# Patient Record
Sex: FEMALE | Race: WHITE | NOT HISPANIC OR LATINO | Employment: OTHER | URBAN - NONMETROPOLITAN AREA
[De-identification: names, ages, dates, MRNs, and addresses within clinical notes are randomized per-mention and may not be internally consistent; named-entity substitution may affect disease eponyms.]

---

## 2017-01-13 ENCOUNTER — OFFICE VISIT (OUTPATIENT)
Dept: FAMILY MEDICINE CLINIC | Facility: CLINIC | Age: 62
End: 2017-01-13

## 2017-01-13 VITALS
DIASTOLIC BLOOD PRESSURE: 55 MMHG | SYSTOLIC BLOOD PRESSURE: 138 MMHG | BODY MASS INDEX: 33.33 KG/M2 | TEMPERATURE: 98.1 F | HEART RATE: 80 BPM | WEIGHT: 225 LBS | HEIGHT: 69 IN | OXYGEN SATURATION: 97 %

## 2017-01-13 DIAGNOSIS — E03.9 ACQUIRED HYPOTHYROIDISM: Primary | ICD-10-CM

## 2017-01-13 DIAGNOSIS — R10.11 RIGHT UPPER QUADRANT ABDOMINAL PAIN: ICD-10-CM

## 2017-01-13 PROCEDURE — 99214 OFFICE O/P EST MOD 30 MIN: CPT | Performed by: INTERNAL MEDICINE

## 2017-01-13 NOTE — MR AVS SNAPSHOT
[place]ia Ramos   1/13/2017 1:15 PM   Office Visit    Dept Phone:  [phone]   Encounter #:  [id number]    Provider:  Gordon Norman MD   Department:  [facility] PRIMARY CARE                Your Full Care Plan              Your Updated Medication List          This list is accurate as of: 1/13/17  1:45 PM.  Always use your most recent med list.                aspirin 81 MG EC tablet       BIOTIN PO       calcium carbonate 600 MG tablet   Commonly known as:  OS-SHANTE       CRANBERRY CONCENTRATE PO       [facility] CINNAMON 500 MG capsule   Generic drug:  Cinnamon       cyanocobalamin 2500 MCG tablet tablet   Commonly known as:  VITAMIN B-12       Fish Oil 435 MG capsule       flecainide 50 MG tablet   Commonly known as:  TAMBOCOR   Take 1 tablet by mouth 2 (Two) Times a Day.       Garlic 500 MG capsule       Glucosamine--500 MG tablet       ipratropium 17 MCG/ACT inhaler   Commonly known as:  ATROVENT HFA   Inhale 2 puffs Every 6 (Six) Hours As Needed for wheezing.       levothyroxine 175 MCG tablet   Commonly known as:  SYNTHROID, LEVOTHROID   Take 1 tablet by mouth Daily.       magnesium gluconate 500 MG tablet   Commonly known as:  MAGONATE       neomycin-bacitracin-polymyxin 400-5-5000 ointment   Commonly known as:  NEOSPORIN   Apply  topically 2 (Two) Times a Day. Apply with steri strips in place       nitrofurantoin (macrocrystal-monohydrate) 100 MG capsule   Commonly known as:  MACROBID       Omeprazole 20 MG tablet delayed-release   Commonly known as:  EQ OMEPRAZOLE   Take 20 mg by mouth 2 (Two) Times a Day.       PENCREAM cream       SOLUBLE FIBER/PROBIOTICS PO       Unable to find       vitamin C 500 MG tablet   Commonly known as:  ASCORBIC ACID       Vitamin D3 2000 UNITS capsule               You Were Diagnosed With        Codes Comments    Acquired hypothyroidism    -  Primary ICD-10-CM: E03.9  ICD-9-CM: 244.9     Right upper quadrant abdominal pain      ICD-10-CM: R10.11  ICD-9-CM: 789.01       Instructions     None    Patient Instructions History      Upcoming Appointments     Visit Type Date Time Department    OFFICE VISIT 1/13/2017  1:15 PM [facility]    OFFICE VISIT 2/6/2017  8:15 AM [facility]    OFFICE VISIT 2/10/2017  3:15 PM [facility]    FOLLOW UP - SLEEP 2/21/2017 12:50 PM [facility]      MyChart Signup     Our records indicate that you have an active [employer] account.    You can view your After Visit Summary by going to [place] and logging in with your [employer] username and password.  If you don't have a [employer] username and password but a parent or guardian has access to your record, the parent or guardian should login with their own [employer] username and password and access your record to view the After Visit Summary.    If you have questions, you can email [email or web address] or call [phone] to talk to our [employer] staff.  Remember, [employer] is NOT to be used for urgent needs.  For medical emergencies, dial 911.               Other Info from Your Visit           Your Appointments     Feb 06, 2017  8:15 AM EST   Office Visit with Gordon Norman MD   [facility] PRIMARY CARE (--)    [address]  [facility] [address] [phone]           Arrive 15 minutes prior to appointment.            Feb 10, 2017  3:15 PM EST   Office Visit with Gordon Norman MD   [facility] PRIMARY CARE (--)    [address]  [facility] [address] [phone]           Arrive 15 minutes prior to appointment.            Feb 21, 2017 12:50 PM EST   FOLLOW UP - SLEEP with Moreno Mustafa MD   [facility] PULMONARY CRITICAL CARE AND SLEEP (--)    [address]  [facility] [address] [phone]              Allergies     Albuterol  Other (See Comments)    FEELS WEIRD, INCREASED HR AND  "BREATHING    Ciprodex [Ciprofloxacin-dexamethasone]  Other (See Comments)    REDNESS    Darvon [Propoxyphene] Unspecified     Niacin  Hives    Other  Other (See Comments)    POLLEN,TREES,AND PLANTS MULTIPLE ENVIRONMENTAL ALLERGIES    Tegaderm Ag Mesh [Silver]  Hives    Adhesive Tape  Rash      Reason for Visit     Abdominal Pain RUQ pain      Vital Signs     Blood Pressure Pulse Temperature Height Weight Last Menstrual Period    138/55 (BP Location: Left arm, Patient Position: Sitting) 80 98.1 °F (36.7 °C) 69\" (175.3 cm) 225 lb (102 kg) (LMP Unknown)    Oxygen Saturation Body Mass Index Smoking Status             97% 33.23 kg/m2 Former Smoker         Problems and Diagnoses Noted     Underactive thyroid    Abdominal pain, right upper quadrant            "

## 2017-01-13 NOTE — PROGRESS NOTES
"Subjective   Patient ID: Albania Ramos is a 61 y.o. female Pt request medical management.     History of Present Illness   Pt request medical management.   Pt in right ab.  Soft stools. Pain comes and goes.  Just pain in the right side and radiates around the right waist. Can last all day and some days not at all.     Pt sees Dr Mustafa for mikaela. Waking up more tired.   Not tolerating mask.   Not tolerating nasal pillows.         The following portions of the patient's history were reviewed and updated as appropriate: allergies, current medications, past family history, past medical history, past social history, past surgical history and problem list.  Review of Systems   Constitutional: Positive for fatigue.   HENT: Positive for congestion.    Gastrointestinal: Positive for abdominal pain.   All other systems reviewed and are negative.      Visit Vitals   • /55 (BP Location: Left arm, Patient Position: Sitting)   • Pulse 80   • Temp 98.1 °F (36.7 °C)   • Ht 69\" (175.3 cm)   • Wt 225 lb (102 kg)   • LMP  (LMP Unknown)   • SpO2 97%   • BMI 33.23 kg/m2       Objective   Physical Exam  General Appearance:    Alert, cooperative, no distress, appears stated age   Head:    Normocephalic, without obvious abnormality, atraumatic   Eyes:    PERRL, conjunctiva/corneas clear, EOM's intact, fundi     benign, both eyes        Ears:    Normal TM's and external ear canals, both ears   Nose:   Nares normal, septum midline, mucosa normal, no drainage    or sinus tenderness   Throat:   Lips, mucosa, and tongue normal; teeth and gums normal   Neck:   Supple, symmetrical, trachea midline, no adenopathy;        thyroid:  No enlargement/tenderness/nodules; no carotid    bruit or JVD   Back:     Symmetric, no curvature, ROM normal, no CVA tenderness   Lungs:     Clear to auscultation bilaterally, respirations unlabored   Chest wall:    No tenderness or deformity   Heart:    Regular rate and rhythm, S1 and S2 normal, no murmur, rub   or " gallop   Abdomen:     Soft, non-tender, bowel sounds active all four quadrants,     no masses, no organomegaly           Extremities:   Extremities normal, atraumatic, no cyanosis or edema   Pulses:   2+ and symmetric all extremities   Skin:   Skin color, texture, turgor normal, no rashes or lesions   Lymph nodes:   Cervical, supraclavicular, and axillary nodes normal   Neurologic:   CNII-XII intact. Normal strength, sensation and reflexes       throughout     Assessment/Plan     kub with large dense stool in area of pain. Neg ct.         Al[place]ia was seen today for abdominal pain.    Diagnoses and all orders for this visit:    Acquired hypothyroidism  -     TSH; Future  -     T4, Free; Future  -     T3, Free; Future    Right upper quadrant abdominal pain      Return in about 4 weeks (around 2/10/2017).           Immunization History   Administered Date(s) Administered   • Influenza TIV (IM) 10/08/2016   • Influenza, Unspecified 08/01/2012   • Tdap 01/01/2011   • Tetanus 04/26/2011   • Zoster 01/01/2012           There are no Patient Instructions on file for this visit.

## 2017-02-06 ENCOUNTER — CONVERSION ENCOUNTER (OUTPATIENT)
Dept: FAMILY MEDICINE CLINIC | Facility: CLINIC | Age: 62
End: 2017-02-06

## 2017-02-07 LAB
T3FREE SERPL-MCNC: 3 PG/ML (ref 2–4.4)
T4 FREE SERPL-MCNC: 1.92 NG/DL (ref 0.78–2.19)
TSH SERPL DL<=0.005 MIU/L-ACNC: 0.02 MIU/ML (ref 0.47–4.68)

## 2017-02-15 ENCOUNTER — TELEPHONE (OUTPATIENT)
Dept: FAMILY MEDICINE CLINIC | Facility: CLINIC | Age: 62
End: 2017-02-15

## 2017-02-15 NOTE — TELEPHONE ENCOUNTER
Patient called very upset stating that she has called three times requesting her lab results, her hair is falling out and she is very concerned. Patient states that labs were done at [facility]. I apologized and informed patient that [facility], and that [facility] would [facility] and request labs. I told patient that labs should have been faxed because they were ordered under [facility] name. I told patient that once labs were received we would give her a call. Patient states that she has to go out today anyway, so she is going to  a copy as well.

## 2017-02-17 ENCOUNTER — TELEPHONE (OUTPATIENT)
Dept: FAMILY MEDICINE CLINIC | Facility: CLINIC | Age: 62
End: 2017-02-17

## 2017-02-17 DIAGNOSIS — E03.8 OTHER SPECIFIED HYPOTHYROIDISM: ICD-10-CM

## 2017-02-17 RX ORDER — LEVOTHYROXINE SODIUM 0.1 MG/1
100 TABLET ORAL DAILY
Qty: 90 TABLET | Refills: 3 | Status: SHIPPED | OUTPATIENT
Start: 2017-02-17 | End: 2018-02-13 | Stop reason: SDUPTHER

## 2017-02-17 RX ORDER — LEVOTHYROXINE SODIUM 88 UG/1
88 TABLET ORAL DAILY
Qty: 90 TABLET | Refills: 3 | Status: SHIPPED | OUTPATIENT
Start: 2017-02-17 | End: 2017-09-21 | Stop reason: SDUPTHER

## 2017-02-17 NOTE — TELEPHONE ENCOUNTER
I spoke with the patient and told her that Dr. Norman had changed her medication and sent it to the pharmacy and to recheck TSH in 6 weeks.  She voiced verbal understanding.

## 2017-02-17 NOTE — TELEPHONE ENCOUNTER
Patient notified that medication changes had been made and to recheck TSH in 6 weeks.  She voiced verbal understanding.

## 2017-02-17 NOTE — TELEPHONE ENCOUNTER
It looks like she may need a slight increase. I will increase to total of 188mcg.  This will have to be two seprate rx. Please let her now. Recheck in 6 weeks.

## 2017-02-17 NOTE — TELEPHONE ENCOUNTER
Patient called for results of her thyroid labs gave them to her but she wanted you to know she is extremely fatigued and also her hair is falling out again was wondering what to do since her TSH was low?

## 2017-02-21 ENCOUNTER — OFFICE VISIT (OUTPATIENT)
Dept: PULMONOLOGY | Facility: CLINIC | Age: 62
End: 2017-02-21

## 2017-02-21 VITALS
WEIGHT: 228 LBS | HEART RATE: 89 BPM | DIASTOLIC BLOOD PRESSURE: 70 MMHG | SYSTOLIC BLOOD PRESSURE: 128 MMHG | RESPIRATION RATE: 18 BRPM | BODY MASS INDEX: 33.77 KG/M2 | HEIGHT: 69 IN | OXYGEN SATURATION: 97 %

## 2017-02-21 DIAGNOSIS — G47.33 OBSTRUCTIVE SLEEP APNEA: Primary | ICD-10-CM

## 2017-02-21 PROCEDURE — 99213 OFFICE O/P EST LOW 20 MIN: CPT | Performed by: INTERNAL MEDICINE

## 2017-02-21 NOTE — PROGRESS NOTES
"Chief Complaint   Patient presents with   • Follow-up     Sleep Apnea          Subjective   Albania Ramos is a 62 y.o. female.     History of Present Illness     The patient is here for follow-up of obstructive sleep apnea.    She reports using his CPAP nightly.  She complains that she does not sleep soundly with the machine however she is not getting multiple times in the middle of the night to urinate.  She does complain of feeling more fatigued during the day and yawning more.  She has switched face masks and is now using a full face mask.  She states it takes her longer to get to sleep since she has started using CPAP.  She used to fall asleep almost immediately and now it takes her about 10 minutes before she can fall asleep.  She is not snoring now since she is using CPAP nightly according to her .    The following portions of the patient's history were reviewed and updated as appropriate: allergies, current medications, past family history, past medical history, past social history and past surgical history.    Review of Systems   Constitutional: Positive for fatigue. Negative for diaphoresis and unexpected weight change.   HENT: Positive for sinus pressure. Negative for rhinorrhea, sneezing, sore throat and voice change.    Respiratory: Negative for cough, choking, chest tightness, shortness of breath and wheezing.    Cardiovascular: Positive for palpitations. Negative for leg swelling.   Psychiatric/Behavioral: Positive for sleep disturbance.       Objective   Visit Vitals   • /70 (BP Location: Right arm, Patient Position: Sitting)   • Pulse 89   • Resp 18   • Ht 69\" (175.3 cm)   • Wt 228 lb (103 kg)   • LMP  (LMP Unknown)   • SpO2 97%   • BMI 33.67 kg/m2     Physical Exam   Constitutional: She is oriented to person, place, and time. She appears well-developed.   HENT:   Head: Normocephalic and atraumatic.   Mouth/Throat: Oropharynx is clear and moist.   Crowded oropharynx.   Eyes: EOM are " normal.   Cardiovascular: Normal rate and regular rhythm.    Pulmonary/Chest: Effort normal and breath sounds normal.   Musculoskeletal:   Gait normal.   Neurological: She is alert and oriented to person, place, and time.   Psychiatric: She has a normal mood and affect.   Vitals reviewed.          Assessment/Plan   [place]ia was seen today for follow-up.    Diagnoses and all orders for this visit:    Obstructive sleep apnea           Return in about 3 months (around 5/21/2017) for Recheck.    DISCUSSION (if any):  She is compliant with CPAP use.  Continue to use the CPAP nightly.  We will monitor her for 3-4 months and see if she has any improvement in her symptoms.    Errors in dictation may reflect use of voice recognition software and not all errors in transcription may have been detected prior to signing    This document was electronically signed by Moreno Mustafa MD February 21, 2017  1:36 PM

## 2017-03-08 DIAGNOSIS — G47.33 OSA (OBSTRUCTIVE SLEEP APNEA): Primary | ICD-10-CM

## 2017-04-03 DIAGNOSIS — G47.33 OSA (OBSTRUCTIVE SLEEP APNEA): Primary | ICD-10-CM

## 2017-04-13 ENCOUNTER — OFFICE VISIT (OUTPATIENT)
Dept: FAMILY MEDICINE CLINIC | Facility: CLINIC | Age: 62
End: 2017-04-13

## 2017-04-13 ENCOUNTER — HOSPITAL ENCOUNTER (OUTPATIENT)
Dept: GENERAL RADIOLOGY | Facility: HOSPITAL | Age: 62
Discharge: HOME | End: 2017-04-13
Attending: INTERNAL MEDICINE | Admitting: INTERNAL MEDICINE

## 2017-04-13 ENCOUNTER — TELEPHONE (OUTPATIENT)
Dept: FAMILY MEDICINE CLINIC | Facility: CLINIC | Age: 62
End: 2017-04-13

## 2017-04-13 ENCOUNTER — LAB (OUTPATIENT)
Dept: LAB | Facility: HOSPITAL | Age: 62
End: 2017-04-13
Attending: INTERNAL MEDICINE

## 2017-04-13 VITALS
HEART RATE: 74 BPM | TEMPERATURE: 98.1 F | SYSTOLIC BLOOD PRESSURE: 132 MMHG | HEIGHT: 69 IN | BODY MASS INDEX: 34.07 KG/M2 | OXYGEN SATURATION: 99 % | WEIGHT: 230 LBS | RESPIRATION RATE: 16 BRPM | DIASTOLIC BLOOD PRESSURE: 71 MMHG

## 2017-04-13 DIAGNOSIS — M19.90 SENILE ARTHRITIS: Primary | ICD-10-CM

## 2017-04-13 DIAGNOSIS — R79.89 ELEVATED D-DIMER: Primary | ICD-10-CM

## 2017-04-13 DIAGNOSIS — J45.21 MILD INTERMITTENT ASTHMA WITH ACUTE EXACERBATION: ICD-10-CM

## 2017-04-13 DIAGNOSIS — E03.9 ACQUIRED HYPOTHYROIDISM: Primary | ICD-10-CM

## 2017-04-13 DIAGNOSIS — R07.1 CHEST PAIN ON BREATHING: ICD-10-CM

## 2017-04-13 LAB
D-DIMER, QUANTITATIVE (MAD,POW, STR): 564 NG/ML (FEU) (ref 0–500)
T4 FREE SERPL-MCNC: 1.81 NG/DL (ref 0.78–2.19)
TROPONIN I SERPL-MCNC: <0.012 NG/ML (ref 0–0.03)
TSH SERPL DL<=0.05 MIU/L-ACNC: 0.07 MIU/ML (ref 0.47–4.68)

## 2017-04-13 PROCEDURE — 84484 ASSAY OF TROPONIN QUANT: CPT

## 2017-04-13 PROCEDURE — 85379 FIBRIN DEGRADATION QUANT: CPT | Performed by: INTERNAL MEDICINE

## 2017-04-13 PROCEDURE — 36415 COLL VENOUS BLD VENIPUNCTURE: CPT | Performed by: INTERNAL MEDICINE

## 2017-04-13 PROCEDURE — 84481 FREE ASSAY (FT-3): CPT | Performed by: INTERNAL MEDICINE

## 2017-04-13 PROCEDURE — 99214 OFFICE O/P EST MOD 30 MIN: CPT | Performed by: INTERNAL MEDICINE

## 2017-04-13 PROCEDURE — 71020 HC CHEST PA AND LATERAL: CPT

## 2017-04-13 PROCEDURE — 84443 ASSAY THYROID STIM HORMONE: CPT | Performed by: INTERNAL MEDICINE

## 2017-04-13 PROCEDURE — 84439 ASSAY OF FREE THYROXINE: CPT | Performed by: INTERNAL MEDICINE

## 2017-04-13 PROCEDURE — 93000 ELECTROCARDIOGRAM COMPLETE: CPT | Performed by: INTERNAL MEDICINE

## 2017-04-13 RX ORDER — ASPIRIN 81 MG/1
81 TABLET ORAL DAILY
Qty: 90 TABLET | Refills: 3 | Status: SHIPPED | OUTPATIENT
Start: 2017-04-13 | End: 2017-05-16 | Stop reason: SDUPTHER

## 2017-04-13 RX ORDER — AMOXICILLIN AND CLAVULANATE POTASSIUM 875; 125 MG/1; MG/1
1 TABLET, FILM COATED ORAL 2 TIMES DAILY
Qty: 20 TABLET | Refills: 0 | Status: SHIPPED | OUTPATIENT
Start: 2017-04-13 | End: 2017-05-16

## 2017-04-13 NOTE — PROGRESS NOTES
"Subjective   Patient ID: Albania Ramos is a 62 y.o. female Pt is here for management of multiple medical problems.    Chief Complaint   Patient presents with   • Shortness of Breath     x 4 days, tightness in chest, no energy, feels like she has a knot in throat, patient has been wheezing, patient is using inhaler   • Cough     coughing up yellow chunks of phlegm, patient having some soreness on right side of chest, patient would like to get a chest X-ray   • Hypothyroidism     patient needs order to check thyroid levels       History of Present Illness      The following portions of the patient's history were reviewed and updated as appropriate: allergies, current medications, past family history, past medical history, past social history, past surgical history and problem list.    soa and cp on right side.   Occurred at work. NP folllowed pt and she didn't go to er.  Cough with large yellow veronika.    Strait line pain and pressure on right chest.   After rain started breathing improved.    durration 4 days./      Review of Systems   Constitutional: Positive for fatigue.   Respiratory: Positive for cough, shortness of breath and wheezing.    Cardiovascular: Positive for chest pain and palpitations.   All other systems reviewed and are negative.      Objective     /71 (BP Location: Left arm, Patient Position: Sitting, Cuff Size: Large Adult)  Pulse 74  Temp 98.1 °F (36.7 °C) (Oral)   Resp 16  Ht 69\" (175.3 cm)  Wt 230 lb (104 kg)  LMP  (LMP Unknown)  SpO2 99%  BMI 33.97 kg/m2  Physical Exam  General Appearance:    Alert, cooperative, no distress, appears stated age   Head:    Normocephalic, without obvious abnormality, atraumatic   Eyes:    PERRL, conjunctiva/corneas clear, EOM's intact           Ears:    Normal TM's and external ear canals, both ears   Nose:   Nares normal, septum midline, mucosa normal, no drainage    or sinus tenderness   Throat:   Lips, mucosa, and tongue normal; teeth and gums " normal   Neck:   Supple, symmetrical, trachea midline, no adenopathy;        thyroid:  No enlargement/tenderness/nodules; no carotid    bruit or JVD   Back:     Symmetric, no curvature, ROM normal, no CVA tenderness   Lungs:     Clear to auscultation bilaterally, respirations unlabored   Chest wall:    No tenderness or deformity   Heart:    Regular rate and rhythm, S1 and S2 normal, no murmur, rub   or gallop   Abdomen:     Soft, non-tender, bowel sounds active all four quadrants,     no masses, no organomegaly           Extremities:   Extremities normal, atraumatic, no cyanosis or edema   Pulses:   2+ and symmetric all extremities   Skin:   Skin color, texture, turgor normal, no rashes or lesions   Lymph nodes:   Cervical, supraclavicular, and axillary nodes normal   Neurologic:   CNII-XII intact. Normal strength, sensation and reflexes       throughout       Assessment/Plan           [place] was seen today for shortness of breath, cough and hypothyroidism.    Diagnoses and all orders for this visit:    Acquired hypothyroidism  -     TSH  -     T4, Free  -     T3, Free    Mild intermittent asthma with acute exacerbation    Chest pain on breathing  -     D-dimer, Quantitative  -     Troponin I  -     ECG 12 Lead  -     XR Chest PA & Lateral; Future      No Follow-up on file.    ECG 12 Lead  Date/Time: 4/13/2017 10:48 AM  Performed by: JAXSON SAPP  Authorized by: JAXSON SAPP   Comparison: compared with previous ECG from 7/29/2016  Similar to previous ECG  Rhythm: sinus rhythm  BPM: 71                           There are no Patient Instructions on file for this visit.

## 2017-04-13 NOTE — TELEPHONE ENCOUNTER
Neris Segura with [facility] called regarding Albania Kumar' D-Dimer it was 564, her other labs are still pending.

## 2017-04-14 ENCOUNTER — TELEPHONE (OUTPATIENT)
Dept: FAMILY MEDICINE CLINIC | Facility: CLINIC | Age: 62
End: 2017-04-14

## 2017-04-14 ENCOUNTER — LAB (OUTPATIENT)
Dept: LAB | Facility: HOSPITAL | Age: 62
End: 2017-04-14
Attending: INTERNAL MEDICINE

## 2017-04-14 DIAGNOSIS — R79.89 ELEVATED D-DIMER: ICD-10-CM

## 2017-04-14 LAB
D-DIMER, QUANTITATIVE (MAD,POW, STR): 444 NG/ML (FEU) (ref 0–500)
T3FREE SERPL-MCNC: 3.2 PG/ML (ref 2–4.4)

## 2017-04-14 PROCEDURE — 85379 FIBRIN DEGRADATION QUANT: CPT

## 2017-05-03 ENCOUNTER — PATIENT MESSAGE (OUTPATIENT)
Dept: FAMILY MEDICINE CLINIC | Facility: CLINIC | Age: 62
End: 2017-05-03

## 2017-05-11 ENCOUNTER — OFFICE VISIT (OUTPATIENT)
Dept: FAMILY MEDICINE CLINIC | Facility: CLINIC | Age: 62
End: 2017-05-11

## 2017-05-11 ENCOUNTER — HOSPITAL ENCOUNTER (OUTPATIENT)
Dept: MRI IMAGING | Facility: HOSPITAL | Age: 62
Discharge: HOME | End: 2017-05-11
Attending: INTERNAL MEDICINE | Admitting: INTERNAL MEDICINE

## 2017-05-11 VITALS
DIASTOLIC BLOOD PRESSURE: 68 MMHG | TEMPERATURE: 98.5 F | OXYGEN SATURATION: 98 % | BODY MASS INDEX: 34.36 KG/M2 | HEIGHT: 69 IN | HEART RATE: 90 BPM | RESPIRATION RATE: 16 BRPM | WEIGHT: 232 LBS | SYSTOLIC BLOOD PRESSURE: 137 MMHG

## 2017-05-11 DIAGNOSIS — S83.8X2A ACUTE MENISCAL INJURY OF LEFT KNEE, INITIAL ENCOUNTER: Primary | ICD-10-CM

## 2017-05-11 PROCEDURE — 73721 MRI JNT OF LWR EXTRE W/O DYE: CPT

## 2017-05-11 PROCEDURE — 99213 OFFICE O/P EST LOW 20 MIN: CPT | Performed by: INTERNAL MEDICINE

## 2017-05-12 ENCOUNTER — HOSPITAL ENCOUNTER (OUTPATIENT)
Dept: MRI IMAGING | Facility: HOSPITAL | Age: 62
End: 2017-05-12
Attending: INTERNAL MEDICINE

## 2017-05-15 ENCOUNTER — TELEPHONE (OUTPATIENT)
Dept: FAMILY MEDICINE CLINIC | Facility: CLINIC | Age: 62
End: 2017-05-15

## 2017-05-16 ENCOUNTER — OFFICE VISIT (OUTPATIENT)
Dept: PULMONOLOGY | Facility: CLINIC | Age: 62
End: 2017-05-16

## 2017-05-16 VITALS
OXYGEN SATURATION: 96 % | SYSTOLIC BLOOD PRESSURE: 130 MMHG | WEIGHT: 232 LBS | DIASTOLIC BLOOD PRESSURE: 86 MMHG | HEIGHT: 69 IN | RESPIRATION RATE: 14 BRPM | HEART RATE: 75 BPM | BODY MASS INDEX: 34.36 KG/M2

## 2017-05-16 DIAGNOSIS — J45.20 MILD INTERMITTENT ASTHMA WITHOUT COMPLICATION: ICD-10-CM

## 2017-05-16 DIAGNOSIS — J30.89 OTHER ALLERGIC RHINITIS: ICD-10-CM

## 2017-05-16 DIAGNOSIS — G47.33 OSA (OBSTRUCTIVE SLEEP APNEA): Primary | ICD-10-CM

## 2017-05-16 PROCEDURE — 99214 OFFICE O/P EST MOD 30 MIN: CPT | Performed by: INTERNAL MEDICINE

## 2017-05-16 RX ORDER — AZELASTINE 1 MG/ML
1 SPRAY, METERED NASAL 2 TIMES DAILY PRN
Qty: 1 EACH | Refills: 5 | Status: SHIPPED | OUTPATIENT
Start: 2017-05-16 | End: 2018-12-28

## 2017-05-16 RX ORDER — MONTELUKAST SODIUM 10 MG/1
10 TABLET ORAL NIGHTLY
Qty: 30 TABLET | Refills: 5 | Status: SHIPPED | OUTPATIENT
Start: 2017-05-16 | End: 2017-10-10 | Stop reason: ALTCHOICE

## 2017-06-20 ENCOUNTER — TELEPHONE (OUTPATIENT)
Dept: FAMILY MEDICINE CLINIC | Facility: CLINIC | Age: 62
End: 2017-06-20

## 2017-06-20 DIAGNOSIS — N39.0 RECURRENT UTI: Primary | ICD-10-CM

## 2017-06-22 ENCOUNTER — TELEPHONE (OUTPATIENT)
Dept: FAMILY MEDICINE CLINIC | Facility: CLINIC | Age: 62
End: 2017-06-22

## 2017-06-22 LAB
BACTERIA UR CULT: NO GROWTH
BACTERIA UR CULT: NORMAL

## 2017-06-22 NOTE — TELEPHONE ENCOUNTER
Dr. Norman, I talked to Albania in regards to the Urine Culture results, she states the urine dip at work showed blood and WBCs after 2 full rounds of antibiotics and 3 days of Cipro. Patient is still having UTI symptoms and she has been having an increase in swelling in her knees and left leg and ankle. Albania has been scheduled to see you on Tuesday (6/27/17).

## 2017-06-27 ENCOUNTER — OFFICE VISIT (OUTPATIENT)
Dept: FAMILY MEDICINE CLINIC | Facility: CLINIC | Age: 62
End: 2017-06-27

## 2017-06-27 VITALS
OXYGEN SATURATION: 100 % | TEMPERATURE: 98 F | DIASTOLIC BLOOD PRESSURE: 66 MMHG | SYSTOLIC BLOOD PRESSURE: 137 MMHG | HEART RATE: 80 BPM | RESPIRATION RATE: 16 BRPM

## 2017-06-27 DIAGNOSIS — R31.9 HEMATURIA: ICD-10-CM

## 2017-06-27 DIAGNOSIS — R60.0 LOCALIZED EDEMA: Primary | ICD-10-CM

## 2017-06-27 PROCEDURE — 99214 OFFICE O/P EST MOD 30 MIN: CPT | Performed by: INTERNAL MEDICINE

## 2017-06-27 RX ORDER — HYDROCHLOROTHIAZIDE 12.5 MG/1
12.5 CAPSULE, GELATIN COATED ORAL DAILY
Qty: 30 CAPSULE | Refills: 5 | Status: SHIPPED | OUTPATIENT
Start: 2017-06-27 | End: 2017-12-07 | Stop reason: SDUPTHER

## 2017-06-27 NOTE — PROGRESS NOTES
Subjective   Patient ID: Albania Ramos is a 62 y.o. female Pt is here for management of multiple medical problems.    Chief Complaint   Patient presents with   • Edema     Patient complains of bilateral leg swelling and pain. She states that swelling was worse after taking Ciprofloxacin for Diverticulitis, so she stopped the antibiotic after three days.        History of Present Illness    cipro for diverticulitis. Had tendinitis in both knees. Still with edema in knees and some better.     uti not symptomatic.        Going for knee replacement 7/25/17.            The following portions of the patient's history were reviewed and updated as appropriate: allergies, current medications, past family history, past medical history, past social history, past surgical history and problem list.      Review of Systems   Constitutional: Negative for fatigue.   Musculoskeletal: Positive for arthralgias.       Objective     /66  Pulse 80  Temp 98 °F (36.7 °C) (Oral)   Resp 16  LMP  (LMP Unknown)  SpO2 100%  Physical Exam  General Appearance:    Alert, cooperative, no distress, appears stated age   Head:    Normocephalic, without obvious abnormality, atraumatic   Eyes:    PERRL, conjunctiva/corneas clear, EOM's intact           Ears:    Normal TM's and external ear canals, both ears   Nose:   Nares normal, septum midline, mucosa normal, no drainage    or sinus tenderness   Throat:   Lips, mucosa, and tongue normal; teeth and gums normal   Neck:   Supple, symmetrical, trachea midline, no adenopathy;        thyroid:  No enlargement/tenderness/nodules; no carotid    bruit or JVD   Back:     Symmetric, no curvature, ROM normal, no CVA tenderness   Lungs:     Clear to auscultation bilaterally, respirations unlabored   Chest wall:    No tenderness or deformity   Heart:    Regular rate and rhythm, S1 and S2 normal, no murmur, rub   or gallop   Abdomen:     Soft, non-tender, bowel sounds active all four quadrants,     no  masses, no organomegaly           Extremities:  edema in knees and crepitus. Worse in right knee.      Pulses:   2+ and symmetric all extremities   Skin:   Skin color, texture, turgor normal, no rashes or lesions   Lymph nodes:   Cervical, supraclavicular, and axillary nodes normal   Neurologic:   CNII-XII intact. Normal strength, sensation and reflexes       throughout       Assessment/Plan     Recheck urine.Has know mild hematuria.   Cysto in past.    Had us renal and ct ab.      On cpap. Less palpitation.   Start prn hctz for edema.       [place] was seen today for edema.    Diagnoses and all orders for this visit:    Localized edema  -     hydrochlorothiazide (MICROZIDE) 12.5 MG capsule; Take 1 capsule by mouth Daily.    Hematuria  -     Urinalysis With Microscopic  -     Urine Culture    No Follow-up on file.                   There are no Patient Instructions on file for this visit.

## 2017-06-29 LAB
APPEARANCE UR: CLEAR
BACTERIA #/AREA URNS HPF: NORMAL /HPF
BACTERIA UR CULT: NO GROWTH
BACTERIA UR CULT: NORMAL
BILIRUB UR QL STRIP: NEGATIVE
COLOR UR: YELLOW
EPI CELLS #/AREA URNS HPF: NORMAL /HPF
GLUCOSE UR QL: NEGATIVE
HGB UR QL STRIP: (no result)
KETONES UR QL STRIP: NEGATIVE
LEUKOCYTE ESTERASE UR QL STRIP: NEGATIVE
NITRITE UR QL STRIP: NEGATIVE
PH UR STRIP: 7 [PH] (ref 5–8)
PROT UR QL STRIP: NEGATIVE
RBC #/AREA URNS HPF: NORMAL /HPF
SP GR UR: 1.01 (ref 1–1.03)
UROBILINOGEN UR STRIP-MCNC: (no result) MG/DL
WBC #/AREA URNS HPF: NORMAL /HPF

## 2017-07-06 ENCOUNTER — OFFICE VISIT (OUTPATIENT)
Dept: FAMILY MEDICINE CLINIC | Facility: CLINIC | Age: 62
End: 2017-07-06

## 2017-07-06 ENCOUNTER — HOSPITAL ENCOUNTER (OUTPATIENT)
Dept: ULTRASOUND IMAGING | Facility: HOSPITAL | Age: 62
Discharge: HOME | End: 2017-07-06
Attending: INTERNAL MEDICINE | Admitting: INTERNAL MEDICINE

## 2017-07-06 VITALS
OXYGEN SATURATION: 98 % | BODY MASS INDEX: 34.8 KG/M2 | HEIGHT: 69 IN | SYSTOLIC BLOOD PRESSURE: 137 MMHG | DIASTOLIC BLOOD PRESSURE: 72 MMHG | HEART RATE: 77 BPM | TEMPERATURE: 98 F | RESPIRATION RATE: 16 BRPM | WEIGHT: 235 LBS

## 2017-07-06 DIAGNOSIS — R10.11 RIGHT UPPER QUADRANT ABDOMINAL PAIN: ICD-10-CM

## 2017-07-06 DIAGNOSIS — R10.11 RIGHT UPPER QUADRANT ABDOMINAL PAIN: Primary | ICD-10-CM

## 2017-07-06 PROCEDURE — 99214 OFFICE O/P EST MOD 30 MIN: CPT | Performed by: INTERNAL MEDICINE

## 2017-07-06 PROCEDURE — 76705 ECHO EXAM OF ABDOMEN: CPT

## 2017-07-06 NOTE — PROGRESS NOTES
"Subjective   Patient ID: Albania Ramos is a 62 y.o. female Pt is here for management of multiple medical problems.    Chief Complaint   Patient presents with   • Edema     left foot and ankle swelling on and off x 3 weeks   • Pain     patient states she has been having pain in her right side again, hurts when bending to the right side and pain/burning on the right side during a bowel movement       History of Present Illness    Left ankle edema worse after getting up in the am.     Pt with no soda intake.    Has compresion stocking at home.   On hctz with overall improvement.     Pain in right upper ab.   Waxes and wanes thinks stool makes it worse.   Hx of ccy. No fever and chills.              The following portions of the patient's history were reviewed and updated as appropriate: allergies, current medications, past family history, past medical history, past social history, past surgical history and problem list.      Review of Systems   HENT: Positive for congestion.    All other systems reviewed and are negative.      Objective     /72 (BP Location: Left arm, Patient Position: Sitting, Cuff Size: Large Adult)  Pulse 77  Temp 98 °F (36.7 °C) (Oral)   Resp 16  Ht 69\" (175.3 cm)  Wt 235 lb (107 kg)  LMP  (LMP Unknown)  SpO2 98%  BMI 34.7 kg/m2  Physical Exam  General Appearance:    Alert, cooperative, no distress, appears stated age   Head:    Normocephalic, without obvious abnormality, atraumatic   Eyes:    PERRL, conjunctiva/corneas clear, EOM's intact           Ears:    Normal TM's and external ear canals, both ears   Nose:   Nares normal, septum midline, mucosa normal, no drainage    or sinus tenderness   Throat:   Lips, mucosa, and tongue normal; teeth and gums normal   Neck:   Supple, symmetrical, trachea midline, no adenopathy;        thyroid:  No enlargement/tenderness/nodules; no carotid    bruit or JVD   Back:     Symmetric, no curvature, ROM normal, no CVA tenderness   Lungs:     Clear to " auscultation bilaterally, respirations unlabored   Chest wall:    No tenderness or deformity   Heart:    Regular rate and rhythm, S1 and S2 normal, no murmur, rub   or gallop   Abdomen:     Soft, mild-tender, bowel sounds active all four quadrants,     no masses, no organomegaly           Extremities:   Extremities normal, atraumatic, no cyanosis +2 edema left foot.    Pulses:   2+ and symmetric all extremities   Skin:   Skin color, texture, turgor normal, no rashes or lesions   Lymph nodes:   Cervical, supraclavicular, and axillary nodes normal   Neurologic:   CNII-XII intact. Normal strength, sensation and reflexes       throughout       Assessment/Plan     Start compression stockings.        [place] was seen today for edema and pain.    Diagnoses and all orders for this visit:    Right upper quadrant abdominal pain  -     US liver; Future  -     Compression Thigh High Stockings      No Follow-up on file.                   There are no Patient Instructions on file for this visit.

## 2017-07-12 ENCOUNTER — HOSPITAL ENCOUNTER (OUTPATIENT)
Dept: GENERAL RADIOLOGY | Facility: HOSPITAL | Age: 62
Discharge: HOME | End: 2017-07-12
Attending: INTERNAL MEDICINE | Admitting: INTERNAL MEDICINE

## 2017-07-12 ENCOUNTER — OFFICE VISIT (OUTPATIENT)
Dept: FAMILY MEDICINE CLINIC | Facility: CLINIC | Age: 62
End: 2017-07-12

## 2017-07-12 VITALS
BODY MASS INDEX: 35.1 KG/M2 | SYSTOLIC BLOOD PRESSURE: 130 MMHG | WEIGHT: 237 LBS | TEMPERATURE: 98.1 F | HEIGHT: 69 IN | DIASTOLIC BLOOD PRESSURE: 75 MMHG | HEART RATE: 89 BPM | OXYGEN SATURATION: 97 %

## 2017-07-12 DIAGNOSIS — M25.562 ACUTE PAIN OF LEFT KNEE: Primary | ICD-10-CM

## 2017-07-12 DIAGNOSIS — M25.562 ACUTE PAIN OF LEFT KNEE: ICD-10-CM

## 2017-07-12 PROCEDURE — 93000 ELECTROCARDIOGRAM COMPLETE: CPT | Performed by: INTERNAL MEDICINE

## 2017-07-12 PROCEDURE — 71020 HC CHEST PA AND LATERAL: CPT

## 2017-07-12 PROCEDURE — 99214 OFFICE O/P EST MOD 30 MIN: CPT | Performed by: INTERNAL MEDICINE

## 2017-07-12 NOTE — PROGRESS NOTES
"Subjective   Patient ID: Albania Ramos is a 62 y.o. female Pt request medical management.     History of Present Illness   Pt request medical management.     July 25 tkr left with DR Calero.     [facility].    No cp and soa.         The following portions of the patient's history were reviewed and updated as appropriate: allergies, current medications, past family history, past medical history, past social history, past surgical history and problem list.  Review of Systems   Musculoskeletal: Positive for arthralgias, gait problem and joint swelling.       /75  Pulse 89  Temp 98.1 °F (36.7 °C)  Ht 69\" (175.3 cm)  Wt 237 lb (108 kg)  LMP  (LMP Unknown)  SpO2 97%  BMI 35 kg/m2    Objective   Physical Exam  General Appearance:    Alert, cooperative, no distress, appears stated age   Head:    Normocephalic, without obvious abnormality, atraumatic   Eyes:    PERRL, conjunctiva/corneas clear, EOM's intact, fundi     benign, both eyes        Ears:    Normal TM's and external ear canals, both ears   Nose:   Nares normal, septum midline, mucosa normal, no drainage    or sinus tenderness   Throat:   Lips, mucosa, and tongue normal; teeth and gums normal   Neck:   Supple, symmetrical, trachea midline, no adenopathy;        thyroid:  No enlargement/tenderness/nodules; no carotid    bruit or JVD   Back:     Symmetric, no curvature, ROM normal, no CVA tenderness   Lungs:     Clear to auscultation bilaterally, respirations unlabored   Chest wall:    No tenderness or deformity   Heart:    Regular rate and rhythm, S1 and S2 normal, no murmur, rub   or gallop   Abdomen:     Soft, non-tender, bowel sounds active all four quadrants,     no masses, no organomegaly           Extremities:   Extremities normal, atraumatic, no cyanosis or edema   Pulses:   2+ and symmetric all extremities   Skin:   Skin color, texture, turgor normal, no rashes or lesions   Lymph nodes:   Cervical, supraclavicular, and axillary nodes normal "   Neurologic:   CNII-XII intact. Normal strength, sensation and reflexes       throughout     Assessment/Plan   Pt is cleared for tkr.      Labs pending.     Chest xr pending.       Albania was seen today for pre-op exam.    Diagnoses and all orders for this visit:    Acute pain of left knee  -     Comprehensive Metabolic Panel  -     CBC & Differential  -     Hemoglobin A1c  -     Rapid drug screen, urine  -     RESPIRATORY CULTURE  -     Urinalysis With Microscopic  -     Urine Culture  -     XR Chest PA & Lateral; Future    Other orders  -     ECG 12 Lead        ECG 12 Lead  Date/Time: 7/12/2017 4:23 PM  Performed by: JAXSON SAPP  Authorized by: JAXSON SAPP   Interpreted by ED physician  Comparison: compared with previous ECG   Rhythm: sinus rhythm  BPM: 81  Clinical impression: normal ECG                Return if symptoms worsen or fail to improve.             Immunization History   Administered Date(s) Administered   • Influenza TIV (IM) 10/08/2016   • Influenza, Unspecified 08/01/2012   • Pneumococcal Conjugate 11/16/2015   • Tdap 01/01/2011   • Tetanus 04/26/2011   • Zoster 01/01/2012           There are no Patient Instructions on file for this visit.

## 2017-07-14 LAB
ALBUMIN SERPL-MCNC: 4.2 G/DL (ref 3.5–5)
ALBUMIN/GLOB SERPL: 1.8 G/DL (ref 1–2)
ALP SERPL-CCNC: 89 U/L (ref 38–126)
ALT SERPL-CCNC: 38 U/L (ref 13–69)
APPEARANCE UR: CLEAR
AST SERPL-CCNC: 30 U/L (ref 15–46)
BACTERIA #/AREA URNS HPF: NORMAL /HPF
BACTERIA SPEC RESP CULT: NORMAL
BACTERIA SPEC RESP CULT: NORMAL
BACTERIA UR CULT: NO GROWTH
BACTERIA UR CULT: NORMAL
BASOPHILS # BLD AUTO: 0.06 10*3/MM3 (ref 0–0.2)
BASOPHILS NFR BLD AUTO: 0.7 % (ref 0–2.5)
BILIRUB SERPL-MCNC: 0.4 MG/DL (ref 0.2–1.3)
BILIRUB UR QL STRIP: NEGATIVE
BUN SERPL-MCNC: 16 MG/DL (ref 7–20)
BUN/CREAT SERPL: 22.9 (ref 7.1–23.5)
CALCIUM SERPL-MCNC: 9.7 MG/DL (ref 8.4–10.2)
CHLORIDE SERPL-SCNC: 100 MMOL/L (ref 98–107)
CO2 SERPL-SCNC: 32 MMOL/L (ref 26–30)
COLOR UR: YELLOW
CREAT SERPL-MCNC: 0.7 MG/DL (ref 0.6–1.3)
EOSINOPHIL # BLD AUTO: 0.45 10*3/MM3 (ref 0–0.7)
EOSINOPHIL NFR BLD AUTO: 5.3 % (ref 0–7)
EPI CELLS #/AREA URNS HPF: NORMAL /HPF
ERYTHROCYTE [DISTWIDTH] IN BLOOD BY AUTOMATED COUNT: 13.2 % (ref 11.5–14.5)
GLOBULIN SER CALC-MCNC: 2.4 GM/DL
GLUCOSE SERPL-MCNC: 78 MG/DL (ref 74–98)
GLUCOSE UR QL: NEGATIVE
HBA1C MFR BLD: 5.4 %
HCT VFR BLD AUTO: 42.6 % (ref 37–47)
HGB BLD-MCNC: 13.6 G/DL (ref 12–16)
HGB UR QL STRIP: (no result)
IMM GRANULOCYTES # BLD: 0.05 10*3/MM3 (ref 0–0.06)
IMM GRANULOCYTES NFR BLD: 0.6 % (ref 0–0.6)
KETONES UR QL STRIP: NEGATIVE
LEUKOCYTE ESTERASE UR QL STRIP: NEGATIVE
LYMPHOCYTES # BLD AUTO: 2.56 10*3/MM3 (ref 0.6–3.4)
LYMPHOCYTES NFR BLD AUTO: 30.4 % (ref 10–50)
MCH RBC QN AUTO: 28.5 PG (ref 27–31)
MCHC RBC AUTO-ENTMCNC: 31.9 G/DL (ref 30–37)
MCV RBC AUTO: 89.1 FL (ref 81–99)
MONOCYTES # BLD AUTO: 0.8 10*3/MM3 (ref 0–0.9)
MONOCYTES NFR BLD AUTO: 9.5 % (ref 0–12)
NEUTROPHILS # BLD AUTO: 4.51 10*3/MM3 (ref 2–6.9)
NEUTROPHILS NFR BLD AUTO: 53.5 % (ref 37–80)
NITRITE UR QL STRIP: NEGATIVE
NRBC BLD AUTO-RTO: 0 /100 WBC (ref 0–0)
PH UR STRIP: 6 [PH] (ref 5–8)
PLATELET # BLD AUTO: 289 10*3/MM3 (ref 130–400)
POTASSIUM SERPL-SCNC: 3.8 MMOL/L (ref 3.5–5.1)
PROT SERPL-MCNC: 6.6 G/DL (ref 6.3–8.2)
PROT UR QL STRIP: NEGATIVE
RBC # BLD AUTO: 4.78 10*6/MM3 (ref 4.2–5.4)
RBC #/AREA URNS HPF: NORMAL /HPF
SODIUM SERPL-SCNC: 142 MMOL/L (ref 137–145)
SP GR UR: 1.01 (ref 1–1.03)
UROBILINOGEN UR STRIP-MCNC: (no result) MG/DL
WBC # BLD AUTO: 8.43 10*3/MM3 (ref 4.8–10.8)
WBC #/AREA URNS HPF: NORMAL /HPF

## 2017-07-18 ENCOUNTER — TELEPHONE (OUTPATIENT)
Dept: FAMILY MEDICINE CLINIC | Facility: CLINIC | Age: 62
End: 2017-07-18

## 2017-07-18 NOTE — TELEPHONE ENCOUNTER
Albania Segura called asking about US of Liver results, she wants to know if results will cause any delay in her surgery?

## 2017-08-14 ENCOUNTER — TREATMENT (OUTPATIENT)
Dept: PHYSICAL THERAPY | Facility: CLINIC | Age: 62
End: 2017-08-14

## 2017-08-14 DIAGNOSIS — Z96.652 H/O TOTAL KNEE REPLACEMENT, LEFT: Primary | ICD-10-CM

## 2017-08-14 PROCEDURE — 97110 THERAPEUTIC EXERCISES: CPT | Performed by: PHYSICAL THERAPIST

## 2017-08-14 PROCEDURE — 97140 MANUAL THERAPY 1/> REGIONS: CPT | Performed by: PHYSICAL THERAPIST

## 2017-08-14 PROCEDURE — 97161 PT EVAL LOW COMPLEX 20 MIN: CPT | Performed by: PHYSICAL THERAPIST

## 2017-08-14 NOTE — PROGRESS NOTES
Physical Therapy Initial Evaluation and Plan of Care      Patient: Albania Ramos   : 1955  Diagnosis/ICD-10 Code:  H/O total knee replacement, left [Z96.652]  Referring practitioner: Self Referring    Subjective Evaluation    History of Present Illness  Mechanism of injury: Pt reports she twisted knee and fell around march 3/1/17 and then she tore meniscus.  She had to use a cane and had painful ambulation until her surgery 17.  Pt reports she is doing much better with this TKA as compared to her R LE.     L LE sciatic nerve sxs as well.  Injection scheduled on 17.       R TKA 11/3/15.      Pain  Current pain ratin (knee pain)  Location: L knee and sciatic nerve pain noted today.  Relieving factors: ice and medications  Aggravating factors: ambulation, stairs, repetitive movement, standing and movement  Progression: improved    Treatments  Previous treatment: physical therapy (home health PT)  Patient Goals  Patient goals for therapy: return to work, increased strength, decreased pain and increased motion             Objective     Observations     Additional Observation Details  L LE ROCIO hose present.  L Knee bandaged.  No distress at rest.     L ankle brace present ( old ankle injury ).     Active Range of Motion   Left Knee   Flexion: 86 degrees with pain  Extensor lag: 3 degrees     Passive Range of Motion   Left Knee   Flexion: 93 degrees with pain    Additional Passive Range of Motion Details  Pain in the medial knee on the R LE.     Swelling     Left Knee Girth Measurement (cm)   Joint line: 52 cm         Assessment & Plan     Assessment  Impairments: abnormal gait, abnormal muscle tone, abnormal or restricted ROM, activity intolerance, impaired balance, impaired physical strength, lacks appropriate home exercise program and pain with function  Assessment details: Patient is a 62 year old female who comes to physical therapy S/P TKA on the L LE. Signs and symptoms are consistent with  diagnosis.  The patient currently has pain, decreased ROM, decreased strength, and inability to perform all essential functional activities. Pt will benefit from skilled PT services to address the above issues.     Prognosis: fair  Prognosis details:   SHORT TERM GOALS:  2 weeks       1. Pt independent with HEP  2. Pt to demonstrate marci hip strength 4/5 or greater to improve stability with ambulation  3. Pt to report being able to walk for 10 minutes without increasing pain in the left knee    LONG TERM GOALS:   6 weeks  1. Pt to demonstrate ability to perform full functional squat with good form and control of the knees and without increasing pain  2. Pt to demonstrate marci hip strength to 4+/5 or greater to improve safety with ambulation on uneven surfaces  3. Pt to return to work full duty without increased pain in the left knee   4. Pt to demonstrate ability to perform step up/down 8 inch step x10 safely and without pain in the left knee   Functional Limitations: carrying objects, walking, uncomfortable because of pain, standing and unable to perform repetitive tasks  Plan  Therapy options: will be seen for skilled physical therapy services  Planned modality interventions: cryotherapy, electrical stimulation/Russian stimulation, thermotherapy (hydrocollator packs) and ultrasound  Planned therapy interventions: stretching, strengthening, soft tissue mobilization, manual therapy, motor coordination training, neuromuscular re-education, ADL retraining, balance/weight-bearing training, flexibility, functional ROM exercises, gait training and home exercise program  Treatment plan discussed with: patient  Plan details: Pt will be seen 2-3 times per week for skilled PT.         Manual Therapy:    9     mins  65033;  Therapeutic Exercise:    18     mins  94906;     Neuromuscular Doug:        mins  10153;    Therapeutic Activity:          mins  16566;     Gait Training:           mins  78679;     Ultrasound:          mins   88846;    Electrical Stimulation:         mins  04936 ( );  Dry Needling          mins self-pay    Timed Treatment:  27    mins   Total Treatment:     58   mins    PT SIGNATURE: Jackson Hodge, PT   DATE TREATMENT INITIATED: 8/14/2017    Initial Certification  Certification Period: 11/12/2017  I certify that the therapy services are furnished while this patient is under my care.  The services outlined above are required by this patient, and will be reviewed every 90 days.     PHYSICIAN: Self Referring      DATE:     Please sign and return via fax to  .. Thank you, [facility] Physical Therapy.

## 2017-08-16 ENCOUNTER — OFFICE VISIT (OUTPATIENT)
Dept: FAMILY MEDICINE CLINIC | Facility: CLINIC | Age: 62
End: 2017-08-16

## 2017-08-16 VITALS
TEMPERATURE: 97.9 F | HEART RATE: 84 BPM | HEIGHT: 69 IN | SYSTOLIC BLOOD PRESSURE: 136 MMHG | DIASTOLIC BLOOD PRESSURE: 68 MMHG | WEIGHT: 236 LBS | BODY MASS INDEX: 34.96 KG/M2 | OXYGEN SATURATION: 97 % | RESPIRATION RATE: 16 BRPM

## 2017-08-16 DIAGNOSIS — K59.03 DRUG-INDUCED CONSTIPATION: ICD-10-CM

## 2017-08-16 DIAGNOSIS — G57.02 PIRIFORMIS SYNDROME OF LEFT SIDE: Primary | ICD-10-CM

## 2017-08-16 PROCEDURE — 99214 OFFICE O/P EST MOD 30 MIN: CPT | Performed by: INTERNAL MEDICINE

## 2017-08-16 RX ORDER — BUPRENORPHINE 10 UG/H
PATCH TRANSDERMAL WEEKLY
COMMUNITY
End: 2017-10-10

## 2017-08-16 RX ORDER — ONDANSETRON 4 MG/1
4 TABLET, FILM COATED ORAL EVERY 8 HOURS PRN
COMMUNITY
End: 2017-10-10

## 2017-08-16 RX ORDER — HYDROCODONE BITARTRATE AND ACETAMINOPHEN 7.5; 325 MG/1; MG/1
1 TABLET ORAL 2 TIMES DAILY PRN
COMMUNITY
End: 2018-06-26

## 2017-08-16 NOTE — PROGRESS NOTES
"Subjective   Patient ID: Albania Ramos is a 62 y.o. female Pt is here for management of multiple medical problems.    Chief Complaint   Patient presents with   • Pain     patient having increased sciatic pain on the left side   • Constipation     patient having severe constipation from pain medication       History of Present Illness  Surgery on 7/25. Still in pain. More from sciatic nerve pain.  Starts mid buttox.   Crying a lot.     Using pain meds.     The following portions of the patient's history were reviewed and updated as appropriate: allergies, current medications, past family history, past medical history, past social history, past surgical history and problem list.      Review of Systems   Constitutional: Positive for fatigue.   Musculoskeletal: Positive for arthralgias, back pain and joint swelling.   All other systems reviewed and are negative.      Objective     /68 (BP Location: Left arm, Patient Position: Sitting, Cuff Size: Large Adult)  Pulse 84  Temp 97.9 °F (36.6 °C) (Oral)   Resp 16  Ht 69\" (175.3 cm)  Wt 236 lb (107 kg)  LMP  (LMP Unknown)  SpO2 97%  BMI 34.85 kg/m2  Physical Exam  General Appearance:    Alert, cooperative, no distress, appears stated age   Head:    Normocephalic, without obvious abnormality, atraumatic   Eyes:    PERRL, conjunctiva/corneas clear, EOM's intact           Ears:    Normal TM's and external ear canals, both ears   Nose:   Nares normal, septum midline, mucosa normal, no drainage    or sinus tenderness   Throat:   Lips, mucosa, and tongue normal; teeth and gums normal   Neck:   Supple, symmetrical, trachea midline, no adenopathy;        thyroid:  No enlargement/tenderness/nodules; no carotid    bruit or JVD   Back:     Symmetric, no curvature, ROM normal, no CVA tenderness   Lungs:     Clear to auscultation bilaterally, respirations unlabored   Chest wall:    No tenderness or deformity   Heart:    Regular rate and rhythm, S1 and S2 normal, no murmur, " rub   or gallop   Abdomen:     Soft, non-tender, bowel sounds active all four quadrants,     no masses, no organomegaly           Extremities:  ttp or mid butt ox on left side.      Pulses:   2+ and symmetric all extremities   Skin:   Skin color, texture, turgor normal, no rashes or lesions   Lymph nodes:   Cervical, supraclavicular, and axillary nodes normal   Neurologic:   CNII-XII intact. Normal strength, sensation and reflexes       throughout       Assessment/Plan           Albania was seen today for pain and constipation.    Diagnoses and all orders for this visit:    Piriformis syndrome of left side  -     Ambulatory Referral to Physical Therapy Evaluate and treat    Drug-induced constipation  -     linaclotide (LINZESS) 145 MCG capsule capsule; Take 1 capsule by mouth Every Morning Before Breakfast.      Return if symptoms worsen or fail to improve.                   Patient Instructions   Mag citrate orange flavor.

## 2017-08-17 ENCOUNTER — TREATMENT (OUTPATIENT)
Dept: PHYSICAL THERAPY | Facility: CLINIC | Age: 62
End: 2017-08-17

## 2017-08-17 PROCEDURE — 97110 THERAPEUTIC EXERCISES: CPT | Performed by: PHYSICAL THERAPIST

## 2017-08-17 NOTE — PROGRESS NOTES
Physical Therapy Daily Progress Note      Visit # : 2    Albania Ramos reports 2/10 pain today at rest.  Pt reports her hip is painful with the knee.  Pt reports the posterior knee is much better since the ice massage.         Objective Pt present to PT today with cane, ROCIO hose, ankle brace.    A/ AROM:  L knee flexion 90      See Exercise, Manual, and Modality Logs for complete treatment.     Assessment/Plan  Pt with elevated hip sxs but her posterior knee sxs are much less. Pt may be trying to perform HEP and CPM too much.       Progress per Plan of Care             Manual Therapy:    4     mins  23090;  Therapeutic Exercise:    45     mins  88372;     Neuromuscular Doug:        mins  09097;    Therapeutic Activity:          mins  33964;     Gait Training:        ___  mins  02521;     Ultrasound:          mins  07662;    Electrical Stimulation:         mins  69651 ( );  Dry Needling          mins self-pay    Timed Treatment:   49   mins   Total Treatment:     55   mins    Jackson Hodge, PT  Physical Therapist

## 2017-08-22 ENCOUNTER — TREATMENT (OUTPATIENT)
Dept: PHYSICAL THERAPY | Facility: CLINIC | Age: 62
End: 2017-08-22

## 2017-08-22 DIAGNOSIS — Z96.652 H/O TOTAL KNEE REPLACEMENT, LEFT: Primary | ICD-10-CM

## 2017-08-22 PROCEDURE — 97140 MANUAL THERAPY 1/> REGIONS: CPT | Performed by: PHYSICAL THERAPIST

## 2017-08-22 PROCEDURE — 97110 THERAPEUTIC EXERCISES: CPT | Performed by: PHYSICAL THERAPIST

## 2017-08-22 NOTE — PROGRESS NOTES
Physical Therapy Daily Progress Note      Visit # : 3    Albania Ramos reports 4/10 pain today at rest.  Pt with pain elevated from using the CPM at the house.         Objective Pt present to PT today with moderate distress today.     AROM:  Supine flexion 92 degrees.           Ext  -5 degrees from full ext.    Hip PROM is positive for relief of pain.     See Exercise, Manual, and Modality Logs for complete treatment.     Assessment/Plan  Pt with increased ROM and less pain post PT.  The hip is much less painful today.      Progress per Plan of Care             Manual Therapy:    14     mins  11177;  Therapeutic Exercise:    41     mins  38576;     Neuromuscular Doug:        mins  95416;    Therapeutic Activity:          mins  09412;     Gait Training:        ___  mins  54379;     Ultrasound:          mins  91504;    Electrical Stimulation:         mins  99043 ( );  Dry Needling          mins self-pay    Timed Treatment:   55   mins   Total Treatment:     68   mins    Jackson Hodge, PT  Physical Therapist

## 2017-08-25 ENCOUNTER — TREATMENT (OUTPATIENT)
Dept: PHYSICAL THERAPY | Facility: CLINIC | Age: 62
End: 2017-08-25

## 2017-08-25 DIAGNOSIS — Z96.652 H/O TOTAL KNEE REPLACEMENT, LEFT: Primary | ICD-10-CM

## 2017-08-25 PROCEDURE — 97140 MANUAL THERAPY 1/> REGIONS: CPT | Performed by: PHYSICAL THERAPIST

## 2017-08-25 PROCEDURE — 97110 THERAPEUTIC EXERCISES: CPT | Performed by: PHYSICAL THERAPIST

## 2017-08-25 NOTE — PROGRESS NOTES
Physical Therapy Daily Progress Note      Visit # : 4    Albania Ramos reports 2/10 pain today at rest.  Pt reports she is doing better overall.         Objective Pt present to PT today with ambulation with minimal guarding and minimal antalgia, straight.     AROM L knee flexion 100 degrees.       See Exercise, Manual, and Modality Logs for complete treatment.     Assessment/Plan  Pt with increased mobility and increased function.       Progress per Plan of Care             Manual Therapy:    9     mins  86259;  Therapeutic Exercise:    48     mins  97733;     Neuromuscular Doug:        mins  46909;    Therapeutic Activity:          mins  83184;     Gait Training:        ___  mins  65663;     Ultrasound:          mins  77952;    Electrical Stimulation:         mins  47619 ( );  Dry Needling          mins self-pay    Timed Treatment:   57   mins   Total Treatment:     68   mins    Jackson Hodge, PT  Physical Therapist

## 2017-08-29 ENCOUNTER — TREATMENT (OUTPATIENT)
Dept: PHYSICAL THERAPY | Facility: CLINIC | Age: 62
End: 2017-08-29

## 2017-08-29 DIAGNOSIS — Z96.652 H/O TOTAL KNEE REPLACEMENT, LEFT: Primary | ICD-10-CM

## 2017-08-29 PROCEDURE — 97110 THERAPEUTIC EXERCISES: CPT | Performed by: PHYSICAL THERAPIST

## 2017-08-29 PROCEDURE — 97140 MANUAL THERAPY 1/> REGIONS: CPT | Performed by: PHYSICAL THERAPIST

## 2017-08-29 NOTE — PROGRESS NOTES
Physical Therapy Daily Progress Note      Visit # : 5    Albania Ramos reports 3/10 pain today at rest.  Pt with knee a little more painful this past weekend.         Objective Pt present to PT today with minimal distress noted.     AROM: knee Flexion 100      See Exercise, Manual, and Modality Logs for complete treatment.     Assessment/Plan  Pt with knee more sensitive today to activity.  She seems to have less hip sxs today.       Progress per Plan of Care             Manual Therapy:    9     mins  52523;  Therapeutic Exercise:    48     mins  36577;     Neuromuscular Doug:        mins  64299;    Therapeutic Activity:          mins  47174;     Gait Training:        ___  mins  76045;     Ultrasound:          mins  70194;    Electrical Stimulation:         mins  55263 ( );  Dry Needling          mins self-pay    Timed Treatment:   57   mins   Total Treatment:     59   mins    Jackson Hodge, PT  Physical Therapist

## 2017-09-01 ENCOUNTER — TREATMENT (OUTPATIENT)
Dept: PHYSICAL THERAPY | Facility: CLINIC | Age: 62
End: 2017-09-01

## 2017-09-01 DIAGNOSIS — Z96.652 H/O TOTAL KNEE REPLACEMENT, LEFT: Primary | ICD-10-CM

## 2017-09-01 PROCEDURE — 97530 THERAPEUTIC ACTIVITIES: CPT | Performed by: PHYSICAL THERAPIST

## 2017-09-01 PROCEDURE — 97110 THERAPEUTIC EXERCISES: CPT | Performed by: PHYSICAL THERAPIST

## 2017-09-01 NOTE — PROGRESS NOTES
Physical Therapy Daily Progress Note      Visit # : 6    Albania Ramos reports 2/10 pain today at rest.  Pt with difficult night with sleeping because of medication not in use.         Objective Pt present to PT today with no cane in use today.     Pt with increased exercise activity today with less pain overall.  She is able to perform ankle activity.       See Exercise, Manual, and Modality Logs for complete treatment.     Assessment/Plan  Pt with increased exercise today.  Pt has more sxs in the knee today versus the hip.       Progress per Plan of Care             Manual Therapy:         mins  11748;  Therapeutic Exercise:    42     mins  53305;     Neuromuscular Doug:        mins  72592;    Therapeutic Activity:     14     mins  96729;     Gait Training:        ___  mins  97301;     Ultrasound:          mins  40897;    Electrical Stimulation:         mins  73557 ( );  Dry Needling          mins self-pay    Timed Treatment:   56   mins   Total Treatment:     69   mins    Jackson Hodge, PT  Physical Therapist

## 2017-09-05 ENCOUNTER — TREATMENT (OUTPATIENT)
Dept: PHYSICAL THERAPY | Facility: CLINIC | Age: 62
End: 2017-09-05

## 2017-09-05 DIAGNOSIS — Z96.652 H/O TOTAL KNEE REPLACEMENT, LEFT: Primary | ICD-10-CM

## 2017-09-05 PROCEDURE — 97140 MANUAL THERAPY 1/> REGIONS: CPT | Performed by: PHYSICAL THERAPIST

## 2017-09-05 PROCEDURE — 97110 THERAPEUTIC EXERCISES: CPT | Performed by: PHYSICAL THERAPIST

## 2017-09-05 NOTE — PROGRESS NOTES
Physical Therapy Daily Progress Note      Visit # : 7    Albania Ramos reports 2/10 pain today at rest.  Pt with less pain in the knee.  Pt feels like a band around the knee.  The hip is slightly painful and tight today as well.         Objective Pt present to PT today with no distres at rest. Pt does have some antalgia noted with ambulation.     AROM of the L knee Flexion 109 degrees.     The L anterior knee does seem to have hypersensitivity to palpation at the distal portion of the incision.       See Exercise, Manual, and Modality Logs for complete treatment.     Assessment/Plan  Pt with increased function and better mobility.  Pt return to MD today.  Pt seems to have some hypersensitivity in the knee more from swelling than the incision itself.       Progress per Plan of Care             Manual Therapy:    10     mins  94002;  Therapeutic Exercise:    44     mins  42018;     Neuromuscular Doug:        mins  22673;    Therapeutic Activity:          mins  55664;     Gait Training:        ___  mins  09638;     Ultrasound:          mins  53172;    Electrical Stimulation:         mins  42118 ( );  Dry Needling          mins self-pay    Timed Treatment:   54   mins   Total Treatment:     58   mins    Jackson Hodge, PT  Physical Therapist

## 2017-09-12 ENCOUNTER — TREATMENT (OUTPATIENT)
Dept: PHYSICAL THERAPY | Facility: CLINIC | Age: 62
End: 2017-09-12

## 2017-09-12 DIAGNOSIS — Z96.652 H/O TOTAL KNEE REPLACEMENT, LEFT: Primary | ICD-10-CM

## 2017-09-12 PROCEDURE — 97110 THERAPEUTIC EXERCISES: CPT | Performed by: PHYSICAL THERAPIST

## 2017-09-12 PROCEDURE — 97140 MANUAL THERAPY 1/> REGIONS: CPT | Performed by: PHYSICAL THERAPIST

## 2017-09-12 NOTE — PROGRESS NOTES
Physical Therapy Daily Progress Note      Visit # : 8    Albania Ramos reports 2/10 pain today at rest.  Pt with pain elevated to 6/10 pain after doing exercises.         Objective Pt present to PT today with min guarding.    AROM:  L knee flexion 115,  Ext -1 degrees.     Edema in the lower knee area      See Exercise, Manual, and Modality Logs for complete treatment.     Assessment/Plan  Pt with L knee mobility improved.  She had an injection that seems to reduce her pain.       Progress per Plan of Care             Manual Therapy:    11     mins  00500;  Therapeutic Exercise:    43     mins  22056;     Neuromuscular Doug:        mins  35785;    Therapeutic Activity:          mins  08472;     Gait Training:        ___  mins  75082;     Ultrasound:          mins  36595;    Electrical Stimulation:         mins  12320 ( );  Dry Needling          mins self-pay    Timed Treatment:   54   mins   Total Treatment:     70   mins    Jackson Hodge, PT  Physical Therapist

## 2017-09-14 ENCOUNTER — TREATMENT (OUTPATIENT)
Dept: PHYSICAL THERAPY | Facility: CLINIC | Age: 62
End: 2017-09-14

## 2017-09-14 ENCOUNTER — TRANSCRIBE ORDERS (OUTPATIENT)
Dept: MAMMOGRAPHY | Facility: HOSPITAL | Age: 62
End: 2017-09-14

## 2017-09-14 DIAGNOSIS — Z96.652 H/O TOTAL KNEE REPLACEMENT, LEFT: Primary | ICD-10-CM

## 2017-09-14 DIAGNOSIS — Z13.9 SCREENING: Primary | ICD-10-CM

## 2017-09-14 PROCEDURE — 97110 THERAPEUTIC EXERCISES: CPT | Performed by: PHYSICAL THERAPIST

## 2017-09-14 PROCEDURE — 97140 MANUAL THERAPY 1/> REGIONS: CPT | Performed by: PHYSICAL THERAPIST

## 2017-09-14 NOTE — PROGRESS NOTES
Physical Therapy Daily Progress Note      Visit # : 9    Albania Ramos reports 2/10 pain today at rest.  Pt reports a good day for walking.         Objective Pt present to PT today with no distress noted at rest.  Ambulating without AD into PT area.     Hypersensitivity in the lower incision area and pes anserine area.       See Exercise, Manual, and Modality Logs for complete treatment.     Assessment/Plan  Pt with better ambulation and function.  Pt with medial knee pain still present.       Progress per Plan of Care             Manual Therapy:    9     mins  66770;  Therapeutic Exercise:    48     mins  83730;     Neuromuscular Doug:        mins  23878;    Therapeutic Activity:          mins  92403;     Gait Training:        ___  mins  77644;     Ultrasound:          mins  20516;    Electrical Stimulation:         mins  27434 ( );  Dry Needling          mins self-pay    Timed Treatment:   57   mins   Total Treatment:     72   mins    Jackson Hodge, PT  Physical Therapist

## 2017-09-19 ENCOUNTER — TREATMENT (OUTPATIENT)
Dept: PHYSICAL THERAPY | Facility: CLINIC | Age: 62
End: 2017-09-19

## 2017-09-19 DIAGNOSIS — Z96.652 H/O TOTAL KNEE REPLACEMENT, LEFT: Primary | ICD-10-CM

## 2017-09-19 PROCEDURE — 97035 APP MDLTY 1+ULTRASOUND EA 15: CPT | Performed by: PHYSICAL THERAPIST

## 2017-09-19 PROCEDURE — 97014 ELECTRIC STIMULATION THERAPY: CPT | Performed by: PHYSICAL THERAPIST

## 2017-09-19 PROCEDURE — 97140 MANUAL THERAPY 1/> REGIONS: CPT | Performed by: PHYSICAL THERAPIST

## 2017-09-19 PROCEDURE — 97110 THERAPEUTIC EXERCISES: CPT | Performed by: PHYSICAL THERAPIST

## 2017-09-19 NOTE — PROGRESS NOTES
Physical Therapy Daily Progress Note      Visit # : 10    Albania Ramos reports 4/10 pain today at rest.  6-7/10 pain last night trying to sleep.  Pain in the sides and the back of the knee.         Objective Pt present to PT today with moderate distress noted in the L knee.  Pt with more discomfort and guarding.     AROM: Knee flexion 112,  Ext -5 degrees.     Palpation:  Tenderness, increased temperature, and hypersensitivity to the L knee.     Pt with less ability to perform exercises secondary to pain and edema.     See Exercise, Manual, and Modality Logs for complete treatment.     Assessment/Plan  Pt with R knee hypersensitive and painful.  Pt's pes anserine is the primary area of pain and maybe causing more inflammation in the whole knee.       Progress per Plan of Care  Check response to treatment today.            Manual Therapy:    16     mins  18109;  Therapeutic Exercise:    13     mins  92784;     Neuromuscular Doug:        mins  50208;    Therapeutic Activity:          mins  20368;     Gait Training:        ___  mins  40080;     Ultrasound:     10     mins  58939;    Electrical Stimulation:     20    mins  82172 ( );  Dry Needling          mins self-pay    Timed Treatment:  29    mins   Total Treatment:     59   mins    Jackson Hodge, PT  Physical Therapist

## 2017-09-21 ENCOUNTER — OFFICE VISIT (OUTPATIENT)
Dept: FAMILY MEDICINE CLINIC | Facility: CLINIC | Age: 62
End: 2017-09-21

## 2017-09-21 VITALS
DIASTOLIC BLOOD PRESSURE: 67 MMHG | HEART RATE: 86 BPM | BODY MASS INDEX: 33.62 KG/M2 | HEIGHT: 69 IN | SYSTOLIC BLOOD PRESSURE: 143 MMHG | OXYGEN SATURATION: 98 % | TEMPERATURE: 97.9 F | WEIGHT: 227 LBS

## 2017-09-21 DIAGNOSIS — R10.30 LOWER ABDOMINAL PAIN: Primary | ICD-10-CM

## 2017-09-21 DIAGNOSIS — Z00.00 ROUTINE GENERAL MEDICAL EXAMINATION AT A HEALTH CARE FACILITY: ICD-10-CM

## 2017-09-21 DIAGNOSIS — E03.8 OTHER SPECIFIED HYPOTHYROIDISM: ICD-10-CM

## 2017-09-21 DIAGNOSIS — R19.7 DIARRHEA OF PRESUMED INFECTIOUS ORIGIN: ICD-10-CM

## 2017-09-21 PROCEDURE — 99214 OFFICE O/P EST MOD 30 MIN: CPT | Performed by: INTERNAL MEDICINE

## 2017-09-21 RX ORDER — LEVOTHYROXINE SODIUM 88 UG/1
88 TABLET ORAL DAILY
Qty: 90 TABLET | Refills: 3 | Status: SHIPPED | OUTPATIENT
Start: 2017-09-21 | End: 2018-08-22 | Stop reason: SDUPTHER

## 2017-09-21 RX ORDER — AMOXICILLIN AND CLAVULANATE POTASSIUM 875; 125 MG/1; MG/1
1 TABLET, FILM COATED ORAL 2 TIMES DAILY
Qty: 20 TABLET | Refills: 0 | Status: SHIPPED | OUTPATIENT
Start: 2017-09-21 | End: 2017-10-10

## 2017-09-21 RX ORDER — METRONIDAZOLE 500 MG/1
500 TABLET ORAL 3 TIMES DAILY
Qty: 30 TABLET | Refills: 0 | Status: SHIPPED | OUTPATIENT
Start: 2017-09-21 | End: 2017-10-10

## 2017-09-21 RX ORDER — NITROFURANTOIN 25; 75 MG/1; MG/1
100 CAPSULE ORAL 2 TIMES DAILY
COMMUNITY
End: 2017-10-10

## 2017-09-21 NOTE — PROGRESS NOTES
Subjective     Patient ID: Albania Ramos is a 62 y.o. female. Patient is here for management of multiple medical problems.     Chief Complaint   Patient presents with   • Abdominal Pain     nausea with black stool with bloody discharge x 2 days      History of Present Illness     Pt with lower ab pain started on Tuesday got worse and persisted till today. Pain some better today. Black tarry stool started last night with bloody mucus.   occ taking Prilosec.      Pt on Macrobid daily.          The following portions of the patient's history were reviewed and updated as appropriate: allergies, current medications, past family history, past medical history, past social history, past surgical history and problem list.    Review of Systems   Constitutional: Positive for fatigue.   Respiratory: Negative for cough and shortness of breath.    Gastrointestinal: Positive for abdominal distention, abdominal pain, blood in stool and diarrhea.   All other systems reviewed and are negative.      Current Outpatient Prescriptions:   •  ALLERGY SERUM INJECTION, Inject  under the skin 1 (One) Time., Disp: , Rfl:   •  aspirin 81 MG EC tablet, Take 81 mg by mouth daily., Disp: , Rfl:   •  azelastine (ASTELIN) 0.1 % nasal spray, 1 spray into each nostril 2 (Two) Times a Day As Needed for Rhinitis or Allergies. Use in each nostril as directed, Disp: 1 each, Rfl: 5  •  BIOTIN PO, Take 2,500 mcg by mouth daily., Disp: , Rfl:   •  Buprenorphine 10 MCG/HR patch weekly, Place  on the skin 1 (One) Time Per Week., Disp: , Rfl:   •  calcium carbonate (OS-SHANTE) 600 MG tablet, Take 600 mg by mouth 2 (two) times a day with meals., Disp: , Rfl:   •  Cholecalciferol (VITAMIN D3) 2000 UNITS capsule, Take 1 capsule by mouth daily., Disp: , Rfl:   •  Cinnamon ([employer] CINNAMON) 500 MG capsule, Take 500 mg by mouth 2 (two) times a day., Disp: , Rfl:   •  CRANBERRY CONCENTRATE PO, Take 4,200 mg by mouth 2 (two) times a day., Disp: , Rfl:   •  Cream Base  (PENCREAM) cream, Apply  topically., Disp: , Rfl:   •  cyanocobalamin (VITAMIN B-12) 2500 MCG tablet tablet, Place 1 tablet under the tongue. 2-3 times a week, Disp: , Rfl:   •  flecainide (TAMBOCOR) 50 MG tablet, Take 1 tablet by mouth 2 (Two) Times a Day., Disp: 180 tablet, Rfl: 2  •  Garlic 500 MG capsule, Take 1 tablet by mouth 2 (two) times a day., Disp: , Rfl:   •  Glucosamine Sulfate-MSM (GLUCOSAMINE-MSM) 500-500 MG tablet, Take 1 tablet by mouth daily., Disp: , Rfl:   •  hydrochlorothiazide (MICROZIDE) 12.5 MG capsule, Take 1 capsule by mouth Daily., Disp: 30 capsule, Rfl: 5  •  HYDROcodone-acetaminophen (NORCO) 7.5-325 MG per tablet, Take 1 tablet by mouth 2 (Two) Times a Day., Disp: , Rfl:   •  ipratropium (ATROVENT HFA) 17 MCG/ACT inhaler, Inhale 2 puffs Every 6 (Six) Hours As Needed for wheezing., Disp: 1 inhaler, Rfl: 5  •  levothyroxine (SYNTHROID) 100 MCG tablet, Take 1 tablet by mouth Daily. Pt will be on total of 188mcg, Disp: 90 tablet, Rfl: 3  •  levothyroxine (SYNTHROID, LEVOTHROID) 88 MCG tablet, Take 1 tablet by mouth Daily., Disp: 90 tablet, Rfl: 3  •  linaclotide (LINZESS) 145 MCG capsule capsule, Take 1 capsule by mouth Every Morning Before Breakfast., Disp: 30 capsule, Rfl: 0  •  magnesium gluconate (MAGONATE) 500 MG tablet, Take 1 tablet by mouth daily., Disp: , Rfl:   •  montelukast (SINGULAIR) 10 MG tablet, Take 1 tablet by mouth Every Night., Disp: 30 tablet, Rfl: 5  •  nitrofurantoin, macrocrystal-monohydrate, (MACROBID) 100 MG capsule, Take 100 mg by mouth 2 (Two) Times a Day., Disp: , Rfl:   •  Omega-3 Fatty Acids (FISH OIL) 435 MG capsule, Take 1,000 mg by mouth daily., Disp: , Rfl:   •  Omeprazole (EQ OMEPRAZOLE) 20 MG tablet delayed-release, Take 20 mg by mouth 2 (Two) Times a Day., Disp: 180 each, Rfl: 3  •  ondansetron (ZOFRAN) 4 MG tablet, Take 4 mg by mouth Every 8 (Eight) Hours As Needed for Nausea or Vomiting., Disp: , Rfl:   •  PATIENT SUPPLIED ALLERGY INJECTION, Inject   "under the skin 1 (One) Time., Disp: , Rfl:   •  Probiotic Product (SOLUBLE FIBER/PROBIOTICS PO), Take 1 tablet by mouth daily., Disp: , Rfl:   •  rivaroxaban (XARELTO) 10 MG tablet, Take 10 mg by mouth Daily., Disp: , Rfl:   •  tapentadol (NUCYNTA) 50 MG tablet, Take 50 mg by mouth Every 6 (Six) Hours As Needed., Disp: , Rfl:   •  Unable to find, Take 1 each by mouth 1 (one) time. Med Name: BLACK  ELDERBERRY SYRUP, Disp: , Rfl:   •  vitamin C (ASCORBIC ACID) 500 MG tablet, Take 1 tablet by mouth daily., Disp: , Rfl:   •  amoxicillin-clavulanate (AUGMENTIN) 875-125 MG per tablet, Take 1 tablet by mouth 2 (Two) Times a Day., Disp: 20 tablet, Rfl: 0  •  metroNIDAZOLE (FLAGYL) 500 MG tablet, Take 1 tablet by mouth 3 (Three) Times a Day., Disp: 30 tablet, Rfl: 0    Objective      Blood pressure 143/67, pulse 86, temperature 97.9 °F (36.6 °C), height 69\" (175.3 cm), weight 227 lb (103 kg), SpO2 98 %.    Physical Exam     General Appearance:    Alert, cooperative, no distress, appears stated age   Head:    Normocephalic, without obvious abnormality, atraumatic   Eyes:    PERRL, conjunctiva/corneas clear, EOM's intact   Ears:    Normal TM's and external ear canals, both ears   Nose:   Nares normal, septum midline, mucosa normal, no drainage   or sinus tenderness   Throat:   Lips, mucosa, and tongue normal; teeth and gums normal   Neck:   Supple, symmetrical, trachea midline, no adenopathy;        thyroid:  No enlargement/tenderness/nodules; no carotid    bruit or JVD   Back:     Symmetric, no curvature, ROM normal, no CVA tenderness   Lungs:     Clear to auscultation bilaterally, respirations unlabored   Chest wall:    No tenderness or deformity   Heart:    Regular rate and rhythm, S1 and S2 normal, no murmur,        rub or gallop   Abdomen:     noSoft, +-tender in lower ab. No rebound.  , bowel sounds active all four quadrants,    no masses, no organomegaly   Extremities:   Extremities normal, atraumatic, no cyanosis or " edema   Pulses:   2+ and symmetric all extremities   Skin:   Skin color, texture, turgor normal, no rashes or lesions   Lymph nodes:   Cervical, supraclavicular, and axillary nodes normal   Neurologic:   CNII-XII intact. Normal strength, sensation and reflexes       throughout      Results for orders placed or performed in visit on 07/12/17   RESPIRATORY CULTURE   Result Value Ref Range    Upper Respiratory Culture Final report     Result 1 Comment    Urine Culture   Result Value Ref Range    Urine Culture Final report     Result 1 No growth    Comprehensive Metabolic Panel   Result Value Ref Range    Glucose 78 74 - 98 mg/dL    BUN 16 7 - 20 mg/dL    Creatinine 0.70 0.60 - 1.30 mg/dL    eGFR Non African Am 85 >60 mL/min/1.73    eGFR African Am 103 >60 mL/min/1.73    BUN/Creatinine Ratio 22.9 7.1 - 23.5    Sodium 142 137 - 145 mmol/L    Potassium 3.8 3.5 - 5.1 mmol/L    Chloride 100 98 - 107 mmol/L    Total CO2 32.0 (H) 26.0 - 30.0 mmol/L    Calcium 9.7 8.4 - 10.2 mg/dL    Total Protein 6.6 6.3 - 8.2 g/dL    Albumin 4.20 3.50 - 5.00 g/dL    Globulin 2.4 gm/dL    A/G Ratio 1.8 1.0 - 2.0 g/dL    Total Bilirubin 0.4 0.2 - 1.3 mg/dL    Alkaline Phosphatase 89 38 - 126 U/L    AST (SGOT) 30 15 - 46 U/L    ALT (SGPT) 38 13 - 69 U/L   Hemoglobin A1c   Result Value Ref Range    Hemoglobin A1C 5.40 %   Microscopic Examination   Result Value Ref Range    WBC, UA Comment None Seen /hpf    RBC, UA Comment None Seen /hpf    Epithelial Cells (non renal) 0-2 /hpf    Bacteria, UA Comment None Seen /hpf   CBC & Differential   Result Value Ref Range    WBC 8.43 4.80 - 10.80 10*3/mm3    RBC 4.78 4.20 - 5.40 10*6/mm3    Hemoglobin 13.6 12.0 - 16.0 g/dL    Hematocrit 42.6 37.0 - 47.0 %    MCV 89.1 81.0 - 99.0 fL    MCH 28.5 27.0 - 31.0 pg    MCHC 31.9 30.0 - 37.0 g/dL    RDW 13.2 11.5 - 14.5 %    Platelets 289 130 - 400 10*3/mm3    Neutrophil Rel % 53.5 37.0 - 80.0 %    Lymphocyte Rel % 30.4 10.0 - 50.0 %    Monocyte Rel % 9.5 0.0 - 12.0  %    Eosinophil Rel % 5.3 0.0 - 7.0 %    Basophil Rel % 0.7 0.0 - 2.5 %    Neutrophils Absolute 4.51 2.00 - 6.90 10*3/mm3    Lymphocytes Absolute 2.56 0.60 - 3.40 10*3/mm3    Monocytes Absolute 0.80 0.00 - 0.90 10*3/mm3    Eosinophils Absolute 0.45 0.00 - 0.70 10*3/mm3    Basophils Absolute 0.06 0.00 - 0.20 10*3/mm3    Immature Granulocyte Rel % 0.6 0.0 - 0.6 %    Immature Grans Absolute 0.05 0.00 - 0.06 10*3/mm3    nRBC 0.0 0.0 - 0.0 /100 WBC   Urinalysis With Microscopic   Result Value Ref Range    Specific Gravity, UA 1.015 1.005 - 1.030    pH, UA 6.0 5.0 - 8.0    Color, UA Yellow     Appearance, UA Clear Clear    Leukocytes, UA Negative Negative    Protein Negative Negative    Glucose, UA Negative Negative    Ketones Negative Negative    Blood, UA Trace (A) Negative    Bilirubin, UA Negative Negative    Urobilinogen, UA Comment     Nitrite, UA Negative Negative         Assessment/Plan   Get back on daily ppi.   emperic treatment for pud +/- divertic. Get labs and reeval.        [place] was seen today for abdominal pain.    Diagnoses and all orders for this visit:    Lower abdominal pain  -     amoxicillin-clavulanate (AUGMENTIN) 875-125 MG per tablet; Take 1 tablet by mouth 2 (Two) Times a Day.  -     metroNIDAZOLE (FLAGYL) 500 MG tablet; Take 1 tablet by mouth 3 (Three) Times a Day.  -     H. Pylori Antigen, Stool  -     Clostridium Difficile Toxin  -     Stool Culture  -     Comprehensive Metabolic Panel  -     CBC & Differential    Other specified hypothyroidism  -     levothyroxine (SYNTHROID, LEVOTHROID) 88 MCG tablet; Take 1 tablet by mouth Daily.  -     TSH  -     T4, Free  -     T3, Free  -     Vitamin B12  -     Comprehensive Metabolic Panel  -     CBC & Differential    Diarrhea of presumed infectious origin  -     amoxicillin-clavulanate (AUGMENTIN) 875-125 MG per tablet; Take 1 tablet by mouth 2 (Two) Times a Day.  -     metroNIDAZOLE (FLAGYL) 500 MG tablet; Take 1 tablet by mouth 3 (Three) Times a  Day.  -     H. Pylori Antigen, Stool  -     Clostridium Difficile Toxin  -     Stool Culture  -     Comprehensive Metabolic Panel  -     CBC & Differential    Routine general medical examination at a health care facility  -     Lipid Panel      Return in about 3 months (around 12/21/2017), or if symptoms worsen or fail to improve.          There are no Patient Instructions on file for this visit.     Gordon Norman MD    Assessment/Plan           [place]ia was seen today for abdominal pain.    Diagnoses and all orders for this visit:    Lower abdominal pain  -     amoxicillin-clavulanate (AUGMENTIN) 875-125 MG per tablet; Take 1 tablet by mouth 2 (Two) Times a Day.  -     metroNIDAZOLE (FLAGYL) 500 MG tablet; Take 1 tablet by mouth 3 (Three) Times a Day.  -     H. Pylori Antigen, Stool  -     Clostridium Difficile Toxin  -     Stool Culture  -     Comprehensive Metabolic Panel  -     CBC & Differential    Other specified hypothyroidism  -     levothyroxine (SYNTHROID, LEVOTHROID) 88 MCG tablet; Take 1 tablet by mouth Daily.  -     TSH  -     T4, Free  -     T3, Free  -     Vitamin B12  -     Comprehensive Metabolic Panel  -     CBC & Differential    Diarrhea of presumed infectious origin  -     amoxicillin-clavulanate (AUGMENTIN) 875-125 MG per tablet; Take 1 tablet by mouth 2 (Two) Times a Day.  -     metroNIDAZOLE (FLAGYL) 500 MG tablet; Take 1 tablet by mouth 3 (Three) Times a Day.  -     H. Pylori Antigen, Stool  -     Clostridium Difficile Toxin  -     Stool Culture  -     Comprehensive Metabolic Panel  -     CBC & Differential    Routine general medical examination at a health care facility  -     Lipid Panel      Return in about 3 months (around 12/21/2017), or if symptoms worsen or fail to improve.                   There are no Patient Instructions on file for this visit.

## 2017-09-22 ENCOUNTER — TREATMENT (OUTPATIENT)
Dept: PHYSICAL THERAPY | Facility: CLINIC | Age: 62
End: 2017-09-22

## 2017-09-22 DIAGNOSIS — Z96.652 H/O TOTAL KNEE REPLACEMENT, LEFT: Primary | ICD-10-CM

## 2017-09-22 PROCEDURE — 97014 ELECTRIC STIMULATION THERAPY: CPT | Performed by: PHYSICAL THERAPIST

## 2017-09-22 PROCEDURE — 97140 MANUAL THERAPY 1/> REGIONS: CPT | Performed by: PHYSICAL THERAPIST

## 2017-09-22 PROCEDURE — 97110 THERAPEUTIC EXERCISES: CPT | Performed by: PHYSICAL THERAPIST

## 2017-09-22 NOTE — PROGRESS NOTES
Physical Therapy Daily Progress Note      Visit # : 11     Albania Ramos reports 1/10 pain today at rest.  Pt reports she has felt better over the past 4 days than she has the whole recovery.          Objective Pt present to PT today with edema still present in the medial knee area.     Pt with less pain after STM.   The pes anserine is still hypersensate and guarded.       See Exercise, Manual, and Modality Logs for complete treatment.     Assessment/Plan  Pt with much less pain after last treatment.  Pt with pes anserine the primary area of pain today.       Progress strengthening /stabilization /functional activity             Manual Therapy:    12     mins  93783;  Therapeutic Exercise:    22     mins  80145;     Neuromuscular Doug:        mins  12522;    Therapeutic Activity:          mins  28917;     Gait Training:        ___  mins  94091;     Ultrasound:     12     mins  53347;    Electrical Stimulation:    15     mins  40901 ( );  Dry Needling          mins self-pay    Timed Treatment:   46   mins   Total Treatment:     68   mins    Jackson Hodge, PT  Physical Therapist

## 2017-09-26 ENCOUNTER — TREATMENT (OUTPATIENT)
Dept: PHYSICAL THERAPY | Facility: CLINIC | Age: 62
End: 2017-09-26

## 2017-09-26 DIAGNOSIS — Z96.652 H/O TOTAL KNEE REPLACEMENT, LEFT: Primary | ICD-10-CM

## 2017-09-26 PROCEDURE — 97140 MANUAL THERAPY 1/> REGIONS: CPT | Performed by: PHYSICAL THERAPIST

## 2017-09-26 PROCEDURE — 97014 ELECTRIC STIMULATION THERAPY: CPT | Performed by: PHYSICAL THERAPIST

## 2017-09-26 PROCEDURE — 97110 THERAPEUTIC EXERCISES: CPT | Performed by: PHYSICAL THERAPIST

## 2017-09-26 NOTE — PROGRESS NOTES
Physical Therapy Daily Progress Note      Visit # : 12    Albania Ramos reports 4/10 pain today at rest.  Pt is in the back of the knee and medial knee.  4/10 in the hip and back as well.  L knee feels swollen.      This is a bad day but she felt good after last PT treatment for 2 days.         Objective Pt present to PT today with moderate distress noted with ambulation.     Left knee is very hyper sensate and guarded.  The pes anserine is the primary area of pain.     AROM:  L Knee 120,  Knee ext is full    Strength testing is limited by pain in the knee.     See Exercise, Manual, and Modality Logs for complete treatment.     Assessment/Plan  Pt with pain from the medial knee and the pes anserine area.   She has been unable to do as much PT since the injection.       Progress per Plan of Care  Continue with PT.            Manual Therapy:    16     mins  69197;  Therapeutic Exercise:    27     mins  88312;     Neuromuscular Doug:        mins  59361;    Therapeutic Activity:          mins  69076;     Gait Training:        ___  mins  44583;     Ultrasound:     10     mins  98484;    Electrical Stimulation:    15     mins  11844 ( );  Dry Needling          mins self-pay    Timed Treatment:   53   mins   Total Treatment:     75   mins    Jackson Hodge, PT  Physical Therapist

## 2017-09-27 LAB
BACTERIA SPEC CULT: NORMAL
BACTERIA SPEC CULT: NORMAL
C DIFF TOX A+B STL QL IA: NEGATIVE
CAMPYLOBACTER STL CULT: NORMAL
E COLI SXT STL QL IA: NEGATIVE
H PYLORI AG STL QL IA: NEGATIVE
SALM + SHIG STL CULT: NORMAL

## 2017-10-06 ENCOUNTER — HOSPITAL ENCOUNTER (OUTPATIENT)
Dept: MAMMOGRAPHY | Facility: HOSPITAL | Age: 62
Discharge: HOME | End: 2017-10-06
Attending: OBSTETRICS & GYNECOLOGY | Admitting: OBSTETRICS & GYNECOLOGY

## 2017-10-06 DIAGNOSIS — Z13.9 SCREENING: ICD-10-CM

## 2017-10-06 PROCEDURE — 77063 BREAST TOMOSYNTHESIS BI: CPT

## 2017-10-06 PROCEDURE — G0202 SCR MAMMO BI INCL CAD: HCPCS

## 2017-10-10 ENCOUNTER — OFFICE VISIT (OUTPATIENT)
Dept: CARDIOLOGY | Facility: CLINIC | Age: 62
End: 2017-10-10

## 2017-10-10 VITALS
HEART RATE: 86 BPM | BODY MASS INDEX: 34.39 KG/M2 | HEIGHT: 69 IN | OXYGEN SATURATION: 98 % | SYSTOLIC BLOOD PRESSURE: 130 MMHG | DIASTOLIC BLOOD PRESSURE: 70 MMHG | RESPIRATION RATE: 18 BRPM | WEIGHT: 232.2 LBS

## 2017-10-10 DIAGNOSIS — R00.2 PALPITATIONS: Primary | ICD-10-CM

## 2017-10-10 DIAGNOSIS — I48.0 PAROXYSMAL ATRIAL FIBRILLATION (HCC): Chronic | ICD-10-CM

## 2017-10-10 PROCEDURE — 93000 ELECTROCARDIOGRAM COMPLETE: CPT | Performed by: INTERNAL MEDICINE

## 2017-10-10 PROCEDURE — 99213 OFFICE O/P EST LOW 20 MIN: CPT | Performed by: INTERNAL MEDICINE

## 2017-10-10 RX ORDER — LEVOCETIRIZINE DIHYDROCHLORIDE 5 MG/1
5 TABLET, FILM COATED ORAL EVERY EVENING
COMMUNITY
End: 2019-10-01 | Stop reason: SDUPTHER

## 2017-10-10 RX ORDER — TURM/GING/BOS/YUC/WIL/CHAM/HOR 100-100 MG
500 TABLET ORAL DAILY
COMMUNITY
End: 2019-10-01

## 2017-10-10 RX ORDER — IBUPROFEN 200 MG
200 TABLET ORAL EVERY 6 HOURS PRN
COMMUNITY
End: 2018-03-20

## 2017-10-10 RX ORDER — FLECAINIDE ACETATE 50 MG/1
50 TABLET ORAL 2 TIMES DAILY
Qty: 180 TABLET | Refills: 4 | Status: SHIPPED | OUTPATIENT
Start: 2017-10-10 | End: 2018-06-26 | Stop reason: HOSPADM

## 2017-10-10 RX ORDER — ECHINACEA 400 MG
1 CAPSULE ORAL DAILY
COMMUNITY
End: 2019-10-01 | Stop reason: SDUPTHER

## 2017-10-10 NOTE — PROGRESS NOTES
Subjective:     Encounter Date:10/10/2017      Patient ID: Albania Ramos is a 62 y.o. female.    Chief Complaint:Palpitations  HPI  This is a 62-year-old female patient who has had 2 previous arrhythmia ablations.  The patient recently had a prolonged loop recorder implanted which was then removed.  She had no episodes of recurrent atrial fibrillation during the monitoring phase.  She had only an occasional PVC.  She continues to have palpitations consistent with atrial and/or ventricular ectopy.  There has been some improvement in the frequency duration and intensity of these since she started using CPAP for recently diagnosed obstructive sleep apnea.  She has occasional shortness of breath but this has not been effort limiting.  She reports intermittent swelling of her feet and ankles but feels that this is due to recent knee issues.  Her primary care provider has expressed concern that this is due to congestive heart failure.  However, her echocardiogram shows normal left ventricular systolic and diastolic function.  There is no evidence of pulmonary hypertension, valvular disease or pericardial disease.  This would largely obviate the likelihood of congestive heart failure.  In addition the patient has no hypertension.  She is a former smoker but has not smoked in many years.  She has no orthopnea or PND.  The following portions of the patient's history were reviewed and updated as appropriate: allergies, current medications, past family history, past medical history, past social history, past surgical history and problem  Review of Systems   Constitution: Negative for chills, diaphoresis, fever, weakness, malaise/fatigue, night sweats, weight gain and weight loss.   HENT: Negative for ear discharge, hearing loss, hoarse voice and nosebleeds.    Eyes: Negative for discharge, double vision, pain and photophobia.   Cardiovascular: Positive for leg swelling and palpitations. Negative for chest pain,  claudication, cyanosis, dyspnea on exertion, near-syncope, orthopnea, paroxysmal nocturnal dyspnea and syncope.   Respiratory: Positive for shortness of breath. Negative for cough, hemoptysis, sputum production and wheezing.    Endocrine: Negative for cold intolerance, heat intolerance, polydipsia, polyphagia and polyuria.   Hematologic/Lymphatic: Negative for adenopathy and bleeding problem. Does not bruise/bleed easily.   Skin: Negative for color change, flushing, itching and rash.   Musculoskeletal: Negative for muscle cramps, muscle weakness, myalgias and stiffness.   Gastrointestinal: Negative for abdominal pain, diarrhea, hematemesis, hematochezia, nausea and vomiting.   Genitourinary: Negative for dysuria, frequency and nocturia.   Neurological: Negative for dizziness, focal weakness, loss of balance, numbness, paresthesias and seizures.   Psychiatric/Behavioral: Negative for altered mental status, hallucinations and suicidal ideas.   Allergic/Immunologic: Negative for HIV exposure, hives and persistent infections.       Current Outpatient Prescriptions:   •  ALLERGY SERUM INJECTION, Inject  under the skin 1 (One) Time., Disp: , Rfl:   •  aspirin 81 MG EC tablet, Take 81 mg by mouth daily., Disp: , Rfl:   •  azelastine (ASTELIN) 0.1 % nasal spray, 1 spray into each nostril 2 (Two) Times a Day As Needed for Rhinitis or Allergies. Use in each nostril as directed, Disp: 1 each, Rfl: 5  •  BIOTIN PO, Take 2,500 mcg by mouth daily., Disp: , Rfl:   •  calcium carbonate (OS-SHANTE) 600 MG tablet, Take 600 mg by mouth 2 (two) times a day with meals., Disp: , Rfl:   •  Cholecalciferol (VITAMIN D3) 2000 UNITS capsule, Take 1 capsule by mouth daily., Disp: , Rfl:   •  Cinnamon ([employer] CINNAMON) 500 MG capsule, Take 500 mg by mouth 2 (two) times a day., Disp: , Rfl:   •  CRANBERRY CONCENTRATE PO, Take 4,200 mg by mouth 2 (two) times a day., Disp: , Rfl:   •  Cream Base (PENCREAM) cream, Apply  topically., Disp: , Rfl:   •   cyanocobalamin (VITAMIN B-12) 2500 MCG tablet tablet, Place 1 tablet under the tongue. 2-3 times a week, Disp: , Rfl:   •  Flaxseed, Linseed, (FLAXSEED OIL) 1000 MG capsule, Take 1 tablet by mouth Daily., Disp: , Rfl:   •  flecainide (TAMBOCOR) 50 MG tablet, Take 1 tablet by mouth 2 (Two) Times a Day., Disp: 180 tablet, Rfl: 4  •  Garlic 500 MG capsule, Take 1 tablet by mouth 2 (two) times a day., Disp: , Rfl:   •  Glucosamine Sulfate-MSM (GLUCOSAMINE-MSM) 500-500 MG tablet, Take 1 tablet by mouth daily., Disp: , Rfl:   •  Green Tea, Camillia sinensis, (GREEN TEA PO), Take 500 mg by mouth Daily., Disp: , Rfl:   •  hydrochlorothiazide (MICROZIDE) 12.5 MG capsule, Take 1 capsule by mouth Daily., Disp: 30 capsule, Rfl: 5  •  ibuprofen (ADVIL,MOTRIN) 200 MG tablet, Take 200 mg by mouth Every 6 (Six) Hours As Needed for Mild Pain ., Disp: , Rfl:   •  ipratropium (ATROVENT HFA) 17 MCG/ACT inhaler, Inhale 2 puffs Every 6 (Six) Hours As Needed for wheezing., Disp: 1 inhaler, Rfl: 5  •  levocetirizine (XYZAL) 5 MG tablet, Take 5 mg by mouth Every Evening., Disp: , Rfl:   •  levothyroxine (SYNTHROID) 100 MCG tablet, Take 1 tablet by mouth Daily. Pt will be on total of 188mcg, Disp: 90 tablet, Rfl: 3  •  levothyroxine (SYNTHROID, LEVOTHROID) 88 MCG tablet, Take 1 tablet by mouth Daily., Disp: 90 tablet, Rfl: 3  •  magnesium gluconate (MAGONATE) 500 MG tablet, Take 1 tablet by mouth daily., Disp: , Rfl:   •  Misc Natural Products (TUMERSAID) tablet, Take 500 mg by mouth Daily., Disp: , Rfl:   •  Omega-3 Fatty Acids (FISH OIL) 435 MG capsule, Take 1,000 mg by mouth daily., Disp: , Rfl:   •  Omeprazole (EQ OMEPRAZOLE) 20 MG tablet delayed-release, Take 20 mg by mouth 2 (Two) Times a Day., Disp: 180 each, Rfl: 3  •  PATIENT SUPPLIED ALLERGY INJECTION, Inject  under the skin 1 (One) Time., Disp: , Rfl:   •  Probiotic Product (SOLUBLE FIBER/PROBIOTICS PO), Take 1 tablet by mouth daily., Disp: , Rfl:   •  tapentadol (NUCYNTA) 50 MG  "tablet, Take 50 mg by mouth Every 6 (Six) Hours As Needed., Disp: , Rfl:   •  Unable to find, Take 1 each by mouth 1 (one) time. Med Name: JULIETH CALVO SYRUP, Disp: , Rfl:   •  vitamin C (ASCORBIC ACID) 500 MG tablet, Take 1 tablet by mouth daily., Disp: , Rfl:   •  HYDROcodone-acetaminophen (NORCO) 7.5-325 MG per tablet, Take 1 tablet by mouth 2 (Two) Times a Day As Needed., Disp: , Rfl:      Objective:     Physical Exam   Constitutional: She is oriented to person, place, and time. She appears well-developed and well-nourished.   HENT:   Head: Normocephalic and atraumatic.   Eyes: Conjunctivae are normal. No scleral icterus.   Cardiovascular: Normal rate, regular rhythm, normal heart sounds and intact distal pulses.  Exam reveals no gallop and no friction rub.    No murmur heard.  Pulmonary/Chest: Effort normal and breath sounds normal. No respiratory distress.   Abdominal: Soft. Bowel sounds are normal. There is no tenderness.   Musculoskeletal: She exhibits no edema.   Neurological: She is alert and oriented to person, place, and time.   Skin: Skin is warm and dry.   Psychiatric: She has a normal mood and affect. Her behavior is normal.     Blood pressure 130/70, pulse 86, resp. rate 18, height 69\" (175.3 cm), weight 232 lb 3.2 oz (105 kg), SpO2 98 %.   Lab Review:       Assessment:         1. Palpitations  No evidence of recurrent atrial fibrillation.  The patient has occasional to rare symptomatic PVCs.  - ECG 12 Lead    2. Paroxysmal atrial fibrillation  The patient is maintaining normal sinus rhythm on low-dose flecainide antiarrhythmic drug therapy.  - ECG 12 Lead    ECG 12 Lead  Date/Time: 10/10/2017 11:22 AM  Performed by: CARLA THOMPSON  Authorized by: CARLA THOMPSON   Rhythm: sinus rhythm  Rate: normal  QRS axis: normal  Clinical impression: normal ECG             Plan:       No changes in her medication profile have been made at today's visit.  No additional cardiovascular testing is " warranted at this time.

## 2017-10-17 ENCOUNTER — TREATMENT (OUTPATIENT)
Dept: PHYSICAL THERAPY | Facility: CLINIC | Age: 62
End: 2017-10-17

## 2017-10-17 DIAGNOSIS — Z96.652 H/O TOTAL KNEE REPLACEMENT, LEFT: Primary | ICD-10-CM

## 2017-10-17 PROCEDURE — 97110 THERAPEUTIC EXERCISES: CPT | Performed by: PHYSICAL THERAPIST

## 2017-10-17 PROCEDURE — 97530 THERAPEUTIC ACTIVITIES: CPT | Performed by: PHYSICAL THERAPIST

## 2017-10-17 NOTE — PROGRESS NOTES
Physical Therapy Daily Progress Note      Visit # : 13    [place]ia Ramos reports 3/10 pain today at rest.  Pt with L knee pain and stiffness are still a problem.  Pt reports MD wanted her to take 2 weeks off.         Objective Pt present to PT today with minimal guarding.     AROM:  L knee flexion 118 degrees,   Knee ext -1.5 degrees from neutral.     MMT:  Knee flex 4/5,  Ext 4/5 L knee     Pt ambulating with better mechanics in the L LE.     Palpation:  Tenderness still present but not as sensitive in the medial knee/ pes anserine area.         See Exercise, Manual, and Modality Logs for complete treatment.     Assessment/Plan  Pt took 2 weeks off to reduce some of her inflammation.  She now arrives with less pain in the knee on the R LE.  Continue with initial goals.       Progress per Plan of Care  Return to PT for 2 x per week.            Manual Therapy:         mins  35924;  Therapeutic Exercise:    28     mins  32541;     Neuromuscular Doug:        mins  45280;    Therapeutic Activity:     11     mins  89321;     Gait Training:        ___  mins  43280;     Ultrasound:          mins  26981;    Electrical Stimulation:         mins  81009 ( );  Dry Needling          mins self-pay    Timed Treatment:   39   mins   Total Treatment:     45   mins    Jackson Hodge, PT  Physical Therapist

## 2017-10-19 ENCOUNTER — TREATMENT (OUTPATIENT)
Dept: PHYSICAL THERAPY | Facility: CLINIC | Age: 62
End: 2017-10-19

## 2017-10-19 DIAGNOSIS — Z96.652 H/O TOTAL KNEE REPLACEMENT, LEFT: Primary | ICD-10-CM

## 2017-10-19 PROCEDURE — 97110 THERAPEUTIC EXERCISES: CPT | Performed by: PHYSICAL THERAPIST

## 2017-10-19 NOTE — PROGRESS NOTES
Physical Therapy Daily Progress Note      Visit # : 14    Al[place]ia Ramos reports 2/10 pain today at rest.  Medial and lateral knee.  Posterior lateral knee area painful         Objective Pt present to PT today with no distress noted at rest.     AROM:  L knee flexion 126,  Ext -1 degree from full ext.       See Exercise, Manual, and Modality Logs for complete treatment.     Assessment/Plan  Pt with less pain and increased ROM noted today.   Pt seems to have less inflammation today.       Progress per Plan of Care             Manual Therapy:         mins  95972;  Therapeutic Exercise:    53     mins  92689;     Neuromuscular Doug:        mins  14510;    Therapeutic Activity:          mins  25904;     Gait Training:        ___  mins  48387;     Ultrasound:          mins  34282;    Electrical Stimulation:         mins  71415 ( );  Dry Needling          mins self-pay    Timed Treatment:   53   mins   Total Treatment:     66   mins    Jackson Hodge, PT  Physical Therapist

## 2017-10-23 ENCOUNTER — TREATMENT (OUTPATIENT)
Dept: PHYSICAL THERAPY | Facility: CLINIC | Age: 62
End: 2017-10-23

## 2017-10-23 DIAGNOSIS — Z96.652 H/O TOTAL KNEE REPLACEMENT, LEFT: Primary | ICD-10-CM

## 2017-10-23 PROCEDURE — 97110 THERAPEUTIC EXERCISES: CPT | Performed by: PHYSICAL THERAPIST

## 2017-10-23 NOTE — PROGRESS NOTES
Physical Therapy Daily Progress Note      Visit # : 15    Albania Ramos reports 1.5/10 pain today at rest.  Pt reports she is sleeping better and having less pain.  Pt reports being able to walk        Objective Pt present to PT today with no distress noted.     Pt with stiffness with transfers.     AROM:  L Knee Flexion 129,  Ext -2 degrees.       See Exercise, Manual, and Modality Logs for complete treatment.     Assessment/Plan  Pt with less pain and increased function.  Her knee ext ROM is slightly less today.       Progress per Plan of Care             Manual Therapy:         mins  82235;  Therapeutic Exercise:    54     mins  23303;     Neuromuscular Doug:        mins  10192;    Therapeutic Activity:          mins  11776;     Gait Training:        ___  mins  57984;     Ultrasound:          mins  49243;    Electrical Stimulation:         mins  13345 ( );  Dry Needling          mins self-pay    Timed Treatment:   54   mins   Total Treatment:     55   mins    Jackson Hodge, PT  Physical Therapist

## 2017-10-26 ENCOUNTER — TREATMENT (OUTPATIENT)
Dept: PHYSICAL THERAPY | Facility: CLINIC | Age: 62
End: 2017-10-26

## 2017-10-26 DIAGNOSIS — Z96.652 H/O TOTAL KNEE REPLACEMENT, LEFT: Primary | ICD-10-CM

## 2017-10-26 PROCEDURE — 97110 THERAPEUTIC EXERCISES: CPT | Performed by: PHYSICAL THERAPIST

## 2017-10-26 NOTE — PROGRESS NOTES
Physical Therapy Daily Progress Note      Visit # : 16    Albania Ramos reports 0/10 pain today at rest.  Pt reports she is feeling better.          Objective Pt present to PT today with minimal stiffness while ambulating but no distress.     AROM:  L knee flexion 130 degrees,  Ext -2 degrees.   Pt with increased functional mobility with less pain noted.       See Exercise, Manual, and Modality Logs for complete treatment.     Assessment/Plan  Pt with increased ROM and increased function for the LE.       Progress per Plan of Care  Pt returns to [facility] on Monday morning and she may return to work next week.            Manual Therapy:    5     mins  33982;  Therapeutic Exercise:    50     mins  70594;     Neuromuscular Doug:        mins  32648;    Therapeutic Activity:          mins  13171;     Gait Training:        ___  mins  01775;     Ultrasound:          mins  90103;    Electrical Stimulation:         mins  83541 ( );  Dry Needling          mins self-pay    Timed Treatment:   55   mins   Total Treatment:     59   mins    Jackson Hodge, PT  Physical Therapist

## 2017-10-30 ENCOUNTER — TREATMENT (OUTPATIENT)
Dept: PHYSICAL THERAPY | Facility: CLINIC | Age: 62
End: 2017-10-30

## 2017-10-30 PROCEDURE — 97110 THERAPEUTIC EXERCISES: CPT | Performed by: PHYSICAL THERAPIST

## 2017-10-30 NOTE — PROGRESS NOTES
Physical Therapy Daily Progress Note      Visit # : 17    Albania Ramos reports 3/10 pain today at rest.  Pt reports some jabbing pains this AM in the L knee.  She went to MD and has injection this morning.         Objective Pt present to PT today with minimal guarding and moderate antalgia post injection today.     L knee ext -1 degrees,  Flexion 121 degrees.     Exercises limited today by elevated discomfort from injection.     See Exercise, Manual, and Modality Logs for complete treatment.     Assessment/Plan  Pt with R knee more tender from injection and pinching in the knee.       Progress per Plan of Care             Manual Therapy:    3     mins  63217;  Therapeutic Exercise:    38     mins  01895;     Neuromuscular Doug:        mins  38960;    Therapeutic Activity:          mins  38120;     Gait Training:        ___  mins  65193;     Ultrasound:          mins  50827;    Electrical Stimulation:         mins  64806 ( );  Dry Needling          mins self-pay    Timed Treatment:   41   mins   Total Treatment:     61   mins    Jackson Hodge, PT  Physical Therapist

## 2017-11-03 ENCOUNTER — TREATMENT (OUTPATIENT)
Dept: PHYSICAL THERAPY | Facility: CLINIC | Age: 62
End: 2017-11-03

## 2017-11-03 PROCEDURE — 97110 THERAPEUTIC EXERCISES: CPT | Performed by: PHYSICAL THERAPIST

## 2017-11-03 NOTE — PROGRESS NOTES
Physical Therapy Daily Progress Note      Visit # : 18    Albania Ramos reports 1/10 pain today at rest.  Pt reports that this past injection seems to really have helped.  She reports more normal motion today.         Objective Pt present to PT today with no distress noted.     L knee flexion 128,  Ext -1.5degree from neutral.       See Exercise, Manual, and Modality Logs for complete treatment.     Assessment/Plan  Pt with increased ROM and function.       Progress per Plan of Care             Manual Therapy:         mins  03888;  Therapeutic Exercise:    53     mins  30415;     Neuromuscular Doug:        mins  67303;    Therapeutic Activity:          mins  49450;     Gait Training:        ___  mins  47140;     Ultrasound:          mins  39156;    Electrical Stimulation:         mins  42557 ( );  Dry Needling          mins self-pay    Timed Treatment:   53   mins   Total Treatment:     65   mins    Jackson Hodge, PT  Physical Therapist

## 2017-11-06 ENCOUNTER — TREATMENT (OUTPATIENT)
Dept: PHYSICAL THERAPY | Facility: CLINIC | Age: 62
End: 2017-11-06

## 2017-11-06 DIAGNOSIS — Z96.652 H/O TOTAL KNEE REPLACEMENT, LEFT: Primary | ICD-10-CM

## 2017-11-06 PROCEDURE — 97110 THERAPEUTIC EXERCISES: CPT | Performed by: PHYSICAL THERAPIST

## 2017-11-06 NOTE — PROGRESS NOTES
Physical Therapy Daily Progress Note      Visit # : 19    Albania Ramos reports 1/10 pain today at rest.  Pt with less pain overall.  Pt reports being able to stand for about 2 hours but then her legs start really hurting.         Objective Pt present to PT today with no distress ambulating into PT area.     AROM:  L knee flexion 130 degrees.       See Exercise, Manual, and Modality Logs for complete treatment.     Assessment/Plan  Pt with improved function and less overall pain.        Progress per Plan of Care             Manual Therapy:         mins  22408;  Therapeutic Exercise:    54     mins  17396;     Neuromuscular Doug:        mins  66969;    Therapeutic Activity:          mins  59229;     Gait Training:        ___  mins  53704;     Ultrasound:          mins  47071;    Electrical Stimulation:        mins  31787 ( );  Dry Needling          mins self-pay    Timed Treatment:   54   mins   Total Treatment:     68   mins    Jackson Hodge, PT  Physical Therapist

## 2017-11-09 ENCOUNTER — TREATMENT (OUTPATIENT)
Dept: PHYSICAL THERAPY | Facility: CLINIC | Age: 62
End: 2017-11-09

## 2017-11-09 DIAGNOSIS — Z96.652 H/O TOTAL KNEE REPLACEMENT, LEFT: Primary | ICD-10-CM

## 2017-11-09 PROCEDURE — 97110 THERAPEUTIC EXERCISES: CPT | Performed by: PHYSICAL THERAPIST

## 2017-11-09 PROCEDURE — 97530 THERAPEUTIC ACTIVITIES: CPT | Performed by: PHYSICAL THERAPIST

## 2017-11-09 NOTE — PROGRESS NOTES
Physical Therapy Daily Progress Note      Visit # : 20    Albania Ramos reports 0/10 pain today at rest.  Pt reports she is able ride bike at home more intensely.         Objective Pt present to PT today with no distress noted.     AROM:  L knee flexion 130 degrees, Ext -2 degrees.     Post stretching L knee ext +2 degree ext    See Exercise, Manual, and Modality Logs for complete treatment.     Assessment/Plan  Pt with less pain and less discomfort noted today.  She started a new topical cream for the knee which may be helping.       Progress per Plan of Care             Manual Therapy:         mins  31654;  Therapeutic Exercise:    40     mins  25354;     Neuromuscular Doug:        mins  92250;    Therapeutic Activity:     13     mins  74666;     Gait Training:        ___  mins  40737;     Ultrasound:          mins  91625;    Electrical Stimulation:         mins  46105 ( );  Dry Needling          mins self-pay    Timed Treatment:   53   mins   Total Treatment:     55   mins    Jackson Hodge, PT  Physical Therapist

## 2017-11-13 ENCOUNTER — TREATMENT (OUTPATIENT)
Dept: PHYSICAL THERAPY | Facility: CLINIC | Age: 62
End: 2017-11-13

## 2017-11-13 DIAGNOSIS — Z96.652 H/O TOTAL KNEE REPLACEMENT, LEFT: Primary | ICD-10-CM

## 2017-11-13 PROCEDURE — 97110 THERAPEUTIC EXERCISES: CPT | Performed by: PHYSICAL THERAPIST

## 2017-11-13 NOTE — PROGRESS NOTES
Physical Therapy Daily Progress Note      Visit # : 21    Albania Ramos reports 2/10 pain today at rest.  Pt with pain less overall.  She is able to ride bike more.         Objective Pt present to PT today with no distress at rest.  Pt does have slight antalgia.     AROM:  Knee flexion 131 degrees,  Ext 0 degrees.    See Exercise, Manual, and Modality Logs for complete treatment.     Assessment/Plan  Pt with good ROM.  She still has some catching occasional and some tenderness to the medial knee joint.       Progress per Plan of Care             Manual Therapy:         mins  53361;  Therapeutic Exercise:    45     mins  99802;     Neuromuscular Doug:        mins  39703;    Therapeutic Activity:          mins  53825;     Gait Training:        ___  mins  86011;     Ultrasound:          mins  60722;    Electrical Stimulation:         mins  66650 ( );  Dry Needling          mins self-pay    Timed Treatment:   45   mins   Total Treatment:     58   mins    Jackson Hodge, PT  Physical Therapist

## 2017-11-17 ENCOUNTER — TREATMENT (OUTPATIENT)
Dept: PHYSICAL THERAPY | Facility: CLINIC | Age: 62
End: 2017-11-17

## 2017-11-17 DIAGNOSIS — Z96.652 H/O TOTAL KNEE REPLACEMENT, LEFT: Primary | ICD-10-CM

## 2017-11-17 PROCEDURE — 97110 THERAPEUTIC EXERCISES: CPT | Performed by: PHYSICAL THERAPIST

## 2017-11-17 NOTE — PROGRESS NOTES
Physical Therapy Daily Progress Note      Visit # : 22    Albania Ramos reports 4/10 pain today at rest.  Pt returned to work this week and worked back to back shifts.          Objective Pt present to PT today with moderate distress noted with ambulation.     Pt with the R knee pain elevated and guarded today.  Pt with less pain after exercises today.       See Exercise, Manual, and Modality Logs for complete treatment.     Assessment/Plan  Pt with more soreness today from elevated activity level returning to work.        Progress per Plan of Care             Manual Therapy:         mins  80871;  Therapeutic Exercise:    53     mins  00327;     Neuromuscular Doug:        mins  79707;    Therapeutic Activity:          mins  26739;     Gait Training:        ___  mins  72631;     Ultrasound:          mins  28718;    Electrical Stimulation:         mins  10458 ( );  Dry Needling          mins self-pay    Timed Treatment:   53   mins   Total Treatment:     56   mins    Jackson Hodge, PT  Physical Therapist

## 2017-11-22 ENCOUNTER — TREATMENT (OUTPATIENT)
Dept: PHYSICAL THERAPY | Facility: CLINIC | Age: 62
End: 2017-11-22

## 2017-11-22 DIAGNOSIS — Z96.652 H/O TOTAL KNEE REPLACEMENT, LEFT: Primary | ICD-10-CM

## 2017-11-22 PROCEDURE — 97530 THERAPEUTIC ACTIVITIES: CPT | Performed by: PHYSICAL THERAPIST

## 2017-11-22 PROCEDURE — 97110 THERAPEUTIC EXERCISES: CPT | Performed by: PHYSICAL THERAPIST

## 2017-11-22 NOTE — PROGRESS NOTES
Physical Therapy Daily Progress Note      Visit # : 23    Albania Ramos reports 0/10 pain today at rest.  Pt reports soreness in the hips from returning to work.  Pt reports she is back to work but hasn't work a full week yet.         Objective Pt present to PT today with no distress noted.      AROM:  L knee ext 0 degrees,  Flexion 138 degrees      See Exercise, Manual, and Modality Logs for complete treatment.     Assessment/Plan  Pt with ROM improved into ext and into flexion.       Progress per Plan of Care             Manual Therapy:         mins  66808;  Therapeutic Exercise:    43     mins  45013;     Neuromuscular Doug:        mins  35382;    Therapeutic Activity:     11     mins  22881;     Gait Training:        ___  mins  72773;     Ultrasound:          mins  94083;    Electrical Stimulation:         mins  03062 ( );  Dry Needling          mins self-pay    Timed Treatment:   54   mins   Total Treatment:     64   mins    Jackson Hodge, PT  Physical Therapist

## 2017-11-27 ENCOUNTER — TREATMENT (OUTPATIENT)
Dept: PHYSICAL THERAPY | Facility: CLINIC | Age: 62
End: 2017-11-27

## 2017-11-27 DIAGNOSIS — Z96.652 H/O TOTAL KNEE REPLACEMENT, LEFT: Primary | ICD-10-CM

## 2017-11-27 PROCEDURE — 97110 THERAPEUTIC EXERCISES: CPT | Performed by: PHYSICAL THERAPIST

## 2017-11-27 PROCEDURE — 97530 THERAPEUTIC ACTIVITIES: CPT | Performed by: PHYSICAL THERAPIST

## 2017-11-27 NOTE — PROGRESS NOTES
Physical Therapy Daily Progress Note      Visit # : 24    Albania Ramos reports 2/10 pain today at rest.  Stiffness and soreness in the L knee.  The strider at work seems to help.         Objective Pt present to PT today with no distress noted.  Pt with minimal antalgia noted in the L LE.     AROM: Knee flexion 134 degrees,  Ext 0 degrees.       See Exercise, Manual, and Modality Logs for complete treatment.     Assessment/Plan  Pt with overall pain controlled by the topically lotion.  She is able to work better.  She states that the exercises seem to keep things moving better.       Progress per Plan of Care             Manual Therapy:         mins  97391;  Therapeutic Exercise:    43     mins  17229;     Neuromuscular Doug:        mins  21612;    Therapeutic Activity:     12     mins  87238;     Gait Training:        ___  mins  64187;     Ultrasound:          mins  27075;    Electrical Stimulation:         mins  06750 ( );  Dry Needling          mins self-pay    Timed Treatment:   55   mins   Total Treatment:     59   mins    Jackson Hodge, PT  Physical Therapist

## 2017-11-28 ENCOUNTER — OFFICE VISIT (OUTPATIENT)
Dept: CARDIOLOGY | Facility: CLINIC | Age: 62
End: 2017-11-28

## 2017-11-28 ENCOUNTER — OFFICE VISIT (OUTPATIENT)
Dept: PULMONOLOGY | Facility: CLINIC | Age: 62
End: 2017-11-28

## 2017-11-28 VITALS
WEIGHT: 235 LBS | DIASTOLIC BLOOD PRESSURE: 70 MMHG | RESPIRATION RATE: 16 BRPM | HEIGHT: 69 IN | HEART RATE: 77 BPM | OXYGEN SATURATION: 99 % | BODY MASS INDEX: 34.8 KG/M2 | SYSTOLIC BLOOD PRESSURE: 116 MMHG

## 2017-11-28 VITALS
OXYGEN SATURATION: 97 % | HEIGHT: 69 IN | BODY MASS INDEX: 34.8 KG/M2 | TEMPERATURE: 90 F | DIASTOLIC BLOOD PRESSURE: 70 MMHG | SYSTOLIC BLOOD PRESSURE: 150 MMHG | WEIGHT: 235 LBS

## 2017-11-28 DIAGNOSIS — R07.2 PRECORDIAL PAIN: ICD-10-CM

## 2017-11-28 DIAGNOSIS — R06.02 SOB (SHORTNESS OF BREATH): Primary | ICD-10-CM

## 2017-11-28 DIAGNOSIS — I48.0 PAROXYSMAL ATRIAL FIBRILLATION (HCC): Primary | Chronic | ICD-10-CM

## 2017-11-28 DIAGNOSIS — J45.30 MILD PERSISTENT ASTHMA WITHOUT COMPLICATION: ICD-10-CM

## 2017-11-28 DIAGNOSIS — R06.02 SOB (SHORTNESS OF BREATH): ICD-10-CM

## 2017-11-28 DIAGNOSIS — G47.33 OSA (OBSTRUCTIVE SLEEP APNEA): Primary | ICD-10-CM

## 2017-11-28 DIAGNOSIS — I49.3 PVCS (PREMATURE VENTRICULAR CONTRACTIONS): ICD-10-CM

## 2017-11-28 DIAGNOSIS — R00.2 PALPITATIONS: ICD-10-CM

## 2017-11-28 DIAGNOSIS — R06.02 SHORTNESS OF BREATH: ICD-10-CM

## 2017-11-28 DIAGNOSIS — J30.89 OTHER ALLERGIC RHINITIS: ICD-10-CM

## 2017-11-28 PROCEDURE — 99214 OFFICE O/P EST MOD 30 MIN: CPT | Performed by: NURSE PRACTITIONER

## 2017-11-28 PROCEDURE — 94729 DIFFUSING CAPACITY: CPT | Performed by: INTERNAL MEDICINE

## 2017-11-28 PROCEDURE — 99214 OFFICE O/P EST MOD 30 MIN: CPT | Performed by: INTERNAL MEDICINE

## 2017-11-28 PROCEDURE — 94726 PLETHYSMOGRAPHY LUNG VOLUMES: CPT | Performed by: INTERNAL MEDICINE

## 2017-11-28 PROCEDURE — 94060 EVALUATION OF WHEEZING: CPT | Performed by: INTERNAL MEDICINE

## 2017-11-28 PROCEDURE — 95012 NITRIC OXIDE EXP GAS DETER: CPT | Performed by: NURSE PRACTITIONER

## 2017-11-28 PROCEDURE — 94620 PR PULMONARY STRESS TESTING,SIMPLE: CPT | Performed by: NURSE PRACTITIONER

## 2017-11-28 RX ORDER — NITROFURANTOIN 25; 75 MG/1; MG/1
100 CAPSULE ORAL 2 TIMES DAILY
Status: ON HOLD | COMMUNITY
End: 2017-12-18

## 2017-11-28 RX ORDER — LAMOTRIGINE 100 %
POWDER (GRAM) MISCELLANEOUS
COMMUNITY
End: 2018-03-20

## 2017-11-28 RX ORDER — UBIDECARENONE 100 MG
100 CAPSULE ORAL DAILY
COMMUNITY
End: 2019-10-01 | Stop reason: SDUPTHER

## 2017-11-28 NOTE — PROGRESS NOTES
"    Subjective:     Encounter Date:11/28/2017      Patient ID: [place]ia Ramos is a 62 y.o. female.    Chief Complaint:Chest pain, shortness of breath and palpitations  HPI  This is a 62-year-old female patient who has been experiencing chest discomfort over the last 2 months.  The patient indicates that she has been having a tightness across her central chest which radiates into her central throat with a \"knot-like\" quality.  The discomfort occurs both at rest as well as with activity.  It is somewhat improved using an inhaler.  It is worse with physical activity and exertion.  The chest discomfort is associated with shortness of breath as well as palpitations.  The patient indicates that she has been having a pounding in her chest with a sense that her heart is beating fast and irregular.  The patient has been unable to wear it her Holter monitors or event recorder is due to severe skin allergy to the adhesive.  She has previously had an implantable loop recorder inserted but this was only in place for 6 weeks before it was removed.  The patient is convinced that her symptoms are identical to that which she was experiencing with previous bouts of atrial fibrillation.  She has had no symptoms to suggest stroke or TIA.  Initially there was concern that the patient's shortness of breath was due to her obstructive sleep apnea.  Her CPAP device was subsequently adjusted and this has led to no improvement in her symptoms.  The overall frequency duration and intensity of her chest discomfort shortness of breath and palpitations have escalated over the last 2 months.  The frequency of her chest discomfort shortness of breath and palpitations are largely related to her physical activity level but these are mostly occurring multiple times per day every day.  The chest discomfort and shortness of breath will typically last anywhere from a few minutes to over an hour.  The palpitations tend to be of shorter duration.  She has " some dizziness but no syncope.  The following portions of the patient's history were reviewed and updated as appropriate: allergies, current medications, past family history, past medical history, past social history, past surgical history and problem  Review of Systems   Constitution: Positive for weakness and malaise/fatigue. Negative for chills, diaphoresis, fever, night sweats, weight gain and weight loss.   HENT: Negative for ear discharge, hearing loss, hoarse voice and nosebleeds.    Eyes: Negative.  Negative for discharge, double vision, pain and photophobia.   Cardiovascular: Positive for chest pain, dyspnea on exertion, irregular heartbeat and palpitations. Negative for claudication, cyanosis, leg swelling, near-syncope, orthopnea, paroxysmal nocturnal dyspnea and syncope.   Respiratory: Positive for shortness of breath. Negative for cough, hemoptysis, sputum production and wheezing.    Endocrine: Negative.  Negative for cold intolerance, heat intolerance, polydipsia, polyphagia and polyuria.   Hematologic/Lymphatic: Negative.  Negative for adenopathy and bleeding problem. Does not bruise/bleed easily.   Skin: Negative.  Negative for color change, flushing, itching and rash.   Musculoskeletal: Negative.  Negative for muscle cramps, muscle weakness, myalgias and stiffness.   Gastrointestinal: Negative.  Negative for abdominal pain, diarrhea, hematemesis, hematochezia, nausea and vomiting.   Genitourinary: Negative.  Negative for dysuria, frequency and nocturia.   Neurological: Positive for headaches. Negative for focal weakness, loss of balance, numbness, paresthesias and seizures.   Psychiatric/Behavioral: Negative.  Negative for altered mental status, hallucinations and suicidal ideas.   Allergic/Immunologic: Negative for HIV exposure, hives and persistent infections.       Current Outpatient Prescriptions:   •  ALLERGY SERUM INJECTION, Inject  under the skin 1 (One) Time., Disp: , Rfl:   •  aspirin 81 MG  EC tablet, Take 81 mg by mouth daily., Disp: , Rfl:   •  azelastine (ASTELIN) 0.1 % nasal spray, 1 spray into each nostril 2 (Two) Times a Day As Needed for Rhinitis or Allergies. Use in each nostril as directed, Disp: 1 each, Rfl: 5  •  BIOTIN PO, Take 2,500 mcg by mouth daily., Disp: , Rfl:   •  calcium carbonate (OS-SHANTE) 600 MG tablet, Take 600 mg by mouth 2 (two) times a day with meals., Disp: , Rfl:   •  Cholecalciferol (VITAMIN D3) 2000 UNITS capsule, Take 1 capsule by mouth daily., Disp: , Rfl:   •  Cinnamon ([employer] CINNAMON) 500 MG capsule, Take 500 mg by mouth 2 (two) times a day., Disp: , Rfl:   •  coenzyme Q10 100 MG capsule, Take 100 mg by mouth Daily., Disp: , Rfl:   •  CRANBERRY CONCENTRATE PO, Take 4,200 mg by mouth 2 (two) times a day., Disp: , Rfl:   •  Cream Base (PENCREAM) cream, Apply  topically., Disp: , Rfl:   •  cyanocobalamin (VITAMIN B-12) 2500 MCG tablet tablet, Place 1 tablet under the tongue. 2-3 times a week, Disp: , Rfl:   •  Flaxseed, Linseed, (FLAXSEED OIL) 1000 MG capsule, Take 1 tablet by mouth Daily., Disp: , Rfl:   •  flecainide (TAMBOCOR) 50 MG tablet, Take 1 tablet by mouth 2 (Two) Times a Day., Disp: 180 tablet, Rfl: 4  •  Garlic 500 MG capsule, Take 1 tablet by mouth 2 (two) times a day., Disp: , Rfl:   •  Glucosamine Sulfate-MSM (GLUCOSAMINE-MSM) 500-500 MG tablet, Take 1 tablet by mouth daily., Disp: , Rfl:   •  Green Tea, Camillia sinensis, (GREEN TEA PO), Take 500 mg by mouth Daily., Disp: , Rfl:   •  hydrochlorothiazide (MICROZIDE) 12.5 MG capsule, Take 1 capsule by mouth Daily., Disp: 30 capsule, Rfl: 5  •  HYDROcodone-acetaminophen (NORCO) 7.5-325 MG per tablet, Take 1 tablet by mouth 2 (Two) Times a Day As Needed., Disp: , Rfl:   •  hydrocortisone 2.5 % cream, Apply  topically 2 (Two) Times a Day., Disp: , Rfl:   •  ibuprofen (ADVIL,MOTRIN) 200 MG tablet, Take 200 mg by mouth Every 6 (Six) Hours As Needed for Mild Pain ., Disp: , Rfl:   •  ipratropium (ATROVENT HFA) 17  MCG/ACT inhaler, Inhale 2 puffs Every 6 (Six) Hours As Needed for wheezing., Disp: 1 inhaler, Rfl: 5  •  LamoTRIgine powder, , Disp: , Rfl:   •  levocetirizine (XYZAL) 5 MG tablet, Take 5 mg by mouth Every Evening., Disp: , Rfl:   •  levothyroxine (SYNTHROID) 100 MCG tablet, Take 1 tablet by mouth Daily. Pt will be on total of 188mcg, Disp: 90 tablet, Rfl: 3  •  levothyroxine (SYNTHROID, LEVOTHROID) 88 MCG tablet, Take 1 tablet by mouth Daily., Disp: 90 tablet, Rfl: 3  •  Lidocaine-Prilocaine, Bulk, 2-2 % cream, , Disp: , Rfl:   •  magnesium gluconate (MAGONATE) 500 MG tablet, Take 1 tablet by mouth daily., Disp: , Rfl:   •  Misc Natural Products (TUMERSAID) tablet, Take 500 mg by mouth Daily., Disp: , Rfl:   •  Mometasone Furoate (ASMANEX HFA) 200 MCG/ACT aerosol, Inhale 2 puffs 2 (Two) Times a Day. Rinse mouth with water after use., Disp: 1 inhaler, Rfl: 5  •  nitrofurantoin, macrocrystal-monohydrate, (MACROBID) 100 MG capsule, Take 100 mg by mouth 2 (Two) Times a Day., Disp: , Rfl:   •  Omega-3 Fatty Acids (FISH OIL) 435 MG capsule, Take 1,000 mg by mouth daily., Disp: , Rfl:   •  Omeprazole (EQ OMEPRAZOLE) 20 MG tablet delayed-release, Take 20 mg by mouth 2 (Two) Times a Day., Disp: 180 each, Rfl: 3  •  PATIENT SUPPLIED ALLERGY INJECTION, Inject  under the skin 1 (One) Time., Disp: , Rfl:   •  Probiotic Product (SOLUBLE FIBER/PROBIOTICS PO), Take 1 tablet by mouth daily., Disp: , Rfl:   •  tapentadol (NUCYNTA) 50 MG tablet, Take 50 mg by mouth Every 6 (Six) Hours As Needed., Disp: , Rfl:   •  Unable to find, Take 1 each by mouth 1 (one) time. Med Name: BLACK  ELDERBERRY SYRUP, Disp: , Rfl:   •  vitamin C (ASCORBIC ACID) 500 MG tablet, Take 1 tablet by mouth daily., Disp: , Rfl:      Objective:     Physical Exam   Constitutional: She is oriented to person, place, and time. She appears well-developed and well-nourished.   HENT:   Head: Normocephalic and atraumatic.   Eyes: Conjunctivae are normal. No scleral  "icterus.   Cardiovascular: Normal rate, regular rhythm, normal heart sounds and intact distal pulses.  Exam reveals no gallop and no friction rub.    No murmur heard.  Pulmonary/Chest: Effort normal and breath sounds normal. No respiratory distress.   Abdominal: Soft. Bowel sounds are normal. There is no tenderness.   Musculoskeletal: She exhibits no edema.   Neurological: She is alert and oriented to person, place, and time.   Skin: Skin is warm and dry.   Psychiatric: She has a normal mood and affect. Her behavior is normal.     Blood pressure 150/70, temperature (!) 90 °F (32.2 °C), height 69\" (175.3 cm), weight 235 lb (107 kg), SpO2 97 %.   Lab Review:       Assessment:         1. Paroxysmal atrial fibrillation  The patient feels strongly that her recurrent palpitations are due to atrial fibrillation.  She has not had any clinically documented arrhythmia.  We have had difficulty with heart monitoring due to a severe allergic reaction to the adhesive causing blistering and ulceration of her skin.  - Adult Transthoracic Echo Complete W/ Cont if Necessary Per Protocol    2. PVCs (premature ventricular contractions)  The patient has also been identified as having symptomatic PVCs.  - Adult Transthoracic Echo Complete W/ Cont if Necessary Per Protocol    3. SOB (shortness of breath)  Her shortness of breath is likely multifactorial in etiology.  There is some element related to her underlying obesity and aerobic deconditioning.  There may be a component related to obstructive sleep apnea and development of secondary pulmonary hypertension.  It is possible she could've developed congestive heart failure.  This could also represent an angina equivalent.  - Stress Test With Myocardial Perfusion Two Day    4. Precordial pain  The patient's chest discomfort has features both typical and atypical for coronary insufficiency.  She has multiple risk factors for atherosclerosis progression.  She is unable to do treadmill " exercise stress testing due to her severe dyspnea and poor effort tolerance.  It is been over 5 years since her last ischemic evaluation.  - Stress Test With Myocardial Perfusion Two Day    5. Palpitations  I suspect there is a combination of underlying arrhythmia and/or ectopy.  Diagnosis has been limited due to inability to wear a outpatient heart monitor due to severe allergic reaction to the adhesive.  The patient is agreeable for an implantable loop recorder.  Procedures     Plan:       I have recommended the patient be referred to the electrophysiologist in formerly [facility] for a second opinion regarding her atrial fibrillation as well as placement of an implantable loop recorder.  I have recommended a vasodilator nuclear stress test as well as an echocardiogram.  No changes in her medication therapy have been made at today's visit.  Further recommendations will be predicated on the results of her outpatient testing.

## 2017-11-28 NOTE — PROGRESS NOTES
"Chief Complaint   Patient presents with   • Follow-up   • Sleep Apnea         Subjective   Albania Ramos is a 62 y.o. female.     History of Present Illness   The patient comes in today for follow-up of shortness of breath and obstructive sleep apnea.    She is using her CPAP nightly.  She has psoriasis on her face and she has had trouble with the mask making the psoriasis worse so there have been a few nights she has not used the CPAP machine.  Overall she feels much better since she began using the machine.    She does complain of more shortness of breath over the last 2 months.  She states the symptoms had resolved when she started using the CPAP but they have returned now.  She describes shortness of breath especially in the mornings after awakening and heaviness in her chest.  She feels like she \"does not breathe in good\".  She also has these symptoms intermittently throughout the day. She uses the Atrovent occasionally. She has tried it in the mornings when she is more short of breath and it does help some.    She reports feeling more short of breath with walking as well. She states she feels like she needs oxygen sometimes.    Even with having the increased shortness of breath she does not use the rescue inhaler very often.     She follows with Dr. Dominguez and saw him a few months ago but she was not having these symptoms at that time.    The following portions of the patient's history were reviewed and updated as appropriate: allergies, current medications, past family history, past medical history, past social history and past surgical history.    Review of Systems   HENT: Positive for sinus pressure. Negative for sneezing and sore throat.    Respiratory: Positive for shortness of breath. Negative for cough and wheezing.        Objective   Visit Vitals   • /70   • Pulse 77   • Resp 16   • Ht 69\" (175.3 cm)   • Wt 235 lb (107 kg)   • LMP  (LMP Unknown)   • SpO2 99%   • BMI 34.7 kg/m2       Physical " Exam   Constitutional: She is oriented to person, place, and time. She appears well-developed.   HENT:   Head: Normocephalic and atraumatic.   Eyes: EOM are normal.   Cardiovascular: Normal rate and regular rhythm.    Pulmonary/Chest: Effort normal and breath sounds normal. No respiratory distress. She has no wheezes.   Musculoskeletal:   Gait normal.   Neurological: She is alert and oriented to person, place, and time.   Skin: Skin is dry.   Psychiatric: She has a normal mood and affect.   Vitals reviewed.          Assessment/Plan   Albaina was seen today for follow-up and sleep apnea.    Diagnoses and all orders for this visit:    STEFAN (obstructive sleep apnea)  -     BIPAP / CPAP Adjustment    Other allergic rhinitis    Shortness of breath  -     Overnight Sleep Oximetry Study; Future  -     Converted Six Minute Walk  -     Pulmonary Function Test    Mild persistent asthma without complication  -     Nitric Oxide    Other orders  -     Mometasone Furoate (ASMANEX HFA) 200 MCG/ACT aerosol; Inhale 2 puffs 2 (Two) Times a Day. Rinse mouth with water after use.           Return in about 3 months (around 2/28/2018) for Recheck, For Dr. Mustafa.    DISCUSSION (if any):  I will order a gel based full facemask to see if this is less abrasive on her face.  I have encouraged her to continue CPAP use at the current pressure of 10.  She is compliant with use and in reviewing her compliance report that average AHI has decreased to 3.6 with CPAP use.    I reviewed her PFT which was obtained today and discussed the results with her.    Given her symptoms and FeNo results of 64 it seems her asthma is uncontrolled.  I have recommended she try Asmanex on a regular basis.  Since she has a history of palpitations and is on Tambocor I will prescribe an ICS only.    Converted Six Minute Walk  Date/Time: 11/28/2017 11:24 AM  Performed by: AMY CASTELLON  Authorized by: AMY CASTELLON   Comments: 6 minute walk  test    Total distance walked: 396 meters  Oxygen levels increased to 97% during the walk.  She reports feeling some tightness in her chest and shortness of breath during the walk.      If the symptoms of chest tightness and shortness of breath persist, I recommended she follow-up with Dr. hewitt sooner than previously planned.    Since she seems to have more shortness of breath in the morning I will also check an overnight pulse oximetry.    Dictated utilizing Dragon dictation.    This document was electronically signed by JEREMY Concepcion November 28, 2017  12:00 PM

## 2017-11-30 LAB
BH CV ECHO MEAS - % IVS THICK: 37.5 %
BH CV ECHO MEAS - % LVPW THICK: 72.2 %
BH CV ECHO MEAS - AO MAX PG (FULL): 3.8 MMHG
BH CV ECHO MEAS - AO MAX PG: 7 MMHG
BH CV ECHO MEAS - AO MEAN PG (FULL): 1 MMHG
BH CV ECHO MEAS - AO MEAN PG: 3 MMHG
BH CV ECHO MEAS - AO ROOT AREA (BSA CORRECTED): 1.2
BH CV ECHO MEAS - AO ROOT AREA: 5.7 CM^2
BH CV ECHO MEAS - AO ROOT DIAM: 2.7 CM
BH CV ECHO MEAS - AO V2 MAX: 131.5 CM/SEC
BH CV ECHO MEAS - AO V2 MEAN: 80.8 CM/SEC
BH CV ECHO MEAS - AO V2 VTI: 25.2 CM
BH CV ECHO MEAS - AVA(I,A): 2.6 CM^2
BH CV ECHO MEAS - AVA(I,D): 2.6 CM^2
BH CV ECHO MEAS - AVA(V,A): 2.1 CM^2
BH CV ECHO MEAS - AVA(V,D): 2.1 CM^2
BH CV ECHO MEAS - BSA(HAYCOCK): 2.3 M^2
BH CV ECHO MEAS - BSA: 2.2 M^2
BH CV ECHO MEAS - BZI_BMI: 34.7 KILOGRAMS/M^2
BH CV ECHO MEAS - BZI_METRIC_HEIGHT: 175.3 CM
BH CV ECHO MEAS - BZI_METRIC_WEIGHT: 106.6 KG
BH CV ECHO MEAS - CONTRAST EF 4CH: 65.1 ML/M^2
BH CV ECHO MEAS - EDV(CUBED): 107.2 ML
BH CV ECHO MEAS - EDV(MOD-SP4): 149 ML
BH CV ECHO MEAS - EDV(TEICH): 104.9 ML
BH CV ECHO MEAS - EF(CUBED): 72.2 %
BH CV ECHO MEAS - EF(MOD-SP4): 65 %
BH CV ECHO MEAS - EF(TEICH): 63.9 %
BH CV ECHO MEAS - ESV(CUBED): 29.8 ML
BH CV ECHO MEAS - ESV(MOD-SP4): 52 ML
BH CV ECHO MEAS - ESV(TEICH): 37.9 ML
BH CV ECHO MEAS - FS: 34.7 %
BH CV ECHO MEAS - IVS/LVPW: 1.3
BH CV ECHO MEAS - IVSD: 1 CM
BH CV ECHO MEAS - IVSS: 1.7 CM
BH CV ECHO MEAS - LA DIMENSION: 3.6 CM
BH CV ECHO MEAS - LA/AO: 1.3
BH CV ECHO MEAS - LV DIASTOLIC VOL/BSA (35-75): 67.4 ML/M^2
BH CV ECHO MEAS - LV IVRT: 0.16 SEC
BH CV ECHO MEAS - LV MASS(C)D: 178.9 GRAMS
BH CV ECHO MEAS - LV MASS(C)DI: 80.8 GRAMS/M^2
BH CV ECHO MEAS - LV MASS(C)S: 183.9 GRAMS
BH CV ECHO MEAS - LV MASS(C)SI: 83.1 GRAMS/M^2
BH CV ECHO MEAS - LV MAX PG: 3.2 MMHG
BH CV ECHO MEAS - LV MEAN PG: 2 MMHG
BH CV ECHO MEAS - LV SYSTOLIC VOL/BSA (12-30): 23.5 ML/M^2
BH CV ECHO MEAS - LV V1 MAX: 89 CM/SEC
BH CV ECHO MEAS - LV V1 MEAN: 62.2 CM/SEC
BH CV ECHO MEAS - LV V1 VTI: 20.5 CM
BH CV ECHO MEAS - LVIDD: 4.8 CM
BH CV ECHO MEAS - LVIDS: 3.1 CM
BH CV ECHO MEAS - LVLD AP4: 8.6 CM
BH CV ECHO MEAS - LVLS AP4: 7 CM
BH CV ECHO MEAS - LVOT AREA (M): 3.1 CM^2
BH CV ECHO MEAS - LVOT AREA: 3.1 CM^2
BH CV ECHO MEAS - LVOT DIAM: 2 CM
BH CV ECHO MEAS - LVPWD: 0.9 CM
BH CV ECHO MEAS - LVPWS: 1.6 CM
BH CV ECHO MEAS - MV A MAX VEL: 52.3 CM/SEC
BH CV ECHO MEAS - MV DEC SLOPE: 613 CM/SEC^2
BH CV ECHO MEAS - MV DEC TIME: 0.21 SEC
BH CV ECHO MEAS - MV E MAX VEL: 122 CM/SEC
BH CV ECHO MEAS - MV E/A: 2.3
BH CV ECHO MEAS - MV P1/2T MAX VEL: 124 CM/SEC
BH CV ECHO MEAS - MV P1/2T: 59.2 MSEC
BH CV ECHO MEAS - MVA P1/2T LCG: 1.8 CM^2
BH CV ECHO MEAS - MVA(P1/2T): 3.7 CM^2
BH CV ECHO MEAS - RAP SYSTOLE: 10 MMHG
BH CV ECHO MEAS - RVSP: 25 MMHG
BH CV ECHO MEAS - SI(AO): 65.2 ML/M^2
BH CV ECHO MEAS - SI(CUBED): 35 ML/M^2
BH CV ECHO MEAS - SI(LVOT): 29.1 ML/M^2
BH CV ECHO MEAS - SI(MOD-SP4): 43.8 ML/M^2
BH CV ECHO MEAS - SI(TEICH): 30.3 ML/M^2
BH CV ECHO MEAS - SV(AO): 144.3 ML
BH CV ECHO MEAS - SV(CUBED): 77.4 ML
BH CV ECHO MEAS - SV(LVOT): 64.4 ML
BH CV ECHO MEAS - SV(MOD-SP4): 97 ML
BH CV ECHO MEAS - SV(TEICH): 67 ML
BH CV ECHO MEAS - TR MAX VEL: 196.3 CM/SEC
BH CV ECHO MEAS - TV MAX PG: 2.3 MMHG
BH CV ECHO MEAS - TV V2 MAX: 75.6 CM/SEC
LEFT ATRIUM VOLUME INDEX: 24 ML/M2
LV EF 2D ECHO EST: 65 %

## 2017-12-06 ENCOUNTER — HOSPITAL ENCOUNTER (OUTPATIENT)
Dept: NUCLEAR MEDICINE | Facility: HOSPITAL | Age: 62
Discharge: HOME | End: 2017-12-06
Attending: INTERNAL MEDICINE

## 2017-12-06 ENCOUNTER — TREATMENT (OUTPATIENT)
Dept: PHYSICAL THERAPY | Facility: CLINIC | Age: 62
End: 2017-12-06

## 2017-12-06 DIAGNOSIS — Z96.652 H/O TOTAL KNEE REPLACEMENT, LEFT: Primary | ICD-10-CM

## 2017-12-06 PROCEDURE — A9500 TC99M SESTAMIBI: HCPCS | Performed by: INTERNAL MEDICINE

## 2017-12-06 PROCEDURE — 25010000002 REGADENOSON 0.4 MG/5ML SOLUTION: Performed by: INTERNAL MEDICINE

## 2017-12-06 PROCEDURE — 78452 HT MUSCLE IMAGE SPECT MULT: CPT

## 2017-12-06 PROCEDURE — 93017 CV STRESS TEST TRACING ONLY: CPT

## 2017-12-06 PROCEDURE — 0 TECHNETIUM SESTAMIBI: Performed by: INTERNAL MEDICINE

## 2017-12-06 PROCEDURE — 93018 CV STRESS TEST I&R ONLY: CPT | Performed by: INTERNAL MEDICINE

## 2017-12-06 PROCEDURE — 78452 HT MUSCLE IMAGE SPECT MULT: CPT | Performed by: INTERNAL MEDICINE

## 2017-12-06 PROCEDURE — 97110 THERAPEUTIC EXERCISES: CPT | Performed by: PHYSICAL THERAPIST

## 2017-12-06 RX ADMIN — TECHNETIUM TC-99M SESTAMIBI 1 DOSE: 1 INJECTION INTRAVENOUS at 07:20

## 2017-12-06 RX ADMIN — REGADENOSON 0.4 MG: 0.08 INJECTION, SOLUTION INTRAVENOUS at 07:20

## 2017-12-06 NOTE — PROGRESS NOTES
Physical Therapy Daily Progress Note      Visit # : 25    Albania Ramos reports 1/10 pain today at rest.  Pt reports she doesn't have any pain meds in her at this time but she is using the cream on the knee periodically.          Objective Pt present to PT today with no distress noted at rest.     Pt with good reproduction of HEP without pain elevation.       See Exercise, Manual, and Modality Logs for complete treatment.     Assessment/Plan  Pt with knee mobility and function continue to improve.  Her resting pain levels have greatly improved.         Progress per Plan of Care             Manual Therapy:         mins  73941;  Therapeutic Exercise:    15     mins  36068;     Neuromuscular Doug:        mins  28808;    Therapeutic Activity:          mins  41702;     Gait Training:        ___  mins  65829;     Ultrasound:          mins  14705;    Electrical Stimulation:         mins  35320 ( );  Dry Needling          mins self-pay    Timed Treatment:   15   mins   Total Treatment:     19   mins    Jackson Hodge, PT  Physical Therapist

## 2017-12-07 ENCOUNTER — HOSPITAL ENCOUNTER (OUTPATIENT)
Dept: NUCLEAR MEDICINE | Facility: HOSPITAL | Age: 62
Discharge: HOME | End: 2017-12-07
Attending: INTERNAL MEDICINE

## 2017-12-07 DIAGNOSIS — R60.0 LOCALIZED EDEMA: ICD-10-CM

## 2017-12-07 LAB
BH CV NUCLEAR PRIOR STUDY: 3
BH CV STRESS COMMENTS STAGE 1: NORMAL
BH CV STRESS DOSE REGADENOSON STAGE 1: 0.4
BH CV STRESS DURATION MIN STAGE 1: 0
BH CV STRESS DURATION SEC STAGE 1: 15
BH CV STRESS PROTOCOL 1: NORMAL
BH CV STRESS RECOVERY BP: NORMAL MMHG
BH CV STRESS RECOVERY HR: 87 BPM
BH CV STRESS STAGE 1: 1
LV EF NUC BP: 67 %
MAXIMAL PREDICTED HEART RATE: 159 BPM
PERCENT MAX PREDICTED HR: 54.72 %
STRESS BASELINE BP: NORMAL MMHG
STRESS BASELINE HR: 68 BPM
STRESS PERCENT HR: 64 %
STRESS POST PEAK BP: NORMAL MMHG
STRESS POST PEAK HR: 87 BPM
STRESS TARGET HR: 135 BPM

## 2017-12-07 PROCEDURE — 0 TECHNETIUM SESTAMIBI: Performed by: INTERNAL MEDICINE

## 2017-12-07 PROCEDURE — A9500 TC99M SESTAMIBI: HCPCS | Performed by: INTERNAL MEDICINE

## 2017-12-07 RX ORDER — HYDROCHLOROTHIAZIDE 12.5 MG/1
12.5 CAPSULE, GELATIN COATED ORAL DAILY
Qty: 30 CAPSULE | Refills: 5 | Status: SHIPPED | OUTPATIENT
Start: 2017-12-07 | End: 2018-06-14 | Stop reason: SDUPTHER

## 2017-12-07 RX ADMIN — TECHNETIUM TC-99M SESTAMIBI 1 DOSE: 1 INJECTION INTRAVENOUS at 06:35

## 2017-12-11 ENCOUNTER — PREP FOR SURGERY (OUTPATIENT)
Dept: OTHER | Facility: HOSPITAL | Age: 62
End: 2017-12-11

## 2017-12-11 ENCOUNTER — TREATMENT (OUTPATIENT)
Dept: PHYSICAL THERAPY | Facility: CLINIC | Age: 62
End: 2017-12-11

## 2017-12-11 DIAGNOSIS — Z96.652 H/O TOTAL KNEE REPLACEMENT, LEFT: Primary | ICD-10-CM

## 2017-12-11 DIAGNOSIS — R55 SYNCOPE AND COLLAPSE: Primary | ICD-10-CM

## 2017-12-11 PROCEDURE — 97110 THERAPEUTIC EXERCISES: CPT | Performed by: PHYSICAL THERAPIST

## 2017-12-11 PROCEDURE — 97530 THERAPEUTIC ACTIVITIES: CPT | Performed by: PHYSICAL THERAPIST

## 2017-12-11 RX ORDER — SODIUM CHLORIDE 0.9 % (FLUSH) 0.9 %
1-10 SYRINGE (ML) INJECTION AS NEEDED
Status: CANCELLED | OUTPATIENT
Start: 2017-12-11

## 2017-12-11 RX ORDER — ONDANSETRON 2 MG/ML
4 INJECTION INTRAMUSCULAR; INTRAVENOUS EVERY 6 HOURS PRN
Status: CANCELLED | OUTPATIENT
Start: 2017-12-11

## 2017-12-11 RX ORDER — CEFAZOLIN SODIUM 2 G/100ML
2 INJECTION, SOLUTION INTRAVENOUS ONCE
Status: CANCELLED | OUTPATIENT
Start: 2017-12-11

## 2017-12-11 NOTE — PROGRESS NOTES
Physical Therapy Daily Progress Note      Visit # : 26    Albania Ramos reports 0/10 pain today at rest.  Pt reports she is doing well enough to be discharged.  She is back to work without any significant limitations.         Objective Pt present to PT today with no distress noted.  Pt ambulating into PT area without AD without distress or antalgia noted.     Function:  Pt is able to ascend and descend stairs without pain noted and without use of UE's.       See Exercise, Manual, and Modality Logs for complete treatment.     Assessment/Plan  Pt with all goals met. Pt is independent with her HEP and she has returned to most of her normal functional activity.       D/c to HEP at this time.              Manual Therapy:         mins  19329;  Therapeutic Exercise:    25     mins  10693;     Neuromuscular Doug:        mins  63191;    Therapeutic Activity:     13     mins  08925;     Gait Training:        ___  mins  22357;     Ultrasound:          mins  58235;    Electrical Stimulation:         mins  86030 ( );  Dry Needling         mins self-pay    Timed Treatment:   38   mins   Total Treatment:     39 mins    Jackson Hodge, PT  Physical Therapist

## 2017-12-14 ENCOUNTER — APPOINTMENT (OUTPATIENT)
Dept: CT IMAGING | Facility: HOSPITAL | Age: 62
End: 2017-12-14

## 2017-12-14 ENCOUNTER — APPOINTMENT (OUTPATIENT)
Dept: GENERAL RADIOLOGY | Facility: HOSPITAL | Age: 62
End: 2017-12-14

## 2017-12-14 ENCOUNTER — HOSPITAL ENCOUNTER (EMERGENCY)
Facility: HOSPITAL | Age: 62
Discharge: HOME | End: 2017-12-14
Attending: EMERGENCY MEDICINE | Admitting: EMERGENCY MEDICINE

## 2017-12-14 VITALS
TEMPERATURE: 97.3 F | OXYGEN SATURATION: 96 % | DIASTOLIC BLOOD PRESSURE: 63 MMHG | WEIGHT: 230 LBS | HEIGHT: 69 IN | BODY MASS INDEX: 34.07 KG/M2 | RESPIRATION RATE: 16 BRPM | HEART RATE: 90 BPM | SYSTOLIC BLOOD PRESSURE: 123 MMHG

## 2017-12-14 DIAGNOSIS — R19.7 NAUSEA, VOMITING, AND DIARRHEA: Primary | ICD-10-CM

## 2017-12-14 DIAGNOSIS — R11.2 NAUSEA, VOMITING, AND DIARRHEA: Primary | ICD-10-CM

## 2017-12-14 DIAGNOSIS — Z87.09 HISTORY OF STREP PHARYNGITIS: ICD-10-CM

## 2017-12-14 LAB
ALBUMIN SERPL-MCNC: 4.6 G/DL (ref 3.5–5)
ALBUMIN/GLOB SERPL: 1.6 G/DL (ref 1–2)
ALP SERPL-CCNC: 96 U/L (ref 38–126)
ALT SERPL W P-5'-P-CCNC: 40 U/L (ref 13–69)
ANION GAP SERPL CALCULATED.3IONS-SCNC: 13.7 MMOL/L
AST SERPL-CCNC: 25 U/L (ref 15–46)
BACTERIA UR QL AUTO: ABNORMAL /HPF
BASOPHILS # BLD AUTO: 0.03 10*3/MM3 (ref 0–0.2)
BASOPHILS NFR BLD AUTO: 0.2 % (ref 0–2.5)
BILIRUB SERPL-MCNC: 0.7 MG/DL (ref 0.2–1.3)
BILIRUB UR QL STRIP: NEGATIVE
BUN BLD-MCNC: 25 MG/DL (ref 7–20)
BUN/CREAT SERPL: 35.7 (ref 7.1–23.5)
CALCIUM SPEC-SCNC: 9.3 MG/DL (ref 8.4–10.2)
CHLORIDE SERPL-SCNC: 103 MMOL/L (ref 98–107)
CLARITY UR: CLEAR
CO2 SERPL-SCNC: 30 MMOL/L (ref 26–30)
COLOR UR: YELLOW
CREAT BLD-MCNC: 0.7 MG/DL (ref 0.6–1.3)
DEPRECATED RDW RBC AUTO: 44.6 FL (ref 37–54)
EOSINOPHIL # BLD AUTO: 0.09 10*3/MM3 (ref 0–0.7)
EOSINOPHIL NFR BLD AUTO: 0.7 % (ref 0–7)
ERYTHROCYTE [DISTWIDTH] IN BLOOD BY AUTOMATED COUNT: 14.2 % (ref 11.5–14.5)
FLUAV AG NPH QL: NEGATIVE
FLUBV AG NPH QL IA: NEGATIVE
GFR SERPL CREATININE-BSD FRML MDRD: 85 ML/MIN/1.73
GLOBULIN UR ELPH-MCNC: 2.9 GM/DL
GLUCOSE BLD-MCNC: 134 MG/DL (ref 74–98)
GLUCOSE UR STRIP-MCNC: NEGATIVE MG/DL
HCT VFR BLD AUTO: 46 % (ref 37–47)
HGB BLD-MCNC: 14.5 G/DL (ref 12–16)
HGB UR QL STRIP.AUTO: ABNORMAL
HOLD SPECIMEN: NORMAL
HOLD SPECIMEN: NORMAL
HYALINE CASTS UR QL AUTO: ABNORMAL /LPF
IMM GRANULOCYTES # BLD: 0.05 10*3/MM3 (ref 0–0.06)
IMM GRANULOCYTES NFR BLD: 0.4 % (ref 0–0.6)
KETONES UR QL STRIP: NEGATIVE
LEUKOCYTE ESTERASE UR QL STRIP.AUTO: NEGATIVE
LIPASE SERPL-CCNC: 88 U/L (ref 23–300)
LYMPHOCYTES # BLD AUTO: 0.36 10*3/MM3 (ref 0.6–3.4)
LYMPHOCYTES NFR BLD AUTO: 2.8 % (ref 10–50)
MCH RBC QN AUTO: 27.2 PG (ref 27–31)
MCHC RBC AUTO-ENTMCNC: 31.5 G/DL (ref 30–37)
MCV RBC AUTO: 86.3 FL (ref 81–99)
MONOCYTES # BLD AUTO: 0.43 10*3/MM3 (ref 0–0.9)
MONOCYTES NFR BLD AUTO: 3.3 % (ref 0–12)
NEUTROPHILS # BLD AUTO: 12.06 10*3/MM3 (ref 2–6.9)
NEUTROPHILS NFR BLD AUTO: 92.6 % (ref 37–80)
NITRITE UR QL STRIP: NEGATIVE
NRBC BLD MANUAL-RTO: 0 /100 WBC (ref 0–0)
PH UR STRIP.AUTO: 5.5 [PH] (ref 5–8)
PLATELET # BLD AUTO: 256 10*3/MM3 (ref 130–400)
PMV BLD AUTO: 11.9 FL (ref 6–12)
POTASSIUM BLD-SCNC: 3.7 MMOL/L (ref 3.5–5.1)
PROT SERPL-MCNC: 7.5 G/DL (ref 6.3–8.2)
PROT UR QL STRIP: NEGATIVE
RBC # BLD AUTO: 5.33 10*6/MM3 (ref 4.2–5.4)
RBC # UR: ABNORMAL /HPF
REF LAB TEST METHOD: ABNORMAL
SODIUM BLD-SCNC: 143 MMOL/L (ref 137–145)
SP GR UR STRIP: <=1.005 (ref 1–1.03)
SQUAMOUS #/AREA URNS HPF: ABNORMAL /HPF
TROPONIN I SERPL-MCNC: <0.012 NG/ML (ref 0–0.03)
TSH SERPL DL<=0.05 MIU/L-ACNC: 0.09 MIU/ML (ref 0.47–4.68)
UROBILINOGEN UR QL STRIP: ABNORMAL
WBC NRBC COR # BLD: 13.02 10*3/MM3 (ref 4.8–10.8)
WBC UR QL AUTO: ABNORMAL /HPF
WHOLE BLOOD HOLD SPECIMEN: NORMAL
WHOLE BLOOD HOLD SPECIMEN: NORMAL

## 2017-12-14 PROCEDURE — 25010000002 PROMETHAZINE PER 50 MG: Performed by: PHYSICIAN ASSISTANT

## 2017-12-14 PROCEDURE — 85025 COMPLETE CBC W/AUTO DIFF WBC: CPT | Performed by: PHYSICIAN ASSISTANT

## 2017-12-14 PROCEDURE — 80053 COMPREHEN METABOLIC PANEL: CPT | Performed by: PHYSICIAN ASSISTANT

## 2017-12-14 PROCEDURE — 96374 THER/PROPH/DIAG INJ IV PUSH: CPT

## 2017-12-14 PROCEDURE — 74177 CT ABD & PELVIS W/CONTRAST: CPT

## 2017-12-14 PROCEDURE — 0 IOPAMIDOL 61 % SOLUTION: Performed by: EMERGENCY MEDICINE

## 2017-12-14 PROCEDURE — 84484 ASSAY OF TROPONIN QUANT: CPT | Performed by: PHYSICIAN ASSISTANT

## 2017-12-14 PROCEDURE — 99283 EMERGENCY DEPT VISIT LOW MDM: CPT

## 2017-12-14 PROCEDURE — 83690 ASSAY OF LIPASE: CPT | Performed by: PHYSICIAN ASSISTANT

## 2017-12-14 PROCEDURE — 87804 INFLUENZA ASSAY W/OPTIC: CPT | Performed by: PHYSICIAN ASSISTANT

## 2017-12-14 PROCEDURE — 71010 HC CHEST PA OR AP: CPT

## 2017-12-14 PROCEDURE — 96361 HYDRATE IV INFUSION ADD-ON: CPT

## 2017-12-14 PROCEDURE — 25010000002 ONDANSETRON PER 1 MG: Performed by: PHYSICIAN ASSISTANT

## 2017-12-14 PROCEDURE — 81001 URINALYSIS AUTO W/SCOPE: CPT | Performed by: PHYSICIAN ASSISTANT

## 2017-12-14 PROCEDURE — 96375 TX/PRO/DX INJ NEW DRUG ADDON: CPT

## 2017-12-14 PROCEDURE — 93005 ELECTROCARDIOGRAM TRACING: CPT | Performed by: PHYSICIAN ASSISTANT

## 2017-12-14 PROCEDURE — 84443 ASSAY THYROID STIM HORMONE: CPT | Performed by: PHYSICIAN ASSISTANT

## 2017-12-14 RX ORDER — DICYCLOMINE HYDROCHLORIDE 10 MG/1
10 CAPSULE ORAL 3 TIMES DAILY PRN
Qty: 9 CAPSULE | Refills: 0 | Status: SHIPPED | OUTPATIENT
Start: 2017-12-14 | End: 2018-06-26

## 2017-12-14 RX ORDER — PROMETHAZINE HYDROCHLORIDE 25 MG/ML
6.25 INJECTION, SOLUTION INTRAMUSCULAR; INTRAVENOUS ONCE
Status: COMPLETED | OUTPATIENT
Start: 2017-12-14 | End: 2017-12-14

## 2017-12-14 RX ORDER — ONDANSETRON 2 MG/ML
4 INJECTION INTRAMUSCULAR; INTRAVENOUS ONCE
Status: COMPLETED | OUTPATIENT
Start: 2017-12-14 | End: 2017-12-14

## 2017-12-14 RX ORDER — FAMOTIDINE 10 MG/ML
20 INJECTION, SOLUTION INTRAVENOUS ONCE
Status: COMPLETED | OUTPATIENT
Start: 2017-12-14 | End: 2017-12-14

## 2017-12-14 RX ORDER — ONDANSETRON 4 MG/1
4 TABLET, ORALLY DISINTEGRATING ORAL EVERY 8 HOURS PRN
Qty: 8 TABLET | Refills: 0 | Status: SHIPPED | OUTPATIENT
Start: 2017-12-14 | End: 2018-10-11

## 2017-12-14 RX ORDER — SODIUM CHLORIDE 0.9 % (FLUSH) 0.9 %
10 SYRINGE (ML) INJECTION AS NEEDED
Status: DISCONTINUED | OUTPATIENT
Start: 2017-12-14 | End: 2017-12-14 | Stop reason: HOSPADM

## 2017-12-14 RX ORDER — PROMETHAZINE HYDROCHLORIDE 12.5 MG/1
12.5 TABLET ORAL EVERY 8 HOURS PRN
Qty: 10 TABLET | Refills: 0 | Status: SHIPPED | OUTPATIENT
Start: 2017-12-14 | End: 2018-06-26

## 2017-12-14 RX ADMIN — FAMOTIDINE 20 MG: 10 INJECTION, SOLUTION INTRAVENOUS at 10:38

## 2017-12-14 RX ADMIN — SODIUM CHLORIDE 1000 ML: 9 INJECTION, SOLUTION INTRAVENOUS at 12:26

## 2017-12-14 RX ADMIN — ONDANSETRON 4 MG: 2 INJECTION INTRAMUSCULAR; INTRAVENOUS at 10:38

## 2017-12-14 RX ADMIN — IOPAMIDOL 100 ML: 612 INJECTION, SOLUTION INTRAVENOUS at 11:14

## 2017-12-14 RX ADMIN — SODIUM CHLORIDE 1000 ML: 9 INJECTION, SOLUTION INTRAVENOUS at 10:38

## 2017-12-14 RX ADMIN — PROMETHAZINE HYDROCHLORIDE 6.25 MG: 25 INJECTION INTRAMUSCULAR; INTRAVENOUS at 10:37

## 2017-12-14 NOTE — ED PROVIDER NOTES
Subjective   HPI Comments: Patient is here with ,.. States she was diagnosed positive strep throat test yesterday at [facility] today she awoke with some nausea vomit diarrhea several episodes of each.  She denies chest pain or shortness of air no fevers reported she has had some nasal sinus congestion symptoms for the past couple of days.  Presents today with abdominal cramps vomiting diarrhea onset early this morning she was started on amoxicillin yesterday  Presents here for further evaluation      Review of Systems   Constitutional: Positive for appetite change. Negative for fever.   HENT: Positive for congestion, rhinorrhea and sore throat.    Eyes: Negative.    Respiratory: Negative.    Cardiovascular: Negative.  Negative for chest pain.   Gastrointestinal: Positive for abdominal pain, diarrhea, nausea and vomiting.   Genitourinary: Negative.    Musculoskeletal: Positive for arthralgias. Negative for neck pain.   Skin: Negative.    Neurological: Negative.  Negative for numbness and headaches.   Psychiatric/Behavioral: The patient is nervous/anxious.    All other systems reviewed and are negative.      Past Medical History:   Diagnosis Date   • Abdominal pain    • Abnormal ECG    • Acute sinusitis    • Allergic    • Ankle joint pain    • Arthritis    • Asthma    • Asthma    • Atrial fibrillation    • Bronchitis    • Chronic bronchitis    • CPAP (continuous positive airway pressure) dependence    • Degenerative joint disease    • Depression    • Diverticulosis    • Dizziness     Has had some episodes. No blake syncope.11/2007 patient underwent pulmonary vein isolation, initially successful.Patient returned, however in February noting some recurrent episode of dizziness.Then wore wore event recorder which showed some recurrent PAF.   • Dysuria    • Easy bruising    • GERD (gastroesophageal reflux disease)    • H/O bone density study    • Hay fever    • History of chest x-ray 07/10/2015    No acute  cardiopulmonary process   • History of chest x-ray 07/02/2015    No acute cardiopulmonary process   • History of echocardiogram 04/04/2007    LVEF of greater than 60%. Mild MR.Mild TR.Trace pulmonary insufficinecy.   • History of mammogram    • History of PFTs 07/02/2015    Normal spirometry   • Hyperlipidemia    • Hypothyroidism    • Measles    • Migraine headache    • Mumps    • Osteoporosis    • Paroxysmal atrial fibrillation     A. Failed medical therapy. B. Pulmonary vein isolation 11/2006. C. Recurrent episodes of PAF by event recorder 9/2006.   • Pneumonia    • Shortness of breath    • Sleep apnea    • Surfer's nodules of right foot    • Thyroid cancer    • Torn meniscus    • Varicella    • Whooping cough        Allergies   Allergen Reactions   • Albuterol Other (See Comments)     FEELS WEIRD, INCREASED HR AND BREATHING   • Ciprodex [Ciprofloxacin-Dexamethasone] Other (See Comments)     REDNESS   • Ciprofloxacin Hcl    • Darvon [Propoxyphene]    • Niacin Hives   • Other Other (See Comments)     POLLEN,TREES,AND PLANTS MULTIPLE ENVIRONMENTAL ALLERGIES   • Tegaderm Ag Mesh [Silver] Hives   • Adhesive Tape Rash       Past Surgical History:   Procedure Laterality Date   • ABLATION OF DYSRHYTHMIC FOCUS     • CARDIAC ELECTROPHYSIOLOGY PROCEDURE N/A 8/4/2016    Procedure: Loop recorder implant;  Surgeon: Himanshu Calvert MD;  Location:  [facility];  Service:    • CARDIAC ELECTROPHYSIOLOGY PROCEDURE N/A 10/13/2016    Procedure: Loop recorder removal;  Surgeon: Himanshu Calvert MD;  Location:  [facility];  Service:    • CARDIAC SURGERY     • CHOLECYSTECTOMY     • COLONOSCOPY     • DILATATION AND CURETTAGE     • EAR TUBES Bilateral    • FOOT SURGERY     • HYSTERECTOMY     • ILEAL LOOP CONDUIT     • INGUINAL HERNIA REPAIR     • JOINT REPLACEMENT     • KNEE SURGERY Bilateral     TKR Ponce   • LUNG BIOPSY      Remote   • LYMPH NODE BIOPSY     • MOUTH SURGERY      WISDOM TEETH   • THYROID  SURGERY      Partial   • THYROIDECTOMY, PARTIAL     • TOTAL THYROIDECTOMY         Family History   Problem Relation Age of Onset   • Atrial fibrillation Mother    • Thyroid disease Mother    • Early death Father 68   • Lung cancer Father    • Early death Sister 16   • Heart attack Sister    • Down syndrome Sister    • Sleep apnea Brother      Nonorganic   • Other Brother      Palpitations (Symptom)       Social History     Social History   • Marital status:      Spouse name: N/A   • Number of children: N/A   • Years of education: N/A     Social History Main Topics   • Smoking status: Former Smoker     Years: 22.00     Quit date: 6/14/1996   • Smokeless tobacco: Never Used   • Alcohol use No      Comment: ONCE PER YEAR. Not excessive.   • Drug use: No   • Sexual activity: Defer     Other Topics Concern   • None     Social History Narrative           Objective   Physical Exam   Constitutional: She is oriented to person, place, and time. She appears well-developed and well-nourished.   Afebrile vital signs stable nontoxic no acute distress   HENT:   Head: Normocephalic.   Mouth/Throat: Oropharynx is clear and moist. No oropharyngeal exudate.   Posterior pharynx clear uvula midline no airway compromise   Eyes: Conjunctivae and EOM are normal. Pupils are equal, round, and reactive to light.   Neck: Normal range of motion. Neck supple.   Cardiovascular: Normal rate, regular rhythm, normal heart sounds and intact distal pulses.    Pulmonary/Chest: Effort normal and breath sounds normal.   Abdominal: Soft. Bowel sounds are normal. She exhibits no mass. There is tenderness. There is no rebound and no guarding.   Mild diffuse lower abdominal tenderness no rebound or guarding   Musculoskeletal: Normal range of motion. She exhibits no edema.   Neurological: She is alert and oriented to person, place, and time. She has normal reflexes. No cranial nerve deficit. She exhibits normal muscle tone. Coordination normal.   Skin:  Skin is warm and dry. No rash noted.   Psychiatric: Her behavior is normal. Judgment and thought content normal.   Nursing note and vitals reviewed.      ECG 12 Lead    Date/Time: 12/14/2017 11:38 AM  Performed by: CHASE LÓPEZ  Authorized by: CHASE LÓPEZ   Interpreted by physician  Rhythm: sinus rhythm  Rate: normal  Conduction: conduction normal  ST Segments: ST segments normal  T Waves: T waves normal  Other: no other findings  Clinical impression: non-specific ECG               ED Course  ED Course   Comment By Time   Patient resting comfortably pending CT imaging report patient's case labs management reviewed with Dr. Pily López PA-C 12/14 1128   EKG reviewed Dr Pily López PA-C 12/14 1137   Patient resting comfortably no acute distress Chase López PA-C 12/14 1202   We'll plan on discharge patient home follow-up with PCP patient will need to have her thyroid medication adjusted as well.. Discussion with  regarding this as well Chase López PA-C 12/14 1209   Patient continues to rest comfortably no vomiting or diarrhea while in the emergency room Chase López PA-C 12/14 1301   I have given copy of CT report to   Advised him of the noted hiatal hernia Chase López PA-C 12/14 1325   Patient continues to rest comfortably will discharge patient home Chase López PA-C 12/14 1325   Again patient advised to continue antibiotics started yesterday.. Treating strep pharyngitis Chase López PA-C 12/14 1326                  MDM  Number of Diagnoses or Management Options     Amount and/or Complexity of Data Reviewed  Clinical lab tests: ordered  Tests in the radiology section of CPT®: ordered  Tests in the medicine section of CPT®: ordered  Decide to obtain previous medical records or to obtain history from someone other than the patient: yes  Obtain history from someone other than the patient: yes  Review and  summarize past medical records: yes  Discuss the patient with other providers: yes    Risk of Complications, Morbidity, and/or Mortality  Presenting problems: moderate  Diagnostic procedures: moderate  Management options: moderate        Final diagnoses:   Nausea, vomiting, and diarrhea   History of strep pharyngitis            Aguilar López PA-C  12/14/17 4532

## 2017-12-18 ENCOUNTER — HOSPITAL ENCOUNTER (OUTPATIENT)
Facility: HOSPITAL | Age: 62
Discharge: HOME | End: 2017-12-18
Attending: INTERNAL MEDICINE | Admitting: INTERNAL MEDICINE

## 2017-12-18 VITALS
WEIGHT: 235.45 LBS | SYSTOLIC BLOOD PRESSURE: 134 MMHG | DIASTOLIC BLOOD PRESSURE: 66 MMHG | OXYGEN SATURATION: 99 % | BODY MASS INDEX: 34.87 KG/M2 | HEIGHT: 69 IN | HEART RATE: 82 BPM | RESPIRATION RATE: 16 BRPM | TEMPERATURE: 96.7 F

## 2017-12-18 DIAGNOSIS — I49.3 PVCS (PREMATURE VENTRICULAR CONTRACTIONS): ICD-10-CM

## 2017-12-18 DIAGNOSIS — R06.02 SOB (SHORTNESS OF BREATH): ICD-10-CM

## 2017-12-18 DIAGNOSIS — R07.2 PRECORDIAL PAIN: ICD-10-CM

## 2017-12-18 DIAGNOSIS — R00.2 PALPITATIONS: ICD-10-CM

## 2017-12-18 DIAGNOSIS — I48.0 PAROXYSMAL ATRIAL FIBRILLATION (HCC): ICD-10-CM

## 2017-12-18 PROBLEM — R07.9 CHEST PAIN: Status: ACTIVE | Noted: 2017-11-28

## 2017-12-18 LAB
ANION GAP SERPL CALCULATED.3IONS-SCNC: 7 MMOL/L (ref 3–11)
BUN BLD-MCNC: 11 MG/DL (ref 9–23)
BUN/CREAT SERPL: 18.3 (ref 7–25)
CALCIUM SPEC-SCNC: 8.2 MG/DL (ref 8.7–10.4)
CHLORIDE SERPL-SCNC: 108 MMOL/L (ref 99–109)
CO2 SERPL-SCNC: 30 MMOL/L (ref 20–31)
CREAT BLD-MCNC: 0.6 MG/DL (ref 0.6–1.3)
DEPRECATED RDW RBC AUTO: 44.3 FL (ref 37–54)
ERYTHROCYTE [DISTWIDTH] IN BLOOD BY AUTOMATED COUNT: 14 % (ref 11.3–14.5)
GFR SERPL CREATININE-BSD FRML MDRD: 102 ML/MIN/1.73
GLUCOSE BLD-MCNC: 96 MG/DL (ref 70–100)
HCT VFR BLD AUTO: 39.6 % (ref 34.5–44)
HGB BLD-MCNC: 12.7 G/DL (ref 11.5–15.5)
MAGNESIUM SERPL-MCNC: 1.9 MG/DL (ref 1.3–2.7)
MCH RBC QN AUTO: 27.9 PG (ref 27–31)
MCHC RBC AUTO-ENTMCNC: 32.1 G/DL (ref 32–36)
MCV RBC AUTO: 87 FL (ref 80–99)
PLATELET # BLD AUTO: 224 10*3/MM3 (ref 150–450)
PMV BLD AUTO: 11 FL (ref 6–12)
POTASSIUM BLD-SCNC: 3.8 MMOL/L (ref 3.5–5.5)
RBC # BLD AUTO: 4.55 10*6/MM3 (ref 3.89–5.14)
SODIUM BLD-SCNC: 145 MMOL/L (ref 132–146)
WBC NRBC COR # BLD: 5.65 10*3/MM3 (ref 3.5–10.8)

## 2017-12-18 PROCEDURE — 33282 HC INSERTION PT ACTIV CARD EVNT REC: CPT | Performed by: INTERNAL MEDICINE

## 2017-12-18 PROCEDURE — 25010000002 MIDAZOLAM PER 1 MG: Performed by: INTERNAL MEDICINE

## 2017-12-18 PROCEDURE — 85027 COMPLETE CBC AUTOMATED: CPT | Performed by: PHYSICIAN ASSISTANT

## 2017-12-18 PROCEDURE — 25010000002 FENTANYL CITRATE (PF) 100 MCG/2ML SOLUTION: Performed by: INTERNAL MEDICINE

## 2017-12-18 PROCEDURE — S0260 H&P FOR SURGERY: HCPCS | Performed by: INTERNAL MEDICINE

## 2017-12-18 PROCEDURE — C1764 EVENT RECORDER, CARDIAC: HCPCS | Performed by: INTERNAL MEDICINE

## 2017-12-18 PROCEDURE — 33282 PR IMPLANTATION PT-ACTIVATED CARDIAC EVENT RECORDER: CPT | Performed by: INTERNAL MEDICINE

## 2017-12-18 PROCEDURE — 83735 ASSAY OF MAGNESIUM: CPT | Performed by: PHYSICIAN ASSISTANT

## 2017-12-18 PROCEDURE — 36415 COLL VENOUS BLD VENIPUNCTURE: CPT

## 2017-12-18 PROCEDURE — 80048 BASIC METABOLIC PNL TOTAL CA: CPT | Performed by: PHYSICIAN ASSISTANT

## 2017-12-18 DEVICE — ICM CONFIRM RX 1.4CC DM3500: Type: IMPLANTABLE DEVICE | Status: FUNCTIONAL

## 2017-12-18 RX ORDER — FENTANYL CITRATE 50 UG/ML
INJECTION, SOLUTION INTRAMUSCULAR; INTRAVENOUS AS NEEDED
Status: DISCONTINUED | OUTPATIENT
Start: 2017-12-18 | End: 2017-12-18 | Stop reason: HOSPADM

## 2017-12-18 RX ORDER — CEFAZOLIN SODIUM 2 G/100ML
2 INJECTION, SOLUTION INTRAVENOUS ONCE
Status: DISCONTINUED | OUTPATIENT
Start: 2017-12-18 | End: 2017-12-18 | Stop reason: HOSPADM

## 2017-12-18 RX ORDER — MIDAZOLAM HYDROCHLORIDE 1 MG/ML
INJECTION INTRAMUSCULAR; INTRAVENOUS AS NEEDED
Status: DISCONTINUED | OUTPATIENT
Start: 2017-12-18 | End: 2017-12-18 | Stop reason: HOSPADM

## 2017-12-18 RX ORDER — LIDOCAINE HYDROCHLORIDE 10 MG/ML
INJECTION, SOLUTION EPIDURAL; INFILTRATION; INTRACAUDAL; PERINEURAL AS NEEDED
Status: DISCONTINUED | OUTPATIENT
Start: 2017-12-18 | End: 2017-12-18 | Stop reason: HOSPADM

## 2017-12-18 RX ORDER — ONDANSETRON 2 MG/ML
4 INJECTION INTRAMUSCULAR; INTRAVENOUS EVERY 6 HOURS PRN
Status: DISCONTINUED | OUTPATIENT
Start: 2017-12-18 | End: 2017-12-18 | Stop reason: HOSPADM

## 2017-12-18 RX ORDER — SODIUM CHLORIDE 0.9 % (FLUSH) 0.9 %
1-10 SYRINGE (ML) INJECTION AS NEEDED
Status: DISCONTINUED | OUTPATIENT
Start: 2017-12-18 | End: 2017-12-18 | Stop reason: HOSPADM

## 2017-12-18 NOTE — PLAN OF CARE
Problem: Cardiac Catheterization with/without PCI (Adult)  Goal: Signs and Symptoms of Listed Potential Problems Will be Absent or Manageable (Cardiac Catheterization with/without PCI)  Outcome: Ongoing (interventions implemented as appropriate)    12/18/17 0738   Cardiac Catheterization with/without PCI   Problems Assessed (Cardiac Catheterization) all   Problems Present (Cardiac Catheterization) none

## 2017-12-18 NOTE — PLAN OF CARE
Problem: Patient Care Overview (Adult)  Goal: Plan of Care Review  Outcome: Ongoing (interventions implemented as appropriate)    12/18/17 0925   Coping/Psychosocial Response Interventions   Plan Of Care Reviewed With patient;spouse   Patient Care Overview   Progress improving   Outcome Evaluation   Outcome Summary/Follow up Plan VSS. No c/o pain. Incision to left upper chest. Will discharge later today.       Goal: Adult Individualization and Mutuality  Outcome: Ongoing (interventions implemented as appropriate)    12/18/17 0937   Individualization   Patient Specific Preferences orange juice,  at bedside   Patient Specific Interventions will discharge later today       Goal: Discharge Needs Assessment  Outcome: Ongoing (interventions implemented as appropriate)    12/18/17 0700 12/18/17 0937   Discharge Needs Assessment   Concerns To Be Addressed --  no discharge needs identified;denies needs/concerns at this time   Living Environment   Transportation Available car;family or friend will provide --    Self-Care   Equipment Currently Used at Home none --          Problem: Cardiac Catheterization with/without PCI (Adult)  Goal: Signs and Symptoms of Listed Potential Problems Will be Absent or Manageable (Cardiac Catheterization with/without PCI)  Outcome: Ongoing (interventions implemented as appropriate)    12/18/17 0937   Cardiac Catheterization with/without PCI   Problems Assessed (Cardiac Catheterization) all   Problems Present (Cardiac Catheterization) none

## 2017-12-18 NOTE — H&P
[facility] at [facility]        Date of Hospital Visit: 17      Place of Service: [facility]    Patient Name: Albania Ramos  :1955    Referral Provider: Mary Ann Blackwell MD  Primary Care Provider: Gordon Norman MD    Chief complaint/Reason for Consultation: palpitations         Problem List:  Problem   Chest Pain    1.  Myocardial perfusion study : No reversible ischemia EF 67%     Paroxysmal Atrial Fibrillation    A. Failed medical therapy. B. Pulmonary vein isolation 2006. C. Recurrent episodes of PAF by event recorder 2006.  Known Hx of atrial fibrillation. Previously treated with Rythmol but began having recurrent episodes of tachy palpitations.Has had some episodes of dizziness but no blake syncope.  Has had some episodes. No blake syncope.2007 patient underwent pulmonary vein isolation, initially successful.Patient returned, however in February noting some recurrent episode of dizziness.Then wore wore event recorder which showed some recurrent PAF.     Pvcs (Premature Ventricular Contractions)   Palpitations    1.  Echocardiogram 17:  Normal ejection fraction, normal valvular morphology           History of Present Illness:  68-year-old female with prior history of atrial fibrillation with pulmonary vein isolation in 2006.  She now returns with tachypalpitations associated with chest discomfort and dizziness.  She is unable to tolerate Holter monitor or event recorders due to allergic reaction to the adhesive.  She had a recent myocardial perfusion study which was unremarkable.  She had an echocardiogram which was also normal.Due to recurrent symptoms it was felt that she will benefit from implantation of a loop recorder for further evaluation.    Past Medical History:   Diagnosis Date   • Abdominal pain    • Abnormal ECG    • Acute sinusitis    • Allergic    • Ankle joint pain    • Arthritis    • Asthma    • Asthma    • Atrial  fibrillation    • Bronchitis    • Chronic bronchitis    • CPAP (continuous positive airway pressure) dependence    • Degenerative joint disease    • Depression    • Diverticulosis    • Dizziness     Has had some episodes. No blake syncope.11/2007 patient underwent pulmonary vein isolation, initially successful.Patient returned, however in February noting some recurrent episode of dizziness.Then wore wore event recorder which showed some recurrent PAF.   • Dysuria    • Easy bruising    • GERD (gastroesophageal reflux disease)    • H/O bone density study    • Hay fever    • History of chest x-ray 07/10/2015    No acute cardiopulmonary process   • History of chest x-ray 07/02/2015    No acute cardiopulmonary process   • History of echocardiogram 04/04/2007    LVEF of greater than 60%. Mild MR.Mild TR.Trace pulmonary insufficinecy.   • History of mammogram    • History of PFTs 07/02/2015    Normal spirometry   • Hyperlipidemia    • Hypothyroidism    • Measles    • Migraine headache    • Mumps    • Osteoporosis    • Paroxysmal atrial fibrillation     A. Failed medical therapy. B. Pulmonary vein isolation 11/2006. C. Recurrent episodes of PAF by event recorder 9/2006.   • Pneumonia    • Shortness of breath    • Sleep apnea    • Surfer's nodules of right foot    • Thyroid cancer    • Torn meniscus    • Varicella    • Whooping cough        Past Surgical History:   Procedure Laterality Date   • ABLATION OF DYSRHYTHMIC FOCUS     • CARDIAC ELECTROPHYSIOLOGY PROCEDURE N/A 8/4/2016    Procedure: Loop recorder implant;  Surgeon: Himanshu Calvert MD;  Location:  [facility];  Service:    • CARDIAC ELECTROPHYSIOLOGY PROCEDURE N/A 10/13/2016    Procedure: Loop recorder removal;  Surgeon: Himanshu Calvert MD;  Location:  [facility];  Service:    • CARDIAC SURGERY     • CHOLECYSTECTOMY     • COLONOSCOPY     • DILATATION AND CURETTAGE     • EAR TUBES Bilateral    • FOOT SURGERY     • HYSTERECTOMY      • ILEAL LOOP CONDUIT     • INGUINAL HERNIA REPAIR     • JOINT REPLACEMENT     • KNEE SURGERY Bilateral     TKR Ponce   • LUNG BIOPSY      Remote   • LYMPH NODE BIOPSY     • MOUTH SURGERY      WISDOM TEETH   • THYROID SURGERY      Partial   • THYROIDECTOMY, PARTIAL     • TOTAL THYROIDECTOMY         Allergies   Allergen Reactions   • Albuterol Other (See Comments)     FEELS WEIRD, INCREASED HR AND BREATHING   • Ciprodex [Ciprofloxacin-Dexamethasone] Other (See Comments)     REDNESS   • Ciprofloxacin Hcl    • Darvon [Propoxyphene]    • Niacin Hives   • Other Other (See Comments)     POLLEN,TREES,AND PLANTS MULTIPLE ENVIRONMENTAL ALLERGIES   • Tegaderm Ag Mesh [Silver] Hives   • Adhesive Tape Rash     Prescriptions Prior to Admission   Medication Sig Dispense Refill Last Dose   • aspirin 81 MG EC tablet Take 81 mg by mouth daily.   Past Week at Unknown time   • azelastine (ASTELIN) 0.1 % nasal spray 1 spray into each nostril 2 (Two) Times a Day As Needed for Rhinitis or Allergies. Use in each nostril as directed 1 each 5 Past Week at Unknown time   • BIOTIN PO Take 2,500 mcg by mouth daily.   Past Week at Unknown time   • calcium carbonate (OS-SHANTE) 600 MG tablet Take 600 mg by mouth 2 (two) times a day with meals.   Past Week at Unknown time   • Cholecalciferol (VITAMIN D3) 2000 UNITS capsule Take 1 capsule by mouth daily.   Past Week at Unknown time   • Cinnamon ([employer] CINNAMON) 500 MG capsule Take 500 mg by mouth 2 (two) times a day.   Past Week at Unknown time   • coenzyme Q10 100 MG capsule Take 100 mg by mouth Daily.   Past Week at Unknown time   • CRANBERRY CONCENTRATE PO Take 4,200 mg by mouth 2 (two) times a day.   Past Week at Unknown time   • Cream Base (PENCREAM) cream Apply  topically.   Past Week at Unknown time   • cyanocobalamin (VITAMIN B-12) 2500 MCG tablet tablet Place 1 tablet under the tongue. 2-3 times a week   12/17/2017 at 2100   • dicyclomine (BENTYL) 10 MG capsule Take 1 capsule by mouth 3 (Three)  Times a Day As Needed (diarrhea). 9 capsule 0 Past Week at Unknown time   • Flaxseed, Linseed, (FLAXSEED OIL) 1000 MG capsule Take 1 tablet by mouth Daily.   Past Week at Unknown time   • flecainide (TAMBOCOR) 50 MG tablet Take 1 tablet by mouth 2 (Two) Times a Day. 180 tablet 4 12/17/2017 at 2100   • Garlic 500 MG capsule Take 1 tablet by mouth 2 (two) times a day.   Past Week at Unknown time   • Glucosamine Sulfate-MSM (GLUCOSAMINE-MSM) 500-500 MG tablet Take 1 tablet by mouth daily.   Past Week at Unknown time   • Green Tea, Camillia sinensis, (GREEN TEA PO) Take 500 mg by mouth Daily.   Past Week at Unknown time   • hydrochlorothiazide (MICROZIDE) 12.5 MG capsule Take 1 capsule by mouth Daily. 30 capsule 5 Past Week at Unknown time   • HYDROcodone-acetaminophen (NORCO) 7.5-325 MG per tablet Take 1 tablet by mouth 2 (Two) Times a Day As Needed.   Past Week at Unknown time   • hydrocortisone 2.5 % cream Apply  topically 2 (Two) Times a Day.   Past Week at Unknown time   • ibuprofen (ADVIL,MOTRIN) 200 MG tablet Take 200 mg by mouth Every 6 (Six) Hours As Needed for Mild Pain .   Past Week at Unknown time   • ipratropium (ATROVENT HFA) 17 MCG/ACT inhaler Inhale 2 puffs Every 6 (Six) Hours As Needed for wheezing. 1 inhaler 5 Past Month at Unknown time   • levocetirizine (XYZAL) 5 MG tablet Take 5 mg by mouth Every Evening.   Past Week at Unknown time   • levothyroxine (SYNTHROID) 100 MCG tablet Take 1 tablet by mouth Daily. Pt will be on total of 188mcg 90 tablet 3 12/18/2017 at 0500   • levothyroxine (SYNTHROID, LEVOTHROID) 88 MCG tablet Take 1 tablet by mouth Daily. 90 tablet 3 12/18/2017 at 0600     • Lidocaine-Prilocaine, Bulk, 2-2 % cream    Past Month at Unknown time   • magnesium gluconate (MAGONATE) 500 MG tablet Take 1 tablet by mouth daily.   Past Week at Unknown time   • Misc Natural Products (TUMERSAID) tablet Take 500 mg by mouth Daily.   Past Week at Unknown time   • Mometasone Furoate (ASMANEX HFA) 200  MCG/ACT aerosol Inhale 2 puffs 2 (Two) Times a Day. Rinse mouth with water after use. 1 inhaler 5 Past Month at Unknown time   • Omega-3 Fatty Acids (FISH OIL) 435 MG capsule Take 1,000 mg by mouth daily.   Past Week at Unknown time   • Omeprazole (EQ OMEPRAZOLE) 20 MG tablet delayed-release Take 20 mg by mouth 2 (Two) Times a Day. 180 each 3 Past Week at Unknown time   • ondansetron ODT (ZOFRAN-ODT) 4 MG disintegrating tablet Take 1 tablet by mouth Every 8 (Eight) Hours As Needed for Nausea or Vomiting. 8 tablet 0 12/17/2017 at 0600   • Probiotic Product (SOLUBLE FIBER/PROBIOTICS PO) Take 1 tablet by mouth daily.   Past Week at Unknown time   • Unable to find Take 1 each by mouth 1 (one) time. Med Name: BLACK  ELDERBERRY SYRUP   Past Week at Unknown time   • vitamin C (ASCORBIC ACID) 500 MG tablet Take 1 tablet by mouth daily.   Past Week at Unknown time   • ALLERGY SERUM INJECTION Inject  under the skin 1 (One) Time.   12/16/2017   • LamoTRIgine powder    Unknown at Unknown time   • PATIENT SUPPLIED ALLERGY INJECTION Inject  under the skin 1 (One) Time.   12/16/2017   • promethazine (PHENERGAN) 12.5 MG tablet Take 1 tablet by mouth Every 8 (Eight) Hours As Needed for Nausea or Vomiting. 10 tablet 0 12/16/2017   • tapentadol (NUCYNTA) 50 MG tablet Take 50 mg by mouth Every 6 (Six) Hours As Needed.   More than a month at Unknown time       Current Facility-Administered Medications:   •  ceFAZolin in dextrose (ANCEF) IVPB solution 2 g, 2 g, Intravenous, Once, ADENIKE Guy  •  ondansetron (ZOFRAN) injection 4 mg, 4 mg, Intravenous, Q6H PRN, ADENIKE Guy  •  sodium chloride 0.9 % flush 1-10 mL, 1-10 mL, Intravenous, PRN, ADENIKE Guy      Social History     Social History   • Marital status:      Spouse name: N/A   • Number of children: N/A   • Years of education: N/A     Occupational History   • Not on file.     Social History Main Topics   • Smoking status: Former Smoker     Years:  "22.00     Quit date: 6/14/1996   • Smokeless tobacco: Never Used   • Alcohol use No      Comment: ONCE PER YEAR. Not excessive.   • Drug use: No   • Sexual activity: Defer     Other Topics Concern   • Not on file     Social History Narrative       Family History   Problem Relation Age of Onset   • Atrial fibrillation Mother    • Thyroid disease Mother    • Early death Father 68   • Lung cancer Father    • Early death Sister 16   • Heart attack Sister    • Down syndrome Sister    • Sleep apnea Brother      Nonorganic   • Other Brother      Palpitations (Symptom)       REVIEW OF SYSTEMS:   Review of Systems   Constitution: Negative.   HENT: Negative.    Eyes: Negative.    Cardiovascular: Positive for chest pain, irregular heartbeat and palpitations. Negative for claudication and cyanosis.   Respiratory: Negative.    Endocrine: Negative.    Hematologic/Lymphatic: Negative.    Skin: Negative.    Musculoskeletal: Negative.    Gastrointestinal: Negative.    Genitourinary: Negative.    Neurological: Negative.    Psychiatric/Behavioral: Negative.    Allergic/Immunologic: Negative.    All other systems reviewed and are negative.           Objective:  Vitals:    12/18/17 0700   BP: 131/69   BP Location: Left arm   Patient Position: Lying   Pulse: 69   Resp: 18   Temp: 96.7 °F (35.9 °C)   TempSrc: Temporal Artery    SpO2: 98%   Weight: 107 kg (235 lb 7.2 oz)   Height: 175.3 cm (69\")     Body mass index is 34.77 kg/(m^2).  Flowsheet Rows         First Filed Value    Admission Height  175.3 cm (69\") Documented at 12/18/2017 0700    Admission Weight  107 kg (235 lb 7.2 oz) Documented at 12/18/2017 0700        No intake or output data in the 24 hours ending 12/18/17 0758    Physical Exam   General: No acute distress, well-developed and well-nourished.    Skin: Skin is warm and dry. No obvious cyanosis, erythema or pallor.   HEENT: Atraumatic, normocephalic, no conjunctival pallor, no scleral icterus.   Neck: Supple, no JVD. Normal " carotid upstrokes, no bruits.    Chest:No respiratory distres No chest wall tenderness. Breath sounds are normal. No wheezes,  rhonchi or rales.  Cardiovascular: Normal S1 and S2, no murmer, gallop or rub. PMI is not displaced.    Pulses:Radial and pedal pulses are 2+ and symmetric.    Abdomen: Soft, non tender, normal bowel sounds.   Musculoskeletal/Extremities:  No clubbing, cyanosis or edema. No gross deformity.   Neurological: Alert and oriented to person, place, and time, no gross focal deficits.   Psychiatric: Normal mood and affect.Speech and behavior is normal.         Lab Review:                  Results from last 7 days  Lab Units 12/14/17  1023   SODIUM mmol/L 143   POTASSIUM mmol/L 3.7   CHLORIDE mmol/L 103   CO2 mmol/L 30.0   BUN mg/dL 25*   CREATININE mg/dL 0.70   GLUCOSE mg/dL 134*   CALCIUM mg/dL 9.3       Results from last 7 days  Lab Units 12/14/17  1023   TROPONIN I ng/mL <0.012       Results from last 7 days  Lab Units 12/18/17  0716   WBC 10*3/mm3 5.65   HEMOGLOBIN g/dL 12.7   HEMATOCRIT % 39.6   PLATELETS 10*3/mm3 224         Assessment:   · Palpitations with associated chest pain and dyspnea.    · Allergic reactions to Holter monitor adhesive.      Plan:   · Loop recorder implant recommended by Dr. Dominguez, I agree with the recommendation.  · The procedure was explained to the patient/family extensively. Indications, benefits, risks and alternatives were discussed. The patient understands well, and wishes to proceed.     Scribed for Mary Ann Blackwell MD by Murphy Gauthier PA-C. 12/18/2017  7:58 AM     I have seen and examined the patient, case was discussed with the physician extender, reviewed the above note, necessary changes were made and I agree with the final note.   Mary Ann Blackwell MD, [facility], [facility]

## 2017-12-21 ENCOUNTER — OFFICE VISIT (OUTPATIENT)
Dept: CARDIOLOGY | Facility: CLINIC | Age: 62
End: 2017-12-21

## 2017-12-21 VITALS
HEIGHT: 69 IN | SYSTOLIC BLOOD PRESSURE: 128 MMHG | WEIGHT: 241.4 LBS | BODY MASS INDEX: 35.76 KG/M2 | HEART RATE: 86 BPM | OXYGEN SATURATION: 97 % | DIASTOLIC BLOOD PRESSURE: 62 MMHG | RESPIRATION RATE: 18 BRPM

## 2017-12-21 DIAGNOSIS — I48.0 PAROXYSMAL ATRIAL FIBRILLATION (HCC): Primary | Chronic | ICD-10-CM

## 2017-12-21 DIAGNOSIS — I49.3 PVCS (PREMATURE VENTRICULAR CONTRACTIONS): ICD-10-CM

## 2017-12-21 DIAGNOSIS — R00.2 PALPITATIONS: ICD-10-CM

## 2017-12-21 LAB
ALBUMIN SERPL-MCNC: 3.7 G/DL (ref 3.5–5)
ALBUMIN/GLOB SERPL: 1.7 G/DL (ref 1–2)
ALP SERPL-CCNC: 86 U/L (ref 38–126)
ALT SERPL-CCNC: 50 U/L (ref 13–69)
AST SERPL-CCNC: 37 U/L (ref 15–46)
BASOPHILS # BLD AUTO: 0.04 10*3/MM3 (ref 0–0.2)
BASOPHILS NFR BLD AUTO: 0.6 % (ref 0–2.5)
BILIRUB SERPL-MCNC: 0.3 MG/DL (ref 0.2–1.3)
BUN SERPL-MCNC: 15 MG/DL (ref 7–20)
BUN/CREAT SERPL: 25 (ref 7.1–23.5)
CALCIUM SERPL-MCNC: 9.5 MG/DL (ref 8.4–10.2)
CHLORIDE SERPL-SCNC: 104 MMOL/L (ref 98–107)
CHOLEST SERPL-MCNC: 149 MG/DL (ref 0–199)
CO2 SERPL-SCNC: 33 MMOL/L (ref 26–30)
CREAT SERPL-MCNC: 0.6 MG/DL (ref 0.6–1.3)
EOSINOPHIL # BLD AUTO: 0.38 10*3/MM3 (ref 0–0.7)
EOSINOPHIL NFR BLD AUTO: 5.4 % (ref 0–7)
ERYTHROCYTE [DISTWIDTH] IN BLOOD BY AUTOMATED COUNT: 14.2 % (ref 11.5–14.5)
GLOBULIN SER CALC-MCNC: 2.2 GM/DL
GLUCOSE SERPL-MCNC: 86 MG/DL (ref 74–98)
HCT VFR BLD AUTO: 40.8 % (ref 37–47)
HDLC SERPL-MCNC: 45 MG/DL (ref 40–60)
HGB BLD-MCNC: 12.9 G/DL (ref 12–16)
IMM GRANULOCYTES # BLD: 0.05 10*3/MM3 (ref 0–0.06)
IMM GRANULOCYTES NFR BLD: 0.7 % (ref 0–0.6)
LDLC SERPL CALC-MCNC: 88 MG/DL (ref 0–99)
LYMPHOCYTES # BLD AUTO: 2.1 10*3/MM3 (ref 0.6–3.4)
LYMPHOCYTES NFR BLD AUTO: 29.8 % (ref 10–50)
MCH RBC QN AUTO: 27.7 PG (ref 27–31)
MCHC RBC AUTO-ENTMCNC: 31.6 G/DL (ref 30–37)
MCV RBC AUTO: 87.6 FL (ref 81–99)
MONOCYTES # BLD AUTO: 0.46 10*3/MM3 (ref 0–0.9)
MONOCYTES NFR BLD AUTO: 6.5 % (ref 0–12)
NEUTROPHILS # BLD AUTO: 4.01 10*3/MM3 (ref 2–6.9)
NEUTROPHILS NFR BLD AUTO: 57 % (ref 37–80)
NRBC BLD AUTO-RTO: 0 /100 WBC (ref 0–0)
PLATELET # BLD AUTO: 252 10*3/MM3 (ref 130–400)
POTASSIUM SERPL-SCNC: 4.3 MMOL/L (ref 3.5–5.1)
PROT SERPL-MCNC: 5.9 G/DL (ref 6.3–8.2)
RBC # BLD AUTO: 4.66 10*6/MM3 (ref 4.2–5.4)
SODIUM SERPL-SCNC: 144 MMOL/L (ref 137–145)
T3FREE SERPL-MCNC: 2.4 PG/ML (ref 2–4.4)
T4 FREE SERPL-MCNC: 1.5 NG/DL (ref 0.78–2.19)
TRIGL SERPL-MCNC: 81 MG/DL
TSH SERPL DL<=0.005 MIU/L-ACNC: 0.51 MIU/ML (ref 0.47–4.68)
VIT B12 SERPL-MCNC: >1000 PG/ML (ref 239–931)
VLDLC SERPL CALC-MCNC: 16.2 MG/DL
WBC # BLD AUTO: 7.04 10*3/MM3 (ref 4.8–10.8)

## 2017-12-21 PROCEDURE — 99213 OFFICE O/P EST LOW 20 MIN: CPT | Performed by: INTERNAL MEDICINE

## 2017-12-21 NOTE — PROGRESS NOTES
Subjective:     Encounter Date:12/21/2017      Patient ID: Albania Ramos is a 62 y.o. female.    Chief Complaint:  HPI    The following portions of the patient's history were reviewed and updated as appropriate: allergies, current medications, past family history, past medical history, past social history, past surgical history and problem  ROS        Current Outpatient Prescriptions:   •  ALLERGY SERUM INJECTION, Inject  under the skin 1 (One) Time., Disp: , Rfl:   •  aspirin 81 MG EC tablet, Take 81 mg by mouth daily., Disp: , Rfl:   •  azelastine (ASTELIN) 0.1 % nasal spray, 1 spray into each nostril 2 (Two) Times a Day As Needed for Rhinitis or Allergies. Use in each nostril as directed, Disp: 1 each, Rfl: 5  •  BIOTIN PO, Take 2,500 mcg by mouth daily., Disp: , Rfl:   •  calcium carbonate (OS-SHANTE) 600 MG tablet, Take 600 mg by mouth 2 (two) times a day with meals., Disp: , Rfl:   •  Cholecalciferol (VITAMIN D3) 2000 UNITS capsule, Take 1 capsule by mouth daily., Disp: , Rfl:   •  Cinnamon ([employer] CINNAMON) 500 MG capsule, Take 500 mg by mouth 2 (two) times a day., Disp: , Rfl:   •  coenzyme Q10 100 MG capsule, Take 100 mg by mouth Daily., Disp: , Rfl:   •  CRANBERRY CONCENTRATE PO, Take 4,200 mg by mouth 2 (two) times a day., Disp: , Rfl:   •  Cream Base (PENCREAM) cream, Apply  topically., Disp: , Rfl:   •  cyanocobalamin (VITAMIN B-12) 2500 MCG tablet tablet, Place 1 tablet under the tongue. 2-3 times a week, Disp: , Rfl:   •  dicyclomine (BENTYL) 10 MG capsule, Take 1 capsule by mouth 3 (Three) Times a Day As Needed (diarrhea)., Disp: 9 capsule, Rfl: 0  •  Flaxseed, Linseed, (FLAXSEED OIL) 1000 MG capsule, Take 1 tablet by mouth Daily., Disp: , Rfl:   •  flecainide (TAMBOCOR) 50 MG tablet, Take 1 tablet by mouth 2 (Two) Times a Day., Disp: 180 tablet, Rfl: 4  •  Garlic 500 MG capsule, Take 1 tablet by mouth 2 (two) times a day., Disp: , Rfl:   •  Glucosamine Sulfate-MSM (GLUCOSAMINE-MSM) 500-500 MG tablet,  Take 1 tablet by mouth daily., Disp: , Rfl:   •  Green Tea, Camillia sinensis, (GREEN TEA PO), Take 500 mg by mouth Daily., Disp: , Rfl:   •  hydrochlorothiazide (MICROZIDE) 12.5 MG capsule, Take 1 capsule by mouth Daily., Disp: 30 capsule, Rfl: 5  •  HYDROcodone-acetaminophen (NORCO) 7.5-325 MG per tablet, Take 1 tablet by mouth 2 (Two) Times a Day As Needed., Disp: , Rfl:   •  hydrocortisone 2.5 % cream, Apply  topically 2 (Two) Times a Day., Disp: , Rfl:   •  ibuprofen (ADVIL,MOTRIN) 200 MG tablet, Take 200 mg by mouth Every 6 (Six) Hours As Needed for Mild Pain ., Disp: , Rfl:   •  ipratropium (ATROVENT HFA) 17 MCG/ACT inhaler, Inhale 2 puffs Every 6 (Six) Hours As Needed for wheezing., Disp: 1 inhaler, Rfl: 5  •  LamoTRIgine powder, , Disp: , Rfl:   •  levocetirizine (XYZAL) 5 MG tablet, Take 5 mg by mouth Every Evening., Disp: , Rfl:   •  levothyroxine (SYNTHROID) 100 MCG tablet, Take 1 tablet by mouth Daily. Pt will be on total of 188mcg, Disp: 90 tablet, Rfl: 3  •  levothyroxine (SYNTHROID, LEVOTHROID) 88 MCG tablet, Take 1 tablet by mouth Daily., Disp: 90 tablet, Rfl: 3  •  Lidocaine-Prilocaine, Bulk, 2-2 % cream, , Disp: , Rfl:   •  magnesium gluconate (MAGONATE) 500 MG tablet, Take 1 tablet by mouth daily., Disp: , Rfl:   •  Misc Natural Products (TUMERSAID) tablet, Take 500 mg by mouth Daily., Disp: , Rfl:   •  Mometasone Furoate (ASMANEX HFA) 200 MCG/ACT aerosol, Inhale 2 puffs 2 (Two) Times a Day. Rinse mouth with water after use., Disp: 1 inhaler, Rfl: 5  •  Omega-3 Fatty Acids (FISH OIL) 435 MG capsule, Take 1,000 mg by mouth daily., Disp: , Rfl:   •  Omeprazole (EQ OMEPRAZOLE) 20 MG tablet delayed-release, Take 20 mg by mouth 2 (Two) Times a Day., Disp: 180 each, Rfl: 3  •  ondansetron ODT (ZOFRAN-ODT) 4 MG disintegrating tablet, Take 1 tablet by mouth Every 8 (Eight) Hours As Needed for Nausea or Vomiting., Disp: 8 tablet, Rfl: 0  •  PATIENT SUPPLIED ALLERGY INJECTION, Inject  under the skin 1  "(One) Time., Disp: , Rfl:   •  Probiotic Product (SOLUBLE FIBER/PROBIOTICS PO), Take 1 tablet by mouth daily., Disp: , Rfl:   •  promethazine (PHENERGAN) 12.5 MG tablet, Take 1 tablet by mouth Every 8 (Eight) Hours As Needed for Nausea or Vomiting., Disp: 10 tablet, Rfl: 0  •  tapentadol (NUCYNTA) 50 MG tablet, Take 50 mg by mouth Every 6 (Six) Hours As Needed., Disp: , Rfl:   •  Unable to find, Take 1 each by mouth 1 (one) time. Med Name: BLACK  ELDERBERRY SYRUP, Disp: , Rfl:   •  vitamin C (ASCORBIC ACID) 500 MG tablet, Take 1 tablet by mouth daily., Disp: , Rfl:      Objective:     Physical Exam  Blood pressure 128/62, pulse 86, resp. rate 18, height 175.3 cm (69.02\"), weight 109 kg (241 lb 6.4 oz), SpO2 97 %.   Lab Review:       Assessment:         There are no diagnoses linked to this encounter.  Procedures     Plan:       ***         "

## 2017-12-21 NOTE — PROGRESS NOTES
Subjective:     Encounter Date:12/21/2017      Patient ID: Albania Ramos is a 62 y.o. female.    Chief Complaint:Palpitations  HPI  This is a 62-year-old female patient who recently underwent a battery of outpatient testing.  The patient had a vasodilator nuclear stress test which showed no evidence of ischemia or infarction.  The patient had an echocardiogram performed which showed no cardiac chamber enlargement or ventricular hypertrophy.  The ejection fraction was normal and there were no regional wall motion abnormalities.  Left ventricular diastolic function was normal.  The patient demonstrated no valvular abnormalities, evidence of intracardiac shunting, secondary pulmonary hypertension or pericardial disease.  The patient has no chest discomfort at rest or with activity.  She has no shortness of breath at rest or with activity.  There is no orthopnea PND or lower extremity edema.  Her palpitations persist unchanged in frequency duration or intensity.  The patient has had an implantable loop recorder placed.  There is been no dizziness or syncope.  She remains a nonsmoker.  The following portions of the patient's history were reviewed and updated as appropriate: allergies, current medications, past family history, past medical history, past social history, past surgical history and problem  Review of Systems   Constitution: Negative for chills, diaphoresis, fever, weakness, malaise/fatigue, night sweats, weight gain and weight loss.   HENT: Negative for ear discharge, hearing loss, hoarse voice and nosebleeds.    Eyes: Negative for discharge, double vision, pain and photophobia.   Cardiovascular: Positive for palpitations. Negative for chest pain, claudication, cyanosis, dyspnea on exertion, leg swelling, near-syncope, orthopnea, paroxysmal nocturnal dyspnea and syncope.   Respiratory: Negative for cough, hemoptysis, sputum production and wheezing.    Endocrine: Negative for cold intolerance, heat  intolerance, polydipsia, polyphagia and polyuria.   Hematologic/Lymphatic: Negative for adenopathy and bleeding problem. Does not bruise/bleed easily.   Skin: Negative for color change, flushing, itching and rash.   Musculoskeletal: Negative for muscle cramps, muscle weakness, myalgias and stiffness.   Gastrointestinal: Negative for abdominal pain, diarrhea, hematemesis, hematochezia, nausea and vomiting.   Genitourinary: Negative for dysuria, frequency and nocturia.   Neurological: Negative for focal weakness, loss of balance, numbness, paresthesias and seizures.   Psychiatric/Behavioral: Negative for altered mental status, hallucinations and suicidal ideas.   Allergic/Immunologic: Negative for HIV exposure, hives and persistent infections.       Current Outpatient Prescriptions:   •  ALLERGY SERUM INJECTION, Inject  under the skin 1 (One) Time., Disp: , Rfl:   •  aspirin 81 MG EC tablet, Take 81 mg by mouth daily., Disp: , Rfl:   •  azelastine (ASTELIN) 0.1 % nasal spray, 1 spray into each nostril 2 (Two) Times a Day As Needed for Rhinitis or Allergies. Use in each nostril as directed, Disp: 1 each, Rfl: 5  •  BIOTIN PO, Take 2,500 mcg by mouth daily., Disp: , Rfl:   •  calcium carbonate (OS-SHANTE) 600 MG tablet, Take 600 mg by mouth 2 (two) times a day with meals., Disp: , Rfl:   •  Cholecalciferol (VITAMIN D3) 2000 UNITS capsule, Take 1 capsule by mouth daily., Disp: , Rfl:   •  Cinnamon ([employer] CINNAMON) 500 MG capsule, Take 500 mg by mouth 2 (two) times a day., Disp: , Rfl:   •  coenzyme Q10 100 MG capsule, Take 100 mg by mouth Daily., Disp: , Rfl:   •  CRANBERRY CONCENTRATE PO, Take 4,200 mg by mouth 2 (two) times a day., Disp: , Rfl:   •  Cream Base (PENCREAM) cream, Apply  topically., Disp: , Rfl:   •  cyanocobalamin (VITAMIN B-12) 2500 MCG tablet tablet, Place 1 tablet under the tongue. 2-3 times a week, Disp: , Rfl:   •  dicyclomine (BENTYL) 10 MG capsule, Take 1 capsule by mouth 3 (Three) Times a Day As  Needed (diarrhea)., Disp: 9 capsule, Rfl: 0  •  Flaxseed, Linseed, (FLAXSEED OIL) 1000 MG capsule, Take 1 tablet by mouth Daily., Disp: , Rfl:   •  flecainide (TAMBOCOR) 50 MG tablet, Take 1 tablet by mouth 2 (Two) Times a Day., Disp: 180 tablet, Rfl: 4  •  Garlic 500 MG capsule, Take 1 tablet by mouth 2 (two) times a day., Disp: , Rfl:   •  Glucosamine Sulfate-MSM (GLUCOSAMINE-MSM) 500-500 MG tablet, Take 1 tablet by mouth daily., Disp: , Rfl:   •  Green Tea, Camillia sinensis, (GREEN TEA PO), Take 500 mg by mouth Daily., Disp: , Rfl:   •  hydrochlorothiazide (MICROZIDE) 12.5 MG capsule, Take 1 capsule by mouth Daily., Disp: 30 capsule, Rfl: 5  •  HYDROcodone-acetaminophen (NORCO) 7.5-325 MG per tablet, Take 1 tablet by mouth 2 (Two) Times a Day As Needed., Disp: , Rfl:   •  hydrocortisone 2.5 % cream, Apply  topically 2 (Two) Times a Day., Disp: , Rfl:   •  ibuprofen (ADVIL,MOTRIN) 200 MG tablet, Take 200 mg by mouth Every 6 (Six) Hours As Needed for Mild Pain ., Disp: , Rfl:   •  ipratropium (ATROVENT HFA) 17 MCG/ACT inhaler, Inhale 2 puffs Every 6 (Six) Hours As Needed for wheezing., Disp: 1 inhaler, Rfl: 5  •  LamoTRIgine powder, , Disp: , Rfl:   •  levocetirizine (XYZAL) 5 MG tablet, Take 5 mg by mouth Every Evening., Disp: , Rfl:   •  levothyroxine (SYNTHROID) 100 MCG tablet, Take 1 tablet by mouth Daily. Pt will be on total of 188mcg, Disp: 90 tablet, Rfl: 3  •  levothyroxine (SYNTHROID, LEVOTHROID) 88 MCG tablet, Take 1 tablet by mouth Daily., Disp: 90 tablet, Rfl: 3  •  Lidocaine-Prilocaine, Bulk, 2-2 % cream, , Disp: , Rfl:   •  magnesium gluconate (MAGONATE) 500 MG tablet, Take 1 tablet by mouth daily., Disp: , Rfl:   •  Misc Natural Products (TUMERSAID) tablet, Take 500 mg by mouth Daily., Disp: , Rfl:   •  Mometasone Furoate (ASMANEX HFA) 200 MCG/ACT aerosol, Inhale 2 puffs 2 (Two) Times a Day. Rinse mouth with water after use., Disp: 1 inhaler, Rfl: 5  •  Omega-3 Fatty Acids (FISH OIL) 435 MG capsule,  "Take 1,000 mg by mouth daily., Disp: , Rfl:   •  Omeprazole (EQ OMEPRAZOLE) 20 MG tablet delayed-release, Take 20 mg by mouth 2 (Two) Times a Day., Disp: 180 each, Rfl: 3  •  ondansetron ODT (ZOFRAN-ODT) 4 MG disintegrating tablet, Take 1 tablet by mouth Every 8 (Eight) Hours As Needed for Nausea or Vomiting., Disp: 8 tablet, Rfl: 0  •  PATIENT SUPPLIED ALLERGY INJECTION, Inject  under the skin 1 (One) Time., Disp: , Rfl:   •  Probiotic Product (SOLUBLE FIBER/PROBIOTICS PO), Take 1 tablet by mouth daily., Disp: , Rfl:   •  promethazine (PHENERGAN) 12.5 MG tablet, Take 1 tablet by mouth Every 8 (Eight) Hours As Needed for Nausea or Vomiting., Disp: 10 tablet, Rfl: 0  •  tapentadol (NUCYNTA) 50 MG tablet, Take 50 mg by mouth Every 6 (Six) Hours As Needed., Disp: , Rfl:   •  Unable to find, Take 1 each by mouth 1 (one) time. Med Name: BLACK  ELDERBERRY SYRUP, Disp: , Rfl:   •  vitamin C (ASCORBIC ACID) 500 MG tablet, Take 1 tablet by mouth daily., Disp: , Rfl:      Objective:     Physical Exam   Constitutional: She is oriented to person, place, and time. She appears well-developed and well-nourished.   HENT:   Head: Normocephalic and atraumatic.   Eyes: Conjunctivae are normal. No scleral icterus.   Cardiovascular: Normal rate, regular rhythm, normal heart sounds and intact distal pulses.  Exam reveals no gallop and no friction rub.    No murmur heard.  Pulmonary/Chest: Effort normal and breath sounds normal. No respiratory distress.   Abdominal: Soft. Bowel sounds are normal. There is no tenderness.   Musculoskeletal: She exhibits no edema.   Neurological: She is alert and oriented to person, place, and time.   Skin: Skin is warm and dry.   Psychiatric: She has a normal mood and affect. Her behavior is normal.     Blood pressure 128/62, pulse 86, resp. rate 18, height 175.3 cm (69.02\"), weight 109 kg (241 lb 6.4 oz), SpO2 97 %.   Lab Review:       Assessment:         1. Paroxysmal atrial fibrillation  No evidence of " clinical recurrence.  The patient has had a loop recorder placed for further quantification of her arrhythmia as well as ectopy burden.    2. PVCs (premature ventricular contractions)  Her symptoms seem to be suspicious for ongoing ectopy.    3. Palpitations  I suspect her palpitations are due to symptomatic PACs\PVCs.  Procedures     Plan:       We will make arrangements for the patient to have her loop recorder downloaded on a monthly basis.  No changes in her medication therapy have been made at today's visit.  Further recommendations will be predicated on the results of her loop recorder.  No additional cardiovascular testing is warranted at this time.

## 2017-12-29 ENCOUNTER — OFFICE VISIT (OUTPATIENT)
Dept: FAMILY MEDICINE CLINIC | Facility: CLINIC | Age: 62
End: 2017-12-29

## 2017-12-29 VITALS
OXYGEN SATURATION: 96 % | SYSTOLIC BLOOD PRESSURE: 143 MMHG | TEMPERATURE: 97.7 F | BODY MASS INDEX: 34.96 KG/M2 | WEIGHT: 236 LBS | DIASTOLIC BLOOD PRESSURE: 67 MMHG | HEIGHT: 69 IN | HEART RATE: 80 BPM

## 2017-12-29 DIAGNOSIS — N39.0 RECURRENT UTI: ICD-10-CM

## 2017-12-29 DIAGNOSIS — C73 MALIGNANT NEOPLASM OF THYROID GLAND (HCC): ICD-10-CM

## 2017-12-29 DIAGNOSIS — E03.9 ACQUIRED HYPOTHYROIDISM: ICD-10-CM

## 2017-12-29 DIAGNOSIS — J11.1 INFLUENZA: Primary | ICD-10-CM

## 2017-12-29 DIAGNOSIS — E53.8 COBALAMIN DEFICIENCY: ICD-10-CM

## 2017-12-29 PROCEDURE — 99213 OFFICE O/P EST LOW 20 MIN: CPT | Performed by: INTERNAL MEDICINE

## 2017-12-29 NOTE — PROGRESS NOTES
Subjective     Patient ID: Albania Ramos is a 62 y.o. female. Patient is here for management of multiple medical problems.     Chief Complaint   Patient presents with   • Follow-up     lab results; patient tested positive for Flu A 12/27/2017     History of Present Illness     Recent dx fo f/u 2 days   On tamiflu.   Still coughing.   Pt given cough med.    No humidifer.            The following portions of the patient's history were reviewed and updated as appropriate: allergies, current medications, past family history, past medical history, past social history, past surgical history and problem list.    Review of Systems   Constitutional: Positive for fatigue and fever.   HENT: Positive for congestion, postnasal drip and rhinorrhea.        Current Outpatient Prescriptions:   •  ALLERGY SERUM INJECTION, Inject  under the skin 1 (One) Time., Disp: , Rfl:   •  aspirin 81 MG EC tablet, Take 81 mg by mouth daily., Disp: , Rfl:   •  azelastine (ASTELIN) 0.1 % nasal spray, 1 spray into each nostril 2 (Two) Times a Day As Needed for Rhinitis or Allergies. Use in each nostril as directed, Disp: 1 each, Rfl: 5  •  BIOTIN PO, Take 2,500 mcg by mouth daily., Disp: , Rfl:   •  calcium carbonate (OS-SHANTE) 600 MG tablet, Take 600 mg by mouth 2 (two) times a day with meals., Disp: , Rfl:   •  Cholecalciferol (VITAMIN D3) 2000 UNITS capsule, Take 1 capsule by mouth daily., Disp: , Rfl:   •  Cinnamon ([employer] CINNAMON) 500 MG capsule, Take 500 mg by mouth 2 (two) times a day., Disp: , Rfl:   •  coenzyme Q10 100 MG capsule, Take 100 mg by mouth Daily., Disp: , Rfl:   •  CRANBERRY CONCENTRATE PO, Take 4,200 mg by mouth 2 (two) times a day., Disp: , Rfl:   •  Cream Base (PENCREAM) cream, Apply  topically., Disp: , Rfl:   •  cyanocobalamin (VITAMIN B-12) 2500 MCG tablet tablet, Place 1 tablet under the tongue. 2-3 times a week, Disp: , Rfl:   •  dicyclomine (BENTYL) 10 MG capsule, Take 1 capsule by mouth 3 (Three) Times a Day As Needed  (diarrhea)., Disp: 9 capsule, Rfl: 0  •  Flaxseed, Linseed, (FLAXSEED OIL) 1000 MG capsule, Take 1 tablet by mouth Daily., Disp: , Rfl:   •  flecainide (TAMBOCOR) 50 MG tablet, Take 1 tablet by mouth 2 (Two) Times a Day., Disp: 180 tablet, Rfl: 4  •  Garlic 500 MG capsule, Take 1 tablet by mouth 2 (two) times a day., Disp: , Rfl:   •  Glucosamine Sulfate-MSM (GLUCOSAMINE-MSM) 500-500 MG tablet, Take 1 tablet by mouth daily., Disp: , Rfl:   •  Green Tea, Camillia sinensis, (GREEN TEA PO), Take 500 mg by mouth Daily., Disp: , Rfl:   •  hydrochlorothiazide (MICROZIDE) 12.5 MG capsule, Take 1 capsule by mouth Daily., Disp: 30 capsule, Rfl: 5  •  HYDROcodone-acetaminophen (NORCO) 7.5-325 MG per tablet, Take 1 tablet by mouth 2 (Two) Times a Day As Needed., Disp: , Rfl:   •  hydrocortisone 2.5 % cream, Apply  topically 2 (Two) Times a Day., Disp: , Rfl:   •  ibuprofen (ADVIL,MOTRIN) 200 MG tablet, Take 200 mg by mouth Every 6 (Six) Hours As Needed for Mild Pain ., Disp: , Rfl:   •  ipratropium (ATROVENT HFA) 17 MCG/ACT inhaler, Inhale 2 puffs Every 6 (Six) Hours As Needed for wheezing., Disp: 1 inhaler, Rfl: 5  •  LamoTRIgine powder, , Disp: , Rfl:   •  levocetirizine (XYZAL) 5 MG tablet, Take 5 mg by mouth Every Evening., Disp: , Rfl:   •  levothyroxine (SYNTHROID) 100 MCG tablet, Take 1 tablet by mouth Daily. Pt will be on total of 188mcg, Disp: 90 tablet, Rfl: 3  •  levothyroxine (SYNTHROID, LEVOTHROID) 88 MCG tablet, Take 1 tablet by mouth Daily., Disp: 90 tablet, Rfl: 3  •  Lidocaine-Prilocaine, Bulk, 2-2 % cream, , Disp: , Rfl:   •  magnesium gluconate (MAGONATE) 500 MG tablet, Take 1 tablet by mouth daily., Disp: , Rfl:   •  Misc Natural Products (TUMERSAID) tablet, Take 500 mg by mouth Daily., Disp: , Rfl:   •  Mometasone Furoate (ASMANEX HFA) 200 MCG/ACT aerosol, Inhale 2 puffs 2 (Two) Times a Day. Rinse mouth with water after use., Disp: 1 inhaler, Rfl: 5  •  Omega-3 Fatty Acids (FISH OIL) 435 MG capsule, Take  "1,000 mg by mouth daily., Disp: , Rfl:   •  Omeprazole (EQ OMEPRAZOLE) 20 MG tablet delayed-release, Take 20 mg by mouth 2 (Two) Times a Day., Disp: 180 each, Rfl: 3  •  ondansetron ODT (ZOFRAN-ODT) 4 MG disintegrating tablet, Take 1 tablet by mouth Every 8 (Eight) Hours As Needed for Nausea or Vomiting., Disp: 8 tablet, Rfl: 0  •  PATIENT SUPPLIED ALLERGY INJECTION, Inject  under the skin 1 (One) Time., Disp: , Rfl:   •  Probiotic Product (SOLUBLE FIBER/PROBIOTICS PO), Take 1 tablet by mouth daily., Disp: , Rfl:   •  promethazine (PHENERGAN) 12.5 MG tablet, Take 1 tablet by mouth Every 8 (Eight) Hours As Needed for Nausea or Vomiting., Disp: 10 tablet, Rfl: 0  •  tapentadol (NUCYNTA) 50 MG tablet, Take 50 mg by mouth Every 6 (Six) Hours As Needed., Disp: , Rfl:   •  Unable to find, Take 1 each by mouth 1 (one) time. Med Name: BLACK  ELDERBERRY SYRUP, Disp: , Rfl:   •  vitamin C (ASCORBIC ACID) 500 MG tablet, Take 1 tablet by mouth daily., Disp: , Rfl:     Objective      Blood pressure 143/67, pulse 80, temperature 97.7 °F (36.5 °C), height 175.3 cm (69\"), weight 107 kg (236 lb), SpO2 96 %.    Physical Exam     General Appearance:    Alert, cooperative, no distress, appears stated age   Head:    Normocephalic, without obvious abnormality, atraumatic   Eyes:    PERRL, conjunctiva/corneas clear, EOM's intact   Ears:    Normal TM's and external ear canals, both ears   Nose:   Nares normal, septum midline, mucosa normal, no drainage   or sinus tenderness   Throat:   Lips, mucosa, and tongue normal; teeth and gums normal   Neck:   Supple, symmetrical, trachea midline, no adenopathy;        thyroid:  No enlargement/tenderness/nodules; no carotid    bruit or JVD   Back:     Symmetric, no curvature, ROM normal, no CVA tenderness   Lungs:     Clear to auscultation bilaterally, respirations unlabored   Chest wall:    No tenderness or deformity   Heart:    Regular rate and rhythm, S1 and S2 normal, no murmur,        rub or " gallop   Abdomen:     Soft, non-tender, bowel sounds active all four quadrants,     no masses, no organomegaly   Extremities:   Extremities normal, atraumatic, no cyanosis or edema   Pulses:   2+ and symmetric all extremities   Skin:   Skin color, texture, turgor normal, no rashes or lesions   Lymph nodes:   Cervical, supraclavicular, and axillary nodes normal   Neurologic:   CNII-XII intact. Normal strength, sensation and reflexes       throughout      Results for orders placed or performed during the hospital encounter of 12/18/17   Basic Metabolic Panel   Result Value Ref Range    Glucose 96 70 - 100 mg/dL    BUN 11 9 - 23 mg/dL    Creatinine 0.60 0.60 - 1.30 mg/dL    Sodium 145 132 - 146 mmol/L    Potassium 3.8 3.5 - 5.5 mmol/L    Chloride 108 99 - 109 mmol/L    CO2 30.0 20.0 - 31.0 mmol/L    Calcium 8.2 (L) 8.7 - 10.4 mg/dL    eGFR Non African Amer 102 >60 mL/min/1.73    BUN/Creatinine Ratio 18.3 7.0 - 25.0    Anion Gap 7.0 3.0 - 11.0 mmol/L   CBC (No Diff)   Result Value Ref Range    WBC 5.65 3.50 - 10.80 10*3/mm3    RBC 4.55 3.89 - 5.14 10*6/mm3    Hemoglobin 12.7 11.5 - 15.5 g/dL    Hematocrit 39.6 34.5 - 44.0 %    MCV 87.0 80.0 - 99.0 fL    MCH 27.9 27.0 - 31.0 pg    MCHC 32.1 32.0 - 36.0 g/dL    RDW 14.0 11.3 - 14.5 %    RDW-SD 44.3 37.0 - 54.0 fl    MPV 11.0 6.0 - 12.0 fL    Platelets 224 150 - 450 10*3/mm3   Magnesium   Result Value Ref Range    Magnesium 1.9 1.3 - 2.7 mg/dL         Assessment/Plan   On bactrim for uti.. Pt due for pap soon.          [place] was seen today for follow-up.    Diagnoses and all orders for this visit:    Influenza  -     T4, Free  -     TSH  -     Lipid Panel  -     Comprehensive Metabolic Panel  -     Vitamin B12  -     CBC & Differential    Recurrent UTI  -     T4, Free  -     TSH  -     Lipid Panel  -     Comprehensive Metabolic Panel  -     Vitamin B12  -     CBC & Differential    Acquired hypothyroidism  -     T4, Free  -     TSH  -     Lipid Panel  -     Comprehensive  Metabolic Panel  -     Vitamin B12  -     CBC & Differential    Malignant neoplasm of thyroid gland  -     T4, Free  -     TSH  -     Lipid Panel  -     Comprehensive Metabolic Panel  -     Vitamin B12  -     CBC & Differential    Cobalamin deficiency  -     T4, Free  -     TSH  -     Lipid Panel  -     Comprehensive Metabolic Panel  -     Vitamin B12  -     CBC & Differential      Return in about 6 months (around 6/29/2018).          There are no Patient Instructions on file for this visit.     Gordon Norman MD    Assessment/Plan

## 2018-02-09 RX ORDER — OMEPRAZOLE 20 MG/1
CAPSULE, DELAYED RELEASE ORAL
Qty: 180 CAPSULE | Refills: 2 | Status: SHIPPED | OUTPATIENT
Start: 2018-02-09 | End: 2018-03-20 | Stop reason: SDUPTHER

## 2018-02-13 DIAGNOSIS — R06.02 SHORTNESS OF BREATH: ICD-10-CM

## 2018-02-14 RX ORDER — LEVOTHYROXINE SODIUM 0.1 MG/1
TABLET ORAL
Qty: 90 TABLET | Refills: 1 | Status: SHIPPED | OUTPATIENT
Start: 2018-02-14 | End: 2018-06-14 | Stop reason: SDUPTHER

## 2018-03-20 ENCOUNTER — OFFICE VISIT (OUTPATIENT)
Dept: CARDIOLOGY | Facility: CLINIC | Age: 63
End: 2018-03-20

## 2018-03-20 VITALS
DIASTOLIC BLOOD PRESSURE: 76 MMHG | WEIGHT: 226.5 LBS | HEART RATE: 78 BPM | OXYGEN SATURATION: 94 % | BODY MASS INDEX: 33.55 KG/M2 | RESPIRATION RATE: 18 BRPM | SYSTOLIC BLOOD PRESSURE: 132 MMHG | HEIGHT: 69 IN

## 2018-03-20 DIAGNOSIS — I48.0 PAROXYSMAL ATRIAL FIBRILLATION (HCC): Primary | Chronic | ICD-10-CM

## 2018-03-20 PROCEDURE — 99213 OFFICE O/P EST LOW 20 MIN: CPT | Performed by: INTERNAL MEDICINE

## 2018-03-20 PROCEDURE — 93285 PRGRMG DEV EVAL SCRMS IP: CPT | Performed by: INTERNAL MEDICINE

## 2018-03-20 PROCEDURE — 93000 ELECTROCARDIOGRAM COMPLETE: CPT | Performed by: INTERNAL MEDICINE

## 2018-03-20 NOTE — PROGRESS NOTES
Subjective:     Encounter Date:03/20/2018      Patient ID: Albania Ramos is a 63 y.o. female.    Chief Complaint:Palpitations  HPI  This is a 63-year-old female patient who had a implantable loop recorder placed.  Her loop recorder was interrogated today which showed over 200 episodes of undersensing.  The sensitivity was adjusted and this has now resulted in appropriate QRS the protection.  The under sensing had resulted in apparent bradycardia which was not present.  The patient had no episodes of atrial fibrillation since her last interrogation.  There were no tachycardic episodes.  All 32 episodes of bradycardia and 163 episodes of sinus pauses were actually due to under sensing.  The patient reports ongoing palpitations which she describes as a fluttering sensation as well as a sense of tachycardia which occurs primarily in the morning.  It does not occur every morning.  She indicates that her blood pressure when she checks it at work is in the 115-120 mmHg systolic range.  She reports episodes of shortness of breath intermittently.  There is no orthopnea PND or lower extremity edema.  There has been no dizziness or syncope.  There is no exertional chest arm neck jaw shoulder or back discomfort.  The following portions of the patient's history were reviewed and updated as appropriate: allergies, current medications, past family history, past medical history, past social history, past surgical history and problem  Review of Systems   Constitution: Negative for chills, diaphoresis, fever, weakness, malaise/fatigue, night sweats, weight gain and weight loss.   HENT: Negative for ear discharge, hearing loss, hoarse voice and nosebleeds.    Eyes: Negative for discharge, double vision, pain and photophobia.   Cardiovascular: Negative for chest pain, claudication, cyanosis, dyspnea on exertion, irregular heartbeat, leg swelling, near-syncope, orthopnea, palpitations, paroxysmal nocturnal dyspnea and syncope.    Respiratory: Negative for cough, hemoptysis, shortness of breath, sputum production and wheezing.    Endocrine: Negative for cold intolerance, heat intolerance, polydipsia, polyphagia and polyuria.   Hematologic/Lymphatic: Negative for adenopathy and bleeding problem. Does not bruise/bleed easily.   Skin: Negative for color change, flushing, itching and rash.   Musculoskeletal: Negative for muscle cramps, muscle weakness, myalgias and stiffness.   Gastrointestinal: Negative for abdominal pain, diarrhea, hematemesis, hematochezia, nausea and vomiting.   Genitourinary: Negative for dysuria, frequency and nocturia.   Neurological: Negative for focal weakness, loss of balance, numbness, paresthesias and seizures.   Psychiatric/Behavioral: Negative for altered mental status, hallucinations and suicidal ideas.   Allergic/Immunologic: Negative for HIV exposure, hives and persistent infections.           Current Outpatient Prescriptions:   •  ALLERGY SERUM INJECTION, Inject  under the skin 1 (One) Time., Disp: , Rfl:   •  aspirin 81 MG EC tablet, Take 81 mg by mouth daily., Disp: , Rfl:   •  azelastine (ASTELIN) 0.1 % nasal spray, 1 spray into each nostril 2 (Two) Times a Day As Needed for Rhinitis or Allergies. Use in each nostril as directed, Disp: 1 each, Rfl: 5  •  BIOTIN PO, Take 2,500 mcg by mouth daily., Disp: , Rfl:   •  calcium carbonate (OS-SHANTE) 600 MG tablet, Take 600 mg by mouth 2 (two) times a day with meals., Disp: , Rfl:   •  Cholecalciferol (VITAMIN D3) 2000 UNITS capsule, Take 1 capsule by mouth daily., Disp: , Rfl:   •  Cinnamon ([employer] CINNAMON) 500 MG capsule, Take 500 mg by mouth 2 (two) times a day., Disp: , Rfl:   •  coenzyme Q10 100 MG capsule, Take 100 mg by mouth Daily., Disp: , Rfl:   •  CRANBERRY CONCENTRATE PO, Take 4,200 mg by mouth 2 (two) times a day., Disp: , Rfl:   •  Cream Base (PENCREAM) cream, Apply  topically., Disp: , Rfl:   •  cyanocobalamin (VITAMIN B-12) 2500 MCG tablet tablet, Place  1 tablet under the tongue. 2-3 times a week, Disp: , Rfl:   •  dicyclomine (BENTYL) 10 MG capsule, Take 1 capsule by mouth 3 (Three) Times a Day As Needed (diarrhea)., Disp: 9 capsule, Rfl: 0  •  Flaxseed, Linseed, (FLAXSEED OIL) 1000 MG capsule, Take 1 tablet by mouth Daily., Disp: , Rfl:   •  flecainide (TAMBOCOR) 50 MG tablet, Take 1 tablet by mouth 2 (Two) Times a Day., Disp: 180 tablet, Rfl: 4  •  Garlic 500 MG capsule, Take 1 tablet by mouth 2 (two) times a day., Disp: , Rfl:   •  Glucosamine Sulfate-MSM (GLUCOSAMINE-MSM) 500-500 MG tablet, Take 1 tablet by mouth daily., Disp: , Rfl:   •  Green Tea, Camillia sinensis, (GREEN TEA PO), Take 500 mg by mouth Daily., Disp: , Rfl:   •  hydrochlorothiazide (MICROZIDE) 12.5 MG capsule, Take 1 capsule by mouth Daily., Disp: 30 capsule, Rfl: 5  •  HYDROcodone-acetaminophen (NORCO) 7.5-325 MG per tablet, Take 1 tablet by mouth 2 (Two) Times a Day As Needed., Disp: , Rfl:   •  hydrocortisone 2.5 % cream, Apply  topically 2 (Two) Times a Day., Disp: , Rfl:   •  ipratropium (ATROVENT HFA) 17 MCG/ACT inhaler, Inhale 2 puffs Every 6 (Six) Hours As Needed for wheezing., Disp: 1 inhaler, Rfl: 5  •  levocetirizine (XYZAL) 5 MG tablet, Take 5 mg by mouth Every Evening., Disp: , Rfl:   •  levothyroxine (SYNTHROID, LEVOTHROID) 100 MCG tablet, TAKE ONE TABLET BY MOUTH DAILY ALONG WITH 88MCG TABLET FOR TOTAL DOSE OF 188MCG, Disp: 90 tablet, Rfl: 1  •  levothyroxine (SYNTHROID, LEVOTHROID) 88 MCG tablet, Take 1 tablet by mouth Daily., Disp: 90 tablet, Rfl: 3  •  Lidocaine-Prilocaine, Bulk, 2-2 % cream, , Disp: , Rfl:   •  magnesium gluconate (MAGONATE) 500 MG tablet, Take 1 tablet by mouth daily., Disp: , Rfl:   •  Misc Natural Products (TUMERSAID) tablet, Take 500 mg by mouth Daily., Disp: , Rfl:   •  Omega-3 Fatty Acids (FISH OIL) 435 MG capsule, Take 1,000 mg by mouth daily., Disp: , Rfl:   •  Omeprazole (EQ OMEPRAZOLE) 20 MG tablet delayed-release, Take 20 mg by mouth 2 (Two)  "Times a Day., Disp: 180 each, Rfl: 3  •  ondansetron ODT (ZOFRAN-ODT) 4 MG disintegrating tablet, Take 1 tablet by mouth Every 8 (Eight) Hours As Needed for Nausea or Vomiting., Disp: 8 tablet, Rfl: 0  •  Probiotic Product (SOLUBLE FIBER/PROBIOTICS PO), Take 1 tablet by mouth daily., Disp: , Rfl:   •  promethazine (PHENERGAN) 12.5 MG tablet, Take 1 tablet by mouth Every 8 (Eight) Hours As Needed for Nausea or Vomiting., Disp: 10 tablet, Rfl: 0  •  tapentadol (NUCYNTA) 50 MG tablet, Take 50 mg by mouth Every 6 (Six) Hours As Needed., Disp: , Rfl:   •  Unable to find, Take 1 each by mouth 1 (one) time. Med Name: BLACK  ELDERBERRY SYRUP, Disp: , Rfl:   •  vitamin C (ASCORBIC ACID) 500 MG tablet, Take 1 tablet by mouth daily., Disp: , Rfl:      Objective:     Physical Exam   Constitutional: She is oriented to person, place, and time. She appears well-developed and well-nourished.   HENT:   Head: Normocephalic and atraumatic.   Eyes: Conjunctivae are normal. No scleral icterus.   Neck: No JVD present.   Cardiovascular: Normal rate, regular rhythm, normal heart sounds and intact distal pulses.  Exam reveals no gallop and no friction rub.    No murmur heard.  Pulmonary/Chest: Effort normal and breath sounds normal. No respiratory distress.   Abdominal: Soft. Bowel sounds are normal. There is no tenderness.   Musculoskeletal: She exhibits no edema.   Neurological: She is alert and oriented to person, place, and time.   Skin: Skin is warm and dry. No rash noted.   Psychiatric: She has a normal mood and affect. Her behavior is normal.     Blood pressure 132/76, pulse 78, resp. rate 18, height 175.3 cm (69.02\"), weight 103 kg (226 lb 8 oz), SpO2 94 %.   Lab Review:       Assessment:         1. Paroxysmal atrial fibrillation  No evidence of recurrent atrial fibrillation or other forms of supraventricular tachycardia.  The patient's cardiac monitor has shown no episodes of arrhythmia.  The device was under sensing and falsely " reporting bradycardic episodes.  The patient has never had an issue with bradycardia.      ECG 12 Lead  Date/Time: 3/20/2018 2:06 PM  Performed by: CARLA THOMPSON  Authorized by: CARLA THOMPSON   Rhythm: sinus rhythm  Rate: normal  QRS axis: normal  Clinical impression: normal ECG             Plan:       No changes in her medication therapy have been made at today's visit.  We have discussed the possibility of using verapamil low-dose on a daily basis or on an as-needed basis for episodes of intermittent tachycardia.  The patient indicates that she wishes to take no additional medication at this time but will reconsider over the next 30-90 days.  No additional cardiac testing is warranted.  Her device will be interrogated again within 3 months.

## 2018-04-30 DIAGNOSIS — G47.33 OSA (OBSTRUCTIVE SLEEP APNEA): Primary | ICD-10-CM

## 2018-05-04 ENCOUNTER — OFFICE VISIT (OUTPATIENT)
Dept: PULMONOLOGY | Facility: CLINIC | Age: 63
End: 2018-05-04

## 2018-05-04 ENCOUNTER — TELEPHONE (OUTPATIENT)
Dept: PULMONOLOGY | Facility: CLINIC | Age: 63
End: 2018-05-04

## 2018-05-04 VITALS
SYSTOLIC BLOOD PRESSURE: 118 MMHG | RESPIRATION RATE: 18 BRPM | HEIGHT: 69 IN | HEART RATE: 85 BPM | DIASTOLIC BLOOD PRESSURE: 72 MMHG | BODY MASS INDEX: 34.66 KG/M2 | OXYGEN SATURATION: 96 % | WEIGHT: 234 LBS

## 2018-05-04 DIAGNOSIS — G47.33 OSA (OBSTRUCTIVE SLEEP APNEA): Primary | ICD-10-CM

## 2018-05-04 DIAGNOSIS — R06.02 SHORTNESS OF BREATH: ICD-10-CM

## 2018-05-04 DIAGNOSIS — J30.89 OTHER ALLERGIC RHINITIS: ICD-10-CM

## 2018-05-04 DIAGNOSIS — J45.30 MILD PERSISTENT ASTHMA WITHOUT COMPLICATION: ICD-10-CM

## 2018-05-04 PROCEDURE — 99214 OFFICE O/P EST MOD 30 MIN: CPT | Performed by: NURSE PRACTITIONER

## 2018-05-04 NOTE — PROGRESS NOTES
"Chief Complaint   Patient presents with   • Follow-up   • Sleep Apnea         Subjective   Albania Ramos is a 63 y.o. female.     History of Present Illness   Patient comes in today for follow-up of shortness of breath and obstructive sleep apnea.    She is struggling a great deal with the use of mask with the CPAP machine.  She has used this machine for well over a year and been very compliant with the use of the machine.  She has psoriasis on her face and the mask is causing severe burning, redness and increased rash.  She has tried multiple masks without resolution of this issue.  She has even seen a dermatologist and is using a specific prescription cream several times a week and this has not helped either.  She does benefit from the machine and is very frustrated that she may have to stop using it.    She does report waking short of breath and she feels like this is probably due to taking at the mask and her sleep or it irritates her skin.  We checked an overnight pulse oximetry and it was overall normal there is no need for her to use oxygen at night.    She has Asmanex and a rescue inhaler that she uses intermittently.  She has not needed her rescue inhaler on a regular basis.    She denies any respiratory illness or need for steroids since her last visit.    The following portions of the patient's history were reviewed and updated as appropriate: allergies, current medications, past family history, past medical history, past social history and past surgical history.    Review of Systems   HENT: Negative for sinus pressure, sneezing and sore throat.    Respiratory: Positive for shortness of breath and wheezing. Negative for cough.        Objective   Visit Vitals  /72   Pulse 85   Resp 18   Ht 175.3 cm (69.02\")   Wt 106 kg (234 lb)   LMP  (LMP Unknown)   SpO2 96%   BMI 34.54 kg/m²     Physical Exam   Constitutional: She is oriented to person, place, and time. She appears well-developed and well-nourished. "   HENT:   Head: Atraumatic.   Eyes: EOM are normal.   Neck: Neck supple.   Cardiovascular: Normal rate and regular rhythm.    Pulmonary/Chest: Effort normal. No respiratory distress.   Scattered expiratory wheezing.   Musculoskeletal: She exhibits no edema.   Gait normal.   Neurological: She is alert and oriented to person, place, and time.   Skin: Skin is warm and dry.   Psychiatric: She has a normal mood and affect.   Vitals reviewed.          Assessment/Plan   [place] was seen today for follow-up and sleep apnea.    Diagnoses and all orders for this visit:    STEFAN (obstructive sleep apnea)    Shortness of breath    Other allergic rhinitis    Mild persistent asthma without complication           Return in about 16 weeks (around 8/24/2018) for Recheck ([place]), For Dr. Mustafa.    DISCUSSION (if any):  She does have some scattered wheezing which is expiratory today.  She does not realize she has this wheezing and she cannot hear it so she feels she does not need to use the Asmanex.  I have reeducated on the use of the Asmanex and that she needs to use the medication at least once a day every day.  Her last FeNO was 64 which shows increased inflammation.  She does seem to have uncontrolled asthma symptoms but she is not using her inhaler as she needs to at this time.  She may continue to use the rescue inhaler as needed for increased shortness of breath and wheezing.    As for, obstructive sleep apnea I feel that she needs to try another option to treat her sleep apnea.  I reviewed her sleep study which reveals moderate sleep apnea with an apnea hypopnea of 21 per hour.  I have recommended that she discuss this issue with a Dentist that makes oral devices to treat sleep apnea.  She wants to discuss this with her dentist and if this is not something that her Dentist can help with then she will contact Dr. Clement in [place] and see what her options are.  I also told her that I was not sure how well these devices were  covered by insurance.    She should continue to use Xyzal and Astelin every day.    Dictated utilizing Dragon dictation.    This document was electronically signed by JEREMY Concepcion May 4, 2018  10:59 AM

## 2018-05-04 NOTE — TELEPHONE ENCOUNTER
Pt was seen in the [place] Office and upon checking out she stated that she would call to reschedule her next appointment when she knew what her schedule was.

## 2018-05-21 RX ORDER — ASPIRIN 81 MG
TABLET, DELAYED RELEASE (ENTERIC COATED) ORAL
Qty: 90 TABLET | Refills: 2 | OUTPATIENT
Start: 2018-05-21

## 2018-06-14 DIAGNOSIS — R60.0 LOCALIZED EDEMA: ICD-10-CM

## 2018-06-14 RX ORDER — ASPIRIN 81 MG
TABLET, DELAYED RELEASE (ENTERIC COATED) ORAL
Qty: 90 TABLET | Refills: 1 | Status: SHIPPED | OUTPATIENT
Start: 2018-06-14 | End: 2018-12-28

## 2018-06-14 RX ORDER — LEVOTHYROXINE SODIUM 0.1 MG/1
TABLET ORAL
Qty: 90 TABLET | Refills: 0 | Status: SHIPPED | OUTPATIENT
Start: 2018-06-14 | End: 2018-11-10 | Stop reason: SDUPTHER

## 2018-06-14 RX ORDER — HYDROCHLOROTHIAZIDE 12.5 MG/1
CAPSULE, GELATIN COATED ORAL
Qty: 90 CAPSULE | Refills: 1 | Status: SHIPPED | OUTPATIENT
Start: 2018-06-14 | End: 2018-12-28

## 2018-06-26 ENCOUNTER — OFFICE VISIT (OUTPATIENT)
Dept: CARDIOLOGY | Facility: CLINIC | Age: 63
End: 2018-06-26

## 2018-06-26 VITALS
BODY MASS INDEX: 34.36 KG/M2 | HEIGHT: 69 IN | WEIGHT: 232 LBS | SYSTOLIC BLOOD PRESSURE: 100 MMHG | DIASTOLIC BLOOD PRESSURE: 60 MMHG | RESPIRATION RATE: 16 BRPM | OXYGEN SATURATION: 98 % | HEART RATE: 72 BPM

## 2018-06-26 DIAGNOSIS — I48.0 PAROXYSMAL ATRIAL FIBRILLATION (HCC): Primary | ICD-10-CM

## 2018-06-26 DIAGNOSIS — R06.02 SOB (SHORTNESS OF BREATH): ICD-10-CM

## 2018-06-26 PROCEDURE — 99213 OFFICE O/P EST LOW 20 MIN: CPT | Performed by: INTERNAL MEDICINE

## 2018-06-26 PROCEDURE — 93291 INTERROG DEV EVAL SCRMS IP: CPT | Performed by: INTERNAL MEDICINE

## 2018-07-27 ENCOUNTER — TELEPHONE (OUTPATIENT)
Dept: INTERNAL MEDICINE | Facility: CLINIC | Age: 63
End: 2018-07-27

## 2018-07-27 NOTE — TELEPHONE ENCOUNTER
Patient called to make appt and wanted to know if we could mail her a lab order for routine blood wk she can get done before her scheduled appt on 8/22 @ 1:00. She also wanted to know if Dr. Norman does hip injections?   Please advise patient   Thank you!

## 2018-08-16 LAB
ALBUMIN SERPL-MCNC: 3.8 G/DL (ref 3.5–5)
ALBUMIN/GLOB SERPL: 1.5 G/DL (ref 1–2)
ALP SERPL-CCNC: 97 U/L (ref 38–126)
ALT SERPL-CCNC: 28 U/L (ref 13–69)
AST SERPL-CCNC: 25 U/L (ref 15–46)
BASOPHILS # BLD AUTO: 0.05 10*3/MM3 (ref 0–0.2)
BASOPHILS NFR BLD AUTO: 0.9 % (ref 0–2.5)
BILIRUB SERPL-MCNC: 0.4 MG/DL (ref 0.2–1.3)
BUN SERPL-MCNC: 17 MG/DL (ref 7–20)
BUN/CREAT SERPL: 28.3 (ref 7.1–23.5)
CALCIUM SERPL-MCNC: 9.5 MG/DL (ref 8.4–10.2)
CHLORIDE SERPL-SCNC: 105 MMOL/L (ref 98–107)
CHOLEST SERPL-MCNC: 172 MG/DL (ref 0–199)
CO2 SERPL-SCNC: 32 MMOL/L (ref 26–30)
CREAT SERPL-MCNC: 0.6 MG/DL (ref 0.6–1.3)
EOSINOPHIL # BLD AUTO: 0.3 10*3/MM3 (ref 0–0.7)
EOSINOPHIL NFR BLD AUTO: 5.2 % (ref 0–7)
ERYTHROCYTE [DISTWIDTH] IN BLOOD BY AUTOMATED COUNT: 13.2 % (ref 11.5–14.5)
GLOBULIN SER CALC-MCNC: 2.6 GM/DL
GLUCOSE SERPL-MCNC: 86 MG/DL (ref 74–98)
HCT VFR BLD AUTO: 43.6 % (ref 37–47)
HDLC SERPL-MCNC: 53 MG/DL (ref 40–60)
HGB BLD-MCNC: 13.6 G/DL (ref 12–16)
IMM GRANULOCYTES # BLD: 0.01 10*3/MM3 (ref 0–0.06)
IMM GRANULOCYTES NFR BLD: 0.2 % (ref 0–0.6)
LDLC SERPL CALC-MCNC: 99 MG/DL (ref 0–99)
LYMPHOCYTES # BLD AUTO: 2.05 10*3/MM3 (ref 0.6–3.4)
LYMPHOCYTES NFR BLD AUTO: 35.8 % (ref 10–50)
MCH RBC QN AUTO: 28.3 PG (ref 27–31)
MCHC RBC AUTO-ENTMCNC: 31.2 G/DL (ref 30–37)
MCV RBC AUTO: 90.6 FL (ref 81–99)
MONOCYTES # BLD AUTO: 0.5 10*3/MM3 (ref 0–0.9)
MONOCYTES NFR BLD AUTO: 8.7 % (ref 0–12)
NEUTROPHILS # BLD AUTO: 2.81 10*3/MM3 (ref 2–6.9)
NEUTROPHILS NFR BLD AUTO: 49.2 % (ref 37–80)
NRBC BLD AUTO-RTO: 0 /100 WBC (ref 0–0)
PLATELET # BLD AUTO: 242 10*3/MM3 (ref 130–400)
POTASSIUM SERPL-SCNC: 4.6 MMOL/L (ref 3.5–5.1)
PROT SERPL-MCNC: 6.4 G/DL (ref 6.3–8.2)
RBC # BLD AUTO: 4.81 10*6/MM3 (ref 4.2–5.4)
SODIUM SERPL-SCNC: 143 MMOL/L (ref 137–145)
T4 FREE SERPL-MCNC: 2.01 NG/DL (ref 0.78–2.19)
TRIGL SERPL-MCNC: 99 MG/DL
TSH SERPL DL<=0.005 MIU/L-ACNC: <0.015 MIU/ML (ref 0.47–4.68)
VIT B12 SERPL-MCNC: 801 PG/ML (ref 239–931)
VLDLC SERPL CALC-MCNC: 19.8 MG/DL
WBC # BLD AUTO: 5.72 10*3/MM3 (ref 4.8–10.8)

## 2018-08-22 ENCOUNTER — TELEPHONE (OUTPATIENT)
Dept: INTERNAL MEDICINE | Facility: CLINIC | Age: 63
End: 2018-08-22

## 2018-08-22 DIAGNOSIS — E03.8 OTHER SPECIFIED HYPOTHYROIDISM: ICD-10-CM

## 2018-08-22 RX ORDER — LEVOTHYROXINE SODIUM 0.07 MG/1
75 TABLET ORAL DAILY
Qty: 90 TABLET | Refills: 3 | Status: SHIPPED | OUTPATIENT
Start: 2018-08-22 | End: 2018-12-28 | Stop reason: SDUPTHER

## 2018-08-22 NOTE — TELEPHONE ENCOUNTER
Patient called asking to get her lab results called to her. She states she seen them on mychart but needed to know for sure about her thyroid labs. She wanted to know if she needs a dosage change. She states she is sweating constantly even in air conditioning. She states she feels this could be thyroid related.

## 2018-08-29 ENCOUNTER — OFFICE VISIT (OUTPATIENT)
Dept: INTERNAL MEDICINE | Facility: CLINIC | Age: 63
End: 2018-08-29

## 2018-08-29 VITALS
TEMPERATURE: 97.8 F | HEART RATE: 83 BPM | HEIGHT: 69 IN | WEIGHT: 231.6 LBS | DIASTOLIC BLOOD PRESSURE: 53 MMHG | OXYGEN SATURATION: 97 % | RESPIRATION RATE: 16 BRPM | BODY MASS INDEX: 34.3 KG/M2 | SYSTOLIC BLOOD PRESSURE: 117 MMHG

## 2018-08-29 DIAGNOSIS — M47.818 SI JOINT ARTHRITIS: Primary | ICD-10-CM

## 2018-08-29 DIAGNOSIS — M25.561 CHRONIC PAIN OF BOTH KNEES: ICD-10-CM

## 2018-08-29 DIAGNOSIS — M25.562 CHRONIC PAIN OF BOTH KNEES: ICD-10-CM

## 2018-08-29 DIAGNOSIS — K40.91 UNILATERAL RECURRENT INGUINAL HERNIA WITHOUT OBSTRUCTION OR GANGRENE: ICD-10-CM

## 2018-08-29 DIAGNOSIS — G89.29 CHRONIC PAIN OF BOTH KNEES: ICD-10-CM

## 2018-08-29 DIAGNOSIS — E03.9 ACQUIRED HYPOTHYROIDISM: ICD-10-CM

## 2018-08-29 PROCEDURE — 99213 OFFICE O/P EST LOW 20 MIN: CPT | Performed by: INTERNAL MEDICINE

## 2018-08-29 NOTE — PROGRESS NOTES
Subjective     Patient ID: Albania Ramos is a 63 y.o. female. Patient is here for management of multiple medical problems.     Chief Complaint   Patient presents with   • Hypothyroidism     follow-up   • Joint Injection     patient here for joint injection of the left hip   • Hernia referral     patient needs referral for Hernia     History of Present Illness     Pt with pain in left inguinal groin area.  Fixed x 4. Dr Lewis last to work on her.   Pt would 2nd opinion.        Pt with wt gain. B/L Knee surgery have limited her activity. Now with problems in left knee. Feels left leg is shorter than right.      Pain in left hip and troch. Asking for inj today.            The following portions of the patient's history were reviewed and updated as appropriate: allergies, current medications, past family history, past medical history, past social history, past surgical history and problem list.    Review of Systems   Constitutional: Negative.    HENT: Negative.    Musculoskeletal: Positive for arthralgias, back pain, gait problem and joint swelling.   All other systems reviewed and are negative.      Current Outpatient Prescriptions:   •  ALLERGY SERUM INJECTION, Inject  under the skin 1 (One) Time., Disp: , Rfl:   •  aspirin 81 MG EC tablet, Take 81 mg by mouth daily., Disp: , Rfl:   •  ASPIRIN LOW DOSE 81 MG EC tablet, TAKE ONE TABLET BY MOUTH DAILY, Disp: 90 tablet, Rfl: 1  •  azelastine (ASTELIN) 0.1 % nasal spray, 1 spray into each nostril 2 (Two) Times a Day As Needed for Rhinitis or Allergies. Use in each nostril as directed, Disp: 1 each, Rfl: 5  •  BIOTIN PO, Take 2,500 mcg by mouth daily., Disp: , Rfl:   •  calcium carbonate (OS-SHANTE) 600 MG tablet, Take 600 mg by mouth 2 (two) times a day with meals., Disp: , Rfl:   •  Cholecalciferol (VITAMIN D3) 2000 UNITS capsule, Take 1 capsule by mouth daily., Disp: , Rfl:   •  Cinnamon ([employer] CINNAMON) 500 MG capsule, Take 500 mg by mouth 2 (two) times a day., Disp: , Rfl:    •  coenzyme Q10 100 MG capsule, Take 100 mg by mouth Daily., Disp: , Rfl:   •  CRANBERRY CONCENTRATE PO, Take 4,200 mg by mouth 2 (two) times a day., Disp: , Rfl:   •  Cream Base (PENCREAM) cream, Apply  topically., Disp: , Rfl:   •  cyanocobalamin (VITAMIN B-12) 2500 MCG tablet tablet, Place 1 tablet under the tongue. 2-3 times a week, Disp: , Rfl:   •  Flaxseed, Linseed, (FLAXSEED OIL) 1000 MG capsule, Take 1 tablet by mouth Daily., Disp: , Rfl:   •  Garlic 500 MG capsule, Take 1 tablet by mouth 2 (two) times a day., Disp: , Rfl:   •  Glucosamine Sulfate-MSM (GLUCOSAMINE-MSM) 500-500 MG tablet, Take 1 tablet by mouth daily., Disp: , Rfl:   •  Green Tea, Camillia sinensis, (GREEN TEA PO), Take 500 mg by mouth Daily., Disp: , Rfl:   •  hydrochlorothiazide (MICROZIDE) 12.5 MG capsule, TAKE ONE CAPSULE BY MOUTH DAILY, Disp: 90 capsule, Rfl: 1  •  hydrocortisone 2.5 % cream, Apply  topically 2 (Two) Times a Day., Disp: , Rfl:   •  ipratropium (ATROVENT HFA) 17 MCG/ACT inhaler, Inhale 2 puffs Every 6 (Six) Hours As Needed for wheezing., Disp: 1 inhaler, Rfl: 5  •  levocetirizine (XYZAL) 5 MG tablet, Take 5 mg by mouth Every Evening., Disp: , Rfl:   •  levothyroxine (SYNTHROID, LEVOTHROID) 100 MCG tablet, TAKE ONE TABLET BY MOUTH DAILY ALONG WITH 88MCG TABLET FOR TOTAL DOSE OF 188MCG, Disp: 90 tablet, Rfl: 0  •  levothyroxine (SYNTHROID, LEVOTHROID) 75 MCG tablet, Take 1 tablet by mouth Daily., Disp: 90 tablet, Rfl: 3  •  Lidocaine-Prilocaine, Bulk, 2-2 % cream, , Disp: , Rfl:   •  Misc Natural Products (TUMERSAID) tablet, Take 500 mg by mouth Daily., Disp: , Rfl:   •  Omega-3 Fatty Acids (FISH OIL) 435 MG capsule, Take 1,000 mg by mouth daily., Disp: , Rfl:   •  Omeprazole (EQ OMEPRAZOLE) 20 MG tablet delayed-release, Take 20 mg by mouth 2 (Two) Times a Day., Disp: 180 each, Rfl: 3  •  ondansetron ODT (ZOFRAN-ODT) 4 MG disintegrating tablet, Take 1 tablet by mouth Every 8 (Eight) Hours As Needed for Nausea or  "Vomiting., Disp: 8 tablet, Rfl: 0  •  Probiotic Product (SOLUBLE FIBER/PROBIOTICS PO), Take 1 tablet by mouth daily., Disp: , Rfl:   •  tapentadol (NUCYNTA) 50 MG tablet, Take 50 mg by mouth Every 6 (Six) Hours As Needed., Disp: , Rfl:   •  Unable to find, Take 1 each by mouth 1 (one) time. Med Name: BLACK  ELDERBERRY SYRUP, Disp: , Rfl:   •  vitamin C (ASCORBIC ACID) 500 MG tablet, Take 1 tablet by mouth daily., Disp: , Rfl:     Objective      Blood pressure 117/53, pulse 83, temperature 97.8 °F (36.6 °C), temperature source Oral, resp. rate 16, height 175.3 cm (69\"), weight 105 kg (231 lb 9.6 oz), SpO2 97 %.    Physical Exam     General Appearance:    Alert, cooperative, no distress, appears stated age   Head:    Normocephalic, without obvious abnormality, atraumatic   Eyes:    PERRL, conjunctiva/corneas clear, EOM's intact   Ears:    Normal TM's and external ear canals, both ears   Nose:   Nares normal, septum midline, mucosa normal, no drainage   or sinus tenderness   Throat:   Lips, mucosa, and tongue normal; teeth and gums normal   Neck:   Supple, symmetrical, trachea midline, no adenopathy;        thyroid:  No enlargement/tenderness/nodules; no carotid    bruit or JVD   Back:     Symmetric, no curvature, ROM normal, no CVA tenderness   Lungs:     Clear to auscultation bilaterally, respirations unlabored   Chest wall:    No tenderness or deformity   Heart:    Regular rate and rhythm, S1 and S2 normal, no murmur,        rub or gallop   Abdomen:     Soft, non-tender, bowel sounds active all four quadrants,     no masses, no organomegaly   Extremities:   Extremities normal, atraumatic, no cyanosis or edema   Pulses:   2+ and symmetric all extremities   Skin:   Skin color, texture, turgor normal, no rashes or lesions   Lymph nodes:   Cervical, supraclavicular, and axillary nodes normal   Neurologic:   CNII-XII intact. Normal strength, sensation and reflexes       throughout      Results for orders placed or " performed in visit on 12/29/17   T4, Free   Result Value Ref Range    Free T4 2.01 0.78 - 2.19 ng/dL   TSH   Result Value Ref Range    TSH <0.015 (L) 0.470 - 4.680 mIU/mL   Lipid Panel   Result Value Ref Range    Total Cholesterol 172 0 - 199 mg/dL    Triglycerides 99 <150 mg/dL    HDL Cholesterol 53 40 - 60 mg/dL    VLDL Cholesterol 19.8 mg/dL    LDL Cholesterol  99 0 - 99 mg/dL   Comprehensive Metabolic Panel   Result Value Ref Range    Glucose 86 74 - 98 mg/dL    BUN 17 7 - 20 mg/dL    Creatinine 0.60 0.60 - 1.30 mg/dL    eGFR Non African Am 101 >60 mL/min/1.73    eGFR African Am 123 >60 mL/min/1.73    BUN/Creatinine Ratio 28.3 (H) 7.1 - 23.5    Sodium 143 137 - 145 mmol/L    Potassium 4.6 3.5 - 5.1 mmol/L    Chloride 105 98 - 107 mmol/L    Total CO2 32.0 (H) 26.0 - 30.0 mmol/L    Calcium 9.5 8.4 - 10.2 mg/dL    Total Protein 6.4 6.3 - 8.2 g/dL    Albumin 3.80 3.50 - 5.00 g/dL    Globulin 2.6 gm/dL    A/G Ratio 1.5 1.0 - 2.0 g/dL    Total Bilirubin 0.4 0.2 - 1.3 mg/dL    Alkaline Phosphatase 97 38 - 126 U/L    AST (SGOT) 25 15 - 46 U/L    ALT (SGPT) 28 13 - 69 U/L   Vitamin B12   Result Value Ref Range    Vitamin B-12 801 239 - 931 pg/mL   CBC & Differential   Result Value Ref Range    WBC 5.72 4.80 - 10.80 10*3/mm3    RBC 4.81 4.20 - 5.40 10*6/mm3    Hemoglobin 13.6 12.0 - 16.0 g/dL    Hematocrit 43.6 37.0 - 47.0 %    MCV 90.6 81.0 - 99.0 fL    MCH 28.3 27.0 - 31.0 pg    MCHC 31.2 30.0 - 37.0 g/dL    RDW 13.2 11.5 - 14.5 %    Platelets 242 130 - 400 10*3/mm3    Neutrophil Rel % 49.2 37.0 - 80.0 %    Lymphocyte Rel % 35.8 10.0 - 50.0 %    Monocyte Rel % 8.7 0.0 - 12.0 %    Eosinophil Rel % 5.2 0.0 - 7.0 %    Basophil Rel % 0.9 0.0 - 2.5 %    Neutrophils Absolute 2.81 2.00 - 6.90 10*3/mm3    Lymphocytes Absolute 2.05 0.60 - 3.40 10*3/mm3    Monocytes Absolute 0.50 0.00 - 0.90 10*3/mm3    Eosinophils Absolute 0.30 0.00 - 0.70 10*3/mm3    Basophils Absolute 0.05 0.00 - 0.20 10*3/mm3    Immature Granulocyte Rel % 0.2  0.0 - 0.6 %    Immature Grans Absolute 0.01 0.00 - 0.06 10*3/mm3    nRBC 0.0 0.0 - 0.0 /100 WBC         Assessment/Plan       [place] was seen today for hypothyroidism, joint injection and hernia referral.    Diagnoses and all orders for this visit:    SI joint arthritis (CMS/[facility])  -     Ambulatory Referral to Physical Therapy Evaluate and treat    Chronic pain of both knees  -     Ambulatory Referral to Physical Therapy Evaluate and treat    Acquired hypothyroidism  -     T4, Free  -     Vitamin B12  -     Comprehensive Metabolic Panel  -     TSH  -     CBC & Differential  -     Lipid Panel    Unilateral recurrent inguinal hernia without obstruction or gangrene  -     Ambulatory Referral to General Surgery      Return in about 3 months (around 11/29/2018).          There are no Patient Instructions on file for this visit.     Gordon Norman MD    Assessment/Plan

## 2018-09-07 ENCOUNTER — TREATMENT (OUTPATIENT)
Dept: PHYSICAL THERAPY | Facility: CLINIC | Age: 63
End: 2018-09-07

## 2018-09-07 DIAGNOSIS — G89.29 CHRONIC LEFT SI JOINT PAIN: ICD-10-CM

## 2018-09-07 DIAGNOSIS — M25.562 CHRONIC PAIN OF BOTH KNEES: Primary | ICD-10-CM

## 2018-09-07 DIAGNOSIS — G89.29 CHRONIC PAIN OF BOTH KNEES: Primary | ICD-10-CM

## 2018-09-07 DIAGNOSIS — M25.561 CHRONIC PAIN OF BOTH KNEES: Primary | ICD-10-CM

## 2018-09-07 DIAGNOSIS — M53.3 CHRONIC LEFT SI JOINT PAIN: ICD-10-CM

## 2018-09-07 PROCEDURE — 97110 THERAPEUTIC EXERCISES: CPT | Performed by: PHYSICAL THERAPIST

## 2018-09-07 PROCEDURE — 97161 PT EVAL LOW COMPLEX 20 MIN: CPT | Performed by: PHYSICAL THERAPIST

## 2018-09-07 NOTE — PROGRESS NOTES
" Physical Therapy Initial Evaluation and Plan of Care      Patient: Albania Ramos   : 1955  Diagnosis/ICD-10 Code:  Chronic pain of both knees [M25.561, M25.562, G89.29]  Referring practitioner: Gordon Norman MD    Subjective Evaluation    History of Present Illness  Mechanism of injury: Pt reports she has been having more knee pain and L SI joint pain.  3 months the pain has been elevating.      L inguinal hernia needs to be repaired and will be repaired in November.     R TKA     L TKA       Patient Occupation: Nurse  Pain  Current pain ratin (knees ,  4/10 in the L SI joint)  At worst pain rating: 10  Location: B knee and L SI joint.  medial L knee and Medial R knee.   Relieving factors: rest, ice and relaxation  Exacerbated by: walking.    Treatments  Previous treatment: physical therapy (B knee s and spine here with Me in the past years.)           Objective       Palpation   Left   Hypertonic in the gluteus kaitlin and lumbar paraspinals.   Tenderness of the gluteus kaitlin and lumbar paraspinals.     Additional Palpation Details  L PIIS primary area of pain.  The L piriformis and L glut med at the area of the PIIS.    Tenderness     Left Hip   No tenderness in the greater trochanter.     Active Range of Motion     Lumbar   Flexion: Active lumbar flexion: FT to 3\" above ankles.   Left Knee   Flexion: 128 degrees with pain    Right Knee   Flexion: 134 degrees     Passive Range of Motion   Left Hip   Flexion: Left hip passive flexion: ERP and anterior hip joint. WFL  External rotation (90/90): WFL  External rotation (prone): WFL  Internal rotation (90/90): WFL  Internal rotation (prone): WFL    Right Hip   External rotation (90/90): WFL  External rotation (prone): WFL  Internal rotation (90/90): WFL  Internal rotation (prone): WFL    Strength/Myotome Testing     Left Hip   Planes of Motion   Extension: 3+ (pain noted in the L PIIS )  Abduction: 4-  Adduction: 4-  External rotation: " 3    Right Hip   Planes of Motion   Extension: 3+  Abduction: 4  Adduction: 4-  External rotation: 4    Left Knee   Flexion: 3+ (apin)  Extension: 4-    Right Knee   Flexion: 3+ (pain)  Extension: 4-    Ambulation     Observational Gait   Increased left stance time. Decreased right stance time.     Additional Observational Gait Details  R LE trendelenburg gait pattern noted.     Quality of Movement During Gait     Additional Quality of Movement During Gait Details  L LE less knee control during mid stance.           Assessment & Plan     Assessment  Impairments: abnormal gait, abnormal muscle tone, abnormal or restricted ROM, activity intolerance, impaired physical strength, lacks appropriate home exercise program, pain with function and weight-bearing intolerance  Assessment details: Patient is a 63 year old female who comes to physical therapy with chronic back pain/SI joint pain and B knee pain. Signs and symptoms are consistent with B HS weakness and hip weakness that seems to aiding her pain.  The patient currently has pain, decreased ROM, decreased strength, and inability to perform all essential functional activities. Pt will benefit from skilled PT services to address the above issues.   Prognosis: good  Prognosis details: SHORT TERM GOALS:  2 weeks       1. Pt independent with HEP  2. Pt to demonstrate marci hip strength 4/5 or greater to improve stability with ambulation  3. Pt to report being able to walk for 10 minutes without increasing pain in the right hip    LONG TERM GOALS:   6 weeks  1. Pt to demonstrate ability to perform full functional squat with good form and control of the hips and without increasing pain  2. Pt to demonstrate marci hip strength to 4+/5 or greater to improve safety with ambulation on uneven surfaces  3. Pt to return to work full duty without increased pain in the right hip(s)   4. Pt to demonstrate ability to perform step up/down 8 inch step x10 safely and without pain in the  right hip(s)     Functional Limitations: carrying objects, walking, pushing, standing, stooping and unable to perform repetitive tasks  Plan  Therapy options: will be seen for skilled physical therapy services  Planned modality interventions: electrical stimulation/Surinamese stimulation, ultrasound, cryotherapy and thermotherapy (hydrocollator packs)  Planned therapy interventions: functional ROM exercises, gait training, home exercise program, flexibility, body mechanics training, balance/weight-bearing training, transfer training, therapeutic activities, stretching, strengthening, manual therapy and postural training  Duration in visits: 12  Treatment plan discussed with: patient        Manual Therapy:         mins  22832;  Therapeutic Exercise:    18     mins  76806;     Neuromuscular Doug:        mins  80959;    Therapeutic Activity:          mins  40519;     Gait Training:           mins  10109;     Ultrasound:          mins  49062;    Electrical Stimulation:         mins  26868 ( );  Dry Needling          mins self-pay    Timed Treatment:   18   mins   Total Treatment:     54   mins    PT SIGNATURE: Jackson Hodge, PT   DATE TREATMENT INITIATED: 9/7/2018    Initial Certification  Certification Period: 12/6/2018  I certify that the therapy services are furnished while this patient is under my care.  The services outlined above are required by this patient, and will be reviewed every 90 days.     PHYSICIAN: Gordon Norman MD      DATE:     Please sign and return via fax to  .. Thank you, [facility].

## 2018-09-12 ENCOUNTER — TREATMENT (OUTPATIENT)
Dept: PHYSICAL THERAPY | Facility: CLINIC | Age: 63
End: 2018-09-12

## 2018-09-12 DIAGNOSIS — M25.562 CHRONIC PAIN OF BOTH KNEES: Primary | ICD-10-CM

## 2018-09-12 DIAGNOSIS — G89.29 CHRONIC LEFT SI JOINT PAIN: ICD-10-CM

## 2018-09-12 DIAGNOSIS — M53.3 CHRONIC LEFT SI JOINT PAIN: ICD-10-CM

## 2018-09-12 DIAGNOSIS — M25.561 CHRONIC PAIN OF BOTH KNEES: Primary | ICD-10-CM

## 2018-09-12 DIAGNOSIS — G89.29 CHRONIC PAIN OF BOTH KNEES: Primary | ICD-10-CM

## 2018-09-12 PROCEDURE — 97140 MANUAL THERAPY 1/> REGIONS: CPT | Performed by: PHYSICAL THERAPIST

## 2018-09-12 PROCEDURE — 97110 THERAPEUTIC EXERCISES: CPT | Performed by: PHYSICAL THERAPIST

## 2018-09-12 NOTE — PROGRESS NOTES
Physical Therapy Daily Progress Note      Visit # : 2    Al[place]ia Ramos reports 5/10 R knee, L knee 3/10, 1/10 in the SI joint today at rest.  Pt with the hernia limiting bridge and causing pain in the anterior area.         Objective Pt present to PT today with no distress noted entering PT area.     Palpation:  Primary area of pain noted in the pes anserine in the B LE's. Less pain noted at the posterior SI area and glutes on the L.    Post PT no elevated pain noted.       See Exercise, Manual, and Modality Logs for complete treatment.     Assessment/Plan  Pt with less pain noted after PT.  SI joint is improved.  The pes anserine is tender B.       Progress per Plan of Care  Check response to ice of pes anserine.            Manual Therapy:    10     mins  66652;  Therapeutic Exercise:    43     mins  69311;     Neuromuscular Doug:        mins  69225;    Therapeutic Activity:          mins  12774;     Gait Training:        ___  mins  51939;     Ultrasound:          mins  35374;    Electrical Stimulation:         mins  37540 ( );  Dry Needling          mins self-pay    Timed Treatment:   53   mins   Total Treatment:     64   mins    Jackson Hodge, PT  Physical Therapist

## 2018-09-14 ENCOUNTER — TREATMENT (OUTPATIENT)
Dept: PHYSICAL THERAPY | Facility: CLINIC | Age: 63
End: 2018-09-14

## 2018-09-14 DIAGNOSIS — G89.29 CHRONIC LEFT SI JOINT PAIN: ICD-10-CM

## 2018-09-14 DIAGNOSIS — M25.561 CHRONIC PAIN OF BOTH KNEES: Primary | ICD-10-CM

## 2018-09-14 DIAGNOSIS — M53.3 CHRONIC LEFT SI JOINT PAIN: ICD-10-CM

## 2018-09-14 DIAGNOSIS — M25.562 CHRONIC PAIN OF BOTH KNEES: Primary | ICD-10-CM

## 2018-09-14 DIAGNOSIS — G89.29 CHRONIC PAIN OF BOTH KNEES: Primary | ICD-10-CM

## 2018-09-14 PROCEDURE — 97110 THERAPEUTIC EXERCISES: CPT | Performed by: PHYSICAL THERAPIST

## 2018-09-14 NOTE — PROGRESS NOTES
Physical Therapy Daily Progress Note      Visit # : 3    Albania Ramos reports 3/10 pain today at rest in the knees.  2.5 / 10 pain in the back.      Pt reports the icing of the medial knees seemed to reduce knee pain for 8 hours after.     Objective Pt present to PT today with no distress at rest.     With transfers pt had elevated L PIIS pain noted.  Straight line motions dont seem to cause pain today but rotational motions do cause pain.       See Exercise, Manual, and Modality Logs for complete treatment.     Assessment/Plan  Pt with less pain in the knees but the SI join seems to be elevated today on the L.        Progress per Plan of Care             Manual Therapy:         mins  40873;  Therapeutic Exercise:    39     mins  73163;     Neuromuscular Doug:        mins  93549;    Therapeutic Activity:          mins  11120;     Gait Training:        ___  mins  12010;     Ultrasound:          mins  23370;    Electrical Stimulation:         mins  42670 ( );  Dry Needling          mins self-pay    Timed Treatment:   39   mins   Total Treatment:     47   mins    Jackson Hodge, PT  Physical Therapist

## 2018-09-17 ENCOUNTER — TREATMENT (OUTPATIENT)
Dept: PHYSICAL THERAPY | Facility: CLINIC | Age: 63
End: 2018-09-17

## 2018-09-17 DIAGNOSIS — G89.29 CHRONIC LEFT SI JOINT PAIN: ICD-10-CM

## 2018-09-17 DIAGNOSIS — M25.561 CHRONIC PAIN OF BOTH KNEES: Primary | ICD-10-CM

## 2018-09-17 DIAGNOSIS — M53.3 CHRONIC LEFT SI JOINT PAIN: ICD-10-CM

## 2018-09-17 DIAGNOSIS — M25.562 CHRONIC PAIN OF BOTH KNEES: Primary | ICD-10-CM

## 2018-09-17 DIAGNOSIS — G89.29 CHRONIC PAIN OF BOTH KNEES: Primary | ICD-10-CM

## 2018-09-17 PROCEDURE — 97110 THERAPEUTIC EXERCISES: CPT | Performed by: PHYSICAL THERAPIST

## 2018-09-17 PROCEDURE — 97140 MANUAL THERAPY 1/> REGIONS: CPT | Performed by: PHYSICAL THERAPIST

## 2018-09-17 NOTE — PROGRESS NOTES
Physical Therapy Daily Progress Note      Visit # : 4    Albania Ramos reports 5/10 pain in the SI joint on the L and 2/10 in B knees.today at rest.  Pt with pain elevated in the SI joint from twisting on the table last visit.         Objective Pt present to PT today with minimal distress with transfers and trunk rotation.     Pt with relief of L SI area pain with DKTC and trunk flexion exercises.        See Exercise, Manual, and Modality Logs for complete treatment.     Assessment/Plan  Pt with less pain in the hip and SI joint from doing solely lumbar flexion exercises.       Progress per Plan of Care  Continue with lumbar flexion exercises.            Manual Therapy:    11     mins  85875;  Therapeutic Exercise:    29     mins  89511;     Neuromuscular Doug:        mins  37885;    Therapeutic Activity:          mins  28675;     Gait Training:        ___  mins  02618;     Ultrasound:          mins  33527;    Electrical Stimulation:         mins  12191 ( );  Dry Needling          mins self-pay    Timed Treatment:   40   mins   Total Treatment:     47   mins    Jackson Hodge, PT  Physical Therapist

## 2018-09-21 ENCOUNTER — TREATMENT (OUTPATIENT)
Dept: PHYSICAL THERAPY | Facility: CLINIC | Age: 63
End: 2018-09-21

## 2018-09-21 DIAGNOSIS — G89.29 CHRONIC PAIN OF BOTH KNEES: Primary | ICD-10-CM

## 2018-09-21 DIAGNOSIS — M25.561 CHRONIC PAIN OF BOTH KNEES: Primary | ICD-10-CM

## 2018-09-21 DIAGNOSIS — M25.562 CHRONIC PAIN OF BOTH KNEES: Primary | ICD-10-CM

## 2018-09-21 DIAGNOSIS — M53.3 CHRONIC LEFT SI JOINT PAIN: ICD-10-CM

## 2018-09-21 DIAGNOSIS — G89.29 CHRONIC LEFT SI JOINT PAIN: ICD-10-CM

## 2018-09-21 PROCEDURE — 97140 MANUAL THERAPY 1/> REGIONS: CPT | Performed by: PHYSICAL THERAPIST

## 2018-09-21 PROCEDURE — 97110 THERAPEUTIC EXERCISES: CPT | Performed by: PHYSICAL THERAPIST

## 2018-09-21 NOTE — PROGRESS NOTES
Physical Therapy Daily Progress Note      Visit # : 5    Albania Ramos reports 1.5/10 pain today at rest.  2/10 pain in the knees.  Pt reports the DKTC really helped.         Objective Pt present to PT today with no distress noted at rest.  Ambulation is limited in mechanics due to hip strength and mechanics.     Pt responding well to DKTC stretching     Pt needed MC's for gait and to avoid hip trendelenburg gait pattern.        See Exercise, Manual, and Modality Logs for complete treatment.     Assessment/Plan  Pt with less pain after PT. Her glut med is still not functioning as intended with ambulation.       Progress per Plan of Care  Check response to exercise and functional training.            Manual Therapy:    9     mins  73176;  Therapeutic Exercise:    45     mins  10940;     Neuromuscular Doug:        mins  97892;    Therapeutic Activity:          mins  19310;     Gait Training:        ___  mins  58802;     Ultrasound:          mins  68594;    Electrical Stimulation:         mins  63224 ( );  Dry Needling          mins self-pay    Timed Treatment:   54   mins   Total Treatment:     58   mins    Jackson Hodge, PT  Physical Therapist

## 2018-09-24 ENCOUNTER — TREATMENT (OUTPATIENT)
Dept: PHYSICAL THERAPY | Facility: CLINIC | Age: 63
End: 2018-09-24

## 2018-09-24 DIAGNOSIS — M53.3 CHRONIC LEFT SI JOINT PAIN: ICD-10-CM

## 2018-09-24 DIAGNOSIS — M25.562 CHRONIC PAIN OF BOTH KNEES: Primary | ICD-10-CM

## 2018-09-24 DIAGNOSIS — G89.29 CHRONIC LEFT SI JOINT PAIN: ICD-10-CM

## 2018-09-24 DIAGNOSIS — M25.561 CHRONIC PAIN OF BOTH KNEES: Primary | ICD-10-CM

## 2018-09-24 DIAGNOSIS — G89.29 CHRONIC PAIN OF BOTH KNEES: Primary | ICD-10-CM

## 2018-09-24 PROCEDURE — 97530 THERAPEUTIC ACTIVITIES: CPT | Performed by: PHYSICAL THERAPIST

## 2018-09-24 PROCEDURE — 97110 THERAPEUTIC EXERCISES: CPT | Performed by: PHYSICAL THERAPIST

## 2018-09-24 PROCEDURE — 97140 MANUAL THERAPY 1/> REGIONS: CPT | Performed by: PHYSICAL THERAPIST

## 2018-09-24 NOTE — PROGRESS NOTES
Physical Therapy Daily Progress Note      Visit # : 6    Albania Ramos reports 2-3/10 pain today at rest in the knees and 1/10 pain in the back and the hip.  Pt with less pain overall this weekend.         Objective Pt present to PT today with no distress noted at rest.     Pt with less pain noted with activity today.       See Exercise, Manual, and Modality Logs for complete treatment.     Assessment/Plan  Pt with improved HEP and improved function.       Progress per Plan of Care  Check response to knee foot position with squat.            Manual Therapy:    8     mins  64064;  Therapeutic Exercise:    31     mins  17221;     Neuromuscular Doug:        mins  12946;    Therapeutic Activity:     11     mins  04258;     Gait Training:        ___  mins  04521;     Ultrasound:          mins  85909;    Electrical Stimulation:         mins  72451 ( );  Dry Needling          mins self-pay    Timed Treatment:   50   mins   Total Treatment:     50   mins    Jackson Hodge, PT  Physical Therapist

## 2018-10-10 ENCOUNTER — TREATMENT (OUTPATIENT)
Dept: PHYSICAL THERAPY | Facility: CLINIC | Age: 63
End: 2018-10-10

## 2018-10-10 DIAGNOSIS — G89.29 CHRONIC PAIN OF BOTH KNEES: Primary | ICD-10-CM

## 2018-10-10 DIAGNOSIS — M25.562 CHRONIC PAIN OF BOTH KNEES: Primary | ICD-10-CM

## 2018-10-10 DIAGNOSIS — M53.3 CHRONIC LEFT SI JOINT PAIN: ICD-10-CM

## 2018-10-10 DIAGNOSIS — M25.561 CHRONIC PAIN OF BOTH KNEES: Primary | ICD-10-CM

## 2018-10-10 DIAGNOSIS — G89.29 CHRONIC LEFT SI JOINT PAIN: ICD-10-CM

## 2018-10-10 PROCEDURE — 97110 THERAPEUTIC EXERCISES: CPT | Performed by: PHYSICAL THERAPIST

## 2018-10-10 PROCEDURE — 97530 THERAPEUTIC ACTIVITIES: CPT | Performed by: PHYSICAL THERAPIST

## 2018-10-10 NOTE — PROGRESS NOTES
Physical Therapy Daily Progress Note      Visit # : 7    Al[place]ia Ramos reports 1/10 pain in the knees and the L hip is 2/10 pain today at rest.  Pt has been ill with         Objective Pt presents to PT today with no distress noted at rest.     Pt with gait training for less hip excursion laterally.      MMT:  L hip ER 3+/5,  L hip abd 3/5     See Exercise, Manual, and Modality Logs for complete treatment.     Assessment/Plan  Pt with less pain noted and improved function.  She has improved with functional gait with better Glut med activation.        Progress per Plan of Care             Manual Therapy:         mins  72897;  Therapeutic Exercise:   38     mins  50252;     Neuromuscular Doug:        mins  80098;    Therapeutic Activity:     14     mins  57699;     Gait Training:        ___  mins  02027;     Ultrasound:          mins  68665;    Electrical Stimulation:         mins  81274 ( );  Dry Needling          mins self-pay    Timed Treatment:   52   mins   Total Treatment:     57   mins    Jackson Hodge, PT  Physical Therapist

## 2018-10-11 ENCOUNTER — OFFICE VISIT (OUTPATIENT)
Dept: SURGERY | Facility: CLINIC | Age: 63
End: 2018-10-11

## 2018-10-11 VITALS
OXYGEN SATURATION: 98 % | TEMPERATURE: 97.5 F | WEIGHT: 230.4 LBS | HEIGHT: 69 IN | DIASTOLIC BLOOD PRESSURE: 78 MMHG | SYSTOLIC BLOOD PRESSURE: 130 MMHG | HEART RATE: 81 BPM | BODY MASS INDEX: 34.13 KG/M2

## 2018-10-11 DIAGNOSIS — R10.32: Primary | ICD-10-CM

## 2018-10-11 PROCEDURE — 99204 OFFICE O/P NEW MOD 45 MIN: CPT | Performed by: SURGERY

## 2018-10-11 RX ORDER — LEVOCETIRIZINE DIHYDROCHLORIDE 5 MG/1
TABLET, FILM COATED ORAL
COMMUNITY
End: 2018-10-11 | Stop reason: SDUPTHER

## 2018-10-11 NOTE — PROGRESS NOTES
Subjective   [place] STACEY Ramos is a 63 y.o. female.   Chief Complaint   Patient presents with   • Groin Pain        History of Present Illness   Ms. land is a 63-year-old female patient here for evaluation and management of left groin pain which she describes as chronic and worsening.  She has a history of multiple prior left inguinal hernia repairs, all of which she reports were performed laparoscopically.  She reports that the most recent one was done about 10 years ago.  She states that she has had pain since that time.  She describes her current pain as sharp and exacerbated by bending and lifting.  The pain does not radiate.  She reports that the pain has significantly worsened over the last 1 year and she feels certain that she has a recurrent hernia.  She reports that her prior repairs were done by Dr. Fishman in [place], Dr. Omid Teague in this practice, and Dr. Ed Patterson at [facility].    The following portions of the patient's history were reviewed and updated as appropriate: allergies, current medications, past family history, past medical history, past social history, past surgical history and problem list.      Patient Active Problem List   Diagnosis   • Fatigue   • Hypothyroidism   • Vitamin D deficiency   • Cobalamin deficiency   • Malignant neoplasm of thyroid gland (CMS/HCC)   • Knee pain   • Torn cartilage   • Asthma with acute exacerbation   • Loss of hair   • Atopic rhinitis   • Paroxysmal atrial fibrillation (CMS/HCC)   • Edema   • Palpitations   • PVCs (premature ventricular contractions)   • Degenerative joint disease   • Depression   • Status post placement of implantable loop recorder   • SOB (shortness of breath)   • Chest pain       Past Medical History:   Diagnosis Date   • Abdominal pain    • Abnormal ECG    • Acute sinusitis    • Allergic    • Ankle joint pain    • Arthritis    • Asthma    • Asthma    • Atrial fibrillation (CMS/HCC)    • Bronchitis    • Chronic bronchitis  (CMS/[facility])    • CPAP (continuous positive airway pressure) dependence    • Degenerative joint disease    • Depression    • Diverticulosis    • Dizziness     Has had some episodes. No blake syncope.11/2007 patient underwent pulmonary vein isolation, initially successful.Patient returned, however in February noting some recurrent episode of dizziness.Then wore wore event recorder which showed some recurrent PAF.   • Dysuria    • Easy bruising    • GERD (gastroesophageal reflux disease)    • H/O bone density study    • Hay fever    • History of chest x-ray 07/10/2015    No acute cardiopulmonary process   • History of chest x-ray 07/02/2015    No acute cardiopulmonary process   • History of echocardiogram 04/04/2007    LVEF of greater than 60%. Mild MR.Mild TR.Trace pulmonary insufficinecy.   • History of mammogram    • History of PFTs 07/02/2015    Normal spirometry   • Hyperlipidemia    • Hypothyroidism    • Measles    • Migraine headache    • Mumps    • Osteoporosis    • Paroxysmal atrial fibrillation (CMS/HCC)     A. Failed medical therapy. B. Pulmonary vein isolation 11/2006. C. Recurrent episodes of PAF by event recorder 9/2006.   • Pneumonia    • Shortness of breath    • Sleep apnea    • Surfer's nodules of right foot    • Thyroid cancer (CMS/HCC)    • Torn meniscus    • Varicella    • Whooping cough        Past Surgical History:   Procedure Laterality Date   • ABLATION OF DYSRHYTHMIC FOCUS     • CARDIAC ELECTROPHYSIOLOGY PROCEDURE N/A 8/4/2016    Procedure: Loop recorder implant;  Surgeon: Himanshu Calvert MD;  Location:  [facility];  Service:    • CARDIAC ELECTROPHYSIOLOGY PROCEDURE N/A 10/13/2016    Procedure: Loop recorder removal;  Surgeon: Himanshu Calvert MD;  Location:  [facility];  Service:    • CARDIAC ELECTROPHYSIOLOGY PROCEDURE N/A 12/18/2017    Procedure: Loop insertion;  Surgeon: Mary Ann Blackwell MD;  Location:  [facility];  Service:    • CARDIAC  SURGERY     • CHOLECYSTECTOMY     • COLONOSCOPY     • DILATATION AND CURETTAGE     • EAR TUBES Bilateral    • FOOT SURGERY     • HYSTERECTOMY     • ILEAL LOOP CONDUIT     • INGUINAL HERNIA REPAIR     • JOINT REPLACEMENT     • KNEE SURGERY Bilateral     TKR Ponce   • LUNG BIOPSY      Remote   • LYMPH NODE BIOPSY     • MOUTH SURGERY      WISDOM TEETH   • THYROID SURGERY      Partial   • THYROIDECTOMY, PARTIAL     • TOTAL THYROIDECTOMY         Medications:     Current Outpatient Prescriptions:   •  ALLERGY SERUM INJECTION, Inject  under the skin 1 (One) Time., Disp: , Rfl:   •  aspirin 81 MG EC tablet, Take 81 mg by mouth daily., Disp: , Rfl:   •  ASPIRIN LOW DOSE 81 MG EC tablet, TAKE ONE TABLET BY MOUTH DAILY, Disp: 90 tablet, Rfl: 1  •  azelastine (ASTELIN) 0.1 % nasal spray, 1 spray into each nostril 2 (Two) Times a Day As Needed for Rhinitis or Allergies. Use in each nostril as directed, Disp: 1 each, Rfl: 5  •  BIOTIN PO, Take 2,500 mcg by mouth daily., Disp: , Rfl:   •  calcium carbonate (OS-SHANTE) 600 MG tablet, Take 600 mg by mouth 2 (two) times a day with meals., Disp: , Rfl:   •  Cholecalciferol (VITAMIN D3) 2000 UNITS capsule, Take 1 capsule by mouth daily., Disp: , Rfl:   •  Cinnamon ([employer] CINNAMON) 500 MG capsule, Take 500 mg by mouth 2 (two) times a day., Disp: , Rfl:   •  coenzyme Q10 100 MG capsule, Take 100 mg by mouth Daily., Disp: , Rfl:   •  CRANBERRY CONCENTRATE PO, Take 4,200 mg by mouth 2 (two) times a day., Disp: , Rfl:   •  Cream Base (PENCREAM) cream, Apply  topically., Disp: , Rfl:   •  cyanocobalamin (VITAMIN B-12) 2500 MCG tablet tablet, Place 1 tablet under the tongue. 2-3 times a week, Disp: , Rfl:   •  Flaxseed, Linseed, (FLAXSEED OIL) 1000 MG capsule, Take 1 tablet by mouth Daily., Disp: , Rfl:   •  Garlic 500 MG capsule, Take 1 tablet by mouth 2 (two) times a day., Disp: , Rfl:   •  Glucosamine Sulfate-MSM (GLUCOSAMINE-MSM) 500-500 MG tablet, Take 1 tablet by mouth daily., Disp: , Rfl:   •   Green Tea, Camillia sinensis, (GREEN TEA PO), Take 500 mg by mouth Daily., Disp: , Rfl:   •  hydrochlorothiazide (MICROZIDE) 12.5 MG capsule, TAKE ONE CAPSULE BY MOUTH DAILY, Disp: 90 capsule, Rfl: 1  •  hydrocortisone 2.5 % cream, Apply  topically 2 (Two) Times a Day., Disp: , Rfl:   •  ipratropium (ATROVENT HFA) 17 MCG/ACT inhaler, Inhale 2 puffs Every 6 (Six) Hours As Needed for wheezing., Disp: 1 inhaler, Rfl: 5  •  levocetirizine (XYZAL) 5 MG tablet, Take 5 mg by mouth Every Evening., Disp: , Rfl:   •  levothyroxine (SYNTHROID, LEVOTHROID) 100 MCG tablet, TAKE ONE TABLET BY MOUTH DAILY ALONG WITH 88MCG TABLET FOR TOTAL DOSE OF 188MCG, Disp: 90 tablet, Rfl: 0  •  levothyroxine (SYNTHROID, LEVOTHROID) 75 MCG tablet, Take 1 tablet by mouth Daily., Disp: 90 tablet, Rfl: 3  •  Lidocaine-Prilocaine, Bulk, 2-2 % cream, , Disp: , Rfl:   •  Misc Natural Products (TUMERSAID) tablet, Take 500 mg by mouth Daily., Disp: , Rfl:   •  Omega-3 Fatty Acids (FISH OIL) 435 MG capsule, Take 1,000 mg by mouth daily., Disp: , Rfl:   •  Omeprazole (EQ OMEPRAZOLE) 20 MG tablet delayed-release, Take 20 mg by mouth 2 (Two) Times a Day., Disp: 180 each, Rfl: 3  •  Probiotic Product (SOLUBLE FIBER/PROBIOTICS PO), Take 1 tablet by mouth daily., Disp: , Rfl:   •  Sulfamethoxazole-Trimethoprim (BACTRIM PO), Take  by mouth., Disp: , Rfl:   •  tapentadol (NUCYNTA) 50 MG tablet, Take 50 mg by mouth Every 6 (Six) Hours As Needed., Disp: , Rfl:   •  Unable to find, Take 1 each by mouth 1 (one) time. Med Name: BLACK  ELDERBERRY SYRUP, Disp: , Rfl:   •  vitamin C (ASCORBIC ACID) 500 MG tablet, Take 1 tablet by mouth daily., Disp: , Rfl:     Allergies:   Allergies   Allergen Reactions   • Niacin Hives   • Tegaderm Ag Mesh [Silver] Hives   • Darvon [Propoxyphene] Nausea And Vomiting   • Adhesive Tape Rash   • Albuterol Palpitations     FEELS WEIRD, INCREASED HR AND BREATHING   • Ciprodex [Ciprofloxacin-Dexamethasone] Other (See Comments)      REDNESS,tendonitis   • Ciprofloxacin Hcl Other (See Comments)     reddness,tendonitis   • Other Other (See Comments)     POLLEN,TREES,AND PLANTS MULTIPLE ENVIRONMENTAL ALLERGIES         Family History   Problem Relation Age of Onset   • Atrial fibrillation Mother    • Thyroid disease Mother    • Early death Father 68   • Lung cancer Father    • Early death Sister 16   • Heart attack Sister    • Down syndrome Sister    • Sleep apnea Brother         Nonorganic   • Other Brother         Palpitations (Symptom)       Social History     Social History   • Marital status:      Social History Main Topics   • Smoking status: Former Smoker     Years: 22.00     Quit date: 6/14/1996   • Smokeless tobacco: Never Used   • Alcohol use No      Comment: ONCE PER YEAR. Not excessive.   • Drug use: No   • Sexual activity: Defer     Other Topics Concern   • Not on file       Review of Systems   Constitutional: Negative for chills, fever and unexpected weight change.   HENT: Negative for hearing loss, trouble swallowing and voice change.    Eyes: Negative for visual disturbance.   Respiratory: Negative for apnea, cough, chest tightness, shortness of breath and wheezing.    Cardiovascular: Negative for chest pain, palpitations and leg swelling.   Gastrointestinal: Negative for abdominal distention, abdominal pain, anal bleeding, blood in stool, constipation, diarrhea, nausea, rectal pain and vomiting.   Endocrine: Negative for cold intolerance and heat intolerance.   Genitourinary: Negative for difficulty urinating, dysuria and flank pain.   Musculoskeletal: Negative for back pain and gait problem.        Groin pain   Skin: Negative for color change, rash and wound.   Neurological: Negative for dizziness, syncope, speech difficulty, weakness, light-headedness, numbness and headaches.   Hematological: Negative for adenopathy. Does not bruise/bleed easily.   Psychiatric/Behavioral: Negative for confusion. The patient is not  "nervous/anxious.        Objective    /78   Pulse 81   Temp 97.5 °F (36.4 °C)   Ht 175.3 cm (69.02\")   Wt 105 kg (230 lb 6.4 oz)   LMP  (LMP Unknown)   SpO2 98%   BMI 34.01 kg/m²     Physical Exam   Constitutional: She is oriented to person, place, and time. She appears well-developed and well-nourished.   HENT:   Head: Normocephalic and atraumatic.   Eyes: No scleral icterus.   Neck: Neck supple.   Cardiovascular: Regular rhythm.    Pulmonary/Chest: Effort normal.   Abdominal: Soft. She exhibits no distension. There is tenderness.   There is tenderness over the left groin.  There is no appreciable bulge, nor is there a hernia on either side on Valsalva.  There are expected postoperative changes on the left side which basically entail some palpable firmness to the tissue.   Neurological: She is alert and oriented to person, place, and time.   Skin: Skin is warm and dry.   Psychiatric: She has a normal mood and affect. Her behavior is normal.       Assessment/Plan   [place] was seen today for groin pain.    Diagnoses and all orders for this visit:    Severe left inguinal pain  -     CT Abdomen Pelvis With Contrast; Future        I discussed with the patient that given her history of multiple prior hernia repairs, I would recommend axial imaging for further evaluation.  I have expressed to her that I do not appreciate a hernia recurrence on her physical exam in the office today.  She does seem anxious to proceed with some sort of surgical intervention, and I have explained to her that it may not be the case that a recurrent hernia is present.  I have counseled her regarding the need for the CT scan, and answered all her questions.  She is agreeable to proceeding with CT, and I will see her back once this is been completed.  Findings on the CT will determine further management.  I did briefly discussed with the patient that there are cases of refractory chronic groin pain following inguinal hernia repair, " which require management by a pain specialist.  At the conclusion of the discussion, she verbalized understanding of the plan of care.

## 2018-10-15 ENCOUNTER — TREATMENT (OUTPATIENT)
Dept: PHYSICAL THERAPY | Facility: CLINIC | Age: 63
End: 2018-10-15

## 2018-10-15 PROCEDURE — 97530 THERAPEUTIC ACTIVITIES: CPT | Performed by: PHYSICAL THERAPIST

## 2018-10-15 PROCEDURE — 97110 THERAPEUTIC EXERCISES: CPT | Performed by: PHYSICAL THERAPIST

## 2018-10-15 NOTE — PROGRESS NOTES
Physical Therapy Daily Progress Note      Visit # : 8    [place] Ramos reports 1/10 pain today at rest L Hip.  Pt reports she is gaining the ability to use her hips more properly.         Objective Pt presents to PT today with no distress noted at rest.     Pt with ambulation improved with decreased step length and pace to allow her to use her hip MM glut med to improved gait pattern.       See Exercise, Manual, and Modality Logs for complete treatment.     Assessment/Plan  Pt with increased function and less pain.  She is ambulating better with less hip excursion.       Progress per Plan of Care             Manual Therapy:         mins  88960;  Therapeutic Exercise:    26     mins  88476;     Neuromuscular Doug:        mins  73775;    Therapeutic Activity:     11     mins  77759;     Gait Training:        ___  mins  89201;     Ultrasound:          mins  75027;    Electrical Stimulation:         mins  00861 ( );  Dry Needling          mins self-pay    Timed Treatment:  37    mins   Total Treatment:     37   mins    Jackson Hodge, PT  Physical Therapist

## 2018-10-16 ENCOUNTER — HOSPITAL ENCOUNTER (OUTPATIENT)
Dept: CT IMAGING | Facility: HOSPITAL | Age: 63
Discharge: HOME | End: 2018-10-16
Attending: SURGERY | Admitting: SURGERY

## 2018-10-16 ENCOUNTER — TRANSCRIBE ORDERS (OUTPATIENT)
Dept: ADMINISTRATIVE | Facility: HOSPITAL | Age: 63
End: 2018-10-16

## 2018-10-16 DIAGNOSIS — Z12.39 SCREENING BREAST EXAMINATION: Primary | ICD-10-CM

## 2018-10-16 DIAGNOSIS — R10.32: ICD-10-CM

## 2018-10-16 LAB — CREAT BLDA-MCNC: 0.9 MG/DL (ref 0.6–1.3)

## 2018-10-16 PROCEDURE — 25010000002 IOPAMIDOL 61 % SOLUTION: Performed by: SURGERY

## 2018-10-16 PROCEDURE — 74177 CT ABD & PELVIS W/CONTRAST: CPT

## 2018-10-16 PROCEDURE — 82565 ASSAY OF CREATININE: CPT

## 2018-10-16 RX ADMIN — IOPAMIDOL 100 ML: 612 INJECTION, SOLUTION INTRAVENOUS at 08:53

## 2018-10-24 ENCOUNTER — TREATMENT (OUTPATIENT)
Dept: PHYSICAL THERAPY | Facility: CLINIC | Age: 63
End: 2018-10-24

## 2018-10-24 DIAGNOSIS — M53.3 CHRONIC LEFT SI JOINT PAIN: ICD-10-CM

## 2018-10-24 DIAGNOSIS — G89.29 CHRONIC PAIN OF BOTH KNEES: Primary | ICD-10-CM

## 2018-10-24 DIAGNOSIS — M25.561 CHRONIC PAIN OF BOTH KNEES: Primary | ICD-10-CM

## 2018-10-24 DIAGNOSIS — M25.562 CHRONIC PAIN OF BOTH KNEES: Primary | ICD-10-CM

## 2018-10-24 DIAGNOSIS — G89.29 CHRONIC LEFT SI JOINT PAIN: ICD-10-CM

## 2018-10-24 PROCEDURE — 97110 THERAPEUTIC EXERCISES: CPT | Performed by: PHYSICAL THERAPIST

## 2018-10-24 PROCEDURE — 97140 MANUAL THERAPY 1/> REGIONS: CPT | Performed by: PHYSICAL THERAPIST

## 2018-10-24 NOTE — PROGRESS NOTES
Physical Therapy Daily Progress Note      Visit # : 9    [place] Ramos reports 3/10 pain today at rest.  Back , hip and knees.          Objective Pt presents to PT today with no distress    L lower abdominal area tender and guarded.  Pt with SKTC and DKTC on the L LE causing pain and elevated pain in the L lower abdominal.     Pt with tenderness and guarding noted with activity.  Post activity less guarding noted.     See Exercise, Manual, and Modality Logs for complete treatment.     Assessment/Plan  Pt with less pain after PT.  The lower abdominal area is limiting function due to pain.       Progress per Plan of Care  Continue with PT for strengthening and mobility activity to reduce pain.            Manual Therapy:    11     mins  60598;  Therapeutic Exercise:    28     mins  52850;     Neuromuscular Doug:        mins  30136;    Therapeutic Activity:          mins  95796;     Gait Training:        ___  mins  30511;     Ultrasound:          mins  11018;    Electrical Stimulation:         mins  15565 ( );  Dry Needling          mins self-pay    Timed Treatment:   39   mins   Total Treatment:     46   mins    Jackson Hodge, PT  Physical Therapist

## 2018-10-25 ENCOUNTER — OFFICE VISIT (OUTPATIENT)
Dept: SURGERY | Facility: CLINIC | Age: 63
End: 2018-10-25

## 2018-10-25 VITALS
RESPIRATION RATE: 18 BRPM | HEIGHT: 69 IN | OXYGEN SATURATION: 98 % | TEMPERATURE: 98 F | WEIGHT: 230 LBS | BODY MASS INDEX: 34.07 KG/M2 | SYSTOLIC BLOOD PRESSURE: 126 MMHG | DIASTOLIC BLOOD PRESSURE: 64 MMHG | HEART RATE: 88 BPM

## 2018-10-25 DIAGNOSIS — G89.29 CHRONIC GROIN PAIN, LEFT: Primary | ICD-10-CM

## 2018-10-25 DIAGNOSIS — Z98.890 S/P INGUINAL HERNIA REPAIR USING SYNTHETIC PATCH: ICD-10-CM

## 2018-10-25 DIAGNOSIS — Z87.19 S/P INGUINAL HERNIA REPAIR USING SYNTHETIC PATCH: ICD-10-CM

## 2018-10-25 DIAGNOSIS — R10.32 CHRONIC GROIN PAIN, LEFT: Primary | ICD-10-CM

## 2018-10-25 PROCEDURE — 99214 OFFICE O/P EST MOD 30 MIN: CPT | Performed by: SURGERY

## 2018-11-10 RX ORDER — LEVOTHYROXINE SODIUM 0.1 MG/1
TABLET ORAL
Qty: 90 TABLET | Refills: 0 | Status: SHIPPED | OUTPATIENT
Start: 2018-11-10 | End: 2019-02-21 | Stop reason: SDUPTHER

## 2018-11-27 ENCOUNTER — HOSPITAL ENCOUNTER (OUTPATIENT)
Dept: MAMMOGRAPHY | Facility: HOSPITAL | Age: 63
Discharge: HOME | End: 2018-11-27
Attending: OBSTETRICS & GYNECOLOGY | Admitting: OBSTETRICS & GYNECOLOGY

## 2018-11-27 DIAGNOSIS — Z12.39 SCREENING BREAST EXAMINATION: ICD-10-CM

## 2018-11-27 PROCEDURE — 77067 SCR MAMMO BI INCL CAD: CPT

## 2018-11-27 PROCEDURE — 77063 BREAST TOMOSYNTHESIS BI: CPT

## 2018-12-06 ENCOUNTER — TELEPHONE (OUTPATIENT)
Dept: INTERNAL MEDICINE | Facility: CLINIC | Age: 63
End: 2018-12-06

## 2018-12-15 LAB
ALBUMIN SERPL-MCNC: 4.3 G/DL (ref 3.6–4.8)
ALBUMIN/GLOB SERPL: 2 {RATIO} (ref 1.2–2.2)
ALP SERPL-CCNC: 98 IU/L (ref 39–117)
ALT SERPL-CCNC: 18 IU/L (ref 0–32)
AST SERPL-CCNC: 19 IU/L (ref 0–40)
BASOPHILS # BLD AUTO: 0 X10E3/UL (ref 0–0.2)
BASOPHILS NFR BLD AUTO: 1 %
BILIRUB SERPL-MCNC: 0.4 MG/DL (ref 0–1.2)
BUN SERPL-MCNC: 13 MG/DL (ref 8–27)
BUN/CREAT SERPL: 15 (ref 12–28)
CALCIUM SERPL-MCNC: 9.3 MG/DL (ref 8.7–10.3)
CHLORIDE SERPL-SCNC: 107 MMOL/L (ref 96–106)
CHOLEST SERPL-MCNC: 193 MG/DL (ref 100–199)
CO2 SERPL-SCNC: 26 MMOL/L (ref 20–29)
CREAT SERPL-MCNC: 0.86 MG/DL (ref 0.57–1)
EOSINOPHIL # BLD AUTO: 0.3 X10E3/UL (ref 0–0.4)
EOSINOPHIL NFR BLD AUTO: 4 %
ERYTHROCYTE [DISTWIDTH] IN BLOOD BY AUTOMATED COUNT: 14 % (ref 12.3–15.4)
GLOBULIN SER CALC-MCNC: 2.2 G/DL (ref 1.5–4.5)
GLUCOSE SERPL-MCNC: 91 MG/DL (ref 65–99)
HCT VFR BLD AUTO: 41.7 % (ref 34–46.6)
HDLC SERPL-MCNC: 64 MG/DL
HGB BLD-MCNC: 13.5 G/DL (ref 11.1–15.9)
IMM GRANULOCYTES # BLD: 0 X10E3/UL (ref 0–0.1)
IMM GRANULOCYTES NFR BLD: 0 %
LDLC SERPL CALC-MCNC: 114 MG/DL (ref 0–99)
LYMPHOCYTES # BLD AUTO: 2.3 X10E3/UL (ref 0.7–3.1)
LYMPHOCYTES NFR BLD AUTO: 37 %
MCH RBC QN AUTO: 28.4 PG (ref 26.6–33)
MCHC RBC AUTO-ENTMCNC: 32.4 G/DL (ref 31.5–35.7)
MCV RBC AUTO: 88 FL (ref 79–97)
MONOCYTES # BLD AUTO: 0.4 X10E3/UL (ref 0.1–0.9)
MONOCYTES NFR BLD AUTO: 7 %
NEUTROPHILS # BLD AUTO: 3.2 X10E3/UL (ref 1.4–7)
NEUTROPHILS NFR BLD AUTO: 51 %
PLATELET # BLD AUTO: 265 X10E3/UL (ref 150–379)
POTASSIUM SERPL-SCNC: 5.2 MMOL/L (ref 3.5–5.2)
PROT SERPL-MCNC: 6.5 G/DL (ref 6–8.5)
RBC # BLD AUTO: 4.76 X10E6/UL (ref 3.77–5.28)
SODIUM SERPL-SCNC: 148 MMOL/L (ref 134–144)
T4 FREE SERPL-MCNC: 1.98 NG/DL (ref 0.82–1.77)
TRIGL SERPL-MCNC: 75 MG/DL (ref 0–149)
TSH SERPL DL<=0.005 MIU/L-ACNC: 0.05 UIU/ML (ref 0.45–4.5)
VIT B12 SERPL-MCNC: 596 PG/ML (ref 232–1245)
VLDLC SERPL CALC-MCNC: 15 MG/DL (ref 5–40)
WBC # BLD AUTO: 6.1 X10E3/UL (ref 3.4–10.8)

## 2018-12-28 ENCOUNTER — OFFICE VISIT (OUTPATIENT)
Dept: INTERNAL MEDICINE | Facility: CLINIC | Age: 63
End: 2018-12-28

## 2018-12-28 ENCOUNTER — OFFICE VISIT (OUTPATIENT)
Dept: CARDIOLOGY | Facility: CLINIC | Age: 63
End: 2018-12-28

## 2018-12-28 VITALS
SYSTOLIC BLOOD PRESSURE: 122 MMHG | WEIGHT: 237 LBS | HEIGHT: 69 IN | BODY MASS INDEX: 35.1 KG/M2 | DIASTOLIC BLOOD PRESSURE: 62 MMHG | OXYGEN SATURATION: 98 % | RESPIRATION RATE: 18 BRPM | HEART RATE: 71 BPM

## 2018-12-28 VITALS
WEIGHT: 237 LBS | DIASTOLIC BLOOD PRESSURE: 73 MMHG | HEIGHT: 69 IN | BODY MASS INDEX: 35.1 KG/M2 | OXYGEN SATURATION: 97 % | SYSTOLIC BLOOD PRESSURE: 130 MMHG | HEART RATE: 78 BPM | TEMPERATURE: 97.5 F

## 2018-12-28 DIAGNOSIS — R06.02 SOB (SHORTNESS OF BREATH): ICD-10-CM

## 2018-12-28 DIAGNOSIS — I49.3 PVCS (PREMATURE VENTRICULAR CONTRACTIONS): ICD-10-CM

## 2018-12-28 DIAGNOSIS — R00.2 PALPITATIONS: ICD-10-CM

## 2018-12-28 DIAGNOSIS — R25.2 LEG CRAMPS: ICD-10-CM

## 2018-12-28 DIAGNOSIS — I48.0 PAROXYSMAL ATRIAL FIBRILLATION (HCC): Primary | ICD-10-CM

## 2018-12-28 DIAGNOSIS — E03.9 ACQUIRED HYPOTHYROIDISM: ICD-10-CM

## 2018-12-28 DIAGNOSIS — I48.0 PAROXYSMAL ATRIAL FIBRILLATION (HCC): Primary | Chronic | ICD-10-CM

## 2018-12-28 DIAGNOSIS — J06.9 UPPER RESPIRATORY TRACT INFECTION, UNSPECIFIED TYPE: ICD-10-CM

## 2018-12-28 DIAGNOSIS — E03.8 OTHER SPECIFIED HYPOTHYROIDISM: ICD-10-CM

## 2018-12-28 PROCEDURE — 99214 OFFICE O/P EST MOD 30 MIN: CPT | Performed by: INTERNAL MEDICINE

## 2018-12-28 PROCEDURE — 93291 INTERROG DEV EVAL SCRMS IP: CPT | Performed by: INTERNAL MEDICINE

## 2018-12-28 PROCEDURE — 99213 OFFICE O/P EST LOW 20 MIN: CPT | Performed by: INTERNAL MEDICINE

## 2018-12-28 RX ORDER — AMOXICILLIN AND CLAVULANATE POTASSIUM 875; 125 MG/1; MG/1
1 TABLET, FILM COATED ORAL EVERY 12 HOURS SCHEDULED
Qty: 20 TABLET | Refills: 0 | Status: SHIPPED | OUTPATIENT
Start: 2018-12-28 | End: 2019-10-01 | Stop reason: SDUPTHER

## 2018-12-28 RX ORDER — LEVOTHYROXINE SODIUM 88 UG/1
88 TABLET ORAL DAILY
Qty: 90 TABLET | Refills: 3 | Status: SHIPPED | OUTPATIENT
Start: 2018-12-28 | End: 2019-02-18 | Stop reason: SDUPTHER

## 2018-12-28 NOTE — PROGRESS NOTES
"    Subjective:     Encounter Date:12/28/2018      Patient ID: [place] Ramos is a 63 y.o. female.    Chief Complaint: Paroxysmal atrial fibrillation  HPI  This is a 63-year-old female patient with a [employer] loop recorder who presents to cardiology clinic for routine device interrogation in follow-up on atrial fibrillation.  Ice interrogation shows under sensing creating the false impression of bradycardia and pauses.  This was previously recognized on her last interrogation.  She has had no episodes of atrial fibrillation or other arrhythmia.  She has had no PVCs.  The patient reports intermittent palpitations with a sense that her heart is \"flip-flop\".  She has had no change in her baseline dyspnea.  There is no chest discomfort at rest or with activity.  Overall she reports doing well from a cardiovascular standpoint.  There is no orthopnea PND or lower extremity edema.  She has had no dizziness or syncope.  The following portions of the patient's history were reviewed and updated as appropriate: allergies, current medications, past family history, past medical history, past social history, past surgical history and problem  Review of Systems   Constitution: Negative for chills, diaphoresis, fever, weakness, malaise/fatigue, weight gain and weight loss.   HENT: Negative for ear discharge, hearing loss, hoarse voice and nosebleeds.    Eyes: Negative for discharge, double vision, pain and photophobia.   Cardiovascular: Positive for palpitations. Negative for chest pain, claudication, cyanosis, dyspnea on exertion, irregular heartbeat, leg swelling, near-syncope, orthopnea, paroxysmal nocturnal dyspnea and syncope.   Respiratory: Positive for shortness of breath. Negative for cough, hemoptysis, sputum production and wheezing.    Endocrine: Negative for cold intolerance, heat intolerance, polydipsia, polyphagia and polyuria.   Hematologic/Lymphatic: Negative for adenopathy and bleeding problem. Does " not bruise/bleed easily.   Skin: Negative for color change, flushing, itching and rash.   Musculoskeletal: Negative for muscle cramps, muscle weakness, myalgias and stiffness.   Gastrointestinal: Negative for abdominal pain, diarrhea, hematemesis, hematochezia, nausea and vomiting.   Genitourinary: Negative for dysuria, frequency and nocturia.   Neurological: Negative for focal weakness, loss of balance, numbness, paresthesias and seizures.   Psychiatric/Behavioral: Negative for altered mental status, hallucinations and suicidal ideas.   Allergic/Immunologic: Negative for HIV exposure, hives and persistent infections.           Current Outpatient Medications:   •  ALLERGY SERUM INJECTION, Inject  under the skin 1 (One) Time., Disp: , Rfl:   •  amoxicillin-clavulanate (AUGMENTIN) 875-125 MG per tablet, Take 1 tablet by mouth Every 12 (Twelve) Hours., Disp: 20 tablet, Rfl: 0  •  BIOTIN PO, Take 2,500 mcg by mouth daily., Disp: , Rfl:   •  calcium carbonate (OS-SHANTE) 600 MG tablet, Take 600 mg by mouth 2 (two) times a day with meals., Disp: , Rfl:   •  Cholecalciferol (VITAMIN D3) 2000 UNITS capsule, Take 1 capsule by mouth daily., Disp: , Rfl:   •  Cinnamon ([employer] CINNAMON) 500 MG capsule, Take 500 mg by mouth 2 (two) times a day., Disp: , Rfl:   •  coenzyme Q10 100 MG capsule, Take 100 mg by mouth Daily., Disp: , Rfl:   •  CRANBERRY CONCENTRATE PO, Take 4,200 mg by mouth 2 (two) times a day., Disp: , Rfl:   •  Cream Base (PENCREAM) cream, Apply  topically., Disp: , Rfl:   •  cyanocobalamin (VITAMIN B-12) 2500 MCG tablet tablet, Take 1 tablet by mouth 1 (One) Time for 1 dose. 2-3 times a week, Disp: 30 tablet, Rfl: 0  •  Flaxseed, Linseed, (FLAXSEED OIL) 1000 MG capsule, Take 1 tablet by mouth Daily., Disp: , Rfl:   •  Garlic 500 MG capsule, Take 1 tablet by mouth 2 (two) times a day., Disp: , Rfl:   •  Glucosamine Sulfate-MSM (GLUCOSAMINE-MSM) 500-500 MG tablet, Take 1 tablet by mouth daily., Disp: , Rfl:   •  Green  Tea, Camillia sinensis, (GREEN TEA PO), Take 500 mg by mouth Daily., Disp: , Rfl:   •  levocetirizine (XYZAL) 5 MG tablet, Take 5 mg by mouth Every Evening., Disp: , Rfl:   •  levothyroxine (SYNTHROID, LEVOTHROID) 100 MCG tablet, TAKE ONE TABLET BY MOUTH DAILY ALONG WITH 88MCG TABLET FOR TOTAL DOSE OF 188MCG, Disp: 90 tablet, Rfl: 0  •  levothyroxine (SYNTHROID, LEVOTHROID) 88 MCG tablet, Take 1 tablet by mouth Daily., Disp: 90 tablet, Rfl: 3  •  Misc Natural Products (TUMERSAID) tablet, Take 500 mg by mouth Daily., Disp: , Rfl:   •  Omega-3 Fatty Acids (FISH OIL) 435 MG capsule, Take 1,000 mg by mouth daily., Disp: , Rfl:   •  Probiotic Product (SOLUBLE FIBER/PROBIOTICS PO), Take 1 tablet by mouth daily., Disp: , Rfl:   •  vitamin C (ASCORBIC ACID) 500 MG tablet, Take 1 tablet by mouth daily., Disp: , Rfl:      Objective:     Physical Exam   Constitutional: She is oriented to person, place, and time. She appears well-developed and well-nourished. No distress.   HENT:   Head: Normocephalic and atraumatic.   Mouth/Throat: Oropharynx is clear and moist.   Eyes: Conjunctivae and EOM are normal. Pupils are equal, round, and reactive to light. No scleral icterus.   Neck: Normal range of motion. Neck supple. No JVD present. No tracheal deviation present. No thyromegaly present.   Cardiovascular: Normal rate, regular rhythm, S1 normal, S2 normal, normal heart sounds, intact distal pulses and normal pulses. PMI is not displaced. Exam reveals no gallop and no friction rub.   No murmur heard.  Pulmonary/Chest: Effort normal and breath sounds normal. No respiratory distress. She has no wheezes. She has no rales.   Abdominal: Soft. Bowel sounds are normal. She exhibits no distension and no mass. There is no tenderness. There is no rebound and no guarding.   Musculoskeletal: Normal range of motion. She exhibits no edema or deformity.   Neurological: She is alert and oriented to person, place, and time. She displays normal  "reflexes. No cranial nerve deficit. Coordination normal.   Skin: Skin is warm and dry. No rash noted. She is not diaphoretic. No erythema.   Psychiatric: She has a normal mood and affect. Her behavior is normal. Thought content normal.     Blood pressure 122/62, pulse 71, resp. rate 18, height 175.3 cm (69.02\"), weight 108 kg (237 lb), SpO2 98 %.   Lab Review:       Assessment:         1. Paroxysmal atrial fibrillation (CMS/HCC)  Maintaining normal sinus rhythm.  Interrogation of her loop recorder shows no evidence of atrial fibrillation or other arrhythmia in the last 6 months.    2. SOB (shortness of breath)  Stable symptoms without evidence of ongoing cardiac pathology    3. PVCs (premature ventricular contractions)  No PVCs noted on cardiac monitor in the last 6 months.    4. Palpitations  The patient reports intermittent rare palpitations.    Procedures     Plan:       No changes to the medication profile have been made at today's visit.  No additional cardiovascular testing is warranted.         "

## 2018-12-28 NOTE — PROGRESS NOTES
Subjective     Patient ID: [place]ia Ramos is a 63 y.o. female. Patient is here for management of multiple medical problems.     Chief Complaint   Patient presents with   • Follow-up     follow up on lab work would like to discuss thyroid meds     URI    This is a new problem. The current episode started 1 to 4 weeks ago. The problem has been gradually worsening. There has been no fever. Associated symptoms include chest pain, congestion, coughing and headaches. Pertinent negatives include no abdominal pain or dysuria. The treatment provided moderate relief.   Hypothyroidism   This is a chronic problem. The problem occurs intermittently. The problem has been gradually worsening. Associated symptoms include chest pain, congestion, coughing and headaches. Pertinent negatives include no abdominal pain or anorexia.        The following portions of the patient's history were reviewed and updated as appropriate: allergies, current medications, past family history, past medical history, past social history, past surgical history and problem list.    Review of Systems   HENT: Positive for congestion.    Respiratory: Positive for cough.    Cardiovascular: Positive for chest pain.   Gastrointestinal: Negative for abdominal pain and anorexia.   Genitourinary: Negative for dysuria.   Neurological: Positive for headaches.       Current Outpatient Medications:   •  ALLERGY SERUM INJECTION, Inject  under the skin 1 (One) Time., Disp: , Rfl:   •  BIOTIN PO, Take 2,500 mcg by mouth daily., Disp: , Rfl:   •  calcium carbonate (OS-SHANTE) 600 MG tablet, Take 600 mg by mouth 2 (two) times a day with meals., Disp: , Rfl:   •  Cholecalciferol (VITAMIN D3) 2000 UNITS capsule, Take 1 capsule by mouth daily., Disp: , Rfl:   •  Cinnamon ([employer] CINNAMON) 500 MG capsule, Take 500 mg by mouth 2 (two) times a day., Disp: , Rfl:   •  coenzyme Q10 100 MG capsule, Take 100 mg by mouth Daily., Disp: , Rfl:   •  CRANBERRY CONCENTRATE PO, Take 4,200 mg by  "mouth 2 (two) times a day., Disp: , Rfl:   •  Cream Base (PENCREAM) cream, Apply  topically., Disp: , Rfl:   •  cyanocobalamin (VITAMIN B-12) 2500 MCG tablet tablet, Take 1 tablet by mouth 1 (One) Time for 1 dose. 2-3 times a week, Disp: 30 tablet, Rfl: 0  •  Flaxseed, Linseed, (FLAXSEED OIL) 1000 MG capsule, Take 1 tablet by mouth Daily., Disp: , Rfl:   •  Garlic 500 MG capsule, Take 1 tablet by mouth 2 (two) times a day., Disp: , Rfl:   •  Glucosamine Sulfate-MSM (GLUCOSAMINE-MSM) 500-500 MG tablet, Take 1 tablet by mouth daily., Disp: , Rfl:   •  Green Tea, Camillia sinensis, (GREEN TEA PO), Take 500 mg by mouth Daily., Disp: , Rfl:   •  levocetirizine (XYZAL) 5 MG tablet, Take 5 mg by mouth Every Evening., Disp: , Rfl:   •  levothyroxine (SYNTHROID, LEVOTHROID) 100 MCG tablet, TAKE ONE TABLET BY MOUTH DAILY ALONG WITH 88MCG TABLET FOR TOTAL DOSE OF 188MCG, Disp: 90 tablet, Rfl: 0  •  levothyroxine (SYNTHROID, LEVOTHROID) 88 MCG tablet, Take 1 tablet by mouth Daily., Disp: 90 tablet, Rfl: 3  •  Misc Natural Products (TUMERSAID) tablet, Take 500 mg by mouth Daily., Disp: , Rfl:   •  Omega-3 Fatty Acids (FISH OIL) 435 MG capsule, Take 1,000 mg by mouth daily., Disp: , Rfl:   •  Probiotic Product (SOLUBLE FIBER/PROBIOTICS PO), Take 1 tablet by mouth daily., Disp: , Rfl:   •  vitamin C (ASCORBIC ACID) 500 MG tablet, Take 1 tablet by mouth daily., Disp: , Rfl:   •  amoxicillin-clavulanate (AUGMENTIN) 875-125 MG per tablet, Take 1 tablet by mouth Every 12 (Twelve) Hours., Disp: 20 tablet, Rfl: 0    Objective      Blood pressure 130/73, pulse 78, temperature 97.5 °F (36.4 °C), height 175.3 cm (69.02\"), weight 108 kg (237 lb), SpO2 97 %.    Physical Exam     General Appearance:    Alert, cooperative, no distress, appears stated age   Head:    Normocephalic, without obvious abnormality, atraumatic   Eyes:    PERRL, conjunctiva/corneas clear, EOM's intact   Ears:    Normal TM's and external ear canals, both ears   Nose:   " Nares normal, septum midline, mucosa normal, no drainage  + sinus tenderness   Throat:   Lips, mucosa, and tongue normal; teeth and gums normal   Neck:   Supple, symmetrical, trachea midline, no adenopathy;        thyroid:  No enlargement/tenderness/nodules; no carotid    bruit or JVD   Back:     Symmetric, no curvature, ROM normal, no CVA tenderness   Lungs:     Clear to auscultation bilaterally, respirations unlabored   Chest wall:    No tenderness or deformity   Heart:    Regular rate and irrgular rhythm, S2 normal, no murmur,        rub or gallop   Abdomen:     Soft, non-tender, bowel sounds active all four quadrants,     no masses, no organomegaly   Extremities:   Extremities normal, atraumatic, no cyanosis or edema   Pulses:   2+ and symmetric all extremities   Skin:   Skin color, texture, turgor normal, no rashes or lesions   Lymph nodes:   Cervical, supraclavicular, and axillary nodes normal   Neurologic:   CNII-XII intact. Normal strength, sensation and reflexes       throughout      Results for orders placed or performed during the hospital encounter of 10/16/18   POC Creatinine   Result Value Ref Range    Creatinine 0.90 0.60 - 1.30 mg/dL         Assessment/Plan     Pt will increase thyroid med slightly.  188 alternating with 175.  Sweating on 188 and cramping on 175.      [place] was seen today for follow-up.    Diagnoses and all orders for this visit:    Paroxysmal atrial fibrillation (CMS/HCC)  -     Lipid Panel  -     CBC & Differential  -     Vitamin B12  -     Comprehensive Metabolic Panel  -     TSH  -     T4, Free    Acquired hypothyroidism  -     Lipid Panel  -     CBC & Differential  -     Vitamin B12  -     Comprehensive Metabolic Panel  -     TSH  -     T4, Free    Upper respiratory tract infection, unspecified type  -     Lipid Panel  -     CBC & Differential  -     Vitamin B12  -     Comprehensive Metabolic Panel  -     TSH  -     T4, Free  -     amoxicillin-clavulanate (AUGMENTIN) 875-125 MG  per tablet; Take 1 tablet by mouth Every 12 (Twelve) Hours.    Leg cramps  -     Lipid Panel  -     CBC & Differential  -     Vitamin B12  -     Comprehensive Metabolic Panel  -     TSH  -     T4, Free    Other specified hypothyroidism  -     levothyroxine (SYNTHROID, LEVOTHROID) 88 MCG tablet; Take 1 tablet by mouth Daily.  -     Lipid Panel  -     CBC & Differential  -     Vitamin B12  -     Comprehensive Metabolic Panel  -     TSH  -     T4, Free    Other orders  -     cyanocobalamin (VITAMIN B-12) 2500 MCG tablet tablet; Take 1 tablet by mouth 1 (One) Time for 1 dose. 2-3 times a week      Return in about 3 months (around 3/28/2019).          There are no Patient Instructions on file for this visit.     Gordon Norman MD    Assessment/Plan

## 2019-01-17 RX ORDER — OMEPRAZOLE 20 MG/1
CAPSULE, DELAYED RELEASE ORAL
Qty: 180 CAPSULE | Refills: 1 | Status: SHIPPED | OUTPATIENT
Start: 2019-01-17 | End: 2020-06-16 | Stop reason: SDUPTHER

## 2019-02-18 DIAGNOSIS — E03.8 OTHER SPECIFIED HYPOTHYROIDISM: ICD-10-CM

## 2019-02-18 RX ORDER — LEVOTHYROXINE SODIUM 88 UG/1
88 TABLET ORAL DAILY
Qty: 90 TABLET | Refills: 1 | Status: SHIPPED | OUTPATIENT
Start: 2019-02-18 | End: 2019-10-01 | Stop reason: ALTCHOICE

## 2019-02-18 RX ORDER — LEVOTHYROXINE SODIUM 0.1 MG/1
TABLET ORAL
Qty: 90 TABLET | Refills: 0 | OUTPATIENT
Start: 2019-02-18

## 2019-02-20 RX ORDER — LEVOTHYROXINE SODIUM 0.1 MG/1
TABLET ORAL
Qty: 90 TABLET | Refills: 0 | OUTPATIENT
Start: 2019-02-20

## 2019-02-21 RX ORDER — LEVOTHYROXINE SODIUM 0.1 MG/1
100 TABLET ORAL DAILY
Qty: 90 TABLET | Refills: 0 | Status: SHIPPED | OUTPATIENT
Start: 2019-02-21 | End: 2019-05-12 | Stop reason: SDUPTHER

## 2019-03-05 DIAGNOSIS — G47.33 OSA (OBSTRUCTIVE SLEEP APNEA): Primary | ICD-10-CM

## 2019-03-28 RX ORDER — MOMETASONE FUROATE 200 UG/1
AEROSOL RESPIRATORY (INHALATION)
Qty: 13 G | Refills: 5 | Status: SHIPPED | OUTPATIENT
Start: 2019-03-28 | End: 2019-10-01

## 2019-05-13 RX ORDER — LEVOTHYROXINE SODIUM 0.1 MG/1
TABLET ORAL
Qty: 90 TABLET | Refills: 3 | Status: SHIPPED | OUTPATIENT
Start: 2019-05-13 | End: 2019-10-01 | Stop reason: ALTCHOICE

## 2019-06-26 ENCOUNTER — PATIENT MESSAGE (OUTPATIENT)
Dept: CARDIOLOGY | Facility: CLINIC | Age: 64
End: 2019-06-26

## 2019-07-01 ENCOUNTER — OFFICE VISIT (OUTPATIENT)
Dept: UROLOGY | Facility: CLINIC | Age: 64
End: 2019-07-01

## 2019-07-01 VITALS
OXYGEN SATURATION: 98 % | HEART RATE: 66 BPM | SYSTOLIC BLOOD PRESSURE: 140 MMHG | DIASTOLIC BLOOD PRESSURE: 78 MMHG | HEIGHT: 69 IN | WEIGHT: 237 LBS | BODY MASS INDEX: 35.1 KG/M2

## 2019-07-01 DIAGNOSIS — Z87.440 HISTORY OF UTI: Primary | ICD-10-CM

## 2019-07-01 DIAGNOSIS — N32.81 OVERACTIVE BLADDER: ICD-10-CM

## 2019-07-01 LAB
BILIRUB BLD-MCNC: NEGATIVE MG/DL
CLARITY, POC: CLEAR
COLOR UR: YELLOW
GLUCOSE UR STRIP-MCNC: NEGATIVE MG/DL
KETONES UR QL: NEGATIVE
LEUKOCYTE EST, POC: NEGATIVE
NITRITE UR-MCNC: NEGATIVE MG/ML
PH UR: 6 [PH] (ref 5–8)
PROT UR STRIP-MCNC: NEGATIVE MG/DL
RBC # UR STRIP: ABNORMAL /UL
SP GR UR: 1 (ref 1–1.03)
UROBILINOGEN UR QL: NORMAL

## 2019-07-01 PROCEDURE — 81003 URINALYSIS AUTO W/O SCOPE: CPT | Performed by: UROLOGY

## 2019-07-01 PROCEDURE — 99203 OFFICE O/P NEW LOW 30 MIN: CPT | Performed by: UROLOGY

## 2019-07-01 PROCEDURE — 52265 CYSTOSCOPY AND TREATMENT: CPT | Performed by: UROLOGY

## 2019-07-01 NOTE — PROGRESS NOTES
Chief Complaint  Consult (uti-blood in urine-urgency)      HPI  [place]ia Ramos is a 64 y.o.female who is referred for evaluation of her bladder with a past history of urinary tract infections and more recently a significant problem with incontinence and dysuria.  She has been evaluated previously by Dr. Bloom who has performed 2 previous cystoscopies.  She states years ago she had her bladder tacked up and found it essentially worthless.  She has also had a prior hysterectomy.  She describes significant problems with incontinence with both urgent and stress components.  She also volunteers that acidic foods seem to aggravate her dysuria.  She has been provided with Estrace cream but is only using it on a weekly basis.  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  Typically she has blood in her urine although it is negative for bacteria and white cells.  Recent urine culture revealed multiple organisms with tiny colony count suggesting contaminated specimen.  She states previously she had been placed on a chronic regimen of alternating Macrodantin and Bactrim on a consistent basis but quit using it last year.    She has a history of atrial fibrillation but after 2 cardiac ablations she is not taking anticoagulation.    Vitals:    07/01/19 1030   BP: 140/78   Pulse: 66   SpO2: 98%       Past Medical History  Past Medical History:   Diagnosis Date   • Abdominal pain    • Abnormal ECG    • Acute sinusitis    • Allergic    • Ankle joint pain    • Arthritis    • Asthma    • Asthma    • Atrial fibrillation (CMS/HCC)    • Bronchitis    • Chronic bronchitis (CMS/HCC)    • CPAP (continuous positive airway pressure) dependence    • Degenerative joint disease    • Depression    • Diverticulosis    • Dizziness     Has had some episodes. No blake syncope.11/2007 patient underwent pulmonary vein isolation, initially successful.Patient returned, however in February noting some  recurrent episode of dizziness.Then wore wore event recorder which showed some recurrent PAF.   • Dysuria    • Easy bruising    • Follicular thyroid cancer (CMS/HCC)     treated with total thyroidectomy followed by [facility]   • GERD (gastroesophageal reflux disease)    • H/O bone density study    • Hay fever    • History of chest x-ray 07/10/2015    No acute cardiopulmonary process   • History of chest x-ray 07/02/2015    No acute cardiopulmonary process   • History of echocardiogram 04/04/2007    LVEF of greater than 60%. Mild MR.Mild TR.Trace pulmonary insufficinecy.   • History of mammogram    • History of PFTs 07/02/2015    Normal spirometry   • Hyperlipidemia    • Hypothyroidism    • Measles    • Migraine headache    • Mumps    • Osteoporosis    • Paroxysmal atrial fibrillation (CMS/HCC)     A. Failed medical therapy. B. Pulmonary vein isolation 11/2006. C. Recurrent episodes of PAF by event recorder 9/2006.   • Pneumonia    • Shortness of breath    • Sleep apnea    • Surfer's nodules of right foot    • Torn meniscus    • Varicella    • Whooping cough        Past Surgical History  Past Surgical History:   Procedure Laterality Date   • ABLATION OF DYSRHYTHMIC FOCUS      x 2   • CARDIAC ELECTROPHYSIOLOGY PROCEDURE N/A 8/4/2016    Procedure: Loop recorder implant;  Surgeon: Himanshu Calvert MD;  Location:  [facility];  Service:    • CARDIAC ELECTROPHYSIOLOGY PROCEDURE N/A 10/13/2016    Procedure: Loop recorder removal;  Surgeon: Himanshu Calvert MD;  Location:  [facility];  Service:    • CARDIAC ELECTROPHYSIOLOGY PROCEDURE N/A 12/18/2017    Procedure: Loop insertion;  Surgeon: Mary Ann Blackwell MD;  Location:  [facility];  Service:    • CHOLECYSTECTOMY     • COLONOSCOPY     • DILATATION AND CURETTAGE     • EAR TUBES Bilateral    • FOOT SURGERY      bone spur   • INGUINAL HERNIA REPAIR Left     x 3   • LUNG BIOPSY      Remote   • LYMPH NODE BIOPSY     • MOUTH SURGERY       WISDOM TEETH   • REPLACEMENT TOTAL KNEE BILATERAL Bilateral    • THYROIDECTOMY, PARTIAL     • TOTAL ABDOMINAL HYSTERECTOMY WITH SALPINGO OOPHORECTOMY     • TOTAL THYROIDECTOMY      completion thyroidectomy for follicular thyroid cancer.         Medications  has a current medication list which includes the following prescription(s): ALLERGY SERUM INJECTION, asmanex hfa, biotin, calcium carbonate, vitamin d3, cinnamon, coenzyme q10, pencream, flaxseed oil, garlic, glucosamine-msm, green tea (avelino sinensis), levocetirizine, levothyroxine, levothyroxine, tumersaid, mometasone furoate, fish oil, omeprazole, probiotic product, vitamin c, amoxicillin-clavulanate, and cranberry.    Allergies  Allergies   Allergen Reactions   • Niacin Hives   • Tegaderm Ag Mesh [Silver] Hives   • Darvon [Propoxyphene] Nausea And Vomiting   • Adhesive Tape Rash   • Albuterol Palpitations     FEELS WEIRD, INCREASED HR AND BREATHING   • Ciprodex [Ciprofloxacin-Dexamethasone] Other (See Comments)     REDNESS,tendonitis   • Ciprofloxacin Hcl Other (See Comments)     reddness,tendonitis   • Other Other (See Comments)     POLLEN,TREES,AND PLANTS MULTIPLE ENVIRONMENTAL ALLERGIES       Social History  Social History     Socioeconomic History   • Marital status:      Spouse name: Not on file   • Number of children: Not on file   • Years of education: Not on file   • Highest education level: Not on file   Tobacco Use   • Smoking status: Former Smoker     Years: 22.00     Last attempt to quit: 1996     Years since quittin.0   • Smokeless tobacco: Never Used   Substance and Sexual Activity   • Alcohol use: No     Comment: ONCE PER YEAR. Not excessive.   • Drug use: No   • Sexual activity: Defer       Family History  Family History   Problem Relation Age of Onset   • Atrial fibrillation Mother    • Thyroid disease Mother    • Early death Father 68   • Lung cancer Father    • Early death Sister 16   • Heart attack Sister    • Down  syndrome Sister    • Sleep apnea Brother         Nonorganic   • Other Brother         Palpitations (Symptom)       Review of Systems  Review of Systems  Positive for weight gain dry eyes hoarseness fast heart rate palpitations swollen ankles leg cramps shortness of breath wheezing dyspnea with exertion swollen joints frequent urination hematuria incontinence frequent heartburn sleep apnea easy bruising chronic pain negative in other categories.  Physical Exam  Physical Exam   Constitutional: She is oriented to person, place, and time. She appears well-developed and well-nourished.   HENT:   Head: Normocephalic.   Eyes: EOM are normal. Pupils are equal, round, and reactive to light.   Neck: Normal range of motion. Neck supple.   Cardiovascular: Normal rate and regular rhythm.   Pulmonary/Chest: Effort normal.   Abdominal: Soft. Bowel sounds are normal.   Genitourinary:             Neurological: She is alert and oriented to person, place, and time.   Skin: Skin is warm and dry.   Psychiatric: She has a normal mood and affect.       Labs recent and today in the office:  Results for orders placed or performed during the hospital encounter of 10/16/18   POC Creatinine   Result Value Ref Range    Creatinine 0.90 0.60 - 1.30 mg/dL      Female cystoscopy:     The patient is prepped and draped in the dorsal lithotomy position in a routine sterile fashion. The vulva and urethral meatus are inspected and found to be atrophic with urethral stenosis. The panendoscope is inserted in the bladder which is visualized with the Foroblique and 70 degree lenses and found to be free of foreign bodies and mucosal lesions. Ureteral orifices are normal location and effluxing clear urine bilaterally.  There is only 1 ounce of postvoid residual but if this progresses she will need urethral dilatation.  The bladder is distended to 500 mL which is mildly uncomfortable but drained and on reinspection there are no signs of interstitial cystitis.   The bladder neck itself is negative for polyps but the proximal urethra is the site of innumerable blood vessels and is a probable source of her microscopic hematuria.  Claudio test is grossly positive with even minimal urine in the bladder.    Assessment & Plan  Urinary incontinence: With her description of both urge and stress incontinence she is placed on Myrbetriq.  She has minimal prolapse from the bladder and only a little bit more from the rectum.  She certainly has classic stress incontinence with hypermobility of the bladder neck.  I would recommend Myrbetriq and if not improved will refer her to consider additional surgery    Atrophic vaginitis/urethritis: Severe atrophic changes with hypervascularity probably account for the microscopic hematuria.  I would strongly recommend a cath specimen be submitted for culture before subjecting her to additional antibiotics.  She needs to increase the application of Estrace vaginal cream to every other day.

## 2019-09-23 RX ORDER — CHOLECALCIFEROL (VITAMIN D3) 25 MCG
2500 TABLET,CHEWABLE ORAL 3 TIMES WEEKLY
Qty: 30 TABLET | Refills: 0 | Status: SHIPPED | OUTPATIENT
Start: 2019-09-23 | End: 2019-10-01 | Stop reason: SDUPTHER

## 2019-10-01 ENCOUNTER — OFFICE VISIT (OUTPATIENT)
Dept: CARDIOLOGY | Facility: CLINIC | Age: 64
End: 2019-10-01

## 2019-10-01 VITALS
OXYGEN SATURATION: 99 % | SYSTOLIC BLOOD PRESSURE: 122 MMHG | HEART RATE: 88 BPM | WEIGHT: 237 LBS | BODY MASS INDEX: 35.1 KG/M2 | HEIGHT: 69 IN | DIASTOLIC BLOOD PRESSURE: 72 MMHG

## 2019-10-01 DIAGNOSIS — Z95.818 STATUS POST PLACEMENT OF IMPLANTABLE LOOP RECORDER: ICD-10-CM

## 2019-10-01 DIAGNOSIS — R06.02 SOB (SHORTNESS OF BREATH): ICD-10-CM

## 2019-10-01 DIAGNOSIS — R00.2 PALPITATIONS: ICD-10-CM

## 2019-10-01 DIAGNOSIS — I49.3 PVCS (PREMATURE VENTRICULAR CONTRACTIONS): ICD-10-CM

## 2019-10-01 DIAGNOSIS — I48.0 PAROXYSMAL ATRIAL FIBRILLATION (HCC): Primary | Chronic | ICD-10-CM

## 2019-10-01 PROCEDURE — 93291 INTERROG DEV EVAL SCRMS IP: CPT | Performed by: NURSE PRACTITIONER

## 2019-10-01 PROCEDURE — 99213 OFFICE O/P EST LOW 20 MIN: CPT | Performed by: NURSE PRACTITIONER

## 2019-10-01 RX ORDER — LEVOTHYROXINE SODIUM 0.15 MG/1
TABLET ORAL
COMMUNITY
End: 2020-09-11 | Stop reason: DRUGHIGH

## 2019-10-01 NOTE — PROGRESS NOTES
"Al[place]ia Ramos is a 64 y.o. female.  MRN #: [id number]    Referring Provider: Gordon Norman MD    Chief Complaint:   Chief Complaint   Patient presents with   • Follow-up   • Atrial Fibrillation        History of Present Illness:  Patient is a 64-year-old female that presents for follow-up and for loop recorder check.  Patient does have history of paroxysmal atrial fibrillation, she is undergone 2 ablation procedures.  She continued to have palpitation type symptoms with vertigo and near syncopal symptoms.  Patient has had loop recorder in for close to 2 years.  Patient expresses her desire to have her loop recorder removed and to be reevaluated by another cardiologist for more definitive treatment related to her atrial fibrillation.  She also voices concern regarding not being on a \"blood thinner\" with her potential for atrial fibrillation.    The patient presents today with their self who contributes to the history of their care.     The following portions of the patient's history were reviewed and updated as appropriate: allergies, current medications, past family history, past medical history, past social history, past surgical history and problem list.     Review of Systems:     Review of Systems   Constitutional: Negative for activity change, appetite change, diaphoresis, fatigue, unexpected weight gain and unexpected weight loss.   HENT: Negative.    Eyes: Negative.  Negative for blurred vision, double vision and visual disturbance.   Respiratory: Negative.  Negative for apnea, chest tightness, shortness of breath and wheezing.    Cardiovascular: Positive for palpitations. Negative for chest pain and leg swelling.        History of paroxysmal atrial fibrillation and presently with a loop recorder.   Gastrointestinal: Negative.  Negative for abdominal distention, abdominal pain, nausea, vomiting, GERD and indigestion.   Endocrine: Negative.    Genitourinary: Negative.  Negative for decreased libido and " hematuria.   Musculoskeletal: Negative.  Negative for back pain, joint swelling and neck pain.   Skin: Negative.  Negative for pallor and bruise.   Allergic/Immunologic: Negative.    Neurological: Positive for dizziness. Negative for tremors, syncope, facial asymmetry, speech difficulty, weakness, light-headedness, numbness, headache, memory problem and confusion.   Hematological: Negative.  Does not bruise/bleed easily.   Psychiatric/Behavioral: Negative.  Negative for agitation, sleep disturbance, negative for hyperactivity and stress. The patient is not nervous/anxious.    All other systems reviewed and are negative.         Current Outpatient Medications:   •  ALLERGY SERUM INJECTION, Inject  under the skin 1 (One) Time., Disp: , Rfl:   •  calcium carbonate (OS-SHANTE) 600 MG tablet, Take 600 mg by mouth 2 (two) times a day with meals., Disp: , Rfl:   •  Cinnamon ([employer] CINNAMON) 500 MG capsule, Take 500 mg by mouth 2 (two) times a day., Disp: , Rfl:   •  CRANBERRY CONCENTRATE PO, Take 4,200 mg by mouth 2 (two) times a day., Disp: , Rfl:   •  Garlic 500 MG capsule, Take 1 tablet by mouth 2 (two) times a day., Disp: , Rfl:   •  levothyroxine (SYNTHROID, LEVOTHROID) 150 MCG tablet, levothyroxine 150 mcg tablet  175 mcg, Disp: , Rfl:   •  Omega-3 Fatty Acids (FISH OIL) 435 MG capsule, Take 1,000 mg by mouth daily., Disp: , Rfl:   •  omeprazole (priLOSEC) 20 MG capsule, TAKE ONE CAPSULE BY MOUTH TWICE A DAY, Disp: 180 capsule, Rfl: 1  •  Probiotic Product (SOLUBLE FIBER/PROBIOTICS PO), Take 1 tablet by mouth daily., Disp: , Rfl:   •  vitamin C (ASCORBIC ACID) 500 MG tablet, Take 1 tablet by mouth daily., Disp: , Rfl:   •  Mirabegron ER (MYRBETRIQ) 50 MG tablet sustained-release 24 hour 24 hr tablet, Take 50 mg by mouth Daily., Disp: 30 tablet, Rfl: 11    Vitals:    10/01/19 1428   BP: 122/72   BP Location: Left arm   Patient Position: Sitting   Cuff Size: Adult   Pulse: 88   SpO2: 99%   Weight: 108 kg (237 lb)   Height:  "175.3 cm (69.02\")       Physical Exam:     Physical Exam   Constitutional: She is oriented to person, place, and time. She appears well-developed and well-nourished. No distress.   HENT:   Head: Normocephalic and atraumatic.   Eyes: Conjunctivae are normal. Pupils are equal, round, and reactive to light. No scleral icterus.   Neck: Normal range of motion. Neck supple. No JVD present. No thyromegaly present.   Cardiovascular: Normal rate, regular rhythm, S1 normal, S2 normal, normal heart sounds and intact distal pulses. PMI is not displaced. Exam reveals no gallop and no friction rub.   No murmur heard.  Pulses:       Carotid pulses are 1+ on the right side, and 1+ on the left side.  Pulmonary/Chest: Effort normal and breath sounds normal. No respiratory distress. She has no wheezes. She has no rales. She exhibits no tenderness.   Abdominal: Soft. Bowel sounds are normal. She exhibits no distension and no mass. There is no tenderness.   Musculoskeletal: Normal range of motion. She exhibits no edema.   Neurological: She is alert and oriented to person, place, and time.   Skin: Skin is warm and dry. Capillary refill takes less than 2 seconds. No rash noted. She is not diaphoretic.   Psychiatric: She has a normal mood and affect. Her behavior is normal. Judgment and thought content normal.   Nursing note and vitals reviewed.      Procedures    Results:   Loop recorder showed one event of accelerated atrial fibrillation that lasted approximately 6 minutes.  Otherwise, normal sinus rhythm.    Assessment/Plan:   Advised patient that it would be wise to take an enteric-coated 81 mg aspirin daily.  Patient has follow-up with  in 1 month for reevaluation and for consultation regarding removal of her loop recorder.  [place] was seen today for follow-up and atrial fibrillation.    Diagnoses and all orders for this visit:    Paroxysmal atrial fibrillation (CMS/HCC)    Palpitations    PVCs (premature ventricular " contractions)    SOB (shortness of breath)        Return in about 1 month (around 11/1/2019).    Félix Aldridge, APRN

## 2019-10-02 ENCOUNTER — TELEPHONE (OUTPATIENT)
Dept: CARDIOLOGY | Facility: CLINIC | Age: 64
End: 2019-10-02

## 2019-10-02 DIAGNOSIS — Z45.09 ENCOUNTER FOR LOOP RECORDER AT END OF BATTERY LIFE: Primary | ICD-10-CM

## 2019-10-02 NOTE — TELEPHONE ENCOUNTER
Patient called to setup a loop recorder removal due to battery depletion.  The device was put in by Dr. Blackwell on 12/18/17.  She follows with [facility] in [place].  The last note on 10/1/2019 states patient to f/u with Dr. Chatterjee for further evaluation and possible removal.    I called the patient and she just wants to have it removed.  Okay to place the order?  Please advise.

## 2019-10-07 ENCOUNTER — OFFICE VISIT (OUTPATIENT)
Dept: INTERNAL MEDICINE | Facility: CLINIC | Age: 64
End: 2019-10-07

## 2019-10-07 VITALS
WEIGHT: 249 LBS | HEIGHT: 69 IN | BODY MASS INDEX: 36.88 KG/M2 | TEMPERATURE: 98.5 F | SYSTOLIC BLOOD PRESSURE: 149 MMHG | RESPIRATION RATE: 16 BRPM | OXYGEN SATURATION: 98 % | HEART RATE: 70 BPM | DIASTOLIC BLOOD PRESSURE: 63 MMHG

## 2019-10-07 DIAGNOSIS — E03.9 ACQUIRED HYPOTHYROIDISM: ICD-10-CM

## 2019-10-07 DIAGNOSIS — Z00.00 ROUTINE GENERAL MEDICAL EXAMINATION AT A HEALTH CARE FACILITY: Primary | ICD-10-CM

## 2019-10-07 DIAGNOSIS — Z78.0 POSTMENOPAUSAL: ICD-10-CM

## 2019-10-07 DIAGNOSIS — M19.90 ARTHRITIS: ICD-10-CM

## 2019-10-07 DIAGNOSIS — R53.83 FATIGUE, UNSPECIFIED TYPE: ICD-10-CM

## 2019-10-07 PROCEDURE — 99396 PREV VISIT EST AGE 40-64: CPT | Performed by: INTERNAL MEDICINE

## 2019-10-07 RX ORDER — VITAMIN B COMPLEX
2500 TABLET ORAL 3 TIMES WEEKLY
COMMUNITY
End: 2019-11-26 | Stop reason: SDUPTHER

## 2019-10-07 NOTE — PROGRESS NOTES
Subjective     Patient ID: [place]ia Ramos is a 64 y.o. female. Patient is here for management of multiple medical problems.     Chief Complaint   Patient presents with   • Hypothyroidism     follow-up, patient taking Levothyroxine 188 mcg and 175 mcg alternating doses, patient needs refills on 100 mcg, 88 mcg and 175 mcg  sent to [facility], and Vitamin B 12   • Allergies     patient taking allergy injections     History of Present Illness     Hypothyroid.   Pt has using 175 mcg 5 days and 188 mcg 2 days a week of thyroid meds.  Seems to be feeling well.      AR. Pt on weekly inj.  Pt sees Dr. Arce and very satisfied. Doing well.      Also needs annual PE.   Pap is set with gyn.    Hx of arthritis with acute worsening pas 7 month. Live in [facility].       The following portions of the patient's history were reviewed and updated as appropriate: allergies, current medications, past family history, past medical history, past social history, past surgical history and problem list.    Review of Systems   Constitutional: Positive for fatigue.   Respiratory: Negative for cough, choking and chest tightness.    Genitourinary: Negative for decreased urine volume and vaginal bleeding.   Musculoskeletal: Negative for arthralgias and back pain.   Psychiatric/Behavioral: Negative for self-injury and sleep disturbance. The patient is not nervous/anxious and is not hyperactive.    All other systems reviewed and are negative.      Current Outpatient Medications:   •  ALLERGY SERUM INJECTION, Inject  under the skin 1 (One) Time., Disp: , Rfl:   •  calcium carbonate (OS-SHANTE) 600 MG tablet, Take 600 mg by mouth 2 (two) times a day with meals., Disp: , Rfl:   •  Cinnamon ([employer] CINNAMON) 500 MG capsule, Take 500 mg by mouth 2 (two) times a day., Disp: , Rfl:   •  CRANBERRY CONCENTRATE PO, Take 4,200 mg by mouth 2 (two) times a day., Disp: , Rfl:   •  Cyanocobalamin 2500 MCG sublingual tablet, Place 2,500 mcg under the tongue 3 (Three)  "Times a Week., Disp: , Rfl:   •  Garlic 500 MG capsule, Take 1 tablet by mouth 2 (two) times a day., Disp: , Rfl:   •  levothyroxine (SYNTHROID, LEVOTHROID) 150 MCG tablet, levothyroxine 150 mcg tablet  175 mcg, Disp: , Rfl:   •  Mirabegron ER (MYRBETRIQ) 50 MG tablet sustained-release 24 hour 24 hr tablet, Take 50 mg by mouth Daily., Disp: 30 tablet, Rfl: 11  •  Omega-3 Fatty Acids (FISH OIL) 435 MG capsule, Take 1,000 mg by mouth daily., Disp: , Rfl:   •  omeprazole (priLOSEC) 20 MG capsule, TAKE ONE CAPSULE BY MOUTH TWICE A DAY, Disp: 180 capsule, Rfl: 1  •  Probiotic Product (SOLUBLE FIBER/PROBIOTICS PO), Take 1 tablet by mouth daily., Disp: , Rfl:   •  vitamin C (ASCORBIC ACID) 500 MG tablet, Take 1 tablet by mouth daily., Disp: , Rfl:     Objective      Blood pressure 149/63, pulse 70, temperature 98.5 °F (36.9 °C), temperature source Oral, resp. rate 16, height 175.3 cm (69\"), weight 113 kg (249 lb), SpO2 98 %.    Physical Exam     General Appearance:    Alert, cooperative, no distress, appears stated age   Head:    Normocephalic, without obvious abnormality, atraumatic   Eyes:    PERRL, conjunctiva/corneas clear, EOM's intact   Ears:    Normal TM's and external ear canals, both ears   Nose:   Nares normal, septum midline, mucosa normal, no drainage   or sinus tenderness   Throat:   Lips, mucosa, and tongue normal; teeth and gums normal   Neck:   Supple, symmetrical, trachea midline, no adenopathy;        thyroid:  No enlargement/tenderness/nodules; no carotid    bruit or JVD   Back:     Symmetric, no curvature, ROM normal, no CVA tenderness   Lungs:     Clear to auscultation bilaterally, respirations unlabored   Chest wall:    No tenderness or deformity   Heart:    Regular rate and rhythm, S1 and S2 normal, no murmur,        rub or gallop   Abdomen:     Soft, non-tender, bowel sounds active all four quadrants,     no masses, no organomegaly   Extremities:  joint deformities in left 5 th fingger. "   Extremities normal, atraumatic, no cyanosis or edema   Pulses:   2+ and symmetric all extremities   Skin:   Skin color, texture, turgor normal, no rashes or lesions   Lymph nodes:   Cervical, supraclavicular, and axillary nodes normal   Neurologic:   CNII-XII intact. Normal strength, sensation and reflexes       throughout      Results for orders placed or performed in visit on 07/01/19   POC Urinalysis Dipstick, Automated   Result Value Ref Range    Color Yellow Yellow, Straw, Dark Yellow, Kelle    Clarity, UA Clear Clear    Specific Gravity  1.005 1.005 - 1.030    pH, Urine 6.0 5.0 - 8.0    Leukocytes Negative Negative    Nitrite, UA Negative Negative    Protein, POC Negative Negative mg/dL    Glucose, UA Negative Negative, 1000 mg/dL (3+) mg/dL    Ketones, UA Negative Negative    Urobilinogen, UA Normal Normal    Bilirubin Negative Negative    Blood, UA Trace (A) Negative         Assessment/Plan   NSR today.    Diet not going well. Wt up.  Using artificial seatners. Will stop and stop sweat tea.    Exercise limitied due to joint pain    Flu vac done.      Wearing seatbelts.    Hx or RA and on meds that caused bleeding ulcer.      [place] was seen today for hypothyroidism and allergies.    Diagnoses and all orders for this visit:    Routine general medical examination at a health care facility  -     DEXA Bone Density Axial  -     Comprehensive Metabolic Panel  -     Vitamin B12  -     CBC & Differential  -     Lipid Panel  -     TSH  -     T4, Free  -     [place] Spotted Fever, IgM  -     [place] Spotted Fever, IgG  -     Lyme Disease, PCR  -     Ehrlichia Antibody Panel  -     Cyclic Citrul Peptide Antibody, IgG / IgA  -     Rheumatoid Factor  -     Sedimentation Rate  -     Uric Acid  -     C-reactive Protein  -     Antistreptolysin O Titer    Acquired hypothyroidism  -     Comprehensive Metabolic Panel  -     Vitamin B12  -     CBC & Differential  -     Lipid Panel  -     TSH  -     T4, Free  -      [place] Spotted Fever, IgM  -     [place] Spotted Fever, IgG  -     Lyme Disease, PCR  -     Ehrlichia Antibody Panel  -     Cyclic Citrul Peptide Antibody, IgG / IgA  -     Rheumatoid Factor  -     Sedimentation Rate  -     Uric Acid  -     C-reactive Protein  -     Antistreptolysin O Titer    Postmenopausal  -     DEXA Bone Density Axial  -     Comprehensive Metabolic Panel  -     Vitamin B12  -     CBC & Differential  -     Lipid Panel  -     TSH  -     T4, Free  -     [place] Spotted Fever, IgM  -     [place] Spotted Fever, IgG  -     Lyme Disease, PCR  -     Ehrlichia Antibody Panel  -     Cyclic Citrul Peptide Antibody, IgG / IgA  -     Rheumatoid Factor  -     Sedimentation Rate  -     Uric Acid  -     C-reactive Protein  -     Antistreptolysin O Titer    Arthritis  -     Comprehensive Metabolic Panel  -     Vitamin B12  -     CBC & Differential  -     Lipid Panel  -     TSH  -     T4, Free  -     [place] Spotted Fever, IgM  -     [place] Spotted Fever, IgG  -     Lyme Disease, PCR  -     Ehrlichia Antibody Panel  -     Cyclic Citrul Peptide Antibody, IgG / IgA  -     Rheumatoid Factor  -     Sedimentation Rate  -     Uric Acid  -     C-reactive Protein  -     Antistreptolysin O Titer    Fatigue, unspecified type  -     Comprehensive Metabolic Panel  -     Vitamin B12  -     CBC & Differential  -     Lipid Panel  -     TSH  -     T4, Free  -     [place] Spotted Fever, IgM  -     [place] Spotted Fever, IgG  -     Lyme Disease, PCR  -     Ehrlichia Antibody Panel  -     Cyclic Citrul Peptide Antibody, IgG / IgA  -     Rheumatoid Factor  -     Sedimentation Rate  -     Uric Acid  -     C-reactive Protein  -     Antistreptolysin O Titer      Return in about 6 weeks (around 11/18/2019).          There are no Patient Instructions on file for this visit.     Gordon Norman MD    Assessment/Plan

## 2019-10-08 ENCOUNTER — APPOINTMENT (OUTPATIENT)
Dept: BONE DENSITY | Facility: HOSPITAL | Age: 64
End: 2019-10-08

## 2019-10-08 PROCEDURE — 77080 DXA BONE DENSITY AXIAL: CPT

## 2019-10-22 ENCOUNTER — TRANSCRIBE ORDERS (OUTPATIENT)
Dept: ADMINISTRATIVE | Facility: HOSPITAL | Age: 64
End: 2019-10-22

## 2019-10-22 ENCOUNTER — OFFICE VISIT (OUTPATIENT)
Dept: OBSTETRICS AND GYNECOLOGY | Facility: CLINIC | Age: 64
End: 2019-10-22

## 2019-10-22 VITALS
BODY MASS INDEX: 37.03 KG/M2 | HEIGHT: 69 IN | DIASTOLIC BLOOD PRESSURE: 72 MMHG | WEIGHT: 250 LBS | SYSTOLIC BLOOD PRESSURE: 130 MMHG

## 2019-10-22 DIAGNOSIS — Z12.72 VAGINAL PAP SMEAR: ICD-10-CM

## 2019-10-22 DIAGNOSIS — Z01.419 ENCOUNTER FOR GYNECOLOGICAL EXAMINATION WITHOUT ABNORMAL FINDING: Primary | ICD-10-CM

## 2019-10-22 DIAGNOSIS — Z12.31 SCREENING MAMMOGRAM FOR HIGH-RISK PATIENT: Primary | ICD-10-CM

## 2019-10-22 PROCEDURE — 99396 PREV VISIT EST AGE 40-64: CPT | Performed by: PHYSICIAN ASSISTANT

## 2019-10-22 RX ORDER — ESTRADIOL 0.1 MG/G
2 CREAM VAGINAL DAILY
COMMUNITY
End: 2021-11-16 | Stop reason: SDUPTHER

## 2019-10-22 RX ORDER — ALLOPURINOL 100 MG/1
100 TABLET ORAL DAILY
Qty: 90 TABLET | Refills: 3 | Status: SHIPPED | OUTPATIENT
Start: 2019-10-22 | End: 2019-12-04 | Stop reason: SDUPTHER

## 2019-10-22 NOTE — PROGRESS NOTES
Subjective   Chief Complaint   Patient presents with   • Gynecologic Exam     Last pap done in , MMG is due , No complaints       [place] Ramos is a 64 y.o. year old  presenting to be seen for her annual gynecological exam.   She has no complaints or concerns  Previous hysterectomy and bilateral salpingo-oophorectomy  Her screening mammogram is due the end of November  Recently had normal DEXA with pcp        Past Medical History:   Diagnosis Date   • Abdominal pain    • Abnormal ECG    • Acute sinusitis    • Allergic    • Ankle joint pain    • Arthritis    • Asthma    • Asthma    • Atrial fibrillation (CMS/HCC)    • Bronchitis    • Chronic bronchitis (CMS/HCC)    • CPAP (continuous positive airway pressure) dependence    • Degenerative joint disease    • Depression    • Diverticulosis    • Dizziness     Has had some episodes. No blake syncope.2007 patient underwent pulmonary vein isolation, initially successful.Patient returned, however in February noting some recurrent episode of dizziness.Then wore wore event recorder which showed some recurrent PAF.   • Dysuria    • Easy bruising    • Follicular thyroid cancer (CMS/HCC)     treated with total thyroidectomy followed by [facility]   • GERD (gastroesophageal reflux disease)    • H/O bone density study    • Hay fever    • History of chest x-ray 07/10/2015    No acute cardiopulmonary process   • History of chest x-ray 2015    No acute cardiopulmonary process   • History of echocardiogram 2007    LVEF of greater than 60%. Mild MR.Mild TR.Trace pulmonary insufficinecy.   • History of mammogram    • History of PFTs 2015    Normal spirometry   • Hyperlipidemia    • Hypothyroidism    • Measles    • Migraine headache    • Mumps    • Osteoporosis    • Paroxysmal atrial fibrillation (CMS/HCC)     A. Failed medical therapy. B. Pulmonary vein isolation 2006. C. Recurrent episodes of PAF by event recorder 2006.   • Pneumonia    • Shortness of  breath    • Sleep apnea    • Surfer's nodules of right foot    • Torn meniscus    • Varicella    • Whooping cough         Current Outpatient Medications:   •  ALLERGY SERUM INJECTION, Inject  under the skin 1 (One) Time., Disp: , Rfl:   •  allopurinol (ZYLOPRIM) 100 MG tablet, Take 1 tablet by mouth Daily., Disp: 90 tablet, Rfl: 3  •  calcium carbonate (OS-SHANTE) 600 MG tablet, Take 600 mg by mouth 2 (two) times a day with meals., Disp: , Rfl:   •  Cinnamon ([employer] CINNAMON) 500 MG capsule, Take 500 mg by mouth 2 (two) times a day., Disp: , Rfl:   •  CRANBERRY CONCENTRATE PO, Take 4,200 mg by mouth 2 (two) times a day., Disp: , Rfl:   •  Cyanocobalamin 2500 MCG sublingual tablet, Place 2,500 mcg under the tongue 3 (Three) Times a Week., Disp: , Rfl:   •  estradiol (ESTRACE VAGINAL) 0.1 MG/GM vaginal cream, Insert 2 g into the vagina Daily., Disp: , Rfl:   •  Garlic 500 MG capsule, Take 1 tablet by mouth 2 (two) times a day., Disp: , Rfl:   •  levothyroxine (SYNTHROID, LEVOTHROID) 150 MCG tablet, levothyroxine 150 mcg tablet  175 mcg, Disp: , Rfl:   •  Mirabegron ER (MYRBETRIQ) 50 MG tablet sustained-release 24 hour 24 hr tablet, Take 50 mg by mouth Daily., Disp: 30 tablet, Rfl: 11  •  Omega-3 Fatty Acids (FISH OIL) 435 MG capsule, Take 1,000 mg by mouth daily., Disp: , Rfl:   •  omeprazole (priLOSEC) 20 MG capsule, TAKE ONE CAPSULE BY MOUTH TWICE A DAY, Disp: 180 capsule, Rfl: 1  •  Probiotic Product (SOLUBLE FIBER/PROBIOTICS PO), Take 1 tablet by mouth daily., Disp: , Rfl:   •  vitamin C (ASCORBIC ACID) 500 MG tablet, Take 1 tablet by mouth daily., Disp: , Rfl:    Allergies   Allergen Reactions   • Niacin Hives   • Adhesive Tape Rash   • Albuterol Palpitations     FEELS WEIRD, INCREASED HR AND BREATHING   • Ciprodex [Ciprofloxacin-Dexamethasone] Other (See Comments)     REDNESS,tendonitis   • Ciprofloxacin Hcl Other (See Comments)     reddness,tendonitis   • Other Other (See Comments)     POLLEN,TREES,AND PLANTS  MULTIPLE ENVIRONMENTAL ALLERGIES      Past Surgical History:   Procedure Laterality Date   • ABLATION OF DYSRHYTHMIC FOCUS      x 2   • CARDIAC ELECTROPHYSIOLOGY PROCEDURE N/A 2016    Procedure: Loop recorder implant;  Surgeon: Himanshu Calvert MD;  Location:  [facility];  Service:    • CARDIAC ELECTROPHYSIOLOGY PROCEDURE N/A 10/13/2016    Procedure: Loop recorder removal;  Surgeon: Himanshu Calvert MD;  Location:  [facility];  Service:    • CARDIAC ELECTROPHYSIOLOGY PROCEDURE N/A 2017    Procedure: Loop insertion;  Surgeon: Mary Ann Blackwell MD;  Location:  [facility];  Service:    • CHOLECYSTECTOMY     • COLONOSCOPY     • DILATATION AND CURETTAGE     • EAR TUBES Bilateral    • FOOT SURGERY      bone spur   • INGUINAL HERNIA REPAIR Left     x 3   • LUNG BIOPSY      Remote   • LYMPH NODE BIOPSY     • MOUTH SURGERY      WISDOM TEETH   • REPLACEMENT TOTAL KNEE BILATERAL Bilateral    • THYROIDECTOMY, PARTIAL     • TOTAL ABDOMINAL HYSTERECTOMY WITH SALPINGO OOPHORECTOMY     • TOTAL THYROIDECTOMY      completion thyroidectomy for follicular thyroid cancer.        Social History     Socioeconomic History   • Marital status:      Spouse name: Not on file   • Number of children: Not on file   • Years of education: Not on file   • Highest education level: Not on file   Tobacco Use   • Smoking status: Former Smoker     Years: 22.00     Last attempt to quit: 1996     Years since quittin.3   • Smokeless tobacco: Never Used   Substance and Sexual Activity   • Alcohol use: No     Comment: ONCE PER YEAR. Not excessive.   • Drug use: No   • Sexual activity: Defer     Birth control/protection: Surgical      Family History   Problem Relation Age of Onset   • Atrial fibrillation Mother    • Thyroid disease Mother    • Early death Father 68   • Lung cancer Father    • Early death Sister 16   • Heart attack Sister    • Down syndrome Sister    • Sleep apnea  "Brother         Nonorganic   • Other Brother         Palpitations (Symptom)       Review of Systems   Constitutional: Negative.    Gastrointestinal: Negative.    Genitourinary: Negative for difficulty urinating, dysuria and pelvic pain.           Objective   /72   Ht 175.3 cm (69\")   Wt 113 kg (250 lb)   LMP  (LMP Unknown)   Breastfeeding? No   BMI 36.92 kg/m²     Physical Exam   Constitutional: She appears well-developed and well-nourished. She is cooperative.   Pulmonary/Chest: Right breast exhibits no inverted nipple, no mass, no nipple discharge, no skin change and no tenderness. Left breast exhibits no inverted nipple, no mass, no nipple discharge, no skin change and no tenderness.   Abdominal: Soft. Normal appearance. There is no tenderness.   Genitourinary: Vagina normal. There is no tenderness or lesion on the right labia. There is no tenderness or lesion on the left labia. Right adnexum displays no mass and no tenderness. Left adnexum displays no mass and no tenderness.   Genitourinary Comments: Pap done   Neurological: She is alert.   Skin: Skin is warm and dry.   Psychiatric: She has a normal mood and affect. Her behavior is normal.            Assessment and Plan  [place] was seen today for gynecologic exam.    Diagnoses and all orders for this visit:    Encounter for gynecological examination without abnormal finding    Vaginal Pap smear  -     Liquid-based Pap Smear, Screening; Future      Patient Instructions   Self breast exam monthly  Annual mammogram  Calcium 1200 mg daily and vit D 2000 mg daily  Regular exercise  If pap normal will not need future pap smears             This note was electronically signed.    Carmenza Campuzano PA-C   October 22, 2019  "

## 2019-10-22 NOTE — PATIENT INSTRUCTIONS
Self breast exam monthly  Annual mammogram  Calcium 1200 mg daily and vit D 2000 mg daily  Regular exercise  If pap normal will not need future pap smears

## 2019-10-22 NOTE — PROGRESS NOTES
Please let them know some of the labs back. Uric acid elevated. Start allopurinol. This may help the arthritis.

## 2019-10-23 LAB
A PHAGOCYTOPH IGG TITR SER IF: NEGATIVE {TITER}
A PHAGOCYTOPH IGM TITR SER IF: NEGATIVE {TITER}
ALBUMIN SERPL-MCNC: 4.5 G/DL (ref 3.5–5.2)
ALBUMIN/GLOB SERPL: 2.1 G/DL
ALP SERPL-CCNC: 91 U/L (ref 39–117)
ALT SERPL-CCNC: 25 U/L (ref 1–33)
ASO AB SERPL-ACNC: 68 IU/ML (ref 0–200)
AST SERPL-CCNC: 20 U/L (ref 1–32)
B BURGDOR DNA SPEC QL NAA+PROBE: NEGATIVE
BASOPHILS # BLD AUTO: 0.04 10*3/MM3 (ref 0–0.2)
BASOPHILS NFR BLD AUTO: 0.7 % (ref 0–1.5)
BILIRUB SERPL-MCNC: 0.3 MG/DL (ref 0.2–1.2)
BUN SERPL-MCNC: 15 MG/DL (ref 8–23)
BUN/CREAT SERPL: 20.8 (ref 7–25)
CALCIUM SERPL-MCNC: 9.5 MG/DL (ref 8.6–10.5)
CCP IGA+IGG SERPL IA-ACNC: 12 UNITS (ref 0–19)
CHLORIDE SERPL-SCNC: 104 MMOL/L (ref 98–107)
CHOLEST SERPL-MCNC: 204 MG/DL (ref 0–200)
CO2 SERPL-SCNC: 30.4 MMOL/L (ref 22–29)
CREAT SERPL-MCNC: 0.72 MG/DL (ref 0.57–1)
CRP SERPL-MCNC: 0.38 MG/DL (ref 0–0.5)
E CHAFFEENSIS IGG TITR SER IF: NEGATIVE {TITER}
E CHAFFEENSIS IGM TITR SER IF: NEGATIVE {TITER}
EOSINOPHIL # BLD AUTO: 0.26 10*3/MM3 (ref 0–0.4)
EOSINOPHIL NFR BLD AUTO: 4.3 % (ref 0.3–6.2)
ERYTHROCYTE [DISTWIDTH] IN BLOOD BY AUTOMATED COUNT: 12.9 % (ref 12.3–15.4)
ERYTHROCYTE [SEDIMENTATION RATE] IN BLOOD BY WESTERGREN METHOD: 21 MM/HR (ref 0–30)
GLOBULIN SER CALC-MCNC: 2.1 GM/DL
GLUCOSE SERPL-MCNC: 97 MG/DL (ref 65–99)
HCT VFR BLD AUTO: 40 % (ref 34–46.6)
HDLC SERPL-MCNC: 59 MG/DL (ref 40–60)
HGB BLD-MCNC: 13.3 G/DL (ref 12–15.9)
IMM GRANULOCYTES # BLD AUTO: 0.02 10*3/MM3 (ref 0–0.05)
IMM GRANULOCYTES NFR BLD AUTO: 0.3 % (ref 0–0.5)
LDLC SERPL CALC-MCNC: 117 MG/DL (ref 0–100)
LYMPHOCYTES # BLD AUTO: 1.99 10*3/MM3 (ref 0.7–3.1)
LYMPHOCYTES NFR BLD AUTO: 32.9 % (ref 19.6–45.3)
MCH RBC QN AUTO: 28.8 PG (ref 26.6–33)
MCHC RBC AUTO-ENTMCNC: 33.3 G/DL (ref 31.5–35.7)
MCV RBC AUTO: 86.6 FL (ref 79–97)
MONOCYTES # BLD AUTO: 0.43 10*3/MM3 (ref 0.1–0.9)
MONOCYTES NFR BLD AUTO: 7.1 % (ref 5–12)
NEUTROPHILS # BLD AUTO: 3.3 10*3/MM3 (ref 1.7–7)
NEUTROPHILS NFR BLD AUTO: 54.7 % (ref 42.7–76)
NRBC BLD AUTO-RTO: 0 /100 WBC (ref 0–0.2)
PLATELET # BLD AUTO: 247 10*3/MM3 (ref 140–450)
POTASSIUM SERPL-SCNC: 4.5 MMOL/L (ref 3.5–5.2)
PROT SERPL-MCNC: 6.6 G/DL (ref 6–8.5)
R RICKETTSI IGG SER QL IA: POSITIVE
R RICKETTSI IGG TITR SER IF: ABNORMAL {TITER}
R RICKETTSI IGM SER-ACNC: 0.31 INDEX (ref 0–0.89)
RBC # BLD AUTO: 4.62 10*6/MM3 (ref 3.77–5.28)
RHEUMATOID FACT SERPL-ACNC: <10 IU/ML (ref 0–13.9)
SODIUM SERPL-SCNC: 145 MMOL/L (ref 136–145)
T4 FREE SERPL-MCNC: 1.74 NG/DL (ref 0.93–1.7)
TRIGL SERPL-MCNC: 141 MG/DL (ref 0–150)
TSH SERPL DL<=0.005 MIU/L-ACNC: 0.14 UIU/ML (ref 0.27–4.2)
URATE SERPL-MCNC: 6.1 MG/DL (ref 2.4–5.7)
VIT B12 SERPL-MCNC: 1296 PG/ML (ref 211–946)
VLDLC SERPL CALC-MCNC: 28.2 MG/DL
WBC # BLD AUTO: 6.04 10*3/MM3 (ref 3.4–10.8)

## 2019-10-24 NOTE — PROGRESS NOTES
Please let them know the rmsf titers is mild +. And uric acid elevated. rtc to discuss treatment options.

## 2019-10-30 DIAGNOSIS — Z12.72 VAGINAL PAP SMEAR: ICD-10-CM

## 2019-10-31 PROBLEM — Z45.09 ENCOUNTER FOR LOOP RECORDER AT END OF BATTERY LIFE: Status: ACTIVE | Noted: 2019-10-31

## 2019-11-01 ENCOUNTER — PREP FOR SURGERY (OUTPATIENT)
Dept: OTHER | Facility: HOSPITAL | Age: 64
End: 2019-11-01

## 2019-11-01 DIAGNOSIS — Z45.09 ENCOUNTER FOR LOOP RECORDER AT END OF BATTERY LIFE: Primary | ICD-10-CM

## 2019-11-01 RX ORDER — CEFAZOLIN SODIUM 2 G/100ML
2 INJECTION, SOLUTION INTRAVENOUS
Status: CANCELLED | OUTPATIENT
Start: 2019-11-01

## 2019-11-01 RX ORDER — SODIUM CHLORIDE 0.9 % (FLUSH) 0.9 %
3 SYRINGE (ML) INJECTION EVERY 12 HOURS SCHEDULED
Status: CANCELLED | OUTPATIENT
Start: 2019-11-01

## 2019-11-01 RX ORDER — NITROGLYCERIN 0.4 MG/1
0.4 TABLET SUBLINGUAL
Status: CANCELLED | OUTPATIENT
Start: 2019-11-01

## 2019-11-01 RX ORDER — SODIUM CHLORIDE 0.9 % (FLUSH) 0.9 %
10 SYRINGE (ML) INJECTION AS NEEDED
Status: CANCELLED | OUTPATIENT
Start: 2019-11-01

## 2019-11-01 RX ORDER — ACETAMINOPHEN 325 MG/1
650 TABLET ORAL EVERY 4 HOURS PRN
Status: CANCELLED | OUTPATIENT
Start: 2019-11-01

## 2019-11-01 RX ORDER — ONDANSETRON 2 MG/ML
4 INJECTION INTRAMUSCULAR; INTRAVENOUS EVERY 6 HOURS PRN
Status: CANCELLED | OUTPATIENT
Start: 2019-11-01

## 2019-11-05 ENCOUNTER — TRANSCRIBE ORDERS (OUTPATIENT)
Dept: ADMINISTRATIVE | Facility: HOSPITAL | Age: 64
End: 2019-11-05

## 2019-11-05 DIAGNOSIS — I48.91 ATRIAL FIBRILLATION, UNSPECIFIED TYPE (HCC): Primary | ICD-10-CM

## 2019-11-06 ENCOUNTER — HOSPITAL ENCOUNTER (OUTPATIENT)
Facility: HOSPITAL | Age: 64
Setting detail: HOSPITAL OUTPATIENT SURGERY
Discharge: HOME | End: 2019-11-06
Attending: INTERNAL MEDICINE | Admitting: INTERNAL MEDICINE

## 2019-11-06 VITALS
DIASTOLIC BLOOD PRESSURE: 74 MMHG | RESPIRATION RATE: 18 BRPM | BODY MASS INDEX: 36.88 KG/M2 | OXYGEN SATURATION: 99 % | TEMPERATURE: 97.6 F | SYSTOLIC BLOOD PRESSURE: 145 MMHG | HEIGHT: 69 IN | WEIGHT: 249 LBS | HEART RATE: 70 BPM

## 2019-11-06 DIAGNOSIS — I48.91 ATRIAL FIBRILLATION, UNSPECIFIED TYPE (HCC): ICD-10-CM

## 2019-11-06 PROCEDURE — 33286 RMVL SUBQ CAR RHYTHM MNTR: CPT | Performed by: INTERNAL MEDICINE

## 2019-11-06 PROCEDURE — 25010000003 LIDOCAINE 1 % SOLUTION: Performed by: INTERNAL MEDICINE

## 2019-11-06 RX ORDER — LIDOCAINE HYDROCHLORIDE 10 MG/ML
INJECTION, SOLUTION INFILTRATION; PERINEURAL AS NEEDED
Status: DISCONTINUED | OUTPATIENT
Start: 2019-11-06 | End: 2019-11-06 | Stop reason: HOSPADM

## 2019-11-06 RX ORDER — NITROGLYCERIN 0.4 MG/1
0.4 TABLET SUBLINGUAL
Status: DISCONTINUED | OUTPATIENT
Start: 2019-11-06 | End: 2019-11-06 | Stop reason: HOSPADM

## 2019-11-06 RX ORDER — ACETAMINOPHEN 325 MG/1
650 TABLET ORAL EVERY 4 HOURS PRN
Status: DISCONTINUED | OUTPATIENT
Start: 2019-11-06 | End: 2019-11-06 | Stop reason: HOSPADM

## 2019-11-06 RX ORDER — SODIUM CHLORIDE 0.9 % (FLUSH) 0.9 %
10 SYRINGE (ML) INJECTION AS NEEDED
Status: DISCONTINUED | OUTPATIENT
Start: 2019-11-06 | End: 2019-11-06 | Stop reason: HOSPADM

## 2019-11-06 RX ORDER — SODIUM CHLORIDE 0.9 % (FLUSH) 0.9 %
10 SYRINGE (ML) INJECTION AS NEEDED
Status: DISCONTINUED | OUTPATIENT
Start: 2019-11-06 | End: 2019-11-06

## 2019-11-06 RX ORDER — ONDANSETRON 2 MG/ML
4 INJECTION INTRAMUSCULAR; INTRAVENOUS EVERY 6 HOURS PRN
Status: DISCONTINUED | OUTPATIENT
Start: 2019-11-06 | End: 2019-11-06 | Stop reason: HOSPADM

## 2019-11-06 RX ORDER — SODIUM CHLORIDE 0.9 % (FLUSH) 0.9 %
3 SYRINGE (ML) INJECTION EVERY 12 HOURS SCHEDULED
Status: DISCONTINUED | OUTPATIENT
Start: 2019-11-06 | End: 2019-11-06 | Stop reason: HOSPADM

## 2019-11-06 RX ORDER — SODIUM CHLORIDE 0.9 % (FLUSH) 0.9 %
10 SYRINGE (ML) INJECTION EVERY 12 HOURS SCHEDULED
Status: DISCONTINUED | OUTPATIENT
Start: 2019-11-06 | End: 2019-11-06

## 2019-11-06 RX ORDER — CEFAZOLIN SODIUM 2 G/50ML
2 SOLUTION INTRAVENOUS
Status: DISCONTINUED | OUTPATIENT
Start: 2019-11-06 | End: 2019-11-06 | Stop reason: HOSPADM

## 2019-11-11 DIAGNOSIS — E03.8 OTHER SPECIFIED HYPOTHYROIDISM: ICD-10-CM

## 2019-11-11 RX ORDER — LEVOTHYROXINE SODIUM 0.07 MG/1
TABLET ORAL
Qty: 90 TABLET | Refills: 2 | Status: SHIPPED | OUTPATIENT
Start: 2019-11-11 | End: 2020-09-11 | Stop reason: DRUGHIGH

## 2019-11-21 ENCOUNTER — OFFICE VISIT (OUTPATIENT)
Dept: PULMONOLOGY | Facility: CLINIC | Age: 64
End: 2019-11-21

## 2019-11-21 VITALS
HEIGHT: 69 IN | DIASTOLIC BLOOD PRESSURE: 80 MMHG | OXYGEN SATURATION: 97 % | SYSTOLIC BLOOD PRESSURE: 138 MMHG | HEART RATE: 81 BPM | WEIGHT: 250 LBS | RESPIRATION RATE: 18 BRPM | BODY MASS INDEX: 37.03 KG/M2

## 2019-11-21 DIAGNOSIS — J30.89 OTHER ALLERGIC RHINITIS: ICD-10-CM

## 2019-11-21 DIAGNOSIS — G47.33 OSA (OBSTRUCTIVE SLEEP APNEA): Primary | ICD-10-CM

## 2019-11-21 DIAGNOSIS — J45.30 MILD PERSISTENT ASTHMA WITHOUT COMPLICATION: ICD-10-CM

## 2019-11-21 PROCEDURE — 99213 OFFICE O/P EST LOW 20 MIN: CPT | Performed by: NURSE PRACTITIONER

## 2019-11-26 ENCOUNTER — TELEPHONE (OUTPATIENT)
Dept: INTERNAL MEDICINE | Facility: CLINIC | Age: 64
End: 2019-11-26

## 2019-11-26 RX ORDER — VITAMIN B COMPLEX
2500 TABLET ORAL 3 TIMES WEEKLY
Status: CANCELLED | OUTPATIENT
Start: 2019-11-27

## 2019-11-26 RX ORDER — CHOLECALCIFEROL (VITAMIN D3) 25 MCG
TABLET,CHEWABLE ORAL
Qty: 30 TABLET | Refills: 0 | Status: SHIPPED | OUTPATIENT
Start: 2019-11-26 | End: 2020-04-09

## 2019-11-26 NOTE — TELEPHONE ENCOUNTER
Last note in October states follow up in 6 weeks, that visit was canceled and not rescheduled, I assume this is why her RX was sent stating she needs appt, I called and scheduled pt for 12/2/19

## 2019-11-26 NOTE — TELEPHONE ENCOUNTER
Pt says she is needing a refill of her cyanocobalamin sublingual sent to the [facility] on [facility] for 90 days.  She says the tablet that was sent in yesterday says she was suppose to see the doctor but pt says she has seen doctor since that was originally put in.  Pt says she is wanting the sublingual and request it for 90 days.

## 2019-12-02 ENCOUNTER — OFFICE VISIT (OUTPATIENT)
Dept: INTERNAL MEDICINE | Facility: CLINIC | Age: 64
End: 2019-12-02

## 2019-12-02 VITALS
HEIGHT: 69 IN | DIASTOLIC BLOOD PRESSURE: 74 MMHG | HEART RATE: 73 BPM | OXYGEN SATURATION: 98 % | BODY MASS INDEX: 37.03 KG/M2 | RESPIRATION RATE: 16 BRPM | TEMPERATURE: 98 F | WEIGHT: 250 LBS | SYSTOLIC BLOOD PRESSURE: 140 MMHG

## 2019-12-02 DIAGNOSIS — E79.0 HYPERURICEMIA: ICD-10-CM

## 2019-12-02 DIAGNOSIS — A77.0 RMSF (ROCKY MOUNTAIN SPOTTED FEVER): Primary | ICD-10-CM

## 2019-12-02 DIAGNOSIS — G47.33 OSA (OBSTRUCTIVE SLEEP APNEA): Primary | ICD-10-CM

## 2019-12-02 LAB — URATE SERPL-MCNC: 6.4 MG/DL (ref 2.4–5.7)

## 2019-12-02 PROCEDURE — 99214 OFFICE O/P EST MOD 30 MIN: CPT | Performed by: INTERNAL MEDICINE

## 2019-12-02 RX ORDER — AMOXICILLIN AND CLAVULANATE POTASSIUM 875; 125 MG/1; MG/1
1 TABLET, FILM COATED ORAL 2 TIMES DAILY
COMMUNITY
End: 2020-07-10

## 2019-12-02 RX ORDER — DOXYCYCLINE 100 MG/1
100 CAPSULE ORAL EVERY 12 HOURS SCHEDULED
Qty: 20 CAPSULE | Refills: 0 | Status: SHIPPED | OUTPATIENT
Start: 2019-12-02 | End: 2019-12-02

## 2019-12-02 RX ORDER — METRONIDAZOLE 500 MG/1
TABLET ORAL
COMMUNITY
Start: 2019-11-27 | End: 2020-07-10

## 2019-12-02 NOTE — PROGRESS NOTES
Subjective     Patient ID: Albania Ramos is a 64 y.o. female. Patient is here for management of multiple medical problems.     Chief Complaint   Patient presents with   • Fatigue     follow-up, patient states she falls asleep in the recliner at 8:00 pm, wakes up at 11:00,  she goes to bed but then she wakes up at 3:00 am.   • Hypothyroidism     follow-up   • Pain     patient having pain in the right ring finger x 5 months since she hit it on the bathroom counter   • B12 deficiency     patient needs refills, asking to switch to sublingual      History of Present Illness       Increase fatigue.   gets up at 3-4 am.  Day time sleeping.    Using cpap at night.   Not using for naps.    Just had f/u with DR Mustafa office.  Topic not adressed by way of the note review on 11/21/19.        Allopurinol helping.    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     increase gerd. Now on 40 mg ppi.      The following portions of the patient's history were reviewed and updated as appropriate: allergies, current medications, past family history, past medical history, past social history, past surgical history and problem list.    Review of Systems   Constitutional: Positive for fatigue.   HENT: Negative for congestion, dental problem, ear discharge, sinus pressure, sinus pain, sneezing and sore throat.    Respiratory: Negative for cough and shortness of breath.    Gastrointestinal: Negative for abdominal distention, abdominal pain, blood in stool and constipation.   Musculoskeletal: Positive for arthralgias. Negative for back pain, gait problem and joint swelling.   Psychiatric/Behavioral: Negative for self-injury. The patient is not nervous/anxious.    All other systems reviewed and are negative.      Current Outpatient Medications:   •  ALLERGY SERUM INJECTION, Inject  under the skin 1 (One) Time., Disp: , Rfl:   •  allopurinol (ZYLOPRIM) 100 MG tablet, Take 1 tablet by mouth Daily., Disp: 90 tablet, Rfl: 3  •  amoxicillin-clavulanate  "(AUGMENTIN) 875-125 MG per tablet, Take 1 tablet by mouth 2 (Two) Times a Day., Disp: , Rfl:   •  calcium carbonate (OS-SHANTE) 600 MG tablet, Take 600 mg by mouth 2 (two) times a day with meals., Disp: , Rfl:   •  Cinnamon ([employer] CINNAMON) 500 MG capsule, Take 500 mg by mouth 2 (two) times a day., Disp: , Rfl:   •  CRANBERRY CONCENTRATE PO, Take 4,200 mg by mouth 2 (two) times a day., Disp: , Rfl:   •  cyanocobalamin (VITAMIN B-12) 2500 MCG tablet tablet, TAKE 1 TABLET BY MOUTH THREE TIMES A WEEK . **MUST CALL MD FOR APPOINTMENT, Disp: 30 tablet, Rfl: 0  •  estradiol (ESTRACE VAGINAL) 0.1 MG/GM vaginal cream, Insert 2 g into the vagina Daily., Disp: , Rfl:   •  Garlic 500 MG capsule, Take 1 tablet by mouth 2 (two) times a day., Disp: , Rfl:   •  levothyroxine (SYNTHROID, LEVOTHROID) 150 MCG tablet, levothyroxine 150 mcg tablet  175 mcg, Disp: , Rfl:   •  levothyroxine (SYNTHROID, LEVOTHROID) 75 MCG tablet, TAKE ONE TABLET BY MOUTH DAILY, Disp: 90 tablet, Rfl: 2  •  metroNIDAZOLE (FLAGYL) 500 MG tablet, , Disp: , Rfl:   •  Mirabegron ER (MYRBETRIQ) 50 MG tablet sustained-release 24 hour 24 hr tablet, Take 50 mg by mouth Daily., Disp: 30 tablet, Rfl: 11  •  Omega-3 Fatty Acids (FISH OIL) 435 MG capsule, Take 1,000 mg by mouth daily., Disp: , Rfl:   •  omeprazole (priLOSEC) 20 MG capsule, TAKE ONE CAPSULE BY MOUTH TWICE A DAY, Disp: 180 capsule, Rfl: 1  •  Probiotic Product (SOLUBLE FIBER/PROBIOTICS PO), Take 1 tablet by mouth daily., Disp: , Rfl:   •  vitamin C (ASCORBIC ACID) 500 MG tablet, Take 1 tablet by mouth daily., Disp: , Rfl:     Objective      Blood pressure 140/74, pulse 73, temperature 98 °F (36.7 °C), temperature source Oral, resp. rate 16, height 175.3 cm (69\"), weight 113 kg (250 lb), SpO2 98 %, not currently breastfeeding.    Physical Exam     General Appearance:    Alert, cooperative, no distress, appears stated age   Head:    Normocephalic, without obvious abnormality, atraumatic   Eyes:    PERRL, " conjunctiva/corneas clear, EOM's intact   Ears:    Normal TM's and external ear canals, both ears   Nose:   Nares normal, septum midline, mucosa normal, no drainage   or sinus tenderness   Throat:   Lips, mucosa, and tongue normal; teeth and gums normal   Neck:   Supple, symmetrical, trachea midline, no adenopathy;        thyroid:  No enlargement/tenderness/nodules; no carotid    bruit or JVD   Back:     Symmetric, no curvature, ROM normal, no CVA tenderness   Lungs:     Clear to auscultation bilaterally, respirations unlabored   Chest wall:    No tenderness or deformity   Heart:    Regular rate and rhythm, S1 and S2 normal, no murmur,        rub or gallop   Abdomen:     Soft, non-tender, bowel sounds active all four quadrants,     no masses, no organomegaly   Extremities:   Extremities normal, atraumatic, no cyanosis or edema   Pulses:   2+ and symmetric all extremities   Skin:   Skin color, texture, turgor normal, no rashes or lesions   Lymph nodes:   Cervical, supraclavicular, and axillary nodes normal   Neurologic:   CNII-XII intact. Normal strength, sensation and reflexes       throughout      Results for orders placed or performed in visit on 10/07/19   Comprehensive Metabolic Panel   Result Value Ref Range    Glucose 97 65 - 99 mg/dL    BUN 15 8 - 23 mg/dL    Creatinine 0.72 0.57 - 1.00 mg/dL    eGFR Non African Am 82 >60 mL/min/1.73    eGFR African Am 99 >60 mL/min/1.73    BUN/Creatinine Ratio 20.8 7.0 - 25.0    Sodium 145 136 - 145 mmol/L    Potassium 4.5 3.5 - 5.2 mmol/L    Chloride 104 98 - 107 mmol/L    Total CO2 30.4 (H) 22.0 - 29.0 mmol/L    Calcium 9.5 8.6 - 10.5 mg/dL    Total Protein 6.6 6.0 - 8.5 g/dL    Albumin 4.50 3.50 - 5.20 g/dL    Globulin 2.1 gm/dL    A/G Ratio 2.1 g/dL    Total Bilirubin 0.3 0.2 - 1.2 mg/dL    Alkaline Phosphatase 91 39 - 117 U/L    AST (SGOT) 20 1 - 32 U/L    ALT (SGPT) 25 1 - 33 U/L   Vitamin B12   Result Value Ref Range    Vitamin B-12 1,296 (H) 211 - 946 pg/mL   Lipid  Panel   Result Value Ref Range    Total Cholesterol 204 (H) 0 - 200 mg/dL    Triglycerides 141 0 - 150 mg/dL    HDL Cholesterol 59 40 - 60 mg/dL    VLDL Cholesterol 28.2 mg/dL    LDL Cholesterol  117 (H) 0 - 100 mg/dL   TSH   Result Value Ref Range    TSH 0.136 (L) 0.270 - 4.200 uIU/mL   T4, Free   Result Value Ref Range    Free T4 1.74 (H) 0.93 - 1.70 ng/dL   [place] Spotted Fever, IgM   Result Value Ref Range    [facility] IgM 0.31 0.00 - 0.89 Count includes the [facility] Spotted Fever, IgG   Result Value Ref Range    RMSF IgG Positive (A) Negative   Lyme Disease, PCR   Result Value Ref Range    Lyme Disease(B.burgdorferi)PCR Negative Negative   Ehrlichia Antibody Panel   Result Value Ref Range    E. chaffeensis ([facility]) IgG Titer Negative Neg:<1:64    E. chaffeensis ([facility]) IgM Titer Negative Neg:<1:20    HGE IgG Titer Negative Neg:<1:64    HGE IgM Titer Negative Neg:<1:20   Cyclic Citrul Peptide Antibody, IgG / IgA   Result Value Ref Range    CCP Antibodies IgG/IgA 12 0 - 19 units   Rheumatoid Factor   Result Value Ref Range    RA Latex Turbid <10.0 0.0 - 13.9 IU/mL   Sedimentation Rate   Result Value Ref Range    Sed Rate 21 0 - 30 mm/hr   Uric Acid   Result Value Ref Range    Uric Acid 6.1 (H) 2.4 - 5.7 mg/dL   C-reactive Protein   Result Value Ref Range    C-Reactive Protein 0.38 0.00 - 0.50 mg/dL   Antistreptolysin O Titer   Result Value Ref Range    ASO 68.0 0.0 - 200.0 IU/mL   CBC & Differential   Result Value Ref Range    WBC 6.04 3.40 - 10.80 10*3/mm3    RBC 4.62 3.77 - 5.28 10*6/mm3    Hemoglobin 13.3 12.0 - 15.9 g/dL    Hematocrit 40.0 34.0 - 46.6 %    MCV 86.6 79.0 - 97.0 fL    MCH 28.8 26.6 - 33.0 pg    MCHC 33.3 31.5 - 35.7 g/dL    RDW 12.9 12.3 - 15.4 %    Platelets 247 140 - 450 10*3/mm3    Neutrophil Rel % 54.7 42.7 - 76.0 %    Lymphocyte Rel % 32.9 19.6 - 45.3 %    Monocyte Rel % 7.1 5.0 - 12.0 %    Eosinophil Rel % 4.3 0.3 - 6.2 %    Basophil Rel % 0.7 0.0 - 1.5 %    Neutrophils Absolute 3.30 1.70 - 7.00  10*3/mm3    Lymphocytes Absolute 1.99 0.70 - 3.10 10*3/mm3    Monocytes Absolute 0.43 0.10 - 0.90 10*3/mm3    Eosinophils Absolute 0.26 0.00 - 0.40 10*3/mm3    Basophils Absolute 0.04 0.00 - 0.20 10*3/mm3    Immature Granulocyte Rel % 0.3 0.0 - 0.5 %    Immature Grans Absolute 0.02 0.00 - 0.05 10*3/mm3    nRBC 0.0 0.0 - 0.2 /100 WBC   Reflexed RMSF, IgG, IFA   Result Value Ref Range    RMSF IgG 1:64 (H) Neg <1:64         Assessment/Plan   Fatigue and arthritis.   + RMSF IgG.  Unclear if abx will help.  short course of Augmentin may be feeling better.  Will call in month of abx if getts worse off Augmentin.    Hx of tick bite long ago.            Al[place]ia was seen today for fatigue, hypothyroidism, pain and b12 deficiency.    Diagnoses and all orders for this visit:    RMSF ([place] spotted fever)    Hyperuricemia  -     Uric Acid    Other orders  -     Discontinue: doxycycline (MONODOX) 100 MG capsule; Take 1 capsule by mouth Every 12 (Twelve) Hours.      Return in about 3 months (around 3/2/2020).          There are no Patient Instructions on file for this visit.     Gordon Norman MD    Assessment/Plan

## 2019-12-04 DIAGNOSIS — M10.9 GOUT, UNSPECIFIED CAUSE, UNSPECIFIED CHRONICITY, UNSPECIFIED SITE: Primary | ICD-10-CM

## 2019-12-04 DIAGNOSIS — E03.9 ACQUIRED HYPOTHYROIDISM: ICD-10-CM

## 2019-12-04 RX ORDER — ALLOPURINOL 300 MG/1
300 TABLET ORAL DAILY
Qty: 90 TABLET | Refills: 3 | Status: SHIPPED | OUTPATIENT
Start: 2019-12-04 | End: 2021-02-25

## 2019-12-19 ENCOUNTER — HOSPITAL ENCOUNTER (OUTPATIENT)
Dept: MAMMOGRAPHY | Facility: HOSPITAL | Age: 64
Discharge: HOME | End: 2019-12-19
Admitting: OBSTETRICS & GYNECOLOGY

## 2019-12-19 DIAGNOSIS — Z12.31 SCREENING MAMMOGRAM FOR HIGH-RISK PATIENT: ICD-10-CM

## 2019-12-19 PROCEDURE — 77067 SCR MAMMO BI INCL CAD: CPT

## 2019-12-19 PROCEDURE — 77063 BREAST TOMOSYNTHESIS BI: CPT

## 2020-02-24 LAB
ALBUMIN SERPL-MCNC: 4.1 G/DL (ref 3.5–5.2)
ALBUMIN/GLOB SERPL: 1.8 G/DL
ALP SERPL-CCNC: 96 U/L (ref 39–117)
ALT SERPL-CCNC: 23 U/L (ref 1–33)
AST SERPL-CCNC: 21 U/L (ref 1–32)
BASOPHILS # BLD AUTO: 0.04 10*3/MM3 (ref 0–0.2)
BASOPHILS NFR BLD AUTO: 0.6 % (ref 0–1.5)
BILIRUB SERPL-MCNC: 0.3 MG/DL (ref 0.2–1.2)
BUN SERPL-MCNC: 13 MG/DL (ref 8–23)
BUN/CREAT SERPL: 17.8 (ref 7–25)
CALCIUM SERPL-MCNC: 9.3 MG/DL (ref 8.6–10.5)
CHLORIDE SERPL-SCNC: 102 MMOL/L (ref 98–107)
CO2 SERPL-SCNC: 28.1 MMOL/L (ref 22–29)
CREAT SERPL-MCNC: 0.73 MG/DL (ref 0.57–1)
EOSINOPHIL # BLD AUTO: 0.3 10*3/MM3 (ref 0–0.4)
EOSINOPHIL NFR BLD AUTO: 4.7 % (ref 0.3–6.2)
ERYTHROCYTE [DISTWIDTH] IN BLOOD BY AUTOMATED COUNT: 13.5 % (ref 12.3–15.4)
GLOBULIN SER CALC-MCNC: 2.3 GM/DL
GLUCOSE SERPL-MCNC: 95 MG/DL (ref 65–99)
HCT VFR BLD AUTO: 40.8 % (ref 34–46.6)
HGB BLD-MCNC: 13.6 G/DL (ref 12–15.9)
IMM GRANULOCYTES # BLD AUTO: 0.03 10*3/MM3 (ref 0–0.05)
IMM GRANULOCYTES NFR BLD AUTO: 0.5 % (ref 0–0.5)
LYMPHOCYTES # BLD AUTO: 1.94 10*3/MM3 (ref 0.7–3.1)
LYMPHOCYTES NFR BLD AUTO: 30.6 % (ref 19.6–45.3)
MCH RBC QN AUTO: 28.9 PG (ref 26.6–33)
MCHC RBC AUTO-ENTMCNC: 33.3 G/DL (ref 31.5–35.7)
MCV RBC AUTO: 86.8 FL (ref 79–97)
MONOCYTES # BLD AUTO: 0.51 10*3/MM3 (ref 0.1–0.9)
MONOCYTES NFR BLD AUTO: 8.1 % (ref 5–12)
NEUTROPHILS # BLD AUTO: 3.51 10*3/MM3 (ref 1.7–7)
NEUTROPHILS NFR BLD AUTO: 55.5 % (ref 42.7–76)
NRBC BLD AUTO-RTO: 0 /100 WBC (ref 0–0.2)
PLATELET # BLD AUTO: 263 10*3/MM3 (ref 140–450)
POTASSIUM SERPL-SCNC: 4.6 MMOL/L (ref 3.5–5.2)
PROT SERPL-MCNC: 6.4 G/DL (ref 6–8.5)
RBC # BLD AUTO: 4.7 10*6/MM3 (ref 3.77–5.28)
SODIUM SERPL-SCNC: 142 MMOL/L (ref 136–145)
T4 FREE SERPL-MCNC: 2.08 NG/DL (ref 0.93–1.7)
TSH SERPL DL<=0.005 MIU/L-ACNC: 0.1 UIU/ML (ref 0.27–4.2)
URATE SERPL-MCNC: 4.4 MG/DL (ref 2.4–5.7)
VIT B12 SERPL-MCNC: 1359 PG/ML (ref 211–946)
WBC # BLD AUTO: 6.33 10*3/MM3 (ref 3.4–10.8)

## 2020-03-24 NOTE — PROGRESS NOTES
Please let them know the uric acid level is up a bit more. I have increase the allopurinol. Repeat labs prior to rtc., orders in.   Statement Selected

## 2020-04-06 NOTE — TELEPHONE ENCOUNTER
Albania Segura's B12 levels were elevated the last time  her labs were drawn, do you want her to continue taking the Vitamin B12?

## 2020-04-06 NOTE — TELEPHONE ENCOUNTER
I spoke with Albania, she is okay with waiting until Dr. Norman comes back into the office next week to discuss her B12 levels.

## 2020-04-09 RX ORDER — CHOLECALCIFEROL (VITAMIN D3) 25 MCG
TABLET,CHEWABLE ORAL
Qty: 30 TABLET | Refills: 0 | Status: SHIPPED | OUTPATIENT
Start: 2020-04-09 | End: 2020-08-31

## 2020-05-08 DIAGNOSIS — E03.8 OTHER SPECIFIED HYPOTHYROIDISM: ICD-10-CM

## 2020-05-11 RX ORDER — LEVOTHYROXINE SODIUM 0.1 MG/1
TABLET ORAL
Qty: 90 TABLET | Refills: 2 | Status: SHIPPED | OUTPATIENT
Start: 2020-05-11 | End: 2021-02-08

## 2020-05-11 RX ORDER — LEVOTHYROXINE SODIUM 88 UG/1
TABLET ORAL
Qty: 90 TABLET | Refills: 3 | Status: SHIPPED | OUTPATIENT
Start: 2020-05-11 | End: 2021-06-14 | Stop reason: SDUPTHER

## 2020-06-16 RX ORDER — OMEPRAZOLE 20 MG/1
20 CAPSULE, DELAYED RELEASE ORAL 2 TIMES DAILY
Qty: 180 CAPSULE | Refills: 0 | Status: SHIPPED | OUTPATIENT
Start: 2020-06-16 | End: 2020-10-08 | Stop reason: SDUPTHER

## 2020-06-26 DIAGNOSIS — G47.33 OSA (OBSTRUCTIVE SLEEP APNEA): Primary | ICD-10-CM

## 2020-06-27 ENCOUNTER — APPOINTMENT (OUTPATIENT)
Dept: GENERAL RADIOLOGY | Facility: HOSPITAL | Age: 65
End: 2020-06-27

## 2020-06-27 ENCOUNTER — HOSPITAL ENCOUNTER (EMERGENCY)
Facility: HOSPITAL | Age: 65
Discharge: HOME | End: 2020-06-27
Attending: EMERGENCY MEDICINE | Admitting: EMERGENCY MEDICINE

## 2020-06-27 VITALS
SYSTOLIC BLOOD PRESSURE: 112 MMHG | WEIGHT: 256.4 LBS | HEART RATE: 73 BPM | HEIGHT: 69 IN | OXYGEN SATURATION: 96 % | TEMPERATURE: 97.9 F | DIASTOLIC BLOOD PRESSURE: 49 MMHG | RESPIRATION RATE: 18 BRPM | BODY MASS INDEX: 37.98 KG/M2

## 2020-06-27 DIAGNOSIS — R00.2 HEART PALPITATIONS: Primary | ICD-10-CM

## 2020-06-27 LAB
ALBUMIN SERPL-MCNC: 4.2 G/DL (ref 3.5–5.2)
ALBUMIN/GLOB SERPL: 1.6 G/DL
ALP SERPL-CCNC: 98 U/L (ref 39–117)
ALT SERPL W P-5'-P-CCNC: 21 U/L (ref 1–33)
ANION GAP SERPL CALCULATED.3IONS-SCNC: 8.2 MMOL/L (ref 5–15)
AST SERPL-CCNC: 22 U/L (ref 1–32)
BASOPHILS # BLD AUTO: 0.09 10*3/MM3 (ref 0–0.2)
BASOPHILS NFR BLD AUTO: 1 % (ref 0–1.5)
BILIRUB SERPL-MCNC: <0.2 MG/DL (ref 0.2–1.2)
BUN BLD-MCNC: 23 MG/DL (ref 8–23)
BUN/CREAT SERPL: 29.5 (ref 7–25)
CALCIUM SPEC-SCNC: 8.9 MG/DL (ref 8.6–10.5)
CHLORIDE SERPL-SCNC: 106 MMOL/L (ref 98–107)
CO2 SERPL-SCNC: 28.8 MMOL/L (ref 22–29)
CREAT BLD-MCNC: 0.78 MG/DL (ref 0.57–1)
DEPRECATED RDW RBC AUTO: 45.9 FL (ref 37–54)
EOSINOPHIL # BLD AUTO: 0.41 10*3/MM3 (ref 0–0.4)
EOSINOPHIL NFR BLD AUTO: 4.6 % (ref 0.3–6.2)
ERYTHROCYTE [DISTWIDTH] IN BLOOD BY AUTOMATED COUNT: 14.3 % (ref 12.3–15.4)
GFR SERPL CREATININE-BSD FRML MDRD: 74 ML/MIN/1.73
GLOBULIN UR ELPH-MCNC: 2.6 GM/DL
GLUCOSE BLD-MCNC: 128 MG/DL (ref 65–99)
HCT VFR BLD AUTO: 38.3 % (ref 34–46.6)
HGB BLD-MCNC: 12.5 G/DL (ref 12–15.9)
IMM GRANULOCYTES # BLD AUTO: 0.06 10*3/MM3 (ref 0–0.05)
IMM GRANULOCYTES NFR BLD AUTO: 0.7 % (ref 0–0.5)
LYMPHOCYTES # BLD AUTO: 2.7 10*3/MM3 (ref 0.7–3.1)
LYMPHOCYTES NFR BLD AUTO: 30.4 % (ref 19.6–45.3)
MCH RBC QN AUTO: 29.2 PG (ref 26.6–33)
MCHC RBC AUTO-ENTMCNC: 32.6 G/DL (ref 31.5–35.7)
MCV RBC AUTO: 89.5 FL (ref 79–97)
MONOCYTES # BLD AUTO: 0.76 10*3/MM3 (ref 0.1–0.9)
MONOCYTES NFR BLD AUTO: 8.6 % (ref 5–12)
NEUTROPHILS # BLD AUTO: 4.85 10*3/MM3 (ref 1.7–7)
NEUTROPHILS NFR BLD AUTO: 54.7 % (ref 42.7–76)
NRBC BLD AUTO-RTO: 0 /100 WBC (ref 0–0.2)
NT-PROBNP SERPL-MCNC: 33.1 PG/ML (ref 5–900)
PLATELET # BLD AUTO: 225 10*3/MM3 (ref 140–450)
PMV BLD AUTO: 11.2 FL (ref 6–12)
POTASSIUM BLD-SCNC: 4.1 MMOL/L (ref 3.5–5.2)
PROT SERPL-MCNC: 6.8 G/DL (ref 6–8.5)
RBC # BLD AUTO: 4.28 10*6/MM3 (ref 3.77–5.28)
SODIUM BLD-SCNC: 143 MMOL/L (ref 136–145)
TROPONIN T SERPL-MCNC: <0.01 NG/ML (ref 0–0.03)
TROPONIN T SERPL-MCNC: <0.01 NG/ML (ref 0–0.03)
WBC NRBC COR # BLD: 8.87 10*3/MM3 (ref 3.4–10.8)

## 2020-06-27 PROCEDURE — 84484 ASSAY OF TROPONIN QUANT: CPT | Performed by: NURSE PRACTITIONER

## 2020-06-27 PROCEDURE — 71046 X-RAY EXAM CHEST 2 VIEWS: CPT

## 2020-06-27 PROCEDURE — 80053 COMPREHEN METABOLIC PANEL: CPT | Performed by: NURSE PRACTITIONER

## 2020-06-27 PROCEDURE — 85025 COMPLETE CBC W/AUTO DIFF WBC: CPT | Performed by: NURSE PRACTITIONER

## 2020-06-27 PROCEDURE — 93005 ELECTROCARDIOGRAM TRACING: CPT | Performed by: NURSE PRACTITIONER

## 2020-06-27 PROCEDURE — 83880 ASSAY OF NATRIURETIC PEPTIDE: CPT | Performed by: NURSE PRACTITIONER

## 2020-06-27 PROCEDURE — 99284 EMERGENCY DEPT VISIT MOD MDM: CPT

## 2020-06-27 RX ORDER — SODIUM CHLORIDE 0.9 % (FLUSH) 0.9 %
10 SYRINGE (ML) INJECTION AS NEEDED
Status: DISCONTINUED | OUTPATIENT
Start: 2020-06-27 | End: 2020-06-27 | Stop reason: HOSPADM

## 2020-06-30 ENCOUNTER — OFFICE VISIT (OUTPATIENT)
Dept: CARDIOLOGY | Facility: CLINIC | Age: 65
End: 2020-06-30

## 2020-06-30 VITALS
SYSTOLIC BLOOD PRESSURE: 130 MMHG | HEIGHT: 69 IN | DIASTOLIC BLOOD PRESSURE: 80 MMHG | WEIGHT: 257 LBS | OXYGEN SATURATION: 95 % | BODY MASS INDEX: 38.06 KG/M2 | HEART RATE: 74 BPM

## 2020-06-30 DIAGNOSIS — I49.3 PVCS (PREMATURE VENTRICULAR CONTRACTIONS): ICD-10-CM

## 2020-06-30 DIAGNOSIS — I48.0 PAROXYSMAL ATRIAL FIBRILLATION (HCC): Primary | Chronic | ICD-10-CM

## 2020-06-30 DIAGNOSIS — R00.2 PALPITATIONS: ICD-10-CM

## 2020-06-30 PROCEDURE — 99213 OFFICE O/P EST LOW 20 MIN: CPT | Performed by: INTERNAL MEDICINE

## 2020-06-30 RX ORDER — LEVOCETIRIZINE DIHYDROCHLORIDE 5 MG/1
5 TABLET, FILM COATED ORAL DAILY PRN
COMMUNITY

## 2020-06-30 RX ORDER — EPINEPHRINE 0.3 MG/.3ML
INJECTION SUBCUTANEOUS
COMMUNITY
End: 2020-09-11 | Stop reason: SDUPTHER

## 2020-06-30 RX ORDER — EPINEPHRINE 0.3 MG/.3ML
INJECTION SUBCUTANEOUS
COMMUNITY
Start: 2020-06-22 | End: 2021-12-27

## 2020-06-30 RX ORDER — DILTIAZEM HYDROCHLORIDE 120 MG/1
120 CAPSULE, EXTENDED RELEASE ORAL DAILY
Qty: 30 CAPSULE | Refills: 11 | Status: SHIPPED | OUTPATIENT
Start: 2020-06-30 | End: 2021-08-16

## 2020-06-30 NOTE — PROGRESS NOTES
"             [facility] Cardiology Office Follow Up Note    Albania Ramos  [id number]  06/30/2020    Primary Care Provider: Gordon Norman MD    Chief Complaint: Follow-up for paroxysmal atrial fibrillation    History of Present Illness:   Mrs. Albania Ramos is a 65 y.o. female who presents to the Cardiology Clinic for follow up of paroxysmal atrial fibrillation.  The patient has a past medical history significant for asthma, hypothyroidism, and depression.  She has a past cardiac history significant for paroxysmal atrial fibrillation, status post ablation x2.  She is also undergone outpatient loop recorder monitoring x2, with her last loop recorder being removed in 10/19.  Patient reports remotely being on Coumadin for CVA prophylaxis, which has been discontinued several years ago given no recurrent episodes of PAF on loop recorder monitoring.  Today, the patient complains of increased episodes of palpitations over the past several weeks with concern for recurrent paroxysmal atrial fibrillation.  She reports a \"irregular\" heart rate which is associated with fatigue and shortness of breath.  She reports similar symptoms during prior episodes of PAF.  Patient reports she has been under significant stress recently, which she believes may have triggered recurrent atrial fibrillation.  She denies any chest pain or chest discomfort.  No orthopnea, PND, or significant lower extremity swelling.  She recently presented to the emergency department on 6/27 for her palpitations, and was found to be in normal sinus rhythm at that time.  No other specific complaints currently.      Review of Systems:   Review of Systems   Constitutional: Positive for fatigue. Negative for activity change, appetite change, chills, diaphoresis, fever, unexpected weight gain and unexpected weight loss.   Eyes: Negative for blurred vision and double vision.   Respiratory: Positive for shortness of breath. Negative for cough, chest " tightness and wheezing.    Cardiovascular: Positive for palpitations. Negative for chest pain and leg swelling.   Gastrointestinal: Negative for abdominal pain, anal bleeding, blood in stool and GERD.   Endocrine: Negative for cold intolerance and heat intolerance.   Genitourinary: Negative for hematuria.   Neurological: Negative for dizziness, syncope, weakness and light-headedness.   Hematological: Does not bruise/bleed easily.   Psychiatric/Behavioral: Positive for stress. Negative for depressed mood. The patient is not nervous/anxious.        I have reviewed and/or updated the patient's past medical, past surgical, family, social history, problem list and allergies as appropriate.     Medications:     Current Outpatient Medications:   •  ALLERGY SERUM INJECTION, Inject  under the skin 1 (One) Time., Disp: , Rfl:   •  allopurinol (ZYLOPRIM) 300 MG tablet, Take 1 tablet by mouth Daily., Disp: 90 tablet, Rfl: 3  •  calcium carbonate (OS-SHANTE) 600 MG tablet, Take 600 mg by mouth 2 (two) times a day with meals., Disp: , Rfl:   •  Cinnamon ([employer] CINNAMON) 500 MG capsule, Take 500 mg by mouth 2 (two) times a day., Disp: , Rfl:   •  CRANBERRY CONCENTRATE PO, Take 4,200 mg by mouth 2 (two) times a day., Disp: , Rfl:   •  cyanocobalamin (VITAMIN B-12) 2500 MCG tablet tablet, TAKE ONE TABLET BY MOUTH THREE TIMES A WEEK MUST MAKE AN APPOINTMENT, Disp: 30 tablet, Rfl: 0  •  EPINEPHrine (EpiPen 2-Doni) 0.3 MG/0.3ML solution auto-injector injection, EpiPen 2-Doni 0.3 mg/0.3 mL injection, auto-injector  As Directed, Disp: , Rfl:   •  estradiol (ESTRACE VAGINAL) 0.1 MG/GM vaginal cream, Insert 2 g into the vagina Daily., Disp: , Rfl:   •  Garlic 500 MG capsule, Take 1 tablet by mouth 2 (two) times a day., Disp: , Rfl:   •  GLUCOSAMINE HCL-MSM PO, Glucosamine-Chondrotin, Disp: , Rfl:   •  levocetirizine (XYZAL) 5 MG tablet, levocetirizine 5 mg tablet  TAKE ONE TABLET BY MOUTH DAILY, Disp: , Rfl:   •  levothyroxine (SYNTHROID,  "LEVOTHROID) 100 MCG tablet, TAKE ONE TABLET BY MOUTH DAILY ALONG WITH 88MCG TABLET FOR TOTAL DOSE OF 188MCG, Disp: 90 tablet, Rfl: 2  •  levothyroxine (SYNTHROID, LEVOTHROID) 88 MCG tablet, TAKE ONE TABLET BY MOUTH DAILY, Disp: 90 tablet, Rfl: 3  •  Montelukast Sodium (SINGULAIR PO), montelukast, Disp: , Rfl:   •  MULTIPLE VITAMIN PO, Multiple Vitamin capsule  Daily, Disp: , Rfl:   •  Omega-3 Fatty Acids (FISH OIL) 435 MG capsule, Take 1,000 mg by mouth daily., Disp: , Rfl:   •  omeprazole (priLOSEC) 20 MG capsule, Take 1 capsule by mouth 2 (Two) Times a Day. APPOINTMENT NEEDED FOR ADDITIONAL REFILLS, Disp: 180 capsule, Rfl: 0  •  Probiotic Product (SOLUBLE FIBER/PROBIOTICS PO), Take 1 tablet by mouth daily., Disp: , Rfl:   •  vitamin C (ASCORBIC ACID) 500 MG tablet, Take 1 tablet by mouth daily., Disp: , Rfl:   •  amoxicillin-clavulanate (AUGMENTIN) 875-125 MG per tablet, Take 1 tablet by mouth 2 (Two) Times a Day., Disp: , Rfl:   •  apixaban (ELIQUIS) 5 MG tablet tablet, Take 1 tablet by mouth Every 12 (Twelve) Hours., Disp: 60 tablet, Rfl: 3  •  dilTIAZem XR (DILACOR XR) 120 MG 24 hr capsule, Take 1 capsule by mouth Daily., Disp: 30 capsule, Rfl: 11  •  EPINEPHrine (EPIPEN) 0.3 MG/0.3ML solution auto-injector injection, , Disp: , Rfl:   •  levothyroxine (SYNTHROID, LEVOTHROID) 150 MCG tablet, levothyroxine 150 mcg tablet  175 mcg, Disp: , Rfl:   •  levothyroxine (SYNTHROID, LEVOTHROID) 75 MCG tablet, TAKE ONE TABLET BY MOUTH DAILY, Disp: 90 tablet, Rfl: 2  •  metroNIDAZOLE (FLAGYL) 500 MG tablet, , Disp: , Rfl:     Physical Exam:  Vital Signs:   Vitals:    06/30/20 0934   BP: 130/80   BP Location: Right arm   Patient Position: Sitting   Cuff Size: Large Adult   Pulse: 74   SpO2: 95%   Weight: 117 kg (257 lb)   Height: 175.3 cm (69\")       Physical Exam   Constitutional: She is oriented to person, place, and time. She appears well-developed and well-nourished. No distress.   HENT:   Head: Normocephalic and " atraumatic.   Moist Mucous Membranes.    Eyes: Pupils are equal, round, and reactive to light. EOM are normal. No scleral icterus.   Neck: No tracheal deviation present.   Cardiovascular: Normal rate, regular rhythm, normal heart sounds and intact distal pulses. Exam reveals no gallop and no friction rub.   No murmur heard.  Normal JVD.   Pulmonary/Chest: Effort normal and breath sounds normal. No stridor. No respiratory distress. She has no wheezes. She has no rales. She exhibits no tenderness.   Abdominal: Soft. Bowel sounds are normal. She exhibits no distension. There is no tenderness. There is no rebound and no guarding.   Musculoskeletal: Normal range of motion. She exhibits no edema.   Lymphadenopathy:     She has no cervical adenopathy.   Neurological: She is alert and oriented to person, place, and time.   Skin: Skin is warm and dry. She is not diaphoretic. No erythema.   Psychiatric: She has a normal mood and affect. Her behavior is normal.       Results Review:   I reviewed the patient's new clinical results.        Assessment / Plan:     1.  Paroxysmal atrial fibrillation  --Long history of paroxysmal atrial fibrillation, with ablation x2 remotely  --Last outpatient cardiac monitoring with a loop recorder removed in 10/19, with no evidence of recurrent PAF at that time  --Presents today for recurrent episodes of palpitations, which appears to be consistent with prior symptomatic PAF  --ECG 6/27 revealed NSR at that time  --Currently unclear if the patient has had recurrent PAF, with symptomatic ectopy possibly contributing to current symptoms  --Discussed repeat outpatient cardiac monitoring with the patient, however given history of intolerance to chest leads due to allergy she has deferred at this time  --Has a CHADS2-VASc score of 1, given her history of paroxysmal atrial fibrillation and concern for recurrent symptomatic PAF discussed the risk and benefits of restarting anticoagulation for CVA  prophylaxis and the patient is agreeable to proceed.  Will therefore start Eliquis  --Given palpitations could potentially be secondary to PAF with RVR versus symptomatic ectopy with deferral of outpatient cardiac monitoring to further evaluate due to history of allergy and intolerance to chest leads, will start diltiazem for rate control and possible suppression of ectopy  --Follow-up in 3 months to reevaluate symptoms and again discuss proceeding with outpatient cardiac monitoring at that time to further determine if the patient has recurrent PAF    Follow Up:   Return in about 3 months (around 9/30/2020).      Thank you for allowing me to participate in the care of your patient. Please to not hesitate to contact me with additional questions or concerns.     NICHELLE Jackman MD  Interventional Cardiology   06/30/2020  9:39 AM

## 2020-07-10 ENCOUNTER — OFFICE VISIT (OUTPATIENT)
Dept: INTERNAL MEDICINE | Facility: CLINIC | Age: 65
End: 2020-07-10

## 2020-07-10 VITALS — SYSTOLIC BLOOD PRESSURE: 140 MMHG | TEMPERATURE: 96.9 F | DIASTOLIC BLOOD PRESSURE: 76 MMHG

## 2020-07-10 DIAGNOSIS — J02.9 SORE THROAT: ICD-10-CM

## 2020-07-10 DIAGNOSIS — R09.81 NASAL CONGESTION: ICD-10-CM

## 2020-07-10 DIAGNOSIS — J01.01 ACUTE RECURRENT MAXILLARY SINUSITIS: Primary | ICD-10-CM

## 2020-07-10 PROCEDURE — 99214 OFFICE O/P EST MOD 30 MIN: CPT | Performed by: NURSE PRACTITIONER

## 2020-07-10 RX ORDER — AMOXICILLIN AND CLAVULANATE POTASSIUM 875; 125 MG/1; MG/1
1 TABLET, FILM COATED ORAL 2 TIMES DAILY
Qty: 20 TABLET | Refills: 0 | Status: SHIPPED | OUTPATIENT
Start: 2020-07-10 | End: 2020-07-20

## 2020-07-10 NOTE — PROGRESS NOTES
Date: 07/10/2020    Name: Albania Ramos  : 1955    Chief Complaint:   Chief Complaint   Patient presents with   • Earache     fluid on ears       HPI:  Albania Ramos is a 65 y.o. female presents with complaints of fluid on her ears for the past week.  She also reports nasal congestion, sore throat, headache; she answered in the negative when asked about those symptoms during screening while being scheduled for appointment and prior to arrival to exam room.  She believes symptoms are related to seasonal allergies.  She has not been exposed to anyone other than her parents,  for the past month or longer.  Wears a mask when out of the house.    Frontal headache worsens with forward movement of her head.  There is some pressure in her ears, teeth.  Denies fever, chills, loss of smell or taste , fatigue, malaise, myalgia, cough, shortness of breath, nausea, vomiting, diarrhea, rash.  She has not taken any medication for symptoms at this point.    History:  The following portions of the patient's history were reviewed and updated as appropriate: allergies, current medications, past medical history, family history, surgical history, social history and problem list.     ROS:  Review of Systems   Constitutional: Negative.    HENT: Negative for ear discharge, hearing loss, sneezing, tinnitus, trouble swallowing and voice change.    Eyes: Negative.    Respiratory: Negative for wheezing.    Cardiovascular: Negative.    Neurological: Negative for dizziness.       VS:  Vitals:    07/10/20 0930   BP: 140/76   Temp: 96.9 °F (36.1 °C)     There is no height or weight on file to calculate BMI.    PE:  Physical Exam   Constitutional: She is oriented to person, place, and time. She appears well-developed and well-nourished. No distress.   HENT:   Head: Normocephalic.   Right Ear: Tympanic membrane, external ear and ear canal normal.   Left Ear: Tympanic membrane, external ear and ear canal normal.   Nose: Mucosal edema  present. Right sinus exhibits maxillary sinus tenderness. Left sinus exhibits maxillary sinus tenderness.   Mouth/Throat: Uvula is midline and mucous membranes are normal. Posterior oropharyngeal erythema present.   Cardiovascular: Normal rate, regular rhythm, normal heart sounds and intact distal pulses.   Pulmonary/Chest: Effort normal and breath sounds normal.   Neurological: She is alert and oriented to person, place, and time.   Psychiatric:   Patient visibly upset when asked about negative answers to screening questions.         Assessment/Plan:  Albania was seen today for earache.    Diagnoses and all orders for this visit:    Acute recurrent maxillary sinusitis  -     amoxicillin-clavulanate (Augmentin) 875-125 MG per tablet; Take 1 tablet by mouth 2 (Two) Times a Day for 10 days.  - Drink plenty of clear, decaffeinated fluids, as tolerated.  - Warm compress to face as needed for sinus pressure    Nasal congestion  -     QUESTIONNAIRE SERIES    Sore throat  -     QUESTIONNAIRE SERIES    Very brief appointment today, due to potential COVID symptoms.  Patient states she has COVID testing scheduled for 1230 today.  Advised to stay sequestered until results available, patient agrees.      Return if symptoms worsen or fail to improve, for Next scheduled follow up.

## 2020-07-27 ENCOUNTER — LAB (OUTPATIENT)
Dept: LAB | Facility: HOSPITAL | Age: 65
End: 2020-07-27

## 2020-07-27 ENCOUNTER — HOSPITAL ENCOUNTER (OUTPATIENT)
Dept: CARDIOLOGY | Facility: HOSPITAL | Age: 65
Discharge: HOME | End: 2020-07-27
Admitting: OTOLARYNGOLOGY

## 2020-07-27 ENCOUNTER — TRANSCRIBE ORDERS (OUTPATIENT)
Dept: LAB | Facility: HOSPITAL | Age: 65
End: 2020-07-27

## 2020-07-27 DIAGNOSIS — Z01.812 PRE-OPERATIVE LABORATORY EXAMINATION: ICD-10-CM

## 2020-07-27 DIAGNOSIS — Z01.818 PRE-OPERATIVE CLEARANCE: ICD-10-CM

## 2020-07-27 DIAGNOSIS — Z01.818 PRE-OPERATIVE CLEARANCE: Primary | ICD-10-CM

## 2020-07-27 LAB
BASOPHILS # BLD AUTO: 0.04 10*3/MM3 (ref 0–0.2)
BASOPHILS NFR BLD AUTO: 0.5 % (ref 0–1.5)
DEPRECATED RDW RBC AUTO: 42.4 FL (ref 37–54)
EOSINOPHIL # BLD AUTO: 0.38 10*3/MM3 (ref 0–0.4)
EOSINOPHIL NFR BLD AUTO: 4.5 % (ref 0.3–6.2)
ERYTHROCYTE [DISTWIDTH] IN BLOOD BY AUTOMATED COUNT: 13.4 % (ref 12.3–15.4)
HCT VFR BLD AUTO: 39 % (ref 34–46.6)
HGB BLD-MCNC: 13 G/DL (ref 12–15.9)
IMM GRANULOCYTES # BLD AUTO: 0.03 10*3/MM3 (ref 0–0.05)
IMM GRANULOCYTES NFR BLD AUTO: 0.4 % (ref 0–0.5)
LYMPHOCYTES # BLD AUTO: 2.39 10*3/MM3 (ref 0.7–3.1)
LYMPHOCYTES NFR BLD AUTO: 28.3 % (ref 19.6–45.3)
MCH RBC QN AUTO: 28.8 PG (ref 26.6–33)
MCHC RBC AUTO-ENTMCNC: 33.3 G/DL (ref 31.5–35.7)
MCV RBC AUTO: 86.3 FL (ref 79–97)
MONOCYTES # BLD AUTO: 0.67 10*3/MM3 (ref 0.1–0.9)
MONOCYTES NFR BLD AUTO: 7.9 % (ref 5–12)
NEUTROPHILS NFR BLD AUTO: 4.95 10*3/MM3 (ref 1.7–7)
NEUTROPHILS NFR BLD AUTO: 58.4 % (ref 42.7–76)
NRBC BLD AUTO-RTO: 0 /100 WBC (ref 0–0.2)
PLATELET # BLD AUTO: 262 10*3/MM3 (ref 140–450)
PMV BLD AUTO: 12.8 FL (ref 6–12)
RBC # BLD AUTO: 4.52 10*6/MM3 (ref 3.77–5.28)
WBC # BLD AUTO: 8.46 10*3/MM3 (ref 3.4–10.8)

## 2020-07-27 PROCEDURE — 36415 COLL VENOUS BLD VENIPUNCTURE: CPT

## 2020-07-27 PROCEDURE — 85025 COMPLETE CBC W/AUTO DIFF WBC: CPT

## 2020-07-27 PROCEDURE — 93005 ELECTROCARDIOGRAM TRACING: CPT | Performed by: OTOLARYNGOLOGY

## 2020-07-27 PROCEDURE — 80053 COMPREHEN METABOLIC PANEL: CPT

## 2020-07-28 LAB
ALBUMIN SERPL-MCNC: 4.2 G/DL (ref 3.5–5.2)
ALBUMIN/GLOB SERPL: 1.6 G/DL
ALP SERPL-CCNC: 82 U/L (ref 39–117)
ALT SERPL W P-5'-P-CCNC: 22 U/L (ref 1–33)
ANION GAP SERPL CALCULATED.3IONS-SCNC: 8.9 MMOL/L (ref 5–15)
AST SERPL-CCNC: 20 U/L (ref 1–32)
BILIRUB SERPL-MCNC: 0.2 MG/DL (ref 0–1.2)
BUN SERPL-MCNC: 16 MG/DL (ref 8–23)
BUN/CREAT SERPL: 21.9 (ref 7–25)
CALCIUM SPEC-SCNC: 9.4 MG/DL (ref 8.6–10.5)
CHLORIDE SERPL-SCNC: 105 MMOL/L (ref 98–107)
CO2 SERPL-SCNC: 29.1 MMOL/L (ref 22–29)
CREAT SERPL-MCNC: 0.73 MG/DL (ref 0.57–1)
GFR SERPL CREATININE-BSD FRML MDRD: 80 ML/MIN/1.73
GLOBULIN UR ELPH-MCNC: 2.7 GM/DL
GLUCOSE SERPL-MCNC: 94 MG/DL (ref 65–99)
POTASSIUM SERPL-SCNC: 4.3 MMOL/L (ref 3.5–5.2)
PROT SERPL-MCNC: 6.9 G/DL (ref 6–8.5)
SODIUM SERPL-SCNC: 143 MMOL/L (ref 136–145)

## 2020-08-31 RX ORDER — CHOLECALCIFEROL (VITAMIN D3) 25 MCG
TABLET,CHEWABLE ORAL
Qty: 30 TABLET | Refills: 5 | Status: SHIPPED | OUTPATIENT
Start: 2020-08-31 | End: 2021-11-08

## 2020-09-11 ENCOUNTER — OFFICE VISIT (OUTPATIENT)
Dept: INTERNAL MEDICINE | Facility: CLINIC | Age: 65
End: 2020-09-11

## 2020-09-11 VITALS
WEIGHT: 256 LBS | SYSTOLIC BLOOD PRESSURE: 150 MMHG | TEMPERATURE: 97.5 F | HEIGHT: 69 IN | RESPIRATION RATE: 16 BRPM | HEART RATE: 81 BPM | OXYGEN SATURATION: 97 % | BODY MASS INDEX: 37.92 KG/M2 | DIASTOLIC BLOOD PRESSURE: 71 MMHG

## 2020-09-11 DIAGNOSIS — R35.0 URINARY FREQUENCY: Primary | ICD-10-CM

## 2020-09-11 DIAGNOSIS — K59.1 FUNCTIONAL DIARRHEA: ICD-10-CM

## 2020-09-11 DIAGNOSIS — R53.83 FATIGUE, UNSPECIFIED TYPE: ICD-10-CM

## 2020-09-11 LAB
BILIRUB BLD-MCNC: NEGATIVE MG/DL
CLARITY, POC: CLEAR
COLOR UR: YELLOW
GLUCOSE UR STRIP-MCNC: NEGATIVE MG/DL
KETONES UR QL: NEGATIVE
LEUKOCYTE EST, POC: NEGATIVE
NITRITE UR-MCNC: NEGATIVE MG/ML
PH UR: 6 [PH] (ref 5–8)
PROT UR STRIP-MCNC: NEGATIVE MG/DL
RBC # UR STRIP: ABNORMAL /UL
SP GR UR: 1.03 (ref 1–1.03)
UROBILINOGEN UR QL: NORMAL

## 2020-09-11 PROCEDURE — 99214 OFFICE O/P EST MOD 30 MIN: CPT | Performed by: INTERNAL MEDICINE

## 2020-09-11 PROCEDURE — 81003 URINALYSIS AUTO W/O SCOPE: CPT | Performed by: INTERNAL MEDICINE

## 2020-09-11 RX ORDER — KETOTIFEN FUMARATE 0.35 MG/ML
SOLUTION/ DROPS OPHTHALMIC
COMMUNITY
End: 2022-03-23

## 2020-09-11 RX ORDER — MONTELUKAST SODIUM 4 MG/1
1 TABLET, CHEWABLE ORAL DAILY
Qty: 30 TABLET | Refills: 11 | Status: SHIPPED | OUTPATIENT
Start: 2020-09-11 | End: 2020-09-22 | Stop reason: SINTOL

## 2020-09-11 NOTE — PROGRESS NOTES
Subjective     Patient ID: Albania Ramos is a 65 y.o. female. Patient is here for management of multiple medical problems.     Chief Complaint   Patient presents with   • Abdominal Pain     patient having lower abdominal pain and lower back pain, with watery diarrhea 20 minutes after eating      History of Present Illness       Lower ab pain and cramping. And then water diarrhea. Not consistent.    Some time a problem and some times ok.  20 min after eating the wrong foods causes pure water BM. 4-5 bms a day.    Worked up by Dr Coyle in the past.     S/p ccy.    Had problem prior and after ccy.          The following portions of the patient's history were reviewed and updated as appropriate: allergies, current medications, past family history, past medical history, past social history, past surgical history and problem list.    Review of Systems   Constitutional: Negative for fatigue.   Cardiovascular: Negative for chest pain and leg swelling.   Gastrointestinal: Positive for diarrhea. Negative for abdominal distention and abdominal pain.   All other systems reviewed and are negative.      Current Outpatient Medications:   •  ALLERGY SERUM INJECTION, Inject  under the skin 1 (One) Time., Disp: , Rfl:   •  allopurinol (ZYLOPRIM) 300 MG tablet, Take 1 tablet by mouth Daily., Disp: 90 tablet, Rfl: 3  •  calcium carbonate (OS-SHANTE) 600 MG tablet, Take 600 mg by mouth 2 (two) times a day with meals., Disp: , Rfl:   •  Cinnamon ([employer] CINNAMON) 500 MG capsule, Take 500 mg by mouth 2 (two) times a day., Disp: , Rfl:   •  CRANBERRY CONCENTRATE PO, Take 4,200 mg by mouth 2 (two) times a day., Disp: , Rfl:   •  cyanocobalamin (VITAMIN B-12) 2500 MCG tablet tablet, TAKE ONE TABLET BY MOUTH THREE TIMES A WEEK MUST MAKE AN APPOINTMENT, Disp: 30 tablet, Rfl: 5  •  dilTIAZem XR (DILACOR XR) 120 MG 24 hr capsule, Take 1 capsule by mouth Daily., Disp: 30 capsule, Rfl: 11  •  EPINEPHrine (EPIPEN) 0.3 MG/0.3ML solution auto-injector  "injection, , Disp: , Rfl:   •  estradiol (ESTRACE VAGINAL) 0.1 MG/GM vaginal cream, Insert 2 g into the vagina Daily., Disp: , Rfl:   •  Garlic 500 MG capsule, Take 1 tablet by mouth 2 (two) times a day., Disp: , Rfl:   •  GLUCOSAMINE HCL-MSM PO, Glucosamine-Chondrotin, Disp: , Rfl:   •  ketotifen (ZADITOR) 0.025 % ophthalmic solution, ketotifen 0.025 % (0.035 %) eye drops  INSTILL 1 DROP INTO AFFECTED EYE(S) BY OPHTHALMIC ROUTE 2 TIMES PER DAY, Disp: , Rfl:   •  levocetirizine (XYZAL) 5 MG tablet, levocetirizine 5 mg tablet  TAKE ONE TABLET BY MOUTH DAILY, Disp: , Rfl:   •  levothyroxine (SYNTHROID, LEVOTHROID) 100 MCG tablet, TAKE ONE TABLET BY MOUTH DAILY ALONG WITH 88MCG TABLET FOR TOTAL DOSE OF 188MCG, Disp: 90 tablet, Rfl: 2  •  levothyroxine (SYNTHROID, LEVOTHROID) 88 MCG tablet, TAKE ONE TABLET BY MOUTH DAILY, Disp: 90 tablet, Rfl: 3  •  Montelukast Sodium (SINGULAIR PO), montelukast, Disp: , Rfl:   •  MULTIPLE VITAMIN PO, Multiple Vitamin capsule  Daily, Disp: , Rfl:   •  Omega-3 Fatty Acids (FISH OIL) 435 MG capsule, Take 1,000 mg by mouth daily., Disp: , Rfl:   •  omeprazole (priLOSEC) 20 MG capsule, Take 1 capsule by mouth 2 (Two) Times a Day. APPOINTMENT NEEDED FOR ADDITIONAL REFILLS, Disp: 180 capsule, Rfl: 0  •  Probiotic Product (SOLUBLE FIBER/PROBIOTICS PO), Take 1 tablet by mouth daily., Disp: , Rfl:   •  vitamin C (ASCORBIC ACID) 500 MG tablet, Take 1 tablet by mouth daily., Disp: , Rfl:   •  colestipol (COLESTID) 1 g tablet, Take 1 tablet by mouth Daily. Adjust dose to achieve 1-3 BM's daily., Disp: 30 tablet, Rfl: 11    Objective      Blood pressure 150/71, pulse 81, temperature 97.5 °F (36.4 °C), temperature source Temporal, resp. rate 16, height 175.3 cm (69\"), weight 116 kg (256 lb), SpO2 97 %, not currently breastfeeding.    Physical Exam     General Appearance:    Alert, cooperative, no distress, appears stated age   Head:    Normocephalic, without obvious abnormality, atraumatic   Eyes:    " PERRL, conjunctiva/corneas clear, EOM's intact   Ears:    Normal TM's and external ear canals, both ears   Nose:   Nares normal, septum midline, mucosa normal, no drainage   or sinus tenderness   Throat:   Lips, mucosa, and tongue normal; teeth and gums normal   Neck:   Supple, symmetrical, trachea midline, no adenopathy;        thyroid:  No enlargement/tenderness/nodules; no carotid    bruit or JVD   Back:     Symmetric, no curvature, ROM normal, no CVA tenderness   Lungs:     Clear to auscultation bilaterally, respirations unlabored   Chest wall:    No tenderness or deformity   Heart:    Regular rate and rhythm, S1 and S2 normal, no murmur,        rub or gallop   Abdomen:     Soft, non-tender, bowel sounds active all four quadrants,     no masses, no organomegaly   Extremities:   Extremities normal, atraumatic, no cyanosis or edema   Pulses:   2+ and symmetric all extremities   Skin:   Skin color, texture, turgor normal, no rashes or lesions   Lymph nodes:   Cervical, supraclavicular, and axillary nodes normal   Neurologic:   CNII-XII intact. Normal strength, sensation and reflexes       throughout      Results for orders placed or performed in visit on 09/11/20   POCT urinalysis dipstick, automated   Result Value Ref Range    Color Yellow Yellow, Straw, Dark Yellow, Kelle    Clarity, UA Clear Clear    Specific Gravity  1.030 1.005 - 1.030    pH, Urine 6.0 5.0 - 8.0    Leukocytes Negative Negative    Nitrite, UA Negative Negative    Protein, POC Negative Negative mg/dL    Glucose, UA Negative Negative, 1000 mg/dL (3+) mg/dL    Ketones, UA Negative Negative    Urobilinogen, UA Normal Normal    Bilirubin Negative Negative    Blood, UA 2+ (A) Negative         Assessment/Plan       [place] was seen today for abdominal pain.    Diagnoses and all orders for this visit:    Urinary frequency  -     POCT urinalysis dipstick, automated  -     Ova & Parasite Examination - Stool, Per Rectum  -     Pancreatic Elastase, Fecal -  Stool, Per Rectum  -     Fecal Leukocytes - Stool, Per Rectum  -     Clostridium Difficile EIA - Stool, Per Rectum  -     H. Pylori Antigen, Stool - Stool, Per Rectum    Fatigue, unspecified type  -     Vitamin B12  -     CBC & Differential  -     Comprehensive Metabolic Panel  -     TSH  -     T4, Free  -     H. Pylori Antibody, IgA  -     H. Pylori Antibody, IgG  -     Glia(IgA / G) & TTG(IgA / G)  -     Endomysial Antibody, IgA / IGG Titer  -     Vasoactive Intestinal Peptide (VIP)  -     IgG  -     IgM  -     IgA  -     Vitamin B6  -     Ova & Parasite Examination - Stool, Per Rectum  -     Pancreatic Elastase, Fecal - Stool, Per Rectum  -     Fecal Leukocytes - Stool, Per Rectum  -     Clostridium Difficile EIA - Stool, Per Rectum  -     H. Pylori Antigen, Stool - Stool, Per Rectum    Functional diarrhea  -     Vitamin B12  -     CBC & Differential  -     Comprehensive Metabolic Panel  -     TSH  -     T4, Free  -     H. Pylori Antibody, IgA  -     H. Pylori Antibody, IgG  -     Glia(IgA / G) & TTG(IgA / G)  -     Endomysial Antibody, IgA / IGG Titer  -     Vasoactive Intestinal Peptide (VIP)  -     IgG  -     IgM  -     IgA  -     Vitamin B6  -     Ova & Parasite Examination - Stool, Per Rectum  -     Pancreatic Elastase, Fecal - Stool, Per Rectum  -     Fecal Leukocytes - Stool, Per Rectum  -     Clostridium Difficile EIA - Stool, Per Rectum  -     H. Pylori Antigen, Stool - Stool, Per Rectum    Other orders  -     colestipol (COLESTID) 1 g tablet; Take 1 tablet by mouth Daily. Adjust dose to achieve 1-3 BM's daily.      No follow-ups on file.          There are no Patient Instructions on file for this visit.     Gordon Norman MD    Assessment/Plan

## 2020-09-17 LAB
ALBUMIN SERPL-MCNC: 4.2 G/DL (ref 3.8–4.8)
ALBUMIN/GLOB SERPL: 2 {RATIO} (ref 1.2–2.2)
ALP SERPL-CCNC: 84 IU/L (ref 39–117)
ALT SERPL-CCNC: 24 IU/L (ref 0–32)
AST SERPL-CCNC: 23 IU/L (ref 0–40)
BASOPHILS # BLD AUTO: 0.1 X10E3/UL (ref 0–0.2)
BASOPHILS NFR BLD AUTO: 1 %
BILIRUB SERPL-MCNC: 0.2 MG/DL (ref 0–1.2)
BUN SERPL-MCNC: 16 MG/DL (ref 8–27)
BUN/CREAT SERPL: 20 (ref 12–28)
CALCIUM SERPL-MCNC: 9.4 MG/DL (ref 8.7–10.3)
CHLORIDE SERPL-SCNC: 104 MMOL/L (ref 96–106)
CO2 SERPL-SCNC: 26 MMOL/L (ref 20–29)
CREAT SERPL-MCNC: 0.79 MG/DL (ref 0.57–1)
ENDOMYSIAL ANTIBODY TITER IGA: NORMAL TITER
ENDOMYSIAL ANTIBODY TITER IGG: NORMAL TITER
EOSINOPHIL # BLD AUTO: 0.3 X10E3/UL (ref 0–0.4)
EOSINOPHIL NFR BLD AUTO: 4 %
ERYTHROCYTE [DISTWIDTH] IN BLOOD BY AUTOMATED COUNT: 13.3 % (ref 11.7–15.4)
GLIADIN PEPTIDE IGA SER-ACNC: 3 UNITS (ref 0–19)
GLIADIN PEPTIDE IGG SER-ACNC: 5 UNITS (ref 0–19)
GLOBULIN SER CALC-MCNC: 2.1 G/DL (ref 1.5–4.5)
GLUCOSE SERPL-MCNC: 89 MG/DL (ref 65–99)
H PYLORI IGA SER-ACNC: <9 UNITS (ref 0–8.9)
H PYLORI IGG SER IA-ACNC: 1.33 INDEX VALUE (ref 0–0.79)
HCT VFR BLD AUTO: 38.9 % (ref 34–46.6)
HGB BLD-MCNC: 12.8 G/DL (ref 11.1–15.9)
IGA SERPL-MCNC: 127 MG/DL (ref 87–352)
IGG SERPL-MCNC: 751 MG/DL (ref 586–1602)
IGM SERPL-MCNC: 37 MG/DL (ref 26–217)
IMM GRANULOCYTES # BLD AUTO: 0 X10E3/UL (ref 0–0.1)
IMM GRANULOCYTES NFR BLD AUTO: 0 %
LYMPHOCYTES # BLD AUTO: 2.2 X10E3/UL (ref 0.7–3.1)
LYMPHOCYTES NFR BLD AUTO: 32 %
MCH RBC QN AUTO: 28.5 PG (ref 26.6–33)
MCHC RBC AUTO-ENTMCNC: 32.9 G/DL (ref 31.5–35.7)
MCV RBC AUTO: 87 FL (ref 79–97)
MONOCYTES # BLD AUTO: 0.5 X10E3/UL (ref 0.1–0.9)
MONOCYTES NFR BLD AUTO: 7 %
NEUTROPHILS # BLD AUTO: 3.8 X10E3/UL (ref 1.4–7)
NEUTROPHILS NFR BLD AUTO: 56 %
PLATELET # BLD AUTO: 235 X10E3/UL (ref 150–450)
POTASSIUM SERPL-SCNC: 4.5 MMOL/L (ref 3.5–5.2)
PROT SERPL-MCNC: 6.3 G/DL (ref 6–8.5)
RBC # BLD AUTO: 4.49 X10E6/UL (ref 3.77–5.28)
SODIUM SERPL-SCNC: 144 MMOL/L (ref 134–144)
T4 FREE SERPL-MCNC: 1.67 NG/DL (ref 0.82–1.77)
TSH SERPL DL<=0.005 MIU/L-ACNC: 0.13 UIU/ML (ref 0.45–4.5)
TTG IGA SER-ACNC: <2 U/ML (ref 0–3)
TTG IGG SER-ACNC: 4 U/ML (ref 0–5)
VIP SERPL-MCNC: 20.8 PG/ML (ref 0–58.8)
VIT B12 SERPL-MCNC: 1856 PG/ML (ref 232–1245)
VIT B6 SERPL-MCNC: 15.9 UG/L (ref 2–32.8)
WBC # BLD AUTO: 6.8 X10E3/UL (ref 3.4–10.8)

## 2020-09-22 ENCOUNTER — OFFICE VISIT (OUTPATIENT)
Dept: PULMONOLOGY | Facility: CLINIC | Age: 65
End: 2020-09-22

## 2020-09-22 ENCOUNTER — OFFICE VISIT (OUTPATIENT)
Dept: INTERNAL MEDICINE | Facility: CLINIC | Age: 65
End: 2020-09-22

## 2020-09-22 VITALS
OXYGEN SATURATION: 98 % | DIASTOLIC BLOOD PRESSURE: 68 MMHG | HEART RATE: 78 BPM | WEIGHT: 255 LBS | BODY MASS INDEX: 37.77 KG/M2 | TEMPERATURE: 97.5 F | SYSTOLIC BLOOD PRESSURE: 112 MMHG | RESPIRATION RATE: 16 BRPM | HEIGHT: 69 IN

## 2020-09-22 VITALS
TEMPERATURE: 98.2 F | DIASTOLIC BLOOD PRESSURE: 62 MMHG | SYSTOLIC BLOOD PRESSURE: 138 MMHG | HEART RATE: 72 BPM | WEIGHT: 254 LBS | BODY MASS INDEX: 37.62 KG/M2 | RESPIRATION RATE: 16 BRPM | HEIGHT: 69 IN | OXYGEN SATURATION: 98 %

## 2020-09-22 DIAGNOSIS — K57.90 DIVERTICULOSIS: Primary | ICD-10-CM

## 2020-09-22 DIAGNOSIS — G47.33 OSA (OBSTRUCTIVE SLEEP APNEA): Primary | ICD-10-CM

## 2020-09-22 DIAGNOSIS — K40.91 UNILATERAL RECURRENT INGUINAL HERNIA WITHOUT OBSTRUCTION OR GANGRENE: ICD-10-CM

## 2020-09-22 DIAGNOSIS — J45.30 MILD PERSISTENT ASTHMA WITHOUT COMPLICATION: ICD-10-CM

## 2020-09-22 DIAGNOSIS — R10.30 LOWER ABDOMINAL PAIN: ICD-10-CM

## 2020-09-22 DIAGNOSIS — M19.90 ARTHRITIS: ICD-10-CM

## 2020-09-22 DIAGNOSIS — J30.89 OTHER ALLERGIC RHINITIS: ICD-10-CM

## 2020-09-22 DIAGNOSIS — E78.00 HYPERCHOLESTEREMIA: ICD-10-CM

## 2020-09-22 PROCEDURE — 99214 OFFICE O/P EST MOD 30 MIN: CPT | Performed by: INTERNAL MEDICINE

## 2020-09-22 RX ORDER — MOMETASONE FUROATE 200 UG/1
2 AEROSOL RESPIRATORY (INHALATION) DAILY
COMMUNITY
End: 2020-09-22 | Stop reason: SDUPTHER

## 2020-09-22 RX ORDER — ALBUTEROL SULFATE 90 UG/1
2 AEROSOL, METERED RESPIRATORY (INHALATION) EVERY 4 HOURS PRN
Qty: 18 G | Refills: 5 | Status: SHIPPED | OUTPATIENT
Start: 2020-09-22 | End: 2021-11-08 | Stop reason: SDUPTHER

## 2020-09-22 RX ORDER — MOMETASONE FUROATE 200 UG/1
2 AEROSOL RESPIRATORY (INHALATION) DAILY
Qty: 1 INHALER | Refills: 5 | COMMUNITY
Start: 2020-09-22 | End: 2020-12-21 | Stop reason: SDUPTHER

## 2020-09-22 NOTE — PROGRESS NOTES
"Chief Complaint   Patient presents with   • Follow-up   • Sleeping Problem       Subjective   [place] Ramos is a 65 y.o. female.     History of Present Illness   Patient was evaluated today for follow up of asthma, STEFAN and shortness of breath. Patient does not report any recent exacerbations requiring emergency room visits or hospitalizations.    Patient is using the rescue inhalers minimally. she is compliant with pulmonary medicines, as prescribed.    Patient doesn't report any issues with the device. The patient describes no significant issues with her mask either. Patient says that the compliance with the use of the equipment is good.     Patient says that her symptoms of fatigue have been helped greatly with the use of PAP, as prescribed.     Patient says that she has been using her nasal sprays on a regular basis and describes no significant ongoing issues other than occasional congestion.     The following portions of the patient's history were reviewed and updated as appropriate: allergies, current medications, past family history, past medical history, past social history and past surgical history.    Review of Systems   Constitutional: Negative for chills and fever.   HENT: Positive for rhinorrhea, sinus pressure and sneezing. Negative for sore throat.    Respiratory: Positive for wheezing. Negative for cough, chest tightness and shortness of breath.    Psychiatric/Behavioral: Negative for sleep disturbance.       Objective   Visit Vitals  /68   Pulse 78   Temp 97.5 °F (36.4 °C)   Resp 16   Ht 175.3 cm (69\")   Wt 116 kg (255 lb)   LMP  (LMP Unknown)   SpO2 98%   BMI 37.66 kg/m²       Physical Exam  Vitals signs reviewed.   Constitutional:       Appearance: She is well-developed.   HENT:      Head: Atraumatic.      Mouth/Throat:      Comments: Oropharynx was crowded.  Neck:      Comments: Increased adipose tissue noted.  Cardiovascular:      Rate and Rhythm: Normal rate and regular rhythm.      Heart " sounds: Normal heart sounds. No murmur.   Pulmonary:      Effort: Pulmonary effort is normal. No respiratory distress.      Breath sounds: Normal breath sounds. No wheezing or rales.   Musculoskeletal:      Comments: Gait was normal.   Neurological:      Mental Status: She is alert and oriented to person, place, and time.         Assessment/Plan   [place] was seen today for follow-up and sleeping problem.    Diagnoses and all orders for this visit:    STEFAN (obstructive sleep apnea)    Other allergic rhinitis    Mild persistent asthma without complication  -     Nitric Oxide; Future    Other orders  -     Mometasone Furoate (Asmanex HFA) 200 MCG/ACT aerosol; Inhale 2 puffs Daily.  -     albuterol sulfate HFA (Ventolin HFA) 108 (90 Base) MCG/ACT inhaler; Inhale 2 puffs Every 4 (Four) Hours As Needed for Wheezing or Shortness of Air.           Return in about 6 months (around 3/22/2021) for Recheck, FeNO F/U, For Nubia, ....Also 13 mths w/ Dr. Mustafa.    DISCUSSION (if any):  It appears that her symptoms of asthma are under adequate control with the current regimen.    Patient's medications for underlying asthma were reviewed in great detail.    Patient was informed about the black box warning for Sien regarding suicidal thoughts and tendencies.  she denies any ongoing issues for now.     Since the patient is doing fairly well as far as controlled asthma symptoms are concerned, I have recommended that she discontinues using Asmanex after thanksgiving.    The patient was asked to restart Asmanex on a regular basis if her symptoms worsen or if she requires her rescue inhaler more than 3 to 4 times a week or if she has any night time symptoms or if she has any exacerbation while she is not on regular schedule dose of Asmanex.    Compliance with medications stressed.     Side effects of prescribed medications discussed with the patient.    I have discussed asthma action plan with her.    The patient was asked to call  this office if the symptoms worsen.    Patient was advised to continue her nasal spray, especially given improvement in symptoms overall.    I reviewed the results of last sleep study in detail. I informed her that the apnea hypopnea index was 21/ hr. REM AHI was 54/hour. This was an in lab study. This was done in Dec 2016.    Continue treatment with CPAP at a pressure of 10, with a full-face mask.    Patient seems to be compliant with PAP device, based on the available data and her account of improved symptoms.     The patient was once again reminded to continue using the PAP device regularly, every night for atleast 4 hours.      Dictated utilizing Dragon dictation.    This document was electronically signed by Moreno Mustafa MD on 09/22/20 at 10:15 EDT

## 2020-09-22 NOTE — PROGRESS NOTES
Subjective     Patient ID: [place]ia Ramos is a 65 y.o. female. Patient is here for management of multiple medical problems.     Chief Complaint   Patient presents with   • Abdominal Pain     follow-up, patient states the pain come and goes, unable to take Colestipol causes constipation     History of Present Illness     Ab pain and constipation on coletipol    Ab pain colonoscopy last fall.  Sever diverticulosis.  Hernia in left lower ab.    Pain in left lower ab.  ingunal hernia repair x 3. Still with sig pain in hernia repair in the past.  Seen Dr Bernal.   Has pain on bending over and she is having more symptoms and modifying her ADL's to avoid pain.      h pylori.  Ab +   Stool neg.    Now with lower ab pain.     Sausage balls this am.             The following portions of the patient's history were reviewed and updated as appropriate: allergies, current medications, past family history, past medical history, past social history, past surgical history and problem list.    Review of Systems   Constitutional: Negative for fatigue.   Respiratory: Negative for cough, shortness of breath and stridor.    Psychiatric/Behavioral: Negative for self-injury and sleep disturbance. The patient is not nervous/anxious.    All other systems reviewed and are negative.      Current Outpatient Medications:   •  albuterol sulfate HFA (Ventolin HFA) 108 (90 Base) MCG/ACT inhaler, Inhale 2 puffs Every 4 (Four) Hours As Needed for Wheezing or Shortness of Air., Disp: 18 g, Rfl: 5  •  ALLERGY SERUM INJECTION, Inject  under the skin 1 (One) Time., Disp: , Rfl:   •  allopurinol (ZYLOPRIM) 300 MG tablet, Take 1 tablet by mouth Daily., Disp: 90 tablet, Rfl: 3  •  calcium carbonate (OS-SHANTE) 600 MG tablet, Take 600 mg by mouth 2 (two) times a day with meals., Disp: , Rfl:   •  Cinnamon ([employer] CINNAMON) 500 MG capsule, Take 500 mg by mouth 2 (two) times a day., Disp: , Rfl:   •  CRANBERRY CONCENTRATE PO, Take 4,200 mg by mouth 2 (two) times a  day., Disp: , Rfl:   •  cyanocobalamin (VITAMIN B-12) 2500 MCG tablet tablet, TAKE ONE TABLET BY MOUTH THREE TIMES A WEEK MUST MAKE AN APPOINTMENT, Disp: 30 tablet, Rfl: 5  •  dilTIAZem XR (DILACOR XR) 120 MG 24 hr capsule, Take 1 capsule by mouth Daily., Disp: 30 capsule, Rfl: 11  •  EPINEPHrine (EPIPEN) 0.3 MG/0.3ML solution auto-injector injection, , Disp: , Rfl:   •  estradiol (ESTRACE VAGINAL) 0.1 MG/GM vaginal cream, Insert 2 g into the vagina Daily., Disp: , Rfl:   •  Garlic 500 MG capsule, Take 1 tablet by mouth 2 (two) times a day., Disp: , Rfl:   •  GLUCOSAMINE HCL-MSM PO, Glucosamine-Chondrotin, Disp: , Rfl:   •  ketotifen (ZADITOR) 0.025 % ophthalmic solution, ketotifen 0.025 % (0.035 %) eye drops  INSTILL 1 DROP INTO AFFECTED EYE(S) BY OPHTHALMIC ROUTE 2 TIMES PER DAY, Disp: , Rfl:   •  levocetirizine (XYZAL) 5 MG tablet, levocetirizine 5 mg tablet  TAKE ONE TABLET BY MOUTH DAILY, Disp: , Rfl:   •  levothyroxine (SYNTHROID, LEVOTHROID) 100 MCG tablet, TAKE ONE TABLET BY MOUTH DAILY ALONG WITH 88MCG TABLET FOR TOTAL DOSE OF 188MCG, Disp: 90 tablet, Rfl: 2  •  levothyroxine (SYNTHROID, LEVOTHROID) 88 MCG tablet, TAKE ONE TABLET BY MOUTH DAILY, Disp: 90 tablet, Rfl: 3  •  Mometasone Furoate (Asmanex HFA) 200 MCG/ACT aerosol, Inhale 2 puffs Daily., Disp: 1 inhaler, Rfl: 5  •  Montelukast Sodium (SINGULAIR PO), montelukast, Disp: , Rfl:   •  MULTIPLE VITAMIN PO, Multiple Vitamin capsule  Daily, Disp: , Rfl:   •  Omega-3 Fatty Acids (FISH OIL) 435 MG capsule, Take 1,000 mg by mouth daily., Disp: , Rfl:   •  omeprazole (priLOSEC) 20 MG capsule, Take 1 capsule by mouth 2 (Two) Times a Day. APPOINTMENT NEEDED FOR ADDITIONAL REFILLS, Disp: 180 capsule, Rfl: 0  •  Probiotic Product (SOLUBLE FIBER/PROBIOTICS PO), Take 1 tablet by mouth daily., Disp: , Rfl:   •  vitamin C (ASCORBIC ACID) 500 MG tablet, Take 1 tablet by mouth daily., Disp: , Rfl:     Objective      Blood pressure 138/62, pulse 72, temperature 98.2 °F  "(36.8 °C), temperature source Temporal, resp. rate 16, height 175.3 cm (69\"), weight 115 kg (254 lb), SpO2 98 %, not currently breastfeeding.    Physical Exam     General Appearance:    Alert, cooperative, no distress, appears stated age   Head:    Normocephalic, without obvious abnormality, atraumatic   Eyes:    PERRL, conjunctiva/corneas clear, EOM's intact   Ears:    Normal TM's and external ear canals, both ears   Nose:   Nares normal, septum midline, mucosa normal, no drainage   or sinus tenderness   Throat:   Lips, mucosa, and tongue normal; teeth and gums normal   Neck:   Supple, symmetrical, trachea midline, no adenopathy;        thyroid:  No enlargement/tenderness/nodules; no carotid    bruit or JVD   Back:     Symmetric, no curvature, ROM normal, no CVA tenderness   Lungs:     Clear to auscultation bilaterally, respirations unlabored   Chest wall:    No tenderness or deformity   Heart:    Regular rate and rhythm, S1 and S2 normal, no murmur,        rub or gallop   Abdomen:     Soft, non-tender, bowel sounds active all four quadrants,     no masses, no organomegaly   Extremities:   Extremities normal, atraumatic, no cyanosis or edema   Pulses:   2+ and symmetric all extremities   Skin:   Skin color, texture, turgor normal, no rashes or lesions   Lymph nodes:   Cervical, supraclavicular, and axillary nodes normal   Neurologic:   CNII-XII intact. Normal strength, sensation and reflexes       throughout      Results for orders placed or performed in visit on 09/11/20   Ova & Parasite Examination - Stool, Per Rectum    Specimen: Per Rectum; Stool    ST     CD- [id number]   Result Value Ref Range    Ova + Parasite Exam Final report     Ova + Parasite Result 1 Comment    Fecal Leukocytes - Stool, Per Rectum    Specimen: Per Rectum; Stool    ST     CD- [id number]   Result Value Ref Range    Fecal Leukocytes Final report None Seen    Result 1 Comment    Clostridium Difficile EIA - Stool, Per Rectum    Specimen: Per " Rectum; Stool    ST     - [id number]   Result Value Ref Range    C difficile Toxins A+B, EIA Negative Negative   Vitamin B12    Specimen: Blood   Result Value Ref Range    Vitamin B-12 1,856 (H) 232 - 1,245 pg/mL   Comprehensive Metabolic Panel    Specimen: Blood   Result Value Ref Range    Glucose 89 65 - 99 mg/dL    BUN 16 8 - 27 mg/dL    Creatinine 0.79 0.57 - 1.00 mg/dL    eGFR Non African Am 79 >59 mL/min/1.73    eGFR African Am 91 >59 mL/min/1.73    BUN/Creatinine Ratio 20 12 - 28    Sodium 144 134 - 144 mmol/L    Potassium 4.5 3.5 - 5.2 mmol/L    Chloride 104 96 - 106 mmol/L    Total CO2 26 20 - 29 mmol/L    Calcium 9.4 8.7 - 10.3 mg/dL    Total Protein 6.3 6.0 - 8.5 g/dL    Albumin 4.2 3.8 - 4.8 g/dL    Globulin 2.1 1.5 - 4.5 g/dL    A/G Ratio 2.0 1.2 - 2.2    Total Bilirubin 0.2 0.0 - 1.2 mg/dL    Alkaline Phosphatase 84 39 - 117 IU/L    AST (SGOT) 23 0 - 40 IU/L    ALT (SGPT) 24 0 - 32 IU/L   TSH    Specimen: Blood   Result Value Ref Range    TSH 0.126 (L) 0.450 - 4.500 uIU/mL   T4, Free    Specimen: Blood   Result Value Ref Range    Free T4 1.67 0.82 - 1.77 ng/dL   H. Pylori Antibody, IgA    Specimen: Blood   Result Value Ref Range    H. pylori, IgA ABS <9.0 0.0 - 8.9 units   H. Pylori Antibody, IgG    Specimen: Blood   Result Value Ref Range    H. pylori IgG 1.33 (H) 0.00 - 0.79 Index Value   Glia(IgA / G) & TTG(IgA / G)    Specimen: Blood   Result Value Ref Range    Gliadin Deamidated Peptide Ab, IgA 3 0 - 19 units    Deaminated Gliadin Ab IgG 5 0 - 19 units    Tissue Transglutaminase IgA <2 0 - 3 U/mL    Tissue Transglutaminase IgG 4 0 - 5 U/mL   Endomysial Antibody, IgA / IGG Titer    Specimen: Blood   Result Value Ref Range    Endomysial Antibody Titer IgA <1:2.5 titer    Endomysial Antibody Titer IgG <1:2.5 titer   Vasoactive Intestinal Peptide (VIP)    Specimen: Blood   Result Value Ref Range    Vasoactive Intestinal Peptide 20.8 0.0 - 58.8 pg/mL   IgG    Specimen: Blood   Result Value Ref Range     IgG 751 586 - 1,602 mg/dL   IgM    Specimen: Blood   Result Value Ref Range    IgM 37 26 - 217 mg/dL   IgA    Specimen: Blood   Result Value Ref Range    IgA 127 87 - 352 mg/dL   Vitamin B6    Specimen: Blood   Result Value Ref Range    Vitamin B6 15.9 2.0 - 32.8 ug/L   Pancreatic Elastase, Fecal - Stool, Per Rectum    Specimen: Per Rectum; Stool   Result Value Ref Range    Pancreatic Fecal     H. Pylori Antigen, Stool - Stool, Per Rectum    Specimen: Per Rectum; Stool    ST     CD- [id number]   Result Value Ref Range    H pylori Ag, Stl Negative Negative   POCT urinalysis dipstick, automated    Specimen: Urine   Result Value Ref Range    Color Yellow Yellow, Straw, Dark Yellow, Kelle    Clarity, UA Clear Clear    Specific Gravity  1.030 1.005 - 1.030    pH, Urine 6.0 5.0 - 8.0    Leukocytes Negative Negative    Nitrite, UA Negative Negative    Protein, POC Negative Negative mg/dL    Glucose, UA Negative Negative, 1000 mg/dL (3+) mg/dL    Ketones, UA Negative Negative    Urobilinogen, UA Normal Normal    Bilirubin Negative Negative    Blood, UA 2+ (A) Negative   CBC & Differential    Specimen: Blood   Result Value Ref Range    WBC 6.8 3.4 - 10.8 x10E3/uL    RBC 4.49 3.77 - 5.28 x10E6/uL    Hemoglobin 12.8 11.1 - 15.9 g/dL    Hematocrit 38.9 34.0 - 46.6 %    MCV 87 79 - 97 fL    MCH 28.5 26.6 - 33.0 pg    MCHC 32.9 31.5 - 35.7 g/dL    RDW 13.3 11.7 - 15.4 %    Platelets 235 150 - 450 x10E3/uL    Neutrophil Rel % 56 Not Estab. %    Lymphocyte Rel % 32 Not Estab. %    Monocyte Rel % 7 Not Estab. %    Eosinophil Rel % 4 Not Estab. %    Basophil Rel % 1 Not Estab. %    Neutrophils Absolute 3.8 1.4 - 7.0 x10E3/uL    Lymphocytes Absolute 2.2 0.7 - 3.1 x10E3/uL    Monocytes Absolute 0.5 0.1 - 0.9 x10E3/uL    Eosinophils Absolute 0.3 0.0 - 0.4 x10E3/uL    Basophils Absolute 0.1 0.0 - 0.2 x10E3/uL    Immature Granulocyte Rel % 0 Not Estab. %    Immature Grans Absolute 0.0 0.0 - 0.1 x10E3/uL         Assessment/Plan     Ab  pain colonoscopy last fall.  Sever diverticulosis.  Hernia in left lower ab. Repair x 3. Another hernia on the uper mid ab.  Will Need DR Bernal to reeval.           Total hyst 24 years ago.          [place] was seen today for abdominal pain.    Diagnoses and all orders for this visit:    Diverticulosis  -     Ambulatory Referral to General Surgery    Lower abdominal pain  -     Ambulatory Referral to General Surgery    Unilateral recurrent inguinal hernia without obstruction or gangrene  -     Ambulatory Referral to General Surgery    Arthritis  -     Uric Acid      Return in about 4 months (around 1/22/2021).          There are no Patient Instructions on file for this visit.     Gordon Norman MD    Assessment/Plan

## 2020-09-23 ENCOUNTER — OFFICE VISIT (OUTPATIENT)
Dept: SURGERY | Facility: CLINIC | Age: 65
End: 2020-09-23

## 2020-09-23 VITALS
BODY MASS INDEX: 37.47 KG/M2 | DIASTOLIC BLOOD PRESSURE: 76 MMHG | HEIGHT: 69 IN | OXYGEN SATURATION: 96 % | HEART RATE: 75 BPM | TEMPERATURE: 98 F | SYSTOLIC BLOOD PRESSURE: 118 MMHG | WEIGHT: 253 LBS

## 2020-09-23 DIAGNOSIS — R10.32 CHRONIC GROIN PAIN, LEFT: Primary | ICD-10-CM

## 2020-09-23 DIAGNOSIS — G89.29 CHRONIC GROIN PAIN, LEFT: Primary | ICD-10-CM

## 2020-09-23 LAB
C DIFF TOX A+B STL QL IA: NEGATIVE
ELASTASE PANC STL-MCNT: 332 UG ELAST./G
H PYLORI AG STL QL IA: NEGATIVE
O+P SPEC MICRO: NORMAL
O+P STL CONC: NORMAL
URATE SERPL-MCNC: 4.8 MG/DL (ref 2.4–5.7)
WBC STL QL MICRO: NORMAL
WBC STL QL MICRO: NORMAL

## 2020-09-23 PROCEDURE — 99213 OFFICE O/P EST LOW 20 MIN: CPT | Performed by: SURGERY

## 2020-09-30 ENCOUNTER — TELEPHONE (OUTPATIENT)
Dept: SURGERY | Facility: CLINIC | Age: 65
End: 2020-09-30

## 2020-09-30 NOTE — TELEPHONE ENCOUNTER
PT WOULD LIKE A CALL BACK. A MRI SHOULD HAVE BEEN SCHEDULED, [facility] HAS NOT RECEIVED AN ORDER. PT STATES THAT SHE IS IN PAIN. PT

## 2020-10-02 NOTE — TELEPHONE ENCOUNTER
Patient called concerned why she hasn't received a call about her MRI. She states she is in pain & needs to know what is going on.  Thanks

## 2020-10-08 RX ORDER — OMEPRAZOLE 20 MG/1
20 CAPSULE, DELAYED RELEASE ORAL 2 TIMES DAILY
Qty: 180 CAPSULE | Refills: 3 | Status: SHIPPED | OUTPATIENT
Start: 2020-10-08 | End: 2020-12-28

## 2020-10-19 ENCOUNTER — OFFICE VISIT (OUTPATIENT)
Dept: INTERNAL MEDICINE | Facility: CLINIC | Age: 65
End: 2020-10-19

## 2020-10-19 ENCOUNTER — HOSPITAL ENCOUNTER (OUTPATIENT)
Dept: GENERAL RADIOLOGY | Facility: HOSPITAL | Age: 65
Discharge: HOME | End: 2020-10-19
Admitting: INTERNAL MEDICINE

## 2020-10-19 VITALS
DIASTOLIC BLOOD PRESSURE: 82 MMHG | SYSTOLIC BLOOD PRESSURE: 145 MMHG | RESPIRATION RATE: 16 BRPM | WEIGHT: 255 LBS | BODY MASS INDEX: 37.77 KG/M2 | HEART RATE: 69 BPM | HEIGHT: 69 IN | TEMPERATURE: 98.2 F | OXYGEN SATURATION: 98 %

## 2020-10-19 DIAGNOSIS — M79.672 FOOT PAIN, LEFT: ICD-10-CM

## 2020-10-19 DIAGNOSIS — M25.512 ACUTE PAIN OF LEFT SHOULDER: ICD-10-CM

## 2020-10-19 DIAGNOSIS — M19.90 ARTHRITIS: ICD-10-CM

## 2020-10-19 DIAGNOSIS — M25.512 ACUTE PAIN OF LEFT SHOULDER: Primary | ICD-10-CM

## 2020-10-19 PROCEDURE — 73630 X-RAY EXAM OF FOOT: CPT

## 2020-10-19 PROCEDURE — 73030 X-RAY EXAM OF SHOULDER: CPT

## 2020-10-19 PROCEDURE — 99214 OFFICE O/P EST MOD 30 MIN: CPT | Performed by: INTERNAL MEDICINE

## 2020-10-19 PROCEDURE — 73130 X-RAY EXAM OF HAND: CPT

## 2020-10-19 NOTE — PROGRESS NOTES
Subjective     Patient ID: Albania Ramos is a 65 y.o. female. Patient is here for management of multiple medical problems.     Chief Complaint   Patient presents with   • Pain     patient having left shoulder pain while exercising at the gym x 2 weeks ago, patient also having increased arthritis pain     History of Present Illness     In Gym and doing lateral pull downs.  3 pops in shoulder at the same time and pain in left soulder since.  2 weeks ago. Limited rom.    No otc for pain        The following portions of the patient's history were reviewed and updated as appropriate: allergies, current medications, past family history, past medical history, past social history, past surgical history and problem list.    Review of Systems   Constitutional: Negative for fatigue.   Musculoskeletal: Positive for arthralgias and joint swelling. Negative for gait problem.   All other systems reviewed and are negative.      Current Outpatient Medications:   •  albuterol sulfate HFA (Ventolin HFA) 108 (90 Base) MCG/ACT inhaler, Inhale 2 puffs Every 4 (Four) Hours As Needed for Wheezing or Shortness of Air., Disp: 18 g, Rfl: 5  •  ALLERGY SERUM INJECTION, Inject  under the skin 1 (One) Time., Disp: , Rfl:   •  allopurinol (ZYLOPRIM) 300 MG tablet, Take 1 tablet by mouth Daily., Disp: 90 tablet, Rfl: 3  •  calcium carbonate (OS-SHANTE) 600 MG tablet, Take 600 mg by mouth 2 (two) times a day with meals., Disp: , Rfl:   •  Cinnamon ([employer] CINNAMON) 500 MG capsule, Take 500 mg by mouth 2 (two) times a day., Disp: , Rfl:   •  CRANBERRY CONCENTRATE PO, Take 4,200 mg by mouth 2 (two) times a day., Disp: , Rfl:   •  cyanocobalamin (VITAMIN B-12) 2500 MCG tablet tablet, TAKE ONE TABLET BY MOUTH THREE TIMES A WEEK MUST MAKE AN APPOINTMENT, Disp: 30 tablet, Rfl: 5  •  dilTIAZem XR (DILACOR XR) 120 MG 24 hr capsule, Take 1 capsule by mouth Daily., Disp: 30 capsule, Rfl: 11  •  EPINEPHrine (EPIPEN) 0.3 MG/0.3ML solution auto-injector injection, ,  "Disp: , Rfl:   •  estradiol (ESTRACE VAGINAL) 0.1 MG/GM vaginal cream, Insert 2 g into the vagina Daily., Disp: , Rfl:   •  Garlic 500 MG capsule, Take 1 tablet by mouth 2 (two) times a day., Disp: , Rfl:   •  GLUCOSAMINE HCL-MSM PO, Glucosamine-Chondrotin, Disp: , Rfl:   •  ketotifen (ZADITOR) 0.025 % ophthalmic solution, ketotifen 0.025 % (0.035 %) eye drops  INSTILL 1 DROP INTO AFFECTED EYE(S) BY OPHTHALMIC ROUTE 2 TIMES PER DAY, Disp: , Rfl:   •  levocetirizine (XYZAL) 5 MG tablet, levocetirizine 5 mg tablet  TAKE ONE TABLET BY MOUTH DAILY, Disp: , Rfl:   •  levothyroxine (SYNTHROID, LEVOTHROID) 100 MCG tablet, TAKE ONE TABLET BY MOUTH DAILY ALONG WITH 88MCG TABLET FOR TOTAL DOSE OF 188MCG, Disp: 90 tablet, Rfl: 2  •  levothyroxine (SYNTHROID, LEVOTHROID) 88 MCG tablet, TAKE ONE TABLET BY MOUTH DAILY, Disp: 90 tablet, Rfl: 3  •  Mometasone Furoate (Asmanex HFA) 200 MCG/ACT aerosol, Inhale 2 puffs Daily., Disp: 1 inhaler, Rfl: 5  •  Montelukast Sodium (SINGULAIR PO), montelukast, Disp: , Rfl:   •  MULTIPLE VITAMIN PO, Multiple Vitamin capsule  Daily, Disp: , Rfl:   •  Omega-3 Fatty Acids (FISH OIL) 435 MG capsule, Take 1,000 mg by mouth daily., Disp: , Rfl:   •  omeprazole (priLOSEC) 20 MG capsule, Take 1 capsule by mouth 2 (Two) Times a Day. APPOINTMENT NEEDED FOR ADDITIONAL REFILLS, Disp: 180 capsule, Rfl: 3  •  Probiotic Product (SOLUBLE FIBER/PROBIOTICS PO), Take 1 tablet by mouth daily., Disp: , Rfl:   •  vitamin C (ASCORBIC ACID) 500 MG tablet, Take 1 tablet by mouth daily., Disp: , Rfl:   •  diclofenac (VOLTAREN) 1 % gel gel, Apply 4 g topically to the appropriate area as directed 4 (Four) Times a Day As Needed (pain)., Disp: 100 g, Rfl: 1    Objective      Blood pressure 145/82, pulse 69, temperature 98.2 °F (36.8 °C), temperature source Temporal, resp. rate 16, height 175.3 cm (69\"), weight 116 kg (255 lb), SpO2 98 %, not currently breastfeeding.    Physical Exam     General Appearance:    Alert, " cooperative, no distress, appears stated age   Head:    Normocephalic, without obvious abnormality, atraumatic   Eyes:    PERRL, conjunctiva/corneas clear, EOM's intact   Ears:    Normal TM's and external ear canals, both ears   Nose:   Nares normal, septum midline, mucosa normal, no drainage   or sinus tenderness   Throat:   Lips, mucosa, and tongue normal; teeth and gums normal   Neck:   Supple, symmetrical, trachea midline, no adenopathy;        thyroid:  No enlargement/tenderness/nodules; no carotid    bruit or JVD   Back:     Symmetric, no curvature, ROM normal, no CVA tenderness   Lungs:     Clear to auscultation bilaterally, respirations unlabored   Chest wall:    No tenderness or deformity   Heart:    Regular rate and rhythm, S1 and S2 normal, no murmur,        rub or gallop   Abdomen:     Soft, non-tender, bowel sounds active all four quadrants,     no masses, no organomegaly   Extremities:   4/5 in left shoulder compared with right.     Pulses:   2+ and symmetric all extremities   Skin:   Skin color, texture, turgor normal, no rashes or lesions   Lymph nodes:   Cervical, supraclavicular, and axillary nodes normal   Neurologic:   CNII-XII intact. Normal strength, sensation and reflexes       throughout      Results for orders placed or performed in visit on 09/22/20   Uric Acid    Specimen: Blood   Result Value Ref Range    Uric Acid 4.8 2.4 - 5.7 mg/dL         Assessment/Plan       Diagnoses and all orders for this visit:    1. Acute pain of left shoulder (Primary)  -     Ambulatory Referral to Physical Therapy Evaluate and treat; Soft Tissue Mobilizaton; Stretching, Strengthening, ROM  -     XR Shoulder 1 View Left; Future    2. Foot pain, left  -     Ambulatory Referral to Podiatry    3. Arthritis  -     XR Shoulder 1 View Left; Future  -     XR Hand 2 View Bilateral  -     XR Foot 3+ View Left; Future    Other orders  -     diclofenac (VOLTAREN) 1 % gel gel; Apply 4 g topically to the appropriate area  as directed 4 (Four) Times a Day As Needed (pain).  Dispense: 100 g; Refill: 1      Return if symptoms worsen or fail to improve.          There are no Patient Instructions on file for this visit.     Gordon Norman MD    Assessment/Plan

## 2020-10-21 ENCOUNTER — TRANSCRIBE ORDERS (OUTPATIENT)
Dept: ADMINISTRATIVE | Facility: HOSPITAL | Age: 65
End: 2020-10-21

## 2020-10-21 DIAGNOSIS — Z12.31 VISIT FOR SCREENING MAMMOGRAM: Primary | ICD-10-CM

## 2020-10-22 ENCOUNTER — TREATMENT (OUTPATIENT)
Dept: PHYSICAL THERAPY | Facility: CLINIC | Age: 65
End: 2020-10-22

## 2020-10-22 DIAGNOSIS — M25.512 ACUTE PAIN OF LEFT SHOULDER: Primary | ICD-10-CM

## 2020-10-22 PROCEDURE — 97140 MANUAL THERAPY 1/> REGIONS: CPT | Performed by: PHYSICAL THERAPIST

## 2020-10-22 PROCEDURE — 97162 PT EVAL MOD COMPLEX 30 MIN: CPT | Performed by: PHYSICAL THERAPIST

## 2020-10-22 PROCEDURE — 97110 THERAPEUTIC EXERCISES: CPT | Performed by: PHYSICAL THERAPIST

## 2020-10-22 NOTE — PROGRESS NOTES
Physical Therapy Initial Evaluation and Plan of Care    Patient: Albania Ramos   : 1955  Diagnosis/ICD-10 Code:  No primary diagnosis found.  Referring practitioner: Gordon Norman MD    Subjective   Injured her left shoulder working out 2 weeks ago (10/08/20).  Was performing her last set of 20 reps of lat pulldowns, and felt a few pops in the shoulder when pulling the bar down.  Had instant pain.  Has had throbbing in the shoulder that travels down the arm into the 2nd-5th digits.  Has had no relief from symptoms at this time, except for use of heat.  Tried use of Tylenol without relief.  Is unable to take NSAIDs due to diverticulosis.      Aggs:  Any shoulder movements; lying on left side (waking up 6 times per night with shoulder pain)  Eases:  heat    Objective   *GHJ AROM flx/ER/HBB:  85 deg; 40 deg; L5  *(+) p! Neutral ER MMT; Belly Press  *(+) reproduction of L UE radiating symptoms with pressure applied to posterior cuff  *(+) L GHJ p! With pressure applied to lower L C\S quadrant    Assessment/Plan   Mrs. Ramos is a 65 year old LHD female that presents to physical therapy s/p acute injury to the L GHJ while performing lat pulldowns.  PMH was covered during interview.  L GHJ AROM is limited in all planes secondary to pain when compared bilaterally.  Has good GHJ muscle activation, but is very painful with testing.  Sending Mrs. Ramos back to Dr. Norman for a potential referral to orthopaedics for further diagnostics.    Manual Therapy:    15     mins  48892;  Therapeutic Exercise:    10     mins  80919;     Neuromuscular Doug:        mins  81022;    Therapeutic Activity:          mins  96367;     Gait Training:           mins  92987;     Ultrasound:          mins  23356;    Electrical Stimulation:         mins  64525 ( );  Dry Needling          mins self-pay    Timed Treatment:   25   mins   Total Treatment:     45   mins    PT SIGNATURE: Gordon Dumont, JUDIE   DATE TREATMENT INITIATED:  10/22/2020    Initial Certification  Certification Period: 1/20/2021  I certify that the therapy services are furnished while this patient is under my care.  The services outlined above are required by this patient, and will be reviewed every 90 days.     PHYSICIAN: Gordon Norman MD      DATE:     Please sign and return via fax to [phone].. Thank you, [facility].

## 2020-10-23 ENCOUNTER — OFFICE VISIT (OUTPATIENT)
Dept: INTERNAL MEDICINE | Facility: CLINIC | Age: 65
End: 2020-10-23

## 2020-10-23 VITALS
HEIGHT: 69 IN | OXYGEN SATURATION: 98 % | RESPIRATION RATE: 16 BRPM | WEIGHT: 255 LBS | BODY MASS INDEX: 37.77 KG/M2 | HEART RATE: 71 BPM | TEMPERATURE: 97.7 F | SYSTOLIC BLOOD PRESSURE: 122 MMHG | DIASTOLIC BLOOD PRESSURE: 69 MMHG

## 2020-10-23 DIAGNOSIS — M25.512 ACUTE PAIN OF LEFT SHOULDER: Primary | ICD-10-CM

## 2020-10-23 PROCEDURE — 20610 DRAIN/INJ JOINT/BURSA W/O US: CPT | Performed by: INTERNAL MEDICINE

## 2020-10-23 PROCEDURE — 99213 OFFICE O/P EST LOW 20 MIN: CPT | Performed by: INTERNAL MEDICINE

## 2020-10-23 RX ORDER — TRIAMCINOLONE ACETONIDE 40 MG/ML
40 INJECTION, SUSPENSION INTRA-ARTICULAR; INTRAMUSCULAR ONCE
Status: DISCONTINUED | OUTPATIENT
Start: 2020-10-23 | End: 2021-12-27

## 2020-10-23 RX ADMIN — LIDOCAINE HYDROCHLORIDE 2 ML: 20 INJECTION, SOLUTION INFILTRATION; PERINEURAL at 09:21

## 2020-10-23 RX ADMIN — TRIAMCINOLONE ACETONIDE 40 MG: 40 INJECTION, SUSPENSION INTRA-ARTICULAR; INTRAMUSCULAR at 09:21

## 2020-10-23 NOTE — PROGRESS NOTES
.Injection Tendon or Ligament    Date/Time: 10/23/2020 9:21 AM  Performed by: Gordon Norman MD  Authorized by: Gordon Norman MD   Medications administered: 40 mg triamcinolone acetonide 40 MG/ML; 2 mL lidocaine 2%      s/n [id number]  Lot [id number] 2/22

## 2020-10-23 NOTE — PROGRESS NOTES
Subjective     Patient ID: Albania Ramos is a 65 y.o. female. Patient is here for management of multiple medical problems.     Chief Complaint   Patient presents with   • Ortho referral     patient here get a referral to Ortho for left shoulder pain      History of Present Illness     Pt with recent visit to PT.   Shoulder not getting better with PT.         The following portions of the patient's history were reviewed and updated as appropriate: allergies, current medications, past family history, past medical history, past social history, past surgical history and problem list.    Review of Systems   Constitutional: Negative for fatigue.   Musculoskeletal: Positive for arthralgias. Negative for back pain and gait problem.   All other systems reviewed and are negative.      Current Outpatient Medications:   •  albuterol sulfate HFA (Ventolin HFA) 108 (90 Base) MCG/ACT inhaler, Inhale 2 puffs Every 4 (Four) Hours As Needed for Wheezing or Shortness of Air., Disp: 18 g, Rfl: 5  •  ALLERGY SERUM INJECTION, Inject  under the skin 1 (One) Time., Disp: , Rfl:   •  allopurinol (ZYLOPRIM) 300 MG tablet, Take 1 tablet by mouth Daily., Disp: 90 tablet, Rfl: 3  •  calcium carbonate (OS-SHANTE) 600 MG tablet, Take 600 mg by mouth 2 (two) times a day with meals., Disp: , Rfl:   •  Cinnamon ([employer] CINNAMON) 500 MG capsule, Take 500 mg by mouth 2 (two) times a day., Disp: , Rfl:   •  CRANBERRY CONCENTRATE PO, Take 4,200 mg by mouth 2 (two) times a day., Disp: , Rfl:   •  cyanocobalamin (VITAMIN B-12) 2500 MCG tablet tablet, TAKE ONE TABLET BY MOUTH THREE TIMES A WEEK MUST MAKE AN APPOINTMENT, Disp: 30 tablet, Rfl: 5  •  diclofenac (VOLTAREN) 1 % gel gel, Apply 4 g topically to the appropriate area as directed 4 (Four) Times a Day As Needed (pain)., Disp: 100 g, Rfl: 1  •  dilTIAZem XR (DILACOR XR) 120 MG 24 hr capsule, Take 1 capsule by mouth Daily., Disp: 30 capsule, Rfl: 11  •  EPINEPHrine (EPIPEN) 0.3 MG/0.3ML solution  "auto-injector injection, , Disp: , Rfl:   •  estradiol (ESTRACE VAGINAL) 0.1 MG/GM vaginal cream, Insert 2 g into the vagina Daily., Disp: , Rfl:   •  Garlic 500 MG capsule, Take 1 tablet by mouth 2 (two) times a day., Disp: , Rfl:   •  GLUCOSAMINE HCL-MSM PO, Glucosamine-Chondrotin, Disp: , Rfl:   •  ketotifen (ZADITOR) 0.025 % ophthalmic solution, ketotifen 0.025 % (0.035 %) eye drops  INSTILL 1 DROP INTO AFFECTED EYE(S) BY OPHTHALMIC ROUTE 2 TIMES PER DAY, Disp: , Rfl:   •  levocetirizine (XYZAL) 5 MG tablet, levocetirizine 5 mg tablet  TAKE ONE TABLET BY MOUTH DAILY, Disp: , Rfl:   •  levothyroxine (SYNTHROID, LEVOTHROID) 100 MCG tablet, TAKE ONE TABLET BY MOUTH DAILY ALONG WITH 88MCG TABLET FOR TOTAL DOSE OF 188MCG, Disp: 90 tablet, Rfl: 2  •  levothyroxine (SYNTHROID, LEVOTHROID) 88 MCG tablet, TAKE ONE TABLET BY MOUTH DAILY, Disp: 90 tablet, Rfl: 3  •  Mometasone Furoate (Asmanex HFA) 200 MCG/ACT aerosol, Inhale 2 puffs Daily., Disp: 1 inhaler, Rfl: 5  •  Montelukast Sodium (SINGULAIR PO), montelukast, Disp: , Rfl:   •  MULTIPLE VITAMIN PO, Multiple Vitamin capsule  Daily, Disp: , Rfl:   •  Omega-3 Fatty Acids (FISH OIL) 435 MG capsule, Take 1,000 mg by mouth daily., Disp: , Rfl:   •  omeprazole (priLOSEC) 20 MG capsule, Take 1 capsule by mouth 2 (Two) Times a Day. APPOINTMENT NEEDED FOR ADDITIONAL REFILLS, Disp: 180 capsule, Rfl: 3  •  Probiotic Product (SOLUBLE FIBER/PROBIOTICS PO), Take 1 tablet by mouth daily., Disp: , Rfl:   •  vitamin C (ASCORBIC ACID) 500 MG tablet, Take 1 tablet by mouth daily., Disp: , Rfl:     Current Facility-Administered Medications:   •  triamcinolone acetonide (KENALOG-40) injection 40 mg, 40 mg, Intra-articular, Once, Gordon Norman MD    Objective      Blood pressure 122/69, pulse 71, temperature 97.7 °F (36.5 °C), temperature source Temporal, resp. rate 16, height 175.3 cm (69\"), weight 116 kg (255 lb), SpO2 98 %, not currently breastfeeding.    Physical Exam     General " Appearance:    Alert, cooperative, no distress, appears stated age   Head:    Normocephalic, without obvious abnormality, atraumatic   Eyes:    PERRL, conjunctiva/corneas clear, EOM's intact   Ears:    Normal TM's and external ear canals, both ears   Nose:   Nares normal, septum midline, mucosa normal, no drainage   or sinus tenderness   Throat:   Lips, mucosa, and tongue normal; teeth and gums normal   Neck:   Supple, symmetrical, trachea midline, no adenopathy;        thyroid:  No enlargement/tenderness/nodules; no carotid    bruit or JVD   Back:     Symmetric, no curvature, ROM normal, no CVA tenderness   Lungs:     Clear to auscultation bilaterally, respirations unlabored   Chest wall:    No tenderness or deformity   Heart:    Regular rate and rhythm, S1 and S2 normal, no murmur,        rub or gallop   Abdomen:     Soft, non-tender, bowel sounds active all four quadrants,     no masses, no organomegaly   Extremities:   ttp in GH joint. ttp tendons.     Pulses:   2+ and symmetric all extremities   Skin:   Skin color, texture, turgor normal, no rashes or lesions   Lymph nodes:   Cervical, supraclavicular, and axillary nodes normal   Neurologic:   CNII-XII intact. Normal strength, sensation and reflexes       throughout      Results for orders placed or performed in visit on 09/22/20   Uric Acid    Specimen: Blood   Result Value Ref Range    Uric Acid 4.8 2.4 - 5.7 mg/dL         Assessment/Plan     SHoulder inj.   Indication: pain.   Pt consented. Area prepped.  left shoulder inj with kenolog 40mg 1 cc and lidocaine 1 cc 1%.  Pt tolerated procedure well.          Diagnoses and all orders for this visit:    1. Acute pain of left shoulder (Primary)  -     triamcinolone acetonide (KENALOG-40) injection 40 mg      No follow-ups on file.          There are no Patient Instructions on file for this visit.     Gordon Norman MD    Assessment/Plan

## 2020-10-26 ENCOUNTER — HOSPITAL ENCOUNTER (OUTPATIENT)
Dept: MRI IMAGING | Facility: HOSPITAL | Age: 65
Discharge: HOME | End: 2020-10-26
Admitting: SURGERY

## 2020-10-26 DIAGNOSIS — R10.32 CHRONIC GROIN PAIN, LEFT: ICD-10-CM

## 2020-10-26 DIAGNOSIS — G89.29 CHRONIC GROIN PAIN, LEFT: ICD-10-CM

## 2020-10-26 PROCEDURE — 72195 MRI PELVIS W/O DYE: CPT

## 2020-10-27 RX ORDER — TRIAMCINOLONE ACETONIDE 40 MG/ML
40 INJECTION, SUSPENSION INTRA-ARTICULAR; INTRAMUSCULAR
Status: COMPLETED | OUTPATIENT
Start: 2020-10-23 | End: 2020-10-23

## 2020-10-27 RX ORDER — LIDOCAINE HYDROCHLORIDE 20 MG/ML
2 INJECTION, SOLUTION INFILTRATION; PERINEURAL
Status: COMPLETED | OUTPATIENT
Start: 2020-10-23 | End: 2020-10-23

## 2020-10-27 NOTE — PROGRESS NOTES
You may inform Mrs. Ramos that her MRI does not show any evidence of a recurrent hernia.  She has postoperative changes from her previous hernia repairs.  No other source of her pain was identified on the MRI.  I would not recommend reoperation at this time.  It may be the case that she has chronic pain from the prior surgical procedures.  This is unfortunately can occur.  As I have previously recommended to her, she may benefit from further evaluation by a hernia specialist at the [facility] for evaluation by interventional pain management.  It is sometimes the case that the interventional pain management physicians can perform nerve blocks to alleviate chronic groin pain that is related to underlying scar, or prior nerve injury.  I am happy to place either of these referrals if she so desires.  If she wishes to be referred on, she does not require a follow-up appointment in my office.  If she wishes to keep her follow-up appointment to discuss this further, that is certainly fine as well.

## 2020-11-09 ENCOUNTER — TRANSCRIBE ORDERS (OUTPATIENT)
Dept: LAB | Facility: HOSPITAL | Age: 65
End: 2020-11-09

## 2020-11-09 ENCOUNTER — LAB (OUTPATIENT)
Dept: LAB | Facility: HOSPITAL | Age: 65
End: 2020-11-09

## 2020-11-09 DIAGNOSIS — Z01.818 PRE-OP TESTING: Primary | ICD-10-CM

## 2020-11-09 DIAGNOSIS — Z01.818 PRE-OP TESTING: ICD-10-CM

## 2020-11-10 ENCOUNTER — LAB (OUTPATIENT)
Dept: LAB | Facility: HOSPITAL | Age: 65
End: 2020-11-10

## 2020-11-10 ENCOUNTER — TRANSCRIBE ORDERS (OUTPATIENT)
Dept: LAB | Facility: HOSPITAL | Age: 65
End: 2020-11-10

## 2020-11-10 DIAGNOSIS — Z01.818 PRE-OP TESTING: Primary | ICD-10-CM

## 2020-11-10 DIAGNOSIS — Z01.818 PRE-OP TESTING: ICD-10-CM

## 2020-11-10 PROCEDURE — U0004 COV-19 TEST NON-CDC HGH THRU: HCPCS

## 2020-11-10 PROCEDURE — C9803 HOPD COVID-19 SPEC COLLECT: HCPCS

## 2020-11-11 LAB — SARS-COV-2 RNA RESP QL NAA+PROBE: NOT DETECTED

## 2020-11-14 ENCOUNTER — HOSPITAL ENCOUNTER (EMERGENCY)
Facility: HOSPITAL | Age: 65
Discharge: HOME | End: 2020-11-14
Attending: EMERGENCY MEDICINE | Admitting: EMERGENCY MEDICINE

## 2020-11-14 ENCOUNTER — APPOINTMENT (OUTPATIENT)
Dept: GENERAL RADIOLOGY | Facility: HOSPITAL | Age: 65
End: 2020-11-14

## 2020-11-14 VITALS
SYSTOLIC BLOOD PRESSURE: 151 MMHG | RESPIRATION RATE: 16 BRPM | HEIGHT: 69 IN | HEART RATE: 67 BPM | WEIGHT: 255 LBS | BODY MASS INDEX: 37.77 KG/M2 | TEMPERATURE: 97.8 F | OXYGEN SATURATION: 92 % | DIASTOLIC BLOOD PRESSURE: 72 MMHG

## 2020-11-14 DIAGNOSIS — G89.18 POST-OPERATIVE PAIN: ICD-10-CM

## 2020-11-14 DIAGNOSIS — T78.40XA ALLERGIC REACTION TO DRUG, INITIAL ENCOUNTER: Primary | ICD-10-CM

## 2020-11-14 LAB
ALBUMIN SERPL-MCNC: 3.7 G/DL (ref 3.5–5.2)
ALBUMIN/GLOB SERPL: 1.8 G/DL
ALP SERPL-CCNC: 103 U/L (ref 39–117)
ALT SERPL W P-5'-P-CCNC: 23 U/L (ref 1–33)
ANION GAP SERPL CALCULATED.3IONS-SCNC: 11.2 MMOL/L (ref 5–15)
AST SERPL-CCNC: 29 U/L (ref 1–32)
BASOPHILS # BLD AUTO: 0.05 10*3/MM3 (ref 0–0.2)
BASOPHILS NFR BLD AUTO: 0.6 % (ref 0–1.5)
BILIRUB SERPL-MCNC: 0.2 MG/DL (ref 0–1.2)
BUN SERPL-MCNC: 20 MG/DL (ref 8–23)
BUN/CREAT SERPL: 25.3 (ref 7–25)
CALCIUM SPEC-SCNC: 8.3 MG/DL (ref 8.6–10.5)
CHLORIDE SERPL-SCNC: 107 MMOL/L (ref 98–107)
CO2 SERPL-SCNC: 25.8 MMOL/L (ref 22–29)
CREAT SERPL-MCNC: 0.79 MG/DL (ref 0.57–1)
DEPRECATED RDW RBC AUTO: 46.1 FL (ref 37–54)
EOSINOPHIL # BLD AUTO: 0.34 10*3/MM3 (ref 0–0.4)
EOSINOPHIL NFR BLD AUTO: 3.8 % (ref 0.3–6.2)
ERYTHROCYTE [DISTWIDTH] IN BLOOD BY AUTOMATED COUNT: 13.9 % (ref 12.3–15.4)
GFR SERPL CREATININE-BSD FRML MDRD: 73 ML/MIN/1.73
GLOBULIN UR ELPH-MCNC: 2.1 GM/DL
GLUCOSE SERPL-MCNC: 108 MG/DL (ref 65–99)
HCT VFR BLD AUTO: 41.2 % (ref 34–46.6)
HGB BLD-MCNC: 13.1 G/DL (ref 12–15.9)
HOLD SPECIMEN: NORMAL
HOLD SPECIMEN: NORMAL
IMM GRANULOCYTES # BLD AUTO: 0.04 10*3/MM3 (ref 0–0.05)
IMM GRANULOCYTES NFR BLD AUTO: 0.4 % (ref 0–0.5)
LIPASE SERPL-CCNC: 33 U/L (ref 13–60)
LYMPHOCYTES # BLD AUTO: 2.31 10*3/MM3 (ref 0.7–3.1)
LYMPHOCYTES NFR BLD AUTO: 26 % (ref 19.6–45.3)
MCH RBC QN AUTO: 28.8 PG (ref 26.6–33)
MCHC RBC AUTO-ENTMCNC: 31.8 G/DL (ref 31.5–35.7)
MCV RBC AUTO: 90.5 FL (ref 79–97)
MONOCYTES # BLD AUTO: 0.59 10*3/MM3 (ref 0.1–0.9)
MONOCYTES NFR BLD AUTO: 6.6 % (ref 5–12)
NEUTROPHILS NFR BLD AUTO: 5.57 10*3/MM3 (ref 1.7–7)
NEUTROPHILS NFR BLD AUTO: 62.6 % (ref 42.7–76)
NRBC BLD AUTO-RTO: 0 /100 WBC (ref 0–0.2)
PLATELET # BLD AUTO: 225 10*3/MM3 (ref 140–450)
PMV BLD AUTO: 11 FL (ref 6–12)
POTASSIUM SERPL-SCNC: 3.8 MMOL/L (ref 3.5–5.2)
PROT SERPL-MCNC: 5.8 G/DL (ref 6–8.5)
RBC # BLD AUTO: 4.55 10*6/MM3 (ref 3.77–5.28)
SODIUM SERPL-SCNC: 144 MMOL/L (ref 136–145)
TROPONIN T SERPL-MCNC: <0.01 NG/ML (ref 0–0.03)
WBC # BLD AUTO: 8.9 10*3/MM3 (ref 3.4–10.8)
WHOLE BLOOD HOLD SPECIMEN: NORMAL
WHOLE BLOOD HOLD SPECIMEN: NORMAL

## 2020-11-14 PROCEDURE — 84484 ASSAY OF TROPONIN QUANT: CPT | Performed by: PHYSICIAN ASSISTANT

## 2020-11-14 PROCEDURE — 25010000002 DIPHENHYDRAMINE PER 50 MG: Performed by: PHYSICIAN ASSISTANT

## 2020-11-14 PROCEDURE — 93005 ELECTROCARDIOGRAM TRACING: CPT

## 2020-11-14 PROCEDURE — 83690 ASSAY OF LIPASE: CPT | Performed by: PHYSICIAN ASSISTANT

## 2020-11-14 PROCEDURE — 99284 EMERGENCY DEPT VISIT MOD MDM: CPT

## 2020-11-14 PROCEDURE — 25010000002 METHYLPREDNISOLONE PER 125 MG: Performed by: PHYSICIAN ASSISTANT

## 2020-11-14 PROCEDURE — 96375 TX/PRO/DX INJ NEW DRUG ADDON: CPT

## 2020-11-14 PROCEDURE — 96374 THER/PROPH/DIAG INJ IV PUSH: CPT

## 2020-11-14 PROCEDURE — 80053 COMPREHEN METABOLIC PANEL: CPT | Performed by: PHYSICIAN ASSISTANT

## 2020-11-14 PROCEDURE — 71045 X-RAY EXAM CHEST 1 VIEW: CPT

## 2020-11-14 PROCEDURE — 85025 COMPLETE CBC W/AUTO DIFF WBC: CPT | Performed by: PHYSICIAN ASSISTANT

## 2020-11-14 RX ORDER — OXYCODONE HYDROCHLORIDE AND ACETAMINOPHEN 5; 325 MG/1; MG/1
1 TABLET ORAL ONCE
Status: COMPLETED | OUTPATIENT
Start: 2020-11-14 | End: 2020-11-14

## 2020-11-14 RX ORDER — FAMOTIDINE 10 MG/ML
20 INJECTION, SOLUTION INTRAVENOUS ONCE
Status: COMPLETED | OUTPATIENT
Start: 2020-11-14 | End: 2020-11-14

## 2020-11-14 RX ORDER — SODIUM CHLORIDE 0.9 % (FLUSH) 0.9 %
10 SYRINGE (ML) INJECTION AS NEEDED
Status: DISCONTINUED | OUTPATIENT
Start: 2020-11-14 | End: 2020-11-14 | Stop reason: HOSPADM

## 2020-11-14 RX ORDER — OXYCODONE HYDROCHLORIDE AND ACETAMINOPHEN 5; 325 MG/1; MG/1
1 TABLET ORAL EVERY 6 HOURS PRN
Qty: 12 TABLET | Refills: 0 | Status: SHIPPED | OUTPATIENT
Start: 2020-11-14 | End: 2020-11-17

## 2020-11-14 RX ORDER — METHYLPREDNISOLONE SODIUM SUCCINATE 125 MG/2ML
125 INJECTION, POWDER, LYOPHILIZED, FOR SOLUTION INTRAMUSCULAR; INTRAVENOUS ONCE
Status: COMPLETED | OUTPATIENT
Start: 2020-11-14 | End: 2020-11-14

## 2020-11-14 RX ORDER — HYDROCODONE BITARTRATE AND ACETAMINOPHEN 5; 325 MG/1; MG/1
1 TABLET ORAL EVERY 6 HOURS PRN
COMMUNITY
End: 2020-11-23

## 2020-11-14 RX ORDER — ACETAMINOPHEN AND CODEINE PHOSPHATE 300; 30 MG/1; MG/1
1 TABLET ORAL EVERY 4 HOURS PRN
COMMUNITY
End: 2020-11-23

## 2020-11-14 RX ORDER — DIPHENHYDRAMINE HYDROCHLORIDE 50 MG/ML
25 INJECTION INTRAMUSCULAR; INTRAVENOUS ONCE
Status: COMPLETED | OUTPATIENT
Start: 2020-11-14 | End: 2020-11-14

## 2020-11-14 RX ADMIN — DIPHENHYDRAMINE HYDROCHLORIDE 25 MG: 50 INJECTION, SOLUTION INTRAMUSCULAR; INTRAVENOUS at 12:42

## 2020-11-14 RX ADMIN — FAMOTIDINE 20 MG: 10 INJECTION, SOLUTION INTRAVENOUS at 12:43

## 2020-11-14 RX ADMIN — OXYCODONE HYDROCHLORIDE AND ACETAMINOPHEN 1 TABLET: 5; 325 TABLET ORAL at 13:58

## 2020-11-14 RX ADMIN — SODIUM CHLORIDE 1000 ML: 9 INJECTION, SOLUTION INTRAVENOUS at 12:42

## 2020-11-14 RX ADMIN — METHYLPREDNISOLONE SODIUM SUCCINATE 125 MG: 125 INJECTION, POWDER, FOR SOLUTION INTRAMUSCULAR; INTRAVENOUS at 12:43

## 2020-11-14 NOTE — DISCHARGE INSTRUCTIONS
Do not take the Tylenol 3.  You may use Percocet instead for pain management as needed as directed.  You will need to follow-up with your podiatrist as scheduled.  You can follow-up with your primary care provider as well.  Return here to the ER for any change in worsening symptoms, or any additional concerns including but not limited to shortness of breath, chest pain, dizziness, facial or tongue swelling, difficulty breathing, wheezing or stridor.

## 2020-11-14 NOTE — ED PROVIDER NOTES
"Subjective   Patient is a 65-year-old female history of abdominal pain, asthma, atrial fibrillation, chronic bronchitis with CPAP use, dizziness, follicular thyroid cancer, GERD hyperlipidemia, hypothyroidism, and migraines presenting to the ER for possible allergic reaction.  Patient states that she had surgery on her for performed yesterday.  She states last night she took 2 Norco without much relief in her pain.  She states that they had given her prescription for Tylenol 3 and they filled it this morning.  She states she took 1 around 930 and states she felt as if she was having muscle contractions over her midsection like she was in a \"vice \" and her  told her face was flushed.  She states she felt she was about to pass out due to the contraction.  They called the ambulance and she was given Zofran and Benadryl in route.  She denies any tongue or facial swelling, rashes, syncope.  She states she feels like she has some pain in her lower back that feels like a \"charley horse\" but denies any other symptoms.  She states she did have one episode of emesis in the ambulance.  Denies any recent fever, chills, headache, syncopal episode, current chest pain, current shortness of breath, difficulty swallowing, abdominal pain, or any other symptoms.          Review of Systems   Constitutional: Negative for chills and fever.   HENT: Negative for congestion, ear pain, rhinorrhea and sore throat.    Eyes: Negative.    Respiratory: Negative for cough and shortness of breath.    Cardiovascular: Negative for chest pain.   Gastrointestinal: Positive for vomiting. Negative for abdominal distention, abdominal pain, constipation and nausea.   Genitourinary: Negative.    Musculoskeletal: Negative.    Skin: Negative for rash.   Allergic/Immunologic: Negative for immunocompromised state.   Neurological: Positive for light-headedness. Negative for syncope and headaches.   Psychiatric/Behavioral: Negative.        Past Medical " "History:   Diagnosis Date   • Abdominal pain    • Abnormal ECG    • Acute sinusitis    • Allergic    • Ankle joint pain    • Arthritis    • Asthma    • Asthma    • Atrial fibrillation (CMS/HCC)    • Bronchitis    • Chronic bronchitis (CMS/HCC)    • CPAP (continuous positive airway pressure) dependence    • Degenerative joint disease    • Depression    • Diverticulosis    • Dizziness     Has had some episodes. No blake syncope.11/2007 patient underwent pulmonary vein isolation, initially successful.Patient returned, however in February noting some recurrent episode of dizziness.Then wore wore event recorder which showed some recurrent PAF.   • Dysuria    • Easy bruising    • Follicular thyroid cancer (CMS/HCC)     treated with total thyroidectomy followed by [facility]   • GERD (gastroesophageal reflux disease)    • H/O bone density study    • Hay fever    • History of chest x-ray 07/10/2015    No acute cardiopulmonary process   • History of chest x-ray 07/02/2015    No acute cardiopulmonary process   • History of echocardiogram 04/04/2007    LVEF of greater than 60%. Mild MR.Mild TR.Trace pulmonary insufficinecy.   • History of mammogram    • History of PFTs 07/02/2015    Normal spirometry   • Hyperlipidemia    • Hypothyroidism    • Measles    • Migraine headache    • Mumps    • Osteoporosis    • Paroxysmal atrial fibrillation (CMS/HCC)     A. Failed medical therapy. B. Pulmonary vein isolation 11/2006. C. Recurrent episodes of PAF by event recorder 9/2006.   • Pneumonia    • Shortness of breath    • Sleep apnea    • Surfer's nodules of right foot    • Torn meniscus    • Varicella    • Whooping cough        Allergies   Allergen Reactions   • Codeine Shortness Of Breath and Other (See Comments)     Pt states \"it made me feel like my insides were in a vice \"   • Niacin Hives   • Darvon [Propoxyphene] Nausea And Vomiting   • Adhesive Tape Rash   • Albuterol Palpitations     FEELS WEIRD, INCREASED HR AND BREATHING   • " Ciprodex [Ciprofloxacin-Dexamethasone] Other (See Comments)     REDNESS,tendonitis   • Ciprofloxacin Hcl Other (See Comments)     reddness,tendonitis   • Other Other (See Comments)     POLLEN,TREES,AND PLANTS MULTIPLE ENVIRONMENTAL ALLERGIES       Past Surgical History:   Procedure Laterality Date   • ABLATION OF DYSRHYTHMIC FOCUS      x 2   • CARDIAC ELECTROPHYSIOLOGY PROCEDURE N/A 8/4/2016    Procedure: Loop recorder implant;  Surgeon: Himanshu Calvert MD;  Location:  [facility];  Service:    • CARDIAC ELECTROPHYSIOLOGY PROCEDURE N/A 10/13/2016    Procedure: Loop recorder removal;  Surgeon: Himanshu Calvert MD;  Location:  [facility];  Service:    • CARDIAC ELECTROPHYSIOLOGY PROCEDURE N/A 12/18/2017    Procedure: Loop insertion;  Surgeon: Mary Ann Blackwell MD;  Location:  [facility];  Service:    • CARDIAC ELECTROPHYSIOLOGY PROCEDURE N/A 11/6/2019    Procedure: Loop recorder removal;  Surgeon: Branden Chatterjee MD;  Location:  [facility];  Service: Cardiovascular   • CHOLECYSTECTOMY     • COLONOSCOPY     • DILATATION AND CURETTAGE     • EAR TUBES Bilateral    • FOOT SURGERY      bone spur   • INGUINAL HERNIA REPAIR Left     x 3   • LUNG BIOPSY      Remote   • LYMPH NODE BIOPSY     • MOUTH SURGERY      WISDOM TEETH   • REPLACEMENT TOTAL KNEE BILATERAL Bilateral    • THYROIDECTOMY, PARTIAL     • TONSILLECTOMY  07/2020    Dr. Ethan Mcneill   • TOTAL ABDOMINAL HYSTERECTOMY WITH SALPINGO OOPHORECTOMY     • TOTAL THYROIDECTOMY      completion thyroidectomy for follicular thyroid cancer.     • TYMPANOSTOMY TUBE PLACEMENT Right 07/2020    Dr. Ethan Mcneill       Family History   Problem Relation Age of Onset   • Atrial fibrillation Mother    • Thyroid disease Mother    • Early death Father 68   • Lung cancer Father    • Early death Sister 16   • Heart attack Sister    • Down syndrome Sister    • Sleep apnea Brother         Nonorganic   • Other Brother  "        Palpitations (Symptom)       Social History     Socioeconomic History   • Marital status:      Spouse name: Not on file   • Number of children: Not on file   • Years of education: Not on file   • Highest education level: Not on file   Tobacco Use   • Smoking status: Former Smoker     Years: 22.00     Quit date: 1996     Years since quittin.4   • Smokeless tobacco: Never Used   Substance and Sexual Activity   • Alcohol use: No     Comment: ONCE PER YEAR. Not excessive.   • Drug use: No   • Sexual activity: Defer     Birth control/protection: Surgical           Objective   Physical Exam  Vitals signs and nursing note reviewed.     /72   Pulse 67   Temp 97.8 °F (36.6 °C) (Oral)   Resp 16   Ht 175.3 cm (69\")   Wt 116 kg (255 lb)   LMP  (LMP Unknown)   SpO2 92%   BMI 37.66 kg/m²     GEN: No acute distress, sitting up in stretcher.  Awake and alert.  Does not appear toxic.  Head: Normocephalic, atraumatic  Eyes: EOM intact  ENT: Posterior pharynx normal in appearance, oral mucosa is moist, tongue is midline, lips are symmetric with no obvious signs of angioedema  Chest: Nontender to palpation  Cardiovascular: Regular rate and rhythm   Lungs: Clear to auscultation bilaterally without adventitious sounds  Abdomen: Soft, nontender, nondistended, no peritoneal signs or guarding  Extremities: No edema, normal appearance, full range of motion without deficits  Neuro: GCS 15  Psych: Mood and affect are appropriate    Procedures           ED Course  ED Course as of 2019   Sat 2020   1241 WBC: 8.90 [LA]   1241 Hemoglobin: 13.1 [LA]   1254 EKG interpreted by me reveals sinus rhythm with rate of 63 bpm.  There are no acute ST segment or T wave changes.  This is a normal-appearing EKG.    [TB]   1311 Lipase: 33 [LA]   1312 Troponin T: <0.010 [LA]   1312 Glucose(!): 108 [LA]   1312 Creatinine: 0.79 [LA]   1312 Sodium: 144 [LA]   1312 Potassium: 3.8 [LA]   1312 Calcium(!): 8.3 [LA] "   1312 Total Protein(!): 5.8 [LA]   1312 Albumin: 3.70 [LA]   1312 ALT (SGPT): 23 [LA]   1312 AST (SGOT): 29 [LA]   1312 Alkaline Phosphatase: 103 [LA]   1324 PORTABLE CHEST    11/14/2020 1:03 PM      HISTORY: Hypotension     COMPARISON: 06/27/2020.     FINDINGS: The heart is  normal in size .  The lungs are clear .  There  is no pneumothorax . The osseous structures  are unremarkable .     IMPRESSION:  No acute cardiopulmonary process .     This report was finalized on 11/14/2020 1:20 PM by Dr Barrera Marsh DO.    [LA]   1351 Checked on the patient, she states she is feeling much better but still having severe pain in her foot. Discussed with Dr. Luis.  She took Norco last night and did not have any reaction but felt it did not help with the pain. Will try percocet here.     [LA]   1445 Patient has done fine with the Percocet and actually has better pain control.  RN states the patient wants to go home at this time    [LA]      ED Course User Index  [LA] Nano Bautista PA-C  [TB] Sofi Luis MD                                           MDM  Number of Diagnoses or Management Options  Allergic reaction to drug, initial encounter:   Post-operative pain:   Diagnosis management comments: On arrival, patient is stable.  Her initial blood pressure was 97/82 but on repeat was 112/75.  She is afebrile, no respiratory distress.  Differential includes medication reaction, allergic reaction, and other concerns.  Do not see any signs of anaphylaxis. Will also obtain basic labs, EKG.  Will give another 25 mg of Benadryl, Pepcid and IV steroids with fluids to help with symptoms.  We will continue to monitor.    EKG was interpreted by the attending.  Labs are stable.  Chest x-ray is read by the radiologist.  No acute abnormality.  Patient felt much better after medications and continue to be monitored here with no signs of anaphylaxis.  Her blood pressure improved significantly.  She was having more pain in her  foot, given that she has tolerated Norco in the past, discussed with Dr. Luis give her Percocet here which did help.  We will give her a short course of these medications.  Discussed refraining from taking the Tylenol with codeine.  Discussed follow-up with orthopedic surgeon, primary care provider.  We discussed very strict return precautions the ER.  She verbalized understanding and was in agreement with this plan of care.       Amount and/or Complexity of Data Reviewed  Clinical lab tests: reviewed and ordered  Tests in the radiology section of CPT®: reviewed and ordered  Discussion of test results with the performing providers: yes  Review and summarize past medical records: yes  Discuss the patient with other providers: yes    Risk of Complications, Morbidity, and/or Mortality  Presenting problems: moderate  Diagnostic procedures: moderate  Management options: low    Patient Progress  Patient progress: improved      Final diagnoses:   Allergic reaction to drug, initial encounter   Post-operative pain            Nano Bautista PA-C  11/14/20 2019

## 2020-11-23 ENCOUNTER — TELEMEDICINE (OUTPATIENT)
Dept: INTERNAL MEDICINE | Facility: CLINIC | Age: 65
End: 2020-11-23

## 2020-11-23 DIAGNOSIS — K57.92 DIVERTICULITIS: Primary | ICD-10-CM

## 2020-11-23 DIAGNOSIS — Z87.39 STATUS POST HAMMER TOE CORRECTION: ICD-10-CM

## 2020-11-23 DIAGNOSIS — Z98.890 STATUS POST HAMMER TOE CORRECTION: ICD-10-CM

## 2020-11-23 PROCEDURE — 99213 OFFICE O/P EST LOW 20 MIN: CPT | Performed by: NURSE PRACTITIONER

## 2020-11-23 RX ORDER — METRONIDAZOLE 500 MG/1
500 TABLET ORAL 3 TIMES DAILY
Qty: 30 TABLET | Refills: 0 | Status: SHIPPED | OUTPATIENT
Start: 2020-11-23 | End: 2020-12-03

## 2020-11-23 RX ORDER — AMOXICILLIN AND CLAVULANATE POTASSIUM 875; 125 MG/1; MG/1
1 TABLET, FILM COATED ORAL 2 TIMES DAILY
Qty: 20 TABLET | Refills: 0 | Status: SHIPPED | OUTPATIENT
Start: 2020-11-23 | End: 2020-12-03

## 2020-11-23 RX ORDER — ONDANSETRON 4 MG/1
4 TABLET, FILM COATED ORAL EVERY 8 HOURS PRN
Qty: 9 TABLET | Refills: 0 | Status: SHIPPED | OUTPATIENT
Start: 2020-11-23 | End: 2021-07-29

## 2020-11-23 NOTE — PROGRESS NOTES
You have chosen to receive care through a telehealth visit.  Do you consent to use a video/audio connection for your medical care today? Yes    Active Parties: Gavi LUNA, Jos Vergara and patient

## 2020-11-23 NOTE — PROGRESS NOTES
Date: 2020    Name: Albania Ramos  : 1955      Chief Complaint:   Chief Complaint   Patient presents with   • Diverticulitis     pt states she gets a flare up 3-4 times a year       HPI:  Albania Ramos is a 65 y.o. female presents for video visit in regard to recurrent diverticulitis.  She has been experiencing LLQ pain all the time, burning with BM for the past 3 days.  This is a normal progression for her when she develops diverticulitis.  Denies fever, chills, nausea, vomiting, diarrhea, constipation, appetite change, fatigue, changes in stool color or characteristics.  She has had hammertoe correction, on 2020.  She is doing well, using cane to ambulate at this time.  Initially used a walker.  She is scheduled for follow-up appointment with surgeon on 2020, will have stitches removed at that time.  No bleeding, drainage, swelling at hammertoe correction sites.      History: The following portions of the patient's history were reviewed and updated as appropriate: allergies, current medications, past medical history, family history, surgical history, social history and problem list.      ROS:  Review of Systems   Constitutional: Negative.    Respiratory: Negative.    Cardiovascular: Negative.    Gastrointestinal: Positive for abdominal distention. Negative for anal bleeding and blood in stool.   Skin: Negative for pallor and rash.   Neurological: Negative.          PE:  Physical Exam   Constitutional: She appears well-developed and well-nourished. No distress.   HENT:   Head: Normocephalic.   Right Ear: External ear normal.   Left Ear: External ear normal.   Eyes: Pupils are equal, round, and reactive to light. Conjunctivae are normal.   Neck: Neck normal appearance.  Pulmonary/Chest: Effort normal.   Abdominal: She exhibits no distension. There is abdominal tenderness (palpated per my direction by patient) in the left lower quadrant.   Neurological: She is alert.   Oriented x 3   Skin:  No erythema. No pallor.   Psychiatric: She has a normal mood and affect. Her speech is normal and behavior is normal. Thought content normal. Her affect is normal.          Assessment/Plan:  Diagnoses and all orders for this visit:    1. Diverticulitis (Primary)  -     amoxicillin-clavulanate (Augmentin) 875-125 MG per tablet; Take 1 tablet by mouth 2 (Two) Times a Day for 10 days.  Dispense: 20 tablet; Refill: 0  -     metroNIDAZOLE (Flagyl) 500 MG tablet; Take 1 tablet by mouth 3 (Three) Times a Day for 10 days.  Dispense: 30 tablet; Refill: 0        - [place], low-residue diet, avoid greasy/spicy foods and dairy until symptoms improve.  Then, may advance diet as tolerated        - Drink plenty of clear, decaffeinated fluids, as tolerated.    2. Status post hammer toe correction        - Continue to monitor for s/s infection, follow up with podiatry as scheduled.     Other orders  -     ondansetron (Zofran) 4 MG tablet; Take 1 tablet by mouth Every 8 (Eight) Hours As Needed for Nausea or Vomiting.  Dispense: 9 tablet; Refill: 0.  To be taken if metronidazole causes nausea, vomiting.          Return for Next scheduled follow up. Scheduled to see Dr Norman on 12/28/20.

## 2020-12-09 ENCOUNTER — OFFICE VISIT (OUTPATIENT)
Dept: INTERNAL MEDICINE | Facility: CLINIC | Age: 65
End: 2020-12-09

## 2020-12-09 VITALS
HEART RATE: 81 BPM | BODY MASS INDEX: 39.25 KG/M2 | DIASTOLIC BLOOD PRESSURE: 70 MMHG | WEIGHT: 265 LBS | HEIGHT: 69 IN | SYSTOLIC BLOOD PRESSURE: 120 MMHG | TEMPERATURE: 98.4 F | OXYGEN SATURATION: 97 % | RESPIRATION RATE: 16 BRPM

## 2020-12-09 DIAGNOSIS — M25.512 CHRONIC LEFT SHOULDER PAIN: Primary | ICD-10-CM

## 2020-12-09 DIAGNOSIS — G89.29 CHRONIC LEFT SHOULDER PAIN: Primary | ICD-10-CM

## 2020-12-09 DIAGNOSIS — H92.01 EARACHE ON RIGHT: ICD-10-CM

## 2020-12-09 DIAGNOSIS — L71.9 ROSACEA: ICD-10-CM

## 2020-12-09 PROBLEM — Z87.440 HISTORY OF FREQUENT URINARY TRACT INFECTIONS: Status: ACTIVE | Noted: 2020-12-09

## 2020-12-09 PROBLEM — R07.9 CHEST PAIN: Status: RESOLVED | Noted: 2017-11-28 | Resolved: 2020-12-09

## 2020-12-09 PROBLEM — N39.498 FREQUENT URINARY INCONTINENCE: Status: ACTIVE | Noted: 2020-12-09

## 2020-12-09 PROCEDURE — 99214 OFFICE O/P EST MOD 30 MIN: CPT | Performed by: INTERNAL MEDICINE

## 2020-12-09 RX ORDER — CLOTRIMAZOLE AND BETAMETHASONE DIPROPIONATE 10; .64 MG/G; MG/G
CREAM TOPICAL 2 TIMES DAILY
Qty: 45 G | Refills: 0 | Status: SHIPPED | OUTPATIENT
Start: 2020-12-09 | End: 2023-02-09

## 2020-12-09 RX ORDER — AMOXICILLIN AND CLAVULANATE POTASSIUM 875; 125 MG/1; MG/1
1 TABLET, FILM COATED ORAL EVERY 12 HOURS SCHEDULED
Qty: 20 TABLET | Refills: 0 | Status: SHIPPED | OUTPATIENT
Start: 2020-12-09 | End: 2021-05-18

## 2020-12-09 NOTE — PROGRESS NOTES
Subjective     Patient ID: Albania Ramos is a 65 y.o. female. Patient is here for management of multiple medical problems.     Chief Complaint   Patient presents with   • Shoulder Pain     left shoulder   • Rash     face   • Earache     History of Present Illness     Shoulder pain. Did well with inj. inj wore off and now in pain  Sleeps on left side.      Rash on face.  X 1 week. No otc.        Left foot with recent surgery. Had pin in foot.      Rash on face from cpap.  Using the same wipes.        Ear ache with sharp pain in right ear.    Increased sinus drainge.   Has a tube in the TM.   X 1 week.        The following portions of the patient's history were reviewed and updated as appropriate: allergies, current medications, past family history, past medical history, past social history, past surgical history and problem list.    Review of Systems   Constitutional: Positive for fatigue.   Musculoskeletal: Positive for arthralgias. Negative for back pain, gait problem and joint swelling.   Skin: Positive for rash and wound.   Psychiatric/Behavioral: Negative for sleep disturbance.   All other systems reviewed and are negative.      Current Outpatient Medications:   •  albuterol sulfate HFA (Ventolin HFA) 108 (90 Base) MCG/ACT inhaler, Inhale 2 puffs Every 4 (Four) Hours As Needed for Wheezing or Shortness of Air., Disp: 18 g, Rfl: 5  •  ALLERGY SERUM INJECTION, Inject  under the skin 1 (One) Time., Disp: , Rfl:   •  allopurinol (ZYLOPRIM) 300 MG tablet, Take 1 tablet by mouth Daily., Disp: 90 tablet, Rfl: 3  •  calcium carbonate (OS-SHANTE) 600 MG tablet, Take 600 mg by mouth 2 (two) times a day with meals., Disp: , Rfl:   •  Cinnamon ([employer] CINNAMON) 500 MG capsule, Take 500 mg by mouth 2 (two) times a day., Disp: , Rfl:   •  CRANBERRY CONCENTRATE PO, Take 4,200 mg by mouth 2 (two) times a day., Disp: , Rfl:   •  cyanocobalamin (VITAMIN B-12) 2500 MCG tablet tablet, TAKE ONE TABLET BY MOUTH THREE TIMES A WEEK MUST  MAKE AN APPOINTMENT, Disp: 30 tablet, Rfl: 5  •  diclofenac (VOLTAREN) 1 % gel gel, Apply 4 g topically to the appropriate area as directed 4 (Four) Times a Day As Needed (pain)., Disp: 100 g, Rfl: 1  •  dilTIAZem XR (DILACOR XR) 120 MG 24 hr capsule, Take 1 capsule by mouth Daily., Disp: 30 capsule, Rfl: 11  •  EPINEPHrine (EPIPEN) 0.3 MG/0.3ML solution auto-injector injection, , Disp: , Rfl:   •  estradiol (ESTRACE VAGINAL) 0.1 MG/GM vaginal cream, Insert 2 g into the vagina Daily., Disp: , Rfl:   •  Garlic 500 MG capsule, Take 1 tablet by mouth 2 (two) times a day., Disp: , Rfl:   •  GLUCOSAMINE HCL-MSM PO, Glucosamine-Chondrotin, Disp: , Rfl:   •  ketotifen (ZADITOR) 0.025 % ophthalmic solution, ketotifen 0.025 % (0.035 %) eye drops  INSTILL 1 DROP INTO AFFECTED EYE(S) BY OPHTHALMIC ROUTE 2 TIMES PER DAY, Disp: , Rfl:   •  levocetirizine (XYZAL) 5 MG tablet, levocetirizine 5 mg tablet  TAKE ONE TABLET BY MOUTH DAILY, Disp: , Rfl:   •  levothyroxine (SYNTHROID, LEVOTHROID) 100 MCG tablet, TAKE ONE TABLET BY MOUTH DAILY ALONG WITH 88MCG TABLET FOR TOTAL DOSE OF 188MCG, Disp: 90 tablet, Rfl: 2  •  levothyroxine (SYNTHROID, LEVOTHROID) 88 MCG tablet, TAKE ONE TABLET BY MOUTH DAILY, Disp: 90 tablet, Rfl: 3  •  Mometasone Furoate (Asmanex HFA) 200 MCG/ACT aerosol, Inhale 2 puffs Daily., Disp: 1 inhaler, Rfl: 5  •  Montelukast Sodium (SINGULAIR PO), montelukast, Disp: , Rfl:   •  MULTIPLE VITAMIN PO, Multiple Vitamin capsule  Daily, Disp: , Rfl:   •  Omega-3 Fatty Acids (FISH OIL) 435 MG capsule, Take 1,000 mg by mouth daily., Disp: , Rfl:   •  omeprazole (priLOSEC) 20 MG capsule, Take 1 capsule by mouth 2 (Two) Times a Day. APPOINTMENT NEEDED FOR ADDITIONAL REFILLS, Disp: 180 capsule, Rfl: 3  •  ondansetron (Zofran) 4 MG tablet, Take 1 tablet by mouth Every 8 (Eight) Hours As Needed for Nausea or Vomiting., Disp: 9 tablet, Rfl: 0  •  Probiotic Product (SOLUBLE FIBER/PROBIOTICS PO), Take 1 tablet by mouth daily., Disp:  ", Rfl:   •  vitamin C (ASCORBIC ACID) 500 MG tablet, Take 1 tablet by mouth daily., Disp: , Rfl:   •  amoxicillin-clavulanate (Augmentin) 875-125 MG per tablet, Take 1 tablet by mouth Every 12 (Twelve) Hours., Disp: 20 tablet, Rfl: 0  •  clotrimazole-betamethasone (Lotrisone) 1-0.05 % cream, Apply  topically to the appropriate area as directed 2 (Two) Times a Day., Disp: 45 g, Rfl: 0    Current Facility-Administered Medications:   •  triamcinolone acetonide (KENALOG-40) injection 40 mg, 40 mg, Intra-articular, Once, Gordon Norman MD    Objective      Blood pressure 120/70, pulse 81, temperature 98.4 °F (36.9 °C), resp. rate 16, height 175.3 cm (69\"), weight 120 kg (265 lb), SpO2 97 %, not currently breastfeeding.    Physical Exam     General Appearance:    Alert, cooperative, no distress, appears stated age   Head:    Normocephalic, without obvious abnormality, atraumatic   Eyes:    PERRL, conjunctiva/corneas clear, EOM's intact   Ears:    Right tm with blue tube.  No puss.  No erythma.    Nose:   Nares normal, septum midline, mucosa normal, no drainage   or sinus tenderness   Throat:   Lips, mucosa, and tongue normal; teeth and gums normal   Neck:   Supple, symmetrical, trachea midline, no adenopathy;        thyroid:  No enlargement/tenderness/nodules; no carotid    bruit or JVD   Back:     Symmetric, no curvature, ROM normal, no CVA tenderness   Lungs:     Clear to auscultation bilaterally, respirations unlabored   Chest wall:    No tenderness or deformity   Heart:    Regular rate and rhythm, S1 and S2 normal, no murmur,        rub or gallop   Abdomen:     Soft, non-tender, bowel sounds active all four quadrants,     no masses, no organomegaly   Extremities:   ttp left shoulder.  Post op left foot.    Pulses:   2+ and symmetric all extremities   Skin:   Nasla labial fold with erythema and scaling Skin color, texture, turgor normal, no rashes or lesions   Lymph nodes:   Cervical, supraclavicular, and axillary " nodes normal   Neurologic:   CNII-XII intact. Normal strength, sensation and reflexes       throughout      Results for orders placed or performed during the hospital encounter of 11/14/20   Comprehensive Metabolic Panel    Specimen: Blood   Result Value Ref Range    Glucose 108 (H) 65 - 99 mg/dL    BUN 20 8 - 23 mg/dL    Creatinine 0.79 0.57 - 1.00 mg/dL    Sodium 144 136 - 145 mmol/L    Potassium 3.8 3.5 - 5.2 mmol/L    Chloride 107 98 - 107 mmol/L    CO2 25.8 22.0 - 29.0 mmol/L    Calcium 8.3 (L) 8.6 - 10.5 mg/dL    Total Protein 5.8 (L) 6.0 - 8.5 g/dL    Albumin 3.70 3.50 - 5.20 g/dL    ALT (SGPT) 23 1 - 33 U/L    AST (SGOT) 29 1 - 32 U/L    Alkaline Phosphatase 103 39 - 117 U/L    Total Bilirubin 0.2 0.0 - 1.2 mg/dL    eGFR Non African Amer 73 >60 mL/min/1.73    Globulin 2.1 gm/dL    A/G Ratio 1.8 g/dL    BUN/Creatinine Ratio 25.3 (H) 7.0 - 25.0    Anion Gap 11.2 5.0 - 15.0 mmol/L   Troponin    Specimen: Blood   Result Value Ref Range    Troponin T <0.010 0.000 - 0.030 ng/mL   Lipase    Specimen: Blood   Result Value Ref Range    Lipase 33 13 - 60 U/L   CBC Auto Differential    Specimen: Blood   Result Value Ref Range    WBC 8.90 3.40 - 10.80 10*3/mm3    RBC 4.55 3.77 - 5.28 10*6/mm3    Hemoglobin 13.1 12.0 - 15.9 g/dL    Hematocrit 41.2 34.0 - 46.6 %    MCV 90.5 79.0 - 97.0 fL    MCH 28.8 26.6 - 33.0 pg    MCHC 31.8 31.5 - 35.7 g/dL    RDW 13.9 12.3 - 15.4 %    RDW-SD 46.1 37.0 - 54.0 fl    MPV 11.0 6.0 - 12.0 fL    Platelets 225 140 - 450 10*3/mm3    Neutrophil % 62.6 42.7 - 76.0 %    Lymphocyte % 26.0 19.6 - 45.3 %    Monocyte % 6.6 5.0 - 12.0 %    Eosinophil % 3.8 0.3 - 6.2 %    Basophil % 0.6 0.0 - 1.5 %    Immature Grans % 0.4 0.0 - 0.5 %    Neutrophils, Absolute 5.57 1.70 - 7.00 10*3/mm3    Lymphocytes, Absolute 2.31 0.70 - 3.10 10*3/mm3    Monocytes, Absolute 0.59 0.10 - 0.90 10*3/mm3    Eosinophils, Absolute 0.34 0.00 - 0.40 10*3/mm3    Basophils, Absolute 0.05 0.00 - 0.20 10*3/mm3    Immature Grans,  Absolute 0.04 0.00 - 0.05 10*3/mm3    nRBC 0.0 0.0 - 0.2 /100 WBC   Light Blue Top   Result Value Ref Range    Extra Tube hold for add-on    Green Top (Gel)   Result Value Ref Range    Extra Tube Hold for add-ons.    Lavender Top   Result Value Ref Range    Extra Tube hold for add-on    Gold Top - SST   Result Value Ref Range    Extra Tube Hold for add-ons.          Assessment/Plan   Will consider inj in left shoulder.        Diagnoses and all orders for this visit:    1. Chronic left shoulder pain (Primary)    2. Rosacea  -     clotrimazole-betamethasone (Lotrisone) 1-0.05 % cream; Apply  topically to the appropriate area as directed 2 (Two) Times a Day.  Dispense: 45 g; Refill: 0    3. Earache on right  -     amoxicillin-clavulanate (Augmentin) 875-125 MG per tablet; Take 1 tablet by mouth Every 12 (Twelve) Hours.  Dispense: 20 tablet; Refill: 0      No follow-ups on file.          There are no Patient Instructions on file for this visit.     Gordon Norman MD    Assessment/Plan

## 2020-12-21 ENCOUNTER — TELEPHONE (OUTPATIENT)
Dept: INTERNAL MEDICINE | Facility: CLINIC | Age: 65
End: 2020-12-21

## 2020-12-21 ENCOUNTER — HOSPITAL ENCOUNTER (OUTPATIENT)
Dept: MAMMOGRAPHY | Facility: HOSPITAL | Age: 65
Discharge: HOME | End: 2020-12-21
Admitting: OBSTETRICS & GYNECOLOGY

## 2020-12-21 DIAGNOSIS — Z12.31 VISIT FOR SCREENING MAMMOGRAM: ICD-10-CM

## 2020-12-21 PROCEDURE — 77067 SCR MAMMO BI INCL CAD: CPT

## 2020-12-21 PROCEDURE — 77063 BREAST TOMOSYNTHESIS BI: CPT

## 2020-12-21 NOTE — TELEPHONE ENCOUNTER
Caller: Albania Ramos    Relationship: Self    Best call back number: [phone]    What orders are you requesting (i.e. lab or imaging): Labs for 12/28/20 appointment    In what timeframe would the patient need to come in: 12/22/20    Where will you receive your lab/imaging services:     Additional notes: Patient is asking for thyroid to be checked and alpurinol.  Patient stated that her hair is starting to fall out again

## 2020-12-21 NOTE — TELEPHONE ENCOUNTER
Called patient back, informed patient that Dr. Norman was out of the office this week, she asked if another doctor could order some labs for patient to have done, she is asking for her normal labs she has done and also a uric acid.

## 2020-12-22 RX ORDER — MOMETASONE FUROATE 200 UG/1
2 AEROSOL RESPIRATORY (INHALATION) 2 TIMES DAILY
Qty: 1 INHALER | Refills: 5 | COMMUNITY
Start: 2020-12-22 | End: 2020-12-29 | Stop reason: SDUPTHER

## 2020-12-28 ENCOUNTER — OFFICE VISIT (OUTPATIENT)
Dept: INTERNAL MEDICINE | Facility: CLINIC | Age: 65
End: 2020-12-28

## 2020-12-28 ENCOUNTER — TREATMENT (OUTPATIENT)
Dept: PHYSICAL THERAPY | Facility: CLINIC | Age: 65
End: 2020-12-28

## 2020-12-28 VITALS
WEIGHT: 265 LBS | OXYGEN SATURATION: 98 % | DIASTOLIC BLOOD PRESSURE: 80 MMHG | SYSTOLIC BLOOD PRESSURE: 120 MMHG | HEIGHT: 69 IN | HEART RATE: 86 BPM | RESPIRATION RATE: 16 BRPM | BODY MASS INDEX: 39.25 KG/M2 | TEMPERATURE: 97.7 F

## 2020-12-28 DIAGNOSIS — M25.512 CHRONIC LEFT SHOULDER PAIN: ICD-10-CM

## 2020-12-28 DIAGNOSIS — M20.42 HAMMER TOE OF LEFT FOOT: Primary | ICD-10-CM

## 2020-12-28 DIAGNOSIS — G89.29 CHRONIC LEFT SHOULDER PAIN: ICD-10-CM

## 2020-12-28 DIAGNOSIS — K21.00 GASTROESOPHAGEAL REFLUX DISEASE WITH ESOPHAGITIS WITHOUT HEMORRHAGE: Primary | ICD-10-CM

## 2020-12-28 PROCEDURE — 20605 DRAIN/INJ JOINT/BURSA W/O US: CPT | Performed by: INTERNAL MEDICINE

## 2020-12-28 PROCEDURE — 97161 PT EVAL LOW COMPLEX 20 MIN: CPT | Performed by: PHYSICAL THERAPIST

## 2020-12-28 PROCEDURE — 99213 OFFICE O/P EST LOW 20 MIN: CPT | Performed by: INTERNAL MEDICINE

## 2020-12-28 PROCEDURE — 97140 MANUAL THERAPY 1/> REGIONS: CPT | Performed by: PHYSICAL THERAPIST

## 2020-12-28 PROCEDURE — 97034 APP MDLTY 1+CNTRST BTH EA 15: CPT | Performed by: PHYSICAL THERAPIST

## 2020-12-28 RX ORDER — OMEPRAZOLE 40 MG/1
40 CAPSULE, DELAYED RELEASE ORAL DAILY
Qty: 90 CAPSULE | Refills: 3 | Status: SHIPPED | OUTPATIENT
Start: 2020-12-28 | End: 2022-04-20 | Stop reason: SDUPTHER

## 2020-12-28 RX ORDER — TRIAMCINOLONE ACETONIDE 40 MG/ML
40 INJECTION, SUSPENSION INTRA-ARTICULAR; INTRAMUSCULAR ONCE
Status: COMPLETED | OUTPATIENT
Start: 2020-12-28 | End: 2020-12-28

## 2020-12-28 RX ADMIN — TRIAMCINOLONE ACETONIDE 40 MG: 40 INJECTION, SUSPENSION INTRA-ARTICULAR; INTRAMUSCULAR at 09:49

## 2020-12-28 NOTE — PROGRESS NOTES
Physical Therapy Initial Evaluation and Plan of Care      Patient: Albania Ramos   : 1955  Diagnosis/ICD-10 Code:  Hammer toe of left foot [M20.42]  Referring practitioner: ANGELLA Dunn*    Subjective Evaluation    History of Present Illness  Mechanism of injury: L foot hammer toe surgery 20.  Pt with pins pulled and with pain and swelling noted.      L foot pain for a year of insidious onset.      4-5/10 without meds.        Patient Occupation: retired Pain  Current pain ratin (with meds )  At worst pain rating: 10  Location: L foot and toes.   Quality: knife-like, throbbing and burning  Relieving factors: medications and ice  Aggravating factors: repetitive movement, standing, movement, ambulation and squatting  Progression: no change    Patient Goals  Patient goals for therapy: return to sport/leisure activities, decreased pain, increased motion and increased strength             Objective          Observations     Right Ankle/Foot   Positive for edema.     Additional Ankle/Foot Observation Details  Pt wearing darco shoe and ace wrap on the foot.      Edema is the primary issue in the forefoot and toes.       Active Range of Motion   Left Ankle/Foot   Dorsiflexion (ke): 10 degrees   Plantar flexion: 48 degrees     Additional Active Range of Motion Details  2nd and 3rd MTP joint on the L LE at -15 degrees from neutral.  No active ext noted with testing.  Active flexion is able to get 25 degrees.     Swelling   Left Ankle/Foot   Metatarsal heads: 24 cm  Figure 8: 55.5 cm  Malleoli: 27.5 cm          Assessment & Plan     Assessment  Impairments: abnormal gait, abnormal or restricted ROM, activity intolerance, impaired balance, impaired physical strength, lacks appropriate home exercise program, pain with function and weight-bearing intolerance  Assessment details: Patient is a 65 year old female who comes to physical therapy with foot and toe pain S/p hammer toe surgery. Signs and  symptoms are consistent with diagnosis with elevated swelling and hypomobility noted.  The patient currently has pain, decreased ROM, decreased strength, and inability to perform all essential functional activities. Pt will benefit from skilled PT services to address the above issues.     Prognosis: fair  Prognosis details:     SHORT TERM GOALS:     3weeks  1. Pt independent with HEP   2. Pt to demonstrate ability to ambulate in the clinic without walking boot and with no reports of increased pain  3. Pt to demonstrate ability to ambulate in the clinic without antalgic gait   3. Pt to demonstrate ability to perform  heel raise on the left without increase in pain      LONG TERM GOALS:   6 weeks  1. Pt to demonstrate ability to perform full functional squat with good form and no increase in pain in the left foot/ankle  2. Pt to demonstrate ability to ambulate on TM for 10 minutes with normal gait pattern without increase in pain  3. Pt to report being able to walk in home without increase in pain in the left ankle/foot  4. Pt to demonstrate ability to perform SLS on the left lower extremity for 5 seconds without LOB or increased pain     Functional Limitations: walking, uncomfortable because of pain, standing and unable to perform repetitive tasks  Plan  Therapy options: will be seen for skilled physical therapy services  Planned modality interventions: cryotherapy, contrast bath immersion, thermotherapy (hydrocollator packs) and ultrasound  Planned therapy interventions: gait training, functional ROM exercises, flexibility, home exercise program, IADL retraining, joint mobilization, therapeutic activities, stretching, soft tissue mobilization, motor coordination training, manual therapy, strengthening, balance/weight-bearing training and ADL retraining  Duration in visits: 12  Treatment plan discussed with: patient  Plan details: Pt will be seen 2-3 x/week.         Manual Therapy:    12     mins  56076;  Therapeutic  Exercise:         mins  18543;     Neuromuscular Doug:        mins  76786;    Therapeutic Activity:          mins  06542;     Gait Training:           mins  17374;     Ultrasound:          mins  40116;    Electrical Stimulation:         mins  27132 ( );  Dry Needling          mins self-pay  Contrast bath ` 15  Timed Treatment:  27   mins   Total Treatment:     55   mins    PT SIGNATURE: Jackson Hodge, PT   DATE TREATMENT INITIATED: 12/28/2020    Initial Certification  Certification Period: 3/28/2021  I certify that the therapy services are furnished while this patient is under my care.  The services outlined above are required by this patient, and will be reviewed every 90 days.     PHYSICIAN: Jamel Matos DPM      DATE:     Please sign and return via fax to  .. Thank you, [facility].

## 2020-12-28 NOTE — PROGRESS NOTES
Subjective     Patient ID: Albania Ramos is a 65 y.o. female. Patient is here for management of multiple medical problems.     Chief Complaint   Patient presents with   • Shoulder Pain     Left shoulder pain     History of Present Illness   Gerd.  ppi no working at 20 mg. Asking for 40mg.                  The following portions of the patient's history were reviewed and updated as appropriate: allergies, current medications, past family history, past medical history, past social history, past surgical history and problem list.    Review of Systems   Constitutional: Negative.  Negative for fatigue.   HENT: Negative.  Negative for hearing loss, mouth sores and postnasal drip.    Cardiovascular: Negative.    Gastrointestinal: Negative.  Negative for abdominal pain, anal bleeding and blood in stool.   Musculoskeletal: Negative for joint swelling and myalgias.   Psychiatric/Behavioral: Negative for self-injury and sleep disturbance. The patient is not nervous/anxious and is not hyperactive.    All other systems reviewed and are negative.      Current Outpatient Medications:   •  albuterol sulfate HFA (Ventolin HFA) 108 (90 Base) MCG/ACT inhaler, Inhale 2 puffs Every 4 (Four) Hours As Needed for Wheezing or Shortness of Air., Disp: 18 g, Rfl: 5  •  ALLERGY SERUM INJECTION, Inject  under the skin 1 (One) Time., Disp: , Rfl:   •  calcium carbonate (OS-SHANTE) 600 MG tablet, Take 600 mg by mouth 2 (two) times a day with meals., Disp: , Rfl:   •  Cinnamon ([employer] CINNAMON) 500 MG capsule, Take 500 mg by mouth 2 (two) times a day., Disp: , Rfl:   •  clotrimazole-betamethasone (Lotrisone) 1-0.05 % cream, Apply  topically to the appropriate area as directed 2 (Two) Times a Day., Disp: 45 g, Rfl: 0  •  CRANBERRY CONCENTRATE PO, Take 4,200 mg by mouth 2 (two) times a day., Disp: , Rfl:   •  cyanocobalamin (VITAMIN B-12) 2500 MCG tablet tablet, TAKE ONE TABLET BY MOUTH THREE TIMES A WEEK MUST MAKE AN APPOINTMENT, Disp: 30 tablet, Rfl:  5  •  diclofenac (VOLTAREN) 1 % gel gel, Apply 4 g topically to the appropriate area as directed 4 (Four) Times a Day As Needed (pain)., Disp: 100 g, Rfl: 1  •  dilTIAZem XR (DILACOR XR) 120 MG 24 hr capsule, Take 1 capsule by mouth Daily., Disp: 30 capsule, Rfl: 11  •  EPINEPHrine (EPIPEN) 0.3 MG/0.3ML solution auto-injector injection, , Disp: , Rfl:   •  estradiol (ESTRACE VAGINAL) 0.1 MG/GM vaginal cream, Insert 2 g into the vagina Daily., Disp: , Rfl:   •  Garlic 500 MG capsule, Take 1 tablet by mouth 2 (two) times a day., Disp: , Rfl:   •  GLUCOSAMINE HCL-MSM PO, Glucosamine-Chondrotin, Disp: , Rfl:   •  ketotifen (ZADITOR) 0.025 % ophthalmic solution, ketotifen 0.025 % (0.035 %) eye drops  INSTILL 1 DROP INTO AFFECTED EYE(S) BY OPHTHALMIC ROUTE 2 TIMES PER DAY, Disp: , Rfl:   •  levocetirizine (XYZAL) 5 MG tablet, levocetirizine 5 mg tablet  TAKE ONE TABLET BY MOUTH DAILY, Disp: , Rfl:   •  levothyroxine (SYNTHROID, LEVOTHROID) 100 MCG tablet, TAKE ONE TABLET BY MOUTH DAILY ALONG WITH 88MCG TABLET FOR TOTAL DOSE OF 188MCG, Disp: 90 tablet, Rfl: 2  •  levothyroxine (SYNTHROID, LEVOTHROID) 88 MCG tablet, TAKE ONE TABLET BY MOUTH DAILY, Disp: 90 tablet, Rfl: 3  •  Mometasone Furoate (Asmanex HFA) 200 MCG/ACT aerosol, Inhale 2 puffs 2 (Two) Times a Day., Disp: 1 inhaler, Rfl: 5  •  Montelukast Sodium (SINGULAIR PO), montelukast, Disp: , Rfl:   •  MULTIPLE VITAMIN PO, Multiple Vitamin capsule  Daily, Disp: , Rfl:   •  Omega-3 Fatty Acids (FISH OIL) 435 MG capsule, Take 1,000 mg by mouth daily., Disp: , Rfl:   •  ondansetron (Zofran) 4 MG tablet, Take 1 tablet by mouth Every 8 (Eight) Hours As Needed for Nausea or Vomiting., Disp: 9 tablet, Rfl: 0  •  Probiotic Product (SOLUBLE FIBER/PROBIOTICS PO), Take 1 tablet by mouth daily., Disp: , Rfl:   •  vitamin C (ASCORBIC ACID) 500 MG tablet, Take 1 tablet by mouth daily., Disp: , Rfl:   •  allopurinol (ZYLOPRIM) 300 MG tablet, Take 1 tablet by mouth Daily., Disp: 90  "tablet, Rfl: 3  •  amoxicillin-clavulanate (Augmentin) 875-125 MG per tablet, Take 1 tablet by mouth Every 12 (Twelve) Hours., Disp: 20 tablet, Rfl: 0  •  omeprazole (priLOSEC) 40 MG capsule, Take 1 capsule by mouth Daily., Disp: 90 capsule, Rfl: 3    Current Facility-Administered Medications:   •  triamcinolone acetonide (KENALOG-40) injection 40 mg, 40 mg, Intra-articular, Once, Gordon Norman MD  •  triamcinolone acetonide (KENALOG-40) injection 40 mg, 40 mg, Intra-articular, Once, Gordon Norman MD    Objective      Blood pressure 120/80, pulse 86, temperature 97.7 °F (36.5 °C), resp. rate 16, height 175.3 cm (69\"), weight 120 kg (265 lb), SpO2 98 %, not currently breastfeeding.    Physical Exam     General Appearance:    Alert, cooperative, no distress, appears stated age   Head:    Normocephalic, without obvious abnormality, atraumatic   Eyes:    PERRL, conjunctiva/corneas clear, EOM's intact   Ears:    Normal TM's and external ear canals, both ears   Nose:   Nares normal, septum midline, mucosa normal, no drainage   or sinus tenderness   Throat:   Lips, mucosa, and tongue normal; teeth and gums normal   Neck:   Supple, symmetrical, trachea midline, no adenopathy;        thyroid:  No enlargement/tenderness/nodules; no carotid    bruit or JVD   Back:     Symmetric, no curvature, ROM normal, no CVA tenderness   Lungs:     Clear to auscultation bilaterally, respirations unlabored   Chest wall:    No tenderness or deformity   Heart:    Regular rate and rhythm, S1 and S2 normal, no murmur,        rub or gallop   Abdomen:     Soft, non-tender, bowel sounds active all four quadrants,     no masses, no organomegaly   Extremities:   Left foot in boot.    Extremities normal, atraumatic, no cyanosis or edema   Pulses:   2+ and symmetric all extremities   Skin:   Skin color, texture, turgor normal, no rashes or lesions   Lymph nodes:   Cervical, supraclavicular, and axillary nodes normal   Neurologic:   CNII-XII " intact. Normal strength, sensation and reflexes       throughout      Results for orders placed or performed during the hospital encounter of 11/14/20   Comprehensive Metabolic Panel    Specimen: Blood   Result Value Ref Range    Glucose 108 (H) 65 - 99 mg/dL    BUN 20 8 - 23 mg/dL    Creatinine 0.79 0.57 - 1.00 mg/dL    Sodium 144 136 - 145 mmol/L    Potassium 3.8 3.5 - 5.2 mmol/L    Chloride 107 98 - 107 mmol/L    CO2 25.8 22.0 - 29.0 mmol/L    Calcium 8.3 (L) 8.6 - 10.5 mg/dL    Total Protein 5.8 (L) 6.0 - 8.5 g/dL    Albumin 3.70 3.50 - 5.20 g/dL    ALT (SGPT) 23 1 - 33 U/L    AST (SGOT) 29 1 - 32 U/L    Alkaline Phosphatase 103 39 - 117 U/L    Total Bilirubin 0.2 0.0 - 1.2 mg/dL    eGFR Non African Amer 73 >60 mL/min/1.73    Globulin 2.1 gm/dL    A/G Ratio 1.8 g/dL    BUN/Creatinine Ratio 25.3 (H) 7.0 - 25.0    Anion Gap 11.2 5.0 - 15.0 mmol/L   Troponin    Specimen: Blood   Result Value Ref Range    Troponin T <0.010 0.000 - 0.030 ng/mL   Lipase    Specimen: Blood   Result Value Ref Range    Lipase 33 13 - 60 U/L   CBC Auto Differential    Specimen: Blood   Result Value Ref Range    WBC 8.90 3.40 - 10.80 10*3/mm3    RBC 4.55 3.77 - 5.28 10*6/mm3    Hemoglobin 13.1 12.0 - 15.9 g/dL    Hematocrit 41.2 34.0 - 46.6 %    MCV 90.5 79.0 - 97.0 fL    MCH 28.8 26.6 - 33.0 pg    MCHC 31.8 31.5 - 35.7 g/dL    RDW 13.9 12.3 - 15.4 %    RDW-SD 46.1 37.0 - 54.0 fl    MPV 11.0 6.0 - 12.0 fL    Platelets 225 140 - 450 10*3/mm3    Neutrophil % 62.6 42.7 - 76.0 %    Lymphocyte % 26.0 19.6 - 45.3 %    Monocyte % 6.6 5.0 - 12.0 %    Eosinophil % 3.8 0.3 - 6.2 %    Basophil % 0.6 0.0 - 1.5 %    Immature Grans % 0.4 0.0 - 0.5 %    Neutrophils, Absolute 5.57 1.70 - 7.00 10*3/mm3    Lymphocytes, Absolute 2.31 0.70 - 3.10 10*3/mm3    Monocytes, Absolute 0.59 0.10 - 0.90 10*3/mm3    Eosinophils, Absolute 0.34 0.00 - 0.40 10*3/mm3    Basophils, Absolute 0.05 0.00 - 0.20 10*3/mm3    Immature Grans, Absolute 0.04 0.00 - 0.05 10*3/mm3     nRBC 0.0 0.0 - 0.2 /100 WBC   Light Blue Top   Result Value Ref Range    Extra Tube hold for add-on    Green Top (Gel)   Result Value Ref Range    Extra Tube Hold for add-ons.    Lavender Top   Result Value Ref Range    Extra Tube hold for add-on    Gold Top - SST   Result Value Ref Range    Extra Tube Hold for add-ons.          Assessment/Plan       Shoulder inj.    Indication: pain  Pt consented. anerior shoulder prepped. Under sterile technique shoulder was injected with Kenalog  40mg 1 cc and lidocaine 1 cc 1%.  Pt tolerated procedure well.      S/n [id number]  Lot [id number]  Exp feb 2022    Gerd.       Diagnoses and all orders for this visit:    1. Gastroesophageal reflux disease with esophagitis without hemorrhage (Primary)  -     omeprazole (priLOSEC) 40 MG capsule; Take 1 capsule by mouth Daily.  Dispense: 90 capsule; Refill: 3  -     Vitamin B12  -     CBC & Differential  -     Lipid Panel  -     Comprehensive Metabolic Panel  -     TSH  -     T4, Free    2. Chronic left shoulder pain  -     Vitamin B12  -     CBC & Differential  -     Lipid Panel  -     Comprehensive Metabolic Panel  -     TSH  -     T4, Free  -     triamcinolone acetonide (KENALOG-40) injection 40 mg      Return in about 3 months (around 3/28/2021).          There are no Patient Instructions on file for this visit.     Gordon Norman MD    Assessment/Plan

## 2020-12-29 RX ORDER — MOMETASONE FUROATE 200 UG/1
2 AEROSOL RESPIRATORY (INHALATION) 2 TIMES DAILY
Qty: 1 INHALER | Refills: 5 | Status: SHIPPED | OUTPATIENT
Start: 2020-12-29 | End: 2021-08-25 | Stop reason: SDUPTHER

## 2020-12-31 ENCOUNTER — TREATMENT (OUTPATIENT)
Dept: PHYSICAL THERAPY | Facility: CLINIC | Age: 65
End: 2020-12-31

## 2020-12-31 DIAGNOSIS — M20.42 HAMMER TOE OF LEFT FOOT: Primary | ICD-10-CM

## 2020-12-31 DIAGNOSIS — M25.512 ACUTE PAIN OF LEFT SHOULDER: ICD-10-CM

## 2020-12-31 PROCEDURE — 97110 THERAPEUTIC EXERCISES: CPT | Performed by: PHYSICAL THERAPIST

## 2020-12-31 PROCEDURE — 97140 MANUAL THERAPY 1/> REGIONS: CPT | Performed by: PHYSICAL THERAPIST

## 2020-12-31 PROCEDURE — 97034 APP MDLTY 1+CNTRST BTH EA 15: CPT | Performed by: PHYSICAL THERAPIST

## 2020-12-31 PROCEDURE — 97530 THERAPEUTIC ACTIVITIES: CPT | Performed by: PHYSICAL THERAPIST

## 2020-12-31 NOTE — PROGRESS NOTES
Physical Therapy Daily Progress Note    Patient Information  Albania Ramos  1955      Visit # : 2    Albania Ramos reports 3/10 pain today at rest.  Pt reports the contrast bath really helped with the swelling and she has been doing really well.         Objective Pt presents to PT today with no distress noted.  Pt is wearing a real shoe into PT area today.     Pt with less edema noted and improved PROM/AROM.      Pt with improved tolerance to ROM and HEP.      See Exercise, Manual, and Modality Logs for complete treatment.     Assessment/Plan  Pt with improved swelling due to more elevation and the contrast bath.  The ROM is much improved as well.       Progress per Plan of Care and Progress strengthening /stabilization /functional activity      Visit Diagnoses:    ICD-10-CM ICD-9-CM   1. Hammer toe of left foot  M20.42 735.4   2. Acute pain of left shoulder  M25.512 719.41            Manual Therapy:    13     mins  32624;  Therapeutic Exercise:    26     mins  25319;     Neuromuscular Doug:        mins  48370;    Therapeutic Activity:     10     mins  98670;     Gait Training:           mins  51856;     Ultrasound:          mins  19436;    Electrical Stimulation:         mins  79589 ( );  Dry Needling          mins self-pay  Contrast Bath   12    Timed Treatment:   61   mins   Total Treatment:     61   mins          Jackson Hodge, PT  Physical Therapist

## 2021-01-04 ENCOUNTER — TELEPHONE (OUTPATIENT)
Dept: PHYSICAL THERAPY | Facility: CLINIC | Age: 66
End: 2021-01-04

## 2021-02-08 RX ORDER — LEVOTHYROXINE SODIUM 0.1 MG/1
TABLET ORAL
Qty: 90 TABLET | Refills: 3 | Status: SHIPPED | OUTPATIENT
Start: 2021-02-08 | End: 2022-02-02 | Stop reason: SDUPTHER

## 2021-02-25 RX ORDER — ALLOPURINOL 300 MG/1
TABLET ORAL
Qty: 90 TABLET | Refills: 3 | Status: SHIPPED | OUTPATIENT
Start: 2021-02-25 | End: 2022-05-11 | Stop reason: SDUPTHER

## 2021-04-26 DIAGNOSIS — M25.512 ACUTE PAIN OF LEFT SHOULDER: Primary | ICD-10-CM

## 2021-04-27 LAB
ALBUMIN SERPL-MCNC: 4.5 G/DL (ref 3.5–5.2)
ALBUMIN/GLOB SERPL: 2.3 G/DL
ALP SERPL-CCNC: 100 U/L (ref 39–117)
ALT SERPL-CCNC: 27 U/L (ref 1–33)
AST SERPL-CCNC: 22 U/L (ref 1–32)
BASOPHILS # BLD AUTO: 0.04 10*3/MM3 (ref 0–0.2)
BASOPHILS NFR BLD AUTO: 0.6 % (ref 0–1.5)
BILIRUB SERPL-MCNC: 0.4 MG/DL (ref 0–1.2)
BUN SERPL-MCNC: 16 MG/DL (ref 8–23)
BUN/CREAT SERPL: 20.5 (ref 7–25)
CALCIUM SERPL-MCNC: 9.9 MG/DL (ref 8.6–10.5)
CHLORIDE SERPL-SCNC: 103 MMOL/L (ref 98–107)
CHOLEST SERPL-MCNC: 188 MG/DL (ref 0–200)
CO2 SERPL-SCNC: 29.3 MMOL/L (ref 22–29)
CREAT SERPL-MCNC: 0.78 MG/DL (ref 0.57–1)
EOSINOPHIL # BLD AUTO: 0.43 10*3/MM3 (ref 0–0.4)
EOSINOPHIL NFR BLD AUTO: 6.5 % (ref 0.3–6.2)
ERYTHROCYTE [DISTWIDTH] IN BLOOD BY AUTOMATED COUNT: 13.5 % (ref 12.3–15.4)
GLOBULIN SER CALC-MCNC: 2 GM/DL
GLUCOSE SERPL-MCNC: 103 MG/DL (ref 65–99)
HCT VFR BLD AUTO: 40 % (ref 34–46.6)
HDLC SERPL-MCNC: 56 MG/DL (ref 40–60)
HGB BLD-MCNC: 13.3 G/DL (ref 12–15.9)
IMM GRANULOCYTES # BLD AUTO: 0.02 10*3/MM3 (ref 0–0.05)
IMM GRANULOCYTES NFR BLD AUTO: 0.3 % (ref 0–0.5)
LDLC SERPL CALC-MCNC: 108 MG/DL (ref 0–100)
LYMPHOCYTES # BLD AUTO: 1.72 10*3/MM3 (ref 0.7–3.1)
LYMPHOCYTES NFR BLD AUTO: 26.1 % (ref 19.6–45.3)
MCH RBC QN AUTO: 28.6 PG (ref 26.6–33)
MCHC RBC AUTO-ENTMCNC: 33.3 G/DL (ref 31.5–35.7)
MCV RBC AUTO: 86 FL (ref 79–97)
MONOCYTES # BLD AUTO: 0.52 10*3/MM3 (ref 0.1–0.9)
MONOCYTES NFR BLD AUTO: 7.9 % (ref 5–12)
NEUTROPHILS # BLD AUTO: 3.87 10*3/MM3 (ref 1.7–7)
NEUTROPHILS NFR BLD AUTO: 58.6 % (ref 42.7–76)
NRBC BLD AUTO-RTO: 0 /100 WBC (ref 0–0.2)
PLATELET # BLD AUTO: 248 10*3/MM3 (ref 140–450)
POTASSIUM SERPL-SCNC: 4.5 MMOL/L (ref 3.5–5.2)
PROT SERPL-MCNC: 6.5 G/DL (ref 6–8.5)
RBC # BLD AUTO: 4.65 10*6/MM3 (ref 3.77–5.28)
SODIUM SERPL-SCNC: 142 MMOL/L (ref 136–145)
T4 FREE SERPL-MCNC: 1.84 NG/DL (ref 0.93–1.7)
TRIGL SERPL-MCNC: 134 MG/DL (ref 0–150)
TSH SERPL DL<=0.005 MIU/L-ACNC: 0.16 UIU/ML (ref 0.27–4.2)
URATE SERPL-MCNC: 5.1 MG/DL (ref 2.4–5.7)
VIT B12 SERPL-MCNC: 1255 PG/ML (ref 211–946)
VLDLC SERPL CALC-MCNC: 24 MG/DL (ref 5–40)
WBC # BLD AUTO: 6.6 10*3/MM3 (ref 3.4–10.8)

## 2021-05-18 ENCOUNTER — HOSPITAL ENCOUNTER (OUTPATIENT)
Dept: GENERAL RADIOLOGY | Facility: HOSPITAL | Age: 66
Discharge: HOME | End: 2021-05-18
Admitting: INTERNAL MEDICINE

## 2021-05-18 ENCOUNTER — OFFICE VISIT (OUTPATIENT)
Dept: INTERNAL MEDICINE | Facility: CLINIC | Age: 66
End: 2021-05-18

## 2021-05-18 ENCOUNTER — HOSPITAL ENCOUNTER (OUTPATIENT)
Dept: ULTRASOUND IMAGING | Facility: HOSPITAL | Age: 66
Discharge: HOME | End: 2021-05-18
Admitting: INTERNAL MEDICINE

## 2021-05-18 VITALS
OXYGEN SATURATION: 98 % | HEART RATE: 77 BPM | HEIGHT: 69 IN | RESPIRATION RATE: 16 BRPM | TEMPERATURE: 97.7 F | BODY MASS INDEX: 39.4 KG/M2 | SYSTOLIC BLOOD PRESSURE: 134 MMHG | DIASTOLIC BLOOD PRESSURE: 82 MMHG | WEIGHT: 266 LBS

## 2021-05-18 DIAGNOSIS — M79.672 LEFT FOOT PAIN: ICD-10-CM

## 2021-05-18 DIAGNOSIS — R60.0 LEG EDEMA: ICD-10-CM

## 2021-05-18 DIAGNOSIS — R53.83 FATIGUE, UNSPECIFIED TYPE: ICD-10-CM

## 2021-05-18 DIAGNOSIS — E03.9 ACQUIRED HYPOTHYROIDISM: ICD-10-CM

## 2021-05-18 DIAGNOSIS — I48.11 LONGSTANDING PERSISTENT ATRIAL FIBRILLATION (HCC): ICD-10-CM

## 2021-05-18 DIAGNOSIS — M19.90 ARTHRITIS: Primary | ICD-10-CM

## 2021-05-18 DIAGNOSIS — M19.90 ARTHRITIS: ICD-10-CM

## 2021-05-18 DIAGNOSIS — I88.9 LYMPHADENITIS: ICD-10-CM

## 2021-05-18 DIAGNOSIS — C73 THYROID CANCER (HCC): ICD-10-CM

## 2021-05-18 PROCEDURE — 99214 OFFICE O/P EST MOD 30 MIN: CPT | Performed by: INTERNAL MEDICINE

## 2021-05-18 PROCEDURE — 73610 X-RAY EXAM OF ANKLE: CPT

## 2021-05-18 PROCEDURE — 93971 EXTREMITY STUDY: CPT

## 2021-05-18 PROCEDURE — 73630 X-RAY EXAM OF FOOT: CPT

## 2021-05-18 NOTE — PROGRESS NOTES
Subjective     Patient ID: Albania Ramos is a 66 y.o. female. Patient is here for management of multiple medical problems.     Chief Complaint   Patient presents with   • Foot Swelling     more swelling in left foot and ankle   • Leg Swelling     more swelling in left leg   • Verrucous Vulgaris   • Chills     History of Present Illness       Left leg edema x 3 weeks. At the gym now and no trauma.   Pain in the latteral lower ankle.  Feels like bone braking in feet.    Hair falling out, tired and cold.   Worse x 3-4 months.      Still on cpap.      Diverticulitis flair more frequent.   Just finished another round of flagyl and augmentin.      The following portions of the patient's history were reviewed and updated as appropriate: allergies, current medications, past family history, past medical history, past social history, past surgical history and problem list.    Review of Systems   Constitutional: Positive for fatigue.   Gastrointestinal: Positive for abdominal pain.   Psychiatric/Behavioral: Negative for self-injury and sleep disturbance. The patient is not nervous/anxious and is not hyperactive.    All other systems reviewed and are negative.      Current Outpatient Medications:   •  albuterol sulfate HFA (Ventolin HFA) 108 (90 Base) MCG/ACT inhaler, Inhale 2 puffs Every 4 (Four) Hours As Needed for Wheezing or Shortness of Air., Disp: 18 g, Rfl: 5  •  ALLERGY SERUM INJECTION, Inject  under the skin 1 (One) Time., Disp: , Rfl:   •  allopurinol (ZYLOPRIM) 300 MG tablet, TAKE ONE TABLET BY MOUTH DAILY, Disp: 90 tablet, Rfl: 3  •  calcium carbonate (OS-SHANTE) 600 MG tablet, Take 600 mg by mouth 2 (two) times a day with meals., Disp: , Rfl:   •  Cinnamon ([employer] CINNAMON) 500 MG capsule, Take 500 mg by mouth 2 (two) times a day., Disp: , Rfl:   •  clotrimazole-betamethasone (Lotrisone) 1-0.05 % cream, Apply  topically to the appropriate area as directed 2 (Two) Times a Day., Disp: 45 g, Rfl: 0  •  CRANBERRY  CONCENTRATE PO, Take 4,200 mg by mouth 2 (two) times a day., Disp: , Rfl:   •  cyanocobalamin (VITAMIN B-12) 2500 MCG tablet tablet, TAKE ONE TABLET BY MOUTH THREE TIMES A WEEK MUST MAKE AN APPOINTMENT, Disp: 30 tablet, Rfl: 5  •  diclofenac (VOLTAREN) 1 % gel gel, Apply 4 g topically to the appropriate area as directed 4 (Four) Times a Day As Needed (pain)., Disp: 100 g, Rfl: 1  •  dilTIAZem XR (DILACOR XR) 120 MG 24 hr capsule, Take 1 capsule by mouth Daily., Disp: 30 capsule, Rfl: 11  •  EPINEPHrine (EPIPEN) 0.3 MG/0.3ML solution auto-injector injection, , Disp: , Rfl:   •  estradiol (ESTRACE VAGINAL) 0.1 MG/GM vaginal cream, Insert 2 g into the vagina Daily., Disp: , Rfl:   •  Garlic 500 MG capsule, Take 1 tablet by mouth 2 (two) times a day., Disp: , Rfl:   •  GLUCOSAMINE HCL-MSM PO, Glucosamine-Chondrotin, Disp: , Rfl:   •  ketotifen (ZADITOR) 0.025 % ophthalmic solution, ketotifen 0.025 % (0.035 %) eye drops  INSTILL 1 DROP INTO AFFECTED EYE(S) BY OPHTHALMIC ROUTE 2 TIMES PER DAY, Disp: , Rfl:   •  levocetirizine (XYZAL) 5 MG tablet, levocetirizine 5 mg tablet  TAKE ONE TABLET BY MOUTH DAILY, Disp: , Rfl:   •  levothyroxine (SYNTHROID, LEVOTHROID) 100 MCG tablet, TAKE ONE TABLET BY MOUTH DAILY ALONG WITH 88MCG TABLET FOR TOTAL DOSE OF 188MCG, Disp: 90 tablet, Rfl: 3  •  levothyroxine (SYNTHROID, LEVOTHROID) 88 MCG tablet, TAKE ONE TABLET BY MOUTH DAILY, Disp: 90 tablet, Rfl: 3  •  Mometasone Furoate (Asmanex HFA) 200 MCG/ACT aerosol, Inhale 2 puffs 2 (Two) Times a Day., Disp: 1 inhaler, Rfl: 5  •  Montelukast Sodium (SINGULAIR PO), montelukast, Disp: , Rfl:   •  MULTIPLE VITAMIN PO, Multiple Vitamin capsule  Daily, Disp: , Rfl:   •  Omega-3 Fatty Acids (FISH OIL) 435 MG capsule, Take 1,000 mg by mouth daily., Disp: , Rfl:   •  omeprazole (priLOSEC) 40 MG capsule, Take 1 capsule by mouth Daily., Disp: 90 capsule, Rfl: 3  •  ondansetron (Zofran) 4 MG tablet, Take 1 tablet by mouth Every 8 (Eight) Hours As  "Needed for Nausea or Vomiting., Disp: 9 tablet, Rfl: 0  •  Probiotic Product (SOLUBLE FIBER/PROBIOTICS PO), Take 1 tablet by mouth daily., Disp: , Rfl:   •  vitamin C (ASCORBIC ACID) 500 MG tablet, Take 1 tablet by mouth daily., Disp: , Rfl:     Current Facility-Administered Medications:   •  triamcinolone acetonide (KENALOG-40) injection 40 mg, 40 mg, Intra-articular, Once, Gordon Norman MD    Objective      Blood pressure 134/82, pulse 77, temperature 97.7 °F (36.5 °C), resp. rate 16, height 175.3 cm (69\"), weight 121 kg (266 lb), SpO2 98 %, not currently breastfeeding.    Physical Exam     General Appearance:    Alert, cooperative, no distress, appears stated age   Head:    Normocephalic, without obvious abnormality, atraumatic   Eyes:    PERRL, conjunctiva/corneas clear, EOM's intact   Ears:    Normal TM's and external ear canals, both ears   Nose:   Nares normal, septum midline, mucosa normal, no drainage   or sinus tenderness   Throat:   Lips, mucosa, and tongue normal; teeth and gums normal   Neck:   Supple, symmetrical, trachea midline, no adenopathy;        thyroid:  No enlargement/tenderness/nodules; no carotid    bruit or JVD   Back:     Symmetric, no curvature, ROM normal, no CVA tenderness   Lungs:     Clear to auscultation bilaterally, respirations unlabored   Chest wall:    No tenderness or deformity   Heart:    Regular rate and rhythm, S1 and S2 normal, no murmur,        rub or gallop   Abdomen:     Soft, non-tender, bowel sounds active all four quadrants,     no masses, no organomegaly   Extremities:   Extremities normal, atraumatic, no cyanosis. + 2 left edema   Pulses:   2+ and symmetric all extremities   Skin:   Skin color, texture, turgor normal, no rashes or lesions   Lymph nodes:   Cervical, supraclavicular, and axillary nodes normal   Neurologic:   CNII-XII intact. Normal strength, sensation and reflexes       throughout      Results for orders placed or performed in visit on 04/26/21 "   Uric acid    Specimen: Blood   Result Value Ref Range    Uric Acid 5.1 2.4 - 5.7 mg/dL         Assessment/Plan   occ soda. Will stop.    Last covid vac in march.  All problems started 1 week after vac.  Pfizer vac.     First vac was fine. Then 2nd vac Lymphadenopathy.       Diagnoses and all orders for this visit:    1. Arthritis (Primary)  -     TSH  -     T4, Free  -     T3, Free  -     CBC & Differential  -     Vitamin B12  -     Comprehensive Metabolic Panel  -     Vitamin B6  -     CK  -     Myoglobin, Serum  -     [place] Spotted Fever, IgM  -     [place] Spotted Fever, IgG  -     Sedimentation Rate  -     Uric Acid  -     C-reactive Protein  -     Antistreptolysin O Titer  -     Rheumatoid Factor  -     Cyclic Citrul Peptide Antibody, IgG / IgA  -     Ehrlichia Antibody Panel  -     Lyme Disease, PCR  -     XR Foot 3+ View Left; Future  -     XR Ankle 3+ View Left; Future    2. Left foot pain  -     TSH  -     T4, Free  -     T3, Free  -     CBC & Differential  -     Vitamin B12  -     Comprehensive Metabolic Panel  -     Vitamin B6  -     CK  -     Myoglobin, Serum  -     [place] Spotted Fever, IgM  -     [place] Spotted Fever, IgG  -     Sedimentation Rate  -     Uric Acid  -     C-reactive Protein  -     Antistreptolysin O Titer  -     Rheumatoid Factor  -     Cyclic Citrul Peptide Antibody, IgG / IgA  -     Ehrlichia Antibody Panel  -     Lyme Disease, PCR  -     XR Foot 3+ View Left; Future  -     XR Ankle 3+ View Left; Future    3. Fatigue, unspecified type  -     TSH  -     T4, Free  -     T3, Free  -     CBC & Differential  -     Vitamin B12  -     Comprehensive Metabolic Panel  -     Vitamin B6  -     CK  -     Myoglobin, Serum  -     [place] Spotted Fever, IgM  -     [place] Spotted Fever, IgG  -     Sedimentation Rate  -     Uric Acid  -     C-reactive Protein  -     Antistreptolysin O Titer  -     Rheumatoid Factor  -     Cyclic Citrul Peptide Antibody, IgG / IgA  -      Ehrlichia Antibody Panel  -     Lyme Disease, PCR  -     Ambulatory Referral to Cardiology    4. Thyroid cancer (CMS/HCC)  -     TSH  -     T4, Free  -     T3, Free  -     CBC & Differential  -     Vitamin B12  -     Comprehensive Metabolic Panel  -     Vitamin B6  -     CK  -     Myoglobin, Serum  -     [place] Spotted Fever, IgM  -     [place] Spotted Fever, IgG  -     Sedimentation Rate  -     Uric Acid  -     C-reactive Protein  -     Antistreptolysin O Titer  -     Rheumatoid Factor  -     Cyclic Citrul Peptide Antibody, IgG / IgA  -     Ehrlichia Antibody Panel  -     Lyme Disease, PCR  -     US Thyroid    5. Leg edema  -     Ambulatory Referral to Cardiology  -     Duplex Venous Lower Extremity - Left CAR; Future  -     US Venous Doppler Lower Extremity Left (duplex); Future    6. Acquired hypothyroidism    7. Longstanding persistent atrial fibrillation (CMS/HCC)    8. Lymphadenitis  -     Bartonella, DNA, Amp Probe      Return in about 3 weeks (around 6/8/2021).          There are no Patient Instructions on file for this visit.     Gordon Norman MD    Assessment/Plan

## 2021-05-20 ENCOUNTER — TELEPHONE (OUTPATIENT)
Dept: INTERNAL MEDICINE | Facility: CLINIC | Age: 66
End: 2021-05-20

## 2021-05-21 DIAGNOSIS — G89.29 CHRONIC PAIN OF LEFT ANKLE: Primary | ICD-10-CM

## 2021-05-21 DIAGNOSIS — M25.572 CHRONIC PAIN OF LEFT ANKLE: Primary | ICD-10-CM

## 2021-05-22 LAB — B HENSELAE DNA SPEC QL NAA+PROBE: NEGATIVE

## 2021-05-24 ENCOUNTER — TELEPHONE (OUTPATIENT)
Dept: INTERNAL MEDICINE | Facility: CLINIC | Age: 66
End: 2021-05-24

## 2021-05-24 NOTE — TELEPHONE ENCOUNTER
Patient called regarding US of her Thyroid patient states they did schedule it for 06/02/2021, patient states she does not have a thyroid that is has been removed and wants to know the reasoning for having the US done if she does not have a thyroid. Patient states she will have it done if there is a reason too but wanted to clarify.  Please advise?

## 2021-05-25 ENCOUNTER — TELEPHONE (OUTPATIENT)
Dept: INTERNAL MEDICINE | Facility: CLINIC | Age: 66
End: 2021-05-25

## 2021-05-25 DIAGNOSIS — M25.579 ACUTE ANKLE PAIN, UNSPECIFIED LATERALITY: Primary | ICD-10-CM

## 2021-05-25 NOTE — TELEPHONE ENCOUNTER
Caller: Albania Ramos    Relationship: Self    Best call back number: [phone]    What is the medical concern/diagnosis: SWOLLEN FOOT     What specialty or service is being requested: REFERRAL TO PODIATRY     What is the provider, practice or medical service name: DR. MICHELLE POLANCO     What is the office location: CLOSE TO [facility]     What is the office phone number: [phone]    Any additional details:

## 2021-05-26 LAB
A PHAGOCYTOPH IGG TITR SER IF: NEGATIVE {TITER}
A PHAGOCYTOPH IGM TITR SER IF: NEGATIVE {TITER}
ALBUMIN SERPL-MCNC: 4.3 G/DL (ref 3.8–4.8)
ALBUMIN/GLOB SERPL: 2 {RATIO} (ref 1.2–2.2)
ALP SERPL-CCNC: 88 IU/L (ref 48–121)
ALT SERPL-CCNC: 22 IU/L (ref 0–32)
ASO AB SERPL-ACNC: 80 IU/ML (ref 0–200)
AST SERPL-CCNC: 23 IU/L (ref 0–40)
B BURGDOR DNA SPEC QL NAA+PROBE: NEGATIVE
BASOPHILS # BLD AUTO: 0.1 X10E3/UL (ref 0–0.2)
BASOPHILS NFR BLD AUTO: 1 %
BILIRUB SERPL-MCNC: 0.3 MG/DL (ref 0–1.2)
BUN SERPL-MCNC: 17 MG/DL (ref 8–27)
BUN/CREAT SERPL: 22 (ref 12–28)
CALCIUM SERPL-MCNC: 9.9 MG/DL (ref 8.7–10.3)
CCP IGA+IGG SERPL IA-ACNC: 10 UNITS (ref 0–19)
CHLORIDE SERPL-SCNC: 107 MMOL/L (ref 96–106)
CK SERPL-CCNC: 80 U/L (ref 32–182)
CO2 SERPL-SCNC: 29 MMOL/L (ref 20–29)
CREAT SERPL-MCNC: 0.77 MG/DL (ref 0.57–1)
CRP SERPL-MCNC: 3 MG/L (ref 0–10)
E CHAFFEENSIS IGG TITR SER IF: NEGATIVE {TITER}
E CHAFFEENSIS IGM TITR SER IF: NEGATIVE {TITER}
EOSINOPHIL # BLD AUTO: 0.3 X10E3/UL (ref 0–0.4)
EOSINOPHIL NFR BLD AUTO: 4 %
ERYTHROCYTE [DISTWIDTH] IN BLOOD BY AUTOMATED COUNT: 13.8 % (ref 11.7–15.4)
ERYTHROCYTE [SEDIMENTATION RATE] IN BLOOD BY WESTERGREN METHOD: 12 MM/HR (ref 0–40)
GLOBULIN SER CALC-MCNC: 2.2 G/DL (ref 1.5–4.5)
GLUCOSE SERPL-MCNC: 100 MG/DL (ref 65–99)
HCT VFR BLD AUTO: 40.2 % (ref 34–46.6)
HGB BLD-MCNC: 13.2 G/DL (ref 11.1–15.9)
IMM GRANULOCYTES # BLD AUTO: 0 X10E3/UL (ref 0–0.1)
IMM GRANULOCYTES NFR BLD AUTO: 0 %
LYMPHOCYTES # BLD AUTO: 2.4 X10E3/UL (ref 0.7–3.1)
LYMPHOCYTES NFR BLD AUTO: 32 %
MCH RBC QN AUTO: 29.1 PG (ref 26.6–33)
MCHC RBC AUTO-ENTMCNC: 32.8 G/DL (ref 31.5–35.7)
MCV RBC AUTO: 89 FL (ref 79–97)
MONOCYTES # BLD AUTO: 0.5 X10E3/UL (ref 0.1–0.9)
MONOCYTES NFR BLD AUTO: 7 %
MYOGLOBIN SERPL-MCNC: 29 NG/ML (ref 25–58)
NEUTROPHILS # BLD AUTO: 4.2 X10E3/UL (ref 1.4–7)
NEUTROPHILS NFR BLD AUTO: 56 %
PLATELET # BLD AUTO: 282 X10E3/UL (ref 150–450)
POTASSIUM SERPL-SCNC: 5 MMOL/L (ref 3.5–5.2)
PROT SERPL-MCNC: 6.5 G/DL (ref 6–8.5)
R RICKETTSI IGG SER QL IA: POSITIVE
R RICKETTSI IGG TITR SER IF: NORMAL {TITER}
R RICKETTSI IGM SER-ACNC: 0.26 INDEX (ref 0–0.89)
RBC # BLD AUTO: 4.53 X10E6/UL (ref 3.77–5.28)
RHEUMATOID FACT SERPL-ACNC: <10 IU/ML (ref 0–13.9)
SODIUM SERPL-SCNC: 147 MMOL/L (ref 134–144)
T3FREE SERPL-MCNC: 3.4 PG/ML (ref 2–4.4)
T4 FREE SERPL-MCNC: 1.92 NG/DL (ref 0.82–1.77)
TSH SERPL DL<=0.005 MIU/L-ACNC: 0.19 UIU/ML (ref 0.45–4.5)
URATE SERPL-MCNC: 4.8 MG/DL (ref 3–7.2)
VIT B12 SERPL-MCNC: 730 PG/ML (ref 232–1245)
VIT B6 SERPL-MCNC: 8.5 UG/L (ref 2–32.8)
WBC # BLD AUTO: 7.5 X10E3/UL (ref 3.4–10.8)

## 2021-06-02 ENCOUNTER — HOSPITAL ENCOUNTER (OUTPATIENT)
Dept: ULTRASOUND IMAGING | Facility: HOSPITAL | Age: 66
Discharge: HOME | End: 2021-06-02
Admitting: INTERNAL MEDICINE

## 2021-06-02 PROCEDURE — 76536 US EXAM OF HEAD AND NECK: CPT

## 2021-06-14 DIAGNOSIS — E03.8 OTHER SPECIFIED HYPOTHYROIDISM: ICD-10-CM

## 2021-06-15 RX ORDER — LEVOTHYROXINE SODIUM 88 UG/1
TABLET ORAL
Qty: 90 TABLET | Refills: 2 | Status: SHIPPED | OUTPATIENT
Start: 2021-06-15 | End: 2021-07-29 | Stop reason: SDUPTHER

## 2021-06-15 RX ORDER — LEVOTHYROXINE SODIUM 88 UG/1
88 TABLET ORAL DAILY
Qty: 90 TABLET | Refills: 3 | Status: SHIPPED | OUTPATIENT
Start: 2021-06-15 | End: 2021-09-11 | Stop reason: SDUPTHER

## 2021-06-17 ENCOUNTER — APPOINTMENT (OUTPATIENT)
Dept: MRI IMAGING | Facility: HOSPITAL | Age: 66
End: 2021-06-17

## 2021-06-30 ENCOUNTER — OFFICE VISIT (OUTPATIENT)
Dept: INTERNAL MEDICINE | Facility: CLINIC | Age: 66
End: 2021-06-30

## 2021-06-30 VITALS
OXYGEN SATURATION: 96 % | TEMPERATURE: 97.8 F | DIASTOLIC BLOOD PRESSURE: 78 MMHG | WEIGHT: 258 LBS | HEART RATE: 90 BPM | BODY MASS INDEX: 38.21 KG/M2 | HEIGHT: 69 IN | SYSTOLIC BLOOD PRESSURE: 136 MMHG | RESPIRATION RATE: 16 BRPM

## 2021-06-30 DIAGNOSIS — K57.92 DIVERTICULITIS: Primary | ICD-10-CM

## 2021-06-30 PROCEDURE — 99214 OFFICE O/P EST MOD 30 MIN: CPT | Performed by: INTERNAL MEDICINE

## 2021-06-30 RX ORDER — METRONIDAZOLE 500 MG/1
500 TABLET ORAL 3 TIMES DAILY
Qty: 30 TABLET | Refills: 0 | Status: SHIPPED | OUTPATIENT
Start: 2021-06-30 | End: 2021-07-29

## 2021-06-30 RX ORDER — AMOXICILLIN AND CLAVULANATE POTASSIUM 875; 125 MG/1; MG/1
1 TABLET, FILM COATED ORAL EVERY 12 HOURS SCHEDULED
Qty: 20 TABLET | Refills: 0 | Status: SHIPPED | OUTPATIENT
Start: 2021-06-30 | End: 2021-07-29

## 2021-06-30 NOTE — PROGRESS NOTES
Subjective     Patient ID: Albania Ramos is a 66 y.o. female. Patient is here for management of multiple medical problems.     Chief Complaint   Patient presents with   • Follow-up     diverticulitis     History of Present Illness   1 week of diverticulitis. On Flagyl and augmentin that she had from last episode.   4 days. Slight improvement.    5 flairs in 7 month          The following portions of the patient's history were reviewed and updated as appropriate: allergies, current medications, past family history, past medical history, past social history, past surgical history and problem list.    Review of Systems   Constitutional: Positive for fatigue.   Respiratory: Positive for shortness of breath.    Psychiatric/Behavioral: Negative for sleep disturbance.   All other systems reviewed and are negative.      Current Outpatient Medications:   •  albuterol sulfate HFA (Ventolin HFA) 108 (90 Base) MCG/ACT inhaler, Inhale 2 puffs Every 4 (Four) Hours As Needed for Wheezing or Shortness of Air., Disp: 18 g, Rfl: 5  •  ALLERGY SERUM INJECTION, Inject  under the skin 1 (One) Time., Disp: , Rfl:   •  allopurinol (ZYLOPRIM) 300 MG tablet, TAKE ONE TABLET BY MOUTH DAILY, Disp: 90 tablet, Rfl: 3  •  calcium carbonate (OS-SHANTE) 600 MG tablet, Take 600 mg by mouth 2 (two) times a day with meals., Disp: , Rfl:   •  Cinnamon ([employer] CINNAMON) 500 MG capsule, Take 500 mg by mouth 2 (two) times a day., Disp: , Rfl:   •  clotrimazole-betamethasone (Lotrisone) 1-0.05 % cream, Apply  topically to the appropriate area as directed 2 (Two) Times a Day., Disp: 45 g, Rfl: 0  •  CRANBERRY CONCENTRATE PO, Take 4,200 mg by mouth 2 (two) times a day., Disp: , Rfl:   •  cyanocobalamin (VITAMIN B-12) 2500 MCG tablet tablet, TAKE ONE TABLET BY MOUTH THREE TIMES A WEEK MUST MAKE AN APPOINTMENT, Disp: 30 tablet, Rfl: 5  •  diclofenac (VOLTAREN) 1 % gel gel, Apply 4 g topically to the appropriate area as directed 4 (Four) Times a Day As Needed  (pain)., Disp: 100 g, Rfl: 1  •  dilTIAZem XR (DILACOR XR) 120 MG 24 hr capsule, Take 1 capsule by mouth Daily., Disp: 30 capsule, Rfl: 11  •  EPINEPHrine (EPIPEN) 0.3 MG/0.3ML solution auto-injector injection, , Disp: , Rfl:   •  estradiol (ESTRACE VAGINAL) 0.1 MG/GM vaginal cream, Insert 2 g into the vagina Daily., Disp: , Rfl:   •  Garlic 500 MG capsule, Take 1 tablet by mouth 2 (two) times a day., Disp: , Rfl:   •  GLUCOSAMINE HCL-MSM PO, Glucosamine-Chondrotin, Disp: , Rfl:   •  ketotifen (ZADITOR) 0.025 % ophthalmic solution, ketotifen 0.025 % (0.035 %) eye drops  INSTILL 1 DROP INTO AFFECTED EYE(S) BY OPHTHALMIC ROUTE 2 TIMES PER DAY, Disp: , Rfl:   •  levocetirizine (XYZAL) 5 MG tablet, levocetirizine 5 mg tablet  TAKE ONE TABLET BY MOUTH DAILY, Disp: , Rfl:   •  levothyroxine (SYNTHROID, LEVOTHROID) 100 MCG tablet, TAKE ONE TABLET BY MOUTH DAILY ALONG WITH 88MCG TABLET FOR TOTAL DOSE OF 188MCG, Disp: 90 tablet, Rfl: 3  •  levothyroxine (SYNTHROID, LEVOTHROID) 88 MCG tablet, TAKE ONE TABLET BY MOUTH DAILY, Disp: 90 tablet, Rfl: 2  •  levothyroxine (SYNTHROID, LEVOTHROID) 88 MCG tablet, Take 1 tablet by mouth Daily., Disp: 90 tablet, Rfl: 3  •  metroNIDAZOLE (FLAGYL) 25 mg/ml oral suspension, Take  by mouth 3 (Three) Times a Day., Disp: , Rfl:   •  Mometasone Furoate (Asmanex HFA) 200 MCG/ACT aerosol, Inhale 2 puffs 2 (Two) Times a Day., Disp: 1 inhaler, Rfl: 5  •  Montelukast Sodium (SINGULAIR PO), montelukast, Disp: , Rfl:   •  MULTIPLE VITAMIN PO, Multiple Vitamin capsule  Daily, Disp: , Rfl:   •  Omega-3 Fatty Acids (FISH OIL) 435 MG capsule, Take 1,000 mg by mouth daily., Disp: , Rfl:   •  omeprazole (priLOSEC) 40 MG capsule, Take 1 capsule by mouth Daily., Disp: 90 capsule, Rfl: 3  •  ondansetron (Zofran) 4 MG tablet, Take 1 tablet by mouth Every 8 (Eight) Hours As Needed for Nausea or Vomiting., Disp: 9 tablet, Rfl: 0  •  Probiotic Product (SOLUBLE FIBER/PROBIOTICS PO), Take 1 tablet by mouth daily.,  "Disp: , Rfl:   •  vitamin C (ASCORBIC ACID) 500 MG tablet, Take 1 tablet by mouth daily., Disp: , Rfl:     Current Facility-Administered Medications:   •  triamcinolone acetonide (KENALOG-40) injection 40 mg, 40 mg, Intra-articular, Once, Gordon Norman MD    Objective      Blood pressure 136/78, pulse 90, temperature 97.8 °F (36.6 °C), temperature source Temporal, resp. rate 16, height 175.3 cm (69\"), weight 117 kg (258 lb), SpO2 96 %, not currently breastfeeding.    Physical Exam     General Appearance:    Alert, cooperative, no distress, appears stated age   Head:    Normocephalic, without obvious abnormality, atraumatic   Eyes:    PERRL, conjunctiva/corneas clear, EOM's intact   Ears:    Normal TM's and external ear canals, both ears   Nose:   Nares normal, septum midline, mucosa normal, no drainage   or sinus tenderness   Throat:   Lips, mucosa, and tongue normal; teeth and gums normal   Neck:   Supple, symmetrical, trachea midline, no adenopathy;        thyroid:  No enlargement/tenderness/nodules; no carotid    bruit or JVD   Back:     Symmetric, no curvature, ROM normal, no CVA tenderness   Lungs:     Clear to auscultation bilaterally, respirations unlabored   Chest wall:    No tenderness or deformity   Heart:    Regular rate and rhythm, S1 and S2 normal, no murmur,        rub or gallop   Abdomen:     Soft, non-tender, bowel sounds active all four quadrants,     no masses, no organomegaly   Extremities:   Extremities normal, atraumatic, no cyanosis or edema   Pulses:   2+ and symmetric all extremities   Skin:   Skin color, texture, turgor normal, no rashes or lesions   Lymph nodes:   Cervical, supraclavicular, and axillary nodes normal   Neurologic:   CNII-XII intact. Normal strength, sensation and reflexes       throughout      Results for orders placed or performed in visit on 05/18/21   TSH    Specimen: Blood   Result Value Ref Range    TSH 0.187 (L) 0.450 - 4.500 uIU/mL   T4, Free    Specimen: Blood "   Result Value Ref Range    Free T4 1.92 (H) 0.82 - 1.77 ng/dL   T3, Free    Specimen: Blood   Result Value Ref Range    T3, Free 3.4 2.0 - 4.4 pg/mL   Vitamin B12    Specimen: Blood   Result Value Ref Range    Vitamin B-12 730 232 - 1,245 pg/mL   Comprehensive Metabolic Panel    Specimen: Blood   Result Value Ref Range    Glucose 100 (H) 65 - 99 mg/dL    BUN 17 8 - 27 mg/dL    Creatinine 0.77 0.57 - 1.00 mg/dL    eGFR Non African Am 81 >59 mL/min/1.73    eGFR African Am 94 >59 mL/min/1.73    BUN/Creatinine Ratio 22 12 - 28    Sodium 147 (H) 134 - 144 mmol/L    Potassium 5.0 3.5 - 5.2 mmol/L    Chloride 107 (H) 96 - 106 mmol/L    Total CO2 29 20 - 29 mmol/L    Calcium 9.9 8.7 - 10.3 mg/dL    Total Protein 6.5 6.0 - 8.5 g/dL    Albumin 4.3 3.8 - 4.8 g/dL    Globulin 2.2 1.5 - 4.5 g/dL    A/G Ratio 2.0 1.2 - 2.2    Total Bilirubin 0.3 0.0 - 1.2 mg/dL    Alkaline Phosphatase 88 48 - 121 IU/L    AST (SGOT) 23 0 - 40 IU/L    ALT (SGPT) 22 0 - 32 IU/L   Vitamin B6    Specimen: Blood   Result Value Ref Range    Vitamin B6 8.5 2.0 - 32.8 ug/L   CK    Specimen: Blood   Result Value Ref Range    Creatine Kinase 80 32 - 182 U/L   Myoglobin, Serum    Specimen: Blood   Result Value Ref Range    Myoglobin 29 25 - 58 ng/mL   [place] Spotted Fever, IgM    Specimen: Blood   Result Value Ref Range    RMSF IgM 0.26 0.00 - 0.89 index   [place] Spotted Fever, IgG    Specimen: Blood   Result Value Ref Range    RMSF IgG Positive (A) Negative   Sedimentation Rate    Specimen: Blood   Result Value Ref Range    Sed Rate 12 0 - 40 mm/hr   Uric Acid    Specimen: Blood   Result Value Ref Range    Uric Acid 4.8 3.0 - 7.2 mg/dL   C-reactive Protein    Specimen: Blood   Result Value Ref Range    C-Reactive Protein 3 0 - 10 mg/L   Antistreptolysin O Titer    Specimen: Blood   Result Value Ref Range    ASO 80.0 0.0 - 200.0 IU/mL   Rheumatoid Factor    Specimen: Blood   Result Value Ref Range    RA Latex Turbid <10.0 0.0 - 13.9 IU/mL   Cyclic  Citrul Peptide Antibody, IgG / IgA    Specimen: Blood   Result Value Ref Range    CCP Antibodies IgG/IgA 10 0 - 19 units   Ehrlichia Antibody Panel    Specimen: Blood   Result Value Ref Range    E. chaffeensis ([facility]ME) IgG Titer Negative Neg:<1:64    E. chaffeensis ([facility]ME) IgM Titer Negative Neg:<1:20    HGE IgG Titer Negative Neg:<1:64    HGE IgM Titer Negative Neg:<1:20   Lyme Disease, PCR    Specimen: Lumbar Puncture; Cerebrospinal Fluid   Result Value Ref Range    Lyme Disease(B.burgdorferi)PCR Negative Negative   Bartonella, DNA, Amp Probe    Specimen: Blood   Result Value Ref Range    Bartonella DNA PCR Negative Negative   CBC & Differential    Specimen: Blood   Result Value Ref Range    WBC 7.5 3.4 - 10.8 x10E3/uL    RBC 4.53 3.77 - 5.28 x10E6/uL    Hemoglobin 13.2 11.1 - 15.9 g/dL    Hematocrit 40.2 34.0 - 46.6 %    MCV 89 79 - 97 fL    MCH 29.1 26.6 - 33.0 pg    MCHC 32.8 31.5 - 35.7 g/dL    RDW 13.8 11.7 - 15.4 %    Platelets 282 150 - 450 x10E3/uL    Neutrophil Rel % 56 Not Estab. %    Lymphocyte Rel % 32 Not Estab. %    Monocyte Rel % 7 Not Estab. %    Eosinophil Rel % 4 Not Estab. %    Basophil Rel % 1 Not Estab. %    Neutrophils Absolute 4.2 1.4 - 7.0 x10E3/uL    Lymphocytes Absolute 2.4 0.7 - 3.1 x10E3/uL    Monocytes Absolute 0.5 0.1 - 0.9 x10E3/uL    Eosinophils Absolute 0.3 0.0 - 0.4 x10E3/uL    Basophils Absolute 0.1 0.0 - 0.2 x10E3/uL    Immature Granulocyte Rel % 0 Not Estab. %    Immature Grans Absolute 0.0 0.0 - 0.1 x10E3/uL   Reflexed RMSF, IgG, IFA   Result Value Ref Range    RMSF IgG <1:64 Neg <1:64         Assessment/Plan       There are no diagnoses linked to this encounter.  No follow-ups on file.          There are no Patient Instructions on file for this visit.     Gordon Norman MD    Assessment/Plan

## 2021-07-12 ENCOUNTER — TELEPHONE (OUTPATIENT)
Dept: INTERNAL MEDICINE | Facility: CLINIC | Age: 66
End: 2021-07-12

## 2021-07-12 DIAGNOSIS — C73 THYROID CANCER (HCC): Primary | ICD-10-CM

## 2021-07-12 NOTE — TELEPHONE ENCOUNTER
Caller: Albania Ramos    Relationship: Self    Best call back number:     What is the medical concern/diagnosis: SWOLLEN NODES IN NECK    What specialty or service is being requested: REPEAT ULTRASOUND      What is the office location: [place]       Any additional details: THEY WERE NEEDING TO  DO  A 2ND ONE AS HER PREVIOUS ONE SHOWED SHE HAD THYROID CANCER

## 2021-07-21 ENCOUNTER — TELEPHONE (OUTPATIENT)
Dept: INTERNAL MEDICINE | Facility: CLINIC | Age: 66
End: 2021-07-21

## 2021-07-21 RX ORDER — HYDROCHLOROTHIAZIDE 12.5 MG/1
12.5 TABLET ORAL DAILY
Qty: 90 TABLET | Refills: 3 | Status: SHIPPED | OUTPATIENT
Start: 2021-07-21

## 2021-07-27 ENCOUNTER — HOSPITAL ENCOUNTER (OUTPATIENT)
Dept: ULTRASOUND IMAGING | Facility: HOSPITAL | Age: 66
Discharge: HOME | End: 2021-07-27
Admitting: INTERNAL MEDICINE

## 2021-07-27 PROCEDURE — 76536 US EXAM OF HEAD AND NECK: CPT

## 2021-07-28 NOTE — PROGRESS NOTES
Please let them know the Lymph nodes n neck are stable.  They want a 6 month us repeat. Please have her baldo on the [facility] so we can set up then.

## 2021-07-29 ENCOUNTER — OFFICE VISIT (OUTPATIENT)
Dept: SURGERY | Facility: CLINIC | Age: 66
End: 2021-07-29

## 2021-07-29 VITALS
BODY MASS INDEX: 37.92 KG/M2 | SYSTOLIC BLOOD PRESSURE: 130 MMHG | HEIGHT: 69 IN | DIASTOLIC BLOOD PRESSURE: 80 MMHG | WEIGHT: 256 LBS | OXYGEN SATURATION: 97 % | HEART RATE: 67 BPM | TEMPERATURE: 97.7 F

## 2021-07-29 DIAGNOSIS — K57.32 DIVERTICULITIS OF SIGMOID COLON: Primary | ICD-10-CM

## 2021-07-29 PROBLEM — Z98.890 PONV (POSTOPERATIVE NAUSEA AND VOMITING): Status: ACTIVE | Noted: 2021-07-29

## 2021-07-29 PROBLEM — R11.2 PONV (POSTOPERATIVE NAUSEA AND VOMITING): Status: ACTIVE | Noted: 2021-07-29

## 2021-07-29 PROCEDURE — 99214 OFFICE O/P EST MOD 30 MIN: CPT | Performed by: SURGERY

## 2021-07-29 RX ORDER — NEOMYCIN SULFATE 500 MG/1
1000 TABLET ORAL TAKE AS DIRECTED
Qty: 6 TABLET | Refills: 0 | Status: SHIPPED | OUTPATIENT
Start: 2021-07-29 | End: 2022-03-23

## 2021-07-29 RX ORDER — METRONIDAZOLE 500 MG/1
500 TABLET ORAL TAKE AS DIRECTED
Qty: 3 TABLET | Refills: 0 | Status: SHIPPED | OUTPATIENT
Start: 2021-07-29 | End: 2021-09-23 | Stop reason: SDUPTHER

## 2021-07-29 RX ORDER — SODIUM, POTASSIUM,MAG SULFATES 17.5-3.13G
2 SOLUTION, RECONSTITUTED, ORAL ORAL ONCE
Qty: 354 ML | Refills: 0 | Status: SHIPPED | OUTPATIENT
Start: 2021-07-29 | End: 2021-07-29

## 2021-08-06 ENCOUNTER — HOSPITAL ENCOUNTER (OUTPATIENT)
Dept: CT IMAGING | Facility: HOSPITAL | Age: 66
Discharge: HOME | End: 2021-08-06
Admitting: SURGERY

## 2021-08-06 ENCOUNTER — PATIENT ROUNDING (BHMG ONLY) (OUTPATIENT)
Dept: SURGERY | Facility: CLINIC | Age: 66
End: 2021-08-06

## 2021-08-06 ENCOUNTER — TELEPHONE (OUTPATIENT)
Dept: SURGERY | Facility: CLINIC | Age: 66
End: 2021-08-06

## 2021-08-06 DIAGNOSIS — K57.32 DIVERTICULITIS OF SIGMOID COLON: ICD-10-CM

## 2021-08-06 LAB — CREAT BLDA-MCNC: 1 MG/DL (ref 0.6–1.3)

## 2021-08-06 PROCEDURE — 74177 CT ABD & PELVIS W/CONTRAST: CPT

## 2021-08-06 PROCEDURE — 25010000002 IOPAMIDOL 61 % SOLUTION: Performed by: SURGERY

## 2021-08-06 PROCEDURE — 82565 ASSAY OF CREATININE: CPT

## 2021-08-06 RX ADMIN — IOPAMIDOL 100 ML: 612 INJECTION, SOLUTION INTRAVENOUS at 16:25

## 2021-08-06 NOTE — PROGRESS NOTES
August 6, 2021    Hello, may I speak with Albania Ramos?    My name is Susana Del Valle      I am  with [facility]  [address]  [facility] [address] [phone].    Before we get started may I verify your date of birth? 1955    I am calling to officially welcome you to our practice and ask about your recent visit. Is this a good time to talk? yes    Tell me about your visit with us. What things went well?  I met with patient in the office. Patient stated she was very upset that an order for a CT scan had not been placed.Patient has made multiple attempts to followup on this test with no answers. Patient states when calling office the phones go to voicemail. I apologized to patient and thanked her for bringing it to my attention. I followed up with . Stat CT scan order was placed and authorized. I called patient giving instructions. Patient apologized for getting upset. Patient was appreciative for quick follow up.       We're always looking for ways to make our patients' experiences even better. Do you have recommendations on ways we may improve?  yes please have staff update patients on status of pending orders.    Overall were you satisfied with your first visit to our practice? yes       I appreciate you taking the time to speak with me today. Is there anything else I can do for you? no      Thank you, and have a great day.

## 2021-08-06 NOTE — TELEPHONE ENCOUNTER
Called patient stating a STAT CT scan had been ordered and authorized for today. Patient instructed to go to [facility]. Patient understood and was appreciative for call.

## 2021-08-16 ENCOUNTER — OFFICE VISIT (OUTPATIENT)
Dept: CARDIOLOGY | Facility: CLINIC | Age: 66
End: 2021-08-16

## 2021-08-16 VITALS — RESPIRATION RATE: 18 BRPM | OXYGEN SATURATION: 98 % | WEIGHT: 257 LBS | HEIGHT: 69 IN | BODY MASS INDEX: 38.06 KG/M2

## 2021-08-16 DIAGNOSIS — I48.0 PAROXYSMAL ATRIAL FIBRILLATION (HCC): Chronic | ICD-10-CM

## 2021-08-16 DIAGNOSIS — R06.09 DYSPNEA ON EXERTION: Primary | ICD-10-CM

## 2021-08-16 PROCEDURE — 99214 OFFICE O/P EST MOD 30 MIN: CPT | Performed by: INTERNAL MEDICINE

## 2021-08-16 PROCEDURE — 93000 ELECTROCARDIOGRAM COMPLETE: CPT | Performed by: INTERNAL MEDICINE

## 2021-08-16 RX ORDER — POLYETHYLENE GLYCOL 3350 17 G/17G
POWDER, FOR SOLUTION ORAL
Qty: 238 G | Refills: 0 | Status: SHIPPED | OUTPATIENT
Start: 2021-08-16 | End: 2022-03-23

## 2021-08-16 RX ORDER — BISACODYL 5 MG
TABLET, DELAYED RELEASE (ENTERIC COATED) ORAL
Qty: 4 TABLET | Refills: 0 | Status: SHIPPED | OUTPATIENT
Start: 2021-08-16 | End: 2022-03-23

## 2021-08-16 RX ORDER — SODIUM, POTASSIUM,MAG SULFATES 17.5-3.13G
SOLUTION, RECONSTITUTED, ORAL ORAL
COMMUNITY
Start: 2021-07-29 | End: 2022-03-31 | Stop reason: HOSPADM

## 2021-08-16 NOTE — PROGRESS NOTES
"             [facility] Office Follow Up Note    Albania Ramos  [id number]  08/16/2021    Primary Care Provider: Gordon Norman MD    Chief Complaint: Routine follow-up    History of Present Illness:   Mrs. Albania Ramos is a 66 y.o. female who presents to the Cardiology Clinic for routine follow-up. The patient has a past medical history significant for asthma, hypothyroidism, and depression.  She has a past cardiac history significant for paroxysmal atrial fibrillation, status post ablation x2.  She is also undergone outpatient loop recorder monitoring x2, with her last loop recorder being removed in 10/19.  She was previously anticoagulated with Coumadin, however this was discontinued after she had no episodes of recurrent PAF noted on loop recorder monitoring.  She presents the cardiology clinic today for routine follow-up.  Since her last appointment, the patient reports occasional episodes of palpitations.  She describes her palpitations as a \"fluttering\" sensation lasting several seconds before resolving spontaneously.  No sustained episodes.  The episodes do not currently in her fear with her daily activities.  Her primary complaint today is bilateral lower extremity swelling, in addition to mild exertional dyspnea.  No significant orthopnea or PND.  She Nuys any episodes of chest pain or exertional anginal symptoms.  No other specific complaints at this time.      Review of Systems:   Review of Systems   Constitutional: Negative for activity change, appetite change, chills, diaphoresis, fatigue, fever, unexpected weight gain and unexpected weight loss.   Eyes: Negative for blurred vision and double vision.   Respiratory: Positive for shortness of breath. Negative for cough, chest tightness and wheezing.    Cardiovascular: Positive for palpitations and leg swelling. Negative for chest pain.   Gastrointestinal: Negative for abdominal pain, anal bleeding, blood in stool and GERD. "   Endocrine: Negative for cold intolerance and heat intolerance.   Genitourinary: Negative for hematuria.   Neurological: Negative for dizziness, syncope, weakness and light-headedness.   Hematological: Does not bruise/bleed easily.   Psychiatric/Behavioral: Negative for depressed mood and stress. The patient is not nervous/anxious.        I have reviewed and/or updated the patient's past medical, past surgical, family, social history, problem list and allergies as appropriate.     Medications:     Current Outpatient Medications:   •  albuterol sulfate HFA (Ventolin HFA) 108 (90 Base) MCG/ACT inhaler, Inhale 2 puffs Every 4 (Four) Hours As Needed for Wheezing or Shortness of Air., Disp: 18 g, Rfl: 5  •  allopurinol (ZYLOPRIM) 300 MG tablet, TAKE ONE TABLET BY MOUTH DAILY, Disp: 90 tablet, Rfl: 3  •  bisacodyl (bisacodyl) 5 MG EC tablet, Take 4 tablets at 1:00pm with 8oz of clear liquids the day before your colonoscopy., Disp: 4 tablet, Rfl: 0  •  calcium carbonate (OS-SHANTE) 600 MG tablet, Take 600 mg by mouth 2 (two) times a day with meals., Disp: , Rfl:   •  clotrimazole-betamethasone (Lotrisone) 1-0.05 % cream, Apply  topically to the appropriate area as directed 2 (Two) Times a Day., Disp: 45 g, Rfl: 0  •  cyanocobalamin (VITAMIN B-12) 2500 MCG tablet tablet, TAKE ONE TABLET BY MOUTH THREE TIMES A WEEK MUST MAKE AN APPOINTMENT, Disp: 30 tablet, Rfl: 5  •  diclofenac (VOLTAREN) 1 % gel gel, Apply 4 g topically to the appropriate area as directed 4 (Four) Times a Day As Needed (pain)., Disp: 100 g, Rfl: 1  •  EPINEPHrine (EPIPEN) 0.3 MG/0.3ML solution auto-injector injection, , Disp: , Rfl:   •  estradiol (ESTRACE VAGINAL) 0.1 MG/GM vaginal cream, Insert 2 g into the vagina Daily., Disp: , Rfl:   •  GLUCOSAMINE HCL-MSM PO, Glucosamine-Chondrotin, Disp: , Rfl:   •  hydroCHLOROthiazide (HYDRODIURIL) 12.5 MG tablet, Take 1 tablet by mouth Daily., Disp: 90 tablet, Rfl: 3  •  ketotifen (ZADITOR) 0.025 % ophthalmic  solution, ketotifen 0.025 % (0.035 %) eye drops  INSTILL 1 DROP INTO AFFECTED EYE(S) BY OPHTHALMIC ROUTE 2 TIMES PER DAY, Disp: , Rfl:   •  levocetirizine (XYZAL) 5 MG tablet, levocetirizine 5 mg tablet  TAKE ONE TABLET BY MOUTH DAILY, Disp: , Rfl:   •  levothyroxine (SYNTHROID, LEVOTHROID) 100 MCG tablet, TAKE ONE TABLET BY MOUTH DAILY ALONG WITH 88MCG TABLET FOR TOTAL DOSE OF 188MCG, Disp: 90 tablet, Rfl: 3  •  levothyroxine (SYNTHROID, LEVOTHROID) 88 MCG tablet, Take 1 tablet by mouth Daily., Disp: 90 tablet, Rfl: 3  •  metroNIDAZOLE (FLAGYL) 500 MG tablet, Take 1 tablet by mouth Take As Directed. Take 1 tablet at 1 pm, 1 tablet at 2 pm and 1 tablet at 10 pm the day before surgery, Disp: 3 tablet, Rfl: 0  •  Mometasone Furoate (Asmanex HFA) 200 MCG/ACT aerosol, Inhale 2 puffs 2 (Two) Times a Day., Disp: 1 inhaler, Rfl: 5  •  Montelukast Sodium (SINGULAIR PO), montelukast, Disp: , Rfl:   •  MULTIPLE VITAMIN PO, Multiple Vitamin capsule  Daily, Disp: , Rfl:   •  neomycin (MYCIFRADIN) 500 MG tablet, Take 2 tablets by mouth Take As Directed. Take 2 tablets at 1 pm, 2 tablets at 2 pm, and 2 tablets at 10 pm on the day prior to surgery., Disp: 6 tablet, Rfl: 0  •  Omega-3 Fatty Acids (FISH OIL) 435 MG capsule, Take 1,000 mg by mouth daily., Disp: , Rfl:   •  omeprazole (priLOSEC) 40 MG capsule, Take 1 capsule by mouth Daily., Disp: 90 capsule, Rfl: 3  •  polyethylene glycol (MIRALAX) 17 GM/SCOOP powder, Mix 238g powder with 64 oz of clear liquid at 4:00pm. Drink 8 oz every 10-15 minutes until consumed., Disp: 238 g, Rfl: 0  •  Probiotic Product (SOLUBLE FIBER/PROBIOTICS PO), Take 1 tablet by mouth daily., Disp: , Rfl:   •  vitamin C (ASCORBIC ACID) 500 MG tablet, Take 1 tablet by mouth daily., Disp: , Rfl:   •  ALLERGY SERUM INJECTION, Inject  under the skin 1 (One) Time., Disp: , Rfl:   •  Garlic 500 MG capsule, Take 1 tablet by mouth 2 (two) times a day., Disp: , Rfl:   •  Suprep Bowel Prep Kit 17.5-3.13-1.6 GM/177ML  "solution oral solution, , Disp: , Rfl:     Current Facility-Administered Medications:   •  triamcinolone acetonide (KENALOG-40) injection 40 mg, 40 mg, Intra-articular, Once, Gordon Norman MD    Physical Exam:  Vital Signs:   Vitals:    08/16/21 1401   Resp: 18   SpO2: 98%   Weight: 117 kg (257 lb)   Height: 175.3 cm (69\")       Physical Exam  Constitutional:       General: She is not in acute distress.     Appearance: She is well-developed. She is obese. She is not diaphoretic.   HENT:      Head: Normocephalic and atraumatic.   Eyes:      General: No scleral icterus.     Pupils: Pupils are equal, round, and reactive to light.   Neck:      Trachea: No tracheal deviation.   Cardiovascular:      Rate and Rhythm: Normal rate and regular rhythm.      Heart sounds: Normal heart sounds. No murmur heard.   No friction rub. No gallop.       Comments: Normal JVD.  Pulmonary:      Effort: Pulmonary effort is normal. No respiratory distress.      Breath sounds: Normal breath sounds. No stridor. No wheezing or rales.   Chest:      Chest wall: No tenderness.   Abdominal:      General: Bowel sounds are normal. There is no distension.      Palpations: Abdomen is soft.      Tenderness: There is no abdominal tenderness. There is no guarding or rebound.   Musculoskeletal:         General: Normal range of motion.      Cervical back: Neck supple. No tenderness.      Comments: Trace to 1+ bilateral lower extremity pitting edema   Lymphadenopathy:      Cervical: No cervical adenopathy.   Skin:     General: Skin is warm and dry.      Findings: No erythema.   Neurological:      General: No focal deficit present.      Mental Status: She is alert and oriented to person, place, and time.   Psychiatric:         Mood and Affect: Mood normal.         Behavior: Behavior normal.         Results Review:   I reviewed the patient's new clinical results.  I personally viewed and interpreted the patient's EKG/Telemetry data      ECG 12 " Lead    Date/Time: 8/16/2021 2:55 PM  Performed by: Tim Jackman MD  Authorized by: Tim Jackman MD   Comparison: not compared with previous ECG   Rhythm: sinus rhythm  Rate: normal  QRS axis: normal    Clinical impression: normal ECG            Assessment / Plan:     1.  Paroxysmal atrial fibrillation  --Long history of paroxysmal atrial fibrillation, with ablation x2 remotely  --Last outpatient cardiac monitoring with a loop recorder removed in 10/19, with no evidence of recurrent PAF at that time  --ECG today shows NSR  --Occasional episodes of palpitations suggestive of symptomatic ectopy, no sustained episodes  --Previously anticoagulated with Coumadin, however discontinued given no recurrent PAF noted on prolonged loop recorder monitoring    2.  Shortness of breath and lower extremity edema  --Mild lower extremity edema on exam today  --Will obtain baseline echocardiogram to evaluate systolic and diastolic function  --proBNP to evaluate for cardiac volume overload  --If proBNP elevated, would then consider intensifying diuresis      Follow Up:   Return in about 3 months (around 11/16/2021).      Thank you for allowing me to participate in the care of your patient. Please to not hesitate to contact me with additional questions or concerns.     NICHELLE Jackman MD  Interventional Cardiology   08/16/2021  14:01 EDT

## 2021-08-17 LAB — NT-PROBNP SERPL-MCNC: 79 PG/ML (ref 0–301)

## 2021-08-18 ENCOUNTER — TELEPHONE (OUTPATIENT)
Dept: SURGERY | Facility: CLINIC | Age: 66
End: 2021-08-18

## 2021-08-18 NOTE — TELEPHONE ENCOUNTER
General Surgery     I attempted to reach the patient via telephone to discuss her upcoming sigmoid colectomy, which was scheduled for Monday, 8/23/2021 for management of recurrent diverticulitis.  At this time, elective surgeries requiring postoperative admission at our facility are being canceled due to the surge in COVID-19 admissions, and lack of bed availability.  I was hoping to discuss this with the patient via phone today.  I also wanted to discuss with her the fact that her recent repeat CT scan demonstrated diverticulosis without changes of acute diverticulitis.  Certainly, this would support the medical safety of a delay in her operative procedure.  Message was left on the patient's voicemail, with instructions to return our call for an update regarding surgery.    Khadijah Bernal MD

## 2021-08-18 NOTE — TELEPHONE ENCOUNTER
Patient was notified of CT scan results. Patient was notified per Dr. Bernal's note that her surgery previously scheduled on 08/23/21 has to be rescheduled due to no admissions at [facility]. Patient verbalized understanding.

## 2021-08-25 RX ORDER — MOMETASONE FUROATE 200 UG/1
2 AEROSOL RESPIRATORY (INHALATION) 2 TIMES DAILY
Qty: 1 EACH | Refills: 3 | Status: SHIPPED | OUTPATIENT
Start: 2021-08-25 | End: 2021-10-25 | Stop reason: SDUPTHER

## 2021-08-31 ENCOUNTER — OFFICE VISIT (OUTPATIENT)
Dept: INTERNAL MEDICINE | Facility: CLINIC | Age: 66
End: 2021-08-31

## 2021-08-31 VITALS
OXYGEN SATURATION: 95 % | WEIGHT: 255 LBS | HEIGHT: 69 IN | BODY MASS INDEX: 37.77 KG/M2 | RESPIRATION RATE: 18 BRPM | DIASTOLIC BLOOD PRESSURE: 76 MMHG | TEMPERATURE: 97.1 F | SYSTOLIC BLOOD PRESSURE: 130 MMHG | HEART RATE: 85 BPM

## 2021-08-31 DIAGNOSIS — Z13.820 SCREENING FOR OSTEOPOROSIS: ICD-10-CM

## 2021-08-31 DIAGNOSIS — Z78.0 POSTMENOPAUSAL: Primary | ICD-10-CM

## 2021-08-31 DIAGNOSIS — R60.0 BILATERAL LOWER EXTREMITY EDEMA: ICD-10-CM

## 2021-08-31 PROCEDURE — 99213 OFFICE O/P EST LOW 20 MIN: CPT | Performed by: NURSE PRACTITIONER

## 2021-08-31 NOTE — PROGRESS NOTES
Office Visit      Patient Name: Albania Ramos  : 1955   MRN: [id number]   Care Team: Patient Care Team:  Gordon Norman MD as PCP - General (Internal Medicine)  Khadijah Bernal MD as Surgeon (General Surgery)    Chief Complaint  Edema (bilateral LE, legs and feet)    Subjective     Subjective      Albania Ramos presents to [facility] for bilateral lower extremity edema.  Has been evaluated for the symptoms in the past and had a full recent cardiology work-up which was negative by Dr. Jackman.  Complaining of bilateral lower extremity edema that worsens throughout the day`.  Denies erythema, warmth, unilateral swelling, chest pain, shortness of breath, and numbness/tingling into the lower extremities.  She is a non-smoker quit 20 years ago, not diabetic and blood pressure is controlled.  She has tried decreasing salt in her diet, elevation of lower extremities, and takes daily HCTZ.  Refuses to weigh today states she weighs herself at home daily and reports no abnormal weight gain.  She is wearing her CPAP nightly.  Due for DEXA scan for osteoporosis screening, hoping to get ordered today.    Review of Systems   Constitutional: Negative for appetite change, fatigue and fever.   HENT: Negative for congestion, sore throat and trouble swallowing.    Eyes: Negative for blurred vision and visual disturbance.   Respiratory: Negative for cough, shortness of breath and wheezing.    Cardiovascular: Positive for leg swelling. Negative for chest pain and palpitations.   Gastrointestinal: Negative for abdominal pain, blood in stool, constipation, diarrhea and nausea.   Endocrine: Negative for polydipsia, polyphagia and polyuria.   Genitourinary: Negative for dysuria.   Musculoskeletal: Negative for arthralgias, back pain and myalgias.   Skin: Negative for rash.   Neurological: Negative for dizziness, weakness, light-headedness and headache.   Psychiatric/Behavioral: Negative for sleep  "disturbance and depressed mood. The patient is not nervous/anxious.        Objective     Objective   Vital Signs:   /76   Pulse 85   Temp 97.1 °F (36.2 °C) (Temporal)   Resp 18   Ht 175.3 cm (69\")   Wt 116 kg (255 lb) Comment: per patient, declined getting weighed  SpO2 95%   BMI 37.66 kg/m²     Physical Exam  Vitals and nursing note reviewed.   Constitutional:       General: She is not in acute distress.     Appearance: Normal appearance. She is not toxic-appearing.   Eyes:      Pupils: Pupils are equal, round, and reactive to light.   Neck:      Vascular: No carotid bruit.   Cardiovascular:      Rate and Rhythm: Normal rate and regular rhythm.      Pulses:           Dorsalis pedis pulses are 2+ on the right side and 2+ on the left side.        Posterior tibial pulses are 2+ on the right side and 2+ on the left side.      Heart sounds: Normal heart sounds. No murmur heard.        Comments: Trace amount of bilateral lower extremity edema nonpitting  Pulmonary:      Effort: Pulmonary effort is normal. No respiratory distress.      Breath sounds: Normal breath sounds. No wheezing.   Musculoskeletal:      Cervical back: Neck supple. No tenderness.      Right lower leg: Edema present.      Left lower leg: Edema present.   Skin:     General: Skin is warm and dry.      Findings: No rash.   Neurological:      General: No focal deficit present.      Mental Status: She is alert.   Psychiatric:         Mood and Affect: Mood normal.         Behavior: Behavior normal.          Assessment / Plan      Assessment/Plan   Problem List Items Addressed This Visit     None      Visit Diagnoses     Postmenopausal    -  Primary    Relevant Orders    DEXA Bone Density Axial    Screening for osteoporosis        Relevant Orders    DEXA Bone Density Axial    Bilateral lower extremity edema        Medical grade compression stockings ordered today.  Encouraged to wear hydrating lotion such as survey under stockings and wear daily. "  Recommend continuing low-salt diet, elevation of lower extremities above the heart while sitting, daily 30-minute walk as tolerated, weight loss, and continue wearing CPAP nightly.  Keep follow-up with PCP and return if symptoms worsen despite these measures.           Follow Up   Return if symptoms worsen or fail to improve.  Patient was given instructions and counseling regarding her condition or for health maintenance advice. Please see specific information pulled into the AVS if appropriate.     JEREMY Messer  [facility] [place]

## 2021-09-07 ENCOUNTER — OFFICE VISIT (OUTPATIENT)
Dept: CARDIOLOGY | Facility: CLINIC | Age: 66
End: 2021-09-07

## 2021-09-07 VITALS
BODY MASS INDEX: 37.92 KG/M2 | OXYGEN SATURATION: 97 % | HEART RATE: 79 BPM | DIASTOLIC BLOOD PRESSURE: 70 MMHG | HEIGHT: 69 IN | WEIGHT: 256 LBS | SYSTOLIC BLOOD PRESSURE: 120 MMHG

## 2021-09-07 DIAGNOSIS — R60.0 LOWER EXTREMITY EDEMA: Primary | ICD-10-CM

## 2021-09-07 PROCEDURE — 99213 OFFICE O/P EST LOW 20 MIN: CPT | Performed by: NURSE PRACTITIONER

## 2021-09-07 NOTE — PROGRESS NOTES
"             [facility] Cardiology Office Follow Up Note    Albania Ramos  [id number]  09/07/2021    Primary Care Provider: Gordon Norman MD   Referring Provider: No ref. provider found    Chief Complaint: Lower extremity edema    History of Present Illness:   Mrs. [place]ia Ramos is a 66 y.o. female who presents to the Cardiology Clinic for follow up lower extremity edema.  The patient has a past medical history significant for asthma, hypothyroidism, and depression.  She has a past cardiac history significant for paroxysmal atrial fibrillation, status post ablation x2.  She has also undergone outpatient loop recorder monitoring x2, with her last loop recorder being removed in 10/19.  She was previously anticoagulated with Coumadin, however this was discontinued after she had no episodes of recurrent PAF noted on loop recorder monitoring.  At her recent cardiology clinic appointment, the patient reported occasional episodes of palpitations.  She described a \"fluttering\" sensation which several seconds before resolving spontaneously.  There were no sustained episodes.  At that time, the episodes did not interfere with her daily activities.  Her primary complaint was bilateral lower extremity swelling, in addition to mild exertional dyspnea.  A repeat echocardiogram showed a normal left ventricular size and systolic function (LVEF 60-65%) and a normal diastolic filling pattern.  In addition, her BNP was normal at 79 approximately 3 weeks ago.  She presents today for concerns regarding ongoing lower extremity edema.  The patient reports this has has been ongoing since she received her covid vaccination in 4/21.  She states she still has lymph node swelling of her neck which she reports is being followed by her primary care provider, Dr. Norman.  The patient states she follows a low-salt diet and avoids prolonged sitting or standing.  She has compression stockings, but has not worn them yet.  She knows " she has varicose veins, but states that she feels like some of the vessels are swelling.  She specifically denies discomfort and dyspnea.  She does report daily palpitations which only last for seconds at a time.  She denies any associated symptoms with this.  She denies dizziness, orthopnea, and PND.  She offers no other complaints or concerns at this time.    Past Cardiac Testin. Last Coronary Angio: No known  2. Prior Stress Testin2017   1. Normal Lexiscan  3. Last Echo:  21   1.  Normal left ventricular size and systolic function, LVEF 60-65%.   2.  Normal LV diastolic filling pattern.   3.  Normal right ventricular size and systolic function.   4.  Normal left and right atrial size.   5.  Trace mitral regurgitation.   6.  Mild tricuspid regurgitation, RVSP 35 mmHg.  4. Prior Holter Monitor: Loop recorder removed on 2019   1.  SCANNED - CARDIOLOGY (10/07/2019)       Past Peripheral Testin2021  1. US Venous Doppler Lower Extremity Left (duplex)   1. No evidence of deep venous thrombosis.    Review of Systems:   Review of Systems   Constitutional: Negative for activity change, chills, diaphoresis, fatigue, fever and unexpected weight gain.   Eyes: Negative for blurred vision and visual disturbance.   Respiratory: Negative for apnea, cough, chest tightness, shortness of breath and wheezing.    Cardiovascular: Positive for palpitations and leg swelling. Negative for chest pain.   Gastrointestinal: Negative for abdominal distention, blood in stool, GERD and indigestion.   Endocrine: Negative for cold intolerance and heat intolerance.   Genitourinary: Negative for hematuria.   Musculoskeletal: Negative for gait problem, joint swelling and myalgias.   Skin: Negative for color change, pallor and bruise.   Neurological: Negative for dizziness, seizures, syncope, weakness, light-headedness, numbness, headache and confusion.   Hematological: Does not bruise/bleed easily.    Psychiatric/Behavioral: Negative for behavioral problems, sleep disturbance, suicidal ideas and depressed mood.     I have reviewed and confirmed the accuracy of the ROS as documented by the MA/LPN/RN JEREMY Pedro    I have reviewed and/or updated the patient's past medical, past surgical, family, social history, problem list and allergies as appropriate.     Medications:     Current Outpatient Medications:   •  albuterol sulfate HFA (Ventolin HFA) 108 (90 Base) MCG/ACT inhaler, Inhale 2 puffs Every 4 (Four) Hours As Needed for Wheezing or Shortness of Air., Disp: 18 g, Rfl: 5  •  allopurinol (ZYLOPRIM) 300 MG tablet, TAKE ONE TABLET BY MOUTH DAILY, Disp: 90 tablet, Rfl: 3  •  calcium carbonate (OS-SHANTE) 600 MG tablet, Take 600 mg by mouth 2 (two) times a day with meals., Disp: , Rfl:   •  clotrimazole-betamethasone (Lotrisone) 1-0.05 % cream, Apply  topically to the appropriate area as directed 2 (Two) Times a Day., Disp: 45 g, Rfl: 0  •  cyanocobalamin (VITAMIN B-12) 2500 MCG tablet tablet, TAKE ONE TABLET BY MOUTH THREE TIMES A WEEK MUST MAKE AN APPOINTMENT (Patient taking differently: Take 1 PO 2 x week), Disp: 30 tablet, Rfl: 5  •  diclofenac (VOLTAREN) 1 % gel gel, Apply 4 g topically to the appropriate area as directed 4 (Four) Times a Day As Needed (pain)., Disp: 100 g, Rfl: 1  •  Elastic Bandages & Supports (Medical Compression Stockings) misc, 1 application Daily., Disp: 2 each, Rfl: 0  •  EPINEPHrine (EPIPEN) 0.3 MG/0.3ML solution auto-injector injection, , Disp: , Rfl:   •  estradiol (ESTRACE VAGINAL) 0.1 MG/GM vaginal cream, Insert 2 g into the vagina Daily., Disp: , Rfl:   •  GLUCOSAMINE HCL-MSM PO, Glucosamine-Chondrotin, Disp: , Rfl:   •  hydroCHLOROthiazide (HYDRODIURIL) 12.5 MG tablet, Take 1 tablet by mouth Daily., Disp: 90 tablet, Rfl: 3  •  ketotifen (ZADITOR) 0.025 % ophthalmic solution, ketotifen 0.025 % (0.035 %) eye drops  INSTILL 1 DROP INTO AFFECTED EYE(S) BY OPHTHALMIC ROUTE  2 TIMES PER DAY, Disp: , Rfl:   •  levocetirizine (XYZAL) 5 MG tablet, levocetirizine 5 mg tablet  TAKE ONE TABLET BY MOUTH DAILY, Disp: , Rfl:   •  levothyroxine (SYNTHROID, LEVOTHROID) 100 MCG tablet, TAKE ONE TABLET BY MOUTH DAILY ALONG WITH 88MCG TABLET FOR TOTAL DOSE OF 188MCG, Disp: 90 tablet, Rfl: 3  •  levothyroxine (SYNTHROID, LEVOTHROID) 88 MCG tablet, Take 1 tablet by mouth Daily., Disp: 90 tablet, Rfl: 3  •  Mometasone Furoate (Asmanex HFA) 200 MCG/ACT aerosol, Inhale 2 puffs 2 (Two) Times a Day., Disp: 1 each, Rfl: 3  •  Montelukast Sodium (SINGULAIR PO), PRN, Disp: , Rfl:   •  MULTIPLE VITAMIN PO, Multiple Vitamin capsule  Daily, Disp: , Rfl:   •  Omega-3 Fatty Acids (FISH OIL) 435 MG capsule, Take 1,000 mg by mouth daily., Disp: , Rfl:   •  omeprazole (priLOSEC) 40 MG capsule, Take 1 capsule by mouth Daily., Disp: 90 capsule, Rfl: 3  •  Probiotic Product (SOLUBLE FIBER/PROBIOTICS PO), Take 1 tablet by mouth daily., Disp: , Rfl:   •  vitamin C (ASCORBIC ACID) 500 MG tablet, Take 1 tablet by mouth daily., Disp: , Rfl:   •  bisacodyl (bisacodyl) 5 MG EC tablet, Take 4 tablets at 1:00pm with 8oz of clear liquids the day before your colonoscopy., Disp: 4 tablet, Rfl: 0  •  metroNIDAZOLE (FLAGYL) 500 MG tablet, Take 1 tablet by mouth Take As Directed. Take 1 tablet at 1 pm, 1 tablet at 2 pm and 1 tablet at 10 pm the day before surgery, Disp: 3 tablet, Rfl: 0  •  neomycin (MYCIFRADIN) 500 MG tablet, Take 2 tablets by mouth Take As Directed. Take 2 tablets at 1 pm, 2 tablets at 2 pm, and 2 tablets at 10 pm on the day prior to surgery., Disp: 6 tablet, Rfl: 0  •  polyethylene glycol (MIRALAX) 17 GM/SCOOP powder, Mix 238g powder with 64 oz of clear liquid at 4:00pm. Drink 8 oz every 10-15 minutes until consumed., Disp: 238 g, Rfl: 0  •  Suprep Bowel Prep Kit 17.5-3.13-1.6 GM/177ML solution oral solution, , Disp: , Rfl:     Current Facility-Administered Medications:   •  triamcinolone acetonide (KENALOG-40)  "injection 40 mg, 40 mg, Intra-articular, Once, Gordon Norman MD    Physical Exam:  Vital Signs:   Vitals:    09/07/21 1340   BP: 120/70   BP Location: Right arm   Patient Position: Sitting   Cuff Size: Adult   Pulse: 79   SpO2: 97%   Weight: 116 kg (256 lb)   Height: 175.3 cm (69\")     Body mass index is 37.8 kg/m².    Physical Exam  Vitals and nursing note reviewed.   Constitutional:       General: She is not in acute distress.     Appearance: Normal appearance. She is well-developed.      Comments: BMI 37.8 kg/m²   HENT:      Head: Normocephalic and atraumatic.   Eyes:      General: No scleral icterus.     Extraocular Movements: Extraocular movements intact.   Neck:      Trachea: Trachea normal.   Cardiovascular:      Rate and Rhythm: Normal rate and regular rhythm.      Pulses: Normal pulses.      Heart sounds: Normal heart sounds. No murmur heard.   No friction rub. No gallop.    Pulmonary:      Effort: Pulmonary effort is normal.      Breath sounds: Normal breath sounds. No stridor. No wheezing, rhonchi or rales.   Abdominal:      Palpations: Abdomen is soft.      Tenderness: There is no abdominal tenderness.   Musculoskeletal:         General: Normal range of motion.      Cervical back: Neck supple.      Right lower leg: Edema present.      Left lower leg: Edema present.      Comments: 1+ LLE edema.  Trace edema of RLE.   Skin:     General: Skin is warm and dry.      Findings: No bruising, lesion or rash.   Neurological:      Mental Status: She is alert and oriented to person, place, and time.      Motor: No weakness.      Gait: Gait normal.   Psychiatric:         Mood and Affect: Mood normal.         Behavior: Behavior normal. Behavior is cooperative.         Thought Content: Thought content does not include suicidal ideation.         Results Review:   I reviewed the patient's new clinical results.      Assessment / Plan:     1. Bilateral lower extremity edema  --Echocardiogram showed normal systolic " (LVEF 60-65%) and diastolic function (in 8/21)  --Recent normal proBNP (79 pg/mL on 8/16)  --Patient does not want to take any additional diuretic  --Bilateral venous duplex to rule out venous stasis  --Consider referral to vascular surgery based on these results  --Follow-up with Dr. Jackman as scheduled in 11/21      Preventative Cardiology:   Tobacco Cessation: N/A   Advance Care Planning: ACP discussion was declined by the patient. Patient does not have an advance directive, declines further assistance.     Follow Up:   Follow-up with Dr. Jackman as scheduled in November.      Thank you for allowing me to participate in the care of your patient. Please to not hesitate to contact me with additional questions or concerns.     Mayuri Shields, APRN

## 2021-09-11 DIAGNOSIS — E03.8 OTHER SPECIFIED HYPOTHYROIDISM: ICD-10-CM

## 2021-09-13 RX ORDER — LEVOTHYROXINE SODIUM 88 UG/1
88 TABLET ORAL DAILY
Qty: 90 TABLET | Refills: 0 | Status: SHIPPED | OUTPATIENT
Start: 2021-09-13 | End: 2021-11-04

## 2021-09-15 ENCOUNTER — HOSPITAL ENCOUNTER (OUTPATIENT)
Dept: ULTRASOUND IMAGING | Facility: HOSPITAL | Age: 66
Discharge: HOME | End: 2021-09-15
Admitting: NURSE PRACTITIONER

## 2021-09-15 DIAGNOSIS — R60.0 LOWER EXTREMITY EDEMA: ICD-10-CM

## 2021-09-15 PROCEDURE — 93970 EXTREMITY STUDY: CPT

## 2021-09-23 ENCOUNTER — DOCUMENTATION (OUTPATIENT)
Dept: SURGERY | Facility: CLINIC | Age: 66
End: 2021-09-23

## 2021-09-23 ENCOUNTER — TELEPHONE (OUTPATIENT)
Dept: SURGERY | Facility: CLINIC | Age: 66
End: 2021-09-23

## 2021-09-23 RX ORDER — AMOXICILLIN AND CLAVULANATE POTASSIUM 875; 125 MG/1; MG/1
1 TABLET, FILM COATED ORAL 2 TIMES DAILY
Qty: 20 TABLET | Refills: 0 | Status: SHIPPED | OUTPATIENT
Start: 2021-09-23 | End: 2021-10-21

## 2021-09-23 RX ORDER — METRONIDAZOLE 500 MG/1
500 TABLET ORAL 3 TIMES DAILY
Qty: 30 TABLET | Refills: 0 | Status: SHIPPED | OUTPATIENT
Start: 2021-09-23 | End: 2021-10-03

## 2021-10-04 ENCOUNTER — TELEPHONE (OUTPATIENT)
Dept: SURGERY | Facility: CLINIC | Age: 66
End: 2021-10-04

## 2021-10-04 NOTE — TELEPHONE ENCOUNTER
Patient called stated she finished antibiotics but she is still having pain and is requesting more antibiotics. Patient was offered appointment on 10/05/21 but refused and stated she just needed antibiotics. Please advise.

## 2021-10-04 NOTE — TELEPHONE ENCOUNTER
If her pain has not improved with her recent antibiotic therapy, my recommendation would be for reevaluation with repeat imaging.  Alternatively, she could be reevaluated in the office as soon as tomorrow.

## 2021-10-05 NOTE — TELEPHONE ENCOUNTER
Spoke with patient, she stated she was still having pain and would like to move forward with having CT scan

## 2021-10-06 DIAGNOSIS — R10.32 LLQ ABDOMINAL PAIN: Primary | ICD-10-CM

## 2021-10-12 ENCOUNTER — TELEPHONE (OUTPATIENT)
Dept: SURGERY | Facility: CLINIC | Age: 66
End: 2021-10-12

## 2021-10-12 ENCOUNTER — HOSPITAL ENCOUNTER (OUTPATIENT)
Dept: CT IMAGING | Facility: HOSPITAL | Age: 66
Discharge: HOME | End: 2021-10-12
Admitting: SURGERY

## 2021-10-12 DIAGNOSIS — R10.32 LLQ ABDOMINAL PAIN: ICD-10-CM

## 2021-10-12 PROCEDURE — 74177 CT ABD & PELVIS W/CONTRAST: CPT

## 2021-10-12 PROCEDURE — 25010000002 IOPAMIDOL 61 % SOLUTION: Performed by: SURGERY

## 2021-10-12 RX ADMIN — IOPAMIDOL 100 ML: 612 INJECTION, SOLUTION INTRAVENOUS at 17:55

## 2021-10-12 NOTE — TELEPHONE ENCOUNTER
Spoke with patient. Informed patient that elective surgeries have started back but they are still not allowing surgery admits. Patient verbalized understanding.

## 2021-10-13 NOTE — PROGRESS NOTES
You may inform the patient that repeat CT does NOT show active diverticulitis.  No additional antibiotics needed at this time.

## 2021-10-19 ENCOUNTER — APPOINTMENT (OUTPATIENT)
Dept: BONE DENSITY | Facility: HOSPITAL | Age: 66
End: 2021-10-19

## 2021-10-19 PROCEDURE — 77080 DXA BONE DENSITY AXIAL: CPT

## 2021-10-21 ENCOUNTER — OFFICE VISIT (OUTPATIENT)
Dept: INTERNAL MEDICINE | Facility: CLINIC | Age: 66
End: 2021-10-21

## 2021-10-21 VITALS
HEART RATE: 91 BPM | DIASTOLIC BLOOD PRESSURE: 80 MMHG | HEIGHT: 69 IN | SYSTOLIC BLOOD PRESSURE: 136 MMHG | OXYGEN SATURATION: 98 % | BODY MASS INDEX: 38.66 KG/M2 | WEIGHT: 261 LBS | TEMPERATURE: 96.9 F

## 2021-10-21 DIAGNOSIS — K52.9 COLITIS: Primary | ICD-10-CM

## 2021-10-21 PROCEDURE — 99214 OFFICE O/P EST MOD 30 MIN: CPT | Performed by: INTERNAL MEDICINE

## 2021-10-21 RX ORDER — CLINDAMYCIN HYDROCHLORIDE 300 MG/1
300 CAPSULE ORAL 3 TIMES DAILY
Qty: 21 CAPSULE | Refills: 0 | Status: SHIPPED | OUTPATIENT
Start: 2021-10-21 | End: 2022-02-09

## 2021-10-21 NOTE — PROGRESS NOTES
Subjective   Al[place]ia Ramos is a 66 y.o. female.     Chief Complaint   Patient presents with   • Diverticulitis     flare up; anal burning, abdominal pain; symptoms persist despite antibiotics       History of Present Illness   Patient has recurrent diverticulitis for 9 times.  Every time patient was treated with Augmentin and metronidazole.  Patient states this antibiotics can never completely cure the diverticulitis.  After each antibiotic course patient still has pain.  Even though pain is improved.  Patient finished antibiotics a week ago now episode flareup again.  Abdominal pain mainly lower abdomen and symptom the same as the symptoms when patient had diverticulitis.  Patient denies any fever chills.  No nausea vomiting.  Patient does have diarrhea.  And rectal pain heaviness    Current Outpatient Medications:   •  albuterol sulfate HFA (Ventolin HFA) 108 (90 Base) MCG/ACT inhaler, Inhale 2 puffs Every 4 (Four) Hours As Needed for Wheezing or Shortness of Air., Disp: 18 g, Rfl: 5  •  allopurinol (ZYLOPRIM) 300 MG tablet, TAKE ONE TABLET BY MOUTH DAILY, Disp: 90 tablet, Rfl: 3  •  bisacodyl (bisacodyl) 5 MG EC tablet, Take 4 tablets at 1:00pm with 8oz of clear liquids the day before your colonoscopy., Disp: 4 tablet, Rfl: 0  •  calcium carbonate (OS-SHANTE) 600 MG tablet, Take 600 mg by mouth 2 (two) times a day with meals., Disp: , Rfl:   •  clotrimazole-betamethasone (Lotrisone) 1-0.05 % cream, Apply  topically to the appropriate area as directed 2 (Two) Times a Day., Disp: 45 g, Rfl: 0  •  cyanocobalamin (VITAMIN B-12) 2500 MCG tablet tablet, TAKE ONE TABLET BY MOUTH THREE TIMES A WEEK MUST MAKE AN APPOINTMENT (Patient taking differently: Take 1 PO 2 x week), Disp: 30 tablet, Rfl: 5  •  diclofenac (VOLTAREN) 1 % gel gel, Apply 4 g topically to the appropriate area as directed 4 (Four) Times a Day As Needed (pain)., Disp: 100 g, Rfl: 1  •  Elastic Bandages & Supports (Medical Compression Stockings) misc, 1  application Daily., Disp: 2 each, Rfl: 0  •  EPINEPHrine (EPIPEN) 0.3 MG/0.3ML solution auto-injector injection, , Disp: , Rfl:   •  estradiol (ESTRACE VAGINAL) 0.1 MG/GM vaginal cream, Insert 2 g into the vagina Daily., Disp: , Rfl:   •  GLUCOSAMINE HCL-MSM PO, Glucosamine-Chondrotin, Disp: , Rfl:   •  hydroCHLOROthiazide (HYDRODIURIL) 12.5 MG tablet, Take 1 tablet by mouth Daily., Disp: 90 tablet, Rfl: 3  •  ketotifen (ZADITOR) 0.025 % ophthalmic solution, ketotifen 0.025 % (0.035 %) eye drops  INSTILL 1 DROP INTO AFFECTED EYE(S) BY OPHTHALMIC ROUTE 2 TIMES PER DAY, Disp: , Rfl:   •  levocetirizine (XYZAL) 5 MG tablet, levocetirizine 5 mg tablet  TAKE ONE TABLET BY MOUTH DAILY, Disp: , Rfl:   •  levothyroxine (SYNTHROID, LEVOTHROID) 100 MCG tablet, TAKE ONE TABLET BY MOUTH DAILY ALONG WITH 88MCG TABLET FOR TOTAL DOSE OF 188MCG, Disp: 90 tablet, Rfl: 3  •  levothyroxine (SYNTHROID, LEVOTHROID) 88 MCG tablet, Take 1 tablet by mouth Daily., Disp: 90 tablet, Rfl: 0  •  Mometasone Furoate (Asmanex HFA) 200 MCG/ACT aerosol, Inhale 2 puffs 2 (Two) Times a Day., Disp: 1 each, Rfl: 3  •  Montelukast Sodium (SINGULAIR PO), PRN, Disp: , Rfl:   •  MULTIPLE VITAMIN PO, Multiple Vitamin capsule  Daily, Disp: , Rfl:   •  Omega-3 Fatty Acids (FISH OIL) 435 MG capsule, Take 1,000 mg by mouth daily., Disp: , Rfl:   •  omeprazole (priLOSEC) 40 MG capsule, Take 1 capsule by mouth Daily., Disp: 90 capsule, Rfl: 3  •  Probiotic Product (SOLUBLE FIBER/PROBIOTICS PO), Take 1 tablet by mouth daily., Disp: , Rfl:   •  vitamin C (ASCORBIC ACID) 500 MG tablet, Take 1 tablet by mouth daily., Disp: , Rfl:   •  clindamycin (Cleocin) 300 MG capsule, Take 1 capsule by mouth 3 (Three) Times a Day., Disp: 21 capsule, Rfl: 0  •  neomycin (MYCIFRADIN) 500 MG tablet, Take 2 tablets by mouth Take As Directed. Take 2 tablets at 1 pm, 2 tablets at 2 pm, and 2 tablets at 10 pm on the day prior to surgery., Disp: 6 tablet, Rfl: 0  •  polyethylene glycol  (MIRALAX) 17 GM/SCOOP powder, Mix 238g powder with 64 oz of clear liquid at 4:00pm. Drink 8 oz every 10-15 minutes until consumed., Disp: 238 g, Rfl: 0  •  Suprep Bowel Prep Kit 17.5-3.13-1.6 GM/177ML solution oral solution, , Disp: , Rfl:     Current Facility-Administered Medications:   •  triamcinolone acetonide (KENALOG-40) injection 40 mg, 40 mg, Intra-articular, Once, Gordon Norman MD    The following portions of the patient's history were reviewed and updated as appropriate: allergies, current medications, past family history, past medical history, past social history, past surgical history and problem list.    Review of Systems   Constitutional: Negative.    Respiratory: Negative.    Cardiovascular: Negative.    Gastrointestinal: Positive for abdominal pain, diarrhea and rectal pain. Negative for nausea and vomiting.   Musculoskeletal: Negative.    Skin: Negative.    Neurological: Negative.    Psychiatric/Behavioral: Negative.        Objective   Physical Exam  Cardiovascular:      Rate and Rhythm: Normal rate and regular rhythm.      Heart sounds: Normal heart sounds.   Pulmonary:      Effort: Pulmonary effort is normal.      Breath sounds: Normal breath sounds.   Abdominal:      General: Bowel sounds are normal.      Palpations: There is no mass.      Tenderness: There is abdominal tenderness ( Lower abdominal tenderness). There is no guarding.      Hernia: No hernia is present.   Musculoskeletal:      Cervical back: Neck supple.   Skin:     General: Skin is warm.   Neurological:      Mental Status: She is alert and oriented to person, place, and time.         All tests have been reviewed.    Assessment/Plan   Diagnoses and all orders for this visit:    Colitis  -     clindamycin (Cleocin) 300 MG capsule; Take 1 capsule by mouth 3 (Three) Times a Day.  -     Clostridium Difficile Toxin, PCR - Stool, Per Rectum    Call if no better.  Continue liquid diet for now and Tylenol as needed.  To emergency room  if pain worse

## 2021-10-24 LAB — C DIFF TOX GENS STL QL NAA+PROBE: NEGATIVE

## 2021-10-25 ENCOUNTER — OFFICE VISIT (OUTPATIENT)
Dept: PULMONOLOGY | Facility: CLINIC | Age: 66
End: 2021-10-25

## 2021-10-25 ENCOUNTER — TELEPHONE (OUTPATIENT)
Dept: SURGERY | Facility: CLINIC | Age: 66
End: 2021-10-25

## 2021-10-25 VITALS
WEIGHT: 262 LBS | DIASTOLIC BLOOD PRESSURE: 82 MMHG | SYSTOLIC BLOOD PRESSURE: 128 MMHG | HEART RATE: 74 BPM | HEIGHT: 69 IN | OXYGEN SATURATION: 98 % | RESPIRATION RATE: 18 BRPM | BODY MASS INDEX: 38.8 KG/M2

## 2021-10-25 DIAGNOSIS — J30.89 OTHER ALLERGIC RHINITIS: ICD-10-CM

## 2021-10-25 DIAGNOSIS — G47.33 OSA (OBSTRUCTIVE SLEEP APNEA): ICD-10-CM

## 2021-10-25 DIAGNOSIS — R06.02 SHORTNESS OF BREATH: Primary | ICD-10-CM

## 2021-10-25 DIAGNOSIS — J45.30 MILD PERSISTENT ASTHMA WITHOUT COMPLICATION: ICD-10-CM

## 2021-10-25 PROCEDURE — 99214 OFFICE O/P EST MOD 30 MIN: CPT | Performed by: INTERNAL MEDICINE

## 2021-10-25 RX ORDER — MOMETASONE FUROATE 200 UG/1
2 AEROSOL RESPIRATORY (INHALATION) 2 TIMES DAILY
Qty: 3 EACH | Refills: 2 | Status: SHIPPED | OUTPATIENT
Start: 2021-10-25 | End: 2022-07-01

## 2021-10-25 NOTE — TELEPHONE ENCOUNTER
[place] is very concerned about her occurrences of diverticulitis.  She is wanting to know when she might be scheduled for surgery.  Her PCP had told her she could develop a rupture.  She was also worried about taking so much antibiotics that she might be resistant to them.  Please call her back about her concerns.  Thanks.

## 2021-10-25 NOTE — PROGRESS NOTES
"  Chief Complaint   Patient presents with   • Follow-up   • Sleeping Problem   • Shortness of Breath         Subjective   Al[place]ia Ramos is a 66 y.o. female.     History of Present Illness   Patient was evaluated today for follow up of asthma, allergic rhinitis, STEFAN and shortness of breath. Patient does not report any recent exacerbations requiring emergency room visits or hospitalizations.    Patient is compliant with pulmonary medicines, as prescribed. she is currently on Asmanex. she is using the rescue inhalers minimally.     Patient says that she has been using her nasal sprays on a seasonal, as needed, basis and describes no significant ongoing issues other than occasional congestion.     Patient doesn't report any issues with the PAP device. The patient describes no significant issues with her mask either.     Patient says that the compliance with the use of the equipment is good.     Patient says that her symptoms of fatigue & daytime sleepiness have been helped greatly with the use of PAP, as prescribed.         The following portions of the patient's history were reviewed and updated as appropriate: allergies, current medications, past family history, past medical history, past social history and past surgical history.    Review of Systems   Constitutional: Negative for chills and fever.   HENT: Positive for rhinorrhea, sinus pressure, sinus pain and sneezing. Negative for sore throat.    Respiratory: Positive for shortness of breath. Negative for cough and wheezing.    Psychiatric/Behavioral: Negative for sleep disturbance.       Objective   Visit Vitals  /82   Pulse 74   Resp 18   Ht 175.3 cm (69\")   Wt 119 kg (262 lb)   LMP  (LMP Unknown)   SpO2 98%   BMI 38.69 kg/m²       Physical Exam  Vitals reviewed.   Constitutional:       Appearance: She is well-developed.   HENT:      Head: Atraumatic.      Mouth/Throat:      Comments: Oropharynx was crowded.  Neck:      Vascular: No JVD.   Musculoskeletal: "      Cervical back: Neck supple.      Comments: Gait was normal.   Skin:     General: Skin is warm and dry.   Neurological:      Mental Status: She is alert and oriented to person, place, and time.         Assessment/Plan   Diagnoses and all orders for this visit:    1. Shortness of breath (Primary)  -     Spirometry With Bronchodilator; Future    2. Mild persistent asthma without complication  -     Spirometry With Bronchodilator; Future    3. STEFAN (obstructive sleep apnea)  -     Cancel: BIPAP / CPAP Adjustment  -     BIPAP / CPAP Adjustment    4. Other allergic rhinitis    Other orders  -     Mometasone Furoate (Asmanex HFA) 200 MCG/ACT aerosol; Inhale 2 puffs 2 (Two) Times a Day.  Dispense: 3 each; Refill: 2           Return in about 8 months (around 6/25/2022) for Recheck, PFT F/U, For Nubia Valderrama).    DISCUSSION (if any):  FeNO level was 27 today.    It appears that her symptoms of asthma are under adequate control with the current regimen.    Patient's medications for underlying asthma were reviewed in great detail.    Patient was informed about the black box warning for [employer] regarding suicidal thoughts and tendencies.  she denies any ongoing issues for now.     Any needed adjustments to her pulmonary medications, either for clinical or insurance coverage reasons, have been made and are reflected in the orders.    Compliance with medications stressed.     Side effects of prescribed medications discussed with the patient.    The need to continue to be aware of triggers that may cause asthma exacerbation versus progression of disease, was also discussed.    Patient was advised to continue her nasal spray on a seasonal basis, since her symptoms are consistent with seasonal allergic rhinitis.    I reviewed the results of last sleep study in detail. I informed her that the apnea hypopnea index was 22 / hr. REM AHI was 54/hour. This was in-lab study, performed in Dec 2016.    Continue treatment with CPAP  at a pressure of 10, with a full-face mask.    Patient seems to be compliant with PAP device, based on the available data and her account of improved symptoms.     Compliance data was obtained from the her device/DME company.    Sleep hygiene measures were discussed in great detail including need to follow a strict bedtime and to avoid electronic devices in bed and close to bedtime.    she was also asked to avoid caffeinated or alcoholic beverages within 4 to 6 hours of bedtime.    The patient was once again reminded to continue using the PAP device regularly, every night for atleast 4 hours.        Dictated utilizing Dragon dictation.    This document was electronically signed by Moreno Mustafa MD on 10/25/21 at 10:26 EDT

## 2021-10-27 NOTE — TELEPHONE ENCOUNTER
As you know, we have not yet resumed doing surgeries that require postoperative admission.  On 2 CT scans, Ms. Ramos has had no evidence of active diverticulitis.  This makes her case elective.  If she would like to be referred out, we can make arrangements for her to be seen by the colorectal surgeons at either the [facility], or at [facility].  It is my understanding that the operating room the [facility] is still running at reduced capacity, but they obviously have far more operating rooms to access bed any other facility in the area.  As far as I am aware both [facility], and the [facility] are running normal schedules. [facility] doctors do their procedures at both [facility] and [place].     It is my understanding that there is some discussion that beginning next week, the hospital may allow 1 postoperative admission per day depending on the overall hospital census.  These will be prioritized based on the urgency of the case.  However, there is no guarantee as to the date when operations will be completely normal and there will be no restriction on procedures requiring postoperative admissions.  I am happy to refer her out in order that she can be cared for more expeditiously.  We can probably get her in faster with one of the doctors at [facility] as opposed to  if that is her preference.

## 2021-11-04 DIAGNOSIS — E03.8 OTHER SPECIFIED HYPOTHYROIDISM: ICD-10-CM

## 2021-11-04 DIAGNOSIS — E78.00 HYPERCHOLESTEREMIA: Primary | ICD-10-CM

## 2021-11-04 RX ORDER — LEVOTHYROXINE SODIUM 0.07 MG/1
75 TABLET ORAL DAILY
Qty: 90 TABLET | Refills: 3 | Status: SHIPPED | OUTPATIENT
Start: 2021-11-04 | End: 2021-12-23

## 2021-11-05 DIAGNOSIS — I48.0 PAROXYSMAL ATRIAL FIBRILLATION (HCC): Chronic | ICD-10-CM

## 2021-11-05 DIAGNOSIS — C73 MALIGNANT NEOPLASM OF THYROID GLAND (HCC): Primary | ICD-10-CM

## 2021-11-08 RX ORDER — CHOLECALCIFEROL (VITAMIN D3) 25 MCG
TABLET,CHEWABLE ORAL
Qty: 30 TABLET | Refills: 5 | Status: SHIPPED | OUTPATIENT
Start: 2021-11-08

## 2021-11-08 RX ORDER — ALBUTEROL SULFATE 90 UG/1
2 AEROSOL, METERED RESPIRATORY (INHALATION) EVERY 4 HOURS PRN
Qty: 18 G | Refills: 5 | Status: SHIPPED | OUTPATIENT
Start: 2021-11-08

## 2021-11-09 DIAGNOSIS — J45.30 MILD PERSISTENT ASTHMA WITHOUT COMPLICATION: Primary | ICD-10-CM

## 2021-11-09 DIAGNOSIS — R06.02 SHORTNESS OF BREATH: ICD-10-CM

## 2021-11-15 ENCOUNTER — OFFICE VISIT (OUTPATIENT)
Dept: CARDIOLOGY | Facility: CLINIC | Age: 66
End: 2021-11-15

## 2021-11-15 VITALS
HEART RATE: 89 BPM | BODY MASS INDEX: 39.1 KG/M2 | OXYGEN SATURATION: 96 % | SYSTOLIC BLOOD PRESSURE: 150 MMHG | WEIGHT: 264 LBS | HEIGHT: 69 IN | DIASTOLIC BLOOD PRESSURE: 80 MMHG

## 2021-11-15 DIAGNOSIS — I48.0 PAROXYSMAL ATRIAL FIBRILLATION (HCC): Primary | ICD-10-CM

## 2021-11-15 DIAGNOSIS — I48.19 ATRIAL FIBRILLATION, PERSISTENT (HCC): ICD-10-CM

## 2021-11-15 LAB
ALBUMIN SERPL-MCNC: 4.4 G/DL (ref 3.5–5.2)
ALBUMIN/GLOB SERPL: 2.3 G/DL
ALP SERPL-CCNC: 93 U/L (ref 39–117)
ALT SERPL-CCNC: 27 U/L (ref 1–33)
AST SERPL-CCNC: 24 U/L (ref 1–32)
BASOPHILS # BLD AUTO: 0.04 10*3/MM3 (ref 0–0.2)
BASOPHILS NFR BLD AUTO: 0.7 % (ref 0–1.5)
BILIRUB SERPL-MCNC: 0.4 MG/DL (ref 0–1.2)
BUN SERPL-MCNC: 14 MG/DL (ref 8–23)
BUN/CREAT SERPL: 20 (ref 7–25)
CALCIUM SERPL-MCNC: 9.4 MG/DL (ref 8.6–10.5)
CHLORIDE SERPL-SCNC: 104 MMOL/L (ref 98–107)
CHOLEST SERPL-MCNC: 203 MG/DL (ref 0–200)
CO2 SERPL-SCNC: 28.7 MMOL/L (ref 22–29)
CREAT SERPL-MCNC: 0.7 MG/DL (ref 0.57–1)
EOSINOPHIL # BLD AUTO: 0.27 10*3/MM3 (ref 0–0.4)
EOSINOPHIL NFR BLD AUTO: 4.7 % (ref 0.3–6.2)
ERYTHROCYTE [DISTWIDTH] IN BLOOD BY AUTOMATED COUNT: 13.3 % (ref 12.3–15.4)
GLOBULIN SER CALC-MCNC: 1.9 GM/DL
GLUCOSE SERPL-MCNC: 101 MG/DL (ref 65–99)
HCT VFR BLD AUTO: 42.1 % (ref 34–46.6)
HDLC SERPL-MCNC: 61 MG/DL (ref 40–60)
HGB BLD-MCNC: 13.2 G/DL (ref 12–15.9)
IMM GRANULOCYTES # BLD AUTO: 0.02 10*3/MM3 (ref 0–0.05)
IMM GRANULOCYTES NFR BLD AUTO: 0.3 % (ref 0–0.5)
LDLC SERPL CALC-MCNC: 127 MG/DL (ref 0–100)
LYMPHOCYTES # BLD AUTO: 1.7 10*3/MM3 (ref 0.7–3.1)
LYMPHOCYTES NFR BLD AUTO: 29.4 % (ref 19.6–45.3)
MCH RBC QN AUTO: 28.4 PG (ref 26.6–33)
MCHC RBC AUTO-ENTMCNC: 31.4 G/DL (ref 31.5–35.7)
MCV RBC AUTO: 90.7 FL (ref 79–97)
MONOCYTES # BLD AUTO: 0.46 10*3/MM3 (ref 0.1–0.9)
MONOCYTES NFR BLD AUTO: 7.9 % (ref 5–12)
NEUTROPHILS # BLD AUTO: 3.3 10*3/MM3 (ref 1.7–7)
NEUTROPHILS NFR BLD AUTO: 57 % (ref 42.7–76)
NRBC BLD AUTO-RTO: 0 /100 WBC (ref 0–0.2)
PLATELET # BLD AUTO: 225 10*3/MM3 (ref 140–450)
POTASSIUM SERPL-SCNC: 4.4 MMOL/L (ref 3.5–5.2)
PROT SERPL-MCNC: 6.3 G/DL (ref 6–8.5)
RBC # BLD AUTO: 4.64 10*6/MM3 (ref 3.77–5.28)
SODIUM SERPL-SCNC: 142 MMOL/L (ref 136–145)
T4 FREE SERPL-MCNC: 1.66 NG/DL (ref 0.93–1.7)
TRIGL SERPL-MCNC: 85 MG/DL (ref 0–150)
TSH SERPL DL<=0.005 MIU/L-ACNC: 0.26 UIU/ML (ref 0.27–4.2)
VIT B12 SERPL-MCNC: 912 PG/ML (ref 211–946)
VIT B6 SERPL-MCNC: 12.7 UG/L (ref 2–32.8)
VLDLC SERPL CALC-MCNC: 15 MG/DL (ref 5–40)
WBC # BLD AUTO: 5.79 10*3/MM3 (ref 3.4–10.8)

## 2021-11-15 PROCEDURE — 99214 OFFICE O/P EST MOD 30 MIN: CPT | Performed by: INTERNAL MEDICINE

## 2021-11-15 PROCEDURE — 93000 ELECTROCARDIOGRAM COMPLETE: CPT | Performed by: INTERNAL MEDICINE

## 2021-11-15 NOTE — PROGRESS NOTES
[facility] Cardiology Office Follow Up Note    Albania Ramos  [id number]  11/15/2021    Primary Care Provider: Gordon Norman MD    Chief Complaint: Routine follow-up    History of Present Illness:   Mrs. Albania Ramos is a 66 y.o. female who presents to the Cardiology Clinic for routine follow-up.  The patient has a past medical history significant for asthma, hypothyroidism, chronic venous stasis, and depression.  She has a past cardiac history significant for paroxysmal atrial fibrillation, status post ablation x2.  She has also undergone outpatient loop recorder monitoring x2, with her last loop recorder being removed in 10/19.  She was previously anticoagulated with Coumadin, however this was discontinued after she had no episodes of recurrent PAF noted on loop recorder monitoring.   At the time of her last appointment, the patient reported lower extremity edema, which she believed was related to prior COVID-19 infection.  She subsequent had an echocardiogram showing normal LV systolic and diastolic function.  A proBNP was minimally elevated.  She presents today for follow-up.  Since her last appointment, she has had complete resolution of his lower extremity swelling.  She denies exertional dyspnea or exertional chest discomfort.  No orthopnea, PND, or lower extremity swelling.  She has not had any recurrent episodes of palpitations to suggest recurrent PAF.  No other specific complaints today.      Past Cardiac Testin. Last Coronary Angio: No known  2. Prior Stress Testin2017              1. Normal Lexiscan  3. Last Echo:  21              1.  Normal left ventricular size and systolic function, LVEF 60-65%.              2.  Normal LV diastolic filling pattern.              3.  Normal right ventricular size and systolic function.              4.  Normal left and right atrial size.              5.  Trace mitral regurgitation.              6.  Mild tricuspid  regurgitation, RVSP 35 mmHg.  4. Prior Holter Monitor: Loop recorder removed on 2019              1.  SCANNED - CARDIOLOGY (10/07/2019)                   Past Peripheral Testin2021  1. US Venous Doppler Lower Extremity Left (duplex)              1. No evidence of deep venous thrombosis.    Review of Systems:   Review of Systems   Constitutional: Negative for activity change, appetite change, chills, diaphoresis, fatigue, fever, unexpected weight gain and unexpected weight loss.   Eyes: Negative for blurred vision and double vision.   Respiratory: Negative for cough, chest tightness, shortness of breath and wheezing.    Cardiovascular: Negative for chest pain, palpitations and leg swelling.   Gastrointestinal: Negative for abdominal pain, anal bleeding, blood in stool and GERD.   Endocrine: Negative for cold intolerance and heat intolerance.   Genitourinary: Negative for hematuria.   Neurological: Negative for dizziness, syncope, weakness and light-headedness.   Hematological: Does not bruise/bleed easily.   Psychiatric/Behavioral: Negative for depressed mood and stress. The patient is not nervous/anxious.        I have reviewed and/or updated the patient's past medical, past surgical, family, social history, problem list and allergies as appropriate.     Medications:     Current Outpatient Medications:   •  albuterol sulfate HFA (Ventolin HFA) 108 (90 Base) MCG/ACT inhaler, Inhale 2 puffs Every 4 (Four) Hours As Needed for Wheezing or Shortness of Air., Disp: 18 g, Rfl: 5  •  allopurinol (ZYLOPRIM) 300 MG tablet, TAKE ONE TABLET BY MOUTH DAILY, Disp: 90 tablet, Rfl: 3  •  bisacodyl (bisacodyl) 5 MG EC tablet, Take 4 tablets at 1:00pm with 8oz of clear liquids the day before your colonoscopy., Disp: 4 tablet, Rfl: 0  •  calcium carbonate (OS-SHANTE) 600 MG tablet, Take 600 mg by mouth 2 (two) times a day with meals., Disp: , Rfl:   •  clindamycin (Cleocin) 300 MG capsule, Take 1 capsule by mouth 3 (Three)  Times a Day., Disp: 21 capsule, Rfl: 0  •  clotrimazole-betamethasone (Lotrisone) 1-0.05 % cream, Apply  topically to the appropriate area as directed 2 (Two) Times a Day., Disp: 45 g, Rfl: 0  •  cyanocobalamin (VITAMIN B-12) 2500 MCG tablet tablet, TAKE ONE TABLET BY MOUTH THREE TIMES A WEEK MUST MAKE AN APPOINTMENT, Disp: 30 tablet, Rfl: 5  •  diclofenac (VOLTAREN) 1 % gel gel, Apply 4 g topically to the appropriate area as directed 4 (Four) Times a Day As Needed (pain)., Disp: 100 g, Rfl: 1  •  Elastic Bandages & Supports (Medical Compression Stockings) misc, 1 application Daily., Disp: 2 each, Rfl: 0  •  EPINEPHrine (EPIPEN) 0.3 MG/0.3ML solution auto-injector injection, , Disp: , Rfl:   •  estradiol (ESTRACE VAGINAL) 0.1 MG/GM vaginal cream, Insert 2 g into the vagina Daily., Disp: , Rfl:   •  GLUCOSAMINE HCL-MSM PO, Glucosamine-Chondrotin, Disp: , Rfl:   •  hydroCHLOROthiazide (HYDRODIURIL) 12.5 MG tablet, Take 1 tablet by mouth Daily., Disp: 90 tablet, Rfl: 3  •  ketotifen (ZADITOR) 0.025 % ophthalmic solution, ketotifen 0.025 % (0.035 %) eye drops  INSTILL 1 DROP INTO AFFECTED EYE(S) BY OPHTHALMIC ROUTE 2 TIMES PER DAY, Disp: , Rfl:   •  levocetirizine (XYZAL) 5 MG tablet, levocetirizine 5 mg tablet  TAKE ONE TABLET BY MOUTH DAILY, Disp: , Rfl:   •  levothyroxine (Synthroid) 75 MCG tablet, Take 1 tablet by mouth Daily., Disp: 90 tablet, Rfl: 3  •  levothyroxine (SYNTHROID, LEVOTHROID) 100 MCG tablet, TAKE ONE TABLET BY MOUTH DAILY ALONG WITH 88MCG TABLET FOR TOTAL DOSE OF 188MCG, Disp: 90 tablet, Rfl: 3  •  Mometasone Furoate (Asmanex HFA) 200 MCG/ACT aerosol, Inhale 2 puffs 2 (Two) Times a Day., Disp: 3 each, Rfl: 2  •  Montelukast Sodium (SINGULAIR PO), PRN, Disp: , Rfl:   •  MULTIPLE VITAMIN PO, Multiple Vitamin capsule  Daily, Disp: , Rfl:   •  neomycin (MYCIFRADIN) 500 MG tablet, Take 2 tablets by mouth Take As Directed. Take 2 tablets at 1 pm, 2 tablets at 2 pm, and 2 tablets at 10 pm on the day prior  "to surgery., Disp: 6 tablet, Rfl: 0  •  Omega-3 Fatty Acids (FISH OIL) 435 MG capsule, Take 1,000 mg by mouth daily., Disp: , Rfl:   •  omeprazole (priLOSEC) 40 MG capsule, Take 1 capsule by mouth Daily., Disp: 90 capsule, Rfl: 3  •  polyethylene glycol (MIRALAX) 17 GM/SCOOP powder, Mix 238g powder with 64 oz of clear liquid at 4:00pm. Drink 8 oz every 10-15 minutes until consumed., Disp: 238 g, Rfl: 0  •  Probiotic Product (SOLUBLE FIBER/PROBIOTICS PO), Take 1 tablet by mouth daily., Disp: , Rfl:   •  Suprep Bowel Prep Kit 17.5-3.13-1.6 GM/177ML solution oral solution, , Disp: , Rfl:   •  vitamin C (ASCORBIC ACID) 500 MG tablet, Take 1 tablet by mouth daily., Disp: , Rfl:     Current Facility-Administered Medications:   •  triamcinolone acetonide (KENALOG-40) injection 40 mg, 40 mg, Intra-articular, Once, Gordon Norman MD    Physical Exam:  Vital Signs:   Vitals:    11/15/21 1322   BP: 150/80   BP Location: Left arm   Patient Position: Sitting   Pulse: 89   SpO2: 96%   Weight: 120 kg (264 lb)   Height: 175.3 cm (69\")       Physical Exam  Constitutional:       General: She is not in acute distress.     Appearance: Normal appearance. She is well-developed. She is not diaphoretic.   HENT:      Head: Normocephalic and atraumatic.   Eyes:      General: No scleral icterus.     Pupils: Pupils are equal, round, and reactive to light.   Neck:      Trachea: No tracheal deviation.   Cardiovascular:      Rate and Rhythm: Normal rate and regular rhythm.      Heart sounds: Normal heart sounds. No murmur heard.  No friction rub. No gallop.       Comments: Normal JVD.  Pulmonary:      Effort: Pulmonary effort is normal. No respiratory distress.      Breath sounds: Normal breath sounds. No stridor. No wheezing or rales.   Chest:      Chest wall: No tenderness.   Abdominal:      General: Bowel sounds are normal. There is no distension.      Palpations: Abdomen is soft.      Tenderness: There is no abdominal tenderness. There is " no guarding or rebound.   Musculoskeletal:         General: No swelling. Normal range of motion.      Cervical back: Neck supple. No tenderness.   Lymphadenopathy:      Cervical: No cervical adenopathy.   Skin:     General: Skin is warm and dry.      Findings: No erythema.   Neurological:      General: No focal deficit present.      Mental Status: She is alert and oriented to person, place, and time.   Psychiatric:         Mood and Affect: Mood normal.         Behavior: Behavior normal.         Results Review:   I reviewed the patient's new clinical results.  I personally viewed and interpreted the patient's EKG/Telemetry data      ECG 12 Lead    Date/Time: 11/15/2021 2:32 PM  Performed by: Tim Jackman MD  Authorized by: Tim Jackman MD   Comparison: not compared with previous ECG   Rhythm: sinus rhythm  Rate: normal  QRS axis: normal  Other findings: non-specific ST-T wave changes    Clinical impression: abnormal EKG            Assessment / Plan:     1. Bilateral lower extremity edema  --Suspect likely secondary to chronic venous insufficiency  --echocardiogram showed normal systolic (LVEF 60-65%)  --Prior proBNP within normal limits with no evidence of cardiac volume overload  --Currently doing well, interval resolution of bilateral lower extremity edema  --Given resolution of lower extremity edema, no indication for further work-up at this time    2.  Persistent atrial fibrillation  --History of ablation x2 without recurrence  --ECG today continues to show NSR  --No recent symptoms to suggest recurrent PAF  --Follow-up in 1 year, sooner if required      Follow Up:   Return in about 1 year (around 11/15/2022).      Thank you for allowing me to participate in the care of your patient. Please to not hesitate to contact me with additional questions or concerns.     NICHELLE Jackman MD  Interventional Cardiology   11/15/2021  13:48 EST

## 2021-11-16 ENCOUNTER — OFFICE VISIT (OUTPATIENT)
Dept: INTERNAL MEDICINE | Facility: CLINIC | Age: 66
End: 2021-11-16

## 2021-11-16 VITALS
BODY MASS INDEX: 39.4 KG/M2 | DIASTOLIC BLOOD PRESSURE: 90 MMHG | WEIGHT: 266 LBS | HEART RATE: 77 BPM | RESPIRATION RATE: 16 BRPM | SYSTOLIC BLOOD PRESSURE: 180 MMHG | TEMPERATURE: 97.2 F | HEIGHT: 69 IN | OXYGEN SATURATION: 99 %

## 2021-11-16 DIAGNOSIS — K57.92 DIVERTICULITIS: ICD-10-CM

## 2021-11-16 DIAGNOSIS — R19.7 DIARRHEA, UNSPECIFIED TYPE: ICD-10-CM

## 2021-11-16 DIAGNOSIS — R10.30 LOWER ABDOMINAL PAIN: ICD-10-CM

## 2021-11-16 DIAGNOSIS — K57.90 DIVERTICULOSIS: Primary | ICD-10-CM

## 2021-11-16 PROCEDURE — 99214 OFFICE O/P EST MOD 30 MIN: CPT | Performed by: INTERNAL MEDICINE

## 2021-11-16 RX ORDER — ESTRADIOL 0.1 MG/G
2 CREAM VAGINAL DAILY
Qty: 42.5 G | Refills: 3 | Status: SHIPPED | OUTPATIENT
Start: 2021-11-16 | End: 2022-10-26

## 2021-11-16 NOTE — PROGRESS NOTES
Subjective     Patient ID: Albania Ramos is a 66 y.o. female. Patient is here for management of multiple medical problems.     No chief complaint on file.  needs surgery      History of Present Illness           The following portions of the patient's history were reviewed and updated as appropriate: allergies, current medications, past family history, past medical history, past social history, past surgical history and problem list.    Review of Systems    Current Outpatient Medications:   •  albuterol sulfate HFA (Ventolin HFA) 108 (90 Base) MCG/ACT inhaler, Inhale 2 puffs Every 4 (Four) Hours As Needed for Wheezing or Shortness of Air., Disp: 18 g, Rfl: 5  •  allopurinol (ZYLOPRIM) 300 MG tablet, TAKE ONE TABLET BY MOUTH DAILY, Disp: 90 tablet, Rfl: 3  •  bisacodyl (bisacodyl) 5 MG EC tablet, Take 4 tablets at 1:00pm with 8oz of clear liquids the day before your colonoscopy., Disp: 4 tablet, Rfl: 0  •  calcium carbonate (OS-SHANTE) 600 MG tablet, Take 600 mg by mouth 2 (two) times a day with meals., Disp: , Rfl:   •  clindamycin (Cleocin) 300 MG capsule, Take 1 capsule by mouth 3 (Three) Times a Day., Disp: 21 capsule, Rfl: 0  •  clotrimazole-betamethasone (Lotrisone) 1-0.05 % cream, Apply  topically to the appropriate area as directed 2 (Two) Times a Day., Disp: 45 g, Rfl: 0  •  cyanocobalamin (VITAMIN B-12) 2500 MCG tablet tablet, TAKE ONE TABLET BY MOUTH THREE TIMES A WEEK MUST MAKE AN APPOINTMENT, Disp: 30 tablet, Rfl: 5  •  diclofenac (VOLTAREN) 1 % gel gel, Apply 4 g topically to the appropriate area as directed 4 (Four) Times a Day As Needed (pain)., Disp: 100 g, Rfl: 1  •  Elastic Bandages & Supports (Medical Compression Stockings) misc, 1 application Daily., Disp: 2 each, Rfl: 0  •  EPINEPHrine (EPIPEN) 0.3 MG/0.3ML solution auto-injector injection, , Disp: , Rfl:   •  estradiol (ESTRACE VAGINAL) 0.1 MG/GM vaginal cream, Insert 2 g into the vagina Daily., Disp: 42.5 g, Rfl: 3  •  GLUCOSAMINE HCL-MSM PO,  Glucosamine-Chondrotin, Disp: , Rfl:   •  hydroCHLOROthiazide (HYDRODIURIL) 12.5 MG tablet, Take 1 tablet by mouth Daily., Disp: 90 tablet, Rfl: 3  •  ketotifen (ZADITOR) 0.025 % ophthalmic solution, ketotifen 0.025 % (0.035 %) eye drops  INSTILL 1 DROP INTO AFFECTED EYE(S) BY OPHTHALMIC ROUTE 2 TIMES PER DAY, Disp: , Rfl:   •  levocetirizine (XYZAL) 5 MG tablet, levocetirizine 5 mg tablet  TAKE ONE TABLET BY MOUTH DAILY, Disp: , Rfl:   •  levothyroxine (Synthroid) 75 MCG tablet, Take 1 tablet by mouth Daily., Disp: 90 tablet, Rfl: 3  •  levothyroxine (SYNTHROID, LEVOTHROID) 100 MCG tablet, TAKE ONE TABLET BY MOUTH DAILY ALONG WITH 88MCG TABLET FOR TOTAL DOSE OF 188MCG, Disp: 90 tablet, Rfl: 3  •  Mometasone Furoate (Asmanex HFA) 200 MCG/ACT aerosol, Inhale 2 puffs 2 (Two) Times a Day., Disp: 3 each, Rfl: 2  •  Montelukast Sodium (SINGULAIR PO), PRN, Disp: , Rfl:   •  MULTIPLE VITAMIN PO, Multiple Vitamin capsule  Daily, Disp: , Rfl:   •  neomycin (MYCIFRADIN) 500 MG tablet, Take 2 tablets by mouth Take As Directed. Take 2 tablets at 1 pm, 2 tablets at 2 pm, and 2 tablets at 10 pm on the day prior to surgery., Disp: 6 tablet, Rfl: 0  •  Omega-3 Fatty Acids (FISH OIL) 435 MG capsule, Take 1,000 mg by mouth daily., Disp: , Rfl:   •  omeprazole (priLOSEC) 40 MG capsule, Take 1 capsule by mouth Daily., Disp: 90 capsule, Rfl: 3  •  polyethylene glycol (MIRALAX) 17 GM/SCOOP powder, Mix 238g powder with 64 oz of clear liquid at 4:00pm. Drink 8 oz every 10-15 minutes until consumed., Disp: 238 g, Rfl: 0  •  Probiotic Product (SOLUBLE FIBER/PROBIOTICS PO), Take 1 tablet by mouth daily., Disp: , Rfl:   •  Suprep Bowel Prep Kit 17.5-3.13-1.6 GM/177ML solution oral solution, , Disp: , Rfl:   •  vitamin C (ASCORBIC ACID) 500 MG tablet, Take 1 tablet by mouth daily., Disp: , Rfl:     Current Facility-Administered Medications:   •  triamcinolone acetonide (KENALOG-40) injection 40 mg, 40 mg, Intra-articular, Once, Gordon Norman  "MD STACEY    Objective      Blood pressure 180/90, pulse 77, temperature 97.2 °F (36.2 °C), resp. rate 16, height 175.3 cm (69\"), weight 121 kg (266 lb), SpO2 99 %, not currently breastfeeding.    Physical Exam     General Appearance:    Alert, cooperative, no distress, appears stated age   Head:    Normocephalic, without obvious abnormality, atraumatic   Eyes:    PERRL, conjunctiva/corneas clear, EOM's intact   Ears:    Normal TM's and external ear canals, both ears   Nose:   Nares normal, septum midline, mucosa normal, no drainage   or sinus tenderness   Throat:   Lips, mucosa, and tongue normal; teeth and gums normal   Neck:   Supple, symmetrical, trachea midline, no adenopathy;        thyroid:  No enlargement/tenderness/nodules; no carotid    bruit or JVD   Back:     Symmetric, no curvature, ROM normal, no CVA tenderness   Lungs:     Clear to auscultation bilaterally, respirations unlabored   Chest wall:    No tenderness or deformity   Heart:    Regular rate and rhythm, S1 and S2 normal, no murmur,        rub or gallop   Abdomen:     Soft, non-tender, bowel sounds active all four quadrants,     no masses, no organomegaly   Extremities:   Extremities normal, atraumatic, no cyanosis or edema   Pulses:   2+ and symmetric all extremities   Skin:   Skin color, texture, turgor normal, no rashes or lesions   Lymph nodes:   Cervical, supraclavicular, and axillary nodes normal   Neurologic:   CNII-XII intact. Normal strength, sensation and reflexes       throughout      Results for orders placed or performed in visit on 11/04/21   Lipid Panel    Specimen: Blood   Result Value Ref Range    Total Cholesterol 203 (H) 0 - 200 mg/dL    Triglycerides 85 0 - 150 mg/dL    HDL Cholesterol 61 (H) 40 - 60 mg/dL    VLDL Cholesterol Lucius 15 5 - 40 mg/dL    LDL Chol Calc (NIH) 127 (H) 0 - 100 mg/dL   Vitamin B12    Specimen: Blood   Result Value Ref Range    Vitamin B-12 912 211 - 946 pg/mL   Comprehensive Metabolic Panel    Specimen: " Blood   Result Value Ref Range    Glucose 101 (H) 65 - 99 mg/dL    BUN 14 8 - 23 mg/dL    Creatinine 0.70 0.57 - 1.00 mg/dL    eGFR Non African Am 84 >60 mL/min/1.73    eGFR African Am 102 >60 mL/min/1.73    BUN/Creatinine Ratio 20.0 7.0 - 25.0    Sodium 142 136 - 145 mmol/L    Potassium 4.4 3.5 - 5.2 mmol/L    Chloride 104 98 - 107 mmol/L    Total CO2 28.7 22.0 - 29.0 mmol/L    Calcium 9.4 8.6 - 10.5 mg/dL    Total Protein 6.3 6.0 - 8.5 g/dL    Albumin 4.40 3.50 - 5.20 g/dL    Globulin 1.9 gm/dL    A/G Ratio 2.3 g/dL    Total Bilirubin 0.4 0.0 - 1.2 mg/dL    Alkaline Phosphatase 93 39 - 117 U/L    AST (SGOT) 24 1 - 32 U/L    ALT (SGPT) 27 1 - 33 U/L   TSH    Specimen: Blood   Result Value Ref Range    TSH 0.263 (L) 0.270 - 4.200 uIU/mL   T4, Free    Specimen: Blood   Result Value Ref Range    Free T4 1.66 0.93 - 1.70 ng/dL   Vitamin B6    Specimen: Blood   Result Value Ref Range    Vitamin B6 12.7 2.0 - 32.8 ug/L   CBC & Differential    Specimen: Blood   Result Value Ref Range    WBC 5.79 3.40 - 10.80 10*3/mm3    RBC 4.64 3.77 - 5.28 10*6/mm3    Hemoglobin 13.2 12.0 - 15.9 g/dL    Hematocrit 42.1 34.0 - 46.6 %    MCV 90.7 79.0 - 97.0 fL    MCH 28.4 26.6 - 33.0 pg    MCHC 31.4 (L) 31.5 - 35.7 g/dL    RDW 13.3 12.3 - 15.4 %    Platelets 225 140 - 450 10*3/mm3    Neutrophil Rel % 57.0 42.7 - 76.0 %    Lymphocyte Rel % 29.4 19.6 - 45.3 %    Monocyte Rel % 7.9 5.0 - 12.0 %    Eosinophil Rel % 4.7 0.3 - 6.2 %    Basophil Rel % 0.7 0.0 - 1.5 %    Neutrophils Absolute 3.30 1.70 - 7.00 10*3/mm3    Lymphocytes Absolute 1.70 0.70 - 3.10 10*3/mm3    Monocytes Absolute 0.46 0.10 - 0.90 10*3/mm3    Eosinophils Absolute 0.27 0.00 - 0.40 10*3/mm3    Basophils Absolute 0.04 0.00 - 0.20 10*3/mm3    Immature Granulocyte Rel % 0.3 0.0 - 0.5 %    Immature Grans Absolute 0.02 0.00 - 0.05 10*3/mm3    nRBC 0.0 0.0 - 0.2 /100 WBC         Assessment/Plan       Pt is getting cancelled for surgery do to capacity issues locally. Pt in sig pain  and would like to proceed with colon resection.    Pt very upset today and BP elevated.    Pt last ct was on abx and feeling fine.        Diagnoses and all orders for this visit:    1. Diverticulosis (Primary)  -     Ambulatory Referral to General Surgery    2. Diverticulitis  -     Ambulatory Referral to General Surgery    3. Diarrhea, unspecified type  -     Strongyloides Antibody IgG, ALTA  -     Lipase  -     Glia(IgA / G) & TTG(IgA / G)  -     Endomysial Antibody, IgA / IGG Titer  -     Helicobacter Pylori, IgA IgG IgM  -     Comprehensive Metabolic Panel  -     CBC & Differential  -     H. Pylori Antigen, Stool - Stool, Per Rectum  -     Pancreatic Elastase, Fecal - Stool, Per Rectum  -     Stool Culture (Reference Lab) - Stool, Per Rectum  -     Ova & Parasite Examination - Stool, Per Rectum  -     Fecal Leukocytes - Stool, Per Rectum  -     Gastrointestinal Panel, PCR - Stool, Per Rectum  -     Calprotectin, Fecal - Stool, Per Rectum    4. Lower abdominal pain  -     Urinalysis With Microscopic - Urine, Clean Catch    Other orders  -     estradiol (ESTRACE VAGINAL) 0.1 MG/GM vaginal cream; Insert 2 g into the vagina Daily.  Dispense: 42.5 g; Refill: 3      Return in about 1 week (around 11/23/2021).          There are no Patient Instructions on file for this visit.     Gordon Norman MD    Assessment/Plan

## 2021-11-17 LAB
ALBUMIN SERPL-MCNC: 4.5 G/DL (ref 3.5–5.2)
ALBUMIN/GLOB SERPL: 2 G/DL
ALP SERPL-CCNC: 100 U/L (ref 39–117)
ALT SERPL-CCNC: 25 U/L (ref 1–33)
APPEARANCE UR: CLEAR
AST SERPL-CCNC: 21 U/L (ref 1–32)
BACTERIA #/AREA URNS HPF: NORMAL /HPF
BASOPHILS # BLD AUTO: 0.06 10*3/MM3 (ref 0–0.2)
BASOPHILS NFR BLD AUTO: 0.9 % (ref 0–1.5)
BILIRUB SERPL-MCNC: 0.2 MG/DL (ref 0–1.2)
BILIRUB UR QL STRIP: NEGATIVE
BUN SERPL-MCNC: 17 MG/DL (ref 8–23)
BUN/CREAT SERPL: 19.8 (ref 7–25)
CALCIUM SERPL-MCNC: 8 MG/DL (ref 8.6–10.5)
CASTS URNS MICRO: NORMAL
CHLORIDE SERPL-SCNC: 107 MMOL/L (ref 98–107)
CO2 SERPL-SCNC: 30.6 MMOL/L (ref 22–29)
COLOR UR: YELLOW
CREAT SERPL-MCNC: 0.86 MG/DL (ref 0.57–1)
EOSINOPHIL # BLD AUTO: 0.32 10*3/MM3 (ref 0–0.4)
EOSINOPHIL NFR BLD AUTO: 5 % (ref 0.3–6.2)
EPI CELLS #/AREA URNS HPF: NORMAL /HPF
ERYTHROCYTE [DISTWIDTH] IN BLOOD BY AUTOMATED COUNT: 13.1 % (ref 12.3–15.4)
GLOBULIN SER CALC-MCNC: 2.2 GM/DL
GLUCOSE SERPL-MCNC: 93 MG/DL (ref 65–99)
GLUCOSE UR QL: NEGATIVE
HCT VFR BLD AUTO: 41.7 % (ref 34–46.6)
HGB BLD-MCNC: 13.3 G/DL (ref 12–15.9)
HGB UR QL STRIP: NEGATIVE
IMM GRANULOCYTES # BLD AUTO: 0.02 10*3/MM3 (ref 0–0.05)
IMM GRANULOCYTES NFR BLD AUTO: 0.3 % (ref 0–0.5)
KETONES UR QL STRIP: NEGATIVE
LEUKOCYTE ESTERASE UR QL STRIP: NEGATIVE
LYMPHOCYTES # BLD AUTO: 2.14 10*3/MM3 (ref 0.7–3.1)
LYMPHOCYTES NFR BLD AUTO: 33.7 % (ref 19.6–45.3)
MCH RBC QN AUTO: 28.5 PG (ref 26.6–33)
MCHC RBC AUTO-ENTMCNC: 31.9 G/DL (ref 31.5–35.7)
MCV RBC AUTO: 89.5 FL (ref 79–97)
MONOCYTES # BLD AUTO: 0.51 10*3/MM3 (ref 0.1–0.9)
MONOCYTES NFR BLD AUTO: 8 % (ref 5–12)
NEUTROPHILS # BLD AUTO: 3.3 10*3/MM3 (ref 1.7–7)
NEUTROPHILS NFR BLD AUTO: 52.1 % (ref 42.7–76)
NITRITE UR QL STRIP: NEGATIVE
NRBC BLD AUTO-RTO: 0 /100 WBC (ref 0–0.2)
PH UR STRIP: 6.5 [PH] (ref 5–8)
PLATELET # BLD AUTO: 254 10*3/MM3 (ref 140–450)
POTASSIUM SERPL-SCNC: 5.2 MMOL/L (ref 3.5–5.2)
PROT SERPL-MCNC: 6.7 G/DL (ref 6–8.5)
PROT UR QL STRIP: NEGATIVE
RBC # BLD AUTO: 4.66 10*6/MM3 (ref 3.77–5.28)
RBC #/AREA URNS HPF: NORMAL /HPF
SODIUM SERPL-SCNC: 143 MMOL/L (ref 136–145)
SP GR UR: 1.01 (ref 1–1.03)
UROBILINOGEN UR STRIP-MCNC: NORMAL MG/DL
WBC # BLD AUTO: 6.35 10*3/MM3 (ref 3.4–10.8)
WBC #/AREA URNS HPF: NORMAL /HPF

## 2021-11-18 ENCOUNTER — TRANSCRIBE ORDERS (OUTPATIENT)
Dept: ADMINISTRATIVE | Facility: HOSPITAL | Age: 66
End: 2021-11-18

## 2021-11-18 DIAGNOSIS — Z12.31 VISIT FOR SCREENING MAMMOGRAM: Primary | ICD-10-CM

## 2021-11-22 ENCOUNTER — OFFICE VISIT (OUTPATIENT)
Dept: INTERNAL MEDICINE | Facility: CLINIC | Age: 66
End: 2021-11-22

## 2021-11-22 VITALS
RESPIRATION RATE: 16 BRPM | HEIGHT: 69 IN | DIASTOLIC BLOOD PRESSURE: 78 MMHG | HEART RATE: 100 BPM | BODY MASS INDEX: 39.4 KG/M2 | WEIGHT: 266 LBS | OXYGEN SATURATION: 95 % | SYSTOLIC BLOOD PRESSURE: 160 MMHG | TEMPERATURE: 97.7 F

## 2021-11-22 DIAGNOSIS — R10.32 LEFT LOWER QUADRANT ABDOMINAL PAIN: Primary | ICD-10-CM

## 2021-11-22 LAB
ADV 40+41 DNA STL QL NAA+NON-PROBE: NOT DETECTED
ASTRO TYP 1-8 RNA STL QL NAA+NON-PROBE: NOT DETECTED
C CAYETANENSIS DNA STL QL NAA+NON-PROBE: NOT DETECTED
C COLI+JEJ+UPSA DNA STL QL NAA+NON-PROBE: NOT DETECTED
C DIF TOX TCDA+TCDB STL QL NAA+NON-PROBE: DETECTED
CRYPTOSP DNA STL QL NAA+NON-PROBE: NOT DETECTED
E COLI O157 DNA STL QL NAA+NON-PROBE: ABNORMAL
E HISTOLYT DNA STL QL NAA+NON-PROBE: NOT DETECTED
EAEC PAA PLAS AGGR+AATA ST NAA+NON-PRB: NOT DETECTED
EC STX1+STX2 GENES STL QL NAA+NON-PROBE: NOT DETECTED
ELASTASE PANC STL-MCNT: 217 UG ELAST./G
ENDOMYSIAL ANTIBODY TITER IGA: NORMAL TITER
ENDOMYSIAL ANTIBODY TITER IGG: NORMAL TITER
EPEC EAE GENE STL QL NAA+NON-PROBE: NOT DETECTED
ETEC LTA+ST1A+ST1B TOX ST NAA+NON-PROBE: NOT DETECTED
G LAMBLIA DNA STL QL NAA+NON-PROBE: NOT DETECTED
GLIADIN PEPTIDE IGA SER-ACNC: 4 UNITS (ref 0–19)
GLIADIN PEPTIDE IGG SER-ACNC: 2 UNITS (ref 0–19)
H PYLORI AG STL QL IA: NEGATIVE
H PYLORI IGA SER-ACNC: <9 UNITS (ref 0–8.9)
H PYLORI IGG SER IA-ACNC: 1 INDEX VALUE (ref 0–0.79)
H PYLORI IGM SER-ACNC: <9 UNITS (ref 0–8.9)
LIPASE SERPL-CCNC: 36 U/L (ref 13–60)
NOROVIRUS GI+II RNA STL QL NAA+NON-PROBE: NOT DETECTED
O+P SPEC MICRO: NORMAL
O+P STL CONC: NORMAL
P SHIGELLOIDES DNA STL QL NAA+NON-PROBE: NOT DETECTED
RVA RNA STL QL NAA+NON-PROBE: NOT DETECTED
S ENT+BONG DNA STL QL NAA+NON-PROBE: NOT DETECTED
SAPO I+II+IV+V RNA STL QL NAA+NON-PROBE: NOT DETECTED
SHIGELLA SP+EIEC IPAH ST NAA+NON-PROBE: NOT DETECTED
STRONGYLOIDES IGG SER QL IA: NEGATIVE
TTG IGA SER-ACNC: <2 U/ML (ref 0–3)
TTG IGG SER-ACNC: 3 U/ML (ref 0–5)
V CHOL+PARA+VUL DNA STL QL NAA+NON-PROBE: NOT DETECTED
V CHOLERAE DNA STL QL NAA+NON-PROBE: NOT DETECTED
WBC STL QL MICRO: NORMAL
WBC STL QL MICRO: NORMAL
Y ENTEROCOL DNA STL QL NAA+NON-PROBE: NOT DETECTED

## 2021-11-22 PROCEDURE — 99214 OFFICE O/P EST MOD 30 MIN: CPT | Performed by: INTERNAL MEDICINE

## 2021-11-22 NOTE — PROGRESS NOTES
Subjective     Patient ID: Albania Ramos is a 66 y.o. female. Patient is here for management of multiple medical problems.     Chief Complaint   Patient presents with   • Diverticulosis     History of Present Illness   Diverticulosis.    Wait time  At [facility] is longer than [facility].      Some better today.      1/2 cob salad yesterday and the other half latter in the day.        The following portions of the patient's history were reviewed and updated as appropriate: allergies, current medications, past family history, past medical history, past social history, past surgical history and problem list.    Review of Systems   Constitutional: Negative for fatigue.   Gastrointestinal: Positive for abdominal pain.   Psychiatric/Behavioral: Negative for sleep disturbance.   All other systems reviewed and are negative.      Current Outpatient Medications:   •  albuterol sulfate HFA (Ventolin HFA) 108 (90 Base) MCG/ACT inhaler, Inhale 2 puffs Every 4 (Four) Hours As Needed for Wheezing or Shortness of Air., Disp: 18 g, Rfl: 5  •  allopurinol (ZYLOPRIM) 300 MG tablet, TAKE ONE TABLET BY MOUTH DAILY, Disp: 90 tablet, Rfl: 3  •  bisacodyl (bisacodyl) 5 MG EC tablet, Take 4 tablets at 1:00pm with 8oz of clear liquids the day before your colonoscopy., Disp: 4 tablet, Rfl: 0  •  calcium carbonate (OS-SHANTE) 600 MG tablet, Take 600 mg by mouth 2 (two) times a day with meals., Disp: , Rfl:   •  clindamycin (Cleocin) 300 MG capsule, Take 1 capsule by mouth 3 (Three) Times a Day., Disp: 21 capsule, Rfl: 0  •  clotrimazole-betamethasone (Lotrisone) 1-0.05 % cream, Apply  topically to the appropriate area as directed 2 (Two) Times a Day., Disp: 45 g, Rfl: 0  •  cyanocobalamin (VITAMIN B-12) 2500 MCG tablet tablet, TAKE ONE TABLET BY MOUTH THREE TIMES A WEEK MUST MAKE AN APPOINTMENT, Disp: 30 tablet, Rfl: 5  •  diclofenac (VOLTAREN) 1 % gel gel, Apply 4 g topically to the appropriate area as directed 4 (Four) Times a Day As Needed (pain)., Disp:  100 g, Rfl: 1  •  Elastic Bandages & Supports (Medical Compression Stockings) misc, 1 application Daily., Disp: 2 each, Rfl: 0  •  EPINEPHrine (EPIPEN) 0.3 MG/0.3ML solution auto-injector injection, , Disp: , Rfl:   •  estradiol (ESTRACE VAGINAL) 0.1 MG/GM vaginal cream, Insert 2 g into the vagina Daily., Disp: 42.5 g, Rfl: 3  •  GLUCOSAMINE HCL-MSM PO, Glucosamine-Chondrotin, Disp: , Rfl:   •  hydroCHLOROthiazide (HYDRODIURIL) 12.5 MG tablet, Take 1 tablet by mouth Daily., Disp: 90 tablet, Rfl: 3  •  ketotifen (ZADITOR) 0.025 % ophthalmic solution, ketotifen 0.025 % (0.035 %) eye drops  INSTILL 1 DROP INTO AFFECTED EYE(S) BY OPHTHALMIC ROUTE 2 TIMES PER DAY, Disp: , Rfl:   •  levocetirizine (XYZAL) 5 MG tablet, levocetirizine 5 mg tablet  TAKE ONE TABLET BY MOUTH DAILY, Disp: , Rfl:   •  levothyroxine (Synthroid) 75 MCG tablet, Take 1 tablet by mouth Daily., Disp: 90 tablet, Rfl: 3  •  levothyroxine (SYNTHROID, LEVOTHROID) 100 MCG tablet, TAKE ONE TABLET BY MOUTH DAILY ALONG WITH 88MCG TABLET FOR TOTAL DOSE OF 188MCG, Disp: 90 tablet, Rfl: 3  •  Mometasone Furoate (Asmanex HFA) 200 MCG/ACT aerosol, Inhale 2 puffs 2 (Two) Times a Day., Disp: 3 each, Rfl: 2  •  Montelukast Sodium (SINGULAIR PO), PRN, Disp: , Rfl:   •  MULTIPLE VITAMIN PO, Multiple Vitamin capsule  Daily, Disp: , Rfl:   •  neomycin (MYCIFRADIN) 500 MG tablet, Take 2 tablets by mouth Take As Directed. Take 2 tablets at 1 pm, 2 tablets at 2 pm, and 2 tablets at 10 pm on the day prior to surgery., Disp: 6 tablet, Rfl: 0  •  Omega-3 Fatty Acids (FISH OIL) 435 MG capsule, Take 1,000 mg by mouth daily., Disp: , Rfl:   •  omeprazole (priLOSEC) 40 MG capsule, Take 1 capsule by mouth Daily., Disp: 90 capsule, Rfl: 3  •  polyethylene glycol (MIRALAX) 17 GM/SCOOP powder, Mix 238g powder with 64 oz of clear liquid at 4:00pm. Drink 8 oz every 10-15 minutes until consumed., Disp: 238 g, Rfl: 0  •  Probiotic Product (SOLUBLE FIBER/PROBIOTICS PO), Take 1 tablet by  "mouth daily., Disp: , Rfl:   •  Suprep Bowel Prep Kit 17.5-3.13-1.6 GM/177ML solution oral solution, , Disp: , Rfl:   •  vitamin C (ASCORBIC ACID) 500 MG tablet, Take 1 tablet by mouth daily., Disp: , Rfl:     Current Facility-Administered Medications:   •  triamcinolone acetonide (KENALOG-40) injection 40 mg, 40 mg, Intra-articular, Once, Gordon Norman MD    Objective      Blood pressure 160/78, pulse 100, temperature 97.7 °F (36.5 °C), resp. rate 16, height 175.3 cm (69\"), weight 121 kg (266 lb), SpO2 95 %, not currently breastfeeding.    Physical Exam     General Appearance:    Alert, cooperative, no distress, appears stated age   Head:    Normocephalic, without obvious abnormality, atraumatic   Eyes:    PERRL, conjunctiva/corneas clear, EOM's intact   Ears:    Normal TM's and external ear canals, both ears   Nose:   Nares normal, septum midline, mucosa normal, no drainage   or sinus tenderness   Throat:   Lips, mucosa, and tongue normal; teeth and gums normal   Neck:   Supple, symmetrical, trachea midline, no adenopathy;        thyroid:  No enlargement/tenderness/nodules; no carotid    bruit or JVD   Back:     Symmetric, no curvature, ROM normal, no CVA tenderness   Lungs:     Clear to auscultation bilaterally, respirations unlabored   Chest wall:    No tenderness or deformity   Heart:    Regular rate and rhythm, S1 and S2 normal, no murmur,        rub or gallop   Abdomen:     Soft, non-tender, bowel sounds active all four quadrants,     no masses, no organomegaly   Extremities:   Extremities normal, atraumatic, no cyanosis or edema   Pulses:   2+ and symmetric all extremities   Skin:   Skin color, texture, turgor normal, no rashes or lesions   Lymph nodes:   Cervical, supraclavicular, and axillary nodes normal   Neurologic:   CNII-XII intact. Normal strength, sensation and reflexes       throughout      Results for orders placed or performed in visit on 11/16/21   Ova & Parasite Examination - Stool, Per " Rectum    Specimen: Per Rectum; Stool    [facility] [id number]   Result Value Ref Range    Ova + Parasite Exam Final report     Ova + Parasite Result 1 Comment    Fecal Leukocytes - Stool, Per Rectum    Specimen: Per Rectum; Stool    [facility] [id number]   Result Value Ref Range    Fecal Leukocytes Final report None Seen    Result 1 Comment    Gastrointestinal Panel, PCR - Stool, Per Rectum    Specimen: Per Rectum; Stool    [facility] [id number]   Result Value Ref Range    Campylobacter Not Detected Not Detected    C.difficile toxin A/B Detected (A) Not Detected    Plesiomonas shigelloides Not Detected Not Detected    Salmonella Not Detected Not Detected    Vibrio Not Detected Not Detected    Vibrio cholerae Not Detected Not Detected    Yersinia enterocolitica Not Detected Not Detected    Enteroaggregative E. coli Not Detected Not Detected    Enteropathogenic E. coli Not Detected Not Detected    Enterotoxigenic E. coli Not Detected Not Detected    Shiga-like toxin-producing E. coli  Not Detected Not Detected    E. coli O157 Not applicable Not Detected    Shigella enteroinvasive E. coli Not Detected Not Detected    Cryptosporidium Not Detected Not Detected    Cyclospora cayetanensis Not Detected Not Detected    Entamoeba Histolytica Ag Not Detected Not Detected    Giardia lamblia Not Detected Not Detected    Adenovirus 40/41 Ag Not Detected Not Detected    Astrovirus Not Detected Not Detected    Norovirus GI/GII Not Detected Not Detected    Rotavirus A Not Detected Not Detected    Sapovirus Not Detected Not Detected   Strongyloides Antibody IgG, ALTA    Specimen: Blood   Result Value Ref Range    Strongyloides Ab IgG Negative Negative   Lipase    Specimen: Blood   Result Value Ref Range    Lipase 36 13 - 60 U/L   Glia(IgA / G) & TTG(IgA / G)    Specimen: Blood   Result Value Ref Range    Gliadin Deamidated Peptide Ab, IgA 4 0 - 19 units    Deaminated Gliadin Ab IgG 2 0 - 19 units    Tissue Transglutaminase IgA <2 0 -  3 U/mL    Tissue Transglutaminase IgG 3 0 - 5 U/mL   Endomysial Antibody, IgA / IGG Titer    Specimen: Blood   Result Value Ref Range    Endomysial Antibody Titer IgA <1:2.5 titer    Endomysial Antibody Titer IgG <1:2.5 titer   Helicobacter Pylori, IgA IgG IgM    Specimen: Blood   Result Value Ref Range    H. pylori IgG 1.00 (H) 0.00 - 0.79 Index Value    H. pylori, IgA ABS <9.0 0.0 - 8.9 units    H. Pylori, IgM <9.0 0.0 - 8.9 units   Comprehensive Metabolic Panel    Specimen: Blood   Result Value Ref Range    Glucose 93 65 - 99 mg/dL    BUN 17 8 - 23 mg/dL    Creatinine 0.86 0.57 - 1.00 mg/dL    eGFR Non African Am 66 >60 mL/min/1.73    eGFR African Am 80 >60 mL/min/1.73    BUN/Creatinine Ratio 19.8 7.0 - 25.0    Sodium 143 136 - 145 mmol/L    Potassium 5.2 3.5 - 5.2 mmol/L    Chloride 107 98 - 107 mmol/L    Total CO2 30.6 (H) 22.0 - 29.0 mmol/L    Calcium 8.0 (L) 8.6 - 10.5 mg/dL    Total Protein 6.7 6.0 - 8.5 g/dL    Albumin 4.50 3.50 - 5.20 g/dL    Globulin 2.2 gm/dL    A/G Ratio 2.0 g/dL    Total Bilirubin 0.2 0.0 - 1.2 mg/dL    Alkaline Phosphatase 100 39 - 117 U/L    AST (SGOT) 21 1 - 32 U/L    ALT (SGPT) 25 1 - 33 U/L   H. Pylori Antigen, Stool - Stool, Per Rectum    Specimen: Per Rectum; Stool    ST     CD- [id number]   Result Value Ref Range    H pylori Ag, Stl Negative Negative   Pancreatic Elastase, Fecal - Stool, Per Rectum    Specimen: Per Rectum; Stool   Result Value Ref Range    Pancreatic Fecal     Microscopic Examination -   Result Value Ref Range    WBC, UA 0-2 /HPF    RBC, UA 0-2 /HPF    Epithelial Cells (non renal) 0-2 /HPF    Cast Type Comment     Bacteria, UA Comment None Seen /HPF   CBC & Differential    Specimen: Blood   Result Value Ref Range    WBC 6.35 3.40 - 10.80 10*3/mm3    RBC 4.66 3.77 - 5.28 10*6/mm3    Hemoglobin 13.3 12.0 - 15.9 g/dL    Hematocrit 41.7 34.0 - 46.6 %    MCV 89.5 79.0 - 97.0 fL    MCH 28.5 26.6 - 33.0 pg    MCHC 31.9 31.5 - 35.7 g/dL    RDW 13.1 12.3 - 15.4 %     Platelets 254 140 - 450 10*3/mm3    Neutrophil Rel % 52.1 42.7 - 76.0 %    Lymphocyte Rel % 33.7 19.6 - 45.3 %    Monocyte Rel % 8.0 5.0 - 12.0 %    Eosinophil Rel % 5.0 0.3 - 6.2 %    Basophil Rel % 0.9 0.0 - 1.5 %    Neutrophils Absolute 3.30 1.70 - 7.00 10*3/mm3    Lymphocytes Absolute 2.14 0.70 - 3.10 10*3/mm3    Monocytes Absolute 0.51 0.10 - 0.90 10*3/mm3    Eosinophils Absolute 0.32 0.00 - 0.40 10*3/mm3    Basophils Absolute 0.06 0.00 - 0.20 10*3/mm3    Immature Granulocyte Rel % 0.3 0.0 - 0.5 %    Immature Grans Absolute 0.02 0.00 - 0.05 10*3/mm3    nRBC 0.0 0.0 - 0.2 /100 WBC   Urinalysis With Microscopic - Urine, Clean Catch    Specimen: Urine, Clean Catch   Result Value Ref Range    Specific Gravity, UA 1.007 1.005 - 1.030    pH, UA 6.5 5.0 - 8.0    Color, UA Yellow     Appearance, UA Clear Clear    Leukocytes, UA Negative Negative    Protein Negative Negative    Glucose, UA Negative Negative    Ketones Negative Negative    Blood, UA Negative Negative    Bilirubin, UA Negative Negative    Urobilinogen, UA Comment     Nitrite, UA Negative Negative         Assessment/Plan   Stool is normal currently. Runny the day she was here.      Diagnoses and all orders for this visit:    1. Left lower quadrant abdominal pain (Primary)      Return in about 4 months (around 3/22/2022).          There are no Patient Instructions on file for this visit.     Gordon Norman MD    Assessment/Plan

## 2021-12-01 ENCOUNTER — TELEPHONE (OUTPATIENT)
Dept: SURGERY | Facility: CLINIC | Age: 66
End: 2021-12-01

## 2021-12-01 NOTE — TELEPHONE ENCOUNTER
Patient is wanting to know if there has been any update on getting her scheduled for her surgery.

## 2021-12-02 NOTE — TELEPHONE ENCOUNTER
Unfortunately, there is no feasible way to get Ms. Ramos's surgery scheduled prior to the end of the year.  When I had spoken to Dr. Norman previously, I had advised him that she would likely be cared for more quickly with a referral elsewhere as the [facility] have larger OR capacity than we have in [place], and are not limiting the post-op admissions.   Again, I am happy to refer her to [facility] (colorectal surgery).

## 2021-12-07 ENCOUNTER — OFFICE VISIT (OUTPATIENT)
Dept: INTERNAL MEDICINE | Facility: CLINIC | Age: 66
End: 2021-12-07

## 2021-12-07 VITALS
TEMPERATURE: 98 F | SYSTOLIC BLOOD PRESSURE: 154 MMHG | DIASTOLIC BLOOD PRESSURE: 88 MMHG | HEART RATE: 75 BPM | WEIGHT: 266 LBS | RESPIRATION RATE: 16 BRPM | OXYGEN SATURATION: 97 % | BODY MASS INDEX: 39.4 KG/M2 | HEIGHT: 69 IN

## 2021-12-07 DIAGNOSIS — K57.92 DIVERTICULITIS: Primary | ICD-10-CM

## 2021-12-07 PROCEDURE — 99214 OFFICE O/P EST MOD 30 MIN: CPT | Performed by: INTERNAL MEDICINE

## 2021-12-07 RX ORDER — METRONIDAZOLE 500 MG/1
500 TABLET ORAL 3 TIMES DAILY
Qty: 30 TABLET | Refills: 0 | Status: SHIPPED | OUTPATIENT
Start: 2021-12-07 | End: 2022-01-24 | Stop reason: SDUPTHER

## 2021-12-07 RX ORDER — AMOXICILLIN AND CLAVULANATE POTASSIUM 875; 125 MG/1; MG/1
1 TABLET, FILM COATED ORAL EVERY 12 HOURS SCHEDULED
Qty: 20 TABLET | Refills: 0 | Status: SHIPPED | OUTPATIENT
Start: 2021-12-07 | End: 2021-12-23

## 2021-12-07 NOTE — PROGRESS NOTES
Subjective     Patient ID: Albania Ramos is a 66 y.o. female. Patient is here for management of multiple medical problems.     Chief Complaint   Patient presents with   • Diverticulosis     History of Present Illness   Diverticulitis.  Flair started yesterday.      Pt with coloscopy soon with Dr. Mendoza with Dr Cerda.        The following portions of the patient's history were reviewed and updated as appropriate: allergies, current medications, past family history, past medical history, past social history, past surgical history and problem list.    Review of Systems    Current Outpatient Medications:   •  albuterol sulfate HFA (Ventolin HFA) 108 (90 Base) MCG/ACT inhaler, Inhale 2 puffs Every 4 (Four) Hours As Needed for Wheezing or Shortness of Air., Disp: 18 g, Rfl: 5  •  allopurinol (ZYLOPRIM) 300 MG tablet, TAKE ONE TABLET BY MOUTH DAILY, Disp: 90 tablet, Rfl: 3  •  bisacodyl (bisacodyl) 5 MG EC tablet, Take 4 tablets at 1:00pm with 8oz of clear liquids the day before your colonoscopy., Disp: 4 tablet, Rfl: 0  •  calcium carbonate (OS-SHANTE) 600 MG tablet, Take 600 mg by mouth 2 (two) times a day with meals., Disp: , Rfl:   •  clindamycin (Cleocin) 300 MG capsule, Take 1 capsule by mouth 3 (Three) Times a Day., Disp: 21 capsule, Rfl: 0  •  clotrimazole-betamethasone (Lotrisone) 1-0.05 % cream, Apply  topically to the appropriate area as directed 2 (Two) Times a Day., Disp: 45 g, Rfl: 0  •  cyanocobalamin (VITAMIN B-12) 2500 MCG tablet tablet, TAKE ONE TABLET BY MOUTH THREE TIMES A WEEK MUST MAKE AN APPOINTMENT, Disp: 30 tablet, Rfl: 5  •  diclofenac (VOLTAREN) 1 % gel gel, Apply 4 g topically to the appropriate area as directed 4 (Four) Times a Day As Needed (pain)., Disp: 100 g, Rfl: 1  •  Elastic Bandages & Supports (Medical Compression Stockings) misc, 1 application Daily., Disp: 2 each, Rfl: 0  •  EPINEPHrine (EPIPEN) 0.3 MG/0.3ML solution auto-injector injection, , Disp: , Rfl:   •  estradiol (ESTRACE  VAGINAL) 0.1 MG/GM vaginal cream, Insert 2 g into the vagina Daily., Disp: 42.5 g, Rfl: 3  •  GLUCOSAMINE HCL-MSM PO, Glucosamine-Chondrotin, Disp: , Rfl:   •  hydroCHLOROthiazide (HYDRODIURIL) 12.5 MG tablet, Take 1 tablet by mouth Daily., Disp: 90 tablet, Rfl: 3  •  ketotifen (ZADITOR) 0.025 % ophthalmic solution, ketotifen 0.025 % (0.035 %) eye drops  INSTILL 1 DROP INTO AFFECTED EYE(S) BY OPHTHALMIC ROUTE 2 TIMES PER DAY, Disp: , Rfl:   •  levocetirizine (XYZAL) 5 MG tablet, levocetirizine 5 mg tablet  TAKE ONE TABLET BY MOUTH DAILY, Disp: , Rfl:   •  levothyroxine (Synthroid) 75 MCG tablet, Take 1 tablet by mouth Daily., Disp: 90 tablet, Rfl: 3  •  levothyroxine (SYNTHROID, LEVOTHROID) 100 MCG tablet, TAKE ONE TABLET BY MOUTH DAILY ALONG WITH 88MCG TABLET FOR TOTAL DOSE OF 188MCG, Disp: 90 tablet, Rfl: 3  •  Mometasone Furoate (Asmanex HFA) 200 MCG/ACT aerosol, Inhale 2 puffs 2 (Two) Times a Day., Disp: 3 each, Rfl: 2  •  Montelukast Sodium (SINGULAIR PO), PRN, Disp: , Rfl:   •  MULTIPLE VITAMIN PO, Multiple Vitamin capsule  Daily, Disp: , Rfl:   •  neomycin (MYCIFRADIN) 500 MG tablet, Take 2 tablets by mouth Take As Directed. Take 2 tablets at 1 pm, 2 tablets at 2 pm, and 2 tablets at 10 pm on the day prior to surgery., Disp: 6 tablet, Rfl: 0  •  Omega-3 Fatty Acids (FISH OIL) 435 MG capsule, Take 1,000 mg by mouth daily., Disp: , Rfl:   •  omeprazole (priLOSEC) 40 MG capsule, Take 1 capsule by mouth Daily., Disp: 90 capsule, Rfl: 3  •  polyethylene glycol (MIRALAX) 17 GM/SCOOP powder, Mix 238g powder with 64 oz of clear liquid at 4:00pm. Drink 8 oz every 10-15 minutes until consumed., Disp: 238 g, Rfl: 0  •  Probiotic Product (SOLUBLE FIBER/PROBIOTICS PO), Take 1 tablet by mouth daily., Disp: , Rfl:   •  Suprep Bowel Prep Kit 17.5-3.13-1.6 GM/177ML solution oral solution, , Disp: , Rfl:   •  vitamin C (ASCORBIC ACID) 500 MG tablet, Take 1 tablet by mouth daily., Disp: , Rfl:   •  amoxicillin-clavulanate  "(Augmentin) 875-125 MG per tablet, Take 1 tablet by mouth Every 12 (Twelve) Hours., Disp: 20 tablet, Rfl: 0  •  metroNIDAZOLE (Flagyl) 500 MG tablet, Take 1 tablet by mouth 3 (Three) Times a Day., Disp: 30 tablet, Rfl: 0    Current Facility-Administered Medications:   •  triamcinolone acetonide (KENALOG-40) injection 40 mg, 40 mg, Intra-articular, Once, Gordon Norman MD    Objective      Blood pressure 154/88, pulse 75, temperature 98 °F (36.7 °C), resp. rate 16, height 175.3 cm (69\"), weight 121 kg (266 lb), SpO2 97 %, not currently breastfeeding.    Physical Exam     General Appearance:    Alert, cooperative, no distress, appears stated age   Head:    Normocephalic, without obvious abnormality, atraumatic   Eyes:    PERRL, conjunctiva/corneas clear, EOM's intact   Ears:    Normal TM's and external ear canals, both ears   Nose:   Nares normal, septum midline, mucosa normal, no drainage   or sinus tenderness   Throat:   Lips, mucosa, and tongue normal; teeth and gums normal   Neck:   Supple, symmetrical, trachea midline, no adenopathy;        thyroid:  No enlargement/tenderness/nodules; no carotid    bruit or JVD   Back:     Symmetric, no curvature, ROM normal, no CVA tenderness   Lungs:     Clear to auscultation bilaterally, respirations unlabored   Chest wall:    No tenderness or deformity   Heart:    Regular rate and rhythm, S1 and S2 normal, no murmur,        rub or gallop   Abdomen:     Soft,-tender left lower ab., bowel sounds active all four quadrants,     no masses, no organomegaly   Extremities:   Extremities normal, atraumatic, no cyanosis or edema   Pulses:   2+ and symmetric all extremities   Skin:   Skin color, texture, turgor normal, no rashes or lesions   Lymph nodes:   Cervical, supraclavicular, and axillary nodes normal   Neurologic:   CNII-XII intact. Normal strength, sensation and reflexes       throughout      Results for orders placed or performed in visit on 11/16/21   Ova & Parasite " Examination - Stool, Per Rectum    Specimen: Per Rectum; Stool    [facility] [id number]   Result Value Ref Range    Ova + Parasite Exam Final report     Ova + Parasite Result 1 Comment    Fecal Leukocytes - Stool, Per Rectum    Specimen: Per Rectum; Stool    [facility] [id number]   Result Value Ref Range    Fecal Leukocytes Final report None Seen    Result 1 Comment    Gastrointestinal Panel, PCR - Stool, Per Rectum    Specimen: Per Rectum; Stool    [facility] [id number]   Result Value Ref Range    Campylobacter Not Detected Not Detected    C.difficile toxin A/B Detected (A) Not Detected    Plesiomonas shigelloides Not Detected Not Detected    Salmonella Not Detected Not Detected    Vibrio Not Detected Not Detected    Vibrio cholerae Not Detected Not Detected    Yersinia enterocolitica Not Detected Not Detected    Enteroaggregative E. coli Not Detected Not Detected    Enteropathogenic E. coli Not Detected Not Detected    Enterotoxigenic E. coli Not Detected Not Detected    Shiga-like toxin-producing E. coli  Not Detected Not Detected    E. coli O157 Not applicable Not Detected    Shigella enteroinvasive E. coli Not Detected Not Detected    Cryptosporidium Not Detected Not Detected    Cyclospora cayetanensis Not Detected Not Detected    Entamoeba Histolytica Ag Not Detected Not Detected    Giardia lamblia Not Detected Not Detected    Adenovirus 40/41 Ag Not Detected Not Detected    Astrovirus Not Detected Not Detected    Norovirus GI/GII Not Detected Not Detected    Rotavirus A Not Detected Not Detected    Sapovirus Not Detected Not Detected   Strongyloides Antibody IgG, ALTA    Specimen: Blood   Result Value Ref Range    Strongyloides Ab IgG Negative Negative   Lipase    Specimen: Blood   Result Value Ref Range    Lipase 36 13 - 60 U/L   Glia(IgA / G) & TTG(IgA / G)    Specimen: Blood   Result Value Ref Range    Gliadin Deamidated Peptide Ab, IgA 4 0 - 19 units    Deaminated Gliadin Ab IgG 2 0 - 19 units    Tissue  Transglutaminase IgA <2 0 - 3 U/mL    Tissue Transglutaminase IgG 3 0 - 5 U/mL   Endomysial Antibody, IgA / IGG Titer    Specimen: Blood   Result Value Ref Range    Endomysial Antibody Titer IgA <1:2.5 titer    Endomysial Antibody Titer IgG <1:2.5 titer   Helicobacter Pylori, IgA IgG IgM    Specimen: Blood   Result Value Ref Range    H. pylori IgG 1.00 (H) 0.00 - 0.79 Index Value    H. pylori, IgA ABS <9.0 0.0 - 8.9 units    H. Pylori, IgM <9.0 0.0 - 8.9 units   Comprehensive Metabolic Panel    Specimen: Blood   Result Value Ref Range    Glucose 93 65 - 99 mg/dL    BUN 17 8 - 23 mg/dL    Creatinine 0.86 0.57 - 1.00 mg/dL    eGFR Non African Am 66 >60 mL/min/1.73    eGFR African Am 80 >60 mL/min/1.73    BUN/Creatinine Ratio 19.8 7.0 - 25.0    Sodium 143 136 - 145 mmol/L    Potassium 5.2 3.5 - 5.2 mmol/L    Chloride 107 98 - 107 mmol/L    Total CO2 30.6 (H) 22.0 - 29.0 mmol/L    Calcium 8.0 (L) 8.6 - 10.5 mg/dL    Total Protein 6.7 6.0 - 8.5 g/dL    Albumin 4.50 3.50 - 5.20 g/dL    Globulin 2.2 gm/dL    A/G Ratio 2.0 g/dL    Total Bilirubin 0.2 0.0 - 1.2 mg/dL    Alkaline Phosphatase 100 39 - 117 U/L    AST (SGOT) 21 1 - 32 U/L    ALT (SGPT) 25 1 - 33 U/L   H. Pylori Antigen, Stool - Stool, Per Rectum    Specimen: Per Rectum; Stool    ST     CD- [id number]   Result Value Ref Range    H pylori Ag, Stl Negative Negative   Pancreatic Elastase, Fecal - Stool, Per Rectum    Specimen: Per Rectum; Stool   Result Value Ref Range    Pancreatic Fecal 217 >200 ug Elast./g   Microscopic Examination -   Result Value Ref Range    WBC, UA 0-2 /HPF    RBC, UA 0-2 /HPF    Epithelial Cells (non renal) 0-2 /HPF    Cast Type Comment     Bacteria, UA Comment None Seen /HPF   CBC & Differential    Specimen: Blood   Result Value Ref Range    WBC 6.35 3.40 - 10.80 10*3/mm3    RBC 4.66 3.77 - 5.28 10*6/mm3    Hemoglobin 13.3 12.0 - 15.9 g/dL    Hematocrit 41.7 34.0 - 46.6 %    MCV 89.5 79.0 - 97.0 fL    MCH 28.5 26.6 - 33.0 pg    MCHC 31.9  31.5 - 35.7 g/dL    RDW 13.1 12.3 - 15.4 %    Platelets 254 140 - 450 10*3/mm3    Neutrophil Rel % 52.1 42.7 - 76.0 %    Lymphocyte Rel % 33.7 19.6 - 45.3 %    Monocyte Rel % 8.0 5.0 - 12.0 %    Eosinophil Rel % 5.0 0.3 - 6.2 %    Basophil Rel % 0.9 0.0 - 1.5 %    Neutrophils Absolute 3.30 1.70 - 7.00 10*3/mm3    Lymphocytes Absolute 2.14 0.70 - 3.10 10*3/mm3    Monocytes Absolute 0.51 0.10 - 0.90 10*3/mm3    Eosinophils Absolute 0.32 0.00 - 0.40 10*3/mm3    Basophils Absolute 0.06 0.00 - 0.20 10*3/mm3    Immature Granulocyte Rel % 0.3 0.0 - 0.5 %    Immature Grans Absolute 0.02 0.00 - 0.05 10*3/mm3    nRBC 0.0 0.0 - 0.2 /100 WBC   Urinalysis With Microscopic - Urine, Clean Catch    Specimen: Urine, Clean Catch   Result Value Ref Range    Specific Gravity, UA 1.007 1.005 - 1.030    pH, UA 6.5 5.0 - 8.0    Color, UA Yellow     Appearance, UA Clear Clear    Leukocytes, UA Negative Negative    Protein Negative Negative    Glucose, UA Negative Negative    Ketones Negative Negative    Blood, UA Negative Negative    Bilirubin, UA Negative Negative    Urobilinogen, UA Comment     Nitrite, UA Negative Negative         Assessment/Plan       Diagnoses and all orders for this visit:    1. Diverticulitis (Primary)    Other orders  -     amoxicillin-clavulanate (Augmentin) 875-125 MG per tablet; Take 1 tablet by mouth Every 12 (Twelve) Hours.  Dispense: 20 tablet; Refill: 0  -     metroNIDAZOLE (Flagyl) 500 MG tablet; Take 1 tablet by mouth 3 (Three) Times a Day.  Dispense: 30 tablet; Refill: 0      No follow-ups on file.          There are no Patient Instructions on file for this visit.     Gordon Norman MD    Assessment/Plan

## 2021-12-08 ENCOUNTER — TRANSCRIBE ORDERS (OUTPATIENT)
Dept: PREADMISSION TESTING | Facility: HOSPITAL | Age: 66
End: 2021-12-08

## 2021-12-08 ENCOUNTER — TRANSCRIBE ORDERS (OUTPATIENT)
Dept: LAB | Facility: HOSPITAL | Age: 66
End: 2021-12-08

## 2021-12-08 ENCOUNTER — PREP FOR SURGERY (OUTPATIENT)
Dept: OTHER | Facility: HOSPITAL | Age: 66
End: 2021-12-08

## 2021-12-08 DIAGNOSIS — Z11.59 ENCOUNTER FOR SPECIAL SCREENING EXAMINATION FOR VIRAL DISEASE: Primary | ICD-10-CM

## 2021-12-08 DIAGNOSIS — H25.12 NUCLEAR SCLEROTIC CATARACT OF LEFT EYE: Primary | ICD-10-CM

## 2021-12-08 RX ORDER — SODIUM CHLORIDE 0.9 % (FLUSH) 0.9 %
10 SYRINGE (ML) INJECTION EVERY 12 HOURS SCHEDULED
Status: CANCELLED | OUTPATIENT
Start: 2021-12-08

## 2021-12-08 RX ORDER — SODIUM CHLORIDE 0.9 % (FLUSH) 0.9 %
1-10 SYRINGE (ML) INJECTION AS NEEDED
Status: CANCELLED | OUTPATIENT
Start: 2021-12-08

## 2021-12-08 RX ORDER — CYCLOPENT/TROPIC/PHEN/KETR/WAT 1%-1%-2.5%
1 DROPS (EA) OPHTHALMIC (EYE)
Status: CANCELLED | OUTPATIENT
Start: 2021-12-08 | End: 2021-12-08

## 2021-12-08 RX ORDER — PREDNISOLONE ACETATE 10 MG/ML
1 SUSPENSION/ DROPS OPHTHALMIC SEE ADMIN INSTRUCTIONS
Status: CANCELLED | OUTPATIENT
Start: 2021-12-08

## 2021-12-08 RX ORDER — TETRACAINE HYDROCHLORIDE 5 MG/ML
1 SOLUTION OPHTHALMIC SEE ADMIN INSTRUCTIONS
Status: CANCELLED | OUTPATIENT
Start: 2021-12-08

## 2021-12-10 PROBLEM — H25.12 NUCLEAR SCLEROTIC CATARACT OF LEFT EYE: Status: ACTIVE | Noted: 2021-12-10

## 2021-12-14 ENCOUNTER — APPOINTMENT (OUTPATIENT)
Dept: PREADMISSION TESTING | Facility: HOSPITAL | Age: 66
End: 2021-12-14

## 2021-12-14 ENCOUNTER — PREP FOR SURGERY (OUTPATIENT)
Dept: OTHER | Facility: HOSPITAL | Age: 66
End: 2021-12-14

## 2021-12-14 DIAGNOSIS — Z11.59 ENCOUNTER FOR SPECIAL SCREENING EXAMINATION FOR VIRAL DISEASE: ICD-10-CM

## 2021-12-14 DIAGNOSIS — H25.11 NUCLEAR SCLEROTIC CATARACT OF RIGHT EYE: Primary | ICD-10-CM

## 2021-12-14 LAB — SARS-COV-2 RNA PNL SPEC NAA+PROBE: NOT DETECTED

## 2021-12-14 PROCEDURE — U0004 COV-19 TEST NON-CDC HGH THRU: HCPCS

## 2021-12-14 PROCEDURE — C9803 HOPD COVID-19 SPEC COLLECT: HCPCS

## 2021-12-14 RX ORDER — PREDNISOLONE ACETATE 10 MG/ML
1 SUSPENSION/ DROPS OPHTHALMIC SEE ADMIN INSTRUCTIONS
Status: CANCELLED | OUTPATIENT
Start: 2021-12-14

## 2021-12-14 RX ORDER — TETRACAINE HYDROCHLORIDE 5 MG/ML
1 SOLUTION OPHTHALMIC SEE ADMIN INSTRUCTIONS
Status: CANCELLED | OUTPATIENT
Start: 2021-12-14

## 2021-12-14 RX ORDER — CYCLOPENT/TROPIC/PHEN/KETR/WAT 1%-1%-2.5%
1 DROPS (EA) OPHTHALMIC (EYE)
Status: CANCELLED | OUTPATIENT
Start: 2021-12-14 | End: 2021-12-14

## 2021-12-14 RX ORDER — SODIUM CHLORIDE 0.9 % (FLUSH) 0.9 %
1-10 SYRINGE (ML) INJECTION AS NEEDED
Status: CANCELLED | OUTPATIENT
Start: 2021-12-14

## 2021-12-14 RX ORDER — SODIUM CHLORIDE 0.9 % (FLUSH) 0.9 %
10 SYRINGE (ML) INJECTION EVERY 12 HOURS SCHEDULED
Status: CANCELLED | OUTPATIENT
Start: 2021-12-14

## 2021-12-20 ENCOUNTER — HOSPITAL ENCOUNTER (OUTPATIENT)
Facility: HOSPITAL | Age: 66
Setting detail: HOSPITAL OUTPATIENT SURGERY
Discharge: HOME | End: 2021-12-20
Attending: OPHTHALMOLOGY | Admitting: OPHTHALMOLOGY

## 2021-12-20 ENCOUNTER — ANESTHESIA (OUTPATIENT)
Dept: PERIOP | Facility: HOSPITAL | Age: 66
End: 2021-12-20

## 2021-12-20 ENCOUNTER — ANESTHESIA EVENT (OUTPATIENT)
Dept: PERIOP | Facility: HOSPITAL | Age: 66
End: 2021-12-20

## 2021-12-20 VITALS
DIASTOLIC BLOOD PRESSURE: 64 MMHG | HEART RATE: 63 BPM | RESPIRATION RATE: 18 BRPM | TEMPERATURE: 97.3 F | OXYGEN SATURATION: 96 % | SYSTOLIC BLOOD PRESSURE: 143 MMHG

## 2021-12-20 DIAGNOSIS — H25.12 NUCLEAR SCLEROTIC CATARACT OF LEFT EYE: ICD-10-CM

## 2021-12-20 PROCEDURE — V2632 POST CHMBR INTRAOCULAR LENS: HCPCS | Performed by: OPHTHALMOLOGY

## 2021-12-20 PROCEDURE — 25010000002 CEFUROXIME 3 MG/0.3 ML SOLUTION 0.3 ML SYRINGE: Performed by: OPHTHALMOLOGY

## 2021-12-20 PROCEDURE — 25010000002 PROPOFOL 200 MG/20ML EMULSION: Performed by: NURSE ANESTHETIST, CERTIFIED REGISTERED

## 2021-12-20 PROCEDURE — V2788 PRESBYOPIA-CORRECT FUNCTION: HCPCS | Performed by: OPHTHALMOLOGY

## 2021-12-20 DEVICE — IMPLANTABLE DEVICE: Type: IMPLANTABLE DEVICE | Site: POSTERIOR CHAMBER | Status: FUNCTIONAL

## 2021-12-20 RX ORDER — BALANCED SALT SOLUTION 6.4; .75; .48; .3; 3.9; 1.7 MG/ML; MG/ML; MG/ML; MG/ML; MG/ML; MG/ML
SOLUTION OPHTHALMIC AS NEEDED
Status: DISCONTINUED | OUTPATIENT
Start: 2021-12-20 | End: 2021-12-20 | Stop reason: HOSPADM

## 2021-12-20 RX ORDER — ACETAZOLAMIDE 500 MG/1
500 CAPSULE, EXTENDED RELEASE ORAL ONCE
Status: COMPLETED | OUTPATIENT
Start: 2021-12-20 | End: 2021-12-20

## 2021-12-20 RX ORDER — SODIUM CHLORIDE 0.9 % (FLUSH) 0.9 %
1-10 SYRINGE (ML) INJECTION AS NEEDED
Status: DISCONTINUED | OUTPATIENT
Start: 2021-12-20 | End: 2021-12-20 | Stop reason: HOSPADM

## 2021-12-20 RX ORDER — LIDOCAINE HYDROCHLORIDE 20 MG/ML
INJECTION, SOLUTION INFILTRATION; PERINEURAL AS NEEDED
Status: DISCONTINUED | OUTPATIENT
Start: 2021-12-20 | End: 2021-12-20 | Stop reason: SURG

## 2021-12-20 RX ORDER — CYCLOPENT/TROPIC/PHEN/KETR/WAT 1%-1%-2.5%
1 DROPS (EA) OPHTHALMIC (EYE)
Status: COMPLETED | OUTPATIENT
Start: 2021-12-20 | End: 2021-12-20

## 2021-12-20 RX ORDER — PREDNISOLONE ACETATE 10 MG/ML
1 SUSPENSION/ DROPS OPHTHALMIC SEE ADMIN INSTRUCTIONS
Status: DISCONTINUED | OUTPATIENT
Start: 2021-12-20 | End: 2021-12-20 | Stop reason: HOSPADM

## 2021-12-20 RX ORDER — KETAMINE HCL IN NACL, ISO-OSM 100MG/10ML
SYRINGE (ML) INJECTION AS NEEDED
Status: DISCONTINUED | OUTPATIENT
Start: 2021-12-20 | End: 2021-12-20 | Stop reason: SURG

## 2021-12-20 RX ORDER — LIDOCAINE HYDROCHLORIDE 40 MG/ML
INJECTION, SOLUTION RETROBULBAR; TOPICAL AS NEEDED
Status: DISCONTINUED | OUTPATIENT
Start: 2021-12-20 | End: 2021-12-20 | Stop reason: HOSPADM

## 2021-12-20 RX ORDER — SODIUM CHLORIDE 0.9 % (FLUSH) 0.9 %
10 SYRINGE (ML) INJECTION EVERY 12 HOURS SCHEDULED
Status: DISCONTINUED | OUTPATIENT
Start: 2021-12-20 | End: 2021-12-20 | Stop reason: HOSPADM

## 2021-12-20 RX ORDER — PREDNISOLONE ACETATE 10 MG/ML
SUSPENSION/ DROPS OPHTHALMIC AS NEEDED
Status: DISCONTINUED | OUTPATIENT
Start: 2021-12-20 | End: 2021-12-20 | Stop reason: HOSPADM

## 2021-12-20 RX ORDER — SODIUM CHLORIDE, SODIUM LACTATE, POTASSIUM CHLORIDE, CALCIUM CHLORIDE 600; 310; 30; 20 MG/100ML; MG/100ML; MG/100ML; MG/100ML
1000 INJECTION, SOLUTION INTRAVENOUS CONTINUOUS
Status: DISCONTINUED | OUTPATIENT
Start: 2021-12-20 | End: 2021-12-20 | Stop reason: HOSPADM

## 2021-12-20 RX ORDER — TETRACAINE HYDROCHLORIDE 5 MG/ML
SOLUTION OPHTHALMIC AS NEEDED
Status: DISCONTINUED | OUTPATIENT
Start: 2021-12-20 | End: 2021-12-20 | Stop reason: HOSPADM

## 2021-12-20 RX ORDER — PROPOFOL 10 MG/ML
INJECTION, EMULSION INTRAVENOUS AS NEEDED
Status: DISCONTINUED | OUTPATIENT
Start: 2021-12-20 | End: 2021-12-20 | Stop reason: SURG

## 2021-12-20 RX ORDER — NEOMYCIN SULFATE, POLYMYXIN B SULFATE, AND DEXAMETHASONE 3.5; 10000; 1 MG/G; [USP'U]/G; MG/G
OINTMENT OPHTHALMIC AS NEEDED
Status: DISCONTINUED | OUTPATIENT
Start: 2021-12-20 | End: 2021-12-20 | Stop reason: HOSPADM

## 2021-12-20 RX ORDER — TETRACAINE HYDROCHLORIDE 5 MG/ML
1 SOLUTION OPHTHALMIC SEE ADMIN INSTRUCTIONS
Status: COMPLETED | OUTPATIENT
Start: 2021-12-20 | End: 2021-12-20

## 2021-12-20 RX ADMIN — ACETAZOLAMIDE 500 MG: 500 CAPSULE, EXTENDED RELEASE ORAL at 11:45

## 2021-12-20 RX ADMIN — TETRACAINE HYDROCHLORIDE 1 DROP: 5 SOLUTION OPHTHALMIC at 10:31

## 2021-12-20 RX ADMIN — SODIUM CHLORIDE, POTASSIUM CHLORIDE, SODIUM LACTATE AND CALCIUM CHLORIDE 1000 ML: 600; 310; 30; 20 INJECTION, SOLUTION INTRAVENOUS at 10:42

## 2021-12-20 RX ADMIN — Medication 25 MG: at 11:11

## 2021-12-20 RX ADMIN — Medication 1 DROP: at 10:25

## 2021-12-20 RX ADMIN — Medication 1 DROP: at 10:40

## 2021-12-20 RX ADMIN — TETRACAINE HYDROCHLORIDE 1 DROP: 5 SOLUTION OPHTHALMIC at 10:30

## 2021-12-20 RX ADMIN — Medication 1 DROP: at 10:35

## 2021-12-20 RX ADMIN — LIDOCAINE HYDROCHLORIDE 2 ML: 20 INJECTION, SOLUTION INFILTRATION; PERINEURAL at 11:11

## 2021-12-20 RX ADMIN — PROPOFOL 50 MG: 10 INJECTION, EMULSION INTRAVENOUS at 11:11

## 2021-12-20 NOTE — OP NOTE
OPERATIVE NOTE    Date of Procedure: 12/20/2021  Patient Name: Albania Ramos  Patient MRN: [id number]  YOB: 1955     Preoperative Diagnosis: Left nuclear sclerotic cataract.     Postoperative Diagnosis: Left nuclear sclerotic cataract.     Procedure Performed: Phacoemulsification with implantation of a  foldable posterior chamber intraocular lens, Left eye.     Surgeon: Arthur Mcdonald MD     Anesthesia:  Monitored Anesthesia Care (MAC)      Brief History and Indication: The patient presents with a history of past progressive loss of vision.  Patient was diagnosed with cataract and requests removal for increased ability to read and see.     Operation Description: The patient was taken to the OR and prepped and draped in the usual sterile ophthalmic fashion. A lid speculum was placed in the eye.  A #75 blade was then used to make a stab incision two o’clock hours from the intended temporal clear cornea groove. The anterior chamber was then inflated with a Viscoelastic. A metal microkeratome blade was then used to enter the anterior chamber at the temporal clear cornea site. A three level tunnel incision was made. A curvilinear capsulorrhexis was then performed with a bent cystotome needle and capsulorrhexis forceps.  BSS on a 30 gauge bent cannula was used to hydro-dissect, and hydro-delineate the lens. Good fluid waves and hydro-delineation were noted. Phacoemulsification was then used to remove nuclear material without complications. The residual cortical and lenticular material was then removed with irrigation and aspiration. Viscoelastics were then used to inflate the bag in a soft shell technique. A PCIOL was injected into the bag. Post-implantation, there were no rents or tears in the bag and the lens was noted to be stable and centered. The residual Viscoelastic was then removed with irrigation and aspiration.  The wound was checked and found to be without leaks. Therefore a Polydex  ointment and one drop of a Prednisilone eye drop was placed in the eye.     Implant Information:   Implant Name Type Inv. Item Serial No.  Lot No. LRB No. Used Action   AcrySof IQ Vivity UV IOL    HERSON [id number] Left 1 Implanted       Complications: None     []   Changed to complex procedure due to: []  hypermature, white cataract. Blue dye was used. []   small iris. A Malyugin Ring was used.    Discharge and Condition  The patient was transported to same day surgery in excellent condition and scheduled for follow-up tomorrow morning. The patient was given instructions on use of eye drops for the operative eye and was specifically instructed to call Dr. Mcdonald at his office or home for any nausea, vomiting, headache, decreased visual acuity, or pain, or if the patient had any general concerns.    Arthur Mcdonald MD  12/20/2021

## 2021-12-20 NOTE — ANESTHESIA POSTPROCEDURE EVALUATION
Patient: Albania Ramos    Procedure Summary       Date: 12/20/21 Room / Location: [facility] OR 1 /  [facility]    Anesthesia Start: 1111 Anesthesia Stop: 1136    Procedure: CATARACT PHACO EXTRACTION WITH INTRAOCULAR LENS IMPLANT LEFT (Left Eye) Diagnosis:       Nuclear sclerotic cataract of left eye      (Nuclear sclerotic cataract of left eye [H25.12])    Surgeons: Arthur Mcdonald MD Provider: Darin Bryson CRNA    Anesthesia Type: MAC ASA Status: 3            Anesthesia Type: MAC    Vitals  Vitals Value Taken Time   /64 12/20/21 1158   Temp 97.3 °F (36.3 °C) 12/20/21 1138   Pulse 63 12/20/21 1158   Resp 18 12/20/21 1158   SpO2 96 % 12/20/21 1158           Post Anesthesia Care and Evaluation    Patient location during evaluation: bedside  Patient participation: complete - patient participated  Level of consciousness: awake  Pain score: 0  Pain management: adequate  Airway patency: patent  Anesthetic complications: No anesthetic complications  PONV Status: controlled  Cardiovascular status: acceptable and stable  Respiratory status: acceptable and room air  Hydration status: acceptable

## 2021-12-20 NOTE — ANESTHESIA PREPROCEDURE EVALUATION
Anesthesia Evaluation     Patient summary reviewed and Nursing notes reviewed   history of anesthetic complications: PONV  NPO Solid Status: > 8 hours  NPO Liquid Status: > 8 hours           Airway   Mallampati: II  TM distance: >3 FB  Possible difficult intubation and Large neck circumference  Dental      Pulmonary    (+) pneumonia resolved , a smoker Former, COPD, asthma,shortness of breath, sleep apnea, decreased breath sounds,   Cardiovascular     (+) dysrhythmias Atrial Fib, PVC, CHF , REYES, PVD, hyperlipidemia,       Neuro/Psych  (+) dizziness/light headedness, psychiatric history Anxiety and Depression,     GI/Hepatic/Renal/Endo    (+)  GERD,  thyroid problem hypothyroidism and thyroid cancer    Musculoskeletal     (+) arthralgias, back pain, chronic pain, myalgias,   Abdominal   (+) obese,    Substance History      OB/GYN          Other   arthritis,    history of cancer                    Anesthesia Plan    ASA 3     MAC   (Risks and benefits discussed including risk of aspiration, recall and dental damage. All patient questions answered.    Will continue with plan of care.)  intravenous induction     Anesthetic plan, all risks, benefits, and alternatives have been provided, discussed and informed consent has been obtained with: patient.

## 2021-12-20 NOTE — H&P
[facility]         History and Physical    Patient Name: Albania Ramos  MRN: [id number]  : 1955  Gender: female     HPI: Patient complaint of PPLOV Left eye diagnosed with cataract. Patient requests PHACO PCIOL for Increase of VA/ADL.    History:    Past Medical History:   Diagnosis Date   • Abdominal pain    • Abnormal ECG    • Acute sinusitis    • Allergic    • Ankle joint pain    • Arthritis    • Asthma    • Asthma    • Atrial fibrillation (HCC)    • Bronchitis    • Chronic bronchitis (HCC)    • CPAP (continuous positive airway pressure) dependence    • Degenerative joint disease    • Depression    • Diverticulosis    • Dizziness     Has had some episodes. No blake syncope.2007 patient underwent pulmonary vein isolation, initially successful.Patient returned, however in February noting some recurrent episode of dizziness.Then wore wore event recorder which showed some recurrent PAF.   • Dysuria    • Easy bruising    • Follicular thyroid cancer (HCC)     treated with total thyroidectomy followed by [facility]   • GERD (gastroesophageal reflux disease)    • H/O bone density study    • Hay fever    • History of chest x-ray 07/10/2015    No acute cardiopulmonary process   • History of chest x-ray 2015    No acute cardiopulmonary process   • History of echocardiogram 2007    LVEF of greater than 60%. Mild MR.Mild TR.Trace pulmonary insufficinecy.   • History of mammogram    • History of PFTs 2015    Normal spirometry   • Hyperlipidemia    • Hypothyroidism    • Measles    • Migraine headache    • Mumps    • Osteoporosis    • Paroxysmal atrial fibrillation (HCC)     A. Failed medical therapy. B. Pulmonary vein isolation 2006. C. Recurrent episodes of PAF by event recorder 2006.   • Pneumonia    • PONV (postoperative nausea and vomiting)    • Shortness of breath    • Sleep apnea    • Surfer's nodules of right foot    • Torn meniscus    • Varicella    • Whooping  cough        Past Surgical History:   Procedure Laterality Date   • ABLATION OF DYSRHYTHMIC FOCUS      x 2   • CARDIAC ELECTROPHYSIOLOGY PROCEDURE N/A 2016    Procedure: Loop recorder implant;  Surgeon: Himanshu Calvert MD;  Location:  [facility];  Service:    • CARDIAC ELECTROPHYSIOLOGY PROCEDURE N/A 10/13/2016    Procedure: Loop recorder removal;  Surgeon: Himanshu Calvert MD;  Location:  [facility];  Service:    • CARDIAC ELECTROPHYSIOLOGY PROCEDURE N/A 2017    Procedure: Loop insertion;  Surgeon: Mary Ann Blackwell MD;  Location:  [facility];  Service:    • CARDIAC ELECTROPHYSIOLOGY PROCEDURE N/A 2019    Procedure: Loop recorder removal;  Surgeon: Branden Chatterjee MD;  Location:  [facility];  Service: Cardiovascular   • CHOLECYSTECTOMY     • COLONOSCOPY     • DILATATION AND CURETTAGE     • EAR TUBES Bilateral    • FOOT SURGERY      bone spur   • HAMMER TOE REPAIR Left 2020   • INGUINAL HERNIA REPAIR Left     x 3   • LUNG BIOPSY      Remote   • LYMPH NODE BIOPSY     • MOUTH SURGERY      WISDOM TEETH   • REPLACEMENT TOTAL KNEE BILATERAL Bilateral    • THYROIDECTOMY, PARTIAL     • TONSILLECTOMY  2020    Dr. Ethan Mcneill   • TOTAL ABDOMINAL HYSTERECTOMY WITH SALPINGO OOPHORECTOMY     • TOTAL THYROIDECTOMY      completion thyroidectomy for follicular thyroid cancer.     • TYMPANOSTOMY TUBE PLACEMENT Right 2020    Dr. Ethan Mcneill       Social History     Socioeconomic History   • Marital status:    Tobacco Use   • Smoking status: Former Smoker     Packs/day: 0.50     Years: 22.00     Pack years: 11.00     Quit date: 1996     Years since quittin.5   • Smokeless tobacco: Never Used   Vaping Use   • Vaping Use: Never used   Substance and Sexual Activity   • Alcohol use: No     Comment: ONCE PER YEAR. Not excessive.   • Drug use: No   • Sexual activity: Defer     Birth control/protection: Surgical        Family History   Problem Relation Age of Onset   • Atrial fibrillation Mother    • Thyroid disease Mother    • Early death Father 68   • Lung cancer Father    • Early death Sister 16   • Heart attack Sister    • Down syndrome Sister    • Sleep apnea Brother         Nonorganic   • Other Brother         Palpitations (Symptom)   • Breast cancer Neg Hx        Prior to Admission Medications:  Facility-Administered Medications Prior to Admission   Medication Dose Route Frequency Provider Last Rate Last Admin   • triamcinolone acetonide (KENALOG-40) injection 40 mg  40 mg Intra-articular Once Gordon Norman MD         Medications Prior to Admission   Medication Sig Dispense Refill Last Dose   • albuterol sulfate HFA (Ventolin HFA) 108 (90 Base) MCG/ACT inhaler Inhale 2 puffs Every 4 (Four) Hours As Needed for Wheezing or Shortness of Air. 18 g 5 Past Week at Unknown time   • hydroCHLOROthiazide (HYDRODIURIL) 12.5 MG tablet Take 1 tablet by mouth Daily. 90 tablet 3 Past Week at Unknown time   • levothyroxine (Synthroid) 75 MCG tablet Take 1 tablet by mouth Daily. 90 tablet 3 12/20/2021 at Unknown time   • levothyroxine (SYNTHROID, LEVOTHROID) 100 MCG tablet TAKE ONE TABLET BY MOUTH DAILY ALONG WITH 88MCG TABLET FOR TOTAL DOSE OF 188MCG 90 tablet 3 12/20/2021 at Unknown time   • omeprazole (priLOSEC) 40 MG capsule Take 1 capsule by mouth Daily. 90 capsule 3 12/19/2021 at Unknown time   • allopurinol (ZYLOPRIM) 300 MG tablet TAKE ONE TABLET BY MOUTH DAILY 90 tablet 3 12/18/2021   • amoxicillin-clavulanate (Augmentin) 875-125 MG per tablet Take 1 tablet by mouth Every 12 (Twelve) Hours. 20 tablet 0    • bisacodyl (bisacodyl) 5 MG EC tablet Take 4 tablets at 1:00pm with 8oz of clear liquids the day before your colonoscopy. 4 tablet 0    • calcium carbonate (OS-SHANTE) 600 MG tablet Take 600 mg by mouth 2 (two) times a day with meals.      • clindamycin (Cleocin) 300 MG capsule Take 1 capsule by mouth 3 (Three) Times a  Day. 21 capsule 0    • clotrimazole-betamethasone (Lotrisone) 1-0.05 % cream Apply  topically to the appropriate area as directed 2 (Two) Times a Day. 45 g 0    • cyanocobalamin (VITAMIN B-12) 2500 MCG tablet tablet TAKE ONE TABLET BY MOUTH THREE TIMES A WEEK MUST MAKE AN APPOINTMENT 30 tablet 5    • diclofenac (VOLTAREN) 1 % gel gel Apply 4 g topically to the appropriate area as directed 4 (Four) Times a Day As Needed (pain). 100 g 1    • Elastic Bandages & Supports (Medical Compression Stockings) misc 1 application Daily. 2 each 0    • EPINEPHrine (EPIPEN) 0.3 MG/0.3ML solution auto-injector injection       • estradiol (ESTRACE VAGINAL) 0.1 MG/GM vaginal cream Insert 2 g into the vagina Daily. 42.5 g 3    • GLUCOSAMINE HCL-MSM PO Glucosamine-Chondrotin      • ketotifen (ZADITOR) 0.025 % ophthalmic solution ketotifen 0.025 % (0.035 %) eye drops   INSTILL 1 DROP INTO AFFECTED EYE(S) BY OPHTHALMIC ROUTE 2 TIMES PER DAY      • levocetirizine (XYZAL) 5 MG tablet levocetirizine 5 mg tablet   TAKE ONE TABLET BY MOUTH DAILY      • metroNIDAZOLE (Flagyl) 500 MG tablet Take 1 tablet by mouth 3 (Three) Times a Day. 30 tablet 0    • Mometasone Furoate (Asmanex HFA) 200 MCG/ACT aerosol Inhale 2 puffs 2 (Two) Times a Day. 3 each 2    • Montelukast Sodium (SINGULAIR PO) PRN      • MULTIPLE VITAMIN PO Multiple Vitamin capsule   Daily      • neomycin (MYCIFRADIN) 500 MG tablet Take 2 tablets by mouth Take As Directed. Take 2 tablets at 1 pm, 2 tablets at 2 pm, and 2 tablets at 10 pm on the day prior to surgery. 6 tablet 0    • Omega-3 Fatty Acids (FISH OIL) 435 MG capsule Take 1,000 mg by mouth daily.      • polyethylene glycol (MIRALAX) 17 GM/SCOOP powder Mix 238g powder with 64 oz of clear liquid at 4:00pm. Drink 8 oz every 10-15 minutes until consumed. 238 g 0    • Probiotic Product (SOLUBLE FIBER/PROBIOTICS PO) Take 1 tablet by mouth daily.      • Suprep Bowel Prep Kit 17.5-3.13-1.6 GM/177ML solution oral solution       •  "vitamin C (ASCORBIC ACID) 500 MG tablet Take 1 tablet by mouth daily.          Allergies:  Allergies   Allergen Reactions   • Codeine Shortness Of Breath and Other (See Comments)     Pt states \"it made me feel like my insides were in a vice \"   • Niacin Hives   • Darvon [Propoxyphene] Nausea And Vomiting   • Adhesive Tape Rash   • Albuterol Palpitations     FEELS WEIRD, INCREASED HR AND BREATHING   • Ciprodex [Ciprofloxacin-Dexamethasone] Other (See Comments)     REDNESS,tendonitis   • Other Other (See Comments)     POLLEN,TREES,AND PLANTS MULTIPLE ENVIRONMENTAL ALLERGIES        Vitals: Heart Rate:  [67] 67  Resp:  [18] 18  BP: (160)/(85) 160/85    Review of Systems:   Within Normal Limits Abnormal   HEENT [x]    []     Cardiovascular [x]   []     Gastrointestinal [x]   []     Genitourinary [x]   []     Neurologic [x]   []     Pulmonary [x]   []       Physical Exam:   Within Normal Limits Abnormal   HEENT [x]    []     Heart [x]   []     Lungs [x]   []     Abdomen [x]   []     Extremities [x]   []       Impression: Left nuclear sclerotic cataract.     Plan: CATARACT PHACO EXTRACTION WITH INTRAOCULAR LENS IMPLANT LEFT (Left)     Arthur Mcdonald MD  12/20/2021    "

## 2021-12-23 ENCOUNTER — OFFICE VISIT (OUTPATIENT)
Dept: ENDOCRINOLOGY | Facility: CLINIC | Age: 66
End: 2021-12-23

## 2021-12-23 VITALS
BODY MASS INDEX: 38.95 KG/M2 | HEART RATE: 76 BPM | SYSTOLIC BLOOD PRESSURE: 140 MMHG | OXYGEN SATURATION: 97 % | HEIGHT: 69 IN | WEIGHT: 263 LBS | DIASTOLIC BLOOD PRESSURE: 76 MMHG

## 2021-12-23 DIAGNOSIS — E89.0 POSTOPERATIVE HYPOTHYROIDISM: ICD-10-CM

## 2021-12-23 DIAGNOSIS — C73 MALIGNANT NEOPLASM OF THYROID GLAND (HCC): Primary | ICD-10-CM

## 2021-12-23 PROCEDURE — 99204 OFFICE O/P NEW MOD 45 MIN: CPT | Performed by: INTERNAL MEDICINE

## 2021-12-23 RX ORDER — LEVOTHYROXINE SODIUM 0.07 MG/1
75 TABLET ORAL DAILY
Qty: 90 TABLET | Refills: 3
Start: 2021-12-23 | End: 2022-10-04

## 2021-12-23 NOTE — PROGRESS NOTES
Chief Complaint   Patient presents with   • Thyroid Problem        Referring Provider  Gordon Norman MD     HPI   Al[place]ia Ramos is a 66 y.o. female had concerns including Thyroid Problem.     Patient here today to establish care for history of thyroid cancer.  In June 2012 she was noted to have a left 3.9 cm complex nodule.  Surgical removal was recommended.  Pt underwent left lobectomy in June 2012 and then had completion in July of 2012.  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  Was noted to have focal capsular invasion, focal lymphovascular invasion, no extrathyroidal extension.  No lymph nodes were submitted.  Right thyroid lobe was benign.    She received 99.2 mCi of I-131 on 8/20/2012 and was noted to have tracer uptake in the thyroid bed as well as a foci of activity in the left colon which could represent GI mucosal activity.  Of note, patient has a history of recurrent diverticulitis for which will require colectomy.  The surgery is pending.    Has hair loss/loss of eyebrows. Started about a year ago.     Dose of levothyroxine was increased about a year ago to 188 mcg due to complaints of fatigue.  Then due to the hair loss she and her PCP discussed a dose reduction to prior dose of 175 mcg, but she continued the dose of 188 mcg she was waiting for this appt.     After her COVID vaccination in Feb/March she noted lymph node swelling in her neck and left axillary region which prompted neck ultrasound.    She had a neck US completed in June and July of this year showing prominent bilateral lymph nodes.    Has issues with weight gain which she feels like may be related to Covid/increased eating.    Past Medical History:   Diagnosis Date   • Abdominal pain    • Abnormal ECG    • Acute sinusitis    • Allergic    • Ankle joint pain    • Arthritis    • Asthma    • Asthma    • Atrial fibrillation (HCC)    •  Bronchitis    • Chronic bronchitis (HCC)    • CPAP (continuous positive airway pressure) dependence    • Degenerative joint disease    • Depression    • Diverticulosis    • Dizziness     Has had some episodes. No blake syncope.11/2007 patient underwent pulmonary vein isolation, initially successful.Patient returned, however in February noting some recurrent episode of dizziness.Then wore wore event recorder which showed some recurrent PAF.   • Dysuria    • Easy bruising    • Follicular thyroid cancer (HCC)     treated with total thyroidectomy followed by BRAMBILA   • GERD (gastroesophageal reflux disease)    • H/O bone density study    • Hay fever    • History of chest x-ray 07/10/2015    No acute cardiopulmonary process   • History of chest x-ray 07/02/2015    No acute cardiopulmonary process   • History of echocardiogram 04/04/2007    LVEF of greater than 60%. Mild MR.Mild TR.Trace pulmonary insufficinecy.   • History of mammogram    • History of PFTs 07/02/2015    Normal spirometry   • Hyperlipidemia    • Hypothyroidism    • Measles    • Migraine headache    • Mumps    • Osteoporosis    • Paroxysmal atrial fibrillation (HCC)     A. Failed medical therapy. B. Pulmonary vein isolation 11/2006. C. Recurrent episodes of PAF by event recorder 9/2006.   • Pneumonia    • PONV (postoperative nausea and vomiting)    • Shortness of breath    • Sleep apnea    • Surfer's nodules of right foot    • Torn meniscus    • Varicella    • Whooping cough      Past Surgical History:   Procedure Laterality Date   • ABLATION OF DYSRHYTHMIC FOCUS      x 2   • CARDIAC ELECTROPHYSIOLOGY PROCEDURE N/A 8/4/2016    Procedure: Loop recorder implant;  Surgeon: Himanshu Calvert MD;  Location:  [facility];  Service:    • CARDIAC ELECTROPHYSIOLOGY PROCEDURE N/A 10/13/2016    Procedure: Loop recorder removal;  Surgeon: Himanshu Calvert MD;  Location:  [facility];  Service:    • CARDIAC ELECTROPHYSIOLOGY PROCEDURE N/A  2017    Procedure: Loop insertion;  Surgeon: Mary Ann Blackwell MD;  Location:  [facility];  Service:    • CARDIAC ELECTROPHYSIOLOGY PROCEDURE N/A 2019    Procedure: Loop recorder removal;  Surgeon: Branden Chatterjee MD;  Location:  [facility];  Service: Cardiovascular   • CHOLECYSTECTOMY     • COLONOSCOPY     • DILATATION AND CURETTAGE     • EAR TUBES Bilateral    • FOOT SURGERY      bone spur   • HAMMER TOE REPAIR Left 2020   • INGUINAL HERNIA REPAIR Left     x 3   • LUNG BIOPSY      Remote   • LYMPH NODE BIOPSY     • MOUTH SURGERY      WISDOM TEETH   • REPLACEMENT TOTAL KNEE BILATERAL Bilateral    • THYROIDECTOMY, PARTIAL Left 2012    Hugh Davis   • THYROIDECTOMY, PARTIAL Right 2012    Hugh Davis   • TONSILLECTOMY  2020    Dr. Ethan Mcneill   • TOTAL ABDOMINAL HYSTERECTOMY WITH SALPINGO OOPHORECTOMY     • TOTAL THYROIDECTOMY      completion thyroidectomy for follicular thyroid cancer.     • TYMPANOSTOMY TUBE PLACEMENT Right 2020    Dr. Ethan Mcneill      Family History   Problem Relation Age of Onset   • Atrial fibrillation Mother    • Thyroid disease Mother    • Early death Father 68   • Lung cancer Father    • Early death Sister 16   • Heart attack Sister    • Down syndrome Sister    • Sleep apnea Brother         Nonorganic   • Other Brother         Palpitations (Symptom)   • Breast cancer Neg Hx       Social History     Socioeconomic History   • Marital status:    Tobacco Use   • Smoking status: Former Smoker     Packs/day: 0.50     Years: 22.00     Pack years: 11.00     Quit date: 1996     Years since quittin.5   • Smokeless tobacco: Never Used   Vaping Use   • Vaping Use: Never used   Substance and Sexual Activity   • Alcohol use: No   • Drug use: No   • Sexual activity: Defer     Birth control/protection: Surgical      Allergies   Allergen Reactions   • Codeine Shortness Of Breath and Other (See Comments)     Pt  "states \"it made me feel like my insides were in a vice \"   • Niacin Hives   • Darvon [Propoxyphene] Nausea And Vomiting   • Adhesive Tape Rash   • Albuterol Palpitations     FEELS WEIRD, INCREASED HR AND BREATHING   • Ciprodex [Ciprofloxacin-Dexamethasone] Other (See Comments)     REDNESS,tendonitis   • Other Other (See Comments)     POLLEN,TREES,AND PLANTS MULTIPLE ENVIRONMENTAL ALLERGIES      Current Outpatient Medications on File Prior to Visit   Medication Sig Dispense Refill   • albuterol sulfate HFA (Ventolin HFA) 108 (90 Base) MCG/ACT inhaler Inhale 2 puffs Every 4 (Four) Hours As Needed for Wheezing or Shortness of Air. 18 g 5   • allopurinol (ZYLOPRIM) 300 MG tablet TAKE ONE TABLET BY MOUTH DAILY 90 tablet 3   • bisacodyl (bisacodyl) 5 MG EC tablet Take 4 tablets at 1:00pm with 8oz of clear liquids the day before your colonoscopy. 4 tablet 0   • calcium carbonate (OS-SHANTE) 600 MG tablet Take 600 mg by mouth 2 (two) times a day with meals.     • clindamycin (Cleocin) 300 MG capsule Take 1 capsule by mouth 3 (Three) Times a Day. 21 capsule 0   • clotrimazole-betamethasone (Lotrisone) 1-0.05 % cream Apply  topically to the appropriate area as directed 2 (Two) Times a Day. 45 g 0   • cyanocobalamin (VITAMIN B-12) 2500 MCG tablet tablet TAKE ONE TABLET BY MOUTH THREE TIMES A WEEK MUST MAKE AN APPOINTMENT 30 tablet 5   • diclofenac (VOLTAREN) 1 % gel gel Apply 4 g topically to the appropriate area as directed 4 (Four) Times a Day As Needed (pain). 100 g 1   • Elastic Bandages & Supports (Medical Compression Stockings) misc 1 application Daily. 2 each 0   • EPINEPHrine (EPIPEN) 0.3 MG/0.3ML solution auto-injector injection      • estradiol (ESTRACE VAGINAL) 0.1 MG/GM vaginal cream Insert 2 g into the vagina Daily. 42.5 g 3   • GLUCOSAMINE HCL-MSM PO Glucosamine-Chondrotin     • hydroCHLOROthiazide (HYDRODIURIL) 12.5 MG tablet Take 1 tablet by mouth Daily. (Patient taking differently: Take 12.5 mg by mouth As " Needed.) 90 tablet 3   • ketotifen (ZADITOR) 0.025 % ophthalmic solution ketotifen 0.025 % (0.035 %) eye drops   INSTILL 1 DROP INTO AFFECTED EYE(S) BY OPHTHALMIC ROUTE 2 TIMES PER DAY     • levocetirizine (XYZAL) 5 MG tablet Take 5 mg by mouth As Needed.     • levothyroxine (SYNTHROID, LEVOTHROID) 100 MCG tablet TAKE ONE TABLET BY MOUTH DAILY ALONG WITH 88MCG TABLET FOR TOTAL DOSE OF 188MCG 90 tablet 3   • metroNIDAZOLE (Flagyl) 500 MG tablet Take 1 tablet by mouth 3 (Three) Times a Day. 30 tablet 0   • Mometasone Furoate (Asmanex HFA) 200 MCG/ACT aerosol Inhale 2 puffs 2 (Two) Times a Day. 3 each 2   • Montelukast Sodium (SINGULAIR PO) PRN     • MULTIPLE VITAMIN PO Multiple Vitamin capsule   Daily     • neomycin (MYCIFRADIN) 500 MG tablet Take 2 tablets by mouth Take As Directed. Take 2 tablets at 1 pm, 2 tablets at 2 pm, and 2 tablets at 10 pm on the day prior to surgery. 6 tablet 0   • Omega-3 Fatty Acids (FISH OIL) 435 MG capsule Take 1,000 mg by mouth daily.     • omeprazole (priLOSEC) 40 MG capsule Take 1 capsule by mouth Daily. 90 capsule 3   • polyethylene glycol (MIRALAX) 17 GM/SCOOP powder Mix 238g powder with 64 oz of clear liquid at 4:00pm. Drink 8 oz every 10-15 minutes until consumed. 238 g 0   • Probiotic Product (SOLUBLE FIBER/PROBIOTICS PO) Take 1 tablet by mouth daily.     • Suprep Bowel Prep Kit 17.5-3.13-1.6 GM/177ML solution oral solution      • vitamin C (ASCORBIC ACID) 500 MG tablet Take 1 tablet by mouth daily.     • [DISCONTINUED] levothyroxine (Synthroid) 75 MCG tablet Take 1 tablet by mouth Daily. (Patient taking differently: Take 88 mcg by mouth Daily.) 90 tablet 3   • [DISCONTINUED] amoxicillin-clavulanate (Augmentin) 875-125 MG per tablet Take 1 tablet by mouth Every 12 (Twelve) Hours. 20 tablet 0     Current Facility-Administered Medications on File Prior to Visit   Medication Dose Route Frequency Provider Last Rate Last Admin   • triamcinolone acetonide (KENALOG-40) injection 40 mg   "40 mg Intra-articular Once Gordon Norman MD            Review of Systems   Constitutional: Positive for chills and fatigue.   HENT: Positive for drooling, rhinorrhea, sinus pressure and sneezing.    Eyes: Positive for photophobia.   Respiratory: Positive for shortness of breath and wheezing.    Cardiovascular: Positive for palpitations.   Gastrointestinal: Positive for abdominal pain, diarrhea and GERD.   Endocrine: Positive for cold intolerance.        Hair loss   Genitourinary: Positive for urinary incontinence.   Musculoskeletal: Positive for arthralgias and joint swelling.   Skin: Negative.    Allergic/Immunologic: Positive for environmental allergies.   Neurological: Positive for headache.   Hematological: Negative.    Psychiatric/Behavioral: Negative.         /76   Pulse 76   Ht 175.3 cm (69\")   Wt 119 kg (263 lb)   LMP  (LMP Unknown)   SpO2 97%   BMI 38.84 kg/m²      Physical Exam    Constitutional:  well developed; well nourished  no acute distress  obese - Body mass index is 38.84 kg/m².   ENT/Thyroid:  Thyroid surgically absent, 1 cm lymph node palpated in right level 3   Eyes: EOM intact  Conjunctiva: clear   Respiratory:  breathing is unlabored  clear to auscultation bilaterally   Cardiovascular:  regular rate and rhythm, S1, S2 normal, no murmur, click, rub or gallop   Chest:  Not performed.   Abdomen: Not performed.   : Not performed.   Musculoskeletal: negative findings:  ROM of all joints is normal, no deformities present   Skin: dry and warm   Neuro: normal without focal findings and mental status, speech normal, alert and oriented x3   Psych: oriented to time, place and person, mood and affect are within normal limits     Labs/Imaging  CMP  Lab Results   Component Value Date    GLUCOSE 93 11/16/2021    BUN 17 11/16/2021    CREATININE 0.86 11/16/2021    EGFRIFNONA 66 11/16/2021    EGFRIFAFRI 80 11/16/2021    BCR 19.8 11/16/2021    K 5.2 11/16/2021    CO2 30.6 (H) 11/16/2021    " CALCIUM 8.0 (L) 2021    PROTENTOTREF 6.7 2021    ALBUMIN 4.50 2021    LABIL2 2.0 2021    AST 21 2021    ALT 25 2021        CBC w/DIFF   Lab Results   Component Value Date    WBC 6.35 2021    RBC 4.66 2021    HGB 13.3 2021    HCT 41.7 2021    MCV 89.5 2021    MCH 28.5 2021    MCHC 31.9 2021    RDW 13.1 2021    RDWSD 46.1 2020    MPV 11.0 2020     2021    NEUTRORELPCT 52.1 2021    LYMPHORELPCT 33.7 2021    MONORELPCT 8.0 2021    EOSRELPCT 5.0 2021    BASORELPCT 0.9 2021    AUTOIGPER 0.4 2020    NEUTROABS 3.30 2021    LYMPHSABS 2.14 2021    MONOSABS 0.51 2021    EOSABS 0.32 2021    BASOSABS 0.06 2021    AUTOIGNUM 0.04 2020    NRBC 0.0 2021       TSH  Lab Results   Component Value Date    TSH 0.263 (L) 2021    TSH 0.187 (L) 2021    TSH 0.164 (L) 2021       T4  Lab Results   Component Value Date    FREET4 1.66 2021    FREET4 1.92 (H) 2021    FREET4 1.84 (H) 2021     Lab Results   Component Value Date    Z4FOVDS 13.5 (H) 2016       T3  Lab Results   Component Value Date    T3FREE 3.4 2021    T3FREE 2.4 2017    T3FREE 3.2 2017     [place] [id number]   SURGICAL PATHOLOGY REPORT     Patient Name: MATA CESPEDES   MR#: [id number]   : 1955   Gender: F   Ordering Physician: TREY PEREZ   Copy To:     Accession #: [id number]   Location: [address] [place] [address]   Encounter #: [id number]   Collected: 2012   Received: 2012   Reported: 2012     Clinical Diagnosis and History   The working history is papillary CA thyroid.       Final Diagnosis   RIGHT THYROID LOBE (COMPLETION THYROIDECTOMY):        Hyperplastic multinodular goiter with single benign parathyroid gland.   No evidence of neoplasm.     JFJ/sk       Amendments:  "      Electronically Signed Out By   Bernabe Hidalgo M.D.     Specimen(s) Received:   Thyroid, lobectomy       Gross Description   Received in formalin labeled right thyroid lobe is a 3.2 gram, 3.5 x 1.8 x 1.2   cm intact hemithyroidectomy specimen. The capsule is inked blue and sectioning   reveals red/tan glistening parenchyma. No invasive tumor or suspicious nodules   are appreciated. Also received in the same container are two additional separate   pieces of red/tan soft tissue averaging 1.0 x 1.0 x 0.7 cm. The specimen is   submitted in its entirety. Summary of sections: A-C - entire thyroid lobe; D -   two smaller pieces of tissue.  M/sk         Microscopic Description   Sections show thyroid parenchyma with hyperplastic change characterized by   colloid-filled follicles with scalloping of the edges of the colloid. There are   areas with Hürthle cell type change with nuclear enlargement, nucleoli, and   granular eosinophilic cytoplasm. A single benign parathyroid gland is also   present. There is no evidence of malignancy. SARAH/sk           Procedures/Addenda      Specimen Collected: 12 06:57 Last Resulted: 12 17:52        Converted Surgical Pathology  Order: [id number]   Status: Final result     Visible to patient: Yes (not seen)     Next appt: 2022 at 09:00 AM in Radiology ([facility])          0 Result Notes           Narrative  Performed by:  [place] [address]   SURGICAL PATHOLOGY REPORT     Patient Name: MATA CESPEDES   MR#: [id number]   : 1955   Gender: F   Ordering Physician: TREY PEREZ   Copy To:     Accession #: [id number]   Location: [facility]   Encounter #: [id number]   Collected: 2012   Received: 2012   Reported: 2012     Clinical Diagnosis and History   The working history is left thyroid nodule.       Final Diagnosis   FINAL DIAGNOSIS   THYROID, LEFT, EXCISION:        \"Oncocytic follicular and " "solid variant of papillary thyroid carcinoma with   infarction secondary to prior fine needle aspiration biopsy (1.5 cm); confined   to the thyroid.   Benign adenomatous and colloid nodules.\" See Template.     COMMENT: This diagnosis rendered by Dr. Hiram Arellano at [facility].   [place] [place]   SPECIMEN TYPE/PROCEDURE:  left thyroid lobectomy   SPECIMEN INTEGRITY:  intact   SPECIMEN SIZE:  4.5 x 3 x 2.7 cm   TUMOR FOCALITY: unifocal   TUMOR LATERALITY:  left   TUMOR SITE:  thyroid lobe   TUMOR SIZE (greatest dimension):  oncocytic follicular carcinoma 1.5cm                            solid variant of papillary thyroid carcinoma 0.2cm   HISTOLOGIC TYPE:  oncocytic follicular and solid variant of papillary thyroid   carcinoma   HISTOLOGIC GRADE:  low   MARGINS:  free of tumor   TUMOR CAPSULE: encapsulated   TUMOR CAPSULAR INVASION: focal   LYMPH-VASCULAR INVASION:  focal   PERINEURAL INVASION:  none   EXTRATHYROIDAL EXTENSIONS:  no extrathyroid extension   NUMBER OF PARATHYROID(S) REMOVED:  zero   LYMPH NODE STATUS:  unknown   ADDITIONAL PATHOLOGIC FINDINGS:  benign adenomatous and colloid nodules   OTHER STUDIES:  none   AJCC PATHOLOGIC STAGE:  (COMPLETED BY PATHOLOGIST, BASED ONLY ON TISSUE   FINDINGS, MORE EXTENSIVE DISEASE MAY NOT BE KNOWN TO THE PATHOLOGIST)   pT=  T1b   pN=  Nx   pM=  Mx   AJCC PATHOLOGIC STAGE:   Stage I       PROVISIONAL DIAGNOSIS PENDING CONSULTATION   LEFT THYROID LOBE:        Multinodular thyroid with large suspicious microfollicular nodule, case   forwarded to Dr. Hiram Arellano for consultative opinion.     DGD/sk       Amendments:   Amended:  7/3/2012 by Ghislaine Rose   Reason: OUTSIDE CONSULTATION RESULTS        Hiram Arellano MD   [facility]   Previous Signout Date:  6/29/2012   Amended:  7/24/2012 by ANETTE SOL   Reason: Pathologist Request        Previous Signout Date:  7/3/2012       Electronically Signed Out By   ALICIA GOVEA     Specimen(s) " Received:   Thyroid, lobectomy       Gross Description   Received in formalin labeled left thyroid lobe and has had a frozen section   performed. The specimen consists of the residual thyroid tissue which measures   4.5 x 3 x 2.7 cm. The thyroid is somewhat multinodular and has had a previous   cut revealing a hemorrhagic cystic cavity. The external surface of the thyroid   is completely inked and the specimen is submitted in toto in cassettes FSA-I.   KANE/sk         Microscopic Description   Sections of the entirely submitted left thyroid lobe show a very large nodule   with a microfollicular pattern and a somewhat ragged interface with the   surrounding capsule. The tissue also shows a small papillary zone with nuclear   cytoplasmic inclusions; the small zone is also suspicious; It is encircled by   the microfollicular nodule. The case is forwarded to  for consultation.    KANE/sk         Procedures/Addenda   External Consultation     Date Ordered:     7/3/2012     Status:  Signed Out        Date Complete:     7/3/2012     By:  Ghislaine Rose        Date Reported:     7/3/2012             Interpretation     SEE SCANNED REFERENCE REPORT.     [facility] [place]       Results-Comments                         ALICIA GOVEA              Specimen Collected: 06/27/12 06:21 Last Resulted: 07/24/12 12:57           ULTRASOUND THYROID-     HISTORY: History of thyroid cancer; [id number] neoplasm of thyroid  gland     FINDINGS: Surgical changes of thyroidectomy are present. Bilateral neck  lymph nodes are seen in the inferior neck. Right node measures 17 x 6 x  18 mm, slightly smaller from prior exam. Normal morphology is  maintained. Left-sided lymph node measures up to 10 mm, previously 12  mm. No new abnormality is seen.     IMPRESSION:  Stable to mildly improved appearance of lymph nodes likely  benign reactive. Longer term follow-up is recommended in 6 months with  ultrasound.     This report was finalized on 7/27/2021 2:40 PM  "by Kalpesh Angulo MD.        ULTRASOUND THYROID-     HISTORY: See Diagnosis; [id number] neoplasm of thyroid gland.     FINDINGS: Thyroid gland is surgically absent. There is no mass in the  thyroidectomy bed.      Bilateral neck lymph nodes are noted, most of which are normal in size.  One node is borderline enlarged in the right mid neck measuring 19 x 17  x 8 mm. Fatty hilum is maintained. No fluid collection is seen.     IMPRESSION:  1. No evidence of recurrent mass in the thyroidectomy bed.  2. Single borderline enlarged lymph node right neck. Recommend  ultrasound follow-up in 3-6 weeks. Favor benign etiology.     This report was finalized on 6/2/2021 4:32 PM by Kalpesh Angulo MD.    Assessment and Plan    Diagnoses and all orders for this visit:    1. 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  Was noted to have focal capsular invasion, focal lymphovascular invasion, no extrathyroidal extension.  No lymph nodes were submitted.  Right thyroid lobe was benign after resection 7/23/2012.  She received 99.2 mCi of I-131 on 8/20/2012 with post treatment scan showing uptake in the thyroid bed as well as a slight focus in the colon which may have represented a \"GI mucosal activity\" (patient does have recurrent diverticulitis and will require colectomy).   Last thyroid ultrasounds from June and July of this year showed prominent bilateral lymph nodes, though normal-appearing - slight reduction on US in July.  We will repeat ultrasound in the office at follow-up visit in 6 months.  Patient noted lymphadenopathy post Covid vaccination in February/March and booster in October.  Check thyroglobulin level today.  -     Thyroglobulin With Anti-TG; Future    2. Postoperative hypothyroidism  With exogenous hyperthyroidism due to history of thyroid cancer.  More recently " patient complaining of increase in hair loss which could be related to slight hyperthyroidism.  Decrease levothyroxine to 175 mcg daily.  Recheck TFTs in 6 weeks.  Target TSH in the low range of normal.  -     levothyroxine (Synthroid) 75 MCG tablet; Take 1 tablet by mouth Daily. With 100 mcg tab for total of 175 mcg daily  Dispense: 90 tablet; Refill: 3  -     T4, Free; Future  -     TSH; Future         Return in about 6 months (around 6/23/2022) for next scheduled follow up, with thyroid ultrasound. The patient was instructed to contact the clinic with any interval questions or concerns.    Melina Simpson, DO   Endocrinologist    Please note that portions of this note were completed with a voice recognition program.

## 2021-12-27 RX ORDER — CEFDINIR 300 MG/1
300 CAPSULE ORAL 2 TIMES DAILY
COMMUNITY
End: 2022-01-24

## 2022-01-03 ENCOUNTER — ANESTHESIA (OUTPATIENT)
Dept: PERIOP | Facility: HOSPITAL | Age: 67
End: 2022-01-03

## 2022-01-03 ENCOUNTER — ANESTHESIA EVENT (OUTPATIENT)
Dept: PERIOP | Facility: HOSPITAL | Age: 67
End: 2022-01-03

## 2022-01-03 ENCOUNTER — HOSPITAL ENCOUNTER (OUTPATIENT)
Facility: HOSPITAL | Age: 67
Setting detail: HOSPITAL OUTPATIENT SURGERY
Discharge: HOME | End: 2022-01-03
Attending: OPHTHALMOLOGY | Admitting: OPHTHALMOLOGY

## 2022-01-03 VITALS
DIASTOLIC BLOOD PRESSURE: 63 MMHG | TEMPERATURE: 98 F | SYSTOLIC BLOOD PRESSURE: 148 MMHG | WEIGHT: 263 LBS | BODY MASS INDEX: 38.95 KG/M2 | HEART RATE: 62 BPM | RESPIRATION RATE: 16 BRPM | HEIGHT: 69 IN | OXYGEN SATURATION: 97 %

## 2022-01-03 DIAGNOSIS — H25.11 NUCLEAR SCLEROTIC CATARACT OF RIGHT EYE: ICD-10-CM

## 2022-01-03 PROCEDURE — 25010000002 CEFUROXIME 3 MG/0.3 ML SOLUTION 0.3 ML SYRINGE: Performed by: OPHTHALMOLOGY

## 2022-01-03 PROCEDURE — V2788 PRESBYOPIA-CORRECT FUNCTION: HCPCS | Performed by: OPHTHALMOLOGY

## 2022-01-03 PROCEDURE — V2632 POST CHMBR INTRAOCULAR LENS: HCPCS | Performed by: OPHTHALMOLOGY

## 2022-01-03 PROCEDURE — 25010000002 PROPOFOL 200 MG/20ML EMULSION: Performed by: NURSE ANESTHETIST, CERTIFIED REGISTERED

## 2022-01-03 DEVICE — IMPLANTABLE DEVICE: Type: IMPLANTABLE DEVICE | Site: POSTERIOR CHAMBER | Status: FUNCTIONAL

## 2022-01-03 RX ORDER — ACETAZOLAMIDE 500 MG/1
500 CAPSULE, EXTENDED RELEASE ORAL ONCE
Status: COMPLETED | OUTPATIENT
Start: 2022-01-03 | End: 2022-01-03

## 2022-01-03 RX ORDER — TETRACAINE HYDROCHLORIDE 5 MG/ML
1 SOLUTION OPHTHALMIC SEE ADMIN INSTRUCTIONS
Status: COMPLETED | OUTPATIENT
Start: 2022-01-03 | End: 2022-01-03

## 2022-01-03 RX ORDER — PREDNISOLONE ACETATE 10 MG/ML
SUSPENSION/ DROPS OPHTHALMIC
Qty: 2 ML | Refills: 0
Start: 2022-01-03 | End: 2022-03-23

## 2022-01-03 RX ORDER — TETRACAINE HYDROCHLORIDE 5 MG/ML
SOLUTION OPHTHALMIC AS NEEDED
Status: DISCONTINUED | OUTPATIENT
Start: 2022-01-03 | End: 2022-01-03 | Stop reason: HOSPADM

## 2022-01-03 RX ORDER — KETAMINE HCL IN NACL, ISO-OSM 100MG/10ML
SYRINGE (ML) INJECTION AS NEEDED
Status: DISCONTINUED | OUTPATIENT
Start: 2022-01-03 | End: 2022-01-03 | Stop reason: SURG

## 2022-01-03 RX ORDER — PREDNISOLONE ACETATE 10 MG/ML
SUSPENSION/ DROPS OPHTHALMIC AS NEEDED
Status: DISCONTINUED | OUTPATIENT
Start: 2022-01-03 | End: 2022-01-03 | Stop reason: HOSPADM

## 2022-01-03 RX ORDER — LIDOCAINE HYDROCHLORIDE 40 MG/ML
INJECTION, SOLUTION RETROBULBAR; TOPICAL AS NEEDED
Status: DISCONTINUED | OUTPATIENT
Start: 2022-01-03 | End: 2022-01-03 | Stop reason: HOSPADM

## 2022-01-03 RX ORDER — SODIUM CHLORIDE, SODIUM LACTATE, POTASSIUM CHLORIDE, CALCIUM CHLORIDE 600; 310; 30; 20 MG/100ML; MG/100ML; MG/100ML; MG/100ML
1000 INJECTION, SOLUTION INTRAVENOUS CONTINUOUS
Status: DISCONTINUED | OUTPATIENT
Start: 2022-01-03 | End: 2022-01-03 | Stop reason: HOSPADM

## 2022-01-03 RX ORDER — SODIUM CHLORIDE 0.9 % (FLUSH) 0.9 %
10 SYRINGE (ML) INJECTION EVERY 12 HOURS SCHEDULED
Status: DISCONTINUED | OUTPATIENT
Start: 2022-01-03 | End: 2022-01-03 | Stop reason: HOSPADM

## 2022-01-03 RX ORDER — CYCLOPENT/TROPIC/PHEN/KETR/WAT 1%-1%-2.5%
1 DROPS (EA) OPHTHALMIC (EYE)
Status: COMPLETED | OUTPATIENT
Start: 2022-01-03 | End: 2022-01-03

## 2022-01-03 RX ORDER — LIDOCAINE HCL/PF 100 MG/5ML
SYRINGE (ML) INJECTION AS NEEDED
Status: DISCONTINUED | OUTPATIENT
Start: 2022-01-03 | End: 2022-01-03 | Stop reason: SURG

## 2022-01-03 RX ORDER — SODIUM CHLORIDE 0.9 % (FLUSH) 0.9 %
1-10 SYRINGE (ML) INJECTION AS NEEDED
Status: DISCONTINUED | OUTPATIENT
Start: 2022-01-03 | End: 2022-01-03 | Stop reason: HOSPADM

## 2022-01-03 RX ORDER — BALANCED SALT SOLUTION 6.4; .75; .48; .3; 3.9; 1.7 MG/ML; MG/ML; MG/ML; MG/ML; MG/ML; MG/ML
SOLUTION OPHTHALMIC AS NEEDED
Status: DISCONTINUED | OUTPATIENT
Start: 2022-01-03 | End: 2022-01-03 | Stop reason: HOSPADM

## 2022-01-03 RX ORDER — PREDNISOLONE ACETATE 10 MG/ML
1 SUSPENSION/ DROPS OPHTHALMIC SEE ADMIN INSTRUCTIONS
Status: DISCONTINUED | OUTPATIENT
Start: 2022-01-03 | End: 2022-01-03 | Stop reason: HOSPADM

## 2022-01-03 RX ORDER — PROPOFOL 10 MG/ML
INJECTION, EMULSION INTRAVENOUS AS NEEDED
Status: DISCONTINUED | OUTPATIENT
Start: 2022-01-03 | End: 2022-01-03 | Stop reason: SURG

## 2022-01-03 RX ADMIN — ACETAZOLAMIDE 500 MG: 500 CAPSULE, EXTENDED RELEASE ORAL at 11:43

## 2022-01-03 RX ADMIN — SODIUM CHLORIDE, POTASSIUM CHLORIDE, SODIUM LACTATE AND CALCIUM CHLORIDE 1000 ML: 600; 310; 30; 20 INJECTION, SOLUTION INTRAVENOUS at 10:40

## 2022-01-03 RX ADMIN — TETRACAINE HYDROCHLORIDE 1 DROP: 5 SOLUTION OPHTHALMIC at 10:29

## 2022-01-03 RX ADMIN — PROPOFOL 50 MG: 10 INJECTION, EMULSION INTRAVENOUS at 11:13

## 2022-01-03 RX ADMIN — Medication 1 DROP: at 10:35

## 2022-01-03 RX ADMIN — Medication 20 MG: at 11:13

## 2022-01-03 RX ADMIN — Medication 50 MG: at 11:13

## 2022-01-03 RX ADMIN — Medication 1 DROP: at 10:40

## 2022-01-03 RX ADMIN — TETRACAINE HYDROCHLORIDE 1 DROP: 5 SOLUTION OPHTHALMIC at 10:28

## 2022-01-03 RX ADMIN — Medication 1 DROP: at 10:30

## 2022-01-03 NOTE — ANESTHESIA PREPROCEDURE EVALUATION
Anesthesia Evaluation     Patient summary reviewed and Nursing notes reviewed   history of anesthetic complications: PONV  NPO Solid Status: > 8 hours  NPO Liquid Status: > 8 hours           Airway   Mallampati: II  TM distance: >3 FB  Possible difficult intubation and Large neck circumference  Dental - normal exam     Pulmonary    (+) pneumonia resolved , a smoker Former, COPD, asthma,shortness of breath, sleep apnea, decreased breath sounds,   Cardiovascular - normal exam    (+) dysrhythmias Atrial Fib, PVC, CHF , REYES, PVD, hyperlipidemia,       Neuro/Psych  (+) headaches (migraines ), dizziness/light headedness, psychiatric history Anxiety and Depression,     GI/Hepatic/Renal/Endo    (+)  GERD,  thyroid problem (follicular thyroid cancer treated with thyroidectomy and BRAMBILA ) hypothyroidism and thyroid cancer    Musculoskeletal     (+) arthralgias, back pain, chronic pain, myalgias,   Abdominal   (+) obese,    Substance History      OB/GYN          Other   arthritis,    history of cancer                      Anesthesia Plan    ASA 3     MAC   (Risks and benefits discussed including risk of aspiration, recall and dental damage. All patient questions answered.    Will continue with plan of care.)  intravenous induction     Anesthetic plan, all risks, benefits, and alternatives have been provided, discussed and informed consent has been obtained with: patient.

## 2022-01-03 NOTE — DISCHARGE INSTRUCTIONS
Post Operative Cataract Instructions     Right Eye  POST OPERATIVE INSTRUCTIONS  • You have received anesthesia today. DO NOT drive, drink alcohol, sign legal documents.   • After surgery, your eye will not hurt. It may feel scratchy, sticky or uncomfortable. Your eye will be sensitive to light.  • Most people see better 1-3 days after the procedure, but it could take 3 weeks to get the full benefits and reach your visual potential. If your vision is blurry for a few days it is normal, and means you may have swelling outside or inside the eye. For some patients, a bubble is placed and there will be blurriness.   • You should receive a post-op kit with tape and an eye shield. Wear the shield for the first 3 nights after surgery to keep you from rubbing the eye.  • You may wear glasses or sunglasses during the day.  You must keep eye protected at all times.  • Most people are able to return to their normal routine 1-3 days after surgery, however, due to the brain adjusting to your new vision you may have trouble judging distances and want to be careful when driving and going up and downstairs.   • You can shower and wash your hair the day after surgery. Keep water, shampoo, hair spray and shaving lotion out of the eye, especially for the first week.   • If eyes become stuck together after surgery you may soak with warm cloth.  • During the first week, you should AVOID:   1. Rubbing or putting pressure on your eye.  2. Eye make-up, face cream or lotion, hair coloring or perms  3. Strenuous activities, such as running or lifting weights, as to avoid sweat from getting in the eye. Avoid swimming, hot tubs, fumes or shira conditions.   4. Sleeping on operative side.  5. Excessive sneezing or coughing.  6. Hanging the head down for periods of time. Keep your head above your heart.    Some discomfort and blurred vision after surgery are normal, but if you have any unusual pain, swelling, bleeding or sudden decrease in  vision, contact our office immediately.     Emergency assistance is available at any time by calling:    Dr.Mark Harry Mcdonald  [phone]    If unable to reach call [facility] @ [phone]    You have been prescribed an eye drop to use after surgery, please follow these instructions and bring eye drops and instructions with you to post op appointment:    Prednisolone Acetate: Shake well before use    USE 1 DROP 4 (FOUR) TIMES A DAY FOR 1 WEEK THEN STARTING WEEK 2, ONLY USE 2 (TWO) TIMES A DAY UNTIL YOU RUN OUT OF DROPS.     PLACE A KAMINI IN THE DAY COLUMN EACH TIME YOU USE TO KEEP ON SCHEDULE    WEEK 1-USE 4  (FOUR) TIMES A DAY DAY 1  []   []    []   []     DAY 2  []   []    []   []  DAY 3  []   []    []   []  DAY 4  []   []    []   []  DAY 5  []   []    []   []  DAY 6  []   []    []   []  DAY 7  []   []    []   []      WEEK 2-USE 2 (TWO)  TIMES A DAY DAY 1  []   []  DAY 2  []   []  DAY 3  []   []  DAY 4  []   []  DAY 5  []   []  DAY 6  []   []  DAY 7  []   []      WEEK 3-USE 2 (TWO) TIMES A DAY DAY 1  []   []  DAY 2  []   []  DAY 3  []   []  DAY 4  []   []  DAY 5  []   []  DAY 6  []   []  DAY 7  []   []      WEEK 4-USE 2 (TWO)TIMES A DAY DAY 1  []   []  DAY 2  []   []  DAY 3  []   []  DAY 4  []   []  DAY 5  []   []  DAY 6  []   []  DAY 7  []   []      Please follow all post op instructions and follow up appointment time from your physician's office included in your discharge packet.  .   No pushing, pulling, tugging,  heavy lifting, or strenuous activity.  No major decision making, driving, or drinking alcoholic beverages for 24 hours. ( due to the medications you have  received)  Always use good hand hygiene/washing techniques.  NO driving while taking pain medications.    * if you have an incision:  Check your incision area every day for signs of infection.   Check for:  * more redness, swelling, or pain  *more fluid or  blood  *warmth  *pus or bad smell  To assist you in voiding:  Drink plenty of fluids  Listen to running water while attempting to void.    If you are unable to urinate and you have an uncomfortable urge to void or it has been   6 hours since you were discharged, return to the Emergency Room

## 2022-01-03 NOTE — ANESTHESIA POSTPROCEDURE EVALUATION
Patient: Albania Ramos    Procedure Summary     Date: 01/03/22 Room / Location: [facility] OR 1 /  [facility]    Anesthesia Start: 1109 Anesthesia Stop: 1126    Procedure: CATARACT PHACO EXTRACTION WITH INTRAOCULAR LENS IMPLANT RIGHT WITH VIVITY LENS (Right Eye) Diagnosis:       Nuclear sclerotic cataract of right eye      (Nuclear sclerotic cataract of right eye [H25.11])    Surgeons: Arthur Mcdonald MD Provider: Sangita Hussein CRNA    Anesthesia Type: MAC ASA Status: 3          Anesthesia Type: MAC    Vitals  Vitals Value Taken Time   /63 01/03/22 1144   Temp     Pulse 62 01/03/22 1144   Resp 16 01/03/22 1144   SpO2 97 % 01/03/22 1144           Post Anesthesia Care and Evaluation    Patient location during evaluation: PHASE II  Patient participation: complete - patient participated  Level of consciousness: awake and alert  Pain score: 0  Pain management: satisfactory to patient  Airway patency: patent  Anesthetic complications: No anesthetic complications  PONV Status: none  Cardiovascular status: acceptable and stable  Respiratory status: acceptable  Hydration status: acceptable

## 2022-01-03 NOTE — OP NOTE
OPERATIVE NOTE    Date of Procedure: 1/3/2022  Patient Name: Albania Ramos  Patient MRN: [id number]  YOB: 1955     Preoperative Diagnosis: Right nuclear sclerotic cataract.     Postoperative Diagnosis: Right nuclear sclerotic cataract.     Procedure Performed: Phacoemulsification with implantation of a  foldable posterior chamber intraocular lens, Right eye.     Surgeon: Arthur Mcdonald MD     Anesthesia:  Monitored Anesthesia Care (MAC)      Brief History and Indication: The patient presents with a history of past progressive loss of vision.  Patient was diagnosed with cataract and requests removal for increased ability to read and see.     Operation Description: The patient was taken to the OR and prepped and draped in the usual sterile ophthalmic fashion. A lid speculum was placed in the eye.  A #75 blade was then used to make a stab incision two o’clock hours from the intended temporal clear cornea groove. The anterior chamber was then inflated with a Viscoelastic. A metal microkeratome blade was then used to enter the anterior chamber at the temporal clear cornea site. A three level tunnel incision was made. A curvilinear capsulorrhexis was then performed with a bent cystotome needle and capsulorrhexis forceps.  BSS on a 30 gauge bent cannula was used to hydro-dissect, and hydro-delineate the lens. Good fluid waves and hydro-delineation were noted. Phacoemulsification was then used to remove nuclear material without complications. The residual cortical and lenticular material was then removed with irrigation and aspiration. Viscoelastics were then used to inflate the bag in a soft shell technique.  A Toric PCIOL was placed on the appropriate axis.  Post-implantation, there were no rents or tears in the bag and the lens was noted to be stable and centered. The residual Viscoelastic was then removed with irrigation and aspiration.  The wound was checked and found to be without leaks. Therefore  a Polydex ointment and one drop of a Prednisilone eye drop was placed in the eye.     Implant Information:   Implant Name Type Inv. Item Serial No.  Lot No. LRB No. Used Action   acrysof iq vivity toric uv iol   [id number] HERSON NA Right 1 Implanted       Complications: None    []   Changed to complex procedure due to: []  hypermature, white cataract. Blue dye was used. []   small iris. A Malyugin Ring was used.    Discharge and Condition  The patient was transported to same day surgery in excellent condition and scheduled for follow-up tomorrow morning. The patient was given instructions on use of eye drops for the operative eye and was specifically instructed to call Dr. Mcdonald at his office or home for any nausea, vomiting, headache, decreased visual acuity, or pain, or if the patient had any general concerns.    Arthur Mcdonald MD  1/3/2022

## 2022-01-03 NOTE — H&P
[facility]         History and Physical    Patient Name: Albania Ramos  MRN: [id number]  : 1955  Gender: female     HPI: Patient complaint of PPLOV Right eye diagnosed with cataract. Patient requests PHACO PCIOL for Increase of VA/ADL.    History:    Past Medical History:   Diagnosis Date   • Abdominal pain    • Abnormal ECG    • Acute sinusitis    • Allergic    • Ankle joint pain    • Arthritis    • Asthma    • Asthma    • Atrial fibrillation (HCC)    • Bronchitis    • Chronic bronchitis (HCC)    • COVID-19 vaccine series completed     pfizer x3   • CPAP (continuous positive airway pressure) dependence    • Degenerative joint disease    • Depression    • Diverticulosis    • Dizziness     Has had some episodes. No blake syncope.2007 patient underwent pulmonary vein isolation, initially successful.Patient returned, however in February noting some recurrent episode of dizziness.Then wore wore event recorder which showed some recurrent PAF.   • Dysuria    • Easy bruising    • Elevated cholesterol    • Follicular thyroid cancer (HCC)     treated with total thyroidectomy followed by [facility]   • GERD (gastroesophageal reflux disease)    • H/O bone density study    • Hay fever    • History of chest x-ray 07/10/2015    No acute cardiopulmonary process   • History of chest x-ray 2015    No acute cardiopulmonary process   • History of echocardiogram 2007    LVEF of greater than 60%. Mild MR.Mild TR.Trace pulmonary insufficinecy.   • History of mammogram    • History of PFTs 2015    Normal spirometry   • Hyperlipidemia    • Hypothyroidism    • Measles    • Migraine headache    • Mumps    • Osteoporosis    • Paroxysmal atrial fibrillation (HCC)     A. Failed medical therapy. B. Pulmonary vein isolation 2006. C. Recurrent episodes of PAF by event recorder 2006.   • Pneumonia    • PONV (postoperative nausea and vomiting)    • Shortness of breath    • Sleep apnea    •  Surfer's nodules of right foot    • Torn meniscus    • Varicella    • Whooping cough        Past Surgical History:   Procedure Laterality Date   • ABLATION OF DYSRHYTHMIC FOCUS      x 2   • CARDIAC ELECTROPHYSIOLOGY PROCEDURE N/A 8/4/2016    Procedure: Loop recorder implant;  Surgeon: Himanshu Calvert MD;  Location:  [facility];  Service:    • CARDIAC ELECTROPHYSIOLOGY PROCEDURE N/A 10/13/2016    Procedure: Loop recorder removal;  Surgeon: Himanshu Calvert MD;  Location:  [facility];  Service:    • CARDIAC ELECTROPHYSIOLOGY PROCEDURE N/A 12/18/2017    Procedure: Loop insertion;  Surgeon: Mary Ann Blackwell MD;  Location:  [facility];  Service:    • CARDIAC ELECTROPHYSIOLOGY PROCEDURE N/A 11/6/2019    Procedure: Loop recorder removal;  Surgeon: Branden Chatterjee MD;  Location:  [facility];  Service: Cardiovascular   • CATARACT EXTRACTION W/ INTRAOCULAR LENS IMPLANT Left 12/20/2021    Procedure: CATARACT PHACO EXTRACTION WITH INTRAOCULAR LENS IMPLANT LEFT;  Surgeon: Arthur Mcdnoald MD;  Location:  [facility];  Service: Ophthalmology;  Laterality: Left;   • CHOLECYSTECTOMY     • COLONOSCOPY     • DILATATION AND CURETTAGE     • EAR TUBES Bilateral    • FOOT SURGERY      bone spur   • HAMMER TOE REPAIR Left 11/13/2020   • INGUINAL HERNIA REPAIR Left     x 3   • LUNG BIOPSY      Remote   • LYMPH NODE BIOPSY     • MOUTH SURGERY      WISDOM TEETH   • REPLACEMENT TOTAL KNEE BILATERAL Bilateral    • THYROIDECTOMY, PARTIAL Left 06/2012    Hugh Davis   • THYROIDECTOMY, PARTIAL Right 07/2012    Hugh Davis   • TONSILLECTOMY  07/2020    Dr. Ethan Mcneill   • TOTAL ABDOMINAL HYSTERECTOMY WITH SALPINGO OOPHORECTOMY     • TOTAL THYROIDECTOMY      completion thyroidectomy for follicular thyroid cancer.     • TYMPANOSTOMY TUBE PLACEMENT Right 07/2020    Dr. Ethan Mcniell       Social History     Socioeconomic History   • Marital status:     Tobacco Use   • Smoking status: Former Smoker     Packs/day: 0.50     Years: 22.00     Pack years: 11.00     Quit date: 1996     Years since quittin.5   • Smokeless tobacco: Never Used   Vaping Use   • Vaping Use: Never used   Substance and Sexual Activity   • Alcohol use: No   • Drug use: No   • Sexual activity: Defer     Birth control/protection: Surgical       Family History   Problem Relation Age of Onset   • Atrial fibrillation Mother    • Thyroid disease Mother    • Early death Father 68   • Lung cancer Father    • Early death Sister 16   • Heart attack Sister    • Down syndrome Sister    • Sleep apnea Brother         Nonorganic   • Other Brother         Palpitations (Symptom)   • Breast cancer Neg Hx        Prior to Admission Medications:  Medications Prior to Admission   Medication Sig Dispense Refill Last Dose   • albuterol sulfate HFA (Ventolin HFA) 108 (90 Base) MCG/ACT inhaler Inhale 2 puffs Every 4 (Four) Hours As Needed for Wheezing or Shortness of Air. 18 g 5 1/3/2022 at 0800   • allopurinol (ZYLOPRIM) 300 MG tablet TAKE ONE TABLET BY MOUTH DAILY 90 tablet 3 2022 at 0800   • calcium carbonate (OS-SHANTE) 600 MG tablet Take 600 mg by mouth 2 (two) times a day with meals.   2022 at 2000   • cefdinir (OMNICEF) 300 MG capsule Take 300 mg by mouth 2 (Two) Times a Day. X 10 days starting 21 at    • clotrimazole-betamethasone (Lotrisone) 1-0.05 % cream Apply  topically to the appropriate area as directed 2 (Two) Times a Day. 45 g 0 Past Week at Unknown time   • cyanocobalamin (VITAMIN B-12) 2500 MCG tablet tablet TAKE ONE TABLET BY MOUTH THREE TIMES A WEEK MUST MAKE AN APPOINTMENT 30 tablet 5 2022 at 0800   • diclofenac (VOLTAREN) 1 % gel gel Apply 4 g topically to the appropriate area as directed 4 (Four) Times a Day As Needed (pain). 100 g 1 Past Week at Unknown time   • estradiol (ESTRACE VAGINAL) 0.1 MG/GM vaginal cream Insert 2 g into the vagina Daily. 42.5 g 3  Past Week at Unknown time   • GLUCOSAMINE HCL-MSM PO Glucosamine-Chondrotin   Past Week at Unknown time   • hydroCHLOROthiazide (HYDRODIURIL) 12.5 MG tablet Take 1 tablet by mouth Daily. (Patient taking differently: Take 12.5 mg by mouth As Needed.) 90 tablet 3 Past Week at Unknown time   • ketotifen (ZADITOR) 0.025 % ophthalmic solution ketotifen 0.025 % (0.035 %) eye drops   INSTILL 1 DROP INTO AFFECTED EYE(S) BY OPHTHALMIC ROUTE 2 TIMES PER DAY   Past Week at Unknown time   • levocetirizine (XYZAL) 5 MG tablet Take 5 mg by mouth As Needed.   Past Week at Unknown time   • levothyroxine (Synthroid) 75 MCG tablet Take 1 tablet by mouth Daily. With 100 mcg tab for total of 175 mcg daily 90 tablet 3 1/3/2022 at 0000   • levothyroxine (SYNTHROID, LEVOTHROID) 100 MCG tablet TAKE ONE TABLET BY MOUTH DAILY ALONG WITH 88MCG TABLET FOR TOTAL DOSE OF 188MCG 90 tablet 3 1/3/2022 at 0000   • metroNIDAZOLE (Flagyl) 500 MG tablet Take 1 tablet by mouth 3 (Three) Times a Day. 30 tablet 0 Past Month at Unknown time   • Mometasone Furoate (Asmanex HFA) 200 MCG/ACT aerosol Inhale 2 puffs 2 (Two) Times a Day. 3 each 2 1/3/2022 at 0800   • Montelukast Sodium (SINGULAIR PO) PRN   Past Month at Unknown time   • MULTIPLE VITAMIN PO Multiple Vitamin capsule   Daily   1/2/2022 at 0800   • Omega-3 Fatty Acids (FISH OIL) 435 MG capsule Take 1,000 mg by mouth daily.   1/2/2022 at 0800   • omeprazole (priLOSEC) 40 MG capsule Take 1 capsule by mouth Daily. 90 capsule 3 1/2/2022 at 0800   • Probiotic Product (SOLUBLE FIBER/PROBIOTICS PO) Take 1 tablet by mouth daily.   1/2/2022 at 0800   • vitamin C (ASCORBIC ACID) 500 MG tablet Take 1 tablet by mouth daily.   1/2/2022 at 0800   • bisacodyl (bisacodyl) 5 MG EC tablet Take 4 tablets at 1:00pm with 8oz of clear liquids the day before your colonoscopy. 4 tablet 0 Unknown at Unknown time   • clindamycin (Cleocin) 300 MG capsule Take 1 capsule by mouth 3 (Three) Times a Day. 21 capsule 0 More than a  "month at Unknown time   • neomycin (MYCIFRADIN) 500 MG tablet Take 2 tablets by mouth Take As Directed. Take 2 tablets at 1 pm, 2 tablets at 2 pm, and 2 tablets at 10 pm on the day prior to surgery. 6 tablet 0 Unknown at Unknown time   • polyethylene glycol (MIRALAX) 17 GM/SCOOP powder Mix 238g powder with 64 oz of clear liquid at 4:00pm. Drink 8 oz every 10-15 minutes until consumed. 238 g 0 Unknown at Unknown time   • Suprep Bowel Prep Kit 17.5-3.13-1.6 GM/177ML solution oral solution    Unknown at Unknown time       Allergies:  Allergies   Allergen Reactions   • Codeine Shortness Of Breath and Other (See Comments)     Pt states \"it made me feel like my insides were in a vice \"   • Niacin Hives   • Darvon [Propoxyphene] Nausea And Vomiting   • Adhesive Tape Rash   • Albuterol Palpitations     FEELS WEIRD, INCREASED HR AND BREATHING   • Ciprodex [Ciprofloxacin-Dexamethasone] Other (See Comments)     REDNESS,tendonitis   • Other Other (See Comments)     POLLEN,TREES,AND PLANTS MULTIPLE ENVIRONMENTAL ALLERGIES        Vitals: Temp:  [98 °F (36.7 °C)] 98 °F (36.7 °C)  Heart Rate:  [80] 80  Resp:  [18] 18  BP: (153)/(75) 153/75    Review of Systems:   Within Normal Limits Abnormal   HEENT [x]    []     Cardiovascular [x]   []     Gastrointestinal [x]   []     Genitourinary [x]   []     Neurologic [x]   []     Pulmonary [x]   []       Physical Exam:   Within Normal Limits Abnormal   HEENT [x]    []     Heart [x]   []     Lungs [x]   []     Abdomen [x]   []     Extremities [x]   []       Impression: Right nuclear sclerotic cataract.     Plan: CATARACT PHACO EXTRACTION WITH INTRAOCULAR LENS IMPLANT RIGHT WITH VIVITY LENS (Right)     Arthur Mcdonald MD  1/3/2022    "

## 2022-01-24 ENCOUNTER — OFFICE VISIT (OUTPATIENT)
Dept: INTERNAL MEDICINE | Facility: CLINIC | Age: 67
End: 2022-01-24

## 2022-01-24 VITALS
TEMPERATURE: 98.4 F | WEIGHT: 264 LBS | RESPIRATION RATE: 16 BRPM | BODY MASS INDEX: 39.1 KG/M2 | SYSTOLIC BLOOD PRESSURE: 136 MMHG | OXYGEN SATURATION: 97 % | DIASTOLIC BLOOD PRESSURE: 78 MMHG | HEIGHT: 69 IN | HEART RATE: 76 BPM

## 2022-01-24 DIAGNOSIS — K57.92 DIVERTICULITIS: Primary | ICD-10-CM

## 2022-01-24 PROCEDURE — 99214 OFFICE O/P EST MOD 30 MIN: CPT | Performed by: INTERNAL MEDICINE

## 2022-01-24 RX ORDER — METRONIDAZOLE 500 MG/1
500 TABLET ORAL 3 TIMES DAILY
Qty: 30 TABLET | Refills: 2 | Status: SHIPPED | OUTPATIENT
Start: 2022-01-24 | End: 2022-02-09

## 2022-01-24 RX ORDER — AMOXICILLIN AND CLAVULANATE POTASSIUM 875; 125 MG/1; MG/1
1 TABLET, FILM COATED ORAL EVERY 12 HOURS SCHEDULED
Qty: 20 TABLET | Refills: 2 | Status: SHIPPED | OUTPATIENT
Start: 2022-01-24 | End: 2022-02-09

## 2022-01-24 RX ORDER — AMOXICILLIN AND CLAVULANATE POTASSIUM 875; 125 MG/1; MG/1
1 TABLET, FILM COATED ORAL EVERY 12 HOURS SCHEDULED
Qty: 20 TABLET | Refills: 0 | Status: SHIPPED | OUTPATIENT
Start: 2022-01-24 | End: 2022-01-24 | Stop reason: SDUPTHER

## 2022-01-24 RX ORDER — METRONIDAZOLE 500 MG/1
500 TABLET ORAL 3 TIMES DAILY
Qty: 30 TABLET | Refills: 0 | Status: SHIPPED | OUTPATIENT
Start: 2022-01-24 | End: 2022-01-24 | Stop reason: SDUPTHER

## 2022-01-24 NOTE — PROGRESS NOTES
Subjective     Patient ID: Albania Ramos is a 67 y.o. female. Patient is here for management of multiple medical problems.     Chief Complaint   Patient presents with   • Diverticulitis     History of Present Illness       diverticulitis flair in left lower ab. Starting to get worse. Started Saturday. Mild constipation prior to problem.      The following portions of the patient's history were reviewed and updated as appropriate: allergies, current medications, past family history, past medical history, past social history, past surgical history and problem list.    Review of Systems    Current Outpatient Medications:   •  albuterol sulfate HFA (Ventolin HFA) 108 (90 Base) MCG/ACT inhaler, Inhale 2 puffs Every 4 (Four) Hours As Needed for Wheezing or Shortness of Air., Disp: 18 g, Rfl: 5  •  allopurinol (ZYLOPRIM) 300 MG tablet, TAKE ONE TABLET BY MOUTH DAILY, Disp: 90 tablet, Rfl: 3  •  bisacodyl (bisacodyl) 5 MG EC tablet, Take 4 tablets at 1:00pm with 8oz of clear liquids the day before your colonoscopy., Disp: 4 tablet, Rfl: 0  •  calcium carbonate (OS-SHANTE) 600 MG tablet, Take 600 mg by mouth 2 (two) times a day with meals., Disp: , Rfl:   •  clindamycin (Cleocin) 300 MG capsule, Take 1 capsule by mouth 3 (Three) Times a Day., Disp: 21 capsule, Rfl: 0  •  clotrimazole-betamethasone (Lotrisone) 1-0.05 % cream, Apply  topically to the appropriate area as directed 2 (Two) Times a Day., Disp: 45 g, Rfl: 0  •  cyanocobalamin (VITAMIN B-12) 2500 MCG tablet tablet, TAKE ONE TABLET BY MOUTH THREE TIMES A WEEK MUST MAKE AN APPOINTMENT, Disp: 30 tablet, Rfl: 5  •  diclofenac (VOLTAREN) 1 % gel gel, Apply 4 g topically to the appropriate area as directed 4 (Four) Times a Day As Needed (pain)., Disp: 100 g, Rfl: 1  •  estradiol (ESTRACE VAGINAL) 0.1 MG/GM vaginal cream, Insert 2 g into the vagina Daily., Disp: 42.5 g, Rfl: 3  •  GLUCOSAMINE HCL-MSM PO, Glucosamine-Chondrotin, Disp: , Rfl:   •  hydroCHLOROthiazide  (HYDRODIURIL) 12.5 MG tablet, Take 1 tablet by mouth Daily. (Patient taking differently: Take 12.5 mg by mouth As Needed.), Disp: 90 tablet, Rfl: 3  •  ketotifen (ZADITOR) 0.025 % ophthalmic solution, ketotifen 0.025 % (0.035 %) eye drops  INSTILL 1 DROP INTO AFFECTED EYE(S) BY OPHTHALMIC ROUTE 2 TIMES PER DAY, Disp: , Rfl:   •  levocetirizine (XYZAL) 5 MG tablet, Take 5 mg by mouth As Needed., Disp: , Rfl:   •  levothyroxine (Synthroid) 75 MCG tablet, Take 1 tablet by mouth Daily. With 100 mcg tab for total of 175 mcg daily, Disp: 90 tablet, Rfl: 3  •  levothyroxine (SYNTHROID, LEVOTHROID) 100 MCG tablet, TAKE ONE TABLET BY MOUTH DAILY ALONG WITH 88MCG TABLET FOR TOTAL DOSE OF 188MCG, Disp: 90 tablet, Rfl: 3  •  metroNIDAZOLE (Flagyl) 500 MG tablet, Take 1 tablet by mouth 3 (Three) Times a Day., Disp: 30 tablet, Rfl: 2  •  Mometasone Furoate (Asmanex HFA) 200 MCG/ACT aerosol, Inhale 2 puffs 2 (Two) Times a Day., Disp: 3 each, Rfl: 2  •  Montelukast Sodium (SINGULAIR PO), PRN, Disp: , Rfl:   •  MULTIPLE VITAMIN PO, Multiple Vitamin capsule  Daily, Disp: , Rfl:   •  neomycin (MYCIFRADIN) 500 MG tablet, Take 2 tablets by mouth Take As Directed. Take 2 tablets at 1 pm, 2 tablets at 2 pm, and 2 tablets at 10 pm on the day prior to surgery., Disp: 6 tablet, Rfl: 0  •  Omega-3 Fatty Acids (FISH OIL) 435 MG capsule, Take 1,000 mg by mouth daily., Disp: , Rfl:   •  omeprazole (priLOSEC) 40 MG capsule, Take 1 capsule by mouth Daily., Disp: 90 capsule, Rfl: 3  •  polyethylene glycol (MIRALAX) 17 GM/SCOOP powder, Mix 238g powder with 64 oz of clear liquid at 4:00pm. Drink 8 oz every 10-15 minutes until consumed., Disp: 238 g, Rfl: 0  •  prednisoLONE acetate (Pred Forte) 1 % ophthalmic suspension, Follow instructions on the After Visit Summary., Disp: 2 mL, Rfl: 0  •  Probiotic Product (SOLUBLE FIBER/PROBIOTICS PO), Take 1 tablet by mouth daily., Disp: , Rfl:   •  Suprep Bowel Prep Kit 17.5-3.13-1.6 GM/177ML solution oral  "solution, , Disp: , Rfl:   •  vitamin C (ASCORBIC ACID) 500 MG tablet, Take 1 tablet by mouth daily., Disp: , Rfl:   •  amoxicillin-clavulanate (Augmentin) 875-125 MG per tablet, Take 1 tablet by mouth Every 12 (Twelve) Hours., Disp: 20 tablet, Rfl: 2    Objective      Blood pressure 136/78, pulse 76, temperature 98.4 °F (36.9 °C), resp. rate 16, height 175.3 cm (69\"), weight 120 kg (264 lb), SpO2 97 %, not currently breastfeeding.    Physical Exam     General Appearance:    Alert, cooperative, no distress, appears stated age   Head:    Normocephalic, without obvious abnormality, atraumatic   Eyes:    PERRL, conjunctiva/corneas clear, EOM's intact   Ears:    Normal TM's and external ear canals, both ears   Nose:   Nares normal, septum midline, mucosa normal, no drainage   or sinus tenderness   Throat:   Lips, mucosa, and tongue normal; teeth and gums normal   Neck:   Supple, symmetrical, trachea midline, no adenopathy;        thyroid:  No enlargement/tenderness/nodules; no carotid    bruit or JVD   Back:     Symmetric, no curvature, ROM normal, no CVA tenderness   Lungs:     Clear to auscultation bilaterally, respirations unlabored   Chest wall:    No tenderness or deformity   Heart:    Regular rate and rhythm, S1 and S2 normal, no murmur,        rub or gallop   Abdomen:     Soft,tender low ab, bowel sounds active all four quadrants,     no masses, no organomegaly   Extremities:   Extremities normal, atraumatic, no cyanosis or edema   Pulses:   2+ and symmetric all extremities   Skin:   Skin color, texture, turgor normal, no rashes or lesions   Lymph nodes:   Cervical, supraclavicular, and axillary nodes normal   Neurologic:   CNII-XII intact. Normal strength, sensation and reflexes       throughout      Results for orders placed or performed in visit on 12/14/21   COVID-19, [facility] IN-HOUSE NP/OP SWAB IN UTM/VTM/[facility] TRANSPORT MEDIA 24HR TAT - Swab, Nasopharynx    Specimen: Nasopharynx; Swab   Result " Value Ref Range    COVID19 Not Detected Not Detected - Ref. Range         Assessment/Plan     surgical candidate. Not able to get surgery due to OR shut downs over covid.          Diagnoses and all orders for this visit:    1. Diverticulitis (Primary)    Other orders  -     Discontinue: metroNIDAZOLE (Flagyl) 500 MG tablet; Take 1 tablet by mouth 3 (Three) Times a Day.  Dispense: 30 tablet; Refill: 0  -     Discontinue: amoxicillin-clavulanate (Augmentin) 875-125 MG per tablet; Take 1 tablet by mouth Every 12 (Twelve) Hours.  Dispense: 20 tablet; Refill: 0  -     amoxicillin-clavulanate (Augmentin) 875-125 MG per tablet; Take 1 tablet by mouth Every 12 (Twelve) Hours.  Dispense: 20 tablet; Refill: 2  -     metroNIDAZOLE (Flagyl) 500 MG tablet; Take 1 tablet by mouth 3 (Three) Times a Day.  Dispense: 30 tablet; Refill: 2      No follow-ups on file.          There are no Patient Instructions on file for this visit.     Gordon Norman MD    Assessment/Plan

## 2022-01-25 ENCOUNTER — HOSPITAL ENCOUNTER (OUTPATIENT)
Dept: MAMMOGRAPHY | Facility: HOSPITAL | Age: 67
Discharge: HOME | End: 2022-01-25
Admitting: OBSTETRICS & GYNECOLOGY

## 2022-01-25 DIAGNOSIS — Z12.31 VISIT FOR SCREENING MAMMOGRAM: ICD-10-CM

## 2022-01-25 PROCEDURE — 77067 SCR MAMMO BI INCL CAD: CPT | Performed by: RADIOLOGY

## 2022-01-25 PROCEDURE — 77067 SCR MAMMO BI INCL CAD: CPT

## 2022-01-25 PROCEDURE — 77063 BREAST TOMOSYNTHESIS BI: CPT | Performed by: RADIOLOGY

## 2022-01-25 PROCEDURE — 77063 BREAST TOMOSYNTHESIS BI: CPT

## 2022-01-31 ENCOUNTER — TELEPHONE (OUTPATIENT)
Dept: INTERNAL MEDICINE | Facility: CLINIC | Age: 67
End: 2022-01-31

## 2022-01-31 ENCOUNTER — APPOINTMENT (OUTPATIENT)
Dept: CT IMAGING | Facility: HOSPITAL | Age: 67
End: 2022-01-31

## 2022-01-31 ENCOUNTER — HOSPITAL ENCOUNTER (EMERGENCY)
Facility: HOSPITAL | Age: 67
Discharge: HOME | End: 2022-01-31
Attending: EMERGENCY MEDICINE | Admitting: EMERGENCY MEDICINE

## 2022-01-31 VITALS
WEIGHT: 265 LBS | OXYGEN SATURATION: 96 % | HEART RATE: 77 BPM | RESPIRATION RATE: 18 BRPM | DIASTOLIC BLOOD PRESSURE: 68 MMHG | HEIGHT: 69 IN | SYSTOLIC BLOOD PRESSURE: 133 MMHG | BODY MASS INDEX: 39.25 KG/M2 | TEMPERATURE: 98.2 F

## 2022-01-31 DIAGNOSIS — R19.7 DIARRHEA, UNSPECIFIED TYPE: ICD-10-CM

## 2022-01-31 DIAGNOSIS — R10.32 LLQ ABDOMINAL PAIN: Primary | ICD-10-CM

## 2022-01-31 LAB
ALBUMIN SERPL-MCNC: 4.4 G/DL (ref 3.5–5.2)
ALBUMIN/GLOB SERPL: 1.7 G/DL
ALP SERPL-CCNC: 97 U/L (ref 39–117)
ALT SERPL W P-5'-P-CCNC: 30 U/L (ref 1–33)
ANION GAP SERPL CALCULATED.3IONS-SCNC: 13.8 MMOL/L (ref 5–15)
AST SERPL-CCNC: 27 U/L (ref 1–32)
BASOPHILS # BLD AUTO: 0.06 10*3/MM3 (ref 0–0.2)
BASOPHILS NFR BLD AUTO: 0.8 % (ref 0–1.5)
BILIRUB SERPL-MCNC: 0.2 MG/DL (ref 0–1.2)
BILIRUB UR QL STRIP: NEGATIVE
BUN SERPL-MCNC: 14 MG/DL (ref 8–23)
BUN/CREAT SERPL: 19.2 (ref 7–25)
CALCIUM SPEC-SCNC: 9.2 MG/DL (ref 8.6–10.5)
CHLORIDE SERPL-SCNC: 103 MMOL/L (ref 98–107)
CLARITY UR: CLEAR
CO2 SERPL-SCNC: 25.2 MMOL/L (ref 22–29)
COLOR UR: YELLOW
CREAT SERPL-MCNC: 0.73 MG/DL (ref 0.57–1)
DEPRECATED RDW RBC AUTO: 45.4 FL (ref 37–54)
EOSINOPHIL # BLD AUTO: 0.32 10*3/MM3 (ref 0–0.4)
EOSINOPHIL NFR BLD AUTO: 4.2 % (ref 0.3–6.2)
ERYTHROCYTE [DISTWIDTH] IN BLOOD BY AUTOMATED COUNT: 13.7 % (ref 12.3–15.4)
GFR SERPL CREATININE-BSD FRML MDRD: 80 ML/MIN/1.73
GLOBULIN UR ELPH-MCNC: 2.6 GM/DL
GLUCOSE SERPL-MCNC: 111 MG/DL (ref 65–99)
GLUCOSE UR STRIP-MCNC: NEGATIVE MG/DL
HCT VFR BLD AUTO: 44.1 % (ref 34–46.6)
HGB BLD-MCNC: 14 G/DL (ref 12–15.9)
HGB UR QL STRIP.AUTO: NEGATIVE
HOLD SPECIMEN: NORMAL
HOLD SPECIMEN: NORMAL
IMM GRANULOCYTES # BLD AUTO: 0.03 10*3/MM3 (ref 0–0.05)
IMM GRANULOCYTES NFR BLD AUTO: 0.4 % (ref 0–0.5)
KETONES UR QL STRIP: NEGATIVE
LEUKOCYTE ESTERASE UR QL STRIP.AUTO: NEGATIVE
LIPASE SERPL-CCNC: 39 U/L (ref 13–60)
LYMPHOCYTES # BLD AUTO: 2.25 10*3/MM3 (ref 0.7–3.1)
LYMPHOCYTES NFR BLD AUTO: 29.6 % (ref 19.6–45.3)
MCH RBC QN AUTO: 28.5 PG (ref 26.6–33)
MCHC RBC AUTO-ENTMCNC: 31.7 G/DL (ref 31.5–35.7)
MCV RBC AUTO: 89.8 FL (ref 79–97)
MONOCYTES # BLD AUTO: 0.5 10*3/MM3 (ref 0.1–0.9)
MONOCYTES NFR BLD AUTO: 6.6 % (ref 5–12)
NEUTROPHILS NFR BLD AUTO: 4.44 10*3/MM3 (ref 1.7–7)
NEUTROPHILS NFR BLD AUTO: 58.4 % (ref 42.7–76)
NITRITE UR QL STRIP: NEGATIVE
NRBC BLD AUTO-RTO: 0 /100 WBC (ref 0–0.2)
PH UR STRIP.AUTO: 7 [PH] (ref 5–8)
PLATELET # BLD AUTO: 258 10*3/MM3 (ref 140–450)
PMV BLD AUTO: 11.4 FL (ref 6–12)
POTASSIUM SERPL-SCNC: 3.9 MMOL/L (ref 3.5–5.2)
PROT SERPL-MCNC: 7 G/DL (ref 6–8.5)
PROT UR QL STRIP: NEGATIVE
RBC # BLD AUTO: 4.91 10*6/MM3 (ref 3.77–5.28)
SODIUM SERPL-SCNC: 142 MMOL/L (ref 136–145)
SP GR UR STRIP: 1.01 (ref 1–1.03)
UROBILINOGEN UR QL STRIP: NORMAL
WBC NRBC COR # BLD: 7.6 10*3/MM3 (ref 3.4–10.8)
WHOLE BLOOD HOLD SPECIMEN: NORMAL
WHOLE BLOOD HOLD SPECIMEN: NORMAL

## 2022-01-31 PROCEDURE — 96374 THER/PROPH/DIAG INJ IV PUSH: CPT

## 2022-01-31 PROCEDURE — 25010000002 IOPAMIDOL 61 % SOLUTION: Performed by: EMERGENCY MEDICINE

## 2022-01-31 PROCEDURE — 85025 COMPLETE CBC W/AUTO DIFF WBC: CPT | Performed by: EMERGENCY MEDICINE

## 2022-01-31 PROCEDURE — 83690 ASSAY OF LIPASE: CPT | Performed by: EMERGENCY MEDICINE

## 2022-01-31 PROCEDURE — 80053 COMPREHEN METABOLIC PANEL: CPT | Performed by: EMERGENCY MEDICINE

## 2022-01-31 PROCEDURE — 74177 CT ABD & PELVIS W/CONTRAST: CPT

## 2022-01-31 PROCEDURE — 81003 URINALYSIS AUTO W/O SCOPE: CPT | Performed by: EMERGENCY MEDICINE

## 2022-01-31 PROCEDURE — 25010000002 ONDANSETRON PER 1 MG: Performed by: PHYSICIAN ASSISTANT

## 2022-01-31 PROCEDURE — 99283 EMERGENCY DEPT VISIT LOW MDM: CPT

## 2022-01-31 RX ORDER — ONDANSETRON 2 MG/ML
4 INJECTION INTRAMUSCULAR; INTRAVENOUS ONCE
Status: COMPLETED | OUTPATIENT
Start: 2022-01-31 | End: 2022-01-31

## 2022-01-31 RX ORDER — SODIUM CHLORIDE 0.9 % (FLUSH) 0.9 %
10 SYRINGE (ML) INJECTION AS NEEDED
Status: DISCONTINUED | OUTPATIENT
Start: 2022-01-31 | End: 2022-01-31 | Stop reason: HOSPADM

## 2022-01-31 RX ADMIN — ONDANSETRON 4 MG: 2 INJECTION INTRAMUSCULAR; INTRAVENOUS at 12:14

## 2022-01-31 RX ADMIN — IOPAMIDOL 100 ML: 612 INJECTION, SOLUTION INTRAVENOUS at 12:57

## 2022-01-31 NOTE — TELEPHONE ENCOUNTER
PT CALLED IN AND STATED THE ANTIBIOTICS GIVEN WEREN'T WORKING AND THAT SHE FELT WORSE  PT ASKED FOR DR SAPP OR TONEY TO CALL HER BACK

## 2022-02-01 ENCOUNTER — LAB (OUTPATIENT)
Dept: LAB | Facility: HOSPITAL | Age: 67
End: 2022-02-01

## 2022-02-01 DIAGNOSIS — R19.7 DIARRHEA, UNSPECIFIED TYPE: ICD-10-CM

## 2022-02-01 DIAGNOSIS — R10.32 LLQ ABDOMINAL PAIN: ICD-10-CM

## 2022-02-01 DIAGNOSIS — E89.0 POSTOPERATIVE HYPOTHYROIDISM: ICD-10-CM

## 2022-02-01 DIAGNOSIS — C73 MALIGNANT NEOPLASM OF THYROID GLAND: ICD-10-CM

## 2022-02-01 LAB
ADV 40+41 DNA STL QL NAA+NON-PROBE: NOT DETECTED
ASTRO TYP 1-8 RNA STL QL NAA+NON-PROBE: NOT DETECTED
C CAYETANENSIS DNA STL QL NAA+NON-PROBE: NOT DETECTED
C COLI+JEJ+UPSA DNA STL QL NAA+NON-PROBE: NOT DETECTED
CRYPTOSP DNA STL QL NAA+NON-PROBE: NOT DETECTED
E HISTOLYT DNA STL QL NAA+NON-PROBE: NOT DETECTED
EAEC PAA PLAS AGGR+AATA ST NAA+NON-PRB: NOT DETECTED
EC STX1+STX2 GENES STL QL NAA+NON-PROBE: NOT DETECTED
EPEC EAE GENE STL QL NAA+NON-PROBE: NOT DETECTED
ETEC LTA+ST1A+ST1B TOX ST NAA+NON-PROBE: NOT DETECTED
G LAMBLIA DNA STL QL NAA+NON-PROBE: NOT DETECTED
NOROVIRUS GI+II RNA STL QL NAA+NON-PROBE: NOT DETECTED
P SHIGELLOIDES DNA STL QL NAA+NON-PROBE: NOT DETECTED
RVA RNA STL QL NAA+NON-PROBE: NOT DETECTED
S ENT+BONG DNA STL QL NAA+NON-PROBE: NOT DETECTED
SAPO I+II+IV+V RNA STL QL NAA+NON-PROBE: NOT DETECTED
SHIGELLA SP+EIEC IPAH ST NAA+NON-PROBE: NOT DETECTED
T4 FREE SERPL-MCNC: 1.43 NG/DL (ref 0.93–1.7)
TSH SERPL DL<=0.05 MIU/L-ACNC: 0.74 UIU/ML (ref 0.27–4.2)
V CHOL+PARA+VUL DNA STL QL NAA+NON-PROBE: NOT DETECTED
V CHOLERAE DNA STL QL NAA+NON-PROBE: NOT DETECTED
Y ENTEROCOL DNA STL QL NAA+NON-PROBE: NOT DETECTED

## 2022-02-01 PROCEDURE — 36415 COLL VENOUS BLD VENIPUNCTURE: CPT

## 2022-02-01 PROCEDURE — 87045 FECES CULTURE AEROBIC BACT: CPT | Performed by: INTERNAL MEDICINE

## 2022-02-01 PROCEDURE — 83993 ASSAY FOR CALPROTECTIN FECAL: CPT | Performed by: INTERNAL MEDICINE

## 2022-02-01 PROCEDURE — 87427 SHIGA-LIKE TOXIN AG IA: CPT | Performed by: INTERNAL MEDICINE

## 2022-02-01 PROCEDURE — 84432 ASSAY OF THYROGLOBULIN: CPT

## 2022-02-01 PROCEDURE — 84443 ASSAY THYROID STIM HORMONE: CPT

## 2022-02-01 PROCEDURE — 84439 ASSAY OF FREE THYROXINE: CPT

## 2022-02-01 PROCEDURE — 86800 THYROGLOBULIN ANTIBODY: CPT

## 2022-02-01 PROCEDURE — 87046 STOOL CULTR AEROBIC BACT EA: CPT | Performed by: INTERNAL MEDICINE

## 2022-02-01 PROCEDURE — 0097U HC BIOFIRE FILMARRAY GI PANEL: CPT

## 2022-02-02 DIAGNOSIS — C73 MALIGNANT NEOPLASM OF THYROID GLAND: Primary | ICD-10-CM

## 2022-02-02 DIAGNOSIS — E89.0 POSTOPERATIVE HYPOTHYROIDISM: ICD-10-CM

## 2022-02-02 LAB
THYROGLOB AB SERPL-ACNC: <1 IU/ML (ref 0–0.9)
THYROGLOB SERPL-MCNC: <0.1 NG/ML (ref 1.5–38.5)

## 2022-02-02 RX ORDER — LEVOTHYROXINE SODIUM 0.1 MG/1
TABLET ORAL
Qty: 90 TABLET | Refills: 3 | Status: CANCELLED | OUTPATIENT
Start: 2022-02-02

## 2022-02-02 RX ORDER — LEVOTHYROXINE SODIUM 0.1 MG/1
TABLET ORAL
Qty: 90 TABLET | Refills: 3 | Status: SHIPPED | OUTPATIENT
Start: 2022-02-02 | End: 2023-02-09

## 2022-02-03 LAB — CALPROTECTIN STL-MCNT: 47 UG/G (ref 0–120)

## 2022-02-05 LAB
BACTERIA SPEC CULT: NORMAL
BACTERIA SPEC CULT: NORMAL
CAMPYLOBACTER STL CULT: NORMAL
E COLI SXT STL QL IA: NEGATIVE
SALM + SHIG STL CULT: NORMAL

## 2022-02-09 ENCOUNTER — OFFICE VISIT (OUTPATIENT)
Dept: INTERNAL MEDICINE | Facility: CLINIC | Age: 67
End: 2022-02-09

## 2022-02-09 VITALS
OXYGEN SATURATION: 98 % | RESPIRATION RATE: 16 BRPM | HEART RATE: 80 BPM | SYSTOLIC BLOOD PRESSURE: 136 MMHG | HEIGHT: 69 IN | WEIGHT: 265 LBS | TEMPERATURE: 98.6 F | DIASTOLIC BLOOD PRESSURE: 76 MMHG | BODY MASS INDEX: 39.25 KG/M2

## 2022-02-09 DIAGNOSIS — E78.00 HYPERCHOLESTEREMIA: ICD-10-CM

## 2022-02-09 DIAGNOSIS — M25.522 LEFT ELBOW PAIN: Primary | ICD-10-CM

## 2022-02-09 DIAGNOSIS — C73 THYROID CANCER: ICD-10-CM

## 2022-02-09 DIAGNOSIS — M19.90 ARTHRITIS: ICD-10-CM

## 2022-02-09 PROCEDURE — 20600 DRAIN/INJ JOINT/BURSA W/O US: CPT | Performed by: INTERNAL MEDICINE

## 2022-02-09 PROCEDURE — 99213 OFFICE O/P EST LOW 20 MIN: CPT | Performed by: INTERNAL MEDICINE

## 2022-02-09 RX ORDER — TRIAMCINOLONE ACETONIDE 40 MG/ML
20 INJECTION, SUSPENSION INTRA-ARTICULAR; INTRAMUSCULAR ONCE
Status: DISCONTINUED | OUTPATIENT
Start: 2022-02-09 | End: 2022-10-27

## 2022-02-09 NOTE — PROGRESS NOTES
elbow inj.    Indication: pain  Pt consented. Left medial elbow prepped. Under sterile technique shoulder was injected with Kenalog  20mg 1 cc and lidocaine 1/2 cc 1%.  Pt tolerated procedure well.      [id number]  [id number]  Lot:[id number]        Subjective     Patient ID: Albania Ramos is a 67 y.o. female. Patient is here for management of multiple medical problems.     Left elbow pain.      History of Present Illness     Left   lebow pain from lifting.        The following portions of the patient's history were reviewed and updated as appropriate: allergies, current medications, past family history, past medical history, past social history, past surgical history and problem list.    Review of Systems    Current Outpatient Medications:   •  albuterol sulfate HFA (Ventolin HFA) 108 (90 Base) MCG/ACT inhaler, Inhale 2 puffs Every 4 (Four) Hours As Needed for Wheezing or Shortness of Air., Disp: 18 g, Rfl: 5  •  allopurinol (ZYLOPRIM) 300 MG tablet, TAKE ONE TABLET BY MOUTH DAILY, Disp: 90 tablet, Rfl: 3  •  bisacodyl (bisacodyl) 5 MG EC tablet, Take 4 tablets at 1:00pm with 8oz of clear liquids the day before your colonoscopy., Disp: 4 tablet, Rfl: 0  •  calcium carbonate (OS-SHANTE) 600 MG tablet, Take 600 mg by mouth 2 (two) times a day with meals., Disp: , Rfl:   •  clotrimazole-betamethasone (Lotrisone) 1-0.05 % cream, Apply  topically to the appropriate area as directed 2 (Two) Times a Day., Disp: 45 g, Rfl: 0  •  cyanocobalamin (VITAMIN B-12) 2500 MCG tablet tablet, TAKE ONE TABLET BY MOUTH THREE TIMES A WEEK MUST MAKE AN APPOINTMENT, Disp: 30 tablet, Rfl: 5  •  diclofenac (VOLTAREN) 1 % gel gel, Apply 4 g topically to the appropriate area as directed 4 (Four) Times a Day As Needed (pain)., Disp: 100 g, Rfl: 1  •  estradiol (ESTRACE VAGINAL) 0.1 MG/GM vaginal cream, Insert 2 g into the vagina Daily., Disp: 42.5 g, Rfl: 3  •  GLUCOSAMINE HCL-MSM PO, Glucosamine-Chondrotin, Disp: , Rfl:   •  hydroCHLOROthiazide  (HYDRODIURIL) 12.5 MG tablet, Take 1 tablet by mouth Daily. (Patient taking differently: Take 12.5 mg by mouth As Needed.), Disp: 90 tablet, Rfl: 3  •  ketotifen (ZADITOR) 0.025 % ophthalmic solution, ketotifen 0.025 % (0.035 %) eye drops  INSTILL 1 DROP INTO AFFECTED EYE(S) BY OPHTHALMIC ROUTE 2 TIMES PER DAY, Disp: , Rfl:   •  levocetirizine (XYZAL) 5 MG tablet, Take 5 mg by mouth As Needed., Disp: , Rfl:   •  levothyroxine (Synthroid) 75 MCG tablet, Take 1 tablet by mouth Daily. With 100 mcg tab for total of 175 mcg daily, Disp: 90 tablet, Rfl: 3  •  levothyroxine (SYNTHROID, LEVOTHROID) 100 MCG tablet, Take daily with 75 mcg tab for total of 175 mcg, Disp: 90 tablet, Rfl: 3  •  Mometasone Furoate (Asmanex HFA) 200 MCG/ACT aerosol, Inhale 2 puffs 2 (Two) Times a Day., Disp: 3 each, Rfl: 2  •  Montelukast Sodium (SINGULAIR PO), PRN, Disp: , Rfl:   •  MULTIPLE VITAMIN PO, Multiple Vitamin capsule  Daily, Disp: , Rfl:   •  neomycin (MYCIFRADIN) 500 MG tablet, Take 2 tablets by mouth Take As Directed. Take 2 tablets at 1 pm, 2 tablets at 2 pm, and 2 tablets at 10 pm on the day prior to surgery., Disp: 6 tablet, Rfl: 0  •  Omega-3 Fatty Acids (FISH OIL) 435 MG capsule, Take 1,000 mg by mouth daily., Disp: , Rfl:   •  omeprazole (priLOSEC) 40 MG capsule, Take 1 capsule by mouth Daily., Disp: 90 capsule, Rfl: 3  •  polyethylene glycol (MIRALAX) 17 GM/SCOOP powder, Mix 238g powder with 64 oz of clear liquid at 4:00pm. Drink 8 oz every 10-15 minutes until consumed., Disp: 238 g, Rfl: 0  •  prednisoLONE acetate (Pred Forte) 1 % ophthalmic suspension, Follow instructions on the After Visit Summary., Disp: 2 mL, Rfl: 0  •  Probiotic Product (SOLUBLE FIBER/PROBIOTICS PO), Take 1 tablet by mouth daily., Disp: , Rfl:   •  Suprep Bowel Prep Kit 17.5-3.13-1.6 GM/177ML solution oral solution, , Disp: , Rfl:   •  vitamin C (ASCORBIC ACID) 500 MG tablet, Take 1 tablet by mouth daily., Disp: , Rfl:     Current Facility-Administered  "Medications:   •  triamcinolone acetonide (KENALOG-40) injection 20 mg, 20 mg, Intra-articular, Once, Gordon Norman MD    Objective      Blood pressure 136/76, pulse 80, temperature 98.6 °F (37 °C), resp. rate 16, height 175.3 cm (69\"), weight 120 kg (265 lb), SpO2 98 %, not currently breastfeeding.    Physical Exam     General Appearance:    Alert, cooperative, no distress, appears stated age   Head:    Normocephalic, without obvious abnormality, atraumatic   Eyes:    PERRL, conjunctiva/corneas clear, EOM's intact   Ears:    Normal TM's and external ear canals, both ears   Nose:   Nares normal, septum midline, mucosa normal, no drainage   or sinus tenderness   Throat:   Lips, mucosa, and tongue normal; teeth and gums normal   Neck:   Supple, symmetrical, trachea midline, no adenopathy;        thyroid:  No enlargement/tenderness/nodules; no carotid    bruit or JVD   Back:     Symmetric, no curvature, ROM normal, no CVA tenderness   Lungs:     Clear to auscultation bilaterally, respirations unlabored   Chest wall:    No tenderness or deformity   Heart:    Regular rate and rhythm, S1 and S2 normal, no murmur,        rub or gallop   Abdomen:     Soft, non-tender, bowel sounds active all four quadrants,     no masses, no organomegaly   Extremities:   Extremities normal, atraumatic, no cyanosis or edema   Pulses:   2+ and symmetric all extremities   Skin:   Skin color, texture, turgor normal, no rashes or lesions   Lymph nodes:   Cervical, supraclavicular, and axillary nodes normal   Neurologic:   CNII-XII intact. Normal strength, sensation and reflexes       throughout      Results for orders placed or performed in visit on 02/01/22   Gastrointestinal Panel, PCR - Stool, Per Rectum    Specimen: Per Rectum; Stool   Result Value Ref Range    Campylobacter Not Detected Not Detected    Plesiomonas shigelloides Not Detected Not Detected    Salmonella Not Detected Not Detected    Vibrio Not Detected Not Detected    Vibrio " cholerae Not Detected Not Detected    Yersinia enterocolitica Not Detected Not Detected    Enteroaggregative E. coli (EAEC) Not Detected Not Detected    Enteropathogenic E. coli (EPEC) Not Detected Not Detected    Enterotoxigenic E. coli (ETEC) lt/st Not Detected Not Detected    Shiga-like toxin-producing E. coli (STEC) stx1/stx2 Not Detected Not Detected    Shigella/Enteroinvasive E. coli (EIEC) Not Detected Not Detected    Cryptosporidium Not Detected Not Detected    Cyclospora cayetanensis Not Detected Not Detected    Entamoeba histolytica Not Detected Not Detected    Giardia lamblia Not Detected Not Detected    Adenovirus F40/41 Not Detected Not Detected    Astrovirus Not Detected Not Detected    Norovirus GI/GII Not Detected Not Detected    Rotavirus A Not Detected Not Detected    Sapovirus (I, II, IV or V) Not Detected Not Detected   T4, Free    Specimen: Blood   Result Value Ref Range    Free T4 1.43 0.93 - 1.70 ng/dL   TSH    Specimen: Blood   Result Value Ref Range    TSH 0.743 0.270 - 4.200 uIU/mL   Thyroglobulin With Anti-TG    Specimen: Blood   Result Value Ref Range    Thyroglobulin Ab <1.0 0.0 - 0.9 IU/mL   Thyroglobulin By SOFÍA    Specimen: Blood   Result Value Ref Range    THYROGLOBULIN BY SOFÍA <0.1 (L) 1.5 - 38.5 ng/mL         Assessment/Plan       Diagnoses and all orders for this visit:    1. Left elbow pain (Primary)  -     triamcinolone acetonide (KENALOG-40) injection 20 mg  -     Lipid Panel  -     CBC & Differential  -     Vitamin B12  -     Comprehensive Metabolic Panel  -     TSH  -     T4, Free  -     Hemoglobin A1c  -     Uric acid    2. Arthritis  -     Lipid Panel  -     CBC & Differential  -     Vitamin B12  -     Comprehensive Metabolic Panel  -     TSH  -     T4, Free  -     Hemoglobin A1c  -     Uric acid    3. Hypercholesteremia  -     Lipid Panel  -     CBC & Differential  -     Vitamin B12  -     Comprehensive Metabolic Panel  -     TSH  -     T4, Free  -     Hemoglobin A1c  -      Uric acid    4. Thyroid cancer (HCC)  -     Lipid Panel  -     CBC & Differential  -     Vitamin B12  -     Comprehensive Metabolic Panel  -     TSH  -     T4, Free  -     Hemoglobin A1c  -     Uric acid      No follow-ups on file.          There are no Patient Instructions on file for this visit.     Gordon Norman MD    Assessment/Plan

## 2022-03-07 ENCOUNTER — OFFICE VISIT (OUTPATIENT)
Dept: CARDIOLOGY | Facility: CLINIC | Age: 67
End: 2022-03-07

## 2022-03-07 VITALS
BODY MASS INDEX: 38.51 KG/M2 | WEIGHT: 260 LBS | HEIGHT: 69 IN | DIASTOLIC BLOOD PRESSURE: 88 MMHG | HEART RATE: 75 BPM | OXYGEN SATURATION: 97 % | SYSTOLIC BLOOD PRESSURE: 132 MMHG | TEMPERATURE: 97.4 F

## 2022-03-07 DIAGNOSIS — Z01.810 PREOPERATIVE CARDIOVASCULAR EXAMINATION: Primary | ICD-10-CM

## 2022-03-07 DIAGNOSIS — I48.0 PAROXYSMAL ATRIAL FIBRILLATION: Chronic | ICD-10-CM

## 2022-03-07 PROCEDURE — 99213 OFFICE O/P EST LOW 20 MIN: CPT | Performed by: NURSE PRACTITIONER

## 2022-03-07 PROCEDURE — 93000 ELECTROCARDIOGRAM COMPLETE: CPT | Performed by: NURSE PRACTITIONER

## 2022-03-07 RX ORDER — METRONIDAZOLE 500 MG/1
500 TABLET ORAL 3 TIMES DAILY
Qty: 30 TABLET | Refills: 5 | Status: SHIPPED | OUTPATIENT
Start: 2022-03-07 | End: 2022-03-23

## 2022-03-07 NOTE — PROGRESS NOTES
[facility] Cardiology Office Follow Up Note    Albania Ramos  [id number]  2022    Primary Care Provider: Gordon Norman MD   Referring Provider: No ref. provider found    Chief Complaint: Regular follow-up    History of Present Illness:   Mr. Albania Ramos is a 67 y.o. female who presents to the Cardiology Clinic for regular follow-up.  The patient has a past medical history significant for asthma, hypothyroidism, chronic venous stasis, and depression.  She has a past cardiac history significant for paroxysmal atrial fibrillation, status post ablation x2.  She has also undergone outpatient loop recorder monitoring x2, with her last loop recorder being removed in 10/19.  She was previously anticoagulated with Coumadin, however this was discontinued after she had no episodes of recurrent PAF noted on loop recorder monitoring.  She presents today for regular follow-up.  She reports feeling well from a cardiac standpoint.  She specifically denies chest pain and dyspnea.  She denies dizziness and syncope.  She does report occasional palpitations that are consistent with her previous episodes.  She denies that any of these are life limiting.  There are no associated symptoms with these episodes.  She denies recent lower extremity edema.  She offers no other complaints or concerns at this time.    Past Cardiac Testing:   . Last Coronary Angio: No known  2. Prior Stress Testin2017              1. Normal Lexiscan  3. Last Echo:  21              1.  Normal left ventricular size and systolic function, LVEF 60-65%.              2.  Normal LV diastolic filling pattern.              3.  Normal right ventricular size and systolic function.              4.  Normal left and right atrial size.              5.  Trace mitral regurgitation.              6.  Mild tricuspid regurgitation, RVSP 35 mmHg.  4. Prior Holter Monitor: Loop recorder removed on 2019              1.  SCANNED -  CARDIOLOGY (10/07/2019)    Past Peripheral Testin2021  1. US Venous Doppler Lower Extremity Left (duplex)              1. No evidence of deep venous thrombosis.    Review of Systems:   Review of Systems   Constitutional: Negative for activity change, chills, diaphoresis, fatigue, fever and unexpected weight gain.   Eyes: Negative for blurred vision and visual disturbance.   Respiratory: Negative for apnea, cough, chest tightness, shortness of breath and wheezing.    Cardiovascular: Positive for palpitations. Negative for chest pain and leg swelling.   Gastrointestinal: Negative for abdominal distention, blood in stool, GERD and indigestion.   Endocrine: Negative for cold intolerance and heat intolerance.   Genitourinary: Negative for hematuria.   Musculoskeletal: Negative for gait problem, joint swelling and myalgias.   Skin: Negative for color change, pallor and wound.   Neurological: Negative for dizziness, seizures, syncope, weakness, light-headedness, numbness, headache and confusion.   Hematological: Does not bruise/bleed easily.   Psychiatric/Behavioral: Negative for behavioral problems, sleep disturbance, suicidal ideas and depressed mood.     I have reviewed and confirmed the accuracy of the ROS as documented by the MA/LPN/RN JEREMY Pedro    I have reviewed and/or updated the patient's past medical, past surgical, family, social history, problem list and allergies as appropriate.     Medications:     Current Outpatient Medications:   •  albuterol sulfate HFA (Ventolin HFA) 108 (90 Base) MCG/ACT inhaler, Inhale 2 puffs Every 4 (Four) Hours As Needed for Wheezing or Shortness of Air., Disp: 18 g, Rfl: 5  •  allopurinol (ZYLOPRIM) 300 MG tablet, TAKE ONE TABLET BY MOUTH DAILY, Disp: 90 tablet, Rfl: 3  •  calcium carbonate (OS-SHANTE) 600 MG tablet, Take 600 mg by mouth 2 (two) times a day with meals., Disp: , Rfl:   •  cyanocobalamin (VITAMIN B-12) 2500 MCG tablet tablet, TAKE ONE TABLET BY  MOUTH THREE TIMES A WEEK MUST MAKE AN APPOINTMENT, Disp: 30 tablet, Rfl: 5  •  diclofenac (VOLTAREN) 1 % gel gel, Apply 4 g topically to the appropriate area as directed 4 (Four) Times a Day As Needed (pain)., Disp: 100 g, Rfl: 1  •  estradiol (ESTRACE VAGINAL) 0.1 MG/GM vaginal cream, Insert 2 g into the vagina Daily., Disp: 42.5 g, Rfl: 3  •  GLUCOSAMINE HCL-MSM PO, Glucosamine-Chondrotin, Disp: , Rfl:   •  hydroCHLOROthiazide (HYDRODIURIL) 12.5 MG tablet, Take 1 tablet by mouth Daily. (Patient taking differently: Take 12.5 mg by mouth As Needed.), Disp: 90 tablet, Rfl: 3  •  levocetirizine (XYZAL) 5 MG tablet, Take 5 mg by mouth As Needed., Disp: , Rfl:   •  levothyroxine (Synthroid) 75 MCG tablet, Take 1 tablet by mouth Daily. With 100 mcg tab for total of 175 mcg daily, Disp: 90 tablet, Rfl: 3  •  levothyroxine (SYNTHROID, LEVOTHROID) 100 MCG tablet, Take daily with 75 mcg tab for total of 175 mcg, Disp: 90 tablet, Rfl: 3  •  metroNIDAZOLE (Flagyl) 500 MG tablet, Take 1 tablet by mouth 3 (Three) Times a Day., Disp: 30 tablet, Rfl: 5  •  Mometasone Furoate (Asmanex HFA) 200 MCG/ACT aerosol, Inhale 2 puffs 2 (Two) Times a Day., Disp: 3 each, Rfl: 2  •  Montelukast Sodium (SINGULAIR PO), PRN, Disp: , Rfl:   •  MULTIPLE VITAMIN PO, Multiple Vitamin capsule  Daily, Disp: , Rfl:   •  Omega-3 Fatty Acids (FISH OIL) 435 MG capsule, Take 1,000 mg by mouth daily., Disp: , Rfl:   •  omeprazole (priLOSEC) 40 MG capsule, Take 1 capsule by mouth Daily., Disp: 90 capsule, Rfl: 3  •  prednisoLONE acetate (Pred Forte) 1 % ophthalmic suspension, Follow instructions on the After Visit Summary., Disp: 2 mL, Rfl: 0  •  Probiotic Product (SOLUBLE FIBER/PROBIOTICS PO), Take 1 tablet by mouth daily., Disp: , Rfl:   •  Suprep Bowel Prep Kit 17.5-3.13-1.6 GM/177ML solution oral solution, , Disp: , Rfl:   •  vitamin C (ASCORBIC ACID) 500 MG tablet, Take 1 tablet by mouth daily., Disp: , Rfl:   •  bisacodyl (bisacodyl) 5 MG EC tablet,  "Take 4 tablets at 1:00pm with 8oz of clear liquids the day before your colonoscopy., Disp: 4 tablet, Rfl: 0  •  clotrimazole-betamethasone (Lotrisone) 1-0.05 % cream, Apply  topically to the appropriate area as directed 2 (Two) Times a Day., Disp: 45 g, Rfl: 0  •  ketotifen (ZADITOR) 0.025 % ophthalmic solution, ketotifen 0.025 % (0.035 %) eye drops  INSTILL 1 DROP INTO AFFECTED EYE(S) BY OPHTHALMIC ROUTE 2 TIMES PER DAY, Disp: , Rfl:   •  neomycin (MYCIFRADIN) 500 MG tablet, Take 2 tablets by mouth Take As Directed. Take 2 tablets at 1 pm, 2 tablets at 2 pm, and 2 tablets at 10 pm on the day prior to surgery., Disp: 6 tablet, Rfl: 0  •  polyethylene glycol (MIRALAX) 17 GM/SCOOP powder, Mix 238g powder with 64 oz of clear liquid at 4:00pm. Drink 8 oz every 10-15 minutes until consumed., Disp: 238 g, Rfl: 0    Current Facility-Administered Medications:   •  triamcinolone acetonide (KENALOG-40) injection 20 mg, 20 mg, Intra-articular, Once, Gordon Norman MD    Physical Exam:  Vital Signs:   Vitals:    03/07/22 1254   BP: 132/88   BP Location: Left arm   Patient Position: Sitting   Cuff Size: Adult   Pulse: 75   Temp: 97.4 °F (36.3 °C)   SpO2: 97%   Weight: 118 kg (260 lb)   Height: 175.3 cm (69\")     Body mass index is 38.4 kg/m².    Physical Exam  Vitals and nursing note reviewed.   Constitutional:       General: She is not in acute distress.     Appearance: Normal appearance. She is well-developed.   HENT:      Head: Normocephalic and atraumatic.   Eyes:      General: No scleral icterus.     Extraocular Movements: Extraocular movements intact.   Neck:      Trachea: Trachea normal.   Cardiovascular:      Rate and Rhythm: Normal rate and regular rhythm.      Pulses: Normal pulses.      Heart sounds: Normal heart sounds. No murmur heard.    No friction rub. No gallop.   Pulmonary:      Effort: Pulmonary effort is normal.      Breath sounds: Normal breath sounds.   Abdominal:      Palpations: Abdomen is soft.      " Tenderness: There is no abdominal tenderness.   Musculoskeletal:         General: Normal range of motion.      Cervical back: Neck supple.      Right lower leg: No edema.      Left lower leg: No edema.   Skin:     General: Skin is warm and dry.      Findings: No bruising, lesion or rash.   Neurological:      Mental Status: She is alert and oriented to person, place, and time.      Motor: No weakness.      Gait: Gait normal.   Psychiatric:         Mood and Affect: Mood normal.         Behavior: Behavior normal. Behavior is cooperative.         Thought Content: Thought content does not include suicidal ideation.         Results Review:   I reviewed the patient's new clinical results.      ECG 12 Lead    Date/Time: 3/7/2022 9:50 AM  Performed by: Mayuri Shields APRN  Authorized by: Mayuri Shields APRN   Comparison: compared with previous ECG from 11/15/2021  Rhythm: sinus rhythm  Rate: normal  BPM: 67  QRS axis: normal    Clinical impression: normal ECG        Assessment / Plan:     1. Preoperative cardiovascular exam  --EKG shows NSR  --Currently without chest pain or exertional symptoms  --Patient may proceed with elective gastrointestinal surgery as scheduled    2. Persistent atrial fibrillation  --History of ablation x2 without recurrence  --EKG continues to show NSR  --No recent symptoms to suggest recurrent PAF  --Follow-up with Dr. Jackman in 1 year or sooner if needed    Preventative Cardiology:   Tobacco Cessation: N/A   Advance Care Planning: ACP discussion was declined by the patient. Patient has an advance directive in EMR which is still valid.  Patient does not have an advance directive, declines further assistance.     Follow Up:   Return in about 1 year (around 3/7/2023) for Follow-up with Dr. Jackman.      Thank you for allowing me to participate in the care of your patient. Please to not hesitate to contact me with additional questions or concerns.     JEREMY Diego

## 2022-03-23 ENCOUNTER — PRE-ADMISSION TESTING (OUTPATIENT)
Dept: PREADMISSION TESTING | Facility: HOSPITAL | Age: 67
End: 2022-03-23

## 2022-03-23 VITALS — WEIGHT: 260.36 LBS | HEIGHT: 69 IN | BODY MASS INDEX: 38.56 KG/M2

## 2022-03-23 LAB
ANION GAP SERPL CALCULATED.3IONS-SCNC: 11 MMOL/L (ref 5–15)
BUN SERPL-MCNC: 14 MG/DL (ref 8–23)
BUN/CREAT SERPL: 19.4 (ref 7–25)
CALCIUM SPEC-SCNC: 9.4 MG/DL (ref 8.6–10.5)
CHLORIDE SERPL-SCNC: 107 MMOL/L (ref 98–107)
CO2 SERPL-SCNC: 29 MMOL/L (ref 22–29)
CREAT SERPL-MCNC: 0.72 MG/DL (ref 0.57–1)
DEPRECATED RDW RBC AUTO: 45.8 FL (ref 37–54)
EGFRCR SERPLBLD CKD-EPI 2021: 92.3 ML/MIN/1.73
ERYTHROCYTE [DISTWIDTH] IN BLOOD BY AUTOMATED COUNT: 14.2 % (ref 12.3–15.4)
GLUCOSE SERPL-MCNC: 104 MG/DL (ref 65–99)
HBA1C MFR BLD: 5.7 % (ref 4.8–5.6)
HCT VFR BLD AUTO: 42 % (ref 34–46.6)
HGB BLD-MCNC: 13.6 G/DL (ref 12–15.9)
MCH RBC QN AUTO: 29 PG (ref 26.6–33)
MCHC RBC AUTO-ENTMCNC: 32.4 G/DL (ref 31.5–35.7)
MCV RBC AUTO: 89.6 FL (ref 79–97)
PLATELET # BLD AUTO: 274 10*3/MM3 (ref 140–450)
PMV BLD AUTO: 11.2 FL (ref 6–12)
POTASSIUM SERPL-SCNC: 4.4 MMOL/L (ref 3.5–5.2)
RBC # BLD AUTO: 4.69 10*6/MM3 (ref 3.77–5.28)
SARS-COV-2 RNA PNL SPEC NAA+PROBE: NOT DETECTED
SODIUM SERPL-SCNC: 147 MMOL/L (ref 136–145)
WBC NRBC COR # BLD: 7.02 10*3/MM3 (ref 3.4–10.8)

## 2022-03-23 PROCEDURE — 85027 COMPLETE CBC AUTOMATED: CPT

## 2022-03-23 PROCEDURE — 83036 HEMOGLOBIN GLYCOSYLATED A1C: CPT

## 2022-03-23 PROCEDURE — U0004 COV-19 TEST NON-CDC HGH THRU: HCPCS

## 2022-03-23 PROCEDURE — C9803 HOPD COVID-19 SPEC COLLECT: HCPCS

## 2022-03-23 PROCEDURE — 36415 COLL VENOUS BLD VENIPUNCTURE: CPT

## 2022-03-23 PROCEDURE — 80048 BASIC METABOLIC PNL TOTAL CA: CPT

## 2022-03-23 NOTE — PAT
Patient to apply Chlorhexadine wipes  to surgical area (as instructed) the night before procedure and the AM of procedure. Wipes provided.    Patient viewed general PAT education video as instructed in their preoperative information received from their surgeon.  Patient stated the general PAT education video was viewed in its entirety and survey completed.  Copies of [facility] general education handouts (Incentive Spirometry, Meds to Beds Program, Patient Belongings, Pre-op skin preparation instructions, Blood Glucose testing, Visitor policy, Surgery FAQ, Code H) distributed to patient if not printed. Education related to the PAT pass and skin preparation for surgery (if applicable) completed in [facility] as a reinforcement to PAT education video. Patient instructed to return PAT pass provided today as well as completed skin preparation sheet (if applicable) on the day of procedure.     Additionally if patient had not viewed video yet but intended to view it at home or in our waiting area, then referred them to the handout with QR code/link provided during PAT visit.  Instructed patient to complete survey after viewing the video in its entirety.  Encouraged patient/family to read [facility] general education handouts thoroughly and notify PAT staff with any questions or concerns. Patient verbalized understanding of all information and priority content.    Cardiac clearance in epic per wayne Minaya     ekg from  in epic, patient had an ekg at her cardiologist office 3-2022 but not scanned into epic, notified them and they are looking for and will scan into [facility] when received

## 2022-03-24 ENCOUNTER — ANESTHESIA EVENT (OUTPATIENT)
Dept: PERIOP | Facility: HOSPITAL | Age: 67
End: 2022-03-24

## 2022-03-24 RX ORDER — FAMOTIDINE 10 MG/ML
20 INJECTION, SOLUTION INTRAVENOUS ONCE
Status: CANCELLED | OUTPATIENT
Start: 2022-03-24 | End: 2022-03-24

## 2022-03-25 ENCOUNTER — ANESTHESIA EVENT CONVERTED (OUTPATIENT)
Dept: ANESTHESIOLOGY | Facility: HOSPITAL | Age: 67
End: 2022-03-25

## 2022-03-25 ENCOUNTER — HOSPITAL ENCOUNTER (INPATIENT)
Facility: HOSPITAL | Age: 67
LOS: 6 days | Discharge: HOME | End: 2022-03-31
Attending: SURGERY | Admitting: SURGERY

## 2022-03-25 ENCOUNTER — ANESTHESIA (OUTPATIENT)
Dept: PERIOP | Facility: HOSPITAL | Age: 67
End: 2022-03-25

## 2022-03-25 DIAGNOSIS — K57.92 DIVERTICULITIS: ICD-10-CM

## 2022-03-25 DIAGNOSIS — K57.32 DIVERTICULITIS OF LARGE INTESTINE WITHOUT PERFORATION OR ABSCESS, UNSPECIFIED BLEEDING STATUS: Primary | ICD-10-CM

## 2022-03-25 PROCEDURE — 25010000002 METHYLNALTREXONE 12 MG/0.6ML SOLUTION: Performed by: SURGERY

## 2022-03-25 PROCEDURE — 25010000002 BUPRENORPHINE PER 0.1 MG: Performed by: NURSE ANESTHETIST, CERTIFIED REGISTERED

## 2022-03-25 PROCEDURE — 25010000002 PROPOFOL 10 MG/ML EMULSION: Performed by: NURSE ANESTHETIST, CERTIFIED REGISTERED

## 2022-03-25 PROCEDURE — C1889 IMPLANT/INSERT DEVICE, NOC: HCPCS | Performed by: SURGERY

## 2022-03-25 PROCEDURE — 25010000002 FENTANYL CITRATE (PF) 50 MCG/ML SOLUTION: Performed by: ANESTHESIOLOGY

## 2022-03-25 PROCEDURE — 25010000002 ONDANSETRON PER 1 MG: Performed by: SURGERY

## 2022-03-25 PROCEDURE — 25010000002 CEFOXITIN PER 1 G

## 2022-03-25 PROCEDURE — P9041 ALBUMIN (HUMAN),5%, 50ML: HCPCS | Performed by: NURSE ANESTHETIST, CERTIFIED REGISTERED

## 2022-03-25 PROCEDURE — 25010000002 FENTANYL CITRATE (PF) 50 MCG/ML SOLUTION: Performed by: NURSE ANESTHETIST, CERTIFIED REGISTERED

## 2022-03-25 PROCEDURE — 88307 TISSUE EXAM BY PATHOLOGIST: CPT | Performed by: SURGERY

## 2022-03-25 PROCEDURE — 25010000002 DEXAMETHASONE PER 1 MG: Performed by: NURSE ANESTHETIST, CERTIFIED REGISTERED

## 2022-03-25 PROCEDURE — 25010000002 ONDANSETRON PER 1 MG

## 2022-03-25 PROCEDURE — 25010000002 ALBUMIN HUMAN 5% PER 50 ML: Performed by: NURSE ANESTHETIST, CERTIFIED REGISTERED

## 2022-03-25 PROCEDURE — 25010000002 HYDROMORPHONE 1 MG/ML SOLUTION: Performed by: SURGERY

## 2022-03-25 PROCEDURE — 25010000002 ONDANSETRON PER 1 MG: Performed by: NURSE ANESTHETIST, CERTIFIED REGISTERED

## 2022-03-25 PROCEDURE — 0DBM4ZZ EXCISION OF DESCENDING COLON, PERCUTANEOUS ENDOSCOPIC APPROACH: ICD-10-PCS | Performed by: SURGERY

## 2022-03-25 PROCEDURE — 0DBN4ZZ EXCISION OF SIGMOID COLON, PERCUTANEOUS ENDOSCOPIC APPROACH: ICD-10-PCS | Performed by: SURGERY

## 2022-03-25 PROCEDURE — 25010000002 FENTANYL CITRATE (PF) 50 MCG/ML SOLUTION

## 2022-03-25 PROCEDURE — 25010000002 MORPHINE PER 10 MG: Performed by: SURGERY

## 2022-03-25 PROCEDURE — 0DJD8ZZ INSPECTION OF LOWER INTESTINAL TRACT, VIA NATURAL OR ARTIFICIAL OPENING ENDOSCOPIC: ICD-10-PCS | Performed by: SURGERY

## 2022-03-25 PROCEDURE — 25010000002 DEXAMETHASONE SODIUM PHOSPHATE 10 MG/ML SOLUTION: Performed by: NURSE ANESTHETIST, CERTIFIED REGISTERED

## 2022-03-25 DEVICE — ENDOPATH ECHELON ENDOSCOPIC LINEAR CUTTER RELOADS, BLUE, 60MM
Type: IMPLANTABLE DEVICE | Site: SIGMOID COLON | Status: FUNCTIONAL
Brand: ECHELON ENDOPATH

## 2022-03-25 DEVICE — ECHELON CIRCULAR POWERED STAPLER
Type: IMPLANTABLE DEVICE | Site: SIGMOID COLON | Status: FUNCTIONAL
Brand: ECHELON CIRCULAR

## 2022-03-25 DEVICE — PROXIMATE RELOADABLE LINEAR STAPLER
Type: IMPLANTABLE DEVICE | Site: SIGMOID COLON | Status: FUNCTIONAL
Brand: PROXIMATE

## 2022-03-25 DEVICE — ENDOPATH ECHELON ENDOSCOPIC LINEAR CUTTER RELOADS, WHITE, 60MM
Type: IMPLANTABLE DEVICE | Site: SIGMOID COLON | Status: FUNCTIONAL
Brand: ECHELON ENDOPATH

## 2022-03-25 DEVICE — LIGACLIP 10-M/L, 10MM ENDOSCOPIC ROTATING MULTIPLE CLIP APPLIERS
Type: IMPLANTABLE DEVICE | Site: SIGMOID COLON | Status: FUNCTIONAL
Brand: LIGACLIP

## 2022-03-25 RX ORDER — FENTANYL CITRATE 50 UG/ML
INJECTION, SOLUTION INTRAMUSCULAR; INTRAVENOUS
Status: COMPLETED
Start: 2022-03-25 | End: 2022-03-25

## 2022-03-25 RX ORDER — DEXAMETHASONE SODIUM PHOSPHATE 4 MG/ML
INJECTION, SOLUTION INTRA-ARTICULAR; INTRALESIONAL; INTRAMUSCULAR; INTRAVENOUS; SOFT TISSUE AS NEEDED
Status: DISCONTINUED | OUTPATIENT
Start: 2022-03-25 | End: 2022-03-25 | Stop reason: SURG

## 2022-03-25 RX ORDER — DEXTROSE AND SODIUM CHLORIDE 5; .45 G/100ML; G/100ML
75 INJECTION, SOLUTION INTRAVENOUS CONTINUOUS
Status: DISCONTINUED | OUTPATIENT
Start: 2022-03-25 | End: 2022-03-30

## 2022-03-25 RX ORDER — NALOXONE HCL 0.4 MG/ML
0.1 VIAL (ML) INJECTION
Status: DISCONTINUED | OUTPATIENT
Start: 2022-03-25 | End: 2022-03-31 | Stop reason: HOSPADM

## 2022-03-25 RX ORDER — ALBUMIN, HUMAN INJ 5% 5 %
SOLUTION INTRAVENOUS CONTINUOUS PRN
Status: DISCONTINUED | OUTPATIENT
Start: 2022-03-25 | End: 2022-03-25 | Stop reason: SURG

## 2022-03-25 RX ORDER — ONDANSETRON 2 MG/ML
INJECTION INTRAMUSCULAR; INTRAVENOUS
Status: COMPLETED
Start: 2022-03-25 | End: 2022-03-25

## 2022-03-25 RX ORDER — ONDANSETRON 2 MG/ML
INJECTION INTRAMUSCULAR; INTRAVENOUS
Status: DISPENSED
Start: 2022-03-25 | End: 2022-03-26

## 2022-03-25 RX ORDER — DEXAMETHASONE SODIUM PHOSPHATE 10 MG/ML
INJECTION, SOLUTION INTRAMUSCULAR; INTRAVENOUS
Status: COMPLETED | OUTPATIENT
Start: 2022-03-25 | End: 2022-03-25

## 2022-03-25 RX ORDER — DEXTROSE MONOHYDRATE 50 MG/ML
INJECTION, SOLUTION INTRAVENOUS CONTINUOUS PRN
Status: DISCONTINUED | OUTPATIENT
Start: 2022-03-25 | End: 2022-03-25

## 2022-03-25 RX ORDER — ROCURONIUM BROMIDE 10 MG/ML
INJECTION, SOLUTION INTRAVENOUS AS NEEDED
Status: DISCONTINUED | OUTPATIENT
Start: 2022-03-25 | End: 2022-03-25 | Stop reason: SURG

## 2022-03-25 RX ORDER — DOCUSATE SODIUM 100 MG/1
100 CAPSULE, LIQUID FILLED ORAL 2 TIMES DAILY PRN
Status: DISCONTINUED | OUTPATIENT
Start: 2022-03-25 | End: 2022-03-31 | Stop reason: HOSPADM

## 2022-03-25 RX ORDER — FENTANYL CITRATE 50 UG/ML
INJECTION, SOLUTION INTRAMUSCULAR; INTRAVENOUS AS NEEDED
Status: DISCONTINUED | OUTPATIENT
Start: 2022-03-25 | End: 2022-03-25 | Stop reason: SURG

## 2022-03-25 RX ORDER — ONDANSETRON 2 MG/ML
4 INJECTION INTRAMUSCULAR; INTRAVENOUS EVERY 6 HOURS PRN
Status: DISCONTINUED | OUTPATIENT
Start: 2022-03-25 | End: 2022-03-31 | Stop reason: HOSPADM

## 2022-03-25 RX ORDER — MAGNESIUM HYDROXIDE 1200 MG/15ML
LIQUID ORAL AS NEEDED
Status: DISCONTINUED | OUTPATIENT
Start: 2022-03-25 | End: 2022-03-25 | Stop reason: HOSPADM

## 2022-03-25 RX ORDER — BUPIVACAINE HYDROCHLORIDE 2.5 MG/ML
INJECTION, SOLUTION EPIDURAL; INFILTRATION; INTRACAUDAL
Status: COMPLETED | OUTPATIENT
Start: 2022-03-25 | End: 2022-03-25

## 2022-03-25 RX ORDER — SODIUM CHLORIDE 9 MG/ML
INJECTION, SOLUTION INTRAVENOUS AS NEEDED
Status: DISCONTINUED | OUTPATIENT
Start: 2022-03-25 | End: 2022-03-25 | Stop reason: HOSPADM

## 2022-03-25 RX ORDER — LEVOTHYROXINE SODIUM 0.07 MG/1
75 TABLET ORAL DAILY
Status: DISCONTINUED | OUTPATIENT
Start: 2022-03-26 | End: 2022-03-27

## 2022-03-25 RX ORDER — SODIUM CHLORIDE 0.9 % (FLUSH) 0.9 %
10 SYRINGE (ML) INJECTION EVERY 12 HOURS SCHEDULED
Status: DISCONTINUED | OUTPATIENT
Start: 2022-03-25 | End: 2022-03-25 | Stop reason: HOSPADM

## 2022-03-25 RX ORDER — ONDANSETRON 2 MG/ML
INJECTION INTRAMUSCULAR; INTRAVENOUS AS NEEDED
Status: DISCONTINUED | OUTPATIENT
Start: 2022-03-25 | End: 2022-03-25 | Stop reason: SURG

## 2022-03-25 RX ORDER — LIDOCAINE HYDROCHLORIDE 10 MG/ML
INJECTION, SOLUTION EPIDURAL; INFILTRATION; INTRACAUDAL; PERINEURAL AS NEEDED
Status: DISCONTINUED | OUTPATIENT
Start: 2022-03-25 | End: 2022-03-25 | Stop reason: SURG

## 2022-03-25 RX ORDER — MORPHINE SULFATE 1 MG/ML
INJECTION INTRAVENOUS CONTINUOUS
Status: DISPENSED | OUTPATIENT
Start: 2022-03-25 | End: 2022-03-28

## 2022-03-25 RX ORDER — ALLOPURINOL 300 MG/1
300 TABLET ORAL DAILY
Status: DISCONTINUED | OUTPATIENT
Start: 2022-03-25 | End: 2022-03-31 | Stop reason: HOSPADM

## 2022-03-25 RX ORDER — ONDANSETRON 4 MG/1
4 TABLET, FILM COATED ORAL EVERY 6 HOURS PRN
Status: DISCONTINUED | OUTPATIENT
Start: 2022-03-25 | End: 2022-03-31 | Stop reason: HOSPADM

## 2022-03-25 RX ORDER — CARISOPRODOL 350 MG/1
350 TABLET ORAL 3 TIMES DAILY PRN
Status: DISCONTINUED | OUTPATIENT
Start: 2022-03-25 | End: 2022-03-31 | Stop reason: HOSPADM

## 2022-03-25 RX ORDER — BUPRENORPHINE HYDROCHLORIDE 0.32 MG/ML
INJECTION INTRAMUSCULAR; INTRAVENOUS
Status: COMPLETED | OUTPATIENT
Start: 2022-03-25 | End: 2022-03-25

## 2022-03-25 RX ORDER — MONTELUKAST SODIUM 10 MG/1
10 TABLET ORAL DAILY PRN
Status: DISCONTINUED | OUTPATIENT
Start: 2022-03-25 | End: 2022-03-31 | Stop reason: HOSPADM

## 2022-03-25 RX ORDER — ALBUTEROL SULFATE 90 UG/1
2 AEROSOL, METERED RESPIRATORY (INHALATION) EVERY 4 HOURS PRN
Status: DISCONTINUED | OUTPATIENT
Start: 2022-03-25 | End: 2022-03-31 | Stop reason: HOSPADM

## 2022-03-25 RX ORDER — FAMOTIDINE 20 MG/1
20 TABLET, FILM COATED ORAL ONCE
Status: COMPLETED | OUTPATIENT
Start: 2022-03-25 | End: 2022-03-25

## 2022-03-25 RX ORDER — SIMETHICONE 80 MG
TABLET,CHEWABLE ORAL
Status: COMPLETED
Start: 2022-03-25 | End: 2022-03-25

## 2022-03-25 RX ORDER — LIDOCAINE HYDROCHLORIDE 10 MG/ML
0.5 INJECTION, SOLUTION EPIDURAL; INFILTRATION; INTRACAUDAL; PERINEURAL ONCE AS NEEDED
Status: COMPLETED | OUTPATIENT
Start: 2022-03-25 | End: 2022-03-25

## 2022-03-25 RX ORDER — PROPOFOL 10 MG/ML
VIAL (ML) INTRAVENOUS AS NEEDED
Status: DISCONTINUED | OUTPATIENT
Start: 2022-03-25 | End: 2022-03-25 | Stop reason: SURG

## 2022-03-25 RX ORDER — NALOXONE HCL 0.4 MG/ML
0.1 VIAL (ML) INJECTION
Status: DISCONTINUED | OUTPATIENT
Start: 2022-03-25 | End: 2022-03-25

## 2022-03-25 RX ORDER — FAMOTIDINE 20 MG/1
20 TABLET, FILM COATED ORAL 2 TIMES DAILY
Status: DISCONTINUED | OUTPATIENT
Start: 2022-03-25 | End: 2022-03-31 | Stop reason: HOSPADM

## 2022-03-25 RX ORDER — SODIUM CHLORIDE, SODIUM LACTATE, POTASSIUM CHLORIDE, CALCIUM CHLORIDE 600; 310; 30; 20 MG/100ML; MG/100ML; MG/100ML; MG/100ML
9 INJECTION, SOLUTION INTRAVENOUS CONTINUOUS
Status: DISCONTINUED | OUTPATIENT
Start: 2022-03-25 | End: 2022-03-25

## 2022-03-25 RX ORDER — EPHEDRINE SULFATE 50 MG/ML
INJECTION, SOLUTION INTRAVENOUS AS NEEDED
Status: DISCONTINUED | OUTPATIENT
Start: 2022-03-25 | End: 2022-03-25 | Stop reason: SURG

## 2022-03-25 RX ORDER — SIMETHICONE 80 MG
80 TABLET,CHEWABLE ORAL 4 TIMES DAILY PRN
Status: DISCONTINUED | OUTPATIENT
Start: 2022-03-25 | End: 2022-03-31 | Stop reason: HOSPADM

## 2022-03-25 RX ORDER — FENTANYL CITRATE 50 UG/ML
50 INJECTION, SOLUTION INTRAMUSCULAR; INTRAVENOUS
Status: DISCONTINUED | OUTPATIENT
Start: 2022-03-25 | End: 2022-03-25 | Stop reason: HOSPADM

## 2022-03-25 RX ORDER — MIDAZOLAM HYDROCHLORIDE 1 MG/ML
0.5 INJECTION INTRAMUSCULAR; INTRAVENOUS
Status: DISCONTINUED | OUTPATIENT
Start: 2022-03-25 | End: 2022-03-25 | Stop reason: HOSPADM

## 2022-03-25 RX ORDER — SODIUM CHLORIDE 0.9 % (FLUSH) 0.9 %
10 SYRINGE (ML) INJECTION AS NEEDED
Status: DISCONTINUED | OUTPATIENT
Start: 2022-03-25 | End: 2022-03-25 | Stop reason: HOSPADM

## 2022-03-25 RX ORDER — SIMETHICONE 80 MG
80 TABLET,CHEWABLE ORAL ONCE
Status: COMPLETED | OUTPATIENT
Start: 2022-03-25 | End: 2022-03-25

## 2022-03-25 RX ADMIN — Medication: at 19:54

## 2022-03-25 RX ADMIN — ROCURONIUM BROMIDE 20 MG: 10 INJECTION, SOLUTION INTRAVENOUS at 10:27

## 2022-03-25 RX ADMIN — FENTANYL CITRATE 50 MCG: 50 INJECTION, SOLUTION INTRAMUSCULAR; INTRAVENOUS at 15:24

## 2022-03-25 RX ADMIN — FENTANYL CITRATE 50 MCG: 50 INJECTION, SOLUTION INTRAMUSCULAR; INTRAVENOUS at 15:09

## 2022-03-25 RX ADMIN — DEXTROSE AND SODIUM CHLORIDE 125 ML/HR: 5; 450 INJECTION, SOLUTION INTRAVENOUS at 17:56

## 2022-03-25 RX ADMIN — ONDANSETRON 4 MG: 2 INJECTION INTRAMUSCULAR; INTRAVENOUS at 22:09

## 2022-03-25 RX ADMIN — ALBUMIN HUMAN: 0.05 INJECTION, SOLUTION INTRAVENOUS at 13:25

## 2022-03-25 RX ADMIN — LIDOCAINE HYDROCHLORIDE 50 MG: 10 INJECTION, SOLUTION EPIDURAL; INFILTRATION; INTRACAUDAL; PERINEURAL at 08:12

## 2022-03-25 RX ADMIN — ROCURONIUM BROMIDE 50 MG: 10 INJECTION, SOLUTION INTRAVENOUS at 08:12

## 2022-03-25 RX ADMIN — HYDROMORPHONE HYDROCHLORIDE 0.2 MG: 1 INJECTION, SOLUTION INTRAMUSCULAR; INTRAVENOUS; SUBCUTANEOUS at 21:41

## 2022-03-25 RX ADMIN — SODIUM CHLORIDE, POTASSIUM CHLORIDE, SODIUM LACTATE AND CALCIUM CHLORIDE 9 ML/HR: 600; 310; 30; 20 INJECTION, SOLUTION INTRAVENOUS at 07:17

## 2022-03-25 RX ADMIN — FAMOTIDINE 20 MG: 20 TABLET ORAL at 07:18

## 2022-03-25 RX ADMIN — PROPOFOL 200 MG: 10 INJECTION, EMULSION INTRAVENOUS at 08:12

## 2022-03-25 RX ADMIN — SIMETHICONE 80 MG: 80 TABLET, CHEWABLE ORAL at 16:29

## 2022-03-25 RX ADMIN — BUPRENORPHINE HYDROCHLORIDE 0.3 MG: 0.32 INJECTION INTRAMUSCULAR; INTRAVENOUS at 07:50

## 2022-03-25 RX ADMIN — DEXAMETHASONE SODIUM PHOSPHATE 8 MG: 4 INJECTION, SOLUTION INTRA-ARTICULAR; INTRALESIONAL; INTRAMUSCULAR; INTRAVENOUS; SOFT TISSUE at 08:16

## 2022-03-25 RX ADMIN — EPHEDRINE SULFATE 10 MG: 50 INJECTION INTRAVENOUS at 09:31

## 2022-03-25 RX ADMIN — ONDANSETRON 4 MG: 2 INJECTION INTRAMUSCULAR; INTRAVENOUS at 15:20

## 2022-03-25 RX ADMIN — HYDROMORPHONE HYDROCHLORIDE 0.2 MG: 1 INJECTION, SOLUTION INTRAMUSCULAR; INTRAVENOUS; SUBCUTANEOUS at 17:56

## 2022-03-25 RX ADMIN — ONDANSETRON 4 MG: 2 INJECTION INTRAMUSCULAR; INTRAVENOUS at 14:02

## 2022-03-25 RX ADMIN — LIDOCAINE HYDROCHLORIDE 0.5 ML: 10 INJECTION, SOLUTION EPIDURAL; INFILTRATION; INTRACAUDAL; PERINEURAL at 07:18

## 2022-03-25 RX ADMIN — CARISOPRODOL 350 MG: 350 TABLET ORAL at 22:19

## 2022-03-25 RX ADMIN — METHYLNALTREXONE BROMIDE 6 MG: 12 INJECTION, SOLUTION SUBCUTANEOUS at 21:27

## 2022-03-25 RX ADMIN — Medication 2 G: at 08:16

## 2022-03-25 RX ADMIN — DEXAMETHASONE SODIUM PHOSPHATE 2 MG: 10 INJECTION, SOLUTION INTRAMUSCULAR; INTRAVENOUS at 07:50

## 2022-03-25 RX ADMIN — BUPIVACAINE HYDROCHLORIDE 60 ML: 2.5 INJECTION, SOLUTION EPIDURAL; INFILTRATION; INTRACAUDAL; PERINEURAL at 07:50

## 2022-03-25 RX ADMIN — FAMOTIDINE 20 MG: 20 TABLET, FILM COATED ORAL at 21:27

## 2022-03-25 RX ADMIN — Medication 80 MG: at 16:29

## 2022-03-25 RX ADMIN — FENTANYL CITRATE 100 MCG: 50 INJECTION, SOLUTION INTRAMUSCULAR; INTRAVENOUS at 08:12

## 2022-03-25 RX ADMIN — Medication 2 G: at 12:06

## 2022-03-25 RX ADMIN — ROCURONIUM BROMIDE 20 MG: 10 INJECTION, SOLUTION INTRAVENOUS at 09:02

## 2022-03-25 NOTE — ANESTHESIA PROCEDURE NOTES
Airway  Urgency: elective    Date/Time: 3/25/2022 8:14 AM  Airway not difficult    General Information and Staff    Patient location during procedure: OR  CRNA: Jackson Jewell CRNA    Indications and Patient Condition  Indications for airway management: airway protection    Preoxygenated: yes  MILS not maintained throughout  Mask difficulty assessment: 1 - vent by mask    Final Airway Details  Final airway type: endotracheal airway      Successful airway: ETT  Cuffed: yes   Successful intubation technique: direct laryngoscopy  Facilitating devices/methods: intubating stylet  Endotracheal tube insertion site: oral  Blade: Mirian  Blade size: 3  ETT size (mm): 7.5  Cormack-Lehane Classification: grade I - full view of glottis  Placement verified by: chest auscultation and capnometry   Measured from: lips  ETT/EBT  to lips (cm): 20  Number of attempts at approach: 1  Assessment: lips, teeth, and gum same as pre-op and atraumatic intubation    Additional Comments  Negative epigastric sounds, Breath sound equal bilaterally with symmetric chest rise and fall

## 2022-03-25 NOTE — ADDENDUM NOTE
Addendum  created 03/25/22 1629 by Luz Marina Lorenzo CRNA    Order list changed, Pharmacy for encounter modified

## 2022-03-25 NOTE — ANESTHESIA POSTPROCEDURE EVALUATION
"Patient: Albania Ramos    Procedure Summary     Date: 03/25/22 Room / Location:  [facility]  /  [facility]    Anesthesia Start: 0806 Anesthesia Stop:     Procedure: LAPAROSCOPIC  COLON RESECTION SIGMOIDECTOMY (N/A Abdomen) Diagnosis:     Surgeons: Arturo Kumar MD Provider: Alex Toledo MD    Anesthesia Type: general ASA Status: 3          Anesthesia Type: general    Vitals  Vitals Value Taken Time   BP     Temp     Pulse 75 03/25/22 1444   Resp     SpO2 82 % 03/25/22 1444   Vitals shown include unvalidated device data.        Post Anesthesia Care and Evaluation    Patient location during evaluation: PACU  Patient participation: complete - patient participated  Level of consciousness: awake and responsive to verbal stimuli  Pain score: 2  Pain management: adequate  Airway patency: patent  Anesthetic complications: No anesthetic complications    Cardiovascular status: acceptable  Respiratory status: acceptable  Hydration status: acceptable    Comments: Pt awake and responsive. SV. VSS. Report to RN. Patient Vitals in the past 24 hrs:  03/25/22 0655, BP:144/74, Temp:97.8 °F (36.6 °C), Temp src:Temporal, Pulse:73, Resp:16, SpO2:96 %, Height:175.3 cm (69\"), Weight:118 kg (260 lb 5.8 oz)  133/78. p 72. r 16. t 98.1                  "

## 2022-03-25 NOTE — ANESTHESIA PROCEDURE NOTES
4 quad taps       Patient reassessed immediately prior to procedure    Patient location during procedure: OR  Reason for block: at surgeon's request and post-op pain management  Performed by  CRNA: Deni Payton CRNA  Preanesthetic Checklist  Completed: patient identified, IV checked, site marked, risks and benefits discussed, surgical consent, monitors and equipment checked, pre-op evaluation and timeout performed  Prep:  Pt Position: supine  Sterile barriers:cap, gloves, sterile barriers and mask  Prep: ChloraPrep  Patient monitoring: blood pressure monitoring, continuous pulse oximetry and EKG  Procedure    Sedation: yes  Performed under: general  Guidance:ultrasound guided  Images:still images obtained, printed/placed on chart    Laterality:Bilateral  Block Type:TAP  Injection Technique:single-shot  Needle Type:short-bevel and echogenic  Needle Gauge:20 G  Resistance on Injection: none    Medications Used: buprenorphine (BUPRENEX) injection, 0.3 mg  bupivacaine PF (MARCAINE) 0.25 % injection, 60 mL  dexamethasone sodium phosphate injection, 2 mg      Medications  Comment:Block Injection:  LA dose divided between Right and Left block        Post Assessment  Injection Assessment: negative aspiration for heme, incremental injection and no paresthesia on injection  Patient Tolerance:comfortable throughout block  Complications:no  Additional Notes      Under Ultrasound guidance, a BBraun 4inch 360 degree needle was advanced with Normal Saline hydro dissection of tissue.  The Internal Oblique and Transversus Abdominus muscles where visualized.  At or before the aponeurosis of Internal Oblique, local anesthetic spread was visualized in the Transversus Abdominus Plane. Injection was made incrementally with aspiration every 5 mls.  There was no  intravascular injection,  injection pressure was normal, there was no neural injection, and the procedure was completed without difficulty.  Thank You.

## 2022-03-25 NOTE — ANESTHESIA PREPROCEDURE EVALUATION
Anesthesia Evaluation     Patient summary reviewed and Nursing notes reviewed   history of anesthetic complications: PONV               Airway   Mallampati: I  TM distance: >3 FB  Neck ROM: full  No difficulty expected  Dental - normal exam     Pulmonary - normal exam   (+) pneumonia , a smoker Former, asthma,shortness of breath, sleep apnea,   Cardiovascular - normal exam    (+) dysrhythmias Atrial Fib, hyperlipidemia,       Neuro/Psych  (+) headaches, dizziness/light headedness, psychiatric history Depression,    GI/Hepatic/Renal/Endo    (+)  GERD,  thyroid problem hypothyroidism and thyroid cancer    Musculoskeletal     Abdominal  - normal exam    Bowel sounds: normal.   Substance History - negative use     OB/GYN negative ob/gyn ROS         Other   arthritis,    history of cancer (THYROID)                    Anesthesia Plan    ASA 3     general   (TAPS FOR POSTOP PAIN)  intravenous induction     Anesthetic plan, all risks, benefits, and alternatives have been provided, discussed and informed consent has been obtained with: patient.    Plan discussed with CRNA.        CODE STATUS:

## 2022-03-26 LAB
ANION GAP SERPL CALCULATED.3IONS-SCNC: 11 MMOL/L (ref 5–15)
BASOPHILS # BLD AUTO: 0.03 10*3/MM3 (ref 0–0.2)
BASOPHILS NFR BLD AUTO: 0.2 % (ref 0–1.5)
BUN SERPL-MCNC: 11 MG/DL (ref 8–23)
BUN/CREAT SERPL: 19 (ref 7–25)
CALCIUM SPEC-SCNC: 8.6 MG/DL (ref 8.6–10.5)
CHLORIDE SERPL-SCNC: 106 MMOL/L (ref 98–107)
CO2 SERPL-SCNC: 24 MMOL/L (ref 22–29)
CREAT SERPL-MCNC: 0.58 MG/DL (ref 0.57–1)
DEPRECATED RDW RBC AUTO: 46.3 FL (ref 37–54)
EGFRCR SERPLBLD CKD-EPI 2021: 99.9 ML/MIN/1.73
EOSINOPHIL # BLD AUTO: 0 10*3/MM3 (ref 0–0.4)
EOSINOPHIL NFR BLD AUTO: 0 % (ref 0.3–6.2)
ERYTHROCYTE [DISTWIDTH] IN BLOOD BY AUTOMATED COUNT: 14.3 % (ref 12.3–15.4)
GLUCOSE SERPL-MCNC: 176 MG/DL (ref 65–99)
HCT VFR BLD AUTO: 35.8 % (ref 34–46.6)
HGB BLD-MCNC: 12.3 G/DL (ref 12–15.9)
IMM GRANULOCYTES # BLD AUTO: 0.06 10*3/MM3 (ref 0–0.05)
IMM GRANULOCYTES NFR BLD AUTO: 0.4 % (ref 0–0.5)
LYMPHOCYTES # BLD AUTO: 1.28 10*3/MM3 (ref 0.7–3.1)
LYMPHOCYTES NFR BLD AUTO: 8.5 % (ref 19.6–45.3)
MCH RBC QN AUTO: 30.5 PG (ref 26.6–33)
MCHC RBC AUTO-ENTMCNC: 34.4 G/DL (ref 31.5–35.7)
MCV RBC AUTO: 88.8 FL (ref 79–97)
MONOCYTES # BLD AUTO: 1.06 10*3/MM3 (ref 0.1–0.9)
MONOCYTES NFR BLD AUTO: 7 % (ref 5–12)
NEUTROPHILS NFR BLD AUTO: 12.68 10*3/MM3 (ref 1.7–7)
NEUTROPHILS NFR BLD AUTO: 83.9 % (ref 42.7–76)
NRBC BLD AUTO-RTO: 0 /100 WBC (ref 0–0.2)
PLATELET # BLD AUTO: 244 10*3/MM3 (ref 140–450)
PMV BLD AUTO: 11.3 FL (ref 6–12)
POTASSIUM SERPL-SCNC: 3.6 MMOL/L (ref 3.5–5.2)
RBC # BLD AUTO: 4.03 10*6/MM3 (ref 3.77–5.28)
SODIUM SERPL-SCNC: 141 MMOL/L (ref 136–145)
WBC NRBC COR # BLD: 15.11 10*3/MM3 (ref 3.4–10.8)

## 2022-03-26 PROCEDURE — 85025 COMPLETE CBC W/AUTO DIFF WBC: CPT | Performed by: SURGERY

## 2022-03-26 PROCEDURE — 25010000002 HEPARIN (PORCINE) PER 1000 UNITS: Performed by: SURGERY

## 2022-03-26 PROCEDURE — 25010000002 ONDANSETRON PER 1 MG: Performed by: SURGERY

## 2022-03-26 PROCEDURE — 80048 BASIC METABOLIC PNL TOTAL CA: CPT | Performed by: SURGERY

## 2022-03-26 RX ORDER — HEPARIN SODIUM 5000 [USP'U]/ML
5000 INJECTION, SOLUTION INTRAVENOUS; SUBCUTANEOUS EVERY 8 HOURS SCHEDULED
Status: DISCONTINUED | OUTPATIENT
Start: 2022-03-26 | End: 2022-03-28

## 2022-03-26 RX ADMIN — FAMOTIDINE 20 MG: 20 TABLET, FILM COATED ORAL at 09:05

## 2022-03-26 RX ADMIN — SIMETHICONE 80 MG: 80 TABLET, CHEWABLE ORAL at 04:27

## 2022-03-26 RX ADMIN — DEXTROSE AND SODIUM CHLORIDE 125 ML/HR: 5; 450 INJECTION, SOLUTION INTRAVENOUS at 01:31

## 2022-03-26 RX ADMIN — ALLOPURINOL 300 MG: 300 TABLET ORAL at 09:04

## 2022-03-26 RX ADMIN — FAMOTIDINE 20 MG: 20 TABLET, FILM COATED ORAL at 21:00

## 2022-03-26 RX ADMIN — HEPARIN SODIUM 5000 UNITS: 5000 INJECTION, SOLUTION INTRAVENOUS; SUBCUTANEOUS at 13:44

## 2022-03-26 RX ADMIN — DEXTROSE AND SODIUM CHLORIDE 125 ML/HR: 5; 450 INJECTION, SOLUTION INTRAVENOUS at 06:09

## 2022-03-26 RX ADMIN — ONDANSETRON 4 MG: 2 INJECTION INTRAMUSCULAR; INTRAVENOUS at 04:41

## 2022-03-26 RX ADMIN — DEXTROSE AND SODIUM CHLORIDE 125 ML/HR: 5; 450 INJECTION, SOLUTION INTRAVENOUS at 13:44

## 2022-03-26 RX ADMIN — HEPARIN SODIUM 5000 UNITS: 5000 INJECTION, SOLUTION INTRAVENOUS; SUBCUTANEOUS at 21:00

## 2022-03-26 RX ADMIN — DEXTROSE AND SODIUM CHLORIDE 125 ML/HR: 5; 450 INJECTION, SOLUTION INTRAVENOUS at 21:36

## 2022-03-26 RX ADMIN — CARISOPRODOL 350 MG: 350 TABLET ORAL at 21:00

## 2022-03-27 LAB
ALBUMIN SERPL-MCNC: 2.9 G/DL (ref 3.5–5.2)
ALBUMIN/GLOB SERPL: 1.1 G/DL
ALP SERPL-CCNC: 68 U/L (ref 39–117)
ALT SERPL W P-5'-P-CCNC: 80 U/L (ref 1–33)
ANION GAP SERPL CALCULATED.3IONS-SCNC: 7 MMOL/L (ref 5–15)
AST SERPL-CCNC: 31 U/L (ref 1–32)
BILIRUB SERPL-MCNC: 0.4 MG/DL (ref 0–1.2)
BUN SERPL-MCNC: 7 MG/DL (ref 8–23)
BUN/CREAT SERPL: 14.6 (ref 7–25)
CALCIUM SPEC-SCNC: 8 MG/DL (ref 8.6–10.5)
CHLORIDE SERPL-SCNC: 103 MMOL/L (ref 98–107)
CO2 SERPL-SCNC: 27 MMOL/L (ref 22–29)
CREAT SERPL-MCNC: 0.48 MG/DL (ref 0.57–1)
D-LACTATE SERPL-SCNC: 0.8 MMOL/L (ref 0.5–2)
DEPRECATED RDW RBC AUTO: 49.3 FL (ref 37–54)
EGFRCR SERPLBLD CKD-EPI 2021: 104.6 ML/MIN/1.73
ERYTHROCYTE [DISTWIDTH] IN BLOOD BY AUTOMATED COUNT: 14.4 % (ref 12.3–15.4)
GLOBULIN UR ELPH-MCNC: 2.7 GM/DL
GLUCOSE SERPL-MCNC: 125 MG/DL (ref 65–99)
HCT VFR BLD AUTO: 35.3 % (ref 34–46.6)
HGB BLD-MCNC: 11 G/DL (ref 12–15.9)
MAGNESIUM SERPL-MCNC: 1.8 MG/DL (ref 1.6–2.4)
MCH RBC QN AUTO: 29.1 PG (ref 26.6–33)
MCHC RBC AUTO-ENTMCNC: 31.2 G/DL (ref 31.5–35.7)
MCV RBC AUTO: 93.4 FL (ref 79–97)
PHOSPHATE SERPL-MCNC: 1.7 MG/DL (ref 2.5–4.5)
PLATELET # BLD AUTO: 203 10*3/MM3 (ref 140–450)
PMV BLD AUTO: 11.8 FL (ref 6–12)
POTASSIUM SERPL-SCNC: 3.3 MMOL/L (ref 3.5–5.2)
PROT SERPL-MCNC: 5.6 G/DL (ref 6–8.5)
RBC # BLD AUTO: 3.78 10*6/MM3 (ref 3.77–5.28)
SODIUM SERPL-SCNC: 137 MMOL/L (ref 136–145)
WBC NRBC COR # BLD: 11.48 10*3/MM3 (ref 3.4–10.8)

## 2022-03-27 PROCEDURE — 25010000002 METHYLNALTREXONE 12 MG/0.6ML SOLUTION: Performed by: SURGERY

## 2022-03-27 PROCEDURE — 83605 ASSAY OF LACTIC ACID: CPT | Performed by: SURGERY

## 2022-03-27 PROCEDURE — 85027 COMPLETE CBC AUTOMATED: CPT | Performed by: SURGERY

## 2022-03-27 PROCEDURE — 80053 COMPREHEN METABOLIC PANEL: CPT | Performed by: SURGERY

## 2022-03-27 PROCEDURE — 25010000002 HEPARIN (PORCINE) PER 1000 UNITS: Performed by: SURGERY

## 2022-03-27 PROCEDURE — 83735 ASSAY OF MAGNESIUM: CPT | Performed by: SURGERY

## 2022-03-27 PROCEDURE — 84100 ASSAY OF PHOSPHORUS: CPT | Performed by: SURGERY

## 2022-03-27 RX ORDER — IBUPROFEN 200 MG
400 TABLET ORAL EVERY 4 HOURS PRN
Status: DISCONTINUED | OUTPATIENT
Start: 2022-03-27 | End: 2022-03-31 | Stop reason: HOSPADM

## 2022-03-27 RX ORDER — LEVOTHYROXINE SODIUM 0.1 MG/1
100 TABLET ORAL
Status: DISCONTINUED | OUTPATIENT
Start: 2022-03-28 | End: 2022-03-31 | Stop reason: HOSPADM

## 2022-03-27 RX ORDER — IBUPROFEN 400 MG/1
400 TABLET ORAL EVERY 6 HOURS PRN
COMMUNITY
End: 2022-08-29

## 2022-03-27 RX ORDER — LEVOTHYROXINE SODIUM 0.07 MG/1
75 TABLET ORAL DAILY
Status: DISCONTINUED | OUTPATIENT
Start: 2022-03-28 | End: 2022-03-31 | Stop reason: HOSPADM

## 2022-03-27 RX ADMIN — IBUPROFEN 400 MG: 200 TABLET, FILM COATED ORAL at 21:00

## 2022-03-27 RX ADMIN — METHYLNALTREXONE BROMIDE 6 MG: 12 INJECTION, SOLUTION SUBCUTANEOUS at 08:23

## 2022-03-27 RX ADMIN — IBUPROFEN 400 MG: 200 TABLET, FILM COATED ORAL at 16:31

## 2022-03-27 RX ADMIN — HEPARIN SODIUM 5000 UNITS: 5000 INJECTION, SOLUTION INTRAVENOUS; SUBCUTANEOUS at 05:16

## 2022-03-27 RX ADMIN — DEXTROSE AND SODIUM CHLORIDE 125 ML/HR: 5; 450 INJECTION, SOLUTION INTRAVENOUS at 05:33

## 2022-03-27 RX ADMIN — IBUPROFEN 400 MG: 200 TABLET, FILM COATED ORAL at 12:30

## 2022-03-27 RX ADMIN — FAMOTIDINE 20 MG: 20 TABLET, FILM COATED ORAL at 08:24

## 2022-03-27 RX ADMIN — HEPARIN SODIUM 5000 UNITS: 5000 INJECTION, SOLUTION INTRAVENOUS; SUBCUTANEOUS at 21:03

## 2022-03-27 RX ADMIN — HEPARIN SODIUM 5000 UNITS: 5000 INJECTION, SOLUTION INTRAVENOUS; SUBCUTANEOUS at 14:14

## 2022-03-27 RX ADMIN — ALLOPURINOL 300 MG: 300 TABLET ORAL at 08:24

## 2022-03-27 RX ADMIN — FAMOTIDINE 20 MG: 20 TABLET, FILM COATED ORAL at 20:30

## 2022-03-27 RX ADMIN — LEVOTHYROXINE SODIUM 75 MCG: 75 TABLET ORAL at 01:18

## 2022-03-28 LAB
ANION GAP SERPL CALCULATED.3IONS-SCNC: 10 MMOL/L (ref 5–15)
BUN SERPL-MCNC: 7 MG/DL (ref 8–23)
BUN/CREAT SERPL: 15.6 (ref 7–25)
CALCIUM SPEC-SCNC: 8 MG/DL (ref 8.6–10.5)
CHLORIDE SERPL-SCNC: 105 MMOL/L (ref 98–107)
CO2 SERPL-SCNC: 28 MMOL/L (ref 22–29)
CREAT SERPL-MCNC: 0.45 MG/DL (ref 0.57–1)
DEPRECATED RDW RBC AUTO: 47.3 FL (ref 37–54)
EGFRCR SERPLBLD CKD-EPI 2021: 106.3 ML/MIN/1.73
ERYTHROCYTE [DISTWIDTH] IN BLOOD BY AUTOMATED COUNT: 14.2 % (ref 12.3–15.4)
GLUCOSE SERPL-MCNC: 102 MG/DL (ref 65–99)
HCT VFR BLD AUTO: 33.4 % (ref 34–46.6)
HGB BLD-MCNC: 10.7 G/DL (ref 12–15.9)
MCH RBC QN AUTO: 29 PG (ref 26.6–33)
MCHC RBC AUTO-ENTMCNC: 32 G/DL (ref 31.5–35.7)
MCV RBC AUTO: 90.5 FL (ref 79–97)
PHOSPHATE SERPL-MCNC: 2.2 MG/DL (ref 2.5–4.5)
PLATELET # BLD AUTO: 210 10*3/MM3 (ref 140–450)
PMV BLD AUTO: 11.7 FL (ref 6–12)
POTASSIUM SERPL-SCNC: 3.6 MMOL/L (ref 3.5–5.2)
RBC # BLD AUTO: 3.69 10*6/MM3 (ref 3.77–5.28)
SODIUM SERPL-SCNC: 143 MMOL/L (ref 136–145)
WBC NRBC COR # BLD: 10.39 10*3/MM3 (ref 3.4–10.8)

## 2022-03-28 PROCEDURE — 25010000002 HEPARIN (PORCINE) PER 1000 UNITS: Performed by: SURGERY

## 2022-03-28 PROCEDURE — 25010000002 MORPHINE SULF MICROINFUSION PF 500 MG/20ML (25 MG/ML) SOLUTION: Performed by: SURGERY

## 2022-03-28 PROCEDURE — 84100 ASSAY OF PHOSPHORUS: CPT | Performed by: SURGERY

## 2022-03-28 PROCEDURE — 25010000002 HYDROMORPHONE 1 MG/ML SOLUTION: Performed by: SURGERY

## 2022-03-28 PROCEDURE — 25010000002 ENOXAPARIN PER 10 MG: Performed by: SURGERY

## 2022-03-28 PROCEDURE — 80048 BASIC METABOLIC PNL TOTAL CA: CPT | Performed by: SURGERY

## 2022-03-28 PROCEDURE — 85027 COMPLETE CBC AUTOMATED: CPT | Performed by: SURGERY

## 2022-03-28 RX ORDER — DOCUSATE SODIUM 100 MG/1
100 CAPSULE, LIQUID FILLED ORAL 2 TIMES DAILY
Status: DISCONTINUED | OUTPATIENT
Start: 2022-03-28 | End: 2022-03-31 | Stop reason: HOSPADM

## 2022-03-28 RX ORDER — SENNA PLUS 8.6 MG/1
1 TABLET ORAL NIGHTLY
Status: DISCONTINUED | OUTPATIENT
Start: 2022-03-28 | End: 2022-03-31 | Stop reason: HOSPADM

## 2022-03-28 RX ORDER — MORPHINE SULFATE 1 MG/ML
INJECTION INTRAVENOUS CONTINUOUS
Status: DISCONTINUED | OUTPATIENT
Start: 2022-03-28 | End: 2022-03-29

## 2022-03-28 RX ADMIN — ALLOPURINOL 300 MG: 300 TABLET ORAL at 08:56

## 2022-03-28 RX ADMIN — LEVOTHYROXINE SODIUM 75 MCG: 75 TABLET ORAL at 02:00

## 2022-03-28 RX ADMIN — DOCUSATE SODIUM 100 MG: 100 CAPSULE, LIQUID FILLED ORAL at 12:02

## 2022-03-28 RX ADMIN — LEVOTHYROXINE SODIUM 100 MCG: 100 TABLET ORAL at 05:28

## 2022-03-28 RX ADMIN — HEPARIN SODIUM 5000 UNITS: 5000 INJECTION, SOLUTION INTRAVENOUS; SUBCUTANEOUS at 05:27

## 2022-03-28 RX ADMIN — FAMOTIDINE 20 MG: 20 TABLET, FILM COATED ORAL at 21:20

## 2022-03-28 RX ADMIN — DOCUSATE SODIUM 100 MG: 100 CAPSULE, LIQUID FILLED ORAL at 21:20

## 2022-03-28 RX ADMIN — HYDROMORPHONE HYDROCHLORIDE 0.2 MG: 1 INJECTION, SOLUTION INTRAMUSCULAR; INTRAVENOUS; SUBCUTANEOUS at 16:45

## 2022-03-28 RX ADMIN — SIMETHICONE 80 MG: 80 TABLET, CHEWABLE ORAL at 22:24

## 2022-03-28 RX ADMIN — CARISOPRODOL 350 MG: 350 TABLET ORAL at 16:05

## 2022-03-28 RX ADMIN — FAMOTIDINE 20 MG: 20 TABLET, FILM COATED ORAL at 08:56

## 2022-03-28 RX ADMIN — ENOXAPARIN SODIUM 40 MG: 40 INJECTION SUBCUTANEOUS at 12:03

## 2022-03-28 RX ADMIN — MORPHINE SULFATE: 500 INJECTION EPIDURAL; INTRATHECAL at 22:17

## 2022-03-28 RX ADMIN — CARISOPRODOL 350 MG: 350 TABLET ORAL at 12:08

## 2022-03-28 RX ADMIN — SENNOSIDES 1 TABLET: 8.6 TABLET, FILM COATED ORAL at 21:21

## 2022-03-29 LAB
ANION GAP SERPL CALCULATED.3IONS-SCNC: 8 MMOL/L (ref 5–15)
BUN SERPL-MCNC: 7 MG/DL (ref 8–23)
BUN/CREAT SERPL: 14 (ref 7–25)
CALCIUM SPEC-SCNC: 8.1 MG/DL (ref 8.6–10.5)
CHLORIDE SERPL-SCNC: 103 MMOL/L (ref 98–107)
CO2 SERPL-SCNC: 30 MMOL/L (ref 22–29)
CREAT SERPL-MCNC: 0.5 MG/DL (ref 0.57–1)
CYTO UR: NORMAL
DEPRECATED RDW RBC AUTO: 46.5 FL (ref 37–54)
EGFRCR SERPLBLD CKD-EPI 2021: 103.6 ML/MIN/1.73
ERYTHROCYTE [DISTWIDTH] IN BLOOD BY AUTOMATED COUNT: 13.7 % (ref 12.3–15.4)
GLUCOSE SERPL-MCNC: 111 MG/DL (ref 65–99)
HCT VFR BLD AUTO: 34.1 % (ref 34–46.6)
HGB BLD-MCNC: 10.9 G/DL (ref 12–15.9)
LAB AP CASE REPORT: NORMAL
LAB AP CLINICAL INFORMATION: NORMAL
MCH RBC QN AUTO: 29.3 PG (ref 26.6–33)
MCHC RBC AUTO-ENTMCNC: 32 G/DL (ref 31.5–35.7)
MCV RBC AUTO: 91.7 FL (ref 79–97)
PATH REPORT.FINAL DX SPEC: NORMAL
PATH REPORT.GROSS SPEC: NORMAL
PLATELET # BLD AUTO: 234 10*3/MM3 (ref 140–450)
PMV BLD AUTO: 11.6 FL (ref 6–12)
POTASSIUM SERPL-SCNC: 3.5 MMOL/L (ref 3.5–5.2)
RBC # BLD AUTO: 3.72 10*6/MM3 (ref 3.77–5.28)
SODIUM SERPL-SCNC: 141 MMOL/L (ref 136–145)
WBC NRBC COR # BLD: 10.01 10*3/MM3 (ref 3.4–10.8)

## 2022-03-29 PROCEDURE — 25010000002 ENOXAPARIN PER 10 MG: Performed by: SURGERY

## 2022-03-29 PROCEDURE — 80048 BASIC METABOLIC PNL TOTAL CA: CPT | Performed by: SURGERY

## 2022-03-29 PROCEDURE — 85027 COMPLETE CBC AUTOMATED: CPT | Performed by: SURGERY

## 2022-03-29 PROCEDURE — 25010000002 METHYLNALTREXONE 12 MG/0.6ML SOLUTION: Performed by: SURGERY

## 2022-03-29 RX ORDER — HYDROCODONE BITARTRATE AND ACETAMINOPHEN 5; 325 MG/1; MG/1
1 TABLET ORAL EVERY 4 HOURS PRN
Status: DISCONTINUED | OUTPATIENT
Start: 2022-03-29 | End: 2022-03-31 | Stop reason: HOSPADM

## 2022-03-29 RX ADMIN — FAMOTIDINE 20 MG: 20 TABLET, FILM COATED ORAL at 20:59

## 2022-03-29 RX ADMIN — HYDROCODONE BITARTRATE AND ACETAMINOPHEN 1 TABLET: 5; 325 TABLET ORAL at 19:50

## 2022-03-29 RX ADMIN — DOCUSATE SODIUM 100 MG: 100 CAPSULE, LIQUID FILLED ORAL at 20:59

## 2022-03-29 RX ADMIN — SIMETHICONE 80 MG: 80 TABLET, CHEWABLE ORAL at 19:50

## 2022-03-29 RX ADMIN — ALLOPURINOL 300 MG: 300 TABLET ORAL at 08:13

## 2022-03-29 RX ADMIN — DEXTROSE AND SODIUM CHLORIDE 75 ML/HR: 5; 450 INJECTION, SOLUTION INTRAVENOUS at 04:11

## 2022-03-29 RX ADMIN — SIMETHICONE 80 MG: 80 TABLET, CHEWABLE ORAL at 13:40

## 2022-03-29 RX ADMIN — LEVOTHYROXINE SODIUM 75 MCG: 75 TABLET ORAL at 02:20

## 2022-03-29 RX ADMIN — DOCUSATE SODIUM 100 MG: 100 CAPSULE, LIQUID FILLED ORAL at 08:13

## 2022-03-29 RX ADMIN — SIMETHICONE 80 MG: 80 TABLET, CHEWABLE ORAL at 10:00

## 2022-03-29 RX ADMIN — FAMOTIDINE 20 MG: 20 TABLET, FILM COATED ORAL at 08:13

## 2022-03-29 RX ADMIN — ENOXAPARIN SODIUM 40 MG: 40 INJECTION SUBCUTANEOUS at 13:00

## 2022-03-29 RX ADMIN — METHYLNALTREXONE BROMIDE 6 MG: 12 INJECTION, SOLUTION SUBCUTANEOUS at 08:12

## 2022-03-29 RX ADMIN — HYDROCODONE BITARTRATE AND ACETAMINOPHEN 1 TABLET: 5; 325 TABLET ORAL at 09:14

## 2022-03-29 RX ADMIN — LEVOTHYROXINE SODIUM 100 MCG: 100 TABLET ORAL at 05:06

## 2022-03-29 RX ADMIN — SENNOSIDES 1 TABLET: 8.6 TABLET, FILM COATED ORAL at 20:59

## 2022-03-30 LAB — PHOSPHATE SERPL-MCNC: 3 MG/DL (ref 2.5–4.5)

## 2022-03-30 PROCEDURE — 25010000002 ENOXAPARIN PER 10 MG: Performed by: SURGERY

## 2022-03-30 PROCEDURE — 84100 ASSAY OF PHOSPHORUS: CPT | Performed by: SURGERY

## 2022-03-30 RX ADMIN — HYDROCODONE BITARTRATE AND ACETAMINOPHEN 1 TABLET: 5; 325 TABLET ORAL at 22:57

## 2022-03-30 RX ADMIN — SIMETHICONE 80 MG: 80 TABLET, CHEWABLE ORAL at 14:37

## 2022-03-30 RX ADMIN — SENNOSIDES 1 TABLET: 8.6 TABLET, FILM COATED ORAL at 20:39

## 2022-03-30 RX ADMIN — ENOXAPARIN SODIUM 40 MG: 40 INJECTION SUBCUTANEOUS at 12:00

## 2022-03-30 RX ADMIN — LEVOTHYROXINE SODIUM 75 MCG: 75 TABLET ORAL at 02:13

## 2022-03-30 RX ADMIN — SIMETHICONE 80 MG: 80 TABLET, CHEWABLE ORAL at 03:30

## 2022-03-30 RX ADMIN — ALLOPURINOL 300 MG: 300 TABLET ORAL at 08:03

## 2022-03-30 RX ADMIN — HYDROCODONE BITARTRATE AND ACETAMINOPHEN 1 TABLET: 5; 325 TABLET ORAL at 17:14

## 2022-03-30 RX ADMIN — DOCUSATE SODIUM 100 MG: 100 CAPSULE, LIQUID FILLED ORAL at 20:39

## 2022-03-30 RX ADMIN — DOCUSATE SODIUM 100 MG: 100 CAPSULE, LIQUID FILLED ORAL at 08:02

## 2022-03-30 RX ADMIN — SIMETHICONE 80 MG: 80 TABLET, CHEWABLE ORAL at 09:33

## 2022-03-30 RX ADMIN — LEVOTHYROXINE SODIUM 100 MCG: 100 TABLET ORAL at 02:13

## 2022-03-30 RX ADMIN — HYDROCODONE BITARTRATE AND ACETAMINOPHEN 1 TABLET: 5; 325 TABLET ORAL at 09:33

## 2022-03-30 RX ADMIN — POTASSIUM PHOSPHATE, MONOBASIC POTASSIUM PHOSPHATE, DIBASIC 15 MMOL: 224; 236 INJECTION, SOLUTION, CONCENTRATE INTRAVENOUS at 09:10

## 2022-03-30 RX ADMIN — FAMOTIDINE 20 MG: 20 TABLET, FILM COATED ORAL at 08:03

## 2022-03-30 RX ADMIN — FAMOTIDINE 20 MG: 20 TABLET, FILM COATED ORAL at 20:39

## 2022-03-30 RX ADMIN — HYDROCODONE BITARTRATE AND ACETAMINOPHEN 1 TABLET: 5; 325 TABLET ORAL at 05:33

## 2022-03-31 ENCOUNTER — READMISSION MANAGEMENT (OUTPATIENT)
Dept: CALL CENTER | Facility: HOSPITAL | Age: 67
End: 2022-03-31

## 2022-03-31 VITALS
BODY MASS INDEX: 38.56 KG/M2 | SYSTOLIC BLOOD PRESSURE: 133 MMHG | DIASTOLIC BLOOD PRESSURE: 88 MMHG | WEIGHT: 260.36 LBS | HEART RATE: 75 BPM | HEIGHT: 69 IN | OXYGEN SATURATION: 90 % | RESPIRATION RATE: 18 BRPM | TEMPERATURE: 98.3 F

## 2022-03-31 PROCEDURE — 25010000002 METHYLNALTREXONE 12 MG/0.6ML SOLUTION: Performed by: SURGERY

## 2022-03-31 PROCEDURE — 25010000002 ENOXAPARIN PER 10 MG: Performed by: SURGERY

## 2022-03-31 RX ORDER — DOCUSATE SODIUM 100 MG/1
100 CAPSULE, LIQUID FILLED ORAL 2 TIMES DAILY
Qty: 30 CAPSULE | Refills: 1 | Status: SHIPPED | OUTPATIENT
Start: 2022-03-31 | End: 2022-04-01 | Stop reason: ALTCHOICE

## 2022-03-31 RX ORDER — HYDROCODONE BITARTRATE AND ACETAMINOPHEN 5; 325 MG/1; MG/1
1 TABLET ORAL EVERY 4 HOURS PRN
Qty: 12 TABLET | Refills: 0 | Status: SHIPPED | OUTPATIENT
Start: 2022-03-31 | End: 2022-04-01 | Stop reason: ALTCHOICE

## 2022-03-31 RX ADMIN — ENOXAPARIN SODIUM 40 MG: 40 INJECTION SUBCUTANEOUS at 11:33

## 2022-03-31 RX ADMIN — METHYLNALTREXONE BROMIDE 6 MG: 12 INJECTION, SOLUTION SUBCUTANEOUS at 08:26

## 2022-03-31 RX ADMIN — LEVOTHYROXINE SODIUM 100 MCG: 100 TABLET ORAL at 01:37

## 2022-03-31 RX ADMIN — LEVOTHYROXINE SODIUM 75 MCG: 75 TABLET ORAL at 01:36

## 2022-03-31 RX ADMIN — FAMOTIDINE 20 MG: 20 TABLET, FILM COATED ORAL at 08:26

## 2022-03-31 RX ADMIN — ALLOPURINOL 300 MG: 300 TABLET ORAL at 08:26

## 2022-03-31 RX ADMIN — HYDROCODONE BITARTRATE AND ACETAMINOPHEN 1 TABLET: 5; 325 TABLET ORAL at 05:51

## 2022-03-31 RX ADMIN — DOCUSATE SODIUM 100 MG: 100 CAPSULE, LIQUID FILLED ORAL at 08:26

## 2022-03-31 NOTE — OUTREACH NOTE
Prep Survey    Flowsheet Row Responses   [facility] patient discharged from? [place]   Is LACE score < 7 ? No   Emergency Room discharge w/ pulse ox? No   Eligibility [facility]   Date of Admission 03/25/22   Date of Discharge 03/31/22   Discharge diagnosis COLON RESECTION  Asthma with acute exacerbation   Does the patient have one of the following disease processes/diagnoses(primary or secondary)? General Surgery   Does the patient have Home health ordered? No   Is there a DME ordered? No   Prep survey completed? Yes          RONNIE LINDSAY - Registered Nurse

## 2022-04-01 ENCOUNTER — TRANSITIONAL CARE MANAGEMENT TELEPHONE ENCOUNTER (OUTPATIENT)
Dept: CALL CENTER | Facility: HOSPITAL | Age: 67
End: 2022-04-01

## 2022-04-01 NOTE — OUTREACH NOTE
Call Center TCM Note    Flowsheet Row Responses   [facility] patient discharged from? [place]   Does the patient have one of the following disease processes/diagnoses(primary or secondary)? General Surgery   TCM attempt successful? Yes   Call start time 1616   Call end time 1621   Discharge diagnosis COLON RESECTION  Asthma with acute exacerbation   Meds reviewed with patient/caregiver? Yes   Is the patient having any side effects they believe may be caused by any medication additions or changes? No   Does the patient have all medications related to this admission filled (includes all antibiotics, pain medications, etc.) Yes   Prescription comments Pt upset pain medication was not sent to her pharmacy (sent to  Pharmacy)    Is the patient taking all medications as directed (includes completed medication regime)? Yes   Does the patient have a follow up appointment scheduled with their surgeon? Yes  [FU apt with surgeon on 4-13-22]   Has the patient kept scheduled appointments due by today? N/A   Comments pt will make fu apt with PCP    Psychosocial issues? No   Did the patient receive a copy of their discharge instructions? Yes   Nursing interventions Reviewed instructions with patient   What is the patient's perception of their health status since discharge? Improving   Nursing interventions Nurse provided patient education   Is the patient /caregiver able to teach back basic post-op care? Continue use of incentive spirometry at least 1 week post discharge, Keep incision areas clean,dry and protected   Is the patient/caregiver able to teach back signs and symptoms of incisional infection? Increased redness, swelling or pain at the incisonal site, Fever, Incisional warmth   Is the patient/caregiver able to teach back steps to recovery at home? Set small, achievable goals for return to baseline health, Rest and rebuild strength, gradually increase activity   If the patient is a current smoker, are they able  to teach back resources for cessation? Not a smoker   Is the patient/caregiver able to teach back the hierarchy of who to call/visit for symptoms/problems? PCP, Specialist, Home health nurse, Urgent Care, ED, 911 Yes   TCM call completed? Yes          Kelle Ramon RN    4/1/2022, 16:22 EDT

## 2022-04-01 NOTE — OUTREACH NOTE
Call Center TCM Note    Flowsheet Row Responses   [facility] patient discharged from? [place]   Does the patient have one of the following disease processes/diagnoses(primary or secondary)? General Surgery   TCM attempt successful? No   Unsuccessful attempts Attempt 1          Kelle Ramon RN    4/1/2022, 16:06 EDT

## 2022-04-04 ENCOUNTER — HOSPITAL ENCOUNTER (EMERGENCY)
Facility: HOSPITAL | Age: 67
Discharge: LEFT BEFORE DISCHARGE | End: 2022-04-04

## 2022-04-04 VITALS
HEART RATE: 102 BPM | SYSTOLIC BLOOD PRESSURE: 145 MMHG | WEIGHT: 260 LBS | BODY MASS INDEX: 38.51 KG/M2 | TEMPERATURE: 98.4 F | HEIGHT: 69 IN | OXYGEN SATURATION: 95 % | RESPIRATION RATE: 20 BRPM | DIASTOLIC BLOOD PRESSURE: 61 MMHG

## 2022-04-04 LAB
ALBUMIN SERPL-MCNC: 3.2 G/DL (ref 3.5–5.2)
ALBUMIN/GLOB SERPL: 1.1 G/DL
ALP SERPL-CCNC: 76 U/L (ref 39–117)
ALT SERPL W P-5'-P-CCNC: 20 U/L (ref 1–33)
ANION GAP SERPL CALCULATED.3IONS-SCNC: 11 MMOL/L (ref 5–15)
AST SERPL-CCNC: 13 U/L (ref 1–32)
BASOPHILS # BLD AUTO: 0.06 10*3/MM3 (ref 0–0.2)
BASOPHILS NFR BLD AUTO: 0.3 % (ref 0–1.5)
BILIRUB SERPL-MCNC: 0.3 MG/DL (ref 0–1.2)
BUN SERPL-MCNC: 9 MG/DL (ref 8–23)
BUN/CREAT SERPL: 14.1 (ref 7–25)
CALCIUM SPEC-SCNC: 8.3 MG/DL (ref 8.6–10.5)
CHLORIDE SERPL-SCNC: 99 MMOL/L (ref 98–107)
CO2 SERPL-SCNC: 27 MMOL/L (ref 22–29)
CREAT SERPL-MCNC: 0.64 MG/DL (ref 0.57–1)
D-LACTATE SERPL-SCNC: 1 MMOL/L (ref 0.5–2)
DEPRECATED RDW RBC AUTO: 46.1 FL (ref 37–54)
EGFRCR SERPLBLD CKD-EPI 2021: 97.6 ML/MIN/1.73
EOSINOPHIL # BLD AUTO: 0.14 10*3/MM3 (ref 0–0.4)
EOSINOPHIL NFR BLD AUTO: 0.7 % (ref 0.3–6.2)
ERYTHROCYTE [DISTWIDTH] IN BLOOD BY AUTOMATED COUNT: 13.9 % (ref 12.3–15.4)
GLOBULIN UR ELPH-MCNC: 3 GM/DL
GLUCOSE SERPL-MCNC: 112 MG/DL (ref 65–99)
HCT VFR BLD AUTO: 33.9 % (ref 34–46.6)
HGB BLD-MCNC: 10.9 G/DL (ref 12–15.9)
HOLD SPECIMEN: NORMAL
IMM GRANULOCYTES # BLD AUTO: 0.27 10*3/MM3 (ref 0–0.05)
IMM GRANULOCYTES NFR BLD AUTO: 1.4 % (ref 0–0.5)
LIPASE SERPL-CCNC: 50 U/L (ref 13–60)
LYMPHOCYTES # BLD AUTO: 1.58 10*3/MM3 (ref 0.7–3.1)
LYMPHOCYTES NFR BLD AUTO: 8.1 % (ref 19.6–45.3)
MCH RBC QN AUTO: 29.2 PG (ref 26.6–33)
MCHC RBC AUTO-ENTMCNC: 32.2 G/DL (ref 31.5–35.7)
MCV RBC AUTO: 90.9 FL (ref 79–97)
MONOCYTES # BLD AUTO: 1.16 10*3/MM3 (ref 0.1–0.9)
MONOCYTES NFR BLD AUTO: 5.9 % (ref 5–12)
NEUTROPHILS NFR BLD AUTO: 16.33 10*3/MM3 (ref 1.7–7)
NEUTROPHILS NFR BLD AUTO: 83.6 % (ref 42.7–76)
NRBC BLD AUTO-RTO: 0 /100 WBC (ref 0–0.2)
PLATELET # BLD AUTO: 383 10*3/MM3 (ref 140–450)
PMV BLD AUTO: 10.4 FL (ref 6–12)
POTASSIUM SERPL-SCNC: 3.5 MMOL/L (ref 3.5–5.2)
PROT SERPL-MCNC: 6.2 G/DL (ref 6–8.5)
RBC # BLD AUTO: 3.73 10*6/MM3 (ref 3.77–5.28)
SODIUM SERPL-SCNC: 137 MMOL/L (ref 136–145)
WBC NRBC COR # BLD: 19.54 10*3/MM3 (ref 3.4–10.8)
WHOLE BLOOD HOLD SPECIMEN: NORMAL
WHOLE BLOOD HOLD SPECIMEN: NORMAL

## 2022-04-04 PROCEDURE — 80053 COMPREHEN METABOLIC PANEL: CPT

## 2022-04-04 PROCEDURE — 99211 OFF/OP EST MAY X REQ PHY/QHP: CPT

## 2022-04-04 PROCEDURE — 83605 ASSAY OF LACTIC ACID: CPT

## 2022-04-04 PROCEDURE — 85025 COMPLETE CBC W/AUTO DIFF WBC: CPT

## 2022-04-04 PROCEDURE — 83690 ASSAY OF LIPASE: CPT

## 2022-04-04 RX ORDER — SODIUM CHLORIDE 9 MG/ML
10 INJECTION INTRAVENOUS AS NEEDED
Status: DISCONTINUED | OUTPATIENT
Start: 2022-04-04 | End: 2022-04-05 | Stop reason: HOSPADM

## 2022-04-05 ENCOUNTER — TRANSCRIBE ORDERS (OUTPATIENT)
Dept: GENERAL RADIOLOGY | Facility: HOSPITAL | Age: 67
End: 2022-04-05

## 2022-04-05 ENCOUNTER — APPOINTMENT (OUTPATIENT)
Dept: CT IMAGING | Facility: HOSPITAL | Age: 67
End: 2022-04-05

## 2022-04-05 ENCOUNTER — HOSPITAL ENCOUNTER (OUTPATIENT)
Dept: GENERAL RADIOLOGY | Facility: HOSPITAL | Age: 67
Discharge: HOME | End: 2022-04-05
Admitting: NURSE PRACTITIONER

## 2022-04-05 ENCOUNTER — HOSPITAL ENCOUNTER (EMERGENCY)
Facility: HOSPITAL | Age: 67
Discharge: HOME | End: 2022-04-06
Attending: EMERGENCY MEDICINE | Admitting: EMERGENCY MEDICINE

## 2022-04-05 DIAGNOSIS — J98.8 UPPER RESPIRATORY TRACT OBSTRUCTION: Primary | ICD-10-CM

## 2022-04-05 DIAGNOSIS — J98.8 UPPER RESPIRATORY TRACT OBSTRUCTION: ICD-10-CM

## 2022-04-05 DIAGNOSIS — L03.311 ABDOMINAL WALL CELLULITIS: Primary | ICD-10-CM

## 2022-04-05 LAB
ALBUMIN SERPL-MCNC: 3.3 G/DL (ref 3.5–5.2)
ALBUMIN/GLOB SERPL: 1.1 G/DL
ALP SERPL-CCNC: 80 U/L (ref 39–117)
ALT SERPL W P-5'-P-CCNC: 19 U/L (ref 1–33)
ANION GAP SERPL CALCULATED.3IONS-SCNC: 11.2 MMOL/L (ref 5–15)
AST SERPL-CCNC: 15 U/L (ref 1–32)
BACTERIA UR QL AUTO: ABNORMAL /HPF
BASOPHILS # BLD AUTO: 0.06 10*3/MM3 (ref 0–0.2)
BASOPHILS NFR BLD AUTO: 0.3 % (ref 0–1.5)
BILIRUB SERPL-MCNC: 0.4 MG/DL (ref 0–1.2)
BILIRUB UR QL STRIP: NEGATIVE
BUN SERPL-MCNC: 9 MG/DL (ref 8–23)
BUN/CREAT SERPL: 11.8 (ref 7–25)
CALCIUM SPEC-SCNC: 8.8 MG/DL (ref 8.6–10.5)
CHLORIDE SERPL-SCNC: 99 MMOL/L (ref 98–107)
CLARITY UR: CLEAR
CO2 SERPL-SCNC: 27.8 MMOL/L (ref 22–29)
COLOR UR: YELLOW
CREAT SERPL-MCNC: 0.76 MG/DL (ref 0.57–1)
D-LACTATE SERPL-SCNC: 1.1 MMOL/L (ref 0.5–2)
DEPRECATED RDW RBC AUTO: 45.7 FL (ref 37–54)
EGFRCR SERPLBLD CKD-EPI 2021: 86.5 ML/MIN/1.73
EOSINOPHIL # BLD AUTO: 0.19 10*3/MM3 (ref 0–0.4)
EOSINOPHIL NFR BLD AUTO: 1 % (ref 0.3–6.2)
ERYTHROCYTE [DISTWIDTH] IN BLOOD BY AUTOMATED COUNT: 14.1 % (ref 12.3–15.4)
GLOBULIN UR ELPH-MCNC: 3.1 GM/DL
GLUCOSE SERPL-MCNC: 111 MG/DL (ref 65–99)
GLUCOSE UR STRIP-MCNC: NEGATIVE MG/DL
HCT VFR BLD AUTO: 33.4 % (ref 34–46.6)
HGB BLD-MCNC: 10.9 G/DL (ref 12–15.9)
HGB UR QL STRIP.AUTO: ABNORMAL
HYALINE CASTS UR QL AUTO: ABNORMAL /LPF
IMM GRANULOCYTES # BLD AUTO: 0.25 10*3/MM3 (ref 0–0.05)
IMM GRANULOCYTES NFR BLD AUTO: 1.3 % (ref 0–0.5)
KETONES UR QL STRIP: NEGATIVE
LEUKOCYTE ESTERASE UR QL STRIP.AUTO: NEGATIVE
LIPASE SERPL-CCNC: 78 U/L (ref 13–60)
LYMPHOCYTES # BLD AUTO: 1.9 10*3/MM3 (ref 0.7–3.1)
LYMPHOCYTES NFR BLD AUTO: 9.8 % (ref 19.6–45.3)
MCH RBC QN AUTO: 29 PG (ref 26.6–33)
MCHC RBC AUTO-ENTMCNC: 32.6 G/DL (ref 31.5–35.7)
MCV RBC AUTO: 88.8 FL (ref 79–97)
MONOCYTES # BLD AUTO: 1.69 10*3/MM3 (ref 0.1–0.9)
MONOCYTES NFR BLD AUTO: 8.7 % (ref 5–12)
NEUTROPHILS NFR BLD AUTO: 15.36 10*3/MM3 (ref 1.7–7)
NEUTROPHILS NFR BLD AUTO: 78.9 % (ref 42.7–76)
NITRITE UR QL STRIP: NEGATIVE
NRBC BLD AUTO-RTO: 0 /100 WBC (ref 0–0.2)
PH UR STRIP.AUTO: 7 [PH] (ref 5–8)
PLATELET # BLD AUTO: 414 10*3/MM3 (ref 140–450)
PMV BLD AUTO: 10.1 FL (ref 6–12)
POTASSIUM SERPL-SCNC: 4 MMOL/L (ref 3.5–5.2)
PROCALCITONIN SERPL-MCNC: 0.13 NG/ML (ref 0–0.25)
PROT SERPL-MCNC: 6.4 G/DL (ref 6–8.5)
PROT UR QL STRIP: NEGATIVE
RBC # BLD AUTO: 3.76 10*6/MM3 (ref 3.77–5.28)
RBC # UR STRIP: ABNORMAL /HPF
REF LAB TEST METHOD: ABNORMAL
SODIUM SERPL-SCNC: 138 MMOL/L (ref 136–145)
SP GR UR STRIP: <=1.005 (ref 1–1.03)
SQUAMOUS #/AREA URNS HPF: ABNORMAL /HPF
UROBILINOGEN UR QL STRIP: ABNORMAL
WBC # UR STRIP: ABNORMAL /HPF
WBC NRBC COR # BLD: 19.45 10*3/MM3 (ref 3.4–10.8)

## 2022-04-05 PROCEDURE — 99283 EMERGENCY DEPT VISIT LOW MDM: CPT

## 2022-04-05 PROCEDURE — 74177 CT ABD & PELVIS W/CONTRAST: CPT

## 2022-04-05 PROCEDURE — 81001 URINALYSIS AUTO W/SCOPE: CPT | Performed by: PHYSICIAN ASSISTANT

## 2022-04-05 PROCEDURE — 85025 COMPLETE CBC W/AUTO DIFF WBC: CPT | Performed by: PHYSICIAN ASSISTANT

## 2022-04-05 PROCEDURE — 71046 X-RAY EXAM CHEST 2 VIEWS: CPT

## 2022-04-05 PROCEDURE — 83690 ASSAY OF LIPASE: CPT | Performed by: PHYSICIAN ASSISTANT

## 2022-04-05 PROCEDURE — 87040 BLOOD CULTURE FOR BACTERIA: CPT | Performed by: PHYSICIAN ASSISTANT

## 2022-04-05 PROCEDURE — 84145 PROCALCITONIN (PCT): CPT | Performed by: PHYSICIAN ASSISTANT

## 2022-04-05 PROCEDURE — 36415 COLL VENOUS BLD VENIPUNCTURE: CPT

## 2022-04-05 PROCEDURE — 83605 ASSAY OF LACTIC ACID: CPT | Performed by: PHYSICIAN ASSISTANT

## 2022-04-05 PROCEDURE — 25010000002 IOPAMIDOL 61 % SOLUTION: Performed by: EMERGENCY MEDICINE

## 2022-04-05 PROCEDURE — 80053 COMPREHEN METABOLIC PANEL: CPT | Performed by: PHYSICIAN ASSISTANT

## 2022-04-05 RX ADMIN — IOPAMIDOL 100 ML: 612 INJECTION, SOLUTION INTRAVENOUS at 23:19

## 2022-04-06 VITALS
OXYGEN SATURATION: 91 % | RESPIRATION RATE: 16 BRPM | HEART RATE: 88 BPM | DIASTOLIC BLOOD PRESSURE: 62 MMHG | HEIGHT: 69 IN | BODY MASS INDEX: 38.51 KG/M2 | SYSTOLIC BLOOD PRESSURE: 126 MMHG | TEMPERATURE: 98.2 F | WEIGHT: 260 LBS

## 2022-04-06 PROCEDURE — 87070 CULTURE OTHR SPECIMN AEROBIC: CPT | Performed by: EMERGENCY MEDICINE

## 2022-04-06 PROCEDURE — 87186 SC STD MICRODIL/AGAR DIL: CPT | Performed by: EMERGENCY MEDICINE

## 2022-04-06 PROCEDURE — 87205 SMEAR GRAM STAIN: CPT | Performed by: EMERGENCY MEDICINE

## 2022-04-06 RX ORDER — METRONIDAZOLE 500 MG/1
500 TABLET ORAL 3 TIMES DAILY
Qty: 21 TABLET | Refills: 0 | Status: SHIPPED | OUTPATIENT
Start: 2022-04-06 | End: 2022-04-13

## 2022-04-06 RX ORDER — METRONIDAZOLE 500 MG/1
500 TABLET ORAL ONCE
Status: COMPLETED | OUTPATIENT
Start: 2022-04-06 | End: 2022-04-06

## 2022-04-06 RX ADMIN — METRONIDAZOLE 500 MG: 500 TABLET ORAL at 00:46

## 2022-04-06 NOTE — ED PROVIDER NOTES
Subjective   Chief Complaint: Drainage from surgical wound  History of Present Illness: 67-year-old female had a descending and sigmoid colon resection on 3/25/2022 of shoshana Bunch.  She states this was done due to history of diverticulitis.  She states she was doing well postoperatively until today she noted erythema purulence from the trocar insertion point in the left lower abdomen.  She states when she was admitted to the hospital she did have a HECTOR drain in place but it was removed prior to discharge.  Coincidentally she was seen outpatient today for a cough and had a chest x-ray and was told she likely had pneumonia was started on Levaquin and she is at 1 dose today.  She states she has pain and tenderness around the draining site to her left lower abdomen.  She states she will also had a fever yesterday of 100.2 but attributed this to the cough.  She states she is having diarrhea with some brownish blood which she was told be normal 6 weeks out from surgery.  Onset: This evening  Timing: Ongoing  Exacerbating / Alleviating factors: Worse with palpation of the left lower quadrant  Associated symptoms: Fever last night, cough      Nurses Notes reviewed and agree, including vitals, allergies, social history and prior medical history.          Review of Systems   Constitutional: Positive for fever.   HENT: Negative.    Eyes: Negative.    Respiratory: Positive for cough.    Cardiovascular: Negative.    Gastrointestinal: Positive for abdominal pain and diarrhea.   Genitourinary: Negative.    Musculoskeletal: Negative.    Skin: Negative.    Neurological: Negative.    Psychiatric/Behavioral: Negative.        Past Medical History:   Diagnosis Date   • Abdominal pain    • Abnormal ECG    • Acute sinusitis    • Allergic    • Ankle joint pain    • Arthritis    • Asthma    • Atrial fibrillation (HCC)    • Chronic bronchitis (HCC)    • COVID-19 vaccine series completed     pfizer x3   • CPAP (continuous positive  "airway pressure) dependence    • Degenerative joint disease    • Depression    • Diverticulitis    • Diverticulosis    • Dizziness     Has had some episodes. No blake syncope.11/2007 patient underwent pulmonary vein isolation, initially successful.Patient returned, however in February noting some recurrent episode of dizziness.Then wore wore event recorder which showed some recurrent PAF.   • Dysuria    • Easy bruising    • Elevated cholesterol    • Follicular thyroid cancer (HCC)     treated with total thyroidectomy followed by [facility]   • GERD (gastroesophageal reflux disease)    • H/O bone density study    • Hay fever    • History of chest x-ray 07/10/2015    No acute cardiopulmonary process   • History of chest x-ray 07/02/2015    No acute cardiopulmonary process   • History of echocardiogram 04/04/2007    LVEF of greater than 60%. Mild MR.Mild TR.Trace pulmonary insufficinecy.   • History of mammogram    • History of PFTs 07/02/2015    Normal spirometry   • Hyperlipidemia    • Hypothyroidism    • Measles    • Migraine headache    • Mumps    • Osteoporosis    • Paroxysmal atrial fibrillation (HCC)     A. Failed medical therapy. B. Pulmonary vein isolation 11/2006. C. Recurrent episodes of PAF by event recorder 9/2006.   • Pneumonia    • PONV (postoperative nausea and vomiting)    • Shortness of breath    • Sleep apnea    • Surfer's nodules of right foot    • Torn meniscus    • Varicella        Allergies   Allergen Reactions   • Codeine Other (See Comments)     Pt states \"it made me feel like my insides were in a vice \"   • Niacin Hives   • Darvon [Propoxyphene] Nausea And Vomiting   • Adhesive Tape Rash     tegaderm    • Ciprodex [Ciprofloxacin-Dexamethasone] Other (See Comments)     REDNESS,tendonitis   • Other Other (See Comments)     POLLEN,TREES,AND PLANTS MULTIPLE ENVIRONMENTAL ALLERGIES       Past Surgical History:   Procedure Laterality Date   • ABLATION OF DYSRHYTHMIC FOCUS      x 2   • CARDIAC " ELECTROPHYSIOLOGY PROCEDURE N/A 8/4/2016    Procedure: Loop recorder implant;  Surgeon: Himanshu Calvert MD;  Location:  [facility];  Service:    • CARDIAC ELECTROPHYSIOLOGY PROCEDURE N/A 10/13/2016    Procedure: Loop recorder removal;  Surgeon: Himanshu Calvert MD;  Location:  [facility];  Service:    • CARDIAC ELECTROPHYSIOLOGY PROCEDURE N/A 12/18/2017    Procedure: Loop insertion;  Surgeon: Mary Ann Blackwell MD;  Location:  [facility];  Service:    • CARDIAC ELECTROPHYSIOLOGY PROCEDURE N/A 11/6/2019    Procedure: Loop recorder removal;  Surgeon: Branden Chatterjee MD;  Location:  [facility];  Service: Cardiovascular   • CATARACT EXTRACTION W/ INTRAOCULAR LENS IMPLANT Left 12/20/2021    Procedure: CATARACT PHACO EXTRACTION WITH INTRAOCULAR LENS IMPLANT LEFT;  Surgeon: Arthur Mcdonald MD;  Location:  [facility];  Service: Ophthalmology;  Laterality: Left;   • CATARACT EXTRACTION W/ INTRAOCULAR LENS IMPLANT Right 1/3/2022    Procedure: CATARACT PHACO EXTRACTION WITH INTRAOCULAR LENS IMPLANT RIGHT WITH VIVITY LENS;  Surgeon: Arthur Mcdonald MD;  Location:  [facility];  Service: Ophthalmology;  Laterality: Right;   • CHOLECYSTECTOMY     • COLON RESECTION N/A 3/25/2022    Procedure: LAPAROSCOPIC  COLON RESECTION SIGMOIDECTOMY;  Surgeon: Arturo Kumar MD;  Location:  [facility];  Service: General;  Laterality: N/A;   • COLONOSCOPY     • DILATATION AND CURETTAGE     • EAR TUBES Bilateral    • FOOT SURGERY      bone spur   • HAMMER TOE REPAIR Left 11/13/2020   • INGUINAL HERNIA REPAIR Left     x 3   • LUNG BIOPSY      Remote   • LYMPH NODE BIOPSY     • MOUTH SURGERY      WISDOM TEETH   • REPLACEMENT TOTAL KNEE BILATERAL Bilateral    • THYROIDECTOMY, PARTIAL Left 06/2012    Hugh Davis   • THYROIDECTOMY, PARTIAL Right 07/2012    Hugh Davis   • TONSILLECTOMY  07/2020    Dr. Ethan Mcneill   • TOTAL ABDOMINAL HYSTERECTOMY WITH  SALPINGO OOPHORECTOMY Bilateral    • TOTAL THYROIDECTOMY      completion thyroidectomy for follicular thyroid cancer.     • TYMPANOSTOMY TUBE PLACEMENT Right 2020    Dr. Ethan Mcneill       Family History   Problem Relation Age of Onset   • Atrial fibrillation Mother    • Thyroid disease Mother    • Early death Father 68   • Lung cancer Father    • Early death Sister 16   • Heart attack Sister    • Down syndrome Sister    • Sleep apnea Brother         Nonorganic   • Other Brother         Palpitations (Symptom)   • Breast cancer Neg Hx    • Ovarian cancer Neg Hx        Social History     Socioeconomic History   • Marital status:    Tobacco Use   • Smoking status: Former Smoker     Packs/day: 0.50     Years: 22.00     Pack years: 11.00     Quit date: 1996     Years since quittin.8   • Smokeless tobacco: Never Used   Vaping Use   • Vaping Use: Never used   Substance and Sexual Activity   • Alcohol use: No   • Drug use: No   • Sexual activity: Defer     Birth control/protection: Surgical           Objective   Physical Exam  Vitals and nursing note reviewed.   Constitutional:       Appearance: Normal appearance. She is obese.   HENT:      Head: Normocephalic and atraumatic.      Nose: Nose normal.   Eyes:      Extraocular Movements: Extraocular movements intact.   Cardiovascular:      Rate and Rhythm: Normal rate and regular rhythm.   Pulmonary:      Effort: Pulmonary effort is normal.   Abdominal:      General: Abdomen is flat.      Palpations: Abdomen is soft.      Comments: Numerous surgical wounds all which appear well-healing without complication except for the one in the left lower quadrant.  There is a small what appears to be trocar insertion point with copious amounts of purulent drainage with surrounding cellulitis   Musculoskeletal:         General: Normal range of motion.      Cervical back: Normal range of motion.   Skin:     General: Skin is warm and dry.   Neurological:      General:  No focal deficit present.      Mental Status: She is alert. Mental status is at baseline.   Psychiatric:         Mood and Affect: Mood normal.         Behavior: Behavior normal.         Procedures           ED Course  ED Course as of 04/06/22 0033   Tue Apr 05, 2022 2255 WBC(!): 19.45 [TM]   2255 Lactate: 1.1 [TM]   Wed Apr 06, 2022   0008 Procalcitonin: 0.13 [TM]   0008 WBC(!): 19.45 [TM]      ED Course User Index  [TM] Markell Min PA-C                                                 [facility]  0034  CT scan shows no evidence of intra-abdominal process.  No abscess.  Lactic acid and procalcitonin normal.  Discussed with the case with Dr. Navarro and had him examined the patient at bedside as well.  Cultures obtained both blood and wound.  Given the fact the patient looks very well no indication for emergent surgical intervention Dr. Moore recommends adding Flagyl to the Levaquin she is already on and having her follow-up with her surgeon by calling tomorrow.  Patient is comfortable with an understanding of the plan will return for any worsening symptoms.  Strict return to care precautions given.  Patient and significant other expressed understanding.  Final diagnoses:   Abdominal wall cellulitis       ED Disposition  ED Disposition     ED Disposition   Discharge    Condition   Stable    Comment   --             Arturo Kumar MD  [address] [address]  [phone]    Call in 1 day      [facility] Emergency Department  [address] [address] [phone]    If symptoms worsen         Medication List      New Prescriptions    metroNIDAZOLE 500 MG tablet  Commonly known as: FLAGYL  Take 1 tablet by mouth 3 (Three) Times a Day for 7 days.           Where to Get Your Medications      These medications were sent to [facility] [place], [place] - [address] - [phone]  - [phone]   [address]  [place] [address]    Phone: [phone]   · metroNIDAZOLE 500 MG tablet          Markell Min PA-C  04/06/22 0033       Markell Min PA-C  04/06/22 0038

## 2022-04-08 ENCOUNTER — TELEPHONE (OUTPATIENT)
Dept: EMERGENCY DEPT | Facility: HOSPITAL | Age: 67
End: 2022-04-08

## 2022-04-08 LAB
BACTERIA SPEC AEROBE CULT: ABNORMAL
GRAM STN SPEC: ABNORMAL
GRAM STN SPEC: ABNORMAL

## 2022-04-08 RX ORDER — DOXYCYCLINE 100 MG/1
100 CAPSULE ORAL 2 TIMES DAILY
Qty: 20 CAPSULE | Refills: 0 | Status: SHIPPED | OUTPATIENT
Start: 2022-04-08 | End: 2022-04-18

## 2022-04-10 LAB
BACTERIA SPEC AEROBE CULT: NORMAL
BACTERIA SPEC AEROBE CULT: NORMAL

## 2022-04-11 ENCOUNTER — READMISSION MANAGEMENT (OUTPATIENT)
Dept: CALL CENTER | Facility: HOSPITAL | Age: 67
End: 2022-04-11

## 2022-04-11 NOTE — OUTREACH NOTE
General Surgery Week 2 Survey    Flowsheet Row Responses   [facility] patient discharged from? [facility]   Does the patient have one of the following disease processes/diagnoses(primary or secondary)? General Surgery   Week 2 attempt successful? No   Unsuccessful attempts Attempt 1          NERISSA BRONSON - Registered Nurse

## 2022-04-14 ENCOUNTER — READMISSION MANAGEMENT (OUTPATIENT)
Dept: CALL CENTER | Facility: HOSPITAL | Age: 67
End: 2022-04-14

## 2022-04-14 NOTE — OUTREACH NOTE
General Surgery Week 2 Survey    Flowsheet Row Responses   [facility] [place] by [facility] patient discharged from? [place]   Does the patient have one of the following disease processes/diagnoses(primary or secondary)? General Surgery   Week 2 attempt successful? No   Unsuccessful attempts Attempt 2   Discharge diagnosis COLON RESECTION  Asthma with acute exacerbation          MARVIN T - Registered Nurse

## 2022-04-17 ENCOUNTER — APPOINTMENT (OUTPATIENT)
Dept: CT IMAGING | Facility: HOSPITAL | Age: 67
End: 2022-04-17

## 2022-04-17 ENCOUNTER — HOSPITAL ENCOUNTER (EMERGENCY)
Facility: HOSPITAL | Age: 67
Discharge: HOME | End: 2022-04-17
Attending: EMERGENCY MEDICINE | Admitting: FAMILY MEDICINE

## 2022-04-17 ENCOUNTER — APPOINTMENT (OUTPATIENT)
Dept: GENERAL RADIOLOGY | Facility: HOSPITAL | Age: 67
End: 2022-04-17

## 2022-04-17 VITALS
HEIGHT: 69 IN | HEART RATE: 72 BPM | WEIGHT: 247 LBS | RESPIRATION RATE: 22 BRPM | DIASTOLIC BLOOD PRESSURE: 68 MMHG | BODY MASS INDEX: 36.58 KG/M2 | TEMPERATURE: 97.6 F | OXYGEN SATURATION: 96 % | SYSTOLIC BLOOD PRESSURE: 132 MMHG

## 2022-04-17 DIAGNOSIS — R10.12 LEFT UPPER QUADRANT PAIN: Primary | ICD-10-CM

## 2022-04-17 LAB
ALBUMIN SERPL-MCNC: 4 G/DL (ref 3.5–5.2)
ALBUMIN/GLOB SERPL: 1.7 G/DL
ALP SERPL-CCNC: 101 U/L (ref 39–117)
ALT SERPL W P-5'-P-CCNC: 14 U/L (ref 1–33)
AMORPH URATE CRY URNS QL MICRO: ABNORMAL /HPF
ANION GAP SERPL CALCULATED.3IONS-SCNC: 10.5 MMOL/L (ref 5–15)
AST SERPL-CCNC: 14 U/L (ref 1–32)
BACTERIA UR QL AUTO: ABNORMAL /HPF
BASOPHILS # BLD AUTO: 0.06 10*3/MM3 (ref 0–0.2)
BASOPHILS NFR BLD AUTO: 0.7 % (ref 0–1.5)
BILIRUB SERPL-MCNC: 0.2 MG/DL (ref 0–1.2)
BILIRUB UR QL STRIP: NEGATIVE
BUN SERPL-MCNC: 10 MG/DL (ref 8–23)
BUN/CREAT SERPL: 13.7 (ref 7–25)
CALCIUM SPEC-SCNC: 8.9 MG/DL (ref 8.6–10.5)
CHLORIDE SERPL-SCNC: 102 MMOL/L (ref 98–107)
CLARITY UR: CLEAR
CO2 SERPL-SCNC: 28.5 MMOL/L (ref 22–29)
COLOR UR: YELLOW
CREAT SERPL-MCNC: 0.73 MG/DL (ref 0.57–1)
DEPRECATED RDW RBC AUTO: 49.4 FL (ref 37–54)
EGFRCR SERPLBLD CKD-EPI 2021: 90.8 ML/MIN/1.73
EOSINOPHIL # BLD AUTO: 0.46 10*3/MM3 (ref 0–0.4)
EOSINOPHIL NFR BLD AUTO: 5.4 % (ref 0.3–6.2)
ERYTHROCYTE [DISTWIDTH] IN BLOOD BY AUTOMATED COUNT: 14.6 % (ref 12.3–15.4)
GLOBULIN UR ELPH-MCNC: 2.4 GM/DL
GLUCOSE SERPL-MCNC: 101 MG/DL (ref 65–99)
GLUCOSE UR STRIP-MCNC: NEGATIVE MG/DL
HCT VFR BLD AUTO: 36.4 % (ref 34–46.6)
HGB BLD-MCNC: 11.4 G/DL (ref 12–15.9)
HGB UR QL STRIP.AUTO: ABNORMAL
HYALINE CASTS UR QL AUTO: ABNORMAL /LPF
IMM GRANULOCYTES # BLD AUTO: 0.05 10*3/MM3 (ref 0–0.05)
IMM GRANULOCYTES NFR BLD AUTO: 0.6 % (ref 0–0.5)
KETONES UR QL STRIP: NEGATIVE
LEUKOCYTE ESTERASE UR QL STRIP.AUTO: NEGATIVE
LIPASE SERPL-CCNC: 49 U/L (ref 13–60)
LYMPHOCYTES # BLD AUTO: 2.39 10*3/MM3 (ref 0.7–3.1)
LYMPHOCYTES NFR BLD AUTO: 28.3 % (ref 19.6–45.3)
MCH RBC QN AUTO: 28.8 PG (ref 26.6–33)
MCHC RBC AUTO-ENTMCNC: 31.3 G/DL (ref 31.5–35.7)
MCV RBC AUTO: 91.9 FL (ref 79–97)
MONOCYTES # BLD AUTO: 0.67 10*3/MM3 (ref 0.1–0.9)
MONOCYTES NFR BLD AUTO: 7.9 % (ref 5–12)
NEUTROPHILS NFR BLD AUTO: 4.82 10*3/MM3 (ref 1.7–7)
NEUTROPHILS NFR BLD AUTO: 57.1 % (ref 42.7–76)
NITRITE UR QL STRIP: NEGATIVE
NRBC BLD AUTO-RTO: 0 /100 WBC (ref 0–0.2)
PH UR STRIP.AUTO: 6.5 [PH] (ref 5–8)
PLATELET # BLD AUTO: 412 10*3/MM3 (ref 140–450)
PMV BLD AUTO: 9.9 FL (ref 6–12)
POTASSIUM SERPL-SCNC: 3.7 MMOL/L (ref 3.5–5.2)
PROT SERPL-MCNC: 6.4 G/DL (ref 6–8.5)
PROT UR QL STRIP: NEGATIVE
RBC # BLD AUTO: 3.96 10*6/MM3 (ref 3.77–5.28)
RBC # UR STRIP: ABNORMAL /HPF
REF LAB TEST METHOD: ABNORMAL
SODIUM SERPL-SCNC: 141 MMOL/L (ref 136–145)
SP GR UR STRIP: 1.01 (ref 1–1.03)
SQUAMOUS #/AREA URNS HPF: ABNORMAL /HPF
TROPONIN T SERPL-MCNC: <0.01 NG/ML (ref 0–0.03)
UROBILINOGEN UR QL STRIP: ABNORMAL
WBC # UR STRIP: ABNORMAL /HPF
WBC NRBC COR # BLD: 8.45 10*3/MM3 (ref 3.4–10.8)

## 2022-04-17 PROCEDURE — 83690 ASSAY OF LIPASE: CPT | Performed by: PHYSICIAN ASSISTANT

## 2022-04-17 PROCEDURE — 85025 COMPLETE CBC W/AUTO DIFF WBC: CPT | Performed by: PHYSICIAN ASSISTANT

## 2022-04-17 PROCEDURE — 25010000002 IOPAMIDOL 61 % SOLUTION: Performed by: FAMILY MEDICINE

## 2022-04-17 PROCEDURE — 84484 ASSAY OF TROPONIN QUANT: CPT | Performed by: PHYSICIAN ASSISTANT

## 2022-04-17 PROCEDURE — 93005 ELECTROCARDIOGRAM TRACING: CPT | Performed by: PHYSICIAN ASSISTANT

## 2022-04-17 PROCEDURE — 96374 THER/PROPH/DIAG INJ IV PUSH: CPT

## 2022-04-17 PROCEDURE — 25010000002 ONDANSETRON PER 1 MG: Performed by: PHYSICIAN ASSISTANT

## 2022-04-17 PROCEDURE — 99283 EMERGENCY DEPT VISIT LOW MDM: CPT

## 2022-04-17 PROCEDURE — 74177 CT ABD & PELVIS W/CONTRAST: CPT

## 2022-04-17 PROCEDURE — 81001 URINALYSIS AUTO W/SCOPE: CPT | Performed by: PHYSICIAN ASSISTANT

## 2022-04-17 PROCEDURE — 80053 COMPREHEN METABOLIC PANEL: CPT | Performed by: PHYSICIAN ASSISTANT

## 2022-04-17 PROCEDURE — 71045 X-RAY EXAM CHEST 1 VIEW: CPT

## 2022-04-17 RX ORDER — SODIUM CHLORIDE 0.9 % (FLUSH) 0.9 %
10 SYRINGE (ML) INJECTION AS NEEDED
Status: DISCONTINUED | OUTPATIENT
Start: 2022-04-17 | End: 2022-04-17 | Stop reason: HOSPADM

## 2022-04-17 RX ORDER — DOXYCYCLINE 100 MG/1
100 CAPSULE ORAL 2 TIMES DAILY
Qty: 10 CAPSULE | Refills: 0 | Status: SHIPPED | OUTPATIENT
Start: 2022-04-17 | End: 2022-04-22

## 2022-04-17 RX ORDER — DICYCLOMINE HYDROCHLORIDE 10 MG/1
20 CAPSULE ORAL ONCE
Status: COMPLETED | OUTPATIENT
Start: 2022-04-17 | End: 2022-04-17

## 2022-04-17 RX ORDER — ONDANSETRON 2 MG/ML
4 INJECTION INTRAMUSCULAR; INTRAVENOUS ONCE
Status: COMPLETED | OUTPATIENT
Start: 2022-04-17 | End: 2022-04-17

## 2022-04-17 RX ORDER — DICYCLOMINE HCL 20 MG
20 TABLET ORAL EVERY 6 HOURS
Qty: 12 TABLET | Refills: 0 | Status: SHIPPED | OUTPATIENT
Start: 2022-04-17 | End: 2022-04-20

## 2022-04-17 RX ADMIN — ALUMINUM HYDROXIDE, MAGNESIUM HYDROXIDE, AND DIMETHICONE: 400; 400; 40 SUSPENSION ORAL at 19:55

## 2022-04-17 RX ADMIN — IOPAMIDOL 100 ML: 612 INJECTION, SOLUTION INTRAVENOUS at 20:17

## 2022-04-17 RX ADMIN — ONDANSETRON 4 MG: 2 INJECTION INTRAMUSCULAR; INTRAVENOUS at 19:54

## 2022-04-17 RX ADMIN — DICYCLOMINE HYDROCHLORIDE 20 MG: 10 CAPSULE ORAL at 21:45

## 2022-04-17 RX ADMIN — SODIUM CHLORIDE 1000 ML: 9 INJECTION, SOLUTION INTRAVENOUS at 19:57

## 2022-04-17 NOTE — ED PROVIDER NOTES
Subjective   Patient is a 67-year-old female with history of diverticulitis status post colectomy, arthritis, asthma, paroxysmal atrial fibrillation, chronic bronchitis, elevated cholesterol, follicular thyroid cancer, GERD, hyperlipidemia, hypothyroidism, osteoporosis, pneumonia and sleep apnea presenting to the ER for evaluation of abdominal pain.  Patient states that she has been dealing with severe diverticulitis and had a laparoscopic colectomy on March 25, 2022 by Dr. Kumar at [facility].  Reportedly she had some abdominal wall cellulitis and pneumonia that has been treated with doxycycline after culture.  She states this has been improving, wound is still draining a bit from the left lower quadrant but looks much improved.  She states today around 12 she began experiencing a pain in her left upper abdomen that she described as a burning pain.  She states it feels almost like when she had diverticulitis in the past.  She states that the pain did dull some but it is still present and she is concerned that something is wrong.  She states she has follow-up with Dr. Kumar on 29 April.  She states she is been taking Colace at home.  She states she has recently increased her food intake but is unsure if this has anything to do with her pain.  She states she is still having bowel movements and passing flatulence.  She denies any fever, chills, chest pain, cough, shortness of breath, emesis, nausea, dysuria, hematuria, or any other symptoms.          Review of Systems   Constitutional: Negative for chills and fever.   HENT: Negative.    Eyes: Negative.    Respiratory: Negative.    Cardiovascular: Negative.    Gastrointestinal: Positive for abdominal pain. Negative for diarrhea, nausea and vomiting.   Genitourinary: Negative.    Musculoskeletal: Negative.    Skin: Negative.    Neurological: Negative.    Psychiatric/Behavioral: Negative.        Past Medical History:   Diagnosis Date   • Abdominal pain    •  "Abnormal ECG    • Acute sinusitis    • Allergic    • Ankle joint pain    • Arthritis    • Asthma    • Atrial fibrillation (HCC)    • Chronic bronchitis (HCC)    • COVID-19 vaccine series completed     pfizer x3   • CPAP (continuous positive airway pressure) dependence    • Degenerative joint disease    • Depression    • Diverticulitis    • Diverticulosis    • Dizziness     Has had some episodes. No blake syncope.11/2007 patient underwent pulmonary vein isolation, initially successful.Patient returned, however in February noting some recurrent episode of dizziness.Then wore wore event recorder which showed some recurrent PAF.   • Dysuria    • Easy bruising    • Elevated cholesterol    • Follicular thyroid cancer (HCC)     treated with total thyroidectomy followed by [facility]   • GERD (gastroesophageal reflux disease)    • H/O bone density study    • Hay fever    • History of chest x-ray 07/10/2015    No acute cardiopulmonary process   • History of chest x-ray 07/02/2015    No acute cardiopulmonary process   • History of echocardiogram 04/04/2007    LVEF of greater than 60%. Mild MR.Mild TR.Trace pulmonary insufficinecy.   • History of mammogram    • History of PFTs 07/02/2015    Normal spirometry   • Hyperlipidemia    • Hypothyroidism    • Measles    • Migraine headache    • Mumps    • Osteoporosis    • Paroxysmal atrial fibrillation (HCC)     A. Failed medical therapy. B. Pulmonary vein isolation 11/2006. C. Recurrent episodes of PAF by event recorder 9/2006.   • Pneumonia    • PONV (postoperative nausea and vomiting)    • Shortness of breath    • Sleep apnea    • Surfer's nodules of right foot    • Torn meniscus    • Varicella        Allergies   Allergen Reactions   • Codeine Other (See Comments)     Pt states \"it made me feel like my insides were in a vice \"   • Niacin Hives   • Darvon [Propoxyphene] Nausea And Vomiting   • Flagyl [Metronidazole] Nausea Only   • Adhesive Tape Rash     tegaderm    • Ciprodex " [Ciprofloxacin-Dexamethasone] Other (See Comments)     REDNESS,tendonitis   • Other Other (See Comments)     POLLEN,TREES,AND PLANTS MULTIPLE ENVIRONMENTAL ALLERGIES       Past Surgical History:   Procedure Laterality Date   • ABLATION OF DYSRHYTHMIC FOCUS      x 2   • CARDIAC ELECTROPHYSIOLOGY PROCEDURE N/A 8/4/2016    Procedure: Loop recorder implant;  Surgeon: Himanshu Calvert MD;  Location:  [facility];  Service:    • CARDIAC ELECTROPHYSIOLOGY PROCEDURE N/A 10/13/2016    Procedure: Loop recorder removal;  Surgeon: Himanshu Calvert MD;  Location:  [facility];  Service:    • CARDIAC ELECTROPHYSIOLOGY PROCEDURE N/A 12/18/2017    Procedure: Loop insertion;  Surgeon: Mary Ann Blackwell MD;  Location:  [facility];  Service:    • CARDIAC ELECTROPHYSIOLOGY PROCEDURE N/A 11/6/2019    Procedure: Loop recorder removal;  Surgeon: Branden Chatterjee MD;  Location:  [facility];  Service: Cardiovascular   • CATARACT EXTRACTION W/ INTRAOCULAR LENS IMPLANT Left 12/20/2021    Procedure: CATARACT PHACO EXTRACTION WITH INTRAOCULAR LENS IMPLANT LEFT;  Surgeon: Arthur Mcdonald MD;  Location:  [facility];  Service: Ophthalmology;  Laterality: Left;   • CATARACT EXTRACTION W/ INTRAOCULAR LENS IMPLANT Right 1/3/2022    Procedure: CATARACT PHACO EXTRACTION WITH INTRAOCULAR LENS IMPLANT RIGHT WITH VIVITY LENS;  Surgeon: Arthur Mcdonald MD;  Location:  [facility];  Service: Ophthalmology;  Laterality: Right;   • CHOLECYSTECTOMY     • COLON RESECTION N/A 3/25/2022    Procedure: LAPAROSCOPIC  COLON RESECTION SIGMOIDECTOMY;  Surgeon: Arturo Kumar MD;  Location:  [facility];  Service: General;  Laterality: N/A;   • COLONOSCOPY     • DILATATION AND CURETTAGE     • EAR TUBES Bilateral    • FOOT SURGERY      bone spur   • HAMMER TOE REPAIR Left 11/13/2020   • INGUINAL HERNIA REPAIR Left     x 3   • LUNG BIOPSY      Remote   • LYMPH NODE BIOPSY     • MOUTH SURGERY   "    WISDOM TEETH   • REPLACEMENT TOTAL KNEE BILATERAL Bilateral    • THYROIDECTOMY, PARTIAL Left 2012    Hugh Davis   • THYROIDECTOMY, PARTIAL Right 2012    Hugh Davis   • TONSILLECTOMY  2020    Dr. Ethan Mcneill   • TOTAL ABDOMINAL HYSTERECTOMY WITH SALPINGO OOPHORECTOMY Bilateral    • TOTAL THYROIDECTOMY      completion thyroidectomy for follicular thyroid cancer.     • TYMPANOSTOMY TUBE PLACEMENT Right 2020    Dr. Ethan Mcneill       Family History   Problem Relation Age of Onset   • Atrial fibrillation Mother    • Thyroid disease Mother    • Early death Father 68   • Lung cancer Father    • Early death Sister 16   • Heart attack Sister    • Down syndrome Sister    • Sleep apnea Brother         Nonorganic   • Other Brother         Palpitations (Symptom)   • Breast cancer Neg Hx    • Ovarian cancer Neg Hx        Social History     Socioeconomic History   • Marital status:    Tobacco Use   • Smoking status: Former Smoker     Packs/day: 0.50     Years: 22.00     Pack years: 11.00     Quit date: 1996     Years since quittin.8   • Smokeless tobacco: Never Used   Vaping Use   • Vaping Use: Never used   Substance and Sexual Activity   • Alcohol use: No   • Drug use: No   • Sexual activity: Defer     Birth control/protection: Surgical           Objective   Physical Exam    Nursing notes and vital signs reviewed.    /68   Pulse 72   Temp 97.6 °F (36.4 °C) (Oral)   Resp 22   Ht 175.3 cm (69\")   Wt 112 kg (247 lb)   LMP  (LMP Unknown)   SpO2 96%   BMI 36.48 kg/m²     GEN: No acute distress, appears uncomfortable but nontoxic.  She is awake and alert.  She does not appear septic.  She is answering questions appropriately.  Head: Normocephalic, atraumatic  Skin: Patient has scabbed areas of her incision sites that appear stable without significant erythema.  There is an area in the left lower quadrant that has a small bit of clear fluid that is draining, no " significant erythema or tenderness to palpation in this area  Eyes: EOM intact  ENT: Mask in place per protocol  Chest: Nontender to palpation  Cardiovascular: Regular rate and rhythm  Lungs: Clear to auscultation bilaterally without adventitious sounds  Abdomen: Large but nondistended.  Bowel sounds hypoactive.  Patient has tenderness in the epigastric area without significant focal guarding elsewhere  Extremities: No edema, normal appearance, full range of motion without deficits  Neuro: GCS 15  Psych: Mood and affect are appropriate    Procedures           ED Course  ED Course as of 04/18/22 0024   Sun Apr 17, 2022   1917 Discussed pain medication with the patient.  She states that she did not want any kind of pain medicine because it made her feel like she had flulike symptoms. [LA]   1932 Color, UA: Yellow [LA]   1955 Appearance, UA: Clear [LA]   1955 pH, UA: 6.5 [LA]   1955 Specific Gravity, UA: 1.009 [LA]   1955 Glucose: Negative [LA]   1955 Ketones, UA: Negative [LA]   1955 Bilirubin, UA: Negative [LA]   1956 Blood, UA(!): Trace [LA]   1956 Protein, UA: Negative [LA]   1956 Leukocytes, UA: Negative [LA]   1956 Nitrite, UA: Negative [LA]   1956 Urobilinogen, UA: 0.2 E.U./dL [LA]   1956 WBC: 8.45 [LA]   1956 Hemoglobin(!): 11.4  Improved in comparison [LA]   1956 Platelets: 412 [LA]   2002 RBC, UA: None Seen [LA]   2002 WBC, UA(!): 0-2 [LA]   2002 Bacteria, UA: None Seen [LA]   2002 Squamous Epithelial Cells, UA: 0-2 [LA]   2002 Hyaline Casts, UA: None Seen [LA]   2002 Amorphous Crystals, UA: Small/1+ [LA]   2002 Methodology:: Manual Light Microscopy [LA]   2002 Troponin T: <0.010 [LA]   2024 Lipase: 49 [LA]   2024 Glucose(!): 101 [LA]   2024 BUN: 10 [LA]   2024 Creatinine: 0.73 [LA]   2024 Sodium: 141 [LA]   2024 Potassium: 3.7 [LA]   2024 Chloride: 102 [LA]   2024 CO2: 28.5 [LA]   2024 Calcium: 8.9 [LA]   2024 Total Protein: 6.4 [LA]   2024 Albumin: 4.00 [LA]   2024 ALT (SGPT): 14 [LA]   2024 AST (SGOT): 14  [LA]   2024 Alkaline Phosphatase: 101 [LA]   2024 Total Bilirubin: 0.2 [LA]   2024 Globulin: 2.4 [LA]   2024 A/G Ratio: 1.7 [LA]   2024 BUN/Creatinine Ratio: 13.7 [LA]   2024 Anion Gap: 10.5 [LA]   2024 eGFR: 90.8 [LA]   2049 EKG interpretation time is 1949 sinus rhythm 71 bpm QRS duration is 82 QT is 362 QTC is 385 no evidence of acute ST elevation or depression. []   2108 Reviewed x-ray with Dr. Lira, no acute cardiopulmonary abnormality.  CT was read by the radiologist and revealed a slight interval decrease in size of extraluminal gas collection in the left lower pelvis likely postoperative in etiology, no acute findings in the abdomen pelvis to account for patient's symptoms, no convincing evidence of postsurgical complication. [LA]   2114 Discussed findings with the patient.  She states the medicine did help.  Dr. Lira also adjusted Bentyl to use.  I discussed that she should continue her stool softeners, nausea medicine, antacids and follow-up closely with her surgeon.  Discussed very strict return precautions.  I will go ahead and add an additional 5 days of doxycycline for the wound on the left lower abdomen, does appear improved in comparison to previous on CT scan. [LA]      ED Course User Index  [LA] Nano Bautista PA-C  [] Soraya, Samantha Olivo,                                                  MDM  Number of Diagnoses or Management Options  Left upper quadrant pain  Diagnosis management comments: On arrival, patient has an elevated blood pressure.  She is afebrile.  Differential could include gastritis, ileus, bowel obstruction, diverticulitis, colitis, and other concerns.  Patient did not want pain medication.  I did order Zofran and GI cocktail.  Will obtain basic labs, lipase, urinalysis lower concern for ACS but given area of pain, will obtain EKG, troponin and chest x-ray.  Will obtain CT the abdomen pelvis.  Discussed with Dr. Lira, will obtain this with IV contrast.    Patient's  labs stable.  No significant abnormalities noted.  She did feel better after medication.  CT scan revealed no acute findings per radiology read, left lower quadrant had improved with antibiotics.  Will have her continue her Dulcolax, antiemetics, antibiotics, will add Bentyl.  Discussed diet changes, follow-up with her surgeon, strict return precautions.  She verbalized understanding and was in agreement with this plan of care       Amount and/or Complexity of Data Reviewed  Clinical lab tests: reviewed and ordered  Tests in the radiology section of CPT®: ordered and reviewed  Discussion of test results with the performing providers: yes  Decide to obtain previous medical records or to obtain history from someone other than the patient: yes  Review and summarize past medical records: yes  Discuss the patient with other providers: yes    Risk of Complications, Morbidity, and/or Mortality  Presenting problems: moderate  Diagnostic procedures: moderate  Management options: low    Patient Progress  Patient progress: stable      Final diagnoses:   Left upper quadrant pain       ED Disposition  ED Disposition     ED Disposition   Discharge    Condition   Stable    Comment   --             Gordon Norman MD  [address] [place], [place] [id number] [phone]    Schedule an appointment as soon as possible for a visit            Medication List      New Prescriptions    dicyclomine 20 MG tablet  Commonly known as: BENTYL  Take 1 tablet by mouth Every 6 (Six) Hours for 3 days.        Changed    clotrimazole-betamethasone 1-0.05 % cream  Commonly known as: Lotrisone  Apply  topically to the appropriate area as directed 2 (Two) Times a Day.  What changed:   · how much to take  · when to take this  · reasons to take this     cyanocobalamin 2500 MCG tablet tablet  Commonly known as: VITAMIN B-12  TAKE ONE TABLET BY MOUTH THREE TIMES A WEEK MUST MAKE AN APPOINTMENT  What changed: See the new instructions.     diclofenac  1 % gel gel  Commonly known as: VOLTAREN  Apply 4 g topically to the appropriate area as directed 4 (Four) Times a Day As Needed (pain).  What changed: reasons to take this     * doxycycline 100 MG capsule  Commonly known as: MONODOX  Take 1 capsule by mouth 2 (Two) Times a Day for 10 days.  What changed: Another medication with the same name was added. Make sure you understand how and when to take each.     * doxycycline 100 MG capsule  Commonly known as: MONODOX  Take 1 capsule by mouth 2 (Two) Times a Day for 5 days.  What changed: You were already taking a medication with the same name, and this prescription was added. Make sure you understand how and when to take each.     estradiol 0.1 MG/GM vaginal cream  Commonly known as: ESTRACE VAGINAL  Insert 2 g into the vagina Daily.  What changed:   · when to take this  · reasons to take this     hydroCHLOROthiazide 12.5 MG tablet  Commonly known as: HYDRODIURIL  Take 1 tablet by mouth Daily.  What changed:   · when to take this  · reasons to take this     * levothyroxine 75 MCG tablet  Commonly known as: Synthroid  Take 1 tablet by mouth Daily. With 100 mcg tab for total of 175 mcg daily  What changed: Another medication with the same name was changed. Make sure you understand how and when to take each.     * levothyroxine 100 MCG tablet  Commonly known as: SYNTHROID, LEVOTHROID  Take daily with 75 mcg tab for total of 175 mcg  What changed:   · how much to take  · how to take this  · when to take this         * This list has 4 medication(s) that are the same as other medications prescribed for you. Read the directions carefully, and ask your doctor or other care provider to review them with you.               Where to Get Your Medications      These medications were sent to [facility] - [place], [place] - [address] - [phone]  - [phone] FX  [address] [place] [address]    Phone: [phone]   · dicyclomine 20 MG  tablet  · doxycycline 100 MG capsule          Nano Bautista PA-C  04/18/22 0024

## 2022-04-18 NOTE — DISCHARGE INSTRUCTIONS
Take your medications as directed.  Continue antacids, stool softeners.  Try to eat a bland diet for the next few days to prevent further GI upset.  Take Bentyl as needed for abdominal spasms.  Take the antibiotic as directed till course is complete to help treat the cellulitis in the lower abdomen.  Try to follow-up with your surgeon as early as possible and your PCP as well.  Return to ER for any change, worsening symptoms, or any additional concerns including but not limited to severe or worsening pain, inability to pass flatulence or bowel movement, intractable vomiting, fever greater than 100.4.

## 2022-04-20 DIAGNOSIS — K21.00 GASTROESOPHAGEAL REFLUX DISEASE WITH ESOPHAGITIS WITHOUT HEMORRHAGE: ICD-10-CM

## 2022-04-20 RX ORDER — OMEPRAZOLE 40 MG/1
40 CAPSULE, DELAYED RELEASE ORAL DAILY
Qty: 90 CAPSULE | Refills: 3 | Status: SHIPPED | OUTPATIENT
Start: 2022-04-20 | End: 2022-10-04

## 2022-04-25 ENCOUNTER — READMISSION MANAGEMENT (OUTPATIENT)
Dept: CALL CENTER | Facility: HOSPITAL | Age: 67
End: 2022-04-25

## 2022-04-25 NOTE — OUTREACH NOTE
General Surgery Week 3 Survey    Flowsheet Row Responses   [facility] patient discharged from? [place]   Does the patient have one of the following disease processes/diagnoses(primary or secondary)? General Surgery   Week 3 attempt successful? No   Unsuccessful attempts Attempt 1          NERISSA BRONSON - Registered Nurse

## 2022-04-26 ENCOUNTER — READMISSION MANAGEMENT (OUTPATIENT)
Dept: CALL CENTER | Facility: HOSPITAL | Age: 67
End: 2022-04-26

## 2022-04-26 NOTE — OUTREACH NOTE
General Surgery Week 3 Survey    Flowsheet Row Responses   [facility] facility patient discharged from? [place]   Does the patient have one of the following disease processes/diagnoses(primary or secondary)? General Surgery   Week 3 attempt successful? No   Unsuccessful attempts Attempt 2          NATALIE PERALES - Registered Nurse

## 2022-05-11 RX ORDER — ALLOPURINOL 300 MG/1
300 TABLET ORAL DAILY
Qty: 90 TABLET | Refills: 3 | Status: SHIPPED | OUTPATIENT
Start: 2022-05-11

## 2022-05-11 NOTE — TELEPHONE ENCOUNTER
Caller: [facility] [address] [facility] [phone] [facility] [phone] FX    Relationship: Pharmacy    Best call back number: [phone]    Requested Prescriptions:   Requested Prescriptions     Pending Prescriptions Disp Refills   • allopurinol (ZYLOPRIM) 300 MG tablet 90 tablet 3     Sig: Take 1 tablet by mouth Daily.        Pharmacy where request should be sent: [facility] [address] - [phone]  - [phone] FX     Additional details provided by patient:     Does the patient have less than a 3 day supply:  [] Yes  [x] No    Samantha Jacobs   05/11/22 14:07 EDT

## 2022-05-26 ENCOUNTER — HOSPITAL ENCOUNTER (OUTPATIENT)
Dept: CT IMAGING | Facility: HOSPITAL | Age: 67
Discharge: HOME | End: 2022-05-26
Admitting: NURSE PRACTITIONER

## 2022-05-26 ENCOUNTER — OFFICE VISIT (OUTPATIENT)
Dept: INTERNAL MEDICINE | Facility: CLINIC | Age: 67
End: 2022-05-26

## 2022-05-26 VITALS
HEART RATE: 98 BPM | HEIGHT: 69 IN | BODY MASS INDEX: 36.29 KG/M2 | SYSTOLIC BLOOD PRESSURE: 122 MMHG | OXYGEN SATURATION: 97 % | DIASTOLIC BLOOD PRESSURE: 78 MMHG | WEIGHT: 245 LBS | TEMPERATURE: 97.7 F

## 2022-05-26 DIAGNOSIS — K59.04 CHRONIC IDIOPATHIC CONSTIPATION: ICD-10-CM

## 2022-05-26 DIAGNOSIS — K57.90 DIVERTICULOSIS: Primary | ICD-10-CM

## 2022-05-26 DIAGNOSIS — R10.31 ACUTE RIGHT LOWER QUADRANT PAIN: ICD-10-CM

## 2022-05-26 PROCEDURE — 25010000002 IOPAMIDOL 61 % SOLUTION: Performed by: NURSE PRACTITIONER

## 2022-05-26 PROCEDURE — 99214 OFFICE O/P EST MOD 30 MIN: CPT | Performed by: NURSE PRACTITIONER

## 2022-05-26 PROCEDURE — 74178 CT ABD&PLV WO CNTR FLWD CNTR: CPT

## 2022-05-26 RX ADMIN — IOPAMIDOL 100 ML: 612 INJECTION, SOLUTION INTRAVENOUS at 17:43

## 2022-05-26 NOTE — PROGRESS NOTES
"  Office Visit      Patient Name: Albania Ramos  : 1955   MRN: [id number]   Care Team: Patient Care Team:  Gordon Norman MD as PCP - General (Internal Medicine)  Khadijah Bernal MD as Surgeon (General Surgery)  Melina Simpson DO as Consulting Physician (Endocrinology)  Tim Jackman MD as Consulting Physician (Cardiology)    Chief Complaint  Abdominal Pain (LRQ, has been constipated, hx of colon removal, she alternates between the colace and miralax as needed for constipation, she takes them as needed however, not if she is going out due to having a blow out. She would like to have a scan done to see about diverticulosis she can not take flagyl)    Subjective     Subjective      Albania Ramos presents to [facility] for RLQ pain.   Symptoms started about 4 days ago.   Endorses severe RLQ when she feels as though her \"bowels are moving\", tenderness of RLQ, constipation, and nausea.   Denies fever, chills, decreased appetite, blood in the stool,  inability to hold down fluids/food, vomiting, dysuria, hematuria, and flank pain.   She has a significant history of diverticulitis and is [facility] She has been in the ER 3 times since her surgery for abdominal related issues, most recently had an abscess and treated with doxycycline.  She has been taking metamucil and colace and in the office now just had a bowel movement that she considered normal. She did have one last night but had to strain to pass it.    She does still have an appendix.  When she has had diverticulitis in the past it has always been on the left side, she has never had anything on the right side like this pain before.     Review of Systems   Constitutional: Positive for fatigue. Negative for appetite change and fever.   Respiratory: Negative for cough.    Cardiovascular: Negative for chest pain.   Gastrointestinal: Positive for abdominal pain, " "constipation and nausea. Negative for abdominal distention, anal bleeding, blood in stool, diarrhea, vomiting and GERD.   Genitourinary: Negative for decreased libido, dysuria, frequency and urgency.   Skin: Negative for rash.       Objective     Objective   Vital Signs:   /78   Pulse 98   Temp 97.7 °F (36.5 °C) (Temporal)   Ht 175.3 cm (69\")   Wt 111 kg (245 lb)   SpO2 97%   BMI 36.18 kg/m²     Physical Exam  Vitals and nursing note reviewed.   Constitutional:       Appearance: Normal appearance. She is normal weight. She is not ill-appearing.   Cardiovascular:      Rate and Rhythm: Normal rate and regular rhythm.      Heart sounds: Normal heart sounds. No murmur heard.  Pulmonary:      Effort: Pulmonary effort is normal.      Breath sounds: Normal breath sounds. No wheezing.   Abdominal:      General: Abdomen is flat. Bowel sounds are normal. There is no distension.      Palpations: Abdomen is soft.      Tenderness: There is abdominal tenderness in the right lower quadrant. There is no right CVA tenderness or left CVA tenderness.      Hernia: No hernia is present.   Skin:     General: Skin is warm and dry.      Findings: No rash.   Neurological:      General: No focal deficit present.      Mental Status: She is alert.   Psychiatric:         Mood and Affect: Mood normal.         Behavior: Behavior normal.          Assessment / Plan      Assessment & Plan   Problem List Items Addressed This Visit    None     Visit Diagnoses     Diverticulosis    -  Primary    Relevant Orders    CT abdomen pelvis w wo contrast    Comprehensive metabolic panel    CBC w AUTO Differential    Sedimentation rate, automated    C-reactive protein    Lipase    STAT CT ordered to rule out abscess due to recent history. If positive for diverticulitis will treat with antibiotic therapy. Labs as above. Recommend treating constipation aggressively with continued metamucil, increase fiber in diet, increase water, and stool softeners as " needed. May benefit from further treatment with linsezz. Follow-up here as needed.     Acute right lower quadrant pain        Relevant Orders    CT abdomen pelvis w wo contrast    Comprehensive metabolic panel    CBC w AUTO Differential    Sedimentation rate, automated    C-reactive protein    Lipase    Chronic idiopathic constipation        Relevant Orders    Comprehensive metabolic panel    CBC w AUTO Differential    Sedimentation rate, automated    C-reactive protein    Lipase         Follow Up   Return if symptoms worsen or fail to improve.  Patient was given instructions and counseling regarding her condition or for health maintenance advice. Please see specific information pulled into the AVS if appropriate.     JEREMY Messer  [facility]

## 2022-06-09 ENCOUNTER — TELEPHONE (OUTPATIENT)
Dept: INTERNAL MEDICINE | Facility: CLINIC | Age: 67
End: 2022-06-09

## 2022-06-09 NOTE — TELEPHONE ENCOUNTER
Patient did not complete MRI ankle ordered on 05/21/2021. This order is too old to be used and has been cancelled.

## 2022-06-27 ENCOUNTER — OFFICE VISIT (OUTPATIENT)
Dept: PULMONOLOGY | Facility: CLINIC | Age: 67
End: 2022-06-27

## 2022-06-27 VITALS
HEART RATE: 89 BPM | OXYGEN SATURATION: 98 % | SYSTOLIC BLOOD PRESSURE: 142 MMHG | BODY MASS INDEX: 36.43 KG/M2 | TEMPERATURE: 97.1 F | DIASTOLIC BLOOD PRESSURE: 80 MMHG | WEIGHT: 246 LBS | HEIGHT: 69 IN

## 2022-06-27 DIAGNOSIS — R06.02 SOB (SHORTNESS OF BREATH): Primary | ICD-10-CM

## 2022-06-27 DIAGNOSIS — J30.89 OTHER ALLERGIC RHINITIS: ICD-10-CM

## 2022-06-27 DIAGNOSIS — R06.02 SHORTNESS OF BREATH: ICD-10-CM

## 2022-06-27 DIAGNOSIS — J45.30 MILD PERSISTENT ASTHMA WITHOUT COMPLICATION: ICD-10-CM

## 2022-06-27 DIAGNOSIS — J45.30 MILD PERSISTENT ASTHMA WITHOUT COMPLICATION: Primary | ICD-10-CM

## 2022-06-27 PROCEDURE — 99214 OFFICE O/P EST MOD 30 MIN: CPT | Performed by: NURSE PRACTITIONER

## 2022-06-27 PROCEDURE — 94060 EVALUATION OF WHEEZING: CPT | Performed by: INTERNAL MEDICINE

## 2022-06-27 RX ORDER — TIOTROPIUM BROMIDE INHALATION SPRAY 1.56 UG/1
2 SPRAY, METERED RESPIRATORY (INHALATION) DAILY
Qty: 4 G | Refills: 0 | COMMUNITY
Start: 2022-06-27

## 2022-06-27 RX ORDER — MONTELUKAST SODIUM 10 MG/1
10 TABLET ORAL NIGHTLY
Qty: 90 TABLET | Refills: 1 | Status: SHIPPED | OUTPATIENT
Start: 2022-06-27

## 2022-06-27 RX ORDER — EPINEPHRINE 0.3 MG/.3ML
INJECTION SUBCUTANEOUS ONCE
COMMUNITY
Start: 2022-06-13 | End: 2023-02-09

## 2022-08-11 RX ORDER — MOMETASONE FUROATE 200 UG/1
AEROSOL RESPIRATORY (INHALATION)
Qty: 13 G | Refills: 4 | OUTPATIENT
Start: 2022-08-11

## 2022-08-15 ENCOUNTER — HOSPITAL ENCOUNTER (OUTPATIENT)
Dept: CARDIOLOGY | Facility: HOSPITAL | Age: 67
Discharge: HOME | End: 2022-08-15

## 2022-08-15 ENCOUNTER — TRANSCRIBE ORDERS (OUTPATIENT)
Dept: LAB | Facility: HOSPITAL | Age: 67
End: 2022-08-15

## 2022-08-15 ENCOUNTER — TRANSCRIBE ORDERS (OUTPATIENT)
Dept: CARDIOLOGY | Facility: HOSPITAL | Age: 67
End: 2022-08-15

## 2022-08-15 ENCOUNTER — LAB (OUTPATIENT)
Dept: LAB | Facility: HOSPITAL | Age: 67
End: 2022-08-15

## 2022-08-15 DIAGNOSIS — Z01.812 PRE-OPERATIVE LABORATORY EXAMINATION: ICD-10-CM

## 2022-08-15 DIAGNOSIS — Z01.818 PRE-OP EXAM: ICD-10-CM

## 2022-08-15 DIAGNOSIS — Z01.818 PRE-OP EXAM: Primary | ICD-10-CM

## 2022-08-15 DIAGNOSIS — Z01.812 PRE-OPERATIVE LABORATORY EXAMINATION: Primary | ICD-10-CM

## 2022-08-15 LAB
ALBUMIN SERPL-MCNC: 4.3 G/DL (ref 3.5–5.2)
ALBUMIN/GLOB SERPL: 2.3 G/DL
ALP SERPL-CCNC: 88 U/L (ref 39–117)
ALT SERPL W P-5'-P-CCNC: 22 U/L (ref 1–33)
ANION GAP SERPL CALCULATED.3IONS-SCNC: 10 MMOL/L (ref 5–15)
AST SERPL-CCNC: 21 U/L (ref 1–32)
BILIRUB SERPL-MCNC: 0.2 MG/DL (ref 0–1.2)
BUN SERPL-MCNC: 16 MG/DL (ref 8–23)
BUN/CREAT SERPL: 20.8 (ref 7–25)
CALCIUM SPEC-SCNC: 9.8 MG/DL (ref 8.6–10.5)
CHLORIDE SERPL-SCNC: 106 MMOL/L (ref 98–107)
CO2 SERPL-SCNC: 28 MMOL/L (ref 22–29)
CREAT SERPL-MCNC: 0.77 MG/DL (ref 0.57–1)
DEPRECATED RDW RBC AUTO: 40.9 FL (ref 37–54)
EGFRCR SERPLBLD CKD-EPI 2021: 85.2 ML/MIN/1.73
ERYTHROCYTE [DISTWIDTH] IN BLOOD BY AUTOMATED COUNT: 13.4 % (ref 12.3–15.4)
GLOBULIN UR ELPH-MCNC: 1.9 GM/DL
GLUCOSE SERPL-MCNC: 104 MG/DL (ref 65–99)
HCT VFR BLD AUTO: 40.4 % (ref 34–46.6)
HGB BLD-MCNC: 13.1 G/DL (ref 12–15.9)
MCH RBC QN AUTO: 27.9 PG (ref 26.6–33)
MCHC RBC AUTO-ENTMCNC: 32.4 G/DL (ref 31.5–35.7)
MCV RBC AUTO: 86.1 FL (ref 79–97)
PLATELET # BLD AUTO: 268 10*3/MM3 (ref 140–450)
PMV BLD AUTO: 12.1 FL (ref 6–12)
POTASSIUM SERPL-SCNC: 4.7 MMOL/L (ref 3.5–5.2)
PROT SERPL-MCNC: 6.2 G/DL (ref 6–8.5)
QT INTERVAL: 374 MS
QTC INTERVAL: 412 MS
RBC # BLD AUTO: 4.69 10*6/MM3 (ref 3.77–5.28)
SODIUM SERPL-SCNC: 144 MMOL/L (ref 136–145)
WBC NRBC COR # BLD: 8.43 10*3/MM3 (ref 3.4–10.8)

## 2022-08-15 PROCEDURE — 93010 ELECTROCARDIOGRAM REPORT: CPT | Performed by: INTERNAL MEDICINE

## 2022-08-15 PROCEDURE — 80053 COMPREHEN METABOLIC PANEL: CPT

## 2022-08-15 PROCEDURE — 93005 ELECTROCARDIOGRAM TRACING: CPT | Performed by: OTOLARYNGOLOGY

## 2022-08-15 PROCEDURE — 85027 COMPLETE CBC AUTOMATED: CPT

## 2022-08-15 PROCEDURE — 36415 COLL VENOUS BLD VENIPUNCTURE: CPT

## 2022-08-18 ENCOUNTER — OFFICE VISIT (OUTPATIENT)
Dept: OBSTETRICS AND GYNECOLOGY | Facility: CLINIC | Age: 67
End: 2022-08-18

## 2022-08-18 VITALS — DIASTOLIC BLOOD PRESSURE: 70 MMHG | BODY MASS INDEX: 37.51 KG/M2 | SYSTOLIC BLOOD PRESSURE: 120 MMHG | WEIGHT: 254 LBS

## 2022-08-18 DIAGNOSIS — N81.6 RECTOCELE: Primary | ICD-10-CM

## 2022-08-18 DIAGNOSIS — N81.11 CYSTOCELE, MIDLINE: ICD-10-CM

## 2022-08-18 DIAGNOSIS — R35.0 URINARY FREQUENCY: ICD-10-CM

## 2022-08-18 DIAGNOSIS — R31.21 ASYMPTOMATIC MICROSCOPIC HEMATURIA: ICD-10-CM

## 2022-08-18 LAB
BILIRUB BLD-MCNC: NEGATIVE MG/DL
CLARITY, POC: CLEAR
COLOR UR: YELLOW
GLUCOSE UR STRIP-MCNC: NEGATIVE MG/DL
KETONES UR QL: NEGATIVE
LEUKOCYTE EST, POC: NEGATIVE
NITRITE UR-MCNC: NEGATIVE MG/ML
PH UR: 6 [PH] (ref 5–8)
PROT UR STRIP-MCNC: NEGATIVE MG/DL
RBC # UR STRIP: ABNORMAL /UL
SP GR UR: 1.01 (ref 1–1.03)
UROBILINOGEN UR QL: NORMAL

## 2022-08-18 PROCEDURE — 81002 URINALYSIS NONAUTO W/O SCOPE: CPT | Performed by: PHYSICIAN ASSISTANT

## 2022-08-18 PROCEDURE — 99214 OFFICE O/P EST MOD 30 MIN: CPT | Performed by: PHYSICIAN ASSISTANT

## 2022-08-18 RX ORDER — IPRATROPIUM BROMIDE 42 UG/1
SPRAY, METERED NASAL AS NEEDED
COMMUNITY
Start: 2022-08-15

## 2022-08-18 NOTE — PATIENT INSTRUCTIONS
Patient is counseled regarding findings on her exam of rectocele and mild cystocele.  We discussed possible trial of pessary however pessary would not be effective management for rectocele.  We discussed potential surgical repair of rectocele and cystocele.  We also discussed the possibility of conservative management with avoiding lifting and straining and taking measures not to become constipated.  We discussed starting Kegel exercises daily x100 as well as the importance of continuing Estrace vaginal cream.  At this time the patient is okay with conservative management.  She is offered a trial of overactive bladder medicine given her long-term urinary frequency and urgency.  She is unsure if she will try this but she does request a prescription be sent to her pharmacy.  She is encouraged to allow us 4 to 6 weeks to see significant improvement should she try the Myrbetriq.  Follow-up as needed.

## 2022-08-18 NOTE — PROGRESS NOTES
Subjective   Chief Complaint   Patient presents with   • Follow-up     Bladder prolapse   • Urinary Frequency     Patient requests urine check       Al[place]ia Ramos is a 67 y.o. year old  presenting to be seen for possible vaginal prolapse.  She is also requesting urine checked as she is having some urinary frequency.  She is not having any dysuria.  She feels like she has noticed a bulge in the vaginal area for about 2 years.  She is not having any pain but does feel some slight pressure at times.  She reports urinary frequency and urgency have been occurring for quite some time.  She also experiences stress incontinence at times.  She is using Estrace vaginal cream prescribed by her PCP.  Urine today notes 1+ microscopic hematuria.  Patient reports she has had microscopic hematuria for quite some time  Previous hysterectomy and bilateral salpingo-oophorectomy    Past Medical History:   Diagnosis Date   • Abdominal pain    • Abnormal ECG    • Acute sinusitis    • Allergic    • Ankle joint pain    • Arthritis    • Asthma    • Atrial fibrillation (HCC)    • Chronic bronchitis (HCC)    • COVID-19 vaccine series completed     pfizer x3   • CPAP (continuous positive airway pressure) dependence    • Degenerative joint disease    • Depression    • Diverticulitis    • Diverticulosis    • Dizziness     Has had some episodes. No blake syncope.2007 patient underwent pulmonary vein isolation, initially successful.Patient returned, however in February noting some recurrent episode of dizziness.Then wore wore event recorder which showed some recurrent PAF.   • Dysuria    • Easy bruising    • Elevated cholesterol    • Follicular thyroid cancer (HCC)     treated with total thyroidectomy followed by [facility]   • GERD (gastroesophageal reflux disease)    • H/O bone density study    • Hay fever    • History of chest x-ray 07/10/2015    No acute cardiopulmonary process   • History of chest x-ray 2015    No acute  cardiopulmonary process   • History of echocardiogram 04/04/2007    LVEF of greater than 60%. Mild MR.Mild TR.Trace pulmonary insufficinecy.   • History of mammogram    • History of PFTs 07/02/2015    Normal spirometry   • Hyperlipidemia    • Hypothyroidism    • Measles    • Migraine headache    • Mumps    • Osteoporosis    • Paroxysmal atrial fibrillation (HCC)     A. Failed medical therapy. B. Pulmonary vein isolation 11/2006. C. Recurrent episodes of PAF by event recorder 9/2006.   • Pneumonia    • PONV (postoperative nausea and vomiting)    • Shortness of breath    • Sleep apnea    • Surfer's nodules of right foot    • Torn meniscus    • Varicella         Current Outpatient Medications:   •  albuterol sulfate HFA (Ventolin HFA) 108 (90 Base) MCG/ACT inhaler, Inhale 2 puffs Every 4 (Four) Hours As Needed for Wheezing or Shortness of Air., Disp: 18 g, Rfl: 5  •  allopurinol (ZYLOPRIM) 300 MG tablet, Take 1 tablet by mouth Daily., Disp: 90 tablet, Rfl: 3  •  calcium carbonate (OS-SHANTE) 600 MG tablet, Take 600 mg by mouth 2 (two) times a day with meals., Disp: , Rfl:   •  clotrimazole-betamethasone (Lotrisone) 1-0.05 % cream, Apply  topically to the appropriate area as directed 2 (Two) Times a Day. (Patient taking differently: Apply 1 application topically to the appropriate area as directed 2 (Two) Times a Day As Needed (skin irritation to face).), Disp: 45 g, Rfl: 0  •  cyanocobalamin (VITAMIN B-12) 2500 MCG tablet tablet, TAKE ONE TABLET BY MOUTH THREE TIMES A WEEK MUST MAKE AN APPOINTMENT (Patient taking differently: Take 2,500 mcg by mouth 1 (One) Time Per Week.), Disp: 30 tablet, Rfl: 5  •  diclofenac (VOLTAREN) 1 % gel gel, Apply 4 g topically to the appropriate area as directed 4 (Four) Times a Day As Needed (pain). (Patient taking differently: Apply 4 g topically to the appropriate area as directed 4 (Four) Times a Day As Needed (knee and foot pain).), Disp: 100 g, Rfl: 1  •  EPINEPHrine (EPIPEN) 0.3  MG/0.3ML solution auto-injector injection, AS DIRECTED AS NEEDED, Disp: , Rfl:   •  estradiol (ESTRACE VAGINAL) 0.1 MG/GM vaginal cream, Insert 2 g into the vagina Daily. (Patient taking differently: Insert 2 g into the vagina Daily As Needed (vaginal dryness).), Disp: 42.5 g, Rfl: 3  •  GLUCOSAMINE HCL-MSM PO, 1 tablet Daily., Disp: , Rfl:   •  hydroCHLOROthiazide (HYDRODIURIL) 12.5 MG tablet, Take 1 tablet by mouth Daily. (Patient taking differently: Take 12.5 mg by mouth Daily As Needed (swelling in ankles and feet).), Disp: 90 tablet, Rfl: 3  •  ibuprofen (ADVIL,MOTRIN) 400 MG tablet, Take 400 mg by mouth Every 6 (Six) Hours As Needed for Mild Pain ., Disp: , Rfl:   •  ipratropium (ATROVENT) 0.06 % nasal spray, , Disp: , Rfl:   •  levocetirizine (XYZAL) 5 MG tablet, Take 5 mg by mouth Daily As Needed for Allergies., Disp: , Rfl:   •  levothyroxine (Synthroid) 75 MCG tablet, Take 1 tablet by mouth Daily. With 100 mcg tab for total of 175 mcg daily, Disp: 90 tablet, Rfl: 3  •  levothyroxine (SYNTHROID, LEVOTHROID) 100 MCG tablet, Take daily with 75 mcg tab for total of 175 mcg (Patient taking differently: Take 100 mcg by mouth Daily. Take daily with 75 mcg tab for total of 175 mcg), Disp: 90 tablet, Rfl: 3  •  montelukast (Singulair) 10 MG tablet, Take 1 tablet by mouth Every Night. PRN, Disp: 90 tablet, Rfl: 1  •  MULTIPLE VITAMIN PO, 1 tablet Daily., Disp: , Rfl:   •  nystatin (MYCOSTATIN) 100,000 unit/mL suspension, Take FOUR MILLILITERS BY MOUTH FOUR TIMES DAILY, Disp: , Rfl:   •  Omega-3 Fatty Acids (FISH OIL) 435 MG capsule, Take 1,000 mg by mouth daily., Disp: , Rfl:   •  omeprazole (priLOSEC) 40 MG capsule, Take 1 capsule by mouth Daily., Disp: 90 capsule, Rfl: 3  •  Probiotic Product (SOLUBLE FIBER/PROBIOTICS PO), Take 1 capsule by mouth Daily., Disp: , Rfl:   •  tiotropium (SPIRIVA) 18 MCG per inhalation capsule, Place 1 capsule into inhaler and inhale Daily., Disp: 90 capsule, Rfl: 3  •  Tiotropium  "Bromide Monohydrate (Spiriva Respimat) 1.25 MCG/ACT aerosol solution inhaler, Inhale 2 puffs Daily., Disp: 4 g, Rfl: 0  •  vitamin C (ASCORBIC ACID) 500 MG tablet, Take 1 tablet by mouth daily., Disp: , Rfl:   •  Mirabegron ER (Myrbetriq) 25 MG tablet sustained-release 24 hour 24 hr tablet, Take 1 tablet by mouth Daily., Disp: 30 tablet, Rfl: 11    Current Facility-Administered Medications:   •  triamcinolone acetonide (KENALOG-40) injection 20 mg, 20 mg, Intra-articular, Once, Gordon Norman MD   Allergies   Allergen Reactions   • Codeine Other (See Comments)     Pt states \"it made me feel like my insides were in a vice \"   • Niacin Hives   • Darvon [Propoxyphene] Nausea And Vomiting   • Flagyl [Metronidazole] Nausea Only   • Adhesive Tape Rash     tegaderm    • Ciprodex [Ciprofloxacin-Dexamethasone] Other (See Comments)     REDNESS,tendonitis   • Other Other (See Comments)     POLLEN,TREES,AND PLANTS MULTIPLE ENVIRONMENTAL ALLERGIES      Past Surgical History:   Procedure Laterality Date   • ABLATION OF DYSRHYTHMIC FOCUS      x 2   • CARDIAC ELECTROPHYSIOLOGY PROCEDURE N/A 8/4/2016    Procedure: Loop recorder implant;  Surgeon: Himanshu Calvert MD;  Location:  [facility];  Service:    • CARDIAC ELECTROPHYSIOLOGY PROCEDURE N/A 10/13/2016    Procedure: Loop recorder removal;  Surgeon: Himanshu Calvert MD;  Location:  [facility];  Service:    • CARDIAC ELECTROPHYSIOLOGY PROCEDURE N/A 12/18/2017    Procedure: Loop insertion;  Surgeon: Mary Ann Blackwell MD;  Location:  [facility];  Service:    • CARDIAC ELECTROPHYSIOLOGY PROCEDURE N/A 11/6/2019    Procedure: Loop recorder removal;  Surgeon: Branden Chatterjee MD;  Location:  [facility];  Service: Cardiovascular   • CATARACT EXTRACTION W/ INTRAOCULAR LENS IMPLANT Left 12/20/2021    Procedure: CATARACT PHACO EXTRACTION WITH INTRAOCULAR LENS IMPLANT LEFT;  Surgeon: Arthur Mcdonald MD;  " Location: [facility] OR;  Service: Ophthalmology;  Laterality: Left;   • CATARACT EXTRACTION W/ INTRAOCULAR LENS IMPLANT Right 1/3/2022    Procedure: CATARACT PHACO EXTRACTION WITH INTRAOCULAR LENS IMPLANT RIGHT WITH VIVITY LENS;  Surgeon: Arthur Mcdonald MD;  Location: [facility] OR;  Service: Ophthalmology;  Laterality: Right;   • CHOLECYSTECTOMY     • COLON RESECTION N/A 3/25/2022    Procedure: LAPAROSCOPIC  COLON RESECTION SIGMOIDECTOMY;  Surgeon: Arturo Kumar MD;  Location: [facility];  Service: General;  Laterality: N/A;   • COLONOSCOPY     • DILATATION AND CURETTAGE     • EAR TUBES Bilateral    • FOOT SURGERY      bone spur   • HAMMER TOE REPAIR Left 2020   • INGUINAL HERNIA REPAIR Left     x 3   • LUNG BIOPSY      Remote   • LYMPH NODE BIOPSY     • MOUTH SURGERY      WISDOM TEETH   • REPLACEMENT TOTAL KNEE BILATERAL Bilateral    • THYROIDECTOMY, PARTIAL Left 2012    Hugh Davis   • THYROIDECTOMY, PARTIAL Right 2012    Hugh Davis   • TONSILLECTOMY  2020    Dr. Ethan Mcneill   • TOTAL ABDOMINAL HYSTERECTOMY WITH SALPINGO OOPHORECTOMY Bilateral    • TOTAL THYROIDECTOMY      completion thyroidectomy for follicular thyroid cancer.     • TYMPANOSTOMY TUBE PLACEMENT Right 2020    Dr. Ethan Mcneill      Social History     Socioeconomic History   • Marital status:    Tobacco Use   • Smoking status: Former Smoker     Packs/day: 0.50     Years: 22.00     Pack years: 11.00     Quit date: 1996     Years since quittin.1   • Smokeless tobacco: Never Used   Vaping Use   • Vaping Use: Never used   Substance and Sexual Activity   • Alcohol use: No   • Drug use: No   • Sexual activity: Defer     Birth control/protection: Surgical      Family History   Problem Relation Age of Onset   • Atrial fibrillation Mother    • Thyroid disease Mother    • Stroke Mother    • Early death Father 68   • Lung cancer Father    • Early death Sister 16   • Heart attack Sister    • Down  syndrome Sister    • Sleep apnea Brother         Nonorganic   • Other Brother         Palpitations (Symptom)   • Breast cancer Neg Hx    • Ovarian cancer Neg Hx        Review of Systems   Constitutional: Negative for chills, diaphoresis and fever.   Gastrointestinal: Positive for constipation and diarrhea. Negative for nausea and vomiting.   Genitourinary: Positive for enuresis and frequency. Negative for difficulty urinating, dysuria, pelvic pain, vaginal bleeding and vaginal discharge.           Objective   /70   Wt 115 kg (254 lb)   LMP  (LMP Unknown)   Breastfeeding No   BMI 37.51 kg/m²     Physical Exam  Constitutional:       Appearance: Normal appearance. She is well-developed and well-groomed. She is morbidly obese.   Eyes:      General: Lids are normal.      Extraocular Movements: Extraocular movements intact.      Conjunctiva/sclera: Conjunctivae normal.   Genitourinary:     Labia:         Right: No rash, tenderness or lesion.         Left: No rash, tenderness or lesion.       Urethra: No prolapse, urethral pain, urethral swelling or urethral lesion.      Vagina: No vaginal discharge, tenderness or lesions.      Uterus: Absent.       Adnexa:         Right: No mass or tenderness.          Left: No mass or tenderness.        Comments: Great 3 rectocele  Grade cystocele  Vaginal cuff is well supported  Neurological:      Mental Status: She is alert.   Psychiatric:         Attention and Perception: Attention normal.         Mood and Affect: Mood normal.         Speech: Speech normal.         Behavior: Behavior is cooperative.            Result Review :                   Assessment and Plan  Diagnoses and all orders for this visit:    1. Rectocele (Primary)    2. Cystocele, midline    3. Urinary frequency  -     POC Urinalysis Dipstick  -     Urine Culture - , Urine, Clean Catch    4. Asymptomatic microscopic hematuria  -     Urine Culture - , Urine, Clean Catch    Other orders  -     Mirabegron ER  (Myrbetriq) 25 MG tablet sustained-release 24 hour 24 hr tablet; Take 1 tablet by mouth Daily.  Dispense: 30 tablet; Refill: 11      Patient Instructions   Patient is counseled regarding findings on her exam of rectocele and mild cystocele.  We discussed possible trial of pessary however pessary would not be effective management for rectocele.  We discussed potential surgical repair of rectocele and cystocele.  We also discussed the possibility of conservative management with avoiding lifting and straining and taking measures not to become constipated.  We discussed starting Kegel exercises daily x100 as well as the importance of continuing Estrace vaginal cream.  At this time the patient is okay with conservative management.  She is offered a trial of overactive bladder medicine given her long-term urinary frequency and urgency.  She is unsure if she will try this but she does request a prescription be sent to her pharmacy.  She is encouraged to allow us 4 to 6 weeks to see significant improvement should she try the Myrbetriq.  Follow-up as needed.             This note was electronically signed.    Carmenza Campuzano PA-C   August 18, 2022

## 2022-08-22 LAB
BACTERIA UR CULT: ABNORMAL
BACTERIA UR CULT: ABNORMAL
OTHER ANTIBIOTIC SUSC ISLT: ABNORMAL

## 2022-08-22 RX ORDER — NITROFURANTOIN 25; 75 MG/1; MG/1
100 CAPSULE ORAL 2 TIMES DAILY
Qty: 10 CAPSULE | Refills: 0 | Status: SHIPPED | OUTPATIENT
Start: 2022-08-22 | End: 2022-08-27

## 2022-08-22 NOTE — PROGRESS NOTES
Please inform patient her urine culture has returned positive.  I have sent in Macrobid for her to take twice daily for 5 days.

## 2022-08-29 ENCOUNTER — OFFICE VISIT (OUTPATIENT)
Dept: INTERNAL MEDICINE | Facility: CLINIC | Age: 67
End: 2022-08-29

## 2022-08-29 VITALS
SYSTOLIC BLOOD PRESSURE: 126 MMHG | RESPIRATION RATE: 16 BRPM | HEIGHT: 69 IN | TEMPERATURE: 97.7 F | OXYGEN SATURATION: 96 % | WEIGHT: 255 LBS | BODY MASS INDEX: 37.77 KG/M2 | DIASTOLIC BLOOD PRESSURE: 68 MMHG | HEART RATE: 79 BPM

## 2022-08-29 DIAGNOSIS — R22.2 ABDOMINAL WALL MASS: ICD-10-CM

## 2022-08-29 DIAGNOSIS — R53.83 FATIGUE, UNSPECIFIED TYPE: ICD-10-CM

## 2022-08-29 DIAGNOSIS — N39.0 URINARY TRACT INFECTION WITH HEMATURIA, SITE UNSPECIFIED: Primary | ICD-10-CM

## 2022-08-29 DIAGNOSIS — R10.10 PAIN OF UPPER ABDOMEN: ICD-10-CM

## 2022-08-29 DIAGNOSIS — R31.9 URINARY TRACT INFECTION WITH HEMATURIA, SITE UNSPECIFIED: Primary | ICD-10-CM

## 2022-08-29 DIAGNOSIS — L76.82 PAIN AT SURGICAL INCISION: ICD-10-CM

## 2022-08-29 LAB
BILIRUB BLD-MCNC: NEGATIVE MG/DL
CLARITY, POC: ABNORMAL
COLOR UR: YELLOW
EXPIRATION DATE: ABNORMAL
GLUCOSE UR STRIP-MCNC: NEGATIVE MG/DL
KETONES UR QL: NEGATIVE
LEUKOCYTE EST, POC: NEGATIVE
Lab: ABNORMAL
NITRITE UR-MCNC: NEGATIVE MG/ML
PH UR: 6 [PH] (ref 5–8)
PROT UR STRIP-MCNC: NEGATIVE MG/DL
RBC # UR STRIP: ABNORMAL /UL
SP GR UR: 1.02 (ref 1–1.03)
UROBILINOGEN UR QL: NORMAL

## 2022-08-29 PROCEDURE — 81003 URINALYSIS AUTO W/O SCOPE: CPT | Performed by: FAMILY MEDICINE

## 2022-08-29 PROCEDURE — 99214 OFFICE O/P EST MOD 30 MIN: CPT | Performed by: FAMILY MEDICINE

## 2022-08-29 NOTE — PROGRESS NOTES
"Chief Complaint  Abdominal Pain (Pt is afraid she has another hernia ) and Urinary Tract Infection (Gyno did culture showing infection pt has completed medication for this and requested recheck )    Subjective      Answers for HPI/ROS submitted by the patient on 8/28/2022  What is the primary reason for your visit?: Abdominal Pain      [place] Ramos presents to [facility]  Dr. Norman patient, in today with various concerns.  Questions another ulcer. Not keen on going for scope at this time, would like to hold off on that.  Feeling very tired. On vitamin B12 high dose once a week, says she was taking twice a week but level got too high. Will wait to discuss further with PCP at next visit.  Wants to recheck urine, recently completed Macrobid from ob/gyn for urinary tract infection.  Realizes this could make her feel poorly.  No symptoms otherwise right now of urinary tract infection. Reports she \"always\" has blood in urine, \"normal\" for her.  Had intestinal surgery early this year, Dr. Kumar at [facility].. Recently her dog jumped on abdomen and it was very painful, above scar. Has an area that has enlarged over time, worried about possible hernia.     Abdominal Pain  The current episode started 1 to 4 weeks ago. The onset quality is sudden. The problem occurs daily. The problem has been gradually worsening. The pain is located in the LLQ. The pain is at a severity of 3/10. The quality of the pain is burning. The abdominal pain radiates to the LLQ. Associated symptoms include arthralgias, constipation, frequency, headaches and myalgias. Pertinent negatives include no belching, diarrhea, dysuria, fever, flatus, hematochezia, hematuria, melena, nausea, vomiting or weight loss. The pain is aggravated by eating. The pain is relieved by nothing. Prior diagnostic workup includes lower endoscopy and surgery.   Urinary Tract Infection   Associated symptoms include frequency. " "Pertinent negatives include no hematuria, nausea or vomiting.       Objective   Vital Signs:  /68 (BP Location: Right arm, Patient Position: Sitting, Cuff Size: Adult)   Pulse 79   Temp 97.7 °F (36.5 °C) (Temporal)   Resp 16   Ht 175.3 cm (69\")   Wt 116 kg (255 lb) Comment: pt reported, refused to weigh in office  SpO2 96%   BMI 37.66 kg/m²   Estimated body mass index is 37.66 kg/m² as calculated from the following:    Height as of this encounter: 175.3 cm (69\").    Weight as of this encounter: 116 kg (255 lb).          Physical Exam  Vitals and nursing note reviewed.   Constitutional:       General: She is not in acute distress.     Appearance: Normal appearance. She is well-developed and well-groomed. She is obese. She is not ill-appearing, toxic-appearing or diaphoretic.      Interventions: Face mask in place.   HENT:      Head: Normocephalic and atraumatic.      Right Ear: Hearing normal.      Left Ear: Hearing normal.   Eyes:      General: Lids are normal. No scleral icterus.     Extraocular Movements: Extraocular movements intact.   Neck:      Trachea: Phonation normal.   Pulmonary:      Effort: Pulmonary effort is normal.   Abdominal:       Musculoskeletal:      Cervical back: Neck supple.   Skin:     Coloration: Skin is not jaundiced or pale.   Neurological:      General: No focal deficit present.      Mental Status: She is alert and oriented to person, place, and time.      Motor: Motor function is intact.   Psychiatric:         Attention and Perception: Attention and perception normal.         Mood and Affect: Mood and affect normal.         Speech: Speech normal.         Behavior: Behavior normal. Behavior is cooperative.         Thought Content: Thought content normal.         Cognition and Memory: Cognition and memory normal.         Judgment: Judgment normal.        Result Review :  The following data was reviewed by: Kaye Mckeon MD on 08/29/2022:  Office Visit on 08/29/2022 "   Component Date Value Ref Range Status   • Color 08/29/2022 Yellow  Yellow, Straw, Dark Yellow, Kelle Final   • Clarity, UA 08/29/2022 Cloudy (A) Clear Final   • Specific Gravity  08/29/2022 1.025  1.005 - 1.030 Final   • pH, Urine 08/29/2022 6.0  5.0 - 8.0 Final   • Leukocytes 08/29/2022 Negative  Negative Final   • Nitrite, UA 08/29/2022 Negative  Negative Final   • Protein, POC 08/29/2022 Negative  Negative mg/dL Final   • Glucose, UA 08/29/2022 Negative  Negative mg/dL Final   • Ketones, UA 08/29/2022 Negative  Negative Final   • Urobilinogen, UA 08/29/2022 Normal  Normal, 0.2 E.U./dL Final   • Bilirubin 08/29/2022 Negative  Negative Final   • Blood, UA 08/29/2022 1+ (A) Negative Final   • Lot Number 08/29/2022 [id number]   Final   • Expiration Date 08/29/2022 12/17/2023   Final    Urine Culture - , Urine, Clean Catch (08/18/2022 14:03)  CT abdomen pelvis w wo contrast (05/26/2022 17:43)    Lab Results   Component Value Date    AQOIHLAM77 912 11/09/2021      Lab Results   Component Value Date    TSH 0.743 02/01/2022     Lab Results   Component Value Date    FREET4 1.43 02/01/2022                 Assessment and Plan   Diagnoses and all orders for this visit:    1. Urinary tract infection with hematuria, site unspecified (Primary)  Comments:  will hold off on further antibiotics and see what culture tells us  Orders:  -     POCT urinalysis dipstick, automated  -     Urine Culture - , Urine, Clean Catch    2. Pain of upper abdomen  -     CT abdomen wo contrast; Future    3. Pain at surgical incision  Comments:  will refer back to surgeon if hernia confirmed with imaging; precautions given regarding severe abdominal pain to go to ER (bowel obstruction symptoms)  Orders:  -     CT abdomen wo contrast; Future    4. Abdominal wall mass  -     CT abdomen wo contrast; Future    5. Fatigue, unspecified type  Comments:  if symptoms persist discuss with PCP, may need labs to check b12, folic acid, mma, etc.              Follow Up   Return for As scheduled previously with PCP.  Patient was given instructions and counseling regarding her condition or for health maintenance advice. Please see specific information pulled into the AVS if appropriate.

## 2022-09-06 ENCOUNTER — HOSPITAL ENCOUNTER (OUTPATIENT)
Dept: CT IMAGING | Facility: HOSPITAL | Age: 67
Discharge: HOME | End: 2022-09-06
Admitting: FAMILY MEDICINE

## 2022-09-06 DIAGNOSIS — R22.2 ABDOMINAL WALL MASS: ICD-10-CM

## 2022-09-06 DIAGNOSIS — R10.10 PAIN OF UPPER ABDOMEN: ICD-10-CM

## 2022-09-06 DIAGNOSIS — L76.82 PAIN AT SURGICAL INCISION: ICD-10-CM

## 2022-09-06 PROCEDURE — 74150 CT ABDOMEN W/O CONTRAST: CPT

## 2022-09-07 DIAGNOSIS — K46.9 ABDOMINAL HERNIA WITHOUT OBSTRUCTION AND WITHOUT GANGRENE, RECURRENCE NOT SPECIFIED, UNSPECIFIED HERNIA TYPE: Primary | ICD-10-CM

## 2022-09-07 DIAGNOSIS — L76.82 PAIN AT SURGICAL INCISION: ICD-10-CM

## 2022-09-07 DIAGNOSIS — K46.9 RECURRENT HERNIA: ICD-10-CM

## 2022-09-07 DIAGNOSIS — K43.2 INCISIONAL HERNIA OF ANTERIOR ABDOMINAL WALL WITHOUT OBSTRUCTION OR GANGRENE: Primary | ICD-10-CM

## 2022-09-07 DIAGNOSIS — R10.10 PAIN OF UPPER ABDOMEN: ICD-10-CM

## 2022-09-07 DIAGNOSIS — R22.2 ABDOMINAL WALL MASS: ICD-10-CM

## 2022-09-07 NOTE — PROGRESS NOTES
CT scan shows:  1. Fatty liver--weight loss gradually over time is ideal. Mediterranean diet may help.  2. Small lesion on one kidney likely cyst  3. Small-to-moderate sliding hiatal hernia  4. Diverticulosis (not flared up)  5. MAIN FINDING--multiple hernias. Will refer to surgeon as discussed during her appointment.    Follow up with PCP for any additional questions/issues (Geile)

## 2022-09-19 ENCOUNTER — APPOINTMENT (OUTPATIENT)
Dept: CT IMAGING | Facility: HOSPITAL | Age: 67
End: 2022-09-19

## 2022-10-04 ENCOUNTER — OFFICE VISIT (OUTPATIENT)
Dept: INTERNAL MEDICINE | Facility: CLINIC | Age: 67
End: 2022-10-04

## 2022-10-04 VITALS
DIASTOLIC BLOOD PRESSURE: 82 MMHG | TEMPERATURE: 97.1 F | BODY MASS INDEX: 38.8 KG/M2 | WEIGHT: 262 LBS | HEIGHT: 69 IN | OXYGEN SATURATION: 98 % | HEART RATE: 76 BPM | SYSTOLIC BLOOD PRESSURE: 138 MMHG

## 2022-10-04 DIAGNOSIS — K43.2 INCISIONAL HERNIA OF ANTERIOR ABDOMINAL WALL WITHOUT OBSTRUCTION OR GANGRENE: Primary | ICD-10-CM

## 2022-10-04 DIAGNOSIS — F43.9 STRESS: ICD-10-CM

## 2022-10-04 DIAGNOSIS — K57.90 DIVERTICULOSIS: ICD-10-CM

## 2022-10-04 DIAGNOSIS — R11.2 NAUSEA AND VOMITING, UNSPECIFIED VOMITING TYPE: ICD-10-CM

## 2022-10-04 DIAGNOSIS — Z87.11 HISTORY OF PEPTIC ULCER: ICD-10-CM

## 2022-10-04 PROCEDURE — 99214 OFFICE O/P EST MOD 30 MIN: CPT | Performed by: NURSE PRACTITIONER

## 2022-10-04 RX ORDER — SULFAMETHOXAZOLE AND TRIMETHOPRIM 800; 160 MG/1; MG/1
1 TABLET ORAL 2 TIMES DAILY
COMMUNITY
Start: 2022-09-15 | End: 2022-10-04

## 2022-10-04 RX ORDER — CYCLOBENZAPRINE HCL 10 MG
TABLET ORAL
Status: ON HOLD | COMMUNITY
End: 2022-10-27

## 2022-10-04 RX ORDER — SUCRALFATE ORAL 1 G/10ML
1 SUSPENSION ORAL 4 TIMES DAILY
Qty: 414 ML | Refills: 1 | Status: SHIPPED | OUTPATIENT
Start: 2022-10-04 | End: 2022-10-14

## 2022-10-04 RX ORDER — PANTOPRAZOLE SODIUM 40 MG/1
TABLET, DELAYED RELEASE ORAL
COMMUNITY

## 2022-10-04 RX ORDER — SUCRALFATE 1 G/1
TABLET ORAL
COMMUNITY
End: 2022-10-04 | Stop reason: ALTCHOICE

## 2022-10-04 NOTE — PROGRESS NOTES
Office Visit      Patient Name: Albania Ramos  : 1955   MRN: [id number]     Chief Complaint:    Chief Complaint   Patient presents with   • GI Problem     Eating causes stomach pain, nausea       History of Present Illness: Albania Ramos is a 67 y.o. female who is here today with concern she may have an ulcer.  She has had one in the past and is having symptoms that were previously attributed to the.  Unable to take medications regularly due to nausea; joints are hurting more than normal, because she is unable to take her medications. Eating increases stomach pain, nausea; she has vomited after eating. Typically eats spicy foods, garlic and onions.  Has been eating bland foods, has been slightly effective at reducing nausea. Denies belching, diarrhea, dysuria, fever, flatus, hematochezia, hematuria, melena, or weight loss. Being worked up for LLQ pain, possibly an incisional hernia. History of diverticulosis.     Stressed at home. Symptoms of nausea and abdominal pain began before stress increased.  Denies anhedonia, SI, HI, panic attacks.       Subjective      I have reviewed and the following portions of the patient's history were updated as appropriate: past family history, past medical history, past social history, past surgical history and problem list.      Current Outpatient Medications:   •  albuterol sulfate HFA (Ventolin HFA) 108 (90 Base) MCG/ACT inhaler, Inhale 2 puffs Every 4 (Four) Hours As Needed for Wheezing or Shortness of Air., Disp: 18 g, Rfl: 5  •  allopurinol (ZYLOPRIM) 300 MG tablet, Take 1 tablet by mouth Daily., Disp: 90 tablet, Rfl: 3  •  calcium carbonate (OS-SHANTE) 600 MG tablet, Take 600 mg by mouth 2 (two) times a day with meals., Disp: , Rfl:   •  clotrimazole-betamethasone (Lotrisone) 1-0.05 % cream, Apply  topically to the appropriate area as directed 2 (Two) Times a Day. (Patient taking differently: Apply 1 application topically to the appropriate area as directed 2  (Two) Times a Day As Needed (skin irritation to face).), Disp: 45 g, Rfl: 0  •  cyanocobalamin (VITAMIN B-12) 2500 MCG tablet tablet, TAKE ONE TABLET BY MOUTH THREE TIMES A WEEK MUST MAKE AN APPOINTMENT (Patient taking differently: Take 2,500 mcg by mouth 1 (One) Time Per Week.), Disp: 30 tablet, Rfl: 5  •  cyclobenzaprine (FLEXERIL) 10 MG tablet, cyclobenzaprine 10 mg tablet  TAKE ONE TABLET BY MOUTH THREE TIMES DAILY AS NEEDED, Disp: , Rfl:   •  diclofenac (VOLTAREN) 1 % gel gel, Apply 4 g topically to the appropriate area as directed 4 (Four) Times a Day As Needed (pain). (Patient taking differently: Apply 4 g topically to the appropriate area as directed 4 (Four) Times a Day As Needed (knee and foot pain).), Disp: 100 g, Rfl: 1  •  EPINEPHrine (EPIPEN) 0.3 MG/0.3ML solution auto-injector injection, AS DIRECTED AS NEEDED, Disp: , Rfl:   •  estradiol (ESTRACE VAGINAL) 0.1 MG/GM vaginal cream, Insert 2 g into the vagina Daily. (Patient taking differently: Insert 2 g into the vagina Daily As Needed (vaginal dryness).), Disp: 42.5 g, Rfl: 3  •  GLUCOSAMINE HCL-MSM PO, 1 tablet Daily., Disp: , Rfl:   •  hydroCHLOROthiazide (HYDRODIURIL) 12.5 MG tablet, Take 1 tablet by mouth Daily. (Patient taking differently: Take 12.5 mg by mouth Daily As Needed (swelling in ankles and feet).), Disp: 90 tablet, Rfl: 3  •  ipratropium (ATROVENT) 0.06 % nasal spray, , Disp: , Rfl:   •  levocetirizine (XYZAL) 5 MG tablet, Take 5 mg by mouth Daily As Needed for Allergies., Disp: , Rfl:   •  levothyroxine (SYNTHROID, LEVOTHROID) 100 MCG tablet, Take daily with 75 mcg tab for total of 175 mcg (Patient taking differently: Take 100 mcg by mouth Daily. Take daily with 75 mcg tab for total of 175 mcg), Disp: 90 tablet, Rfl: 3  •  Mirabegron ER (Myrbetriq) 25 MG tablet sustained-release 24 hour 24 hr tablet, Take 1 tablet by mouth Daily., Disp: 30 tablet, Rfl: 11  •  montelukast (Singulair) 10 MG tablet, Take 1 tablet by mouth Every Night.  "PRN, Disp: 90 tablet, Rfl: 1  •  MULTIPLE VITAMIN PO, 1 tablet Daily., Disp: , Rfl:   •  nystatin (MYCOSTATIN) 100,000 unit/mL suspension, Take FOUR MILLILITERS BY MOUTH FOUR TIMES DAILY, Disp: , Rfl:   •  Omega-3 Fatty Acids (FISH OIL) 435 MG capsule, Take 1,000 mg by mouth daily., Disp: , Rfl:   •  pantoprazole (PROTONIX) 40 MG EC tablet, pantoprazole 40 mg tablet,delayed release  TAKE ONE TABLET BY MOUTH once a day, Disp: , Rfl:   •  Probiotic Product (SOLUBLE FIBER/PROBIOTICS PO), Take 1 capsule by mouth Daily., Disp: , Rfl:   •  tiotropium (SPIRIVA) 18 MCG per inhalation capsule, Place 1 capsule into inhaler and inhale Daily., Disp: 90 capsule, Rfl: 3  •  Tiotropium Bromide Monohydrate (Spiriva Respimat) 1.25 MCG/ACT aerosol solution inhaler, Inhale 2 puffs Daily., Disp: 4 g, Rfl: 0  •  vitamin C (ASCORBIC ACID) 500 MG tablet, Take 1 tablet by mouth daily., Disp: , Rfl:   •  sucralfate (Carafate) 1 GM/10ML suspension, Take 10 mL by mouth 4 (Four) Times a Day., Disp: 414 mL, Rfl: 1    Current Facility-Administered Medications:   •  triamcinolone acetonide (KENALOG-40) injection 20 mg, 20 mg, Intra-articular, Once, Gordon Norman MD    Allergies   Allergen Reactions   • Codeine Other (See Comments)     Pt states \"it made me feel like my insides were in a vice \"   • Niacin Hives   • Darvon [Propoxyphene] Nausea And Vomiting   • Flagyl [Metronidazole] Nausea Only   • Adhesive Tape Rash     tegaderm    • Ciprodex [Ciprofloxacin-Dexamethasone] Other (See Comments)     REDNESS,tendonitis   • Other Other (See Comments)     POLLEN,TREES,AND PLANTS MULTIPLE ENVIRONMENTAL ALLERGIES       Objective     Physical Exam:  Vital Signs:   Vitals:    10/04/22 1102   BP: 138/82   Pulse: 76   Temp: 97.1 °F (36.2 °C)   SpO2: 98%   Weight: 119 kg (262 lb)   Height: 175.3 cm (69.02\")     Body mass index is 38.67 kg/m².    Physical Exam  Constitutional:       Appearance: She is obese. She is not ill-appearing.   HENT:      Head: " Normocephalic.      Right Ear: External ear normal.      Left Ear: External ear normal.   Eyes:      Conjunctiva/sclera: Conjunctivae normal.      Pupils: Pupils are equal, round, and reactive to light.   Cardiovascular:      Rate and Rhythm: Normal rate and regular rhythm.      Pulses:           Radial pulses are 2+ on the right side and 2+ on the left side.        Dorsalis pedis pulses are 2+ on the right side and 2+ on the left side.      Heart sounds: Normal heart sounds.   Pulmonary:      Effort: Pulmonary effort is normal.      Breath sounds: Normal breath sounds.   Abdominal:      General: Bowel sounds are normal.      Palpations: Abdomen is soft.      Tenderness: There is abdominal tenderness in the left upper quadrant and left lower quadrant.   Musculoskeletal:      Cervical back: Normal range of motion and neck supple.      Right lower leg: No edema.      Left lower leg: No edema.   Skin:     General: Skin is warm.      Capillary Refill: Capillary refill takes less than 2 seconds.   Neurological:      Mental Status: She is alert and oriented to person, place, and time.      Coordination: Coordination normal.      Gait: Gait normal.   Psychiatric:         Mood and Affect: Mood normal.         Behavior: Behavior normal.         Thought Content: Thought content normal.         Assessment / Plan      Assessment/Plan:   Diagnoses and all orders for this visit:    1. Incisional hernia of anterior abdominal wall without obstruction or gangrene (Primary)    2. Diverticulosis  -     Ambulatory Referral to Gastroenterology    3. Nausea and vomiting, unspecified vomiting type  -     sucralfate (Carafate) 1 GM/10ML suspension; Take 10 mL by mouth 4 (Four) Times a Day.  Dispense: 414 mL; Refill: 1  -     Ambulatory Referral to Gastroenterology        - [place] diet, avoid greasy/spicy foods and dairy until symptoms improve.  Then, may advance diet as tolerated        - Drink plenty of clear, decaffeinated fluids, as  tolerated.    4. History of peptic ulcer  -     Ambulatory Referral to Gastroenterology    5. Stress        - Encouraged to take part in daily physical exercise.          - Eat healthy, well balanced diet; avoid sugary foods or beverages        - Abstain from alcohol and drugs        - Ensure good night's sleep by creating calm space in bedroom, avoiding screen time 1-2 hours before bed, no caffeine after 5 pm        - Talk to supportive family and friends, as needed        - Consider journaling, other creative way to express feelings, if needed      Follow Up:   Return if symptoms worsen or fail to improve.    Patient was given instructions and counseling regarding her condition or for health maintenance advice. Please see specific information pulled into the AVS if appropriate.       [facility] [address] [facility]     Please note that portions of this note may have been completed with a voice recognition program. Efforts were made to edit dictation, but occasionally words are mistranscribed.

## 2022-10-14 DIAGNOSIS — R11.2 NAUSEA AND VOMITING, UNSPECIFIED VOMITING TYPE: ICD-10-CM

## 2022-10-14 RX ORDER — SUCRALFATE 1 G/10ML
SUSPENSION ORAL
Qty: 420 ML | Refills: 1 | Status: SHIPPED | OUTPATIENT
Start: 2022-10-14 | End: 2023-02-09

## 2022-10-21 ENCOUNTER — PRE-ADMISSION TESTING (OUTPATIENT)
Dept: PREADMISSION TESTING | Facility: HOSPITAL | Age: 67
End: 2022-10-21

## 2022-10-21 VITALS — WEIGHT: 267.64 LBS | HEIGHT: 69 IN | BODY MASS INDEX: 39.64 KG/M2

## 2022-10-21 LAB
ANION GAP SERPL CALCULATED.3IONS-SCNC: 9 MMOL/L (ref 5–15)
BUN SERPL-MCNC: 19 MG/DL (ref 8–23)
BUN/CREAT SERPL: 27.1 (ref 7–25)
CALCIUM SPEC-SCNC: 9.1 MG/DL (ref 8.6–10.5)
CHLORIDE SERPL-SCNC: 106 MMOL/L (ref 98–107)
CO2 SERPL-SCNC: 30 MMOL/L (ref 22–29)
CREAT SERPL-MCNC: 0.7 MG/DL (ref 0.57–1)
DEPRECATED RDW RBC AUTO: 43.9 FL (ref 37–54)
EGFRCR SERPLBLD CKD-EPI 2021: 95.5 ML/MIN/1.73
ERYTHROCYTE [DISTWIDTH] IN BLOOD BY AUTOMATED COUNT: 13.4 % (ref 12.3–15.4)
GLUCOSE SERPL-MCNC: 87 MG/DL (ref 65–99)
HBA1C MFR BLD: 5.6 % (ref 4.8–5.6)
HCT VFR BLD AUTO: 39.6 % (ref 34–46.6)
HGB BLD-MCNC: 12.8 G/DL (ref 12–15.9)
MCH RBC QN AUTO: 28.6 PG (ref 26.6–33)
MCHC RBC AUTO-ENTMCNC: 32.3 G/DL (ref 31.5–35.7)
MCV RBC AUTO: 88.4 FL (ref 79–97)
PLATELET # BLD AUTO: 251 10*3/MM3 (ref 140–450)
PMV BLD AUTO: 10.9 FL (ref 6–12)
POTASSIUM SERPL-SCNC: 4.4 MMOL/L (ref 3.5–5.2)
RBC # BLD AUTO: 4.48 10*6/MM3 (ref 3.77–5.28)
SODIUM SERPL-SCNC: 145 MMOL/L (ref 136–145)
WBC NRBC COR # BLD: 5.53 10*3/MM3 (ref 3.4–10.8)

## 2022-10-21 PROCEDURE — 85027 COMPLETE CBC AUTOMATED: CPT

## 2022-10-21 PROCEDURE — 83036 HEMOGLOBIN GLYCOSYLATED A1C: CPT

## 2022-10-21 PROCEDURE — 36415 COLL VENOUS BLD VENIPUNCTURE: CPT

## 2022-10-21 PROCEDURE — 80048 BASIC METABOLIC PNL TOTAL CA: CPT

## 2022-10-21 RX ORDER — AMOXICILLIN 500 MG/1
1000 CAPSULE ORAL 2 TIMES DAILY
Status: ON HOLD | COMMUNITY
End: 2022-10-27

## 2022-10-21 NOTE — PAT
Patient viewed general PAT education video as instructed in their preoperative information received from their surgeon.  Patient stated the general PAT education video was viewed in its entirety and survey completed.  Copies of [facility] general education handouts (Incentive Spirometry, Meds to Beds Program, Patient Belongings, Pre-op skin preparation instructions, Blood Glucose testing, Visitor policy, Surgery FAQ, Code H) distributed to patient if not printed. Education related to the PAT pass and skin preparation for surgery (if applicable) completed in [facility] as a reinforcement to PAT education video. Patient instructed to return PAT pass provided today as well as completed skin preparation sheet (if applicable) on the day of procedure.     Additionally if patient had not viewed video yet but intended to view it at home or in our waiting area, then referred them to the handout with QR code/link provided during PAT visit.  Instructed patient to complete survey after viewing the video in its entirety.  Encouraged patient/family to read [facility] general education handouts thoroughly and notify PAT staff with any questions or concerns. Patient verbalized understanding of all information and priority content.    Patient to apply Chlorhexadine wipes  to surgical area (as instructed) the night before procedure and the AM of procedure. Wipes provided.    Patient instructed to bring CPAP mask and tubing to the hospital for overnight stay.  Explained that it is not necessary to bring their CPAP machine to the hospital instead a CPAP machine will be provided for use by the hospital. If patient knows their CPAP settings, those settings will be implemented.  If not, the CPAP machine will be utilized on the auto setting using their mask and tubing.    Patient verbalized understanding.    EKG IN [facility] FROM 8/22 AND CARDIAC CLEARANCE IN [facility] FROM 3/7/22 FROM JEREMY TYSON. PATIENT DENIES SHORTNESS OF BREATH AND CHEST PAIN    PATIENT  STATES THAT SHE IS TAKING AMOXICILLIN 1000MG BID FOR POSSIBLE H-PYLORI INFECTION. FAISAL AT DR MARQUEZ OFFICE NOTIFIED AND WILL ALERT DR TSANG.

## 2022-10-26 ENCOUNTER — ANESTHESIA EVENT (OUTPATIENT)
Dept: PERIOP | Facility: HOSPITAL | Age: 67
End: 2022-10-26

## 2022-10-26 RX ORDER — SODIUM CHLORIDE 0.9 % (FLUSH) 0.9 %
10 SYRINGE (ML) INJECTION EVERY 12 HOURS SCHEDULED
Status: CANCELLED | OUTPATIENT
Start: 2022-10-26

## 2022-10-26 RX ORDER — ESTRADIOL 0.1 MG/G
2 CREAM VAGINAL DAILY PRN
Qty: 42.5 G | Refills: 11 | Status: SHIPPED | OUTPATIENT
Start: 2022-10-26

## 2022-10-26 RX ORDER — FAMOTIDINE 10 MG/ML
20 INJECTION, SOLUTION INTRAVENOUS ONCE
Status: CANCELLED | OUTPATIENT
Start: 2022-10-26 | End: 2022-10-26

## 2022-10-26 RX ORDER — SODIUM CHLORIDE 0.9 % (FLUSH) 0.9 %
10 SYRINGE (ML) INJECTION AS NEEDED
Status: CANCELLED | OUTPATIENT
Start: 2022-10-26

## 2022-10-26 NOTE — TELEPHONE ENCOUNTER
Rx Refill Note  Requested Prescriptions     Pending Prescriptions Disp Refills   • estradiol (ESTRACE) 0.1 MG/GM vaginal cream [Pharmacy Med Name: estradiol 0.01% (0.1 mg/gram) vaginal cream] 42.5 g 1     Sig: insert TWO grams vaginally via APPLICATORFUL once a day      Last office visit with prescribing clinician: 2/9/2022      Next office visit with prescribing clinician: 11/21/2022            Mable Alfaro MA  10/26/22, 11:06 EDT

## 2022-10-27 ENCOUNTER — ANESTHESIA (OUTPATIENT)
Dept: PERIOP | Facility: HOSPITAL | Age: 67
End: 2022-10-27

## 2022-10-27 ENCOUNTER — HOSPITAL ENCOUNTER (OUTPATIENT)
Facility: HOSPITAL | Age: 67
Discharge: HOME | End: 2022-10-29
Attending: SURGERY | Admitting: SURGERY

## 2022-10-27 ENCOUNTER — ANESTHESIA EVENT CONVERTED (OUTPATIENT)
Dept: ANESTHESIOLOGY | Facility: HOSPITAL | Age: 67
End: 2022-10-27

## 2022-10-27 DIAGNOSIS — K43.2 INCISIONAL HERNIA: ICD-10-CM

## 2022-10-27 PROCEDURE — 25010000002 ONDANSETRON PER 1 MG: Performed by: SURGERY

## 2022-10-27 PROCEDURE — 94799 UNLISTED PULMONARY SVC/PX: CPT

## 2022-10-27 PROCEDURE — G0378 HOSPITAL OBSERVATION PER HR: HCPCS

## 2022-10-27 PROCEDURE — 88302 TISSUE EXAM BY PATHOLOGIST: CPT | Performed by: SURGERY

## 2022-10-27 PROCEDURE — 25010000002 DEXAMETHASONE SODIUM PHOSPHATE 10 MG/ML SOLUTION: Performed by: SURGERY

## 2022-10-27 PROCEDURE — 25010000002 HYDROMORPHONE HCL-NACL 30-0.9 MG/30ML-% SOLUTION PREFILLED SYRINGE

## 2022-10-27 PROCEDURE — 25010000002 PROPOFOL 10 MG/ML EMULSION: Performed by: ANESTHESIOLOGY

## 2022-10-27 PROCEDURE — 25010000002 ONDANSETRON PER 1 MG

## 2022-10-27 PROCEDURE — 49560 PR REPAIR INCISIONAL HERNIA,REDUCIBLE: CPT | Performed by: PHYSICIAN ASSISTANT

## 2022-10-27 PROCEDURE — C1781 MESH (IMPLANTABLE): HCPCS | Performed by: SURGERY

## 2022-10-27 PROCEDURE — 49568 PR IMPLANT MESH HERNIA REPAIR/DEBRIDEMENT CLOSURE: CPT | Performed by: PHYSICIAN ASSISTANT

## 2022-10-27 PROCEDURE — 25010000002 FENTANYL CITRATE (PF) 50 MCG/ML SOLUTION: Performed by: ANESTHESIOLOGY

## 2022-10-27 PROCEDURE — 25010000002 FENTANYL CITRATE (PF) 50 MCG/ML SOLUTION

## 2022-10-27 PROCEDURE — 25010000002 DEXAMETHASONE PER 1 MG: Performed by: ANESTHESIOLOGY

## 2022-10-27 PROCEDURE — 94660 CPAP INITIATION&MGMT: CPT

## 2022-10-27 PROCEDURE — 25010000002 ONDANSETRON PER 1 MG: Performed by: ANESTHESIOLOGY

## 2022-10-27 DEVICE — VENTRALIGHT ST MESH
Type: IMPLANTABLE DEVICE | Site: ABDOMEN | Status: FUNCTIONAL
Brand: VENTRALIGHT ST

## 2022-10-27 RX ORDER — DEXAMETHASONE SODIUM PHOSPHATE 10 MG/ML
INJECTION, SOLUTION INTRAMUSCULAR; INTRAVENOUS
Status: COMPLETED | OUTPATIENT
Start: 2022-10-27 | End: 2022-10-27

## 2022-10-27 RX ORDER — MAGNESIUM HYDROXIDE 1200 MG/15ML
LIQUID ORAL AS NEEDED
Status: DISCONTINUED | OUTPATIENT
Start: 2022-10-27 | End: 2022-10-27 | Stop reason: HOSPADM

## 2022-10-27 RX ORDER — FAMOTIDINE 20 MG/1
20 TABLET, FILM COATED ORAL 2 TIMES DAILY
Status: DISCONTINUED | OUTPATIENT
Start: 2022-10-27 | End: 2022-10-29 | Stop reason: HOSPADM

## 2022-10-27 RX ORDER — MONTELUKAST SODIUM 10 MG/1
10 TABLET ORAL NIGHTLY
Status: DISCONTINUED | OUTPATIENT
Start: 2022-10-27 | End: 2022-10-29 | Stop reason: HOSPADM

## 2022-10-27 RX ORDER — EPHEDRINE SULFATE 50 MG/ML
5 INJECTION, SOLUTION INTRAVENOUS ONCE AS NEEDED
Status: DISCONTINUED | OUTPATIENT
Start: 2022-10-27 | End: 2022-10-27 | Stop reason: HOSPADM

## 2022-10-27 RX ORDER — ONDANSETRON 4 MG/1
4 TABLET, FILM COATED ORAL EVERY 6 HOURS PRN
Status: DISCONTINUED | OUTPATIENT
Start: 2022-10-27 | End: 2022-10-29 | Stop reason: HOSPADM

## 2022-10-27 RX ORDER — ALBUTEROL SULFATE 90 UG/1
2 AEROSOL, METERED RESPIRATORY (INHALATION) EVERY 4 HOURS PRN
Status: DISCONTINUED | OUTPATIENT
Start: 2022-10-27 | End: 2022-10-29 | Stop reason: HOSPADM

## 2022-10-27 RX ORDER — FENTANYL CITRATE 50 UG/ML
50 INJECTION, SOLUTION INTRAMUSCULAR; INTRAVENOUS
Status: DISCONTINUED | OUTPATIENT
Start: 2022-10-27 | End: 2022-10-27 | Stop reason: HOSPADM

## 2022-10-27 RX ORDER — FENTANYL CITRATE 50 UG/ML
INJECTION, SOLUTION INTRAMUSCULAR; INTRAVENOUS
Status: COMPLETED
Start: 2022-10-27 | End: 2022-10-27

## 2022-10-27 RX ORDER — SODIUM CHLORIDE, SODIUM LACTATE, POTASSIUM CHLORIDE, CALCIUM CHLORIDE 600; 310; 30; 20 MG/100ML; MG/100ML; MG/100ML; MG/100ML
100 INJECTION, SOLUTION INTRAVENOUS CONTINUOUS
Status: DISCONTINUED | OUTPATIENT
Start: 2022-10-27 | End: 2022-10-29

## 2022-10-27 RX ORDER — ONDANSETRON 2 MG/ML
4 INJECTION INTRAMUSCULAR; INTRAVENOUS EVERY 6 HOURS PRN
Status: DISCONTINUED | OUTPATIENT
Start: 2022-10-27 | End: 2022-10-29 | Stop reason: HOSPADM

## 2022-10-27 RX ORDER — BUPIVACAINE HYDROCHLORIDE 2.5 MG/ML
INJECTION, SOLUTION EPIDURAL; INFILTRATION; INTRACAUDAL
Status: COMPLETED | OUTPATIENT
Start: 2022-10-27 | End: 2022-10-27

## 2022-10-27 RX ORDER — FENTANYL CITRATE 50 UG/ML
INJECTION, SOLUTION INTRAMUSCULAR; INTRAVENOUS AS NEEDED
Status: DISCONTINUED | OUTPATIENT
Start: 2022-10-27 | End: 2022-10-27 | Stop reason: SURG

## 2022-10-27 RX ORDER — LIDOCAINE HYDROCHLORIDE 10 MG/ML
INJECTION, SOLUTION EPIDURAL; INFILTRATION; INTRACAUDAL; PERINEURAL AS NEEDED
Status: DISCONTINUED | OUTPATIENT
Start: 2022-10-27 | End: 2022-10-27 | Stop reason: SURG

## 2022-10-27 RX ORDER — LIDOCAINE HYDROCHLORIDE 10 MG/ML
0.5 INJECTION, SOLUTION EPIDURAL; INFILTRATION; INTRACAUDAL; PERINEURAL ONCE AS NEEDED
Status: COMPLETED | OUTPATIENT
Start: 2022-10-27 | End: 2022-10-27

## 2022-10-27 RX ORDER — SODIUM CHLORIDE, SODIUM LACTATE, POTASSIUM CHLORIDE, CALCIUM CHLORIDE 600; 310; 30; 20 MG/100ML; MG/100ML; MG/100ML; MG/100ML
9 INJECTION, SOLUTION INTRAVENOUS CONTINUOUS
Status: DISCONTINUED | OUTPATIENT
Start: 2022-10-27 | End: 2022-10-27

## 2022-10-27 RX ORDER — ONDANSETRON 2 MG/ML
4 INJECTION INTRAMUSCULAR; INTRAVENOUS ONCE AS NEEDED
Status: DISCONTINUED | OUTPATIENT
Start: 2022-10-27 | End: 2022-10-27 | Stop reason: HOSPADM

## 2022-10-27 RX ORDER — PROPOFOL 10 MG/ML
VIAL (ML) INTRAVENOUS AS NEEDED
Status: DISCONTINUED | OUTPATIENT
Start: 2022-10-27 | End: 2022-10-27 | Stop reason: SURG

## 2022-10-27 RX ORDER — SODIUM CHLORIDE, SODIUM LACTATE, POTASSIUM CHLORIDE, CALCIUM CHLORIDE 600; 310; 30; 20 MG/100ML; MG/100ML; MG/100ML; MG/100ML
75 INJECTION, SOLUTION INTRAVENOUS CONTINUOUS
Status: DISCONTINUED | OUTPATIENT
Start: 2022-10-27 | End: 2022-10-27

## 2022-10-27 RX ORDER — NALOXONE HCL 0.4 MG/ML
0.4 VIAL (ML) INJECTION AS NEEDED
Status: DISCONTINUED | OUTPATIENT
Start: 2022-10-27 | End: 2022-10-27 | Stop reason: HOSPADM

## 2022-10-27 RX ORDER — NALOXONE HCL 0.4 MG/ML
0.1 VIAL (ML) INJECTION
Status: DISCONTINUED | OUTPATIENT
Start: 2022-10-27 | End: 2022-10-28

## 2022-10-27 RX ORDER — DOCUSATE SODIUM 100 MG/1
100 CAPSULE, LIQUID FILLED ORAL 2 TIMES DAILY PRN
Status: DISCONTINUED | OUTPATIENT
Start: 2022-10-27 | End: 2022-10-29 | Stop reason: HOSPADM

## 2022-10-27 RX ORDER — ONDANSETRON 2 MG/ML
INJECTION INTRAMUSCULAR; INTRAVENOUS AS NEEDED
Status: DISCONTINUED | OUTPATIENT
Start: 2022-10-27 | End: 2022-10-27 | Stop reason: SURG

## 2022-10-27 RX ORDER — LEVOTHYROXINE SODIUM 0.1 MG/1
100 TABLET ORAL
Status: DISCONTINUED | OUTPATIENT
Start: 2022-10-28 | End: 2022-10-28

## 2022-10-27 RX ORDER — MEPERIDINE HYDROCHLORIDE 25 MG/ML
12.5 INJECTION INTRAMUSCULAR; INTRAVENOUS; SUBCUTANEOUS
Status: DISCONTINUED | OUTPATIENT
Start: 2022-10-27 | End: 2022-10-27 | Stop reason: HOSPADM

## 2022-10-27 RX ORDER — CEFAZOLIN SODIUM IN 0.9 % NACL 3 G/100 ML
3 INTRAVENOUS SOLUTION, PIGGYBACK (ML) INTRAVENOUS ONCE
Status: COMPLETED | OUTPATIENT
Start: 2022-10-27 | End: 2022-10-27

## 2022-10-27 RX ORDER — CALCIUM CARBONATE 200(500)MG
2 TABLET,CHEWABLE ORAL 3 TIMES DAILY PRN
Status: DISCONTINUED | OUTPATIENT
Start: 2022-10-27 | End: 2022-10-29 | Stop reason: HOSPADM

## 2022-10-27 RX ORDER — BUPIVACAINE HCL/0.9 % NACL/PF 0.125 %
PLASTIC BAG, INJECTION (ML) EPIDURAL AS NEEDED
Status: DISCONTINUED | OUTPATIENT
Start: 2022-10-27 | End: 2022-10-27 | Stop reason: SURG

## 2022-10-27 RX ORDER — DEXAMETHASONE SODIUM PHOSPHATE 4 MG/ML
INJECTION, SOLUTION INTRA-ARTICULAR; INTRALESIONAL; INTRAMUSCULAR; INTRAVENOUS; SOFT TISSUE AS NEEDED
Status: DISCONTINUED | OUTPATIENT
Start: 2022-10-27 | End: 2022-10-27 | Stop reason: SURG

## 2022-10-27 RX ORDER — ROCURONIUM BROMIDE 10 MG/ML
INJECTION, SOLUTION INTRAVENOUS AS NEEDED
Status: DISCONTINUED | OUTPATIENT
Start: 2022-10-27 | End: 2022-10-27 | Stop reason: SURG

## 2022-10-27 RX ORDER — FAMOTIDINE 20 MG/1
20 TABLET, FILM COATED ORAL ONCE
Status: COMPLETED | OUTPATIENT
Start: 2022-10-27 | End: 2022-10-27

## 2022-10-27 RX ORDER — MIDAZOLAM HYDROCHLORIDE 1 MG/ML
0.5 INJECTION INTRAMUSCULAR; INTRAVENOUS
Status: DISCONTINUED | OUTPATIENT
Start: 2022-10-27 | End: 2022-10-27 | Stop reason: HOSPADM

## 2022-10-27 RX ORDER — ONDANSETRON 2 MG/ML
INJECTION INTRAMUSCULAR; INTRAVENOUS
Status: COMPLETED
Start: 2022-10-27 | End: 2022-10-27

## 2022-10-27 RX ADMIN — LIDOCAINE HYDROCHLORIDE 50 MG: 10 INJECTION, SOLUTION EPIDURAL; INFILTRATION; INTRACAUDAL; PERINEURAL at 11:42

## 2022-10-27 RX ADMIN — ROCURONIUM BROMIDE 30 MG: 10 INJECTION, SOLUTION INTRAVENOUS at 12:55

## 2022-10-27 RX ADMIN — DEXAMETHASONE SODIUM PHOSPHATE 4 MG: 4 INJECTION, SOLUTION INTRA-ARTICULAR; INTRALESIONAL; INTRAMUSCULAR; INTRAVENOUS; SOFT TISSUE at 11:51

## 2022-10-27 RX ADMIN — DEXAMETHASONE SODIUM PHOSPHATE 4 MG: 10 INJECTION, SOLUTION INTRAMUSCULAR; INTRAVENOUS at 11:46

## 2022-10-27 RX ADMIN — PROPOFOL 200 MG: 10 INJECTION, EMULSION INTRAVENOUS at 11:42

## 2022-10-27 RX ADMIN — ONDANSETRON 4 MG: 2 INJECTION INTRAMUSCULAR; INTRAVENOUS at 19:00

## 2022-10-27 RX ADMIN — FENTANYL CITRATE 50 MCG: 50 INJECTION, SOLUTION INTRAMUSCULAR; INTRAVENOUS at 14:36

## 2022-10-27 RX ADMIN — FENTANYL CITRATE 50 MCG: 50 INJECTION, SOLUTION INTRAMUSCULAR; INTRAVENOUS at 15:10

## 2022-10-27 RX ADMIN — BUPIVACAINE HYDROCHLORIDE 60 ML: 2.5 INJECTION, SOLUTION EPIDURAL; INFILTRATION; INTRACAUDAL; PERINEURAL at 11:46

## 2022-10-27 RX ADMIN — ONDANSETRON 4 MG: 2 INJECTION INTRAMUSCULAR; INTRAVENOUS at 15:38

## 2022-10-27 RX ADMIN — FENTANYL CITRATE 100 MCG: 50 INJECTION, SOLUTION INTRAMUSCULAR; INTRAVENOUS at 11:42

## 2022-10-27 RX ADMIN — Medication: at 15:26

## 2022-10-27 RX ADMIN — SODIUM CHLORIDE, POTASSIUM CHLORIDE, SODIUM LACTATE AND CALCIUM CHLORIDE 100 ML/HR: 600; 310; 30; 20 INJECTION, SOLUTION INTRAVENOUS at 16:34

## 2022-10-27 RX ADMIN — FAMOTIDINE 20 MG: 20 TABLET ORAL at 08:44

## 2022-10-27 RX ADMIN — ONDANSETRON 4 MG: 2 INJECTION INTRAMUSCULAR; INTRAVENOUS at 13:01

## 2022-10-27 RX ADMIN — SODIUM CHLORIDE, POTASSIUM CHLORIDE, SODIUM LACTATE AND CALCIUM CHLORIDE 9 ML/HR: 600; 310; 30; 20 INJECTION, SOLUTION INTRAVENOUS at 08:44

## 2022-10-27 RX ADMIN — Medication 100 MCG: at 12:59

## 2022-10-27 RX ADMIN — ROCURONIUM BROMIDE 50 MG: 10 INJECTION, SOLUTION INTRAVENOUS at 11:42

## 2022-10-27 RX ADMIN — MONTELUKAST 10 MG: 10 TABLET, FILM COATED ORAL at 20:35

## 2022-10-27 RX ADMIN — Medication 100 MCG: at 12:29

## 2022-10-27 RX ADMIN — ROCURONIUM BROMIDE 10 MG: 10 INJECTION, SOLUTION INTRAVENOUS at 12:27

## 2022-10-27 RX ADMIN — Medication 3 G: at 11:44

## 2022-10-27 RX ADMIN — LIDOCAINE HYDROCHLORIDE 0.5 ML: 10 INJECTION, SOLUTION EPIDURAL; INFILTRATION; INTRACAUDAL; PERINEURAL at 08:45

## 2022-10-27 RX ADMIN — SODIUM CHLORIDE, POTASSIUM CHLORIDE, SODIUM LACTATE AND CALCIUM CHLORIDE: 600; 310; 30; 20 INJECTION, SOLUTION INTRAVENOUS at 13:54

## 2022-10-27 RX ADMIN — Medication 100 MCG: at 12:34

## 2022-10-27 RX ADMIN — PROPOFOL 25 MCG/KG/MIN: 10 INJECTION, EMULSION INTRAVENOUS at 11:50

## 2022-10-27 RX ADMIN — FAMOTIDINE 20 MG: 20 TABLET ORAL at 20:35

## 2022-10-27 RX ADMIN — SUGAMMADEX 200 MG: 100 INJECTION, SOLUTION INTRAVENOUS at 13:57

## 2022-10-27 RX ADMIN — CALCIUM CARBONATE (ANTACID) CHEW TAB 500 MG 2 TABLET: 500 CHEW TAB at 22:31

## 2022-10-27 NOTE — ANESTHESIA PROCEDURE NOTES
Peripheral Block      Patient reassessed immediately prior to procedure    Patient location during procedure: OR  Start time: 10/27/2022 11:42 AM  Stop time: 10/27/2022 11:46 AM  Reason for block: at surgeon's request and post-op pain management  Performed by  CRNA/CAA: Barrera Barbour CRNASRNA: Ailyn Matos SRNA  Preanesthetic Checklist  Completed: patient identified, IV checked, site marked, risks and benefits discussed, surgical consent, monitors and equipment checked, pre-op evaluation and timeout performed  Prep:  Pt Position: supine  Sterile barriers:cap, gloves, mask and washed/disinfected hands  Prep: ChloraPrep  Patient monitoring: blood pressure monitoring, continuous pulse oximetry and EKG  Procedure    Sedation: yes  Performed under: general  Guidance:ultrasound guided  Images:still images obtained, printed/placed on chart    Laterality:Bilateral  Block Type:TAP (subcostal)  Injection Technique:single-shot  Needle Type:short-bevel and echogenic  Needle Gauge:20 G  Resistance on Injection: none    Medications Used: bupivacaine PF (MARCAINE) 0.25 % injection - Injection   60 mL - 10/27/2022 11:46:00 AM  dexamethasone sodium phosphate injection - Injection   4 mg - 10/27/2022 11:46:00 AM      Medications  Preservative Free Saline:5ml  Comment:Block Injection:  LA dose divided between Right and Left block        Post Assessment  Injection Assessment: negative aspiration for heme, incremental injection and no paresthesia on injection  Patient Tolerance:comfortable throughout block  Complications:no

## 2022-10-27 NOTE — ANESTHESIA PREPROCEDURE EVALUATION
Anesthesia Evaluation     Patient summary reviewed and Nursing notes reviewed   history of anesthetic complications: PONV  NPO Solid Status: > 8 hours  NPO Liquid Status: > 2 hours           Airway   Mallampati: II  TM distance: >3 FB  Neck ROM: full  No difficulty expected  Dental - normal exam     Pulmonary - normal exam   (+) asthma (Allergy induced),sleep apnea on CPAP,   Cardiovascular - normal exam    ECG reviewed  Rhythm: regular  Rate: normal    (+) dysrhythmias (Paroxysmal Afib s/p PVA) Atrial Fib,     ROS comment: 2021 Echo:  1.  Normal left ventricular size and systolic function, LVEF 60-65%.  2.  Normal LV diastolic filling pattern.  3.  Normal right ventricular size and systolic function.  4.  Normal left and right atrial size.  5.  Trace mitral regurgitation.  6.  Mild tricuspid regurgitation, RVSP 35 mmHg.    Neuro/Psych- negative ROS  GI/Hepatic/Renal/Endo    (+) morbid obesity, GERD well controlled,  thyroid problem (s/p resection) thyroid cancer    Musculoskeletal     Abdominal    Substance History      OB/GYN          Other   arthritis,                      Anesthesia Plan    ASA 3     general with block     (Bilateral TAP blocks post-induction for post-operative analgesia per request of Dr. Kumar)  intravenous induction     Anesthetic plan, risks, benefits, and alternatives have been provided, discussed and informed consent has been obtained with: patient.    Plan discussed with CRNA.        CODE STATUS:

## 2022-10-27 NOTE — ANESTHESIA PROCEDURE NOTES
Airway  Urgency: elective    Airway not difficult    General Information and Staff    Patient location during procedure: OR  SRNA: Ailyn Matos SRNA  Indications and Patient Condition  Indications for airway management: airway protection    Preoxygenated: yes  MILS not maintained throughout  Mask difficulty assessment: 2 - vent by mask + OA or adjuvant +/- NMBA    Final Airway Details  Final airway type: endotracheal airway      Successful airway: ETT  Cuffed: yes   Successful intubation technique: direct laryngoscopy  Endotracheal tube insertion site: oral  Blade: Mirian  Blade size: 3  ETT size (mm): 7.0  Cormack-Lehane Classification: grade I - full view of glottis  Placement verified by: chest auscultation and capnometry   Cuff volume (mL): 8  Measured from: lips  ETT/EBT  to lips (cm): 21  Number of attempts at approach: 1  Assessment: lips, teeth, and gum same as pre-op and atraumatic intubation    Additional Comments  Negative epigastric sounds, Breath sound equal bilaterally with symmetric chest rise and fall

## 2022-10-27 NOTE — ANESTHESIA POSTPROCEDURE EVALUATION
Patient: Albania Ramos    Procedure Summary     Date: 10/27/22 Room / Location:  [facility] /  [facility]    Anesthesia Start: 1136 Anesthesia Stop: 1419    Procedure: INCISIONAL HERNIA REPAIR WITH MESH,  COMPONENT SEPARATION (Abdomen) Diagnosis: Incisional hernia    Surgeons: Arturo Kumar MD Provider: Gustavo Walton MD    Anesthesia Type: general with block ASA Status: 3          Anesthesia Type: general with block    Vitals  Vitals Value Taken Time   /74 10/27/22 1419   Temp 98.6 °F (37 °C) 10/27/22 1419   Pulse 59 10/27/22 1419   Resp 14 10/27/22 1419   SpO2 96 % 10/27/22 1419           Post Anesthesia Care and Evaluation    Patient location during evaluation: PACU  Patient participation: complete - patient participated  Level of consciousness: awake and alert  Pain management: adequate    Airway patency: patent  Anesthetic complications: No anesthetic complications  PONV Status: none  Cardiovascular status: hemodynamically stable and acceptable  Respiratory status: nonlabored ventilation, acceptable and nasal cannula  Hydration status: acceptable

## 2022-10-28 LAB
ANION GAP SERPL CALCULATED.3IONS-SCNC: 10 MMOL/L (ref 5–15)
BASOPHILS # BLD AUTO: 0.02 10*3/MM3 (ref 0–0.2)
BASOPHILS NFR BLD AUTO: 0.2 % (ref 0–1.5)
BUN SERPL-MCNC: 15 MG/DL (ref 8–23)
BUN/CREAT SERPL: 22.4 (ref 7–25)
CALCIUM SPEC-SCNC: 8.5 MG/DL (ref 8.6–10.5)
CHLORIDE SERPL-SCNC: 105 MMOL/L (ref 98–107)
CO2 SERPL-SCNC: 26 MMOL/L (ref 22–29)
CREAT SERPL-MCNC: 0.67 MG/DL (ref 0.57–1)
CYTO UR: NORMAL
DEPRECATED RDW RBC AUTO: 46.6 FL (ref 37–54)
EGFRCR SERPLBLD CKD-EPI 2021: 96.5 ML/MIN/1.73
EOSINOPHIL # BLD AUTO: 0 10*3/MM3 (ref 0–0.4)
EOSINOPHIL NFR BLD AUTO: 0 % (ref 0.3–6.2)
ERYTHROCYTE [DISTWIDTH] IN BLOOD BY AUTOMATED COUNT: 14 % (ref 12.3–15.4)
GLUCOSE SERPL-MCNC: 140 MG/DL (ref 65–99)
HCT VFR BLD AUTO: 41.4 % (ref 34–46.6)
HGB BLD-MCNC: 12.9 G/DL (ref 12–15.9)
IMM GRANULOCYTES # BLD AUTO: 0.04 10*3/MM3 (ref 0–0.05)
IMM GRANULOCYTES NFR BLD AUTO: 0.4 % (ref 0–0.5)
LAB AP CASE REPORT: NORMAL
LAB AP CLINICAL INFORMATION: NORMAL
LYMPHOCYTES # BLD AUTO: 1.18 10*3/MM3 (ref 0.7–3.1)
LYMPHOCYTES NFR BLD AUTO: 11.3 % (ref 19.6–45.3)
MCH RBC QN AUTO: 28.4 PG (ref 26.6–33)
MCHC RBC AUTO-ENTMCNC: 31.2 G/DL (ref 31.5–35.7)
MCV RBC AUTO: 91.2 FL (ref 79–97)
MONOCYTES # BLD AUTO: 1 10*3/MM3 (ref 0.1–0.9)
MONOCYTES NFR BLD AUTO: 9.6 % (ref 5–12)
NEUTROPHILS NFR BLD AUTO: 78.5 % (ref 42.7–76)
NEUTROPHILS NFR BLD AUTO: 8.22 10*3/MM3 (ref 1.7–7)
NRBC BLD AUTO-RTO: 0 /100 WBC (ref 0–0.2)
PATH REPORT.FINAL DX SPEC: NORMAL
PATH REPORT.GROSS SPEC: NORMAL
PLATELET # BLD AUTO: 267 10*3/MM3 (ref 140–450)
PMV BLD AUTO: 11.2 FL (ref 6–12)
POTASSIUM SERPL-SCNC: 5 MMOL/L (ref 3.5–5.2)
RBC # BLD AUTO: 4.54 10*6/MM3 (ref 3.77–5.28)
SODIUM SERPL-SCNC: 141 MMOL/L (ref 136–145)
WBC NRBC COR # BLD: 10.46 10*3/MM3 (ref 3.4–10.8)

## 2022-10-28 PROCEDURE — 85025 COMPLETE CBC W/AUTO DIFF WBC: CPT | Performed by: SURGERY

## 2022-10-28 PROCEDURE — 80048 BASIC METABOLIC PNL TOTAL CA: CPT | Performed by: SURGERY

## 2022-10-28 PROCEDURE — 25010000002 ONDANSETRON PER 1 MG: Performed by: SURGERY

## 2022-10-28 PROCEDURE — 63710000001 ONDANSETRON PER 8 MG: Performed by: SURGERY

## 2022-10-28 PROCEDURE — G0378 HOSPITAL OBSERVATION PER HR: HCPCS

## 2022-10-28 PROCEDURE — 25010000002 HYDROMORPHONE PER 4 MG: Performed by: SURGERY

## 2022-10-28 RX ORDER — LEVOTHYROXINE SODIUM 0.07 MG/1
75 TABLET ORAL ONCE
Status: COMPLETED | OUTPATIENT
Start: 2022-10-28 | End: 2022-10-28

## 2022-10-28 RX ORDER — PANTOPRAZOLE SODIUM 40 MG/1
40 TABLET, DELAYED RELEASE ORAL
Status: DISCONTINUED | OUTPATIENT
Start: 2022-10-28 | End: 2022-10-29 | Stop reason: HOSPADM

## 2022-10-28 RX ORDER — ACETAMINOPHEN 325 MG/1
650 TABLET ORAL EVERY 4 HOURS PRN
Status: DISCONTINUED | OUTPATIENT
Start: 2022-10-28 | End: 2022-10-29 | Stop reason: HOSPADM

## 2022-10-28 RX ORDER — HYDROMORPHONE HYDROCHLORIDE 1 MG/ML
0.5 INJECTION, SOLUTION INTRAMUSCULAR; INTRAVENOUS; SUBCUTANEOUS
Status: DISCONTINUED | OUTPATIENT
Start: 2022-10-28 | End: 2022-10-29 | Stop reason: HOSPADM

## 2022-10-28 RX ORDER — LEVOTHYROXINE SODIUM 175 UG/1
175 TABLET ORAL
Status: DISCONTINUED | OUTPATIENT
Start: 2022-10-29 | End: 2022-10-29 | Stop reason: HOSPADM

## 2022-10-28 RX ADMIN — ACETAMINOPHEN 650 MG: 325 TABLET, FILM COATED ORAL at 23:28

## 2022-10-28 RX ADMIN — ONDANSETRON 4 MG: 2 INJECTION INTRAMUSCULAR; INTRAVENOUS at 15:54

## 2022-10-28 RX ADMIN — ONDANSETRON HYDROCHLORIDE 4 MG: 4 TABLET, FILM COATED ORAL at 23:49

## 2022-10-28 RX ADMIN — SODIUM CHLORIDE, POTASSIUM CHLORIDE, SODIUM LACTATE AND CALCIUM CHLORIDE 100 ML/HR: 600; 310; 30; 20 INJECTION, SOLUTION INTRAVENOUS at 21:17

## 2022-10-28 RX ADMIN — FAMOTIDINE 20 MG: 20 TABLET ORAL at 09:23

## 2022-10-28 RX ADMIN — HYDROMORPHONE HYDROCHLORIDE 0.5 MG: 1 INJECTION, SOLUTION INTRAMUSCULAR; INTRAVENOUS; SUBCUTANEOUS at 21:05

## 2022-10-28 RX ADMIN — PANTOPRAZOLE SODIUM 40 MG: 40 TABLET, DELAYED RELEASE ORAL at 17:53

## 2022-10-28 RX ADMIN — PANTOPRAZOLE SODIUM 40 MG: 40 TABLET, DELAYED RELEASE ORAL at 09:24

## 2022-10-28 RX ADMIN — FAMOTIDINE 20 MG: 20 TABLET ORAL at 21:18

## 2022-10-28 RX ADMIN — MONTELUKAST 10 MG: 10 TABLET, FILM COATED ORAL at 21:19

## 2022-10-28 RX ADMIN — LEVOTHYROXINE SODIUM 75 MCG: 0.07 TABLET ORAL at 03:39

## 2022-10-28 RX ADMIN — LEVOTHYROXINE SODIUM 100 MCG: 0.1 TABLET ORAL at 02:52

## 2022-10-29 ENCOUNTER — READMISSION MANAGEMENT (OUTPATIENT)
Dept: CALL CENTER | Facility: HOSPITAL | Age: 67
End: 2022-10-29

## 2022-10-29 VITALS
TEMPERATURE: 97.8 F | HEART RATE: 81 BPM | DIASTOLIC BLOOD PRESSURE: 64 MMHG | RESPIRATION RATE: 18 BRPM | BODY MASS INDEX: 39.25 KG/M2 | HEIGHT: 69 IN | SYSTOLIC BLOOD PRESSURE: 131 MMHG | WEIGHT: 265 LBS | OXYGEN SATURATION: 9 %

## 2022-10-29 LAB
ANION GAP SERPL CALCULATED.3IONS-SCNC: 10 MMOL/L (ref 5–15)
BASOPHILS # BLD AUTO: 0.05 10*3/MM3 (ref 0–0.2)
BASOPHILS NFR BLD AUTO: 0.5 % (ref 0–1.5)
BUN SERPL-MCNC: 13 MG/DL (ref 8–23)
BUN/CREAT SERPL: 22.8 (ref 7–25)
CALCIUM SPEC-SCNC: 8.6 MG/DL (ref 8.6–10.5)
CHLORIDE SERPL-SCNC: 100 MMOL/L (ref 98–107)
CO2 SERPL-SCNC: 28 MMOL/L (ref 22–29)
CREAT SERPL-MCNC: 0.57 MG/DL (ref 0.57–1)
DEPRECATED RDW RBC AUTO: 47.8 FL (ref 37–54)
EGFRCR SERPLBLD CKD-EPI 2021: 100.4 ML/MIN/1.73
EOSINOPHIL # BLD AUTO: 0.41 10*3/MM3 (ref 0–0.4)
EOSINOPHIL NFR BLD AUTO: 4.1 % (ref 0.3–6.2)
ERYTHROCYTE [DISTWIDTH] IN BLOOD BY AUTOMATED COUNT: 14.1 % (ref 12.3–15.4)
GLUCOSE SERPL-MCNC: 105 MG/DL (ref 65–99)
HCT VFR BLD AUTO: 38.9 % (ref 34–46.6)
HGB BLD-MCNC: 12.1 G/DL (ref 12–15.9)
IMM GRANULOCYTES # BLD AUTO: 0.04 10*3/MM3 (ref 0–0.05)
IMM GRANULOCYTES NFR BLD AUTO: 0.4 % (ref 0–0.5)
LYMPHOCYTES # BLD AUTO: 2.18 10*3/MM3 (ref 0.7–3.1)
LYMPHOCYTES NFR BLD AUTO: 21.8 % (ref 19.6–45.3)
MCH RBC QN AUTO: 28.7 PG (ref 26.6–33)
MCHC RBC AUTO-ENTMCNC: 31.1 G/DL (ref 31.5–35.7)
MCV RBC AUTO: 92.2 FL (ref 79–97)
MONOCYTES # BLD AUTO: 0.99 10*3/MM3 (ref 0.1–0.9)
MONOCYTES NFR BLD AUTO: 9.9 % (ref 5–12)
NEUTROPHILS NFR BLD AUTO: 6.35 10*3/MM3 (ref 1.7–7)
NEUTROPHILS NFR BLD AUTO: 63.3 % (ref 42.7–76)
NRBC BLD AUTO-RTO: 0 /100 WBC (ref 0–0.2)
PLATELET # BLD AUTO: 223 10*3/MM3 (ref 140–450)
PMV BLD AUTO: 11.4 FL (ref 6–12)
POTASSIUM SERPL-SCNC: 4 MMOL/L (ref 3.5–5.2)
RBC # BLD AUTO: 4.22 10*6/MM3 (ref 3.77–5.28)
SODIUM SERPL-SCNC: 138 MMOL/L (ref 136–145)
WBC NRBC COR # BLD: 10.02 10*3/MM3 (ref 3.4–10.8)

## 2022-10-29 PROCEDURE — 80048 BASIC METABOLIC PNL TOTAL CA: CPT | Performed by: SURGERY

## 2022-10-29 PROCEDURE — 94660 CPAP INITIATION&MGMT: CPT

## 2022-10-29 PROCEDURE — G0378 HOSPITAL OBSERVATION PER HR: HCPCS

## 2022-10-29 PROCEDURE — 94799 UNLISTED PULMONARY SVC/PX: CPT

## 2022-10-29 PROCEDURE — 25010000002 HYDROMORPHONE PER 4 MG: Performed by: SURGERY

## 2022-10-29 PROCEDURE — 85025 COMPLETE CBC W/AUTO DIFF WBC: CPT | Performed by: SURGERY

## 2022-10-29 RX ORDER — HYDROCODONE BITARTRATE AND ACETAMINOPHEN 5; 325 MG/1; MG/1
1 TABLET ORAL EVERY 4 HOURS PRN
Qty: 14 TABLET | Refills: 0 | Status: SHIPPED | OUTPATIENT
Start: 2022-10-29 | End: 2022-11-05

## 2022-10-29 RX ORDER — HYDROCODONE BITARTRATE AND ACETAMINOPHEN 5; 325 MG/1; MG/1
1 TABLET ORAL EVERY 6 HOURS PRN
Status: DISCONTINUED | OUTPATIENT
Start: 2022-10-29 | End: 2022-10-29 | Stop reason: HOSPADM

## 2022-10-29 RX ORDER — PSEUDOEPHEDRINE HCL 30 MG
100 TABLET ORAL 2 TIMES DAILY PRN
Qty: 30 CAPSULE | Refills: 0 | Status: SHIPPED | OUTPATIENT
Start: 2022-10-29

## 2022-10-29 RX ADMIN — HYDROMORPHONE HYDROCHLORIDE 0.5 MG: 1 INJECTION, SOLUTION INTRAMUSCULAR; INTRAVENOUS; SUBCUTANEOUS at 04:38

## 2022-10-29 RX ADMIN — SODIUM CHLORIDE, POTASSIUM CHLORIDE, SODIUM LACTATE AND CALCIUM CHLORIDE 100 ML/HR: 600; 310; 30; 20 INJECTION, SOLUTION INTRAVENOUS at 06:51

## 2022-10-29 RX ADMIN — FAMOTIDINE 20 MG: 20 TABLET ORAL at 09:02

## 2022-10-29 RX ADMIN — DOCUSATE SODIUM 100 MG: 100 CAPSULE, LIQUID FILLED ORAL at 04:38

## 2022-10-29 RX ADMIN — HYDROCODONE BITARTRATE AND ACETAMINOPHEN 1 TABLET: 5; 325 TABLET ORAL at 14:47

## 2022-10-29 RX ADMIN — PANTOPRAZOLE SODIUM 40 MG: 40 TABLET, DELAYED RELEASE ORAL at 06:51

## 2022-10-29 RX ADMIN — HYDROCODONE BITARTRATE AND ACETAMINOPHEN 1 TABLET: 5; 325 TABLET ORAL at 10:55

## 2022-10-29 RX ADMIN — LEVOTHYROXINE SODIUM 175 MCG: 175 TABLET ORAL at 02:12

## 2022-10-29 RX ADMIN — ACETAMINOPHEN 650 MG: 325 TABLET, FILM COATED ORAL at 04:38

## 2022-10-29 NOTE — OUTREACH NOTE
Prep Survey    Flowsheet Row Responses   [facility] patient discharged from? [place]   Is LACE score < 7 ? Yes   Emergency Room discharge w/ pulse ox? No   Eligibility [facility]   Date of Admission 10/27/22   Date of Discharge 10/29/22   Discharge Disposition Home or Self Care   Discharge diagnosis INCISIONAL HERNIA REPAIR WITH MESH   Does the patient have one of the following disease processes/diagnoses(primary or secondary)? General Surgery   Does the patient have Home health ordered? No   Is there a DME ordered? No   Prep survey completed? Yes          TAMMI LATHAM - Registered Nurse

## 2022-10-31 ENCOUNTER — TRANSITIONAL CARE MANAGEMENT TELEPHONE ENCOUNTER (OUTPATIENT)
Dept: CALL CENTER | Facility: HOSPITAL | Age: 67
End: 2022-10-31

## 2022-10-31 NOTE — OUTREACH NOTE
Call Center TCM Note    Flowsheet Row Responses   [facility] patient discharged from? [place]   Does the patient have one of the following disease processes/diagnoses(primary or secondary)? General Surgery   TCM attempt successful? No   Unsuccessful attempts Attempt 2          Cate Gamino RN    10/31/2022, 16:35 EDT

## 2022-10-31 NOTE — OUTREACH NOTE
Call Center TCM Note    Flowsheet Row Responses   [facility] patient discharged from? [place]   Does the patient have one of the following disease processes/diagnoses(primary or secondary)? General Surgery   TCM attempt successful? No   Unsuccessful attempts Attempt 1  [No VR]   Call Status Left message          Cate Gamino RN    10/31/2022, 15:32 EDT

## 2022-11-01 ENCOUNTER — TRANSITIONAL CARE MANAGEMENT TELEPHONE ENCOUNTER (OUTPATIENT)
Dept: CALL CENTER | Facility: HOSPITAL | Age: 67
End: 2022-11-01

## 2022-11-01 NOTE — OUTREACH NOTE
Call Center TCM Note    Flowsheet Row Responses   [facility] patient discharged from? [place]   Does the patient have one of the following disease processes/diagnoses(primary or secondary)? General Surgery   TCM attempt successful? No   Unsuccessful attempts Attempt 3          Melina Lawrence RN    11/1/2022, 14:29 EDT

## 2022-12-05 ENCOUNTER — PATIENT MESSAGE (OUTPATIENT)
Dept: INTERNAL MEDICINE | Facility: CLINIC | Age: 67
End: 2022-12-05

## 2022-12-05 DIAGNOSIS — E89.0 POSTOPERATIVE HYPOTHYROIDISM: ICD-10-CM

## 2022-12-05 RX ORDER — LEVOTHYROXINE SODIUM 175 UG/1
175 TABLET ORAL DAILY
Qty: 90 TABLET | Refills: 1 | Status: SHIPPED | OUTPATIENT
Start: 2022-12-05

## 2022-12-05 NOTE — TELEPHONE ENCOUNTER
From: Albania Ramos  To: Gordon Norman MD  Sent: 12/5/2022 4:06 PM EST  Subject: Synthroid 175 mcg    Hello, I’m currently taking a 100 mcg plus a 75 mcg in Synthroid daily. I would like a 175 mcg tab instead, 90 count, sent to [facility] please.  Thanks, Albania

## 2022-12-07 RX ORDER — LEVOTHYROXINE SODIUM 0.07 MG/1
TABLET ORAL
Qty: 90 TABLET | Refills: 2 | OUTPATIENT
Start: 2022-12-07

## 2022-12-07 NOTE — TELEPHONE ENCOUNTER
Rx Refill Note  Requested Prescriptions     Refused Prescriptions Disp Refills   • levothyroxine (SYNTHROID, LEVOTHROID) 75 MCG tablet [Pharmacy Med Name: levothyroxine 75 mcg tablet] 90 tablet 2     Sig: TAKE ONE TABLET BY MOUTH EVERY DAY      Last office visit with prescribing clinician: 2/9/2022   Last telemedicine visit with prescribing clinician: Visit date not found   Next office visit with prescribing clinician: Visit date not found                         Would you like a call back once the refill request has been completed: [] Yes [] No    If the office needs to give you a call back, can they leave a voicemail: [] Yes [] No    Alyson Maria LPN  12/07/22, 09:01 EST

## 2022-12-12 ENCOUNTER — TRANSCRIBE ORDERS (OUTPATIENT)
Dept: ADMINISTRATIVE | Facility: HOSPITAL | Age: 67
End: 2022-12-12

## 2022-12-12 ENCOUNTER — TRANSCRIBE ORDERS (OUTPATIENT)
Dept: GENERAL RADIOLOGY | Facility: HOSPITAL | Age: 67
End: 2022-12-12

## 2022-12-12 ENCOUNTER — HOSPITAL ENCOUNTER (OUTPATIENT)
Dept: GENERAL RADIOLOGY | Facility: HOSPITAL | Age: 67
Discharge: HOME | End: 2022-12-12
Admitting: NURSE PRACTITIONER

## 2022-12-12 DIAGNOSIS — R06.02 SHORTNESS OF BREATH: Primary | ICD-10-CM

## 2022-12-12 DIAGNOSIS — R06.02 SHORTNESS OF BREATH: ICD-10-CM

## 2022-12-12 DIAGNOSIS — Z12.31 VISIT FOR SCREENING MAMMOGRAM: Primary | ICD-10-CM

## 2022-12-12 PROCEDURE — 71046 X-RAY EXAM CHEST 2 VIEWS: CPT

## 2022-12-23 ENCOUNTER — OFFICE VISIT (OUTPATIENT)
Dept: INTERNAL MEDICINE | Facility: CLINIC | Age: 67
End: 2022-12-23

## 2022-12-23 VITALS
DIASTOLIC BLOOD PRESSURE: 72 MMHG | TEMPERATURE: 97.8 F | OXYGEN SATURATION: 98 % | BODY MASS INDEX: 39.99 KG/M2 | SYSTOLIC BLOOD PRESSURE: 138 MMHG | HEART RATE: 68 BPM | HEIGHT: 69 IN | WEIGHT: 270 LBS

## 2022-12-23 DIAGNOSIS — R10.32 LLQ PAIN: Primary | ICD-10-CM

## 2022-12-23 PROCEDURE — 99213 OFFICE O/P EST LOW 20 MIN: CPT | Performed by: INTERNAL MEDICINE

## 2022-12-23 NOTE — PROGRESS NOTES
"Chief Complaint   Patient presents with   • Abstract     Pt states she went to stand up out of her chair about 3 hours ago and has had constant left lower quadrant abdominal pain     Subjective   Albania Ramos is a 67 y.o. female.     History of Present Illness  Pt stood up and has sudden onset of LLQ pain.   She has had two surgeries earlier this yr.  In March surgery for diverticulitis and then a few mo ago for incisional hernia on right side.   She stopped wearing her binder about a week ago.  She has not been lifting much, has been lifting less.   This feels like her prior hernia.     Abdominal Pain  This is a new problem. The current episode started today. The onset quality is sudden. The pain is located in the LLQ. The quality of the pain is burning and tearing. The abdominal pain does not radiate. Pertinent negatives include no constipation, dysuria, fever, nausea or vomiting. The pain is aggravated by certain positions and movement. The pain is relieved by nothing. She has tried nothing for the symptoms. The treatment provided no relief. Her past medical history is significant for abdominal surgery. diverticulitis        The following portions of the patient's history were reviewed and updated as appropriate: allergies, current medications, past family history, past medical history, past social history, past surgical history and problem list.    Review of Systems   Constitutional: Negative for chills and fever.   HENT: Negative.    Respiratory: Negative.    Cardiovascular: Negative.    Gastrointestinal: Positive for abdominal pain. Negative for blood in stool, constipation, nausea and vomiting.   Genitourinary: Negative for dysuria.       Objective   /72   Pulse 68   Temp 97.8 °F (36.6 °C)   Ht 175.3 cm (69\")   Wt 122 kg (270 lb)   LMP  (LMP Unknown)   SpO2 98%   BMI 39.87 kg/m²   Body mass index is 39.87 kg/m².  Physical Exam  Vitals and nursing note reviewed.   Constitutional:       General: " She is in acute distress.      Appearance: Normal appearance. She is obese. She is not ill-appearing.      Comments: Pleasant female, appears in pain   HENT:      Head: Normocephalic and atraumatic.      Right Ear: External ear normal.      Left Ear: External ear normal.   Eyes:      General:         Right eye: No discharge.         Left eye: No discharge.      Extraocular Movements: Extraocular movements intact.   Cardiovascular:      Rate and Rhythm: Normal rate and regular rhythm.      Heart sounds: Normal heart sounds. No murmur heard.  Pulmonary:      Effort: Pulmonary effort is normal. No respiratory distress.      Breath sounds: Normal breath sounds.   Abdominal:      General: Bowel sounds are normal.      Palpations: Abdomen is soft. There is no mass.      Tenderness: There is abdominal tenderness. There is no guarding or rebound.      Comments: Left mid abdominal pain to palpation, no mass, no HSM, no R or G   Musculoskeletal:      Right lower leg: No edema.      Left lower leg: No edema.   Neurological:      Mental Status: She is alert and oriented to person, place, and time.   Psychiatric:         Mood and Affect: Mood normal.         Assessment & Plan   Albania Ramos is here today and the following problems have been addressed:      Diagnoses and all orders for this visit:    1. LLQ pain (Primary)  -     CT Abdomen Pelvis Without Contrast; Future  -     Ambulatory Referral to General Surgery    Pt with severe left mid abdominal pain of sudden onset  Suspect ventral hernia  CT abdomen ordered  Avoid lifting  Recommend she wear her binder if tolerated  Refer to Dr Kumar for eval and treatment again  She could not provide UA sample today    RTC or go to ER if sxs worsen or persist    Please note that portions of this note were completed with a voice recognition program.  Efforts were made to edit dictation, but occasionally words are mistranscribed.

## 2022-12-27 ENCOUNTER — HOSPITAL ENCOUNTER (OUTPATIENT)
Dept: CT IMAGING | Facility: HOSPITAL | Age: 67
Discharge: HOME | End: 2022-12-27
Admitting: INTERNAL MEDICINE

## 2022-12-27 DIAGNOSIS — R10.32 LLQ PAIN: ICD-10-CM

## 2022-12-27 PROCEDURE — 74176 CT ABD & PELVIS W/O CONTRAST: CPT

## 2022-12-28 ENCOUNTER — OFFICE VISIT (OUTPATIENT)
Dept: PULMONOLOGY | Facility: CLINIC | Age: 67
End: 2022-12-28

## 2022-12-28 VITALS
HEIGHT: 69 IN | RESPIRATION RATE: 16 BRPM | BODY MASS INDEX: 39.99 KG/M2 | WEIGHT: 270 LBS | DIASTOLIC BLOOD PRESSURE: 80 MMHG | SYSTOLIC BLOOD PRESSURE: 124 MMHG | OXYGEN SATURATION: 99 % | HEART RATE: 71 BPM | TEMPERATURE: 97 F

## 2022-12-28 DIAGNOSIS — J45.30 MILD PERSISTENT ASTHMA WITHOUT COMPLICATION: Primary | ICD-10-CM

## 2022-12-28 DIAGNOSIS — J30.89 OTHER ALLERGIC RHINITIS: ICD-10-CM

## 2022-12-28 DIAGNOSIS — G47.33 OSA (OBSTRUCTIVE SLEEP APNEA): ICD-10-CM

## 2022-12-28 PROCEDURE — 99214 OFFICE O/P EST MOD 30 MIN: CPT | Performed by: INTERNAL MEDICINE

## 2022-12-28 RX ORDER — PREDNISONE 10 MG/1
TABLET ORAL
COMMUNITY
Start: 2022-12-28 | End: 2023-02-09

## 2022-12-28 NOTE — PROGRESS NOTES
"  Chief Complaint   Patient presents with   • Sleep Apnea     followup       Subjective   Al[place]ia Ramos is a 67 y.o. female.     History of Present Illness   Patient was evaluated today for follow up of asthma, allergic rhinitis and STEFAN. Patient does not report any recent exacerbations requiring emergency room visits or hospitalizations.    Patient is compliant with pulmonary medicines, as prescribed.     she is currently on Spiriva. she is using the rescue inhalers minimally.     Patient says that she has been using her nasal sprays on a seasonal basis and describes no significant ongoing issues other than occasional congestion.     Patient does report improvement but now feels that the PAP device is not relieving some of the symptoms.    Patient reports slight worsening in daytime sleepiness when compared to before.     Patient denies any issues with her mask.     Patient says that the compliance with the use of the equipment is good.     The following portions of the patient's history were reviewed and updated as appropriate: allergies, current medications, past family history, past medical history, past social history and past surgical history.    Review of Systems   Constitutional: Negative for chills.   HENT: Positive for sneezing. Negative for sinus pressure.    Respiratory: Negative for cough.    Psychiatric/Behavioral: Negative for sleep disturbance.       Objective   Visit Vitals  /80   Pulse 71   Temp 97 °F (36.1 °C)   Resp 16   Ht 175.3 cm (69\")   Wt 122 kg (270 lb)   LMP  (LMP Unknown)   SpO2 99%   BMI 39.87 kg/m²       Physical Exam  Vitals reviewed.   Constitutional:       Appearance: She is well-developed.   HENT:      Head: Atraumatic.      Mouth/Throat:      Comments: Oropharynx was crowded.  Neck:      Vascular: No JVD.   Pulmonary:      Effort: Pulmonary effort is normal.      Breath sounds: Normal breath sounds. No wheezing or rhonchi.   Musculoskeletal:      Cervical back: Neck supple. "      Comments: Gait was normal.   Skin:     General: Skin is warm and dry.   Neurological:      Mental Status: She is alert and oriented to person, place, and time.         Assessment & Plan   Diagnoses and all orders for this visit:    1. Mild persistent asthma without complication (Primary)    2. Other allergic rhinitis    3. STEFAN (obstructive sleep apnea)  -     BIPAP / CPAP Adjustment           Return in about 6 months (around 6/28/2023) for Recheck, For Nubia Valderrama).    DISCUSSION (if any):  It appears that her symptoms of asthma are under good control with the current regimen.    Patient's medications for underlying asthma were reviewed in great detail.    Patient was informed about the black box warning for [facility] regarding suicidal thoughts and tendencies.  she denies any ongoing issues for now.     Compliance with medications stressed.     Side effects of prescribed medications discussed with the patient.    The need to continue to be aware of triggers that may cause asthma exacerbation versus progression of disease, was also discussed.    I have discussed asthma action plan with her.    The patient was asked to call this office if the symptoms worsen.    I reviewed the results of last in-lab sleep study in detail. It was performed in 2016. I informed her that the apnea hypopnea index was 22 / hr. Supine AHI was 52/hour. REM AHI was 54/hour.     I told the patient that her symptoms are consistent with partially controlled sleep apnea.    I have decided to empirically increase the pressure by 2 cm, to a total of 12.    Patient was advised to continue using PAP for at least 4 hours every night.    Patient was advised to call this office with any issues.    The patient says that she is up-to-date with influenza, pnevnar and pneumovax vaccinations.       Dictated utilizing Dragon dictation.    This document was electronically signed by Moreno Mustafa MD on 12/28/22 at 14:49 EST

## 2023-01-18 DIAGNOSIS — E89.0 POSTOPERATIVE HYPOTHYROIDISM: ICD-10-CM

## 2023-01-18 RX ORDER — LEVOTHYROXINE SODIUM 0.1 MG/1
TABLET ORAL
Qty: 90 TABLET | Refills: 3 | OUTPATIENT
Start: 2023-01-18

## 2023-01-27 ENCOUNTER — HOSPITAL ENCOUNTER (OUTPATIENT)
Dept: MAMMOGRAPHY | Facility: HOSPITAL | Age: 68
Discharge: HOME | End: 2023-01-27
Admitting: OBSTETRICS & GYNECOLOGY
Payer: COMMERCIAL

## 2023-01-27 DIAGNOSIS — Z12.31 VISIT FOR SCREENING MAMMOGRAM: ICD-10-CM

## 2023-01-27 PROCEDURE — 77067 SCR MAMMO BI INCL CAD: CPT | Performed by: RADIOLOGY

## 2023-01-27 PROCEDURE — 77067 SCR MAMMO BI INCL CAD: CPT

## 2023-01-27 PROCEDURE — 77063 BREAST TOMOSYNTHESIS BI: CPT

## 2023-01-27 PROCEDURE — 77063 BREAST TOMOSYNTHESIS BI: CPT | Performed by: RADIOLOGY

## 2023-02-09 ENCOUNTER — OFFICE VISIT (OUTPATIENT)
Dept: INTERNAL MEDICINE | Facility: CLINIC | Age: 68
End: 2023-02-09
Payer: COMMERCIAL

## 2023-02-09 VITALS
SYSTOLIC BLOOD PRESSURE: 146 MMHG | OXYGEN SATURATION: 94 % | RESPIRATION RATE: 16 BRPM | HEIGHT: 69 IN | HEART RATE: 79 BPM | DIASTOLIC BLOOD PRESSURE: 70 MMHG | BODY MASS INDEX: 39.99 KG/M2 | WEIGHT: 270 LBS | TEMPERATURE: 97.7 F

## 2023-02-09 DIAGNOSIS — M25.512 CHRONIC LEFT SHOULDER PAIN: Primary | ICD-10-CM

## 2023-02-09 DIAGNOSIS — G89.29 CHRONIC LEFT SHOULDER PAIN: Primary | ICD-10-CM

## 2023-02-09 PROCEDURE — 99214 OFFICE O/P EST MOD 30 MIN: CPT | Performed by: INTERNAL MEDICINE

## 2023-02-09 PROCEDURE — 96372 THER/PROPH/DIAG INJ SC/IM: CPT | Performed by: INTERNAL MEDICINE

## 2023-02-09 RX ORDER — CYCLOBENZAPRINE HCL 10 MG
1 TABLET ORAL 3 TIMES DAILY
COMMUNITY
Start: 2022-11-16 | End: 2023-04-07

## 2023-02-09 RX ORDER — TRIAMCINOLONE ACETONIDE 40 MG/ML
40 INJECTION, SUSPENSION INTRA-ARTICULAR; INTRAMUSCULAR ONCE
Status: SHIPPED | OUTPATIENT
Start: 2023-02-09

## 2023-02-09 NOTE — PROGRESS NOTES
Subjective     Patient ID: [place]ia Ramos is a 68 y.o. female. Patient is here for management of multiple medical problems.     Chief Complaint   Patient presents with   • Shoulder Pain     Left shoulder     History of Present Illness     Ab partial colectomy.     Reached behind her in to the back seat.  Increased pain. Diclofenac gel not helping    .             The following portions of the patient's history were reviewed and updated as appropriate: allergies, current medications, past family history, past medical history, past social history, past surgical history and problem list.    Review of Systems    Current Outpatient Medications:   •  albuterol sulfate HFA (Ventolin HFA) 108 (90 Base) MCG/ACT inhaler, Inhale 2 puffs Every 4 (Four) Hours As Needed for Wheezing or Shortness of Air., Disp: 18 g, Rfl: 5  •  allopurinol (ZYLOPRIM) 300 MG tablet, Take 1 tablet by mouth Daily., Disp: 90 tablet, Rfl: 3  •  calcium carbonate (OS-SHANTE) 600 MG tablet, Take 1 tablet by mouth 2 (Two) Times a Day With Meals. NOT CURRENTLY TAKING, Disp: , Rfl:   •  cyanocobalamin (VITAMIN B-12) 2500 MCG tablet tablet, TAKE ONE TABLET BY MOUTH THREE TIMES A WEEK MUST MAKE AN APPOINTMENT (Patient taking differently: Take 2,500 mcg by mouth 1 (One) Time Per Week. MONDAYS), Disp: 30 tablet, Rfl: 5  •  cyclobenzaprine (FLEXERIL) 10 MG tablet, Take 1 tablet by mouth 3 (Three) Times a Day., Disp: , Rfl:   •  diclofenac (VOLTAREN) 1 % gel gel, Apply 4 g topically to the appropriate area as directed 4 (Four) Times a Day As Needed (pain)., Disp: 100 g, Rfl: 1  •  docusate sodium 100 MG capsule, Take 1 capsule by mouth 2 (Two) Times a Day As Needed for Constipation., Disp: 30 capsule, Rfl: 0  •  estradiol (ESTRACE) 0.1 MG/GM vaginal cream, Insert 2 g into the vagina Daily As Needed (vaginal dryness). USUALLY USES TWICE A WEEK, Disp: 42.5 g, Rfl: 11  •  GLUCOSAMINE HCL-MSM PO, 1 tablet Daily. NOT CURRENTLY TAKING, Disp: , Rfl:   •   "hydroCHLOROthiazide (HYDRODIURIL) 12.5 MG tablet, Take 1 tablet by mouth Daily. (Patient taking differently: Take 12.5 mg by mouth Daily As Needed (swelling in ankles and feet).), Disp: 90 tablet, Rfl: 3  •  ipratropium (ATROVENT) 0.06 % nasal spray, into the nostril(s) as directed by provider As Needed for Rhinitis., Disp: , Rfl:   •  levocetirizine (XYZAL) 5 MG tablet, Take 5 mg by mouth Daily As Needed for Allergies., Disp: , Rfl:   •  levothyroxine (Synthroid) 175 MCG tablet, Take 1 tablet by mouth Daily., Disp: 90 tablet, Rfl: 1  •  montelukast (Singulair) 10 MG tablet, Take 1 tablet by mouth Every Night. PRN, Disp: 90 tablet, Rfl: 1  •  MULTIPLE VITAMIN PO, 1 tablet Daily. NOT CURRENTLY TAKING, Disp: , Rfl:   •  Omega-3 Fatty Acids (FISH OIL) 435 MG capsule, Take 1,000 mg by mouth Daily. NOT CURRENTLY TAKING, Disp: , Rfl:   •  pantoprazole (PROTONIX) 40 MG EC tablet, NOT CURRENTLY TAKING, Disp: , Rfl:   •  Probiotic Product (SOLUBLE FIBER/PROBIOTICS PO), Take 1 capsule by mouth 2 (Two) Times a Day., Disp: , Rfl:   •  Tiotropium Bromide Monohydrate (Spiriva Respimat) 1.25 MCG/ACT aerosol solution inhaler, Inhale 2 puffs Daily., Disp: 4 g, Rfl: 0    Current Facility-Administered Medications:   •  triamcinolone acetonide (KENALOG-40) injection 40 mg, 40 mg, Intra-articular, Once, Gordon Norman MD    Objective      Blood pressure 146/70, pulse 79, temperature 97.7 °F (36.5 °C), resp. rate 16, height 175.3 cm (69\"), weight 122 kg (270 lb), SpO2 94 %, not currently breastfeeding.    Physical Exam     General Appearance:    Alert, cooperative, no distress, appears stated age   Head:    Normocephalic, without obvious abnormality, atraumatic   Eyes:    PERRL, conjunctiva/corneas clear, EOM's intact   Ears:    Normal TM's and external ear canals, both ears   Nose:   Nares normal, septum midline, mucosa normal, no drainage   or sinus tenderness   Throat:   Lips, mucosa, and tongue normal; teeth and gums normal "   Neck:   Supple, symmetrical, trachea midline, no adenopathy;        thyroid:  No enlargement/tenderness/nodules; no carotid    bruit or JVD   Back:     Symmetric, no curvature, ROM normal, no CVA tenderness   Lungs:     Clear to auscultation bilaterally, respirations unlabored   Chest wall:    No tenderness or deformity   Heart:    Regular rate and rhythm, S1 and S2 normal, no murmur,        rub or gallop   Abdomen:     Soft, non-tender, bowel sounds active all four quadrants,     no masses, no organomegaly   Extremities:   ttp of joint.   Pulses:   2+ and symmetric all extremities   Skin:   Skin color, texture, turgor normal, no rashes or lesions   Lymph nodes:   Cervical, supraclavicular, and axillary nodes normal   Neurologic:   CNII-XII intact. Normal strength, sensation and reflexes       throughout      Results for orders placed or performed during the hospital encounter of 10/27/22   Basic Metabolic Panel    Specimen: Blood   Result Value Ref Range    Glucose 140 (H) 65 - 99 mg/dL    BUN 15 8 - 23 mg/dL    Creatinine 0.67 0.57 - 1.00 mg/dL    Sodium 141 136 - 145 mmol/L    Potassium 5.0 3.5 - 5.2 mmol/L    Chloride 105 98 - 107 mmol/L    CO2 26.0 22.0 - 29.0 mmol/L    Calcium 8.5 (L) 8.6 - 10.5 mg/dL    BUN/Creatinine Ratio 22.4 7.0 - 25.0    Anion Gap 10.0 5.0 - 15.0 mmol/L    eGFR 96.5 >60.0 mL/min/1.73   CBC Auto Differential    Specimen: Blood   Result Value Ref Range    WBC 10.46 3.40 - 10.80 10*3/mm3    RBC 4.54 3.77 - 5.28 10*6/mm3    Hemoglobin 12.9 12.0 - 15.9 g/dL    Hematocrit 41.4 34.0 - 46.6 %    MCV 91.2 79.0 - 97.0 fL    MCH 28.4 26.6 - 33.0 pg    MCHC 31.2 (L) 31.5 - 35.7 g/dL    RDW 14.0 12.3 - 15.4 %    RDW-SD 46.6 37.0 - 54.0 fl    MPV 11.2 6.0 - 12.0 fL    Platelets 267 140 - 450 10*3/mm3    Neutrophil % 78.5 (H) 42.7 - 76.0 %    Lymphocyte % 11.3 (L) 19.6 - 45.3 %    Monocyte % 9.6 5.0 - 12.0 %    Eosinophil % 0.0 (L) 0.3 - 6.2 %    Basophil % 0.2 0.0 - 1.5 %    Immature Grans % 0.4 0.0  - 0.5 %    Neutrophils, Absolute 8.22 (H) 1.70 - 7.00 10*3/mm3    Lymphocytes, Absolute 1.18 0.70 - 3.10 10*3/mm3    Monocytes, Absolute 1.00 (H) 0.10 - 0.90 10*3/mm3    Eosinophils, Absolute 0.00 0.00 - 0.40 10*3/mm3    Basophils, Absolute 0.02 0.00 - 0.20 10*3/mm3    Immature Grans, Absolute 0.04 0.00 - 0.05 10*3/mm3    nRBC 0.0 0.0 - 0.2 /100 WBC   Basic Metabolic Panel    Specimen: Blood   Result Value Ref Range    Glucose 105 (H) 65 - 99 mg/dL    BUN 13 8 - 23 mg/dL    Creatinine 0.57 0.57 - 1.00 mg/dL    Sodium 138 136 - 145 mmol/L    Potassium 4.0 3.5 - 5.2 mmol/L    Chloride 100 98 - 107 mmol/L    CO2 28.0 22.0 - 29.0 mmol/L    Calcium 8.6 8.6 - 10.5 mg/dL    BUN/Creatinine Ratio 22.8 7.0 - 25.0    Anion Gap 10.0 5.0 - 15.0 mmol/L    eGFR 100.4 >60.0 mL/min/1.73   CBC Auto Differential    Specimen: Blood   Result Value Ref Range    WBC 10.02 3.40 - 10.80 10*3/mm3    RBC 4.22 3.77 - 5.28 10*6/mm3    Hemoglobin 12.1 12.0 - 15.9 g/dL    Hematocrit 38.9 34.0 - 46.6 %    MCV 92.2 79.0 - 97.0 fL    MCH 28.7 26.6 - 33.0 pg    MCHC 31.1 (L) 31.5 - 35.7 g/dL    RDW 14.1 12.3 - 15.4 %    RDW-SD 47.8 37.0 - 54.0 fl    MPV 11.4 6.0 - 12.0 fL    Platelets 223 140 - 450 10*3/mm3    Neutrophil % 63.3 42.7 - 76.0 %    Lymphocyte % 21.8 19.6 - 45.3 %    Monocyte % 9.9 5.0 - 12.0 %    Eosinophil % 4.1 0.3 - 6.2 %    Basophil % 0.5 0.0 - 1.5 %    Immature Grans % 0.4 0.0 - 0.5 %    Neutrophils, Absolute 6.35 1.70 - 7.00 10*3/mm3    Lymphocytes, Absolute 2.18 0.70 - 3.10 10*3/mm3    Monocytes, Absolute 0.99 (H) 0.10 - 0.90 10*3/mm3    Eosinophils, Absolute 0.41 (H) 0.00 - 0.40 10*3/mm3    Basophils, Absolute 0.05 0.00 - 0.20 10*3/mm3    Immature Grans, Absolute 0.04 0.00 - 0.05 10*3/mm3    nRBC 0.0 0.0 - 0.2 /100 WBC   Tissue Pathology Exam    Specimen: Hernia, Sac; Tissue   Result Value Ref Range    Case Report       Surgical Pathology Report                         Case: [id number]                                  Authorizing  Provider:  Arturo Kumar MD    Collected:           10/27/2022 12:32 PM          Ordering Location:     [facility]   Received:            10/27/2022 02:28 PM                                 OR                                                                           Pathologist:           Anastasia Muhammad DO                                                        Specimen:    Hernia, Sac, INCISIONAL HERNIA SAC                                                         Clinical Information       Incisional hernia      Final Diagnosis       HERNIA SAC, REPAIR:  Fibroadipose tissue with foreign body giant cell reaction and reactive changes; findings compatible hernia sac contents      Gross Description       1. Hernia, Sac.  Received in formalin labeled incisional hernia sac is an 8 x 7 x 2 cm aggregate of fragmented yellow adipose tissue and pink-tan to red, glistening fibromembranous tissue.  Sectioning reveals no masses, nodules or other gross lesions.  Representative sections are submitted in a single cassette.  LDP        Microscopic Description       The slides are reviewed and demonstrate histopathologic features supporting the above rendered diagnosis.             Assessment & Plan     Shoulder inj.    Indication: pain  Pt consented. anerior shoulder prepped. Under sterile technique shoulder was injected with Kenalog  40mg 1 cc and lidocaine 1 cc 1%.  Pt tolerated procedure well.        Ndc: 7095-1596-88  06/2024  Lot: [id number]        Diagnoses and all orders for this visit:    1. Chronic left shoulder pain (Primary)  -     triamcinolone acetonide (KENALOG-40) injection 40 mg      No follow-ups on file.          There are no Patient Instructions on file for this visit.     Gordon Norman MD    Assessment & Plan       Answers for HPI/ROS submitted by the patient on 2/7/2023  Please describe your symptoms.: Left shoulder joint pain.  Have you had these symptoms before?: Yes  How long have you  been having these symptoms?: Greater than 2 weeks  Please list any medications you are currently taking for this condition.: Diclofenac ointment  Please describe any probable cause for these symptoms. : Over extending  What is the primary reason for your visit?: Other

## 2023-02-20 DIAGNOSIS — G47.33 OSA (OBSTRUCTIVE SLEEP APNEA): Primary | ICD-10-CM

## 2023-02-24 ENCOUNTER — OFFICE VISIT (OUTPATIENT)
Dept: INTERNAL MEDICINE | Facility: CLINIC | Age: 68
End: 2023-02-24
Payer: COMMERCIAL

## 2023-02-24 VITALS
TEMPERATURE: 97.5 F | HEART RATE: 75 BPM | HEIGHT: 69 IN | BODY MASS INDEX: 39.25 KG/M2 | OXYGEN SATURATION: 98 % | WEIGHT: 265 LBS | SYSTOLIC BLOOD PRESSURE: 158 MMHG | DIASTOLIC BLOOD PRESSURE: 68 MMHG

## 2023-02-24 DIAGNOSIS — R10.31 RIGHT LOWER QUADRANT ABDOMINAL PAIN: ICD-10-CM

## 2023-02-24 DIAGNOSIS — K57.92 DIVERTICULITIS: Primary | ICD-10-CM

## 2023-02-24 LAB
BILIRUB BLD-MCNC: NEGATIVE MG/DL
CLARITY, POC: CLEAR
COLOR UR: YELLOW
EXPIRATION DATE: ABNORMAL
GLUCOSE UR STRIP-MCNC: NEGATIVE MG/DL
KETONES UR QL: NEGATIVE
LEUKOCYTE EST, POC: NEGATIVE
Lab: ABNORMAL
NITRITE UR-MCNC: NEGATIVE MG/ML
PH UR: 6 [PH] (ref 5–8)
PROT UR STRIP-MCNC: NEGATIVE MG/DL
RBC # UR STRIP: ABNORMAL /UL
SP GR UR: 1.01 (ref 1–1.03)
UROBILINOGEN UR QL: NORMAL

## 2023-02-24 PROCEDURE — 99214 OFFICE O/P EST MOD 30 MIN: CPT | Performed by: NURSE PRACTITIONER

## 2023-02-24 PROCEDURE — 81003 URINALYSIS AUTO W/O SCOPE: CPT | Performed by: NURSE PRACTITIONER

## 2023-02-24 RX ORDER — AMOXICILLIN AND CLAVULANATE POTASSIUM 875; 125 MG/1; MG/1
1 TABLET, FILM COATED ORAL 2 TIMES DAILY
Qty: 14 TABLET | Refills: 0 | Status: SHIPPED | OUTPATIENT
Start: 2023-02-24 | End: 2023-03-01 | Stop reason: SDUPTHER

## 2023-02-24 NOTE — PROGRESS NOTES
"Chief Complaint   Patient presents with   • Abdominal Pain     Patient states that she thinks she is having a flare of diverticulitis, started having R-sided lower abdominal pain side, around to lower back X 8 days ago, states that she was constipated last week, thinks that started it      Subjective   [place]ia Ramos is a 68 y.o. female.     History of Present Illness     Patient presents with abdominal pain.  Location right lower quadrant.  Onset 8+ days. Refers around side.  She had episode of constipation last week, but that is better. No fever, myalgias. She did have chills one day without fever. She has a history of diverticulitis.  She has had numerous surgeries due to recurrent flares.  States that she has diverticula in the right side of her colon.  She does not have a gallbladder anymore.  Patient states \"I know this is not my appendix\".  She has not had fever, nausea or vomiting.  Pain is not constant, more with movement and palpation.  She did \"eat things I shouldn't have\" and that's when her symptoms started.  Patient says this feels like when she has diverticulitis flare and requesting antibiotics.  She is not eating a clear liquid diet at this time. Pain is worse today, 7/10.  Denies any urinary symptoms.  States she does have a history of microscopic hematuria and her PCP is aware of this, so blood typically does show up in her urinalysis.  She has not noticed any gross hematuria.    The following portions of the patient's history were reviewed and updated as appropriate: allergies, current medications, past family history, past medical history, past social history, past surgical history and problem list.      Objective   /68 (BP Location: Right arm, Patient Position: Sitting, Cuff Size: Adult)   Pulse 75   Temp 97.5 °F (36.4 °C) (Temporal)   Ht 175.3 cm (69\")   Wt 120 kg (265 lb)   LMP  (LMP Unknown)   SpO2 98%   BMI 39.13 kg/m²   Body mass index is 39.13 kg/m².  Physical Exam  Vitals " and nursing note reviewed.   Constitutional:       General: She is not in acute distress.     Appearance: Normal appearance. She is obese. She is not ill-appearing, toxic-appearing or diaphoretic.      Interventions: Face mask in place.   HENT:      Right Ear: External ear normal.      Left Ear: External ear normal.   Eyes:      Extraocular Movements: Extraocular movements intact.   Cardiovascular:      Rate and Rhythm: Normal rate and regular rhythm.   Pulmonary:      Effort: Pulmonary effort is normal.      Breath sounds: Normal breath sounds.   Abdominal:      General: Bowel sounds are normal. There is no distension.      Palpations: Abdomen is soft.      Tenderness: There is abdominal tenderness in the right lower quadrant. There is no right CVA tenderness, left CVA tenderness, guarding or rebound. Negative signs include McBurney's sign, psoas sign and obturator sign.       Musculoskeletal:         General: Normal range of motion.      Cervical back: Normal range of motion.   Skin:     General: Skin is dry.   Neurological:      Mental Status: She is alert and oriented to person, place, and time.   Psychiatric:         Mood and Affect: Mood normal.         Labs:  Results for orders placed or performed in visit on 02/24/23   POCT urinalysis dipstick, automated    Specimen: Urine   Result Value Ref Range    Color Yellow Yellow, Straw, Dark Yellow, Kelle    Clarity, UA Clear Clear    Specific Gravity  1.015 1.005 - 1.030    pH, Urine 6.0 5.0 - 8.0    Leukocytes Negative Negative    Nitrite, UA Negative Negative    Protein, POC Negative Negative mg/dL    Glucose, UA Negative Negative mg/dL    Ketones, UA Negative Negative    Urobilinogen, UA Normal Normal, 0.2 E.U./dL    Bilirubin Negative Negative    Blood, UA 1+ (A) Negative    Lot Number [id number]     Expiration Date 3,252,024        Assessment & Plan   [place] Selena Ramos is here today and the following problems have been addressed:      Diagnoses and all orders  for this visit:    1. Diverticulitis (Primary)  -     amoxicillin-clavulanate (Augmentin) 875-125 MG per tablet; Take 1 tablet by mouth 2 (Two) Times a Day for 7 days.  Dispense: 14 tablet; Refill: 0  - Patient has many medication allergies and to antibiotics.  States she cannot take Flagyl or ciprofloxacin. Has tolerated Augmentin and Bactrim in the past for treatment of diverticulitis flares.  Will initiate Augmentin, take twice a day x7 days.  Recommend a clear liquid diet for the next few days, then when pain is improving can advance slowly as tolerated.  She is to go to the emergency room if she develops any severe pain, fever, nausea, vomiting.  She is not tender McBurney's point and she currently does not have a fever or other symptoms to make me concerned of appendicitis.  She does not have gallbladder. There is no acute abdomen on exam.  But she is aware of when to be seen urgently.    2. Right lower quadrant abdominal pain  -     POCT urinalysis dipstick, automated  -     Urine Culture - Urine, Urine, Clean Catch; Future  - Urine does show 1+ blood, no gross hematuria.  She is not having any urinary symptoms.  We will send a culture to rule out infection.  If she continues to have microscopic blood, need to see urology.        Follow Up   Return if symptoms worsen or fail to improve.  Patient was given instructions and counseling regarding her condition or for health maintenance advice. Please see specific information pulled into the AVS if appropriate.     Melina LUNA  [facility] - [place]

## 2023-03-01 DIAGNOSIS — K57.92 DIVERTICULITIS: ICD-10-CM

## 2023-03-01 LAB
BACTERIA UR CULT: ABNORMAL
BACTERIA UR CULT: ABNORMAL
OTHER ANTIBIOTIC SUSC ISLT: ABNORMAL

## 2023-03-01 RX ORDER — AMOXICILLIN AND CLAVULANATE POTASSIUM 875; 125 MG/1; MG/1
1 TABLET, FILM COATED ORAL 2 TIMES DAILY
Qty: 20 TABLET | Refills: 0 | Status: SHIPPED | OUTPATIENT
Start: 2023-03-01 | End: 2023-03-08

## 2023-03-14 ENCOUNTER — OFFICE VISIT (OUTPATIENT)
Dept: INTERNAL MEDICINE | Facility: CLINIC | Age: 68
End: 2023-03-14
Payer: COMMERCIAL

## 2023-03-14 ENCOUNTER — HOSPITAL ENCOUNTER (OUTPATIENT)
Dept: CT IMAGING | Facility: HOSPITAL | Age: 68
Discharge: HOME | End: 2023-03-14
Admitting: NURSE PRACTITIONER
Payer: COMMERCIAL

## 2023-03-14 VITALS
SYSTOLIC BLOOD PRESSURE: 148 MMHG | HEIGHT: 69 IN | WEIGHT: 266 LBS | OXYGEN SATURATION: 98 % | HEART RATE: 82 BPM | BODY MASS INDEX: 39.4 KG/M2 | DIASTOLIC BLOOD PRESSURE: 72 MMHG | TEMPERATURE: 97.9 F

## 2023-03-14 DIAGNOSIS — K57.30 DIVERTICULOSIS OF COLON WITHOUT DIVERTICULITIS: ICD-10-CM

## 2023-03-14 DIAGNOSIS — K59.01 SLOW TRANSIT CONSTIPATION: ICD-10-CM

## 2023-03-14 DIAGNOSIS — R10.31 RIGHT LOWER QUADRANT ABDOMINAL PAIN: Primary | ICD-10-CM

## 2023-03-14 DIAGNOSIS — N30.01 ACUTE CYSTITIS WITH HEMATURIA: ICD-10-CM

## 2023-03-14 LAB
BILIRUB BLD-MCNC: NEGATIVE MG/DL
CLARITY, POC: CLEAR
COLOR UR: YELLOW
EXPIRATION DATE: ABNORMAL
GLUCOSE UR STRIP-MCNC: NEGATIVE MG/DL
KETONES UR QL: NEGATIVE
LEUKOCYTE EST, POC: ABNORMAL
Lab: ABNORMAL
NITRITE UR-MCNC: NEGATIVE MG/ML
PH UR: 6.5 [PH] (ref 5–8)
PROT UR STRIP-MCNC: NEGATIVE MG/DL
RBC # UR STRIP: ABNORMAL /UL
SP GR UR: 1.01 (ref 1–1.03)
UROBILINOGEN UR QL: NORMAL

## 2023-03-14 PROCEDURE — 99214 OFFICE O/P EST MOD 30 MIN: CPT | Performed by: NURSE PRACTITIONER

## 2023-03-14 PROCEDURE — 25510000001 IOPAMIDOL 61 % SOLUTION: Performed by: NURSE PRACTITIONER

## 2023-03-14 PROCEDURE — 81003 URINALYSIS AUTO W/O SCOPE: CPT | Performed by: NURSE PRACTITIONER

## 2023-03-14 PROCEDURE — 74177 CT ABD & PELVIS W/CONTRAST: CPT

## 2023-03-14 RX ADMIN — IOPAMIDOL 100 ML: 612 INJECTION, SOLUTION INTRAVENOUS at 17:33

## 2023-03-14 NOTE — PROGRESS NOTES
Chief Complaint   Patient presents with   • Follow-up     Patient states that she was having a little pain with bowel movement yesterday, extreme pain, lower R side from front to back today, just as with last visit, c/o nausea today, has taken coarse antibiotics for diverticulitis      Tico Ramos is a 68 y.o. female.     History of Present Illness     The patient is following up for diverticulitis.    Previously saw me on 2/24/2023 with 8 days of right lower quadrant pain. Urinalysis had 1 plus blood and a urine culture was performed and was positive for enterococcus faecalis. She was treated with Augmentin due to history of diverticulitis and possible UTI. She completed the antibiotics I originally prescribed 7 days and then patient called and requested three more days of antibiotics to do a full 10-day period. Her PCP sent in seven additional days, so she completed all the antibiotics and felt like symptoms were getting better. After completing the antibiotics, she felt like the symptoms returned. Pain is still in the right lower quadrant flank area, referring to back. She does admit to some change in her bowel habits. Her stools have been more formed lately. She's experiencing a little more constipation. Her pain is 8 out of 10 today. She is nauseous with it, not vomiting. She denies any urinary symptoms. It is worse with movement. She had a little pain with bowel movement yesterday. She denies fever, myalgias, chills, vomiting, bloating, or distention. She is crying and upset because she has had multiple bowel resections and concerned that she will have to have a colostomy bag. She does not have gallbladder. Still has appendix.     The following portions of the patient's history were reviewed and updated as appropriate: allergies, current medications, past family history, past medical history, past social history, past surgical history and problem list.    Objective   /72 (BP Location:  "Left arm, Patient Position: Sitting, Cuff Size: Adult)   Pulse 82   Temp 97.9 °F (36.6 °C) (Temporal)   Ht 175.3 cm (69\")   Wt 121 kg (266 lb)   LMP  (LMP Unknown)   SpO2 98%   BMI 39.28 kg/m²   Body mass index is 39.28 kg/m².  Physical Exam  Vitals and nursing note reviewed.   Constitutional:       General: She is not in acute distress.     Appearance: Normal appearance. She is obese. She is not ill-appearing, toxic-appearing or diaphoretic.      Interventions: Face mask in place.   HENT:      Right Ear: External ear normal.      Left Ear: External ear normal.   Eyes:      General: No scleral icterus.     Extraocular Movements: Extraocular movements intact.      Pupils: Pupils are equal, round, and reactive to light.   Cardiovascular:      Rate and Rhythm: Normal rate and regular rhythm.   Pulmonary:      Effort: Pulmonary effort is normal.      Breath sounds: Normal breath sounds.   Abdominal:      General: Bowel sounds are normal. There is no distension.      Palpations: Abdomen is soft.      Tenderness: There is abdominal tenderness in the right lower quadrant. There is no right CVA tenderness, left CVA tenderness, guarding or rebound. Negative signs include McBurney's sign, psoas sign and obturator sign.      Hernia: No hernia is present.          Comments: Right flank,back   Musculoskeletal:         General: Normal range of motion.      Cervical back: Normal range of motion.   Skin:     General: Skin is warm and dry.      Coloration: Skin is not jaundiced.   Neurological:      General: No focal deficit present.      Mental Status: She is alert and oriented to person, place, and time.   Psychiatric:         Attention and Perception: Attention and perception normal.         Mood and Affect: Mood is anxious. Affect is tearful.         Speech: Speech normal.         Behavior: Behavior is cooperative.         Labs:  Results for orders placed or performed in visit on 03/14/23   POCT urinalysis dipstick, " automated    Specimen: Urine   Result Value Ref Range    Color Yellow Yellow, Straw, Dark Yellow, Kelle    Clarity, UA Clear Clear    Specific Gravity  1.015 1.005 - 1.030    pH, Urine 6.5 5.0 - 8.0    Leukocytes Trace (A) Negative    Nitrite, UA Negative Negative    Protein, POC Negative Negative mg/dL    Glucose, UA Negative Negative mg/dL    Ketones, UA Negative Negative    Urobilinogen, UA Normal Normal, 0.2 E.U./dL    Bilirubin Negative Negative    Blood, UA 1+ (A) Negative    Lot Number [id number]     Expiration Date [id number],252,024      CT Abdomen Pelvis Without Contrast    Result Date: 12/27/2022  No definite acute findings in the abdomen or pelvis. Mild colonic diverticulosis without evidence of acute diverticulitis.  Severe hepatic steatosis. Small hiatal hernia. Other chronic findings as above.  Mildly prominent left ureter without evidence of obstructing stone, nonspecific, correlate for possible recently passed stone or with urinary tract infection.    Images personally reviewed, interpreted and dictated by NAZARIO Coon.       This report was signed and finalized on 12/27/2022 1:11 PM by NAZARIO Coon.    CT Abdomen Pelvis With Contrast    Result Date: 3/14/2023  Diverticulosis without evidence of diverticulitis or other acute abnormality. Authenticated and Electronically Signed by Ramón Finney M.D. on 03/14/2023 06:14:12 PM        Assessment & Plan   Albania Ramos is here today and the following problems have been addressed:      Diagnoses and all orders for this visit:    1. Right lower quadrant abdominal pain (Primary)  -     CT Abdomen Pelvis With Contrast    2. Diverticulosis of colon without diverticulitis  -     CT Abdomen Pelvis With Contrast    3. Slow transit constipation  -     CT Abdomen Pelvis With Contrast    4. Acute cystitis with hematuria  -     Urine Culture - Urine, Urine, Clean Catch  -     POCT urinalysis dipstick, automated      68-year-old female presents to follow up  diverticulitis and acute cystitis with hematuria. No acute abdomen on exam. Patient is very anxious and tearful, appears to be in significant pain. She declines to go into the emergency room. We will order a stat CT with contrast to rule out any emergent causes. We will hold off on any antibiotic treatment for now until the results are back.    After review of CT today, it shows diverticulosis without evidence of diverticulitis on the left side and no other acute abnormality. I attempted to call the patient and there was no answer. I left a voicemail with the detailed results. I also sent a [employer] message with the results and patient viewed it. I discussed could be musculoskeletal pain so she can try a muscle relaxer, heat, NSAIDs, stretches, and use support pillow to protect that area with movement. We will have her follow up if the symptoms continue. I would like her to follow up with her colorectal surgeon. She may need repeat colonscopy if symptoms continue. I would like her to eat a low residue diet and push lots of fluids. If she is having issues with constipation, she is advised to try MiraLAX, increase fiber. She is to be seen emergently with any severe abdominal pain, high fever, and any distress. We will send another culture to see if infection has resolved.    Follow Up   Return if symptoms worsen or fail to improve.  Patient was given instructions and counseling regarding her condition or for health maintenance advice. Please see specific information pulled into the AVS if appropriate.     Melina LUNA  [facility]    Transcribed from ambient dictation for JEREMY River by Winnie Dobbs.  03/15/23   10:18 EDT    Note was dictated after visit.   I have personally performed the services described in this document as transcribed by the above individual, and it is both accurate and complete.

## 2023-03-14 NOTE — PROGRESS NOTES
CT unremarkable - I am wondering if this could be musculoskeletal gloria since worse with movement? Lets try applying heat, and looks like you were prescribed flexeril muscle relaxant, try these, let me know if need more.  I would also recommend follow up with the colorectal surgeon as soon as you are able. Eat a high fiber diet, Miralax and push lots of water to prevent constipation. ER If pain becomes severe.    There is a large area of fatty change in the liver.  No focal liver lesion. The other solid abdominal organs are unremarkable. There is left-sided diverticulosis without evidence of diverticulitis.  There is a small hiatal hernia of the stomach. No kidney stone. The GI tract is otherwise unremarkable, with no sign of appendicitis. The urinary bladder is unremarkable.  There is no pelvic or abdominal ascites, adenopathy or acute osseous abnormality.

## 2023-03-16 ENCOUNTER — PATIENT MESSAGE (OUTPATIENT)
Dept: INTERNAL MEDICINE | Facility: CLINIC | Age: 68
End: 2023-03-16
Payer: COMMERCIAL

## 2023-03-16 ENCOUNTER — TELEPHONE (OUTPATIENT)
Dept: INTERNAL MEDICINE | Facility: CLINIC | Age: 68
End: 2023-03-16
Payer: COMMERCIAL

## 2023-03-16 DIAGNOSIS — K57.30 DIVERTICULOSIS OF COLON WITHOUT DIVERTICULITIS: ICD-10-CM

## 2023-03-16 DIAGNOSIS — R10.31 RIGHT LOWER QUADRANT ABDOMINAL PAIN: Primary | ICD-10-CM

## 2023-03-16 DIAGNOSIS — K59.01 SLOW TRANSIT CONSTIPATION: ICD-10-CM

## 2023-03-16 NOTE — TELEPHONE ENCOUNTER
From: Albania Ramos  To: Melina Matos  Sent: 3/16/2023 9:44 AM EDT  Subject: Gastroenterologist Referral     Good morning, I reached out to Dr Nereida Amaro office and was told that I need a referral. If you could please send one to his office? I would greatly appreciate it.  Thank you, Albania Ramos

## 2023-03-16 NOTE — TELEPHONE ENCOUNTER
"  Caller: Albania Ramos \"JULIO\"    Relationship: Self    Best call back number: [phone]    What was the call regarding: PATIENT STATES TO PLEASE SEND MEDICATION FOR HER STOMACH TO PHARMACY TODAY AND THE PHARMACY CLOSES AT 6      "

## 2023-03-17 LAB
BACTERIA UR CULT: NO GROWTH
BACTERIA UR CULT: NORMAL

## 2023-03-17 RX ORDER — DICYCLOMINE HYDROCHLORIDE 10 MG/1
10 CAPSULE ORAL
Qty: 90 CAPSULE | Refills: 0 | Status: SHIPPED | OUTPATIENT
Start: 2023-03-17 | End: 2023-04-07

## 2023-03-27 ENCOUNTER — TELEPHONE (OUTPATIENT)
Dept: CARDIOLOGY | Facility: CLINIC | Age: 68
End: 2023-03-27
Payer: COMMERCIAL

## 2023-03-27 NOTE — TELEPHONE ENCOUNTER
"Caller: Albania Ramos \"JULIO\"    Relationship to patient: Self    Best call back number: [phone]    Type of visit: FOLLOW UP     Requested date: WHENEVER WE CAN GET HER IN     If rescheduling, when is the original appointment: 03/31/23    Additional notes: PATIENT CALLED IN TO RESCHD HER APPOINTMENT, STATED SHE IS NOT HAVING ANY ISSUES SO IF WE NEED TO SCHD HER OUT IT WAS OKAY. PATIENT STATED IF SHE DOESN'T ANSWER TO LEAVE A VM    "

## 2023-03-30 NOTE — PROGRESS NOTES
[facility] Cardiology Office Follow Up Note    Albania Ramos  [id number]  2023    Primary Care Provider: Gordon Norman MD   Referring Provider: No ref. provider found    Chief Complaint: 1yr F/U Afib    History of Present Illness:   Mrs. Albania Ramos is a 68 y.o. female who presents to the Cardiology Clinic for routine follow-up.  The patient has a past medical history significant for asthma, hypothyroidism, chronic venous stasis, and depression.  She has a past cardiac history significant for paroxysmal atrial fibrillation, status post ablation x2.  She has also undergone outpatient loop recorder monitoring x2, with her last loop recorder being removed in 10/19.  She was previously anticoagulated with Coumadin, however this was discontinued after she had no episodes of recurrent PAF noted on loop recorder monitoring.  She presents today for regular follow-up.  She reports minimal palpitations without associated symptoms.  She plans on putting some flowers out this weekend.  She denies any limitation with activity.  She offers no other complaints or concerns at this time.       Past Cardiac Testin. Last Coronary Angio: No known  2. Prior Stress Testin2017              1. Normal Lexiscan  3. Last Echo:  21              1.  Normal left ventricular size and systolic function, LVEF 60-65%.              2.  Normal LV diastolic filling pattern.              3.  Normal right ventricular size and systolic function.              4.  Normal left and right atrial size.              5.  Trace mitral regurgitation.              6.  Mild tricuspid regurgitation, RVSP 35 mmHg.  4. Prior Holter Monitor: Loop recorder removed on 2019              1.  SCANNED - CARDIOLOGY (10/07/2019)     Past Peripheral Testin/15/2021  FINDINGS: Normal phasic flow was noted in the visualized deep venous  system. No intraluminal increased echogenicity is noted to suggest  thrombus.  There is normal compression and augmentation of the venous  structures.  No abnormal venous collaterals are seen.      Review of Systems:   Review of Systems  As Noted in HPI.   I have reviewed and confirmed the accuracy of the ROS as documented by the MA/LPN/RN JEREMY Pedro    I have reviewed and/or updated the patient's past medical, past surgical, family, social history, problem list and allergies as appropriate.     Medications:     Current Outpatient Medications:   •  albuterol sulfate HFA (Ventolin HFA) 108 (90 Base) MCG/ACT inhaler, Inhale 2 puffs Every 4 (Four) Hours As Needed for Wheezing or Shortness of Air., Disp: 18 g, Rfl: 5  •  allopurinol (ZYLOPRIM) 300 MG tablet, Take 1 tablet by mouth Daily., Disp: 90 tablet, Rfl: 3  •  calcium carbonate (OS-SHANTE) 600 MG tablet, Take 1 tablet by mouth 2 (Two) Times a Day With Meals. NOT CURRENTLY TAKING, Disp: , Rfl:   •  cyanocobalamin (VITAMIN B-12) 2500 MCG tablet tablet, TAKE ONE TABLET BY MOUTH THREE TIMES A WEEK MUST MAKE AN APPOINTMENT (Patient taking differently: Take 1 tablet by mouth 1 (One) Time Per Week. MONDAYS), Disp: 30 tablet, Rfl: 5  •  diclofenac (VOLTAREN) 1 % gel gel, Apply 4 g topically to the appropriate area as directed 4 (Four) Times a Day As Needed (pain)., Disp: 100 g, Rfl: 1  •  docusate sodium 100 MG capsule, Take 1 capsule by mouth 2 (Two) Times a Day As Needed for Constipation., Disp: 30 capsule, Rfl: 0  •  estradiol (ESTRACE) 0.1 MG/GM vaginal cream, Insert 2 g into the vagina Daily As Needed (vaginal dryness). USUALLY USES TWICE A WEEK, Disp: 42.5 g, Rfl: 11  •  GLUCOSAMINE HCL-MSM PO, 1 tablet Daily. NOT CURRENTLY TAKING, Disp: , Rfl:   •  hydroCHLOROthiazide (HYDRODIURIL) 12.5 MG tablet, Take 1 tablet by mouth Daily. (Patient taking differently: Take 1 tablet by mouth Daily As Needed (swelling in ankles and feet).), Disp: 90 tablet, Rfl: 3  •  ipratropium (ATROVENT) 0.06 % nasal spray, into the nostril(s) as directed by  "provider As Needed for Rhinitis., Disp: , Rfl:   •  levocetirizine (XYZAL) 5 MG tablet, Take 1 tablet by mouth Daily As Needed for Allergies., Disp: , Rfl:   •  levothyroxine (Synthroid) 175 MCG tablet, Take 1 tablet by mouth Daily., Disp: 90 tablet, Rfl: 1  •  montelukast (Singulair) 10 MG tablet, Take 1 tablet by mouth Every Night. PRN, Disp: 90 tablet, Rfl: 1  •  MULTIPLE VITAMIN PO, 1 tablet Daily. NOT CURRENTLY TAKING, Disp: , Rfl:   •  Omega-3 Fatty Acids (FISH OIL) 435 MG capsule, Take 1,000 mg by mouth Daily. NOT CURRENTLY TAKING, Disp: , Rfl:   •  pantoprazole (PROTONIX) 40 MG EC tablet, PRN, Disp: , Rfl:   •  Probiotic Product (SOLUBLE FIBER/PROBIOTICS PO), Take 1 capsule by mouth 2 (Two) Times a Day., Disp: , Rfl:   •  Tiotropium Bromide Monohydrate (Spiriva Respimat) 1.25 MCG/ACT aerosol solution inhaler, Inhale 2 puffs Daily., Disp: 4 g, Rfl: 0    Current Facility-Administered Medications:   •  triamcinolone acetonide (KENALOG-40) injection 40 mg, 40 mg, Intra-articular, Once, Gordon Norman MD    Physical Exam:  Vital Signs:   Vitals:    04/07/23 0914   BP: 130/82   BP Location: Left arm   Patient Position: Sitting   Pulse: 72   Resp: 18   SpO2: 98%   Weight: 119 kg (263 lb)   Height: 175.3 cm (69\")     Body mass index is 38.84 kg/m².    Physical Exam  Vitals and nursing note reviewed.   Constitutional:       General: She is not in acute distress.     Appearance: She is obese.   HENT:      Head: Normocephalic and atraumatic.   Neck:      Trachea: Trachea normal.   Cardiovascular:      Rate and Rhythm: Normal rate and regular rhythm.      Pulses: Normal pulses.      Heart sounds: Normal heart sounds. No murmur heard.    No friction rub. No gallop.   Pulmonary:      Effort: Pulmonary effort is normal.      Breath sounds: Normal breath sounds.   Musculoskeletal:      Cervical back: Neck supple.      Right lower leg: No edema.      Left lower leg: No edema.   Skin:     General: Skin is warm and dry. "   Neurological:      Mental Status: She is alert and oriented to person, place, and time.   Psychiatric:         Mood and Affect: Mood normal.         Behavior: Behavior normal. Behavior is cooperative.         Thought Content: Thought content does not include suicidal ideation.         Results Review:   I reviewed the patient's new clinical results.      ECG 12 Lead    Date/Time: 4/7/2023 9:01 AM  Performed by: Mayuri Shields APRN  Authorized by: Mayuri Shields APRN   Comparison: compared with previous ECG from 8/15/2022  Rhythm: sinus rhythm  Rate: normal  BPM: 72  QRS axis: normal    Clinical impression: normal ECG        Assessment / Plan:     1.  Paroxysmal atrial fibrillation  History of ablation x2 without recurrence  ECG today continues to show NSR  Denies any symptoms suggestive of recurrence      Preventative Cardiology:   Tobacco Cessation: N/A   Advance Care Planning: ACP discussion was declined by the patient. Patient does not have an advance directive, declines further assistance.     Follow Up:   Return in about 1 year (around 4/7/2024) for Follow-up with Dr. Jackman.      Thank you for allowing me to participate in the care of your patient. Please do not hesitate to contact me with additional questions or concerns.     JEREMY Diego

## 2023-04-05 DIAGNOSIS — K21.00 GASTROESOPHAGEAL REFLUX DISEASE WITH ESOPHAGITIS WITHOUT HEMORRHAGE: ICD-10-CM

## 2023-04-05 RX ORDER — OMEPRAZOLE 40 MG/1
40 CAPSULE, DELAYED RELEASE ORAL DAILY
Qty: 90 CAPSULE | Refills: 3 | OUTPATIENT
Start: 2023-04-05

## 2023-04-07 ENCOUNTER — OFFICE VISIT (OUTPATIENT)
Dept: CARDIOLOGY | Facility: CLINIC | Age: 68
End: 2023-04-07
Payer: COMMERCIAL

## 2023-04-07 VITALS
OXYGEN SATURATION: 98 % | SYSTOLIC BLOOD PRESSURE: 130 MMHG | HEIGHT: 69 IN | HEART RATE: 72 BPM | WEIGHT: 263 LBS | DIASTOLIC BLOOD PRESSURE: 82 MMHG | BODY MASS INDEX: 38.95 KG/M2 | RESPIRATION RATE: 18 BRPM

## 2023-04-07 DIAGNOSIS — I48.0 PAROXYSMAL ATRIAL FIBRILLATION: Primary | Chronic | ICD-10-CM

## 2023-04-07 PROBLEM — I48.91 ATRIAL FIBRILLATION: Status: RESOLVED | Noted: 2019-11-05 | Resolved: 2023-04-07

## 2023-04-07 PROCEDURE — 99213 OFFICE O/P EST LOW 20 MIN: CPT | Performed by: NURSE PRACTITIONER

## 2023-04-07 PROCEDURE — 93000 ELECTROCARDIOGRAM COMPLETE: CPT | Performed by: NURSE PRACTITIONER

## 2023-04-26 RX ORDER — ALLOPURINOL 300 MG/1
TABLET ORAL
Qty: 90 TABLET | Refills: 3 | Status: SHIPPED | OUTPATIENT
Start: 2023-04-26

## 2023-04-26 NOTE — TELEPHONE ENCOUNTER
Rx Refill Note  Requested Prescriptions     Pending Prescriptions Disp Refills    allopurinol (ZYLOPRIM) 300 MG tablet [Pharmacy Med Name: allopurinol 300 mg tablet] 90 tablet 3     Sig: TAKE ONE TABLET BY MOUTH EVERY DAY      Last office visit with prescribing clinician: 2/9/2023   Last telemedicine visit with prescribing clinician: 8/4/2023   Next office visit with prescribing clinician: 8/4/2023                         Beckie Damon MA  04/26/23, 08:01 EDT

## 2023-05-15 RX ORDER — LEVOTHYROXINE SODIUM 0.07 MG/1
75 TABLET ORAL DAILY
Qty: 90 TABLET | Refills: 3 | Status: SHIPPED | OUTPATIENT
Start: 2023-05-15

## 2023-05-15 RX ORDER — LEVOTHYROXINE SODIUM 0.1 MG/1
100 TABLET ORAL DAILY
Qty: 90 TABLET | Refills: 3 | Status: SHIPPED | OUTPATIENT
Start: 2023-05-15

## 2023-06-05 RX ORDER — LEVOTHYROXINE SODIUM 175 UG/1
TABLET ORAL
Qty: 90 TABLET | Refills: 1 | Status: SHIPPED | OUTPATIENT
Start: 2023-06-05

## 2023-07-05 PROBLEM — R10.32 LEFT LOWER QUADRANT ABDOMINAL PAIN: Chronic | Status: ACTIVE | Noted: 2022-12-23

## 2023-07-05 PROBLEM — R10.9 RIGHT SIDED ABDOMINAL PAIN: Status: ACTIVE | Noted: 2023-07-05

## 2023-07-05 PROBLEM — K21.9 GASTROESOPHAGEAL REFLUX DISEASE: Chronic | Status: ACTIVE | Noted: 2023-07-05

## 2023-07-05 PROBLEM — R19.7 DIARRHEA: Chronic | Status: ACTIVE | Noted: 2023-07-05

## 2023-07-05 PROBLEM — K76.0 FATTY (CHANGE OF) LIVER, NOT ELSEWHERE CLASSIFIED: Chronic | Status: ACTIVE | Noted: 2023-07-05

## 2023-07-05 PROBLEM — E66.01 CLASS 2 SEVERE OBESITY DUE TO EXCESS CALORIES WITH SERIOUS COMORBIDITY AND BODY MASS INDEX (BMI) OF 36.0 TO 36.9 IN ADULT: Status: ACTIVE | Noted: 2023-07-05

## 2023-07-05 PROBLEM — Z86.010 PERSONAL HISTORY OF COLONIC POLYPS: Chronic | Status: ACTIVE | Noted: 2023-07-05

## 2023-07-05 PROBLEM — R19.7 DIARRHEA: Status: ACTIVE | Noted: 2023-07-05

## 2023-07-05 PROBLEM — E66.812 CLASS 2 SEVERE OBESITY DUE TO EXCESS CALORIES WITH SERIOUS COMORBIDITY AND BODY MASS INDEX (BMI) OF 36.0 TO 36.9 IN ADULT: Status: ACTIVE | Noted: 2023-07-05

## 2023-07-05 PROBLEM — Z86.010 PERSONAL HISTORY OF COLONIC POLYPS: Status: ACTIVE | Noted: 2023-07-05

## 2023-07-05 PROBLEM — K76.0 FATTY (CHANGE OF) LIVER, NOT ELSEWHERE CLASSIFIED: Status: ACTIVE | Noted: 2023-07-05

## 2023-07-05 PROBLEM — Z86.0100 PERSONAL HISTORY OF COLONIC POLYPS: Chronic | Status: ACTIVE | Noted: 2023-07-05

## 2023-07-05 PROBLEM — Z86.0100 PERSONAL HISTORY OF COLONIC POLYPS: Status: ACTIVE | Noted: 2023-07-05

## 2023-07-05 PROBLEM — K21.9 GASTROESOPHAGEAL REFLUX DISEASE: Status: ACTIVE | Noted: 2023-07-05

## 2023-07-05 PROBLEM — R11.0 NAUSEA: Status: ACTIVE | Noted: 2023-07-05

## 2023-07-24 ENCOUNTER — OFFICE VISIT (OUTPATIENT)
Dept: INTERNAL MEDICINE | Facility: CLINIC | Age: 68
End: 2023-07-24
Payer: COMMERCIAL

## 2023-07-24 ENCOUNTER — LAB (OUTPATIENT)
Dept: LAB | Facility: HOSPITAL | Age: 68
End: 2023-07-24
Payer: COMMERCIAL

## 2023-07-24 VITALS
BODY MASS INDEX: 35.37 KG/M2 | TEMPERATURE: 97.3 F | SYSTOLIC BLOOD PRESSURE: 142 MMHG | HEART RATE: 68 BPM | WEIGHT: 238.8 LBS | DIASTOLIC BLOOD PRESSURE: 70 MMHG | OXYGEN SATURATION: 97 % | HEIGHT: 69 IN

## 2023-07-24 DIAGNOSIS — E53.8 COBALAMIN DEFICIENCY: Primary | ICD-10-CM

## 2023-07-24 DIAGNOSIS — R20.0 BILATERAL LEG NUMBNESS: ICD-10-CM

## 2023-07-24 DIAGNOSIS — E89.0 POSTOPERATIVE HYPOTHYROIDISM: ICD-10-CM

## 2023-07-24 DIAGNOSIS — E53.8 COBALAMIN DEFICIENCY: ICD-10-CM

## 2023-07-24 LAB
T3FREE SERPL-MCNC: 2.53 PG/ML (ref 2–4.4)
T4 FREE SERPL-MCNC: 1.96 NG/DL (ref 0.93–1.7)
VIT B12 BLD-MCNC: 1238 PG/ML (ref 211–946)

## 2023-07-24 PROCEDURE — 86235 NUCLEAR ANTIGEN ANTIBODY: CPT

## 2023-07-24 PROCEDURE — 86225 DNA ANTIBODY NATIVE: CPT

## 2023-07-24 PROCEDURE — 84481 FREE ASSAY (FT-3): CPT | Performed by: STUDENT IN AN ORGANIZED HEALTH CARE EDUCATION/TRAINING PROGRAM

## 2023-07-24 PROCEDURE — 84439 ASSAY OF FREE THYROXINE: CPT | Performed by: STUDENT IN AN ORGANIZED HEALTH CARE EDUCATION/TRAINING PROGRAM

## 2023-07-24 PROCEDURE — 99213 OFFICE O/P EST LOW 20 MIN: CPT | Performed by: STUDENT IN AN ORGANIZED HEALTH CARE EDUCATION/TRAINING PROGRAM

## 2023-07-24 PROCEDURE — 86038 ANTINUCLEAR ANTIBODIES: CPT

## 2023-07-24 PROCEDURE — 36415 COLL VENOUS BLD VENIPUNCTURE: CPT

## 2023-07-24 PROCEDURE — 82607 VITAMIN B-12: CPT

## 2023-07-24 NOTE — PROGRESS NOTES
Subjective   Albania Ramos is a 68 y.o. female.     History of Present Illness  Patient complaint patient presents with complaints of bilateral lower extremity numbness and tingling.  States this has been going on for years.  Off and on.  Happens at random times.  States it has improved since having colectomy.  However, states that she noticed it starting back up some last week.  States it is on the lateral portion of both bilateral lower extremities from the knee to the side of the foot, gets worse when her legs are hanging down.  Feels like the feet and legs are cold at times.  States she also notices that in bilateral upper extremities when they have been hanging off the side of the bed or when she has been driving, on the on the medial portion of bilateral forearms and into fourth and fifth digits of both hands.  States that sometimes in her feet around her spider veins she will have a white discoloration, and the midfoot will be discolored blue and toes will be red.     The following portions of the patient's history were reviewed and updated as appropriate: allergies, current medications, past family history, past medical history, past social history, past surgical history, and problem list.    Review of Systems   All other systems reviewed and are negative.    Objective   Physical Exam  Vitals and nursing note reviewed.   Constitutional:       Appearance: Normal appearance.   HENT:      Head: Normocephalic and atraumatic.      Right Ear: External ear normal.      Left Ear: External ear normal.      Nose: Nose normal.      Mouth/Throat:      Mouth: Mucous membranes are moist.      Pharynx: Oropharynx is clear. No oropharyngeal exudate or posterior oropharyngeal erythema.   Eyes:      Extraocular Movements: Extraocular movements intact.      Conjunctiva/sclera: Conjunctivae normal.      Pupils: Pupils are equal, round, and reactive to light.   Cardiovascular:      Rate and Rhythm: Normal rate and regular  rhythm.      Pulses: Normal pulses.      Heart sounds: Normal heart sounds.   Pulmonary:      Effort: Pulmonary effort is normal.      Breath sounds: Normal breath sounds.   Abdominal:      General: Abdomen is flat. Bowel sounds are normal.      Palpations: Abdomen is soft.   Musculoskeletal:         General: Normal range of motion.      Cervical back: Normal range of motion.   Skin:     General: Skin is warm.      Capillary Refill: Capillary refill takes less than 2 seconds.      Comments: Telangiectasis bilaterally feet and calves   Neurological:      General: No focal deficit present.      Mental Status: She is alert and oriented to person, place, and time. Mental status is at baseline.   Psychiatric:         Mood and Affect: Mood normal.         Behavior: Behavior normal.         Thought Content: Thought content normal.         Judgment: Judgment normal.       Assessment & Plan   Diagnoses and all orders for this visit:    1. Cobalamin deficiency (Primary)  -     Vitamin B12; Future    2. Postoperative hypothyroidism  -     T3, Free  -     T4, Free    3. Bilateral leg numbness  -     AJITH With / DsDNA, RNP, Sjogrens A / B, Ngo; Future    Due to patient's discoloration and symptoms we will check AJITH today.  Patient has some labs with her from her visit with GI at [facility] [place] and TSH was low.  T3 and T4 were not checked at that time.  Patient has history of supratherapeutic and subtherapeutic levels of B12.  We will recheck that today to due to neuropathic symptoms  Patient counseled that we will get up with her with lab results.  However symptoms likely due to TSH levels.  Counseled to make appointment with endocrinologist, as she is already established with one.  Due to her history of thyroid cancer will not adjust her medications at this time, we will wait to see what T3 and T4 look like.

## 2023-07-25 ENCOUNTER — PATIENT MESSAGE (OUTPATIENT)
Dept: INTERNAL MEDICINE | Facility: CLINIC | Age: 68
End: 2023-07-25
Payer: COMMERCIAL

## 2023-07-25 DIAGNOSIS — M33.20 POLYMYOSITIS ASSOCIATED WITH AUTOIMMUNE DISEASE: Primary | ICD-10-CM

## 2023-07-25 DIAGNOSIS — M35.9 POLYMYOSITIS ASSOCIATED WITH AUTOIMMUNE DISEASE: Primary | ICD-10-CM

## 2023-07-25 LAB
ANA SER QL: POSITIVE
CENTROMERE B AB SER-ACNC: <0.2 AI (ref 0–0.9)
CHROMATIN AB SERPL-ACNC: <0.2 AI (ref 0–0.9)
DSDNA AB SER-ACNC: <1 IU/ML (ref 0–9)
ENA JO1 AB SER-ACNC: <0.2 AI (ref 0–0.9)
ENA RNP AB SER-ACNC: 1.9 AI (ref 0–0.9)
ENA SCL70 AB SER-ACNC: <0.2 AI (ref 0–0.9)
ENA SM AB SER-ACNC: <0.2 AI (ref 0–0.9)
ENA SS-A AB SER-ACNC: <0.2 AI (ref 0–0.9)
ENA SS-B AB SER-ACNC: <0.2 AI (ref 0–0.9)
Lab: ABNORMAL

## 2023-07-26 ENCOUNTER — OFFICE VISIT (OUTPATIENT)
Dept: ENDOCRINOLOGY | Facility: CLINIC | Age: 68
End: 2023-07-26
Payer: COMMERCIAL

## 2023-07-26 VITALS
WEIGHT: 239 LBS | HEIGHT: 69 IN | OXYGEN SATURATION: 99 % | DIASTOLIC BLOOD PRESSURE: 80 MMHG | BODY MASS INDEX: 35.4 KG/M2 | SYSTOLIC BLOOD PRESSURE: 122 MMHG | HEART RATE: 68 BPM

## 2023-07-26 DIAGNOSIS — C73 MALIGNANT NEOPLASM OF THYROID GLAND: ICD-10-CM

## 2023-07-26 DIAGNOSIS — E89.0 POSTOPERATIVE HYPOTHYROIDISM: Primary | ICD-10-CM

## 2023-07-26 PROCEDURE — 99214 OFFICE O/P EST MOD 30 MIN: CPT | Performed by: INTERNAL MEDICINE

## 2023-07-26 RX ORDER — LEVOTHYROXINE SODIUM 0.12 MG/1
62.5 TABLET ORAL DAILY
Qty: 45 TABLET | Refills: 1 | Status: SHIPPED | OUTPATIENT
Start: 2023-07-26

## 2023-07-26 NOTE — PROGRESS NOTES
"Chief Complaint   Patient presents with    Thyroid Problem        HPI   Albania Ramos is a 68 y.o. female had concerns including Thyroid Problem.      Patient was last seen in 2021.  Here today for follow-up on postoperative hypothyroidism and history of thyroid cancer.  Recent TSH was suppressed, T4 elevated.  She has lost 40+ pounds over recent months after dietary modifications.  Has experienced hair loss, but this seems to be stabilizing.  Has felt better on combination of levothyroxine 100 mcg tabs and 75 mcg tabs as opposed to 175 mcg tabs.    The following portions of the patient's history were reviewed and updated as appropriate: allergies, current medications, past family history, past medical history, past social history, past surgical history, and problem list.    Review of Systems   Constitutional: Negative.    Endocrine:        Hair loss      /80   Pulse 68   Ht 175.3 cm (69\")   Wt 108 kg (239 lb)   LMP  (LMP Unknown)   SpO2 99%   BMI 35.29 kg/m²      Physical Exam  Vitals reviewed.   Constitutional:       Appearance: Normal appearance. She is obese.      Comments: Body mass index is 35.29 kg/m².   Cardiovascular:      Rate and Rhythm: Normal rate.   Pulmonary:      Effort: Pulmonary effort is normal.   Neurological:      General: No focal deficit present.      Mental Status: She is alert. Mental status is at baseline.   Psychiatric:         Mood and Affect: Mood normal.         Behavior: Behavior normal.            LABS AND IMAGING   CMP  Lab Results   Component Value Date    GLUCOSE 105 (H) 10/29/2022    BUN 13 10/29/2022    CREATININE 0.57 10/29/2022    EGFRIFNONA 80 01/31/2022    EGFRIFAFRI 80 11/16/2021    BCR 22.8 10/29/2022    K 4.0 10/29/2022    CO2 28.0 10/29/2022    CALCIUM 8.6 10/29/2022    PROTENTOTREF 6.7 11/16/2021    ALBUMIN 4.30 08/15/2022    LABIL2 2.0 11/16/2021    AST 21 08/15/2022    ALT 22 08/15/2022        CBC w/DIFF   Lab Results   Component Value Date    WBC 10.02 " 10/29/2022    RBC 4.22 10/29/2022    HGB 12.1 10/29/2022    HCT 38.9 10/29/2022    MCV 92.2 10/29/2022    MCH 28.7 10/29/2022    MCHC 31.1 (L) 10/29/2022    RDW 14.1 10/29/2022    RDWSD 47.8 10/29/2022    MPV 11.4 10/29/2022     10/29/2022    NEUTRORELPCT 63.3 10/29/2022    LYMPHORELPCT 21.8 10/29/2022    MONORELPCT 9.9 10/29/2022    EOSRELPCT 4.1 10/29/2022    BASORELPCT 0.5 10/29/2022    AUTOIGPER 0.4 10/29/2022    NEUTROABS 6.35 10/29/2022    LYMPHSABS 2.18 10/29/2022    MONOSABS 0.99 (H) 10/29/2022    EOSABS 0.41 (H) 10/29/2022    BASOSABS 0.05 10/29/2022    AUTOIGNUM 0.04 10/29/2022    NRBC 0.0 10/29/2022     Lab Results   Component Value Date    THYROGLOBULN <0.1 (L) 02/01/2022       Lab Results   Component Value Date    THGAB <1.0 02/01/2022       TSH  Lab Results   Component Value Date    TSH 0.743 02/01/2022    TSH 0.263 (L) 11/09/2021    TSH 0.187 (L) 05/18/2021     T4  Lab Results   Component Value Date    FREET4 1.96 (H) 07/24/2023    FREET4 1.43 02/01/2022    FREET4 1.66 11/09/2021     Lab Results   Component Value Date    T8XZLBH 13.5 (H) 06/07/2016     T3  Lab Results   Component Value Date    T3FREE 2.53 07/24/2023    T3FREE 3.4 05/18/2021    T3FREE 2.4 12/20/2017     (ABNORMAL) TSH W/REFLEX TO FT4 (07/14/2023 8:15 AM EDT)  Lab Results - (ABNORMAL) TSH W/REFLEX TO FT4 (07/14/2023 8:15 AM EDT)  Component Value Ref Range Test Method Analysis Time Performed At [facility]   TSH 0.261 (L) 0.450 - 4.500 uIU/mL     [facility]       Lab Results - (ABNORMAL) TSH W/REFLEX TO FT4 (07/14/2023 8:15 AM EDT)  Specimen (Source) Anatomical Location / Laterality Collection Method / Volume Collection Time Received Time         07/14/2023 8:15 AM EDT 07/14/2023     ULTRASOUND THYROID-     HISTORY: History of thyroid cancer; N53-Tijnaonio neoplasm of thyroid  gland     FINDINGS: Surgical changes of thyroidectomy are present. Bilateral neck  lymph nodes are seen in the inferior neck. Right node measures 17  x 6 x  18 mm, slightly smaller from prior exam. Normal morphology is  maintained. Left-sided lymph node measures up to 10 mm, previously 12  mm. No new abnormality is seen.     IMPRESSION:  Stable to mildly improved appearance of lymph nodes likely  benign reactive. Longer term follow-up is recommended in 6 months with  ultrasound.     This report was finalized on 7/27/2021 2:40 PM by Kalpesh Angulo MD.           ULTRASOUND THYROID-     HISTORY: See Diagnosis; P42-Aolyxjhwx neoplasm of thyroid gland.     FINDINGS: Thyroid gland is surgically absent. There is no mass in the  thyroidectomy bed.      Bilateral neck lymph nodes are noted, most of which are normal in size.  One node is borderline enlarged in the right mid neck measuring 19 x 17  x 8 mm. Fatty hilum is maintained. No fluid collection is seen.     IMPRESSION:  1. No evidence of recurrent mass in the thyroidectomy bed.  2. Single borderline enlarged lymph node right neck. Recommend  ultrasound follow-up in 3-6 weeks. Favor benign etiology.     This report was finalized on 6/2/2021 4:32 PM by Kalpesh Angulo MD prefers 2 tabs instead of 1  Assessment and Plan    Diagnoses and all orders for this visit:    1. Postoperative hypothyroidism (Primary)  Currently on exam levothyroxine 175 mcg daily and recent TSH suppressed, free T4 high.  Suspect reduced doses required with recent weight loss.  Decrease levothyroxine to 162.5 mcg daily and repeat labs in 6 to 8 weeks.  -     levothyroxine (Synthroid) 125 MCG tablet; Take 0.5 tablets by mouth Daily. Take with 100 mcg tab daily  Dispense: 45 tablet; Refill: 1  -     T4, Free; Future  -     TSH; Future  -     Thyroglobulin Antibody and Thyroglobulin, SOFÍA or LC/MS-MS; Future    2. 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 Noted focal capsular invasion, focal  "lymphovascular invasion, no extrathyroidal extension. No lymph nodes submitted. Right thyroid lobe was benign after resection 7/23/2012. She received 99.2 mCi of I-131 on 8/20/2012 with post treatment scan showing uptake in the thyroid bed as well as a slight focus in the colon which may have represented a \"GI mucosal activity\" (patient does have recurrent diverticulitis and will require colectomy).  Thyroglobulin levels have been low.  Recheck with next labs.  Levothyroxine management as above.  Update neck ultrasound at follow-up visit.          Return in about 6 months (around 1/26/2024) for next scheduled follow up, with thyroid ultrasound ** 30 min appt. The patient was instructed to contact the clinic with any interval questions or concerns.    Melina Simpson, DO   Endocrinologist    Please note that portions of this note were completed with a voice recognition program.    "

## 2023-08-02 DIAGNOSIS — G47.33 OSA (OBSTRUCTIVE SLEEP APNEA): Primary | ICD-10-CM

## 2023-08-04 ENCOUNTER — OFFICE VISIT (OUTPATIENT)
Dept: INTERNAL MEDICINE | Facility: CLINIC | Age: 68
End: 2023-08-04
Payer: COMMERCIAL

## 2023-08-04 VITALS
OXYGEN SATURATION: 94 % | WEIGHT: 223 LBS | BODY MASS INDEX: 33.03 KG/M2 | RESPIRATION RATE: 16 BRPM | DIASTOLIC BLOOD PRESSURE: 80 MMHG | HEART RATE: 68 BPM | TEMPERATURE: 97.4 F | SYSTOLIC BLOOD PRESSURE: 130 MMHG | HEIGHT: 69 IN

## 2023-08-04 DIAGNOSIS — R76.8 POSITIVE ANA (ANTINUCLEAR ANTIBODY): Primary | ICD-10-CM

## 2023-08-04 DIAGNOSIS — E53.8 LOW VITAMIN B12 LEVEL: ICD-10-CM

## 2023-08-04 DIAGNOSIS — M79.10 MUSCLE PAIN: ICD-10-CM

## 2023-08-04 DIAGNOSIS — K57.90 DIVERTICULOSIS: ICD-10-CM

## 2023-08-04 DIAGNOSIS — E79.0 HYPERURICEMIA: ICD-10-CM

## 2023-08-04 PROBLEM — M19.90 OSTEOARTHRITIS: Status: ACTIVE | Noted: 2023-03-22

## 2023-08-04 PROBLEM — K76.0 FATTY LIVER: Status: ACTIVE | Noted: 2023-03-22

## 2023-08-04 PROBLEM — K57.92 DIVERTICULITIS: Status: ACTIVE | Noted: 2023-03-22

## 2023-08-04 PROBLEM — Z91.09 ENVIRONMENTAL ALLERGIES: Status: ACTIVE | Noted: 2023-03-22

## 2023-08-04 PROBLEM — C73 MALIGNANT NEOPLASM OF THYROID GLAND: Chronic | Status: ACTIVE | Noted: 2023-03-22

## 2023-08-04 PROBLEM — M51.379 DDD (DEGENERATIVE DISC DISEASE), LUMBOSACRAL: Status: ACTIVE | Noted: 2023-03-22

## 2023-08-04 PROBLEM — R19.7 DIARRHEA: Status: ACTIVE | Noted: 2023-03-22

## 2023-08-04 PROBLEM — M51.37 DDD (DEGENERATIVE DISC DISEASE), LUMBOSACRAL: Status: ACTIVE | Noted: 2023-03-22

## 2023-08-04 PROBLEM — Z00.00 ENCOUNTER FOR GENERAL ADULT MEDICAL EXAMINATION WITHOUT ABNORMAL FINDINGS: Status: ACTIVE | Noted: 2023-03-22

## 2023-08-04 PROBLEM — Z86.69 HISTORY OF OBSTRUCTIVE SLEEP APNEA: Status: ACTIVE | Noted: 2023-03-22

## 2023-08-04 PROBLEM — K58.9 SPASTIC COLON: Status: ACTIVE | Noted: 2023-03-22

## 2023-08-04 PROBLEM — M50.30 DDD (DEGENERATIVE DISC DISEASE), CERVICAL: Status: ACTIVE | Noted: 2023-03-22

## 2023-08-04 PROBLEM — M19.90 ARTHRITIS: Status: ACTIVE | Noted: 2023-03-22

## 2023-08-04 PROBLEM — K21.9 GERD (GASTROESOPHAGEAL REFLUX DISEASE): Status: ACTIVE | Noted: 2023-03-22

## 2023-08-04 PROBLEM — G47.30 SLEEP APNEA: Status: ACTIVE | Noted: 2023-03-22

## 2023-08-04 PROCEDURE — 99214 OFFICE O/P EST MOD 30 MIN: CPT | Performed by: INTERNAL MEDICINE

## 2023-08-09 LAB
ANA SER QL IF: NEGATIVE
C3 SERPL-MCNC: 146 MG/DL (ref 82–167)
C4 SERPL-MCNC: 35 MG/DL (ref 12–38)
CH50 SERPL-ACNC: >60 U/ML
CK SERPL-CCNC: 80 U/L (ref 32–182)
HBA1C MFR BLD: 5.8 % (ref 4.8–5.6)
MYOGLOBIN SERPL-MCNC: 35 NG/ML (ref 25–58)
PYRIDOXAL PHOS SERPL-MCNC: 12.4 UG/L (ref 3.4–65.2)
URATE SERPL-MCNC: 3.9 MG/DL (ref 3–7.2)
VIT B12 SERPL-MCNC: 710 PG/ML (ref 232–1245)

## 2023-08-21 ENCOUNTER — TELEPHONE (OUTPATIENT)
Dept: CARDIOLOGY | Facility: CLINIC | Age: 68
End: 2023-08-21
Payer: COMMERCIAL

## 2023-08-21 DIAGNOSIS — I48.0 PAROXYSMAL ATRIAL FIBRILLATION: Primary | Chronic | ICD-10-CM

## 2023-08-21 NOTE — TELEPHONE ENCOUNTER
"  Caller: Albania Ramos \"JULIO\"    Relationship: Self    Best call back number: [phone]     What is the best time to reach you: ANY    Who are you requesting to speak with (clinical staff, provider,  specific staff member): STAFF      What was the call regarding: PT IS WANTING AN APPT TO SEE IF SHE CAN BE SEEN SOONER AS SHE HAS BEEN EXPERIENCING SOME ISSUES WITH HER HEART BEING OUT OF RHYTHM SINCE HER ABLATION    Is it okay if the provider responds through MyChart: NO      "

## 2023-08-22 ENCOUNTER — OFFICE VISIT (OUTPATIENT)
Dept: CARDIOLOGY | Facility: CLINIC | Age: 68
End: 2023-08-22
Payer: COMMERCIAL

## 2023-08-22 VITALS
WEIGHT: 233 LBS | OXYGEN SATURATION: 97 % | SYSTOLIC BLOOD PRESSURE: 112 MMHG | HEART RATE: 75 BPM | DIASTOLIC BLOOD PRESSURE: 68 MMHG | HEIGHT: 69 IN | BODY MASS INDEX: 34.51 KG/M2 | RESPIRATION RATE: 16 BRPM

## 2023-08-22 DIAGNOSIS — I48.0 PAROXYSMAL ATRIAL FIBRILLATION: Primary | Chronic | ICD-10-CM

## 2023-08-22 PROCEDURE — 99214 OFFICE O/P EST MOD 30 MIN: CPT | Performed by: NURSE PRACTITIONER

## 2023-08-22 PROCEDURE — 93000 ELECTROCARDIOGRAM COMPLETE: CPT | Performed by: NURSE PRACTITIONER

## 2023-08-22 NOTE — PROGRESS NOTES
"             [facility] Cardiology Office Follow Up Note    Albania Ramos  [id number]  08/22/2023    Primary Care Provider: Gordon Norman MD   Referring Provider: No ref. provider found    Chief Complaint: Palpitations, SOA    History of Present Illness:   Mrs. Albania Ramos is a 68 y.o. female who presents to the Cardiology Clinic for follow up of palpitations.  The patient has a past medical history significant for asthma, hypothyroidism, chronic venous stasis, and depression.  She has a past cardiac history significant for paroxysmal atrial fibrillation, status post ablation x2.  She has also undergone outpatient loop recorder monitoring x2, with her last loop recorder being removed in 10/19.  The patient states that she went dancing with her  and some friends this past Saturday.  She states at that time she experienced the sensation of her heart \"racing\" with some shortness of breath and mild dizziness.  She states that she has felt her heart \"flip out of rhythm before\", but she has previously used Valsalva maneuvers with success.  She states that after several attempts of bearing down, she was unable to put herself back into normal rhythm.  She is concerned because her symptoms last approximately 2 hours before spontaneous resolution.  She did take an aspirin during that time, but did not seek medical care.  The patient notes that she exercises regularly and has actually lost 47 lbs this year, so she does not feel like she was overexerting herself at the time of of this episode.  The patient reports that she has previously had 2 loop recorders because of severe skin reactions associated with outpatient cardiac monitors.  Given she has had no recurrent PAF after her last ablation, she has been off of DOAC for years.  She did, however, take an aspirin at the time of her episode this past Saturday.  She denies any recurrence of this.  She offers no other complaints or concerns at this " time.    Past Cardiac Testin. Last Coronary Angio: No known  2. Prior Stress Testin2017              1. Normal Lexiscan  3. Last Echo:  21              1.  Normal left ventricular size and systolic function, LVEF 60-65%.              2.  Normal LV diastolic filling pattern.              3.  Normal right ventricular size and systolic function.              4.  Normal left and right atrial size.              5.  Trace mitral regurgitation.              6.  Mild tricuspid regurgitation, RVSP 35 mmHg.  4. Prior Holter Monitor: Loop recorder removed on 2019              1.  SCANNED - CARDIOLOGY (10/07/2019)    Review of Systems:   Review of Systems  As Noted in HPI.   I have reviewed and confirmed the accuracy of the ROS as documented by the MA/LPN/RN JEREMY Pedro    I have reviewed and/or updated the patient's past medical, past surgical, family, social history, problem list and allergies as appropriate.     Medications:     Current Outpatient Medications:     albuterol sulfate HFA (Ventolin HFA) 108 (90 Base) MCG/ACT inhaler, Inhale 2 puffs Every 4 (Four) Hours As Needed for Wheezing or Shortness of Air., Disp: 18 g, Rfl: 5    allopurinol (ZYLOPRIM) 300 MG tablet, TAKE ONE TABLET BY MOUTH EVERY DAY, Disp: 90 tablet, Rfl: 3    calcium carbonate (OS-SHANTE) 600 MG tablet, Take 1 tablet by mouth 2 (Two) Times a Day With Meals. NOT CURRENTLY TAKING, Disp: , Rfl:     Cinnamon 500 MG tablet, Take  by mouth., Disp: , Rfl:     cyanocobalamin (VITAMIN B-12) 2500 MCG tablet tablet, TAKE ONE TABLET BY MOUTH THREE TIMES A WEEK MUST MAKE AN APPOINTMENT (Patient taking differently: Take 1 tablet by mouth 1 (One) Time Per Week. ), Disp: 30 tablet, Rfl: 5    diclofenac (VOLTAREN) 1 % gel gel, Apply 4 g topically to the appropriate area as directed 4 (Four) Times a Day As Needed (pain)., Disp: 100 g, Rfl: 1    docusate sodium 100 MG capsule, Take 1 capsule by mouth 2 (Two) Times a Day As Needed  for Constipation., Disp: 30 capsule, Rfl: 0    estradiol (ESTRACE) 0.1 MG/GM vaginal cream, Insert 2 g into the vagina Daily As Needed (vaginal dryness). USUALLY USES TWICE A WEEK, Disp: 42.5 g, Rfl: 11    GLUCOSAMINE HCL-MSM PO, 1 tablet Daily. NOT CURRENTLY TAKING, Disp: , Rfl:     hydroCHLOROthiazide (HYDRODIURIL) 12.5 MG tablet, Take 1 tablet by mouth Daily. (Patient taking differently: Take 1 tablet by mouth Daily As Needed (swelling in ankles and feet).), Disp: 90 tablet, Rfl: 3    levocetirizine (XYZAL) 5 MG tablet, Take 1 tablet by mouth Daily As Needed for Allergies., Disp: , Rfl:     levothyroxine (Synthroid) 125 MCG tablet, Take 0.5 tablets by mouth Daily. Take with 100 mcg tab daily, Disp: 45 tablet, Rfl: 1    levothyroxine (SYNTHROID, LEVOTHROID) 100 MCG tablet, Take 1 tablet by mouth Daily., Disp: 90 tablet, Rfl: 3    montelukast (Singulair) 10 MG tablet, Take 1 tablet by mouth Every Night. PRN, Disp: 90 tablet, Rfl: 1    MULTIPLE VITAMIN PO, 1 tablet Daily. NOT CURRENTLY TAKING, Disp: , Rfl:     NON FORMULARY, Lions ramiro mushrooms, Disp: , Rfl:     Omega-3 Fatty Acids (FISH OIL) 435 MG capsule, Take 1,000 mg by mouth Daily. NOT CURRENTLY TAKING, Disp: , Rfl:     omeprazole (priLOSEC) 40 MG capsule, Take 1 capsule by mouth Daily., Disp: , Rfl:     Probiotic Product (SOLUBLE FIBER/PROBIOTICS PO), Take 1 capsule by mouth 2 (Two) Times a Day., Disp: , Rfl:     Tiotropium Bromide Monohydrate (Spiriva Respimat) 1.25 MCG/ACT aerosol solution inhaler, Inhale 2 puffs Daily., Disp: 4 g, Rfl: 0    TURMERIC PO, Take  by mouth., Disp: , Rfl:     vitamin C (ASCORBIC ACID) 250 MG tablet, Take 1 tablet by mouth Daily., Disp: , Rfl:     apixaban (ELIQUIS) 5 MG tablet tablet, Take 1 tablet by mouth 2 (Two) Times a Day., Disp: 28 tablet, Rfl: 0    Current Facility-Administered Medications:     triamcinolone acetonide (KENALOG-40) injection 40 mg, 40 mg, Intra-articular, Once, Gordon Norman MD    Physical  "Exam:  Vital Signs:   Vitals:    08/22/23 1504   BP: 112/68   BP Location: Right arm   Patient Position: Sitting   Pulse: 75   Resp: 16   SpO2: 97%   Weight: 106 kg (233 lb)   Height: 175.3 cm (69\")     Body mass index is 34.41 kg/mý.    Physical Exam  Vitals and nursing note reviewed.   Constitutional:       General: She is not in acute distress.     Appearance: She is obese.   HENT:      Head: Normocephalic and atraumatic.   Neck:      Trachea: Trachea normal.   Cardiovascular:      Rate and Rhythm: Normal rate and regular rhythm.      Pulses: Normal pulses.      Heart sounds: Normal heart sounds. No murmur heard.    No friction rub. No gallop.   Pulmonary:      Effort: Pulmonary effort is normal.      Breath sounds: Normal breath sounds.   Musculoskeletal:      Cervical back: Neck supple.      Right lower leg: No edema.      Left lower leg: No edema.   Skin:     General: Skin is warm and dry.   Neurological:      Mental Status: She is alert and oriented to person, place, and time.   Psychiatric:         Mood and Affect: Mood normal.         Behavior: Behavior normal. Behavior is cooperative.         Thought Content: Thought content does not include suicidal ideation.       Results Review:   I reviewed the patient's new clinical results.      ECG 12 Lead    Date/Time: 8/22/2023 4:34 PM  Performed by: Mayuri Shields APRN  Authorized by: Mayuri Shields APRN   Comparison: compared with previous ECG from 4/7/2023  Similar to previous ECG  Rhythm: sinus rhythm  Rate: normal  BPM: 70  QRS axis: normal    Clinical impression: normal ECG        Assessment / Plan:   Diagnoses and all orders for this visit:    1. Paroxysmal atrial fibrillation (Primary)  ECG shows NSR today  Recent symptoms are suspicious of RVR  Could consider low-dose beta blocker or diltiazem for rate suppression  Was previously on warfarin, but this was discontinued after a prolonged period of no recurrent arrhythmia and normal loop " recorder  [id number]  START Eliquis for CVA prophylaxis  Consult Dr. Jackman      Preventative Cardiology:   Tobacco Cessation: N/A   Advance Care Planning: ACP discussion was declined by the patient. Patient does not have an advance directive, declines further assistance.     Follow Up:   TBD      Thank you for allowing me to participate in the care of your patient. Please do not hesitate to contact me with additional questions or concerns.     Mayuri Shields APRN

## 2023-08-22 NOTE — TELEPHONE ENCOUNTER
Dr. Jackman,  The patient was in NSR today, but describes symptoms concerning for recurrent PAF.  Would you please read my note and let me know how you'd like to proceed since she has had 2 loop recorders and is unable to wear a monitor?

## 2023-08-23 RX ORDER — DILTIAZEM HYDROCHLORIDE 120 MG/1
120 CAPSULE, EXTENDED RELEASE ORAL DAILY
Qty: 90 CAPSULE | Refills: 0 | Status: SHIPPED | OUTPATIENT
Start: 2023-08-23

## 2023-08-23 NOTE — TELEPHONE ENCOUNTER
Appt has been scheduled   Imfinzi infused and d/c'd. Patient tolerated infusion without any difficulty. Mediport flushed with 30 ml NSS and 5 ml heparin. Patient denies any complaints. Kenyon needle d/c'd, band aid to site. Patient discharged to home.

## 2023-08-24 ENCOUNTER — TELEPHONE (OUTPATIENT)
Dept: CARDIOLOGY | Facility: CLINIC | Age: 68
End: 2023-08-24
Payer: COMMERCIAL

## 2023-08-24 DIAGNOSIS — I87.2 VENOUS INSUFFICIENCY OF BOTH LOWER EXTREMITIES: Primary | ICD-10-CM

## 2023-08-24 NOTE — TELEPHONE ENCOUNTER
"----- Message from Valerie Mccauley MA sent at 8/24/2023 11:17 AM EDT -----  Regarding: FW: recommendations  Contact: [phone]  Patient is requesting referral. Please advise  ----- Message -----  From: Albania Ramos \"JULIO\"  Sent: 8/24/2023  11:05 AM EDT  To: Gio Ochoa [facility]  Subject: recommendations                                  I still would like to be referred to the vein specialist, please.  Thank you    "

## 2023-08-31 ENCOUNTER — TELEPHONE (OUTPATIENT)
Dept: CARDIOLOGY | Facility: CLINIC | Age: 68
End: 2023-08-31
Payer: COMMERCIAL

## 2023-08-31 DIAGNOSIS — I48.0 PAROXYSMAL ATRIAL FIBRILLATION: Primary | Chronic | ICD-10-CM

## 2023-09-01 NOTE — TELEPHONE ENCOUNTER
Discontinue diltiazem.  START low-dose metoprolol twice daily.  Follow-up with Dr. Jackman as scheduled.

## 2023-09-01 NOTE — TELEPHONE ENCOUNTER
"----- Message from Yenny Sidhu MA sent at 8/30/2023 10:41 AM EDT -----  Regarding: FW: DILT  mg  Contact: [phone]    ----- Message -----  From: Albania Ramos \"JULIO\"  Sent: 8/29/2023   7:56 PM EDT  To: Gio Kahn [facility]  Subject: DILT  mg                                   Hello, due to side effects, I am not able to continue taking this medication. It has caused me to have heaviness in my chest, shortness of breath and fatigue. It has also caused me to   become severely constipated, even with taking Docusate Sodium 100mg twice daily. I have Diverticulosis, which has resulted in the removal of half of my colon. My GI doctor has "stressed" that I'm not to get constipated. I didn't know if there is something else that I could possibly try that does not have these side effects?   Thank you, Albania"

## 2023-09-01 NOTE — TELEPHONE ENCOUNTER
"----- Message from Yenny Sidhu MA sent at 8/30/2023 10:41 AM EDT -----  Regarding: FW: Eliquis  Contact: [phone]    ----- Message -----  From: Albania Ramos \"JULIO\"  Sent: 8/29/2023   8:17 PM EDT  To: Gio [facility]  Subject: Eliquis                                          Should I be taking this if I have fatty liver disease? Just wondering.  Thanks, Albania    "

## 2023-09-05 ENCOUNTER — LAB (OUTPATIENT)
Dept: LAB | Facility: HOSPITAL | Age: 68
End: 2023-09-05
Payer: COMMERCIAL

## 2023-09-05 DIAGNOSIS — E89.0 POSTOPERATIVE HYPOTHYROIDISM: ICD-10-CM

## 2023-09-05 DIAGNOSIS — E66.01 CLASS 2 SEVERE OBESITY DUE TO EXCESS CALORIES WITH SERIOUS COMORBIDITY AND BODY MASS INDEX (BMI) OF 36.0 TO 36.9 IN ADULT: Chronic | ICD-10-CM

## 2023-09-05 DIAGNOSIS — R19.7 DIARRHEA, UNSPECIFIED TYPE: Chronic | ICD-10-CM

## 2023-09-05 DIAGNOSIS — K76.0 FATTY (CHANGE OF) LIVER, NOT ELSEWHERE CLASSIFIED: Chronic | ICD-10-CM

## 2023-09-05 LAB
ALBUMIN SERPL-MCNC: 4.3 G/DL (ref 3.5–5.2)
ALBUMIN/GLOB SERPL: 1.9 G/DL
ALP SERPL-CCNC: 79 U/L (ref 39–117)
ALT SERPL W P-5'-P-CCNC: 16 U/L (ref 1–33)
ANION GAP SERPL CALCULATED.3IONS-SCNC: 8 MMOL/L (ref 5–15)
AST SERPL-CCNC: 18 U/L (ref 1–32)
BILIRUB SERPL-MCNC: 0.4 MG/DL (ref 0–1.2)
BUN SERPL-MCNC: 17 MG/DL (ref 8–23)
BUN/CREAT SERPL: 21.8 (ref 7–25)
CALCIUM SPEC-SCNC: 9.6 MG/DL (ref 8.6–10.5)
CHLORIDE SERPL-SCNC: 107 MMOL/L (ref 98–107)
CO2 SERPL-SCNC: 28 MMOL/L (ref 22–29)
CREAT SERPL-MCNC: 0.78 MG/DL (ref 0.57–1)
DEPRECATED RDW RBC AUTO: 43.1 FL (ref 37–54)
EGFRCR SERPLBLD CKD-EPI 2021: 83.4 ML/MIN/1.73
ERYTHROCYTE [DISTWIDTH] IN BLOOD BY AUTOMATED COUNT: 13.7 % (ref 12.3–15.4)
GLOBULIN UR ELPH-MCNC: 2.3 GM/DL
GLUCOSE SERPL-MCNC: 95 MG/DL (ref 65–99)
HBV SURFACE AG SERPL QL IA: NORMAL
HCT VFR BLD AUTO: 40 % (ref 34–46.6)
HCV AB SER DONR QL: NORMAL
HGB BLD-MCNC: 13.2 G/DL (ref 12–15.9)
INR PPP: 1.01 (ref 0.9–1.1)
MCH RBC QN AUTO: 28.8 PG (ref 26.6–33)
MCHC RBC AUTO-ENTMCNC: 33 G/DL (ref 31.5–35.7)
MCV RBC AUTO: 87.3 FL (ref 79–97)
PLATELET # BLD AUTO: 221 10*3/MM3 (ref 140–450)
PMV BLD AUTO: 12.9 FL (ref 6–12)
POTASSIUM SERPL-SCNC: 4.6 MMOL/L (ref 3.5–5.2)
PROT SERPL-MCNC: 6.6 G/DL (ref 6–8.5)
PROTHROMBIN TIME: 13.9 SECONDS (ref 12.3–15.1)
RBC # BLD AUTO: 4.58 10*6/MM3 (ref 3.77–5.28)
SODIUM SERPL-SCNC: 143 MMOL/L (ref 136–145)
T4 FREE SERPL-MCNC: 1.92 NG/DL (ref 0.93–1.7)
TSH SERPL DL<=0.05 MIU/L-ACNC: 0.18 UIU/ML (ref 0.27–4.2)
WBC NRBC COR # BLD: 6.95 10*3/MM3 (ref 3.4–10.8)

## 2023-09-05 PROCEDURE — 36415 COLL VENOUS BLD VENIPUNCTURE: CPT

## 2023-09-05 PROCEDURE — 84439 ASSAY OF FREE THYROXINE: CPT

## 2023-09-05 PROCEDURE — 86800 THYROGLOBULIN ANTIBODY: CPT

## 2023-09-05 PROCEDURE — 85610 PROTHROMBIN TIME: CPT

## 2023-09-05 PROCEDURE — 84432 ASSAY OF THYROGLOBULIN: CPT

## 2023-09-05 PROCEDURE — 84443 ASSAY THYROID STIM HORMONE: CPT

## 2023-09-06 ENCOUNTER — HOSPITAL ENCOUNTER (OUTPATIENT)
Dept: ULTRASOUND IMAGING | Facility: HOSPITAL | Age: 68
Discharge: HOME | End: 2023-09-06
Payer: COMMERCIAL

## 2023-09-06 ENCOUNTER — TELEPHONE (OUTPATIENT)
Dept: CARDIOLOGY | Facility: CLINIC | Age: 68
End: 2023-09-06
Payer: COMMERCIAL

## 2023-09-06 DIAGNOSIS — I87.2 VENOUS INSUFFICIENCY OF BOTH LOWER EXTREMITIES: ICD-10-CM

## 2023-09-06 DIAGNOSIS — E89.0 POSTOPERATIVE HYPOTHYROIDISM: Primary | ICD-10-CM

## 2023-09-06 LAB
HAV AB SER QL IA: POSITIVE
HBV CORE AB SERPL QL IA: NEGATIVE
HBV SURFACE AB SER-ACNC: 52.1 MIU/ML
THYROGLOB AB SERPL-ACNC: <1 IU/ML (ref 0–0.9)
THYROGLOB SERPL-MCNC: 0.1 NG/ML (ref 1.5–38.5)

## 2023-09-06 RX ORDER — LEVOTHYROXINE SODIUM 0.15 MG/1
150 TABLET ORAL DAILY
Qty: 30 TABLET | Refills: 5 | Status: SHIPPED | OUTPATIENT
Start: 2023-09-06

## 2023-09-06 NOTE — TELEPHONE ENCOUNTER
Radiology called stating that patient was there for a venous duplex.  They do not do this testing for the DX listed. Advise.

## 2023-09-06 NOTE — TELEPHONE ENCOUNTER
Yes, I copied your note and put in the communication of referral and routed back to Dr. Hedrick office to advise on which testing they are needing.

## 2023-09-06 NOTE — TELEPHONE ENCOUNTER
Melina, Can you send this back to Dr. Hedrick's office for direction?  The only kind of venous ultrasound that can be done here in [place] is an ultrasound to rule out DVT.  Knowing this, I spoke with the patient and canceled the test.    Can you ask their office specifically which US they want ordered and where that test could be completed.  I have been advised by Vern in radiology that this test should have even been scheduled in [place].  The patient still wants to establish care with Dr. Hedrick, so if their office wants to capture this patient for service, we need to know how to proceed.  The patient said she will be waiting to hear back from us.     Thanks.

## 2023-09-07 ENCOUNTER — OFFICE VISIT (OUTPATIENT)
Dept: INTERNAL MEDICINE | Facility: CLINIC | Age: 68
End: 2023-09-07
Payer: COMMERCIAL

## 2023-09-07 VITALS
OXYGEN SATURATION: 94 % | WEIGHT: 233 LBS | HEIGHT: 69 IN | RESPIRATION RATE: 16 BRPM | BODY MASS INDEX: 34.51 KG/M2 | SYSTOLIC BLOOD PRESSURE: 116 MMHG | TEMPERATURE: 97.1 F | HEART RATE: 59 BPM | DIASTOLIC BLOOD PRESSURE: 50 MMHG

## 2023-09-07 DIAGNOSIS — M25.551 RIGHT HIP PAIN: Primary | ICD-10-CM

## 2023-09-07 LAB
A2 MACROGLOB SERPL-MCNC: 143 MG/DL (ref 110–276)
ALT SERPL W P-5'-P-CCNC: 18 IU/L (ref 0–40)
APO A-I SERPL-MCNC: 147 MG/DL (ref 116–209)
AST SERPL W P-5'-P-CCNC: 19 IU/L (ref 0–40)
BILIRUB SERPL-MCNC: 0.4 MG/DL (ref 0–1.2)
CHOLEST SERPL-MCNC: 190 MG/DL (ref 100–199)
FIBROSIS SCORING:: ABNORMAL
FIBROSIS STAGE SERPL QL: ABNORMAL
GGT SERPL-CCNC: 11 IU/L (ref 0–60)
GLUCOSE SERPL-MCNC: 90 MG/DL (ref 70–99)
HAPTOGLOB SERPL-MCNC: 158 MG/DL (ref 37–355)
LABORATORY COMMENT REPORT: ABNORMAL
LIVER FIBR SCORE SERPL CALC.FIBROSURE: 0.08 (ref 0–0.21)
LIVER STEATOSIS GRADE SERPL QL: ABNORMAL
LIVER STEATOSIS SCORE SERPL: 0.46 (ref 0–0.4)
NASH GRADE SERPL QL: ABNORMAL
NASH INTERPRETATION SERPL-IMP: ABNORMAL
NASH SCORE SERPL: 0.37 (ref 0–0.25)
NASH SCORING: ABNORMAL
STEATOSIS SCORING: ABNORMAL
TEST PERFORMANCE INFO SPEC: ABNORMAL
TEST PERFORMANCE INFO SPEC: ABNORMAL
TRIGL SERPL-MCNC: 140 MG/DL (ref 0–149)

## 2023-09-07 RX ORDER — KETOROLAC TROMETHAMINE 30 MG/ML
30 INJECTION, SOLUTION INTRAMUSCULAR; INTRAVENOUS ONCE
Status: COMPLETED | OUTPATIENT
Start: 2023-09-07 | End: 2023-09-07

## 2023-09-07 RX ADMIN — KETOROLAC TROMETHAMINE 30 MG: 30 INJECTION, SOLUTION INTRAMUSCULAR; INTRAVENOUS at 11:49

## 2023-09-18 ENCOUNTER — OFFICE VISIT (OUTPATIENT)
Dept: INTERNAL MEDICINE | Facility: CLINIC | Age: 68
End: 2023-09-18
Payer: COMMERCIAL

## 2023-09-18 VITALS
HEIGHT: 69 IN | TEMPERATURE: 97 F | OXYGEN SATURATION: 97 % | HEART RATE: 57 BPM | BODY MASS INDEX: 34.51 KG/M2 | SYSTOLIC BLOOD PRESSURE: 132 MMHG | RESPIRATION RATE: 16 BRPM | WEIGHT: 233 LBS | DIASTOLIC BLOOD PRESSURE: 62 MMHG

## 2023-09-18 DIAGNOSIS — E53.8 LOW VITAMIN B12 LEVEL: ICD-10-CM

## 2023-09-18 DIAGNOSIS — C73 MALIGNANT NEOPLASM OF THYROID GLAND: ICD-10-CM

## 2023-09-18 DIAGNOSIS — E89.0 POSTOPERATIVE HYPOTHYROIDISM: Primary | ICD-10-CM

## 2023-09-18 PROBLEM — G47.33 OSA ON CPAP: Status: ACTIVE | Noted: 2023-03-22

## 2023-09-18 PROBLEM — Z99.89 OSA ON CPAP: Status: ACTIVE | Noted: 2023-03-22

## 2023-09-18 PROCEDURE — 99214 OFFICE O/P EST MOD 30 MIN: CPT | Performed by: INTERNAL MEDICINE

## 2023-09-18 NOTE — PROGRESS NOTES
Subjective     Patient ID: Albania Ramos is a 68 y.o. female. Patient is here for management of multiple medical problems.     Chief Complaint   Patient presents with    Positive AJITH     History of Present Illness   Knee pain. \        Wants dexa.          The following portions of the patient's history were reviewed and updated as appropriate: allergies, current medications, past family history, past medical history, past social history, past surgical history and problem list.    Review of Systems    Current Outpatient Medications:     albuterol sulfate HFA (Ventolin HFA) 108 (90 Base) MCG/ACT inhaler, Inhale 2 puffs Every 4 (Four) Hours As Needed for Wheezing or Shortness of Air., Disp: 18 g, Rfl: 5    allopurinol (ZYLOPRIM) 300 MG tablet, TAKE ONE TABLET BY MOUTH EVERY DAY, Disp: 90 tablet, Rfl: 3    calcium carbonate (OS-SHANTE) 600 MG tablet, Take 1 tablet by mouth 2 (Two) Times a Day With Meals. NOT CURRENTLY TAKING, Disp: , Rfl:     Cinnamon 500 MG tablet, Take  by mouth., Disp: , Rfl:     cyanocobalamin (VITAMIN B-12) 2500 MCG tablet tablet, TAKE ONE TABLET BY MOUTH THREE TIMES A WEEK MUST MAKE AN APPOINTMENT (Patient taking differently: Take 1,200 mcg by mouth 1 (One) Time Per Week. MONDAYS), Disp: 30 tablet, Rfl: 5    diclofenac (VOLTAREN) 1 % gel gel, Apply 4 g topically to the appropriate area as directed 4 (Four) Times a Day As Needed (pain)., Disp: 100 g, Rfl: 1    docusate sodium 100 MG capsule, Take 1 capsule by mouth 2 (Two) Times a Day As Needed for Constipation., Disp: 30 capsule, Rfl: 0    estradiol (ESTRACE) 0.1 MG/GM vaginal cream, Insert 2 g into the vagina Daily As Needed (vaginal dryness). USUALLY USES TWICE A WEEK, Disp: 42.5 g, Rfl: 11    GLUCOSAMINE HCL-MSM PO, 1 tablet Daily. NOT CURRENTLY TAKING, Disp: , Rfl:     hydroCHLOROthiazide (HYDRODIURIL) 12.5 MG tablet, Take 1 tablet by mouth Daily. (Patient taking differently: Take 1 tablet by mouth Daily As Needed (swelling in ankles and  "feet).), Disp: 90 tablet, Rfl: 3    levocetirizine (XYZAL) 5 MG tablet, Take 1 tablet by mouth Daily As Needed for Allergies., Disp: , Rfl:     levothyroxine (SYNTHROID, LEVOTHROID) 150 MCG tablet, Take 1 tablet by mouth Daily., Disp: 30 tablet, Rfl: 5    metoprolol tartrate (LOPRESSOR) 25 MG tablet, Take 0.5 tablets by mouth 2 (Two) Times a Day., Disp: 30 tablet, Rfl: 6    montelukast (Singulair) 10 MG tablet, Take 1 tablet by mouth Every Night. PRN (Patient taking differently: Take 1 tablet by mouth As Needed. PRN), Disp: 90 tablet, Rfl: 1    MULTIPLE VITAMIN PO, 1 tablet Daily. NOT CURRENTLY TAKING, Disp: , Rfl:     NON FORMULARY, Lions ramiro mushrooms, Disp: , Rfl:     Omega-3 Fatty Acids (FISH OIL) 435 MG capsule, Take 1,000 mg by mouth Daily. NOT CURRENTLY TAKING, Disp: , Rfl:     omeprazole (priLOSEC) 40 MG capsule, Take 1 capsule by mouth Daily., Disp: , Rfl:     Probiotic Product (SOLUBLE FIBER/PROBIOTICS PO), Take 1 capsule by mouth 2 (Two) Times a Day., Disp: , Rfl:     Tiotropium Bromide Monohydrate (Spiriva Respimat) 1.25 MCG/ACT aerosol solution inhaler, Inhale 2 puffs Daily., Disp: 4 g, Rfl: 0    TURMERIC PO, Take  by mouth., Disp: , Rfl:     vitamin C (ASCORBIC ACID) 250 MG tablet, Take 1 tablet by mouth Daily., Disp: , Rfl:     Current Facility-Administered Medications:     triamcinolone acetonide (KENALOG-40) injection 40 mg, 40 mg, Intra-articular, Once, Gordon Norman MD    Objective      Blood pressure 132/62, pulse 57, temperature 97 °F (36.1 °C), resp. rate 16, height 175.3 cm (69\"), weight 106 kg (233 lb), SpO2 97 %, not currently breastfeeding.    Physical Exam     General Appearance:    Alert, cooperative, no distress, appears stated age   Head:    Normocephalic, without obvious abnormality, atraumatic   Eyes:    PERRL, conjunctiva/corneas clear, EOM's intact   Ears:    Normal TM's and external ear canals, both ears   Nose:   Nares normal, septum midline, mucosa normal, no drainage   or " sinus tenderness   Throat:   Lips, mucosa, and tongue normal; teeth and gums normal   Neck:   Supple, symmetrical, trachea midline, no adenopathy;        thyroid:  No enlargement/tenderness/nodules; no carotid    bruit or JVD   Back:     Symmetric, no curvature, ROM normal, no CVA tenderness   Lungs:     Clear to auscultation bilaterally, respirations unlabored   Chest wall:    No tenderness or deformity   Heart:    Regular rate and rhythm, S1 and S2 normal, no murmur,        rub or gallop   Abdomen:     Soft, non-tender, bowel sounds active all four quadrants,     no masses, no organomegaly   Extremities:   Extremities normal, atraumatic, no cyanosis or edema   Pulses:   2+ and symmetric all extremities   Skin:   Skin color, texture, turgor normal, no rashes or lesions   Lymph nodes:   Cervical, supraclavicular, and axillary nodes normal   Neurologic:   CNII-XII intact. Normal strength, sensation and reflexes       throughout      Results for orders placed or performed in visit on 09/05/23   Thyroglobulin Antibody and Thyroglobulin, SOFÍA or LC/MS-MS    Specimen: Blood   Result Value Ref Range    Thyroglobulin Ab <1.0 0.0 - 0.9 IU/mL   CBC (No Diff)    Specimen: Blood   Result Value Ref Range    WBC 6.95 3.40 - 10.80 10*3/mm3    RBC 4.58 3.77 - 5.28 10*6/mm3    Hemoglobin 13.2 12.0 - 15.9 g/dL    Hematocrit 40.0 34.0 - 46.6 %    MCV 87.3 79.0 - 97.0 fL    MCH 28.8 26.6 - 33.0 pg    MCHC 33.0 31.5 - 35.7 g/dL    RDW 13.7 12.3 - 15.4 %    RDW-SD 43.1 37.0 - 54.0 fl    MPV 12.9 (H) 6.0 - 12.0 fL    Platelets 221 140 - 450 10*3/mm3   Comprehensive Metabolic Panel    Specimen: Blood   Result Value Ref Range    Glucose 95 65 - 99 mg/dL    BUN 17 8 - 23 mg/dL    Creatinine 0.78 0.57 - 1.00 mg/dL    Sodium 143 136 - 145 mmol/L    Potassium 4.6 3.5 - 5.2 mmol/L    Chloride 107 98 - 107 mmol/L    CO2 28.0 22.0 - 29.0 mmol/L    Calcium 9.6 8.6 - 10.5 mg/dL    Total Protein 6.6 6.0 - 8.5 g/dL    Albumin 4.3 3.5 - 5.2 g/dL    ALT  (SGPT) 16 1 - 33 U/L    AST (SGOT) 18 1 - 32 U/L    Alkaline Phosphatase 79 39 - 117 U/L    Total Bilirubin 0.4 0.0 - 1.2 mg/dL    Globulin 2.3 gm/dL    A/G Ratio 1.9 g/dL    BUN/Creatinine Ratio 21.8 7.0 - 25.0    Anion Gap 8.0 5.0 - 15.0 mmol/L    eGFR 83.4 >60.0 mL/min/1.73   Hepatitis A Antibody, Total    Specimen: Blood   Result Value Ref Range    Hep A Total Ab Positive (A) Negative   Hepatitis B Surf Antibody Quant    Specimen: Blood   Result Value Ref Range    Hepatitis B Surface Ab Quant 52.1 Immunity>9.9 mIU/mL   Hepatitis B Surface Antigen    Specimen: Blood   Result Value Ref Range    Hepatitis B Surface Ag Non-Reactive Non-Reactive   Hepatitis B Core Antibody, Total    Specimen: Blood   Result Value Ref Range    Hep B Core Total Ab Negative Negative   Hepatitis C Antibody    Specimen: Blood   Result Value Ref Range    Hepatitis C Ab Non-Reactive Non-Reactive   Protime-INR    Specimen: Blood   Result Value Ref Range    Protime 13.9 12.3 - 15.1 Seconds    INR 1.01 0.90 - 1.10   ESTES Fibrosure Plus    Specimen: Blood   Result Value Ref Range    Fibrosis Score 0.08 0.00 - 0.21    Fibrosis Stage Comment     Steatosis Score (Reference) 0.46 (H) 0.00 - 0.40    Steatosis Grade (Reference) Comment     ESTES Score (Reference) 0.37 (H) 0.00 - 0.25    Estes Grade (Reference) Comment     Methodology: Comment     Alpha 2-Macroglobulins, Qn 143 110 - 276 mg/dL    Haptoglobin 158 37 - 355 mg/dL    Apolipoprotein A-1 147 116 - 209 mg/dL    Total Bilirubin 0.4 0.0 - 1.2 mg/dL    GGT 11 0 - 60 IU/L    ALT (SGPT) 18 0 - 40 IU/L    AST (SGOT) P5P (Reference) 19 0 - 40 IU/L    Cholesterol, Total (Reference) 190 100 - 199 mg/dL    Glucose, Serum (Reference) 90 70 - 99 mg/dL    Triglycerides 140 0 - 149 mg/dL    Interpretations: (Reference) Comment     Fibrosis Scoring: Comment     Steatosis Scoring Comment     ESTES Scoring Comment     Limitations: Comment     Comment Comment    TSH    Specimen: Blood   Result Value Ref Range     TSH 0.185 (L) 0.270 - 4.200 uIU/mL   T4, Free    Specimen: Blood   Result Value Ref Range    Free T4 1.92 (H) 0.93 - 1.70 ng/dL   Thyroglobulin By SOFÍA    Specimen: Blood   Result Value Ref Range    THYROGLOBULIN BY SOFÍA 0.1 (L) 1.5 - 38.5 ng/mL         Assessment & Plan     Labs look good.              Diagnoses and all orders for this visit:    1. Postoperative hypothyroidism (Primary)  -     Lipid Panel  -     CBC & Differential  -     Vitamin B12  -     Comprehensive Metabolic Panel  -     TSH  -     T4, Free  -     Vitamin D,25-Hydroxy  -     Hemoglobin A1c    2. Malignant neoplasm of thyroid gland  -     Lipid Panel  -     CBC & Differential  -     Vitamin B12  -     Comprehensive Metabolic Panel  -     TSH  -     T4, Free  -     Vitamin D,25-Hydroxy  -     Hemoglobin A1c    3. Low vitamin B12 level  -     Lipid Panel  -     CBC & Differential  -     Vitamin B12  -     Comprehensive Metabolic Panel  -     TSH  -     T4, Free  -     Vitamin D,25-Hydroxy  -     Hemoglobin A1c      Return in about 6 months (around 3/18/2024).          There are no Patient Instructions on file for this visit.     Gordon Norman MD    Assessment & Plan     Answers submitted by the patient for this visit:  Other (Submitted on 9/11/2023)  Please describe your symptoms.: Discuss arthritis in feet, request Dexa Scan and f/u on labs.  Have you had these symptoms before?: Yes  How long have you been having these symptoms?: Greater than 2 weeks  Please list any medications you are currently taking for this condition.: N/A  Please describe any probable cause for these symptoms. : N/A  Primary Reason for Visit (Submitted on 9/11/2023)  What is the primary reason for your visit?: Other

## 2023-09-25 NOTE — PROGRESS NOTES
Chief Complaint / Reason:      Chief Complaint   Patient presents with    Hip Pain     Right hip pain radiating down right leg    Sciatica     Right leg       Subjective     Hip Pain     Sciatica  Patient presents today with complaints of right hip pain that she has had for some time but states its been worse over the past month or so and states it is does radiate down to her right leg she denies bladder or bowel incontinence.  Has tried rest position changes and over-the-counter medication with minimal relief.  Patient's BMI is 34.4.    History taken from: patient    PMH/FH/Social History were reviewed and updated appropriately in the electronic medical record.   Past Medical History:   Diagnosis Date    Abdominal pain     Abnormal ECG 2005    Atrial Fibrillation    Allergic 2003    Allergic rhinitis     Ankle joint pain     Arrhythmia 2006    Arthritis     Asthma     Bronchiectasis     Cataract 2015    Had left removed 12/2021 and rt removed 1/2022    Cholelithiasis     Chronic bronchitis     Colon polyp 12/10/2019    COVID-19 vaccine series completed     pfizer x3    Degenerative joint disease     Depression     Diverticulitis     Diverticulosis     Dizziness     Has had some episodes. No blake syncope.11/2007 patient underwent pulmonary vein isolation, initially successful.Patient returned, however in February noting some recurrent episode of dizziness.Then wore wore event recorder which showed some recurrent PAF.    Dysuria     Easy bruising     Elevated cholesterol     Follicular thyroid cancer     treated with total thyroidectomy followed by [facility]    GERD (gastroesophageal reflux disease)     H/O bone density study     Hay fever     History of chest x-ray 07/10/2015    No acute cardiopulmonary process    History of chest x-ray 07/02/2015    No acute cardiopulmonary process    History of echocardiogram 04/04/2007    LVEF of greater than 60%. Mild MR.Mild TR.Trace pulmonary insufficinecy.    History of  mammogram     History of PFTs 07/02/2015    Normal spirometry    Hyperlipidemia     Hypothyroidism     Measles     Migraine headache     Mumps     Obesity 2020    Osteopenia 2000    Osteoporosis     Paroxysmal atrial fibrillation     A. Failed medical therapy. B. Pulmonary vein isolation 11/2006. C. Recurrent episodes of PAF by event recorder 9/2006.    Pneumonia     PONV (postoperative nausea and vomiting)     Shortness of breath     Sleep apnea     USES CPAP    Sleep apnea, obstructive     Surfer's nodules of right foot     Torn meniscus     Varicella     Visual impairment      Past Surgical History:   Procedure Laterality Date    ABLATION OF DYSRHYTHMIC FOCUS      x 2    BRONCHOSCOPY      CARDIAC CATHETERIZATION      CARDIAC ELECTROPHYSIOLOGY PROCEDURE N/A 08/04/2016    Procedure: Loop recorder implant;  Surgeon: Himanshu Calvert MD;  Location:  [facility];  Service:     CARDIAC ELECTROPHYSIOLOGY PROCEDURE N/A 10/13/2016    Procedure: Loop recorder removal;  Surgeon: Himanshu Calvert MD;  Location:  [facility];  Service:     CARDIAC ELECTROPHYSIOLOGY PROCEDURE N/A 12/18/2017    Procedure: Loop insertion;  Surgeon: Mary Ann Blackwell MD;  Location:  [facility];  Service:     CARDIAC ELECTROPHYSIOLOGY PROCEDURE N/A 11/06/2019    Procedure: Loop recorder removal;  Surgeon: Branden Chatterjee MD;  Location:  [facility];  Service: Cardiovascular    CARDIAC SURGERY      Cardiac Ablation x2    CATARACT EXTRACTION W/ INTRAOCULAR LENS IMPLANT Left 12/20/2021    Procedure: CATARACT PHACO EXTRACTION WITH INTRAOCULAR LENS IMPLANT LEFT;  Surgeon: Arthur Mcdonald MD;  Location: [facility] OR;  Service: Ophthalmology;  Laterality: Left;    CATARACT EXTRACTION W/ INTRAOCULAR LENS IMPLANT Right 01/03/2022    Procedure: CATARACT PHACO EXTRACTION WITH INTRAOCULAR LENS IMPLANT RIGHT WITH VIVITY LENS;  Surgeon: Arthur Mcdonald MD;  Location: [facility] OR;   Service: Ophthalmology;  Laterality: Right;    CHOLECYSTECTOMY      COLON RESECTION N/A 2022    Procedure: LAPAROSCOPIC  COLON RESECTION SIGMOIDECTOMY;  Surgeon: Arturo Kumar MD;  Location: [facility];  Service: General;  Laterality: N/A;    COLONOSCOPY  12/10/2019    COLONOSCOPY  2021    DILATATION AND CURETTAGE      EAR TUBES Bilateral     EYE SURGERY  2021 & 2022    Had cateracs removed from both eyes and multi focal implants put in both.    FOOT SURGERY      bone spur    HAMMER TOE REPAIR Left 2020    INGUINAL HERNIA REPAIR Left     x 3    JOINT REPLACEMENT  ,     Bilateral knee replacements    LUNG BIOPSY      Remote    LYMPH NODE BIOPSY      MOUTH SURGERY      WISDOM TEETH    REPLACEMENT TOTAL KNEE BILATERAL Bilateral     THYROIDECTOMY, PARTIAL Left 2012    Dr. Cerda[facility]    THYROIDECTOMY, PARTIAL Right 2012    Dr. Cerda, [facility]    TONSILLECTOMY  2020    Dr. Ethan Mcneill    TOTAL ABDOMINAL HYSTERECTOMY WITH SALPINGO OOPHORECTOMY Bilateral     TOTAL THYROIDECTOMY      completion thyroidectomy for follicular thyroid cancer.      TYMPANOSTOMY TUBE PLACEMENT Right 2020    Dr. Ethan Mcneill    UPPER GASTROINTESTINAL ENDOSCOPY      15-20 years ago    VENTRAL/INCISIONAL HERNIA REPAIR N/A 10/27/2022    Procedure: INCISIONAL HERNIA REPAIR WITH MESH,  COMPONENT SEPARATION;  Surgeon: Arturo Kumar MD;  Location: [facility];  Service: General;  Laterality: N/A;     Social History     Socioeconomic History    Marital status:    Tobacco Use    Smoking status: Former     Packs/day: 1.50     Years: 22.00     Pack years: 33.00     Types: Cigarettes     Start date: 1974     Quit date: 1996     Years since quittin.2     Passive exposure: Past    Smokeless tobacco: Never    Tobacco comments:     Smoked menthol   Vaping Use    Vaping Use: Never used   Substance and Sexual Activity    Alcohol use: No    Drug use: Never    Sexual activity: Not Currently      Partners: Male     Birth control/protection: None, Hysterectomy     Family History   Problem Relation Age of Onset    Atrial fibrillation Mother     Thyroid disease Mother     Stroke Mother     Arthritis Mother         Osteoarthritis    Arrhythmia Mother     Heart failure Mother     Early death Father         Lung cancer    Lung cancer Father     Alcohol abuse Father     Early death Sister         Heart attack    Heart attack Sister     Down syndrome Sister     Developmental Disability Sister         Down syndrome    Sleep apnea Brother         Nonorganic    Other Brother         Palpitations (Symptom)    Mental illness Brother     Arthritis Brother         Osteoporosis    Asthma Brother     Developmental Disability Brother         At birth    Arrhythmia Brother     Heart attack Brother     Heart failure Brother     Stroke Maternal Grandmother     Breast cancer Neg Hx     Ovarian cancer Neg Hx        Review of Systems:   Review of Systems      All other systems were reviewed and are negative.  Exceptions are noted in the subjective or above.      Objective     Vital Signs  Vitals:    09/07/23 1114   BP: 116/50   Pulse: 59   Resp: 16   Temp: 97.1 °F (36.2 °C)   SpO2: 94%       Body mass index is 34.41 kg/m².        Physical Exam  Vitals and nursing note reviewed.   Constitutional:       General: She is not in acute distress.     Appearance: She is well-developed. She is obese.   Cardiovascular:      Rate and Rhythm: Normal rate and regular rhythm.      Pulses: Normal pulses.      Heart sounds: Normal heart sounds.   Pulmonary:      Effort: Pulmonary effort is normal.      Breath sounds: Normal breath sounds. No wheezing.   Chest:      Chest wall: No tenderness.   Musculoskeletal:         General: Tenderness and deformity present.   Skin:     General: Skin is warm and dry.      Capillary Refill: Capillary refill takes less than 2 seconds.      Coloration: Skin is not pale.      Findings: No erythema or rash.    Neurological:      Mental Status: She is alert and oriented to person, place, and time.   Psychiatric:         Behavior: Behavior normal.         Thought Content: Thought content normal.         Judgment: Judgment normal.            Results Review:    I reviewed the patient's new clinical results.       Medication Review:     Current Outpatient Medications:     allopurinol (ZYLOPRIM) 300 MG tablet, TAKE ONE TABLET BY MOUTH EVERY DAY, Disp: 90 tablet, Rfl: 3    calcium carbonate (OS-SHANTE) 600 MG tablet, Take 1 tablet by mouth 2 (Two) Times a Day With Meals. NOT CURRENTLY TAKING, Disp: , Rfl:     Cinnamon 500 MG tablet, Take  by mouth., Disp: , Rfl:     cyanocobalamin (VITAMIN B-12) 2500 MCG tablet tablet, TAKE ONE TABLET BY MOUTH THREE TIMES A WEEK MUST MAKE AN APPOINTMENT (Patient taking differently: Take 1,200 mcg by mouth 1 (One) Time Per Week. MONDAYS), Disp: 30 tablet, Rfl: 5    diclofenac (VOLTAREN) 1 % gel gel, Apply 4 g topically to the appropriate area as directed 4 (Four) Times a Day As Needed (pain)., Disp: 100 g, Rfl: 1    docusate sodium 100 MG capsule, Take 1 capsule by mouth 2 (Two) Times a Day As Needed for Constipation., Disp: 30 capsule, Rfl: 0    estradiol (ESTRACE) 0.1 MG/GM vaginal cream, Insert 2 g into the vagina Daily As Needed (vaginal dryness). USUALLY USES TWICE A WEEK, Disp: 42.5 g, Rfl: 11    GLUCOSAMINE HCL-MSM PO, 1 tablet Daily. NOT CURRENTLY TAKING, Disp: , Rfl:     hydroCHLOROthiazide (HYDRODIURIL) 12.5 MG tablet, Take 1 tablet by mouth Daily. (Patient taking differently: Take 1 tablet by mouth Daily As Needed (swelling in ankles and feet).), Disp: 90 tablet, Rfl: 3    levocetirizine (XYZAL) 5 MG tablet, Take 1 tablet by mouth Daily As Needed for Allergies., Disp: , Rfl:     levothyroxine (SYNTHROID, LEVOTHROID) 150 MCG tablet, Take 1 tablet by mouth Daily., Disp: 30 tablet, Rfl: 5    metoprolol tartrate (LOPRESSOR) 25 MG tablet, Take 0.5 tablets by mouth 2 (Two) Times a Day., Disp:  30 tablet, Rfl: 6    montelukast (Singulair) 10 MG tablet, Take 1 tablet by mouth Every Night. PRN (Patient taking differently: Take 1 tablet by mouth As Needed. PRN), Disp: 90 tablet, Rfl: 1    MULTIPLE VITAMIN PO, 1 tablet Daily. NOT CURRENTLY TAKING, Disp: , Rfl:     NON FORMULARY, Lions ramiro mushrooms, Disp: , Rfl:     Omega-3 Fatty Acids (FISH OIL) 435 MG capsule, Take 1,000 mg by mouth Daily. NOT CURRENTLY TAKING, Disp: , Rfl:     omeprazole (priLOSEC) 40 MG capsule, Take 1 capsule by mouth Daily., Disp: , Rfl:     Probiotic Product (SOLUBLE FIBER/PROBIOTICS PO), Take 1 capsule by mouth 2 (Two) Times a Day., Disp: , Rfl:     Tiotropium Bromide Monohydrate (Spiriva Respimat) 1.25 MCG/ACT aerosol solution inhaler, Inhale 2 puffs Daily., Disp: 4 g, Rfl: 0    TURMERIC PO, Take  by mouth., Disp: , Rfl:     vitamin C (ASCORBIC ACID) 250 MG tablet, Take 1 tablet by mouth Daily., Disp: , Rfl:     albuterol sulfate HFA (Ventolin HFA) 108 (90 Base) MCG/ACT inhaler, Inhale 2 puffs Every 4 (Four) Hours As Needed for Wheezing or Shortness of Air., Disp: 18 g, Rfl: 5    Current Facility-Administered Medications:     triamcinolone acetonide (KENALOG-40) injection 40 mg, 40 mg, Intra-articular, Once, Gordon Norman MD    Diagnoses and all orders for this visit:    Right hip pain  -     ketorolac (TORADOL) injection 30 mg  -     Ambulatory Referral to Orthopedic Surgery      Discussed worsening signs and symptoms along with home care instructions    Return if symptoms worsen or fail to improve.    Gavi Dias, APRN  09/07/2023

## 2023-09-26 DIAGNOSIS — Z78.0 POSTMENOPAUSAL: Primary | ICD-10-CM

## 2023-10-23 ENCOUNTER — OFFICE VISIT (OUTPATIENT)
Dept: CARDIOLOGY | Facility: CLINIC | Age: 68
End: 2023-10-23
Payer: COMMERCIAL

## 2023-10-23 ENCOUNTER — LAB (OUTPATIENT)
Dept: LAB | Facility: HOSPITAL | Age: 68
End: 2023-10-23
Payer: COMMERCIAL

## 2023-10-23 VITALS
OXYGEN SATURATION: 97 % | RESPIRATION RATE: 18 BRPM | BODY MASS INDEX: 34.51 KG/M2 | SYSTOLIC BLOOD PRESSURE: 106 MMHG | HEIGHT: 69 IN | WEIGHT: 233 LBS | HEART RATE: 61 BPM | DIASTOLIC BLOOD PRESSURE: 68 MMHG

## 2023-10-23 DIAGNOSIS — E89.0 POSTOPERATIVE HYPOTHYROIDISM: ICD-10-CM

## 2023-10-23 DIAGNOSIS — I48.0 PAROXYSMAL ATRIAL FIBRILLATION: Primary | Chronic | ICD-10-CM

## 2023-10-23 LAB
T4 FREE SERPL-MCNC: 1.67 NG/DL (ref 0.93–1.7)
TSH SERPL DL<=0.05 MIU/L-ACNC: 0.41 UIU/ML (ref 0.27–4.2)

## 2023-10-23 PROCEDURE — 84439 ASSAY OF FREE THYROXINE: CPT

## 2023-10-23 PROCEDURE — 84443 ASSAY THYROID STIM HORMONE: CPT

## 2023-10-23 PROCEDURE — 99214 OFFICE O/P EST MOD 30 MIN: CPT | Performed by: INTERNAL MEDICINE

## 2023-10-23 PROCEDURE — 93000 ELECTROCARDIOGRAM COMPLETE: CPT | Performed by: INTERNAL MEDICINE

## 2023-10-23 RX ORDER — ASPIRIN 325 MG
TABLET ORAL
COMMUNITY
Start: 2023-09-25 | End: 2023-10-23

## 2023-10-23 NOTE — PROGRESS NOTES
[facility] Cardiology Office Follow Up Note    Albania Ramos  [id number]  10/23/2023    Primary Care Provider: Gordon Norman MD    Chief Complaint: Routine follow-up    History of Present Illness:   Mrs. Albania Ramos is a 68 y.o. female who presents to the Cardiology Clinic for in routine follow-up.  The patient has a past medical history significant for asthma, hypothyroidism, chronic venous stasis, and depression.  She has a past cardiac history significant for paroxysmal atrial fibrillation, status post ablation x2.  She has also undergone outpatient loop recorder monitoring x2, with her last loop recorder being removed in 10/19.  Since her last appointment, the patient has self discontinued Eliquis.  It does not appear she had any difficulty with significant bruising or bleeding.  Her Eliquis was discontinued as she is on several supplements which she thought would protect her from CVA.  With metoprolol, her palpitations have been well controlled.  She has not had any sustained episodes of palpitations.  No other specific complaints today.    Past Cardiac Testin. Last Coronary Angio: No known  2. Prior Stress Testin2017              1. Normal Lexiscan  3. Last Echo:  21              1.  Normal left ventricular size and systolic function, LVEF 60-65%.              2.  Normal LV diastolic filling pattern.              3.  Normal right ventricular size and systolic function.              4.  Normal left and right atrial size.              5.  Trace mitral regurgitation.              6.  Mild tricuspid regurgitation, RVSP 35 mmHg.  4. Prior Holter Monitor: Loop recorder removed on 2019              1.  SCANNED - CARDIOLOGY (10/07/2019)       Review of Systems:   Review of Systems   Constitutional:  Negative for activity change, appetite change, chills, diaphoresis, fatigue, fever, unexpected weight gain and unexpected weight loss.   Eyes:  Negative for blurred  vision and double vision.   Respiratory:  Negative for cough, chest tightness, shortness of breath and wheezing.    Cardiovascular:  Negative for chest pain, palpitations and leg swelling.   Gastrointestinal:  Negative for abdominal pain, anal bleeding, blood in stool and GERD.   Endocrine: Negative for cold intolerance and heat intolerance.   Genitourinary:  Negative for hematuria.   Neurological:  Negative for dizziness, syncope, weakness and light-headedness.   Hematological:  Does not bruise/bleed easily.   Psychiatric/Behavioral:  Negative for depressed mood and stress. The patient is not nervous/anxious.        I have reviewed and/or updated the patient's past medical, past surgical, family, social history, problem list and allergies as appropriate.     Medications:     Current Outpatient Medications:     albuterol sulfate HFA (Ventolin HFA) 108 (90 Base) MCG/ACT inhaler, Inhale 2 puffs Every 4 (Four) Hours As Needed for Wheezing or Shortness of Air., Disp: 18 g, Rfl: 5    allopurinol (ZYLOPRIM) 300 MG tablet, TAKE ONE TABLET BY MOUTH EVERY DAY, Disp: 90 tablet, Rfl: 3    calcium carbonate (OS-SHANTE) 600 MG tablet, Take 1 tablet by mouth 2 (Two) Times a Day With Meals. NOT CURRENTLY TAKING, Disp: , Rfl:     Cinnamon 500 MG tablet, Take  by mouth., Disp: , Rfl:     cyanocobalamin (VITAMIN B-12) 2500 MCG tablet tablet, TAKE ONE TABLET BY MOUTH THREE TIMES A WEEK MUST MAKE AN APPOINTMENT (Patient taking differently: Take 1,200 mcg by mouth 1 (One) Time Per Week. MONDAYS), Disp: 30 tablet, Rfl: 5    diclofenac (VOLTAREN) 1 % gel gel, Apply 4 g topically to the appropriate area as directed 4 (Four) Times a Day As Needed (pain)., Disp: 100 g, Rfl: 1    docusate sodium 100 MG capsule, Take 1 capsule by mouth 2 (Two) Times a Day As Needed for Constipation., Disp: 30 capsule, Rfl: 0    estradiol (ESTRACE) 0.1 MG/GM vaginal cream, Insert 2 g into the vagina Daily As Needed (vaginal dryness). USUALLY USES TWICE A WEEK,  "Disp: 42.5 g, Rfl: 11    GLUCOSAMINE HCL-MSM PO, 1 tablet Daily. NOT CURRENTLY TAKING, Disp: , Rfl:     hydroCHLOROthiazide (HYDRODIURIL) 12.5 MG tablet, Take 1 tablet by mouth Daily. (Patient taking differently: Take 1 tablet by mouth Daily As Needed (swelling in ankles and feet).), Disp: 90 tablet, Rfl: 3    levothyroxine (SYNTHROID, LEVOTHROID) 150 MCG tablet, Take 1 tablet by mouth Daily., Disp: 30 tablet, Rfl: 5    metoprolol tartrate (LOPRESSOR) 25 MG tablet, Take 0.5 tablets by mouth 2 (Two) Times a Day., Disp: 30 tablet, Rfl: 6    montelukast (Singulair) 10 MG tablet, Take 1 tablet by mouth Every Night. PRN (Patient taking differently: Take 1 tablet by mouth As Needed. PRN), Disp: 90 tablet, Rfl: 1    MULTIPLE VITAMIN PO, 1 tablet Daily. NOT CURRENTLY TAKING, Disp: , Rfl:     NON FORMULARY, Lions ramiro mushrooms, Disp: , Rfl:     Omega-3 Fatty Acids (FISH OIL) 435 MG capsule, Take 1,000 mg by mouth Daily. NOT CURRENTLY TAKING, Disp: , Rfl:     omeprazole (priLOSEC) 40 MG capsule, Take 1 capsule by mouth Daily., Disp: , Rfl:     Probiotic Product (SOLUBLE FIBER/PROBIOTICS PO), Take 1 capsule by mouth 2 (Two) Times a Day., Disp: , Rfl:     Tiotropium Bromide Monohydrate (Spiriva Respimat) 1.25 MCG/ACT aerosol solution inhaler, Inhale 2 puffs Daily., Disp: 4 g, Rfl: 0    TURMERIC PO, Take  by mouth., Disp: , Rfl:     vitamin C (ASCORBIC ACID) 250 MG tablet, Take 1 tablet by mouth Daily., Disp: , Rfl:     apixaban (ELIQUIS) 5 MG tablet tablet, Take 1 tablet by mouth 2 (Two) Times a Day., Disp: 180 tablet, Rfl: 3    Current Facility-Administered Medications:     triamcinolone acetonide (KENALOG-40) injection 40 mg, 40 mg, Intra-articular, Once, Gordon Norman MD    Physical Exam:  Vital Signs:   Vitals:    10/23/23 0914   BP: 106/68   BP Location: Right arm   Patient Position: Sitting   Pulse: 61   Resp: 18   SpO2: 97%   Weight: 106 kg (233 lb)   Height: 175.3 cm (69\")       Physical Exam  Constitutional:       " General: She is not in acute distress.     Appearance: Normal appearance. She is well-developed. She is not diaphoretic.   HENT:      Head: Normocephalic and atraumatic.   Eyes:      General: No scleral icterus.     Pupils: Pupils are equal, round, and reactive to light.   Neck:      Trachea: No tracheal deviation.   Cardiovascular:      Rate and Rhythm: Normal rate and regular rhythm.      Heart sounds: Normal heart sounds. No murmur heard.     No friction rub. No gallop.      Comments: Normal JVD.  Pulmonary:      Effort: Pulmonary effort is normal. No respiratory distress.      Breath sounds: Normal breath sounds. No stridor. No wheezing or rales.   Chest:      Chest wall: No tenderness.   Abdominal:      General: Bowel sounds are normal. There is no distension.      Palpations: Abdomen is soft.      Tenderness: There is no abdominal tenderness. There is no guarding or rebound.   Musculoskeletal:         General: No swelling or tenderness. Normal range of motion.      Cervical back: Neck supple. No tenderness.   Lymphadenopathy:      Cervical: No cervical adenopathy.   Skin:     General: Skin is warm and dry.      Findings: No erythema.   Neurological:      General: No focal deficit present.      Mental Status: She is alert and oriented to person, place, and time.   Psychiatric:         Mood and Affect: Mood normal.         Behavior: Behavior normal.         Results Review:   I reviewed the patient's new clinical results.  I personally viewed and interpreted the patient's EKG/Telemetry data      ECG 12 Lead    Date/Time: 10/23/2023 9:29 AM  Performed by: Tim Jackman MD    Authorized by: Tim Jackman MD  Comparison: not compared with previous ECG   Previous ECG: no previous ECG available  Rhythm: sinus rhythm  Rate: normal  QRS axis: normal    Clinical impression: normal ECG          Assessment / Plan:     1.  Paroxysmal atrial fibrillation  --Status post ablation x2  --No documented recurrent  "episodes of PAF with prior outpatient cardiac monitoring  --Episodes of palpitations are now occasional to rare  --Given elevated [id number] with history of prior PAF, discussed restarting Eliquis for CVA prophylaxis versus a \"watch and wait\" strategy, and the patient has opted to restart Eliquis for prophylaxis  --Continue metoprolol given palpitations  --Follow-up in 6 months, consider yearly thereafter          Follow Up:   Return in about 6 months (around 4/23/2024).      Thank you for allowing me to participate in the care of your patient. Please to not hesitate to contact me with additional questions or concerns.     NICHELLE Jackman MD  Interventional Cardiology   10/23/2023  09:17 EDT   "

## 2023-10-30 ENCOUNTER — APPOINTMENT (OUTPATIENT)
Dept: BONE DENSITY | Facility: HOSPITAL | Age: 68
End: 2023-10-30
Payer: COMMERCIAL

## 2023-10-30 PROCEDURE — 77080 DXA BONE DENSITY AXIAL: CPT

## 2023-11-13 DIAGNOSIS — K21.00 GASTRO-ESOPHAGEAL REFLUX DISEASE WITH ESOPHAGITIS, WITHOUT BLEEDING: ICD-10-CM

## 2023-11-13 RX ORDER — OMEPRAZOLE 40 MG/1
40 CAPSULE, DELAYED RELEASE ORAL DAILY
Qty: 90 CAPSULE | Refills: 3 | Status: SHIPPED | OUTPATIENT
Start: 2023-11-13

## 2023-11-16 ENCOUNTER — HOSPITAL ENCOUNTER (OUTPATIENT)
Dept: GENERAL RADIOLOGY | Facility: HOSPITAL | Age: 68
Discharge: HOME | End: 2023-11-16
Admitting: NURSE PRACTITIONER
Payer: COMMERCIAL

## 2023-11-16 ENCOUNTER — OFFICE VISIT (OUTPATIENT)
Dept: INTERNAL MEDICINE | Facility: CLINIC | Age: 68
End: 2023-11-16
Payer: COMMERCIAL

## 2023-11-16 VITALS
OXYGEN SATURATION: 97 % | DIASTOLIC BLOOD PRESSURE: 50 MMHG | HEART RATE: 66 BPM | WEIGHT: 233 LBS | RESPIRATION RATE: 18 BRPM | BODY MASS INDEX: 34.51 KG/M2 | TEMPERATURE: 97.5 F | SYSTOLIC BLOOD PRESSURE: 128 MMHG | HEIGHT: 69 IN

## 2023-11-16 DIAGNOSIS — M25.551 RIGHT HIP PAIN: Primary | ICD-10-CM

## 2023-11-16 PROCEDURE — 99213 OFFICE O/P EST LOW 20 MIN: CPT | Performed by: NURSE PRACTITIONER

## 2023-11-16 PROCEDURE — 73502 X-RAY EXAM HIP UNI 2-3 VIEWS: CPT

## 2023-11-16 NOTE — PROGRESS NOTES
Chief Complaint / Reason:      Chief Complaint   Patient presents with    Hip Pain     Rt hip       Subjective     HPI    History taken from: patient      The patient is a 68-year-old female who presents today with complaints of right hip pain. The patient was seen previously for right hip pain back in 09/2023 and was referred to ortho. She is accompanied by an adult male.    The patient was referred to orthopedics for her knee and her IT band, which was causing her hip pain. She went to physical therapy, which resolved her issues. Her hip pain is deep down inside her buttocks and is becoming worse. She did not have x-rays completed of her hip. She has artificial knees. She can not sleep. She has trouble trying to get in and out of the car and having to turn her leg. Getting up and down hurts. Her leg has caught on her a couple of times. She has experienced falling in the past. She turned her hip and it popped out of the joint. She could not even put any weight on and was in a wheelchair because it hurt so bad. She is afraid she is going to fall. She had an appointment to see the doctor before, but they never got the referral.  Her spine hurts when she leans back against something hard. She has received injections on both sides when she had her knee replacements done because her gait was off. The Toradol injection did not help at all. She has tried stretching exercises, but it is not helping. She does not tolerate pain medication well. She denies any injury to her hip. Her right knee had locked up on her and she had to forcefully bend it and was experiencing a lot of pain. She was referred to see orthopedics because she did not know if she had any damage done to her artificial knee. He x-rayed her knee and sent her to physical therapy. Her IT band was affecting a lot of things. Her hip has not gotten any better and her legs give out on her. She experiences trouble sitting, getting up, sleeping, and getting in and  out of the car. It feels like a toothache that is stuck deep in her buttocks. She has Flexeril and a cane at home for ambulation. She denies any chest pain or popping. When she stands, it takes a second for her to become mobilized.      PMH/FH/Social History were reviewed and updated appropriately in the electronic medical record.   Past Medical History:   Diagnosis Date    Abdominal pain     Abnormal ECG 2005    Atrial Fibrillation    Allergic 2003    Allergic rhinitis     Ankle joint pain     Arrhythmia 2006    Arthritis     Arthritis 03/22/2023    Asthma     Bronchiectasis     Cataract 2015    Had left removed 12/2021 and rt removed 1/2022    Cholelithiasis     Chronic bronchitis     Colon polyp 12/10/2019    COVID-19 vaccine series completed     pfizer x3    Degenerative joint disease     Depression     Diverticulitis     Diverticulosis     Dizziness     Has had some episodes. No blake syncope.11/2007 patient underwent pulmonary vein isolation, initially successful.Patient returned, however in February noting some recurrent episode of dizziness.Then wore wore event recorder which showed some recurrent PAF.    Dysuria     Easy bruising     Elevated cholesterol     Follicular thyroid cancer     treated with total thyroidectomy followed by BRAMBILA    GERD (gastroesophageal reflux disease)     H/O bone density study     Hay fever     History of chest x-ray 07/10/2015    No acute cardiopulmonary process    History of chest x-ray 07/02/2015    No acute cardiopulmonary process    History of echocardiogram 04/04/2007    LVEF of greater than 60%. Mild MR.Mild TR.Trace pulmonary insufficinecy.    History of mammogram     History of PFTs 07/02/2015    Normal spirometry    Hyperlipidemia     Hypothyroidism     Measles     Migraine headache     Mumps     Obesity 2020    Osteopenia 2000    Osteoporosis     Paroxysmal atrial fibrillation     A. Failed medical therapy. B. Pulmonary vein isolation 11/2006. C. Recurrent episodes of  PAF by event recorder 9/2006.    Pneumonia     PONV (postoperative nausea and vomiting)     Shortness of breath     Sleep apnea     USES CPAP    Sleep apnea, obstructive     Surfer's nodules of right foot     Torn meniscus     Varicella     Visual impairment      Past Surgical History:   Procedure Laterality Date    ABLATION OF DYSRHYTHMIC FOCUS      x 2    BRONCHOSCOPY      CARDIAC CATHETERIZATION      CARDIAC ELECTROPHYSIOLOGY PROCEDURE N/A 08/04/2016    Procedure: Loop recorder implant;  Surgeon: Himanshu Calvert MD;  Location:  [facility];  Service:     CARDIAC ELECTROPHYSIOLOGY PROCEDURE N/A 10/13/2016    Procedure: Loop recorder removal;  Surgeon: Himanshu Calvert MD;  Location:  [facility];  Service:     CARDIAC ELECTROPHYSIOLOGY PROCEDURE N/A 12/18/2017    Procedure: Loop insertion;  Surgeon: Mary Ann Blackwell MD;  Location:  [facility];  Service:     CARDIAC ELECTROPHYSIOLOGY PROCEDURE N/A 11/06/2019    Procedure: Loop recorder removal;  Surgeon: Branden Chatterjee MD;  Location:  [facility];  Service: Cardiovascular    CARDIAC SURGERY      Cardiac Ablation x2    CATARACT EXTRACTION W/ INTRAOCULAR LENS IMPLANT Left 12/20/2021    Procedure: CATARACT PHACO EXTRACTION WITH INTRAOCULAR LENS IMPLANT LEFT;  Surgeon: Arthur Mcdonald MD;  Location: [facility] OR;  Service: Ophthalmology;  Laterality: Left;    CATARACT EXTRACTION W/ INTRAOCULAR LENS IMPLANT Right 01/03/2022    Procedure: CATARACT PHACO EXTRACTION WITH INTRAOCULAR LENS IMPLANT RIGHT WITH VIVITY LENS;  Surgeon: Arthur Mcdonald MD;  Location:  [facility] OR;  Service: Ophthalmology;  Laterality: Right;    CHOLECYSTECTOMY      COLON RESECTION N/A 03/25/2022    Procedure: LAPAROSCOPIC  COLON RESECTION SIGMOIDECTOMY;  Surgeon: Arturo Kumar MD;  Location:  [facility];  Service: General;  Laterality: N/A;    COLONOSCOPY  12/10/2019    COLONOSCOPY  12/2021    DILATATION AND  CURETTAGE      EAR TUBES Bilateral     EYE SURGERY  2021 & 2022    Had cateracs removed from both eyes and multi focal implants put in both.    FOOT SURGERY      bone spur    HAMMER TOE REPAIR Left 2020    INGUINAL HERNIA REPAIR Left     x 3    JOINT REPLACEMENT  ,     Bilateral knee replacements    LUNG BIOPSY      Remote    LYMPH NODE BIOPSY      MOUTH SURGERY      WISDOM TEETH    REPLACEMENT TOTAL KNEE BILATERAL Bilateral     THYROIDECTOMY, PARTIAL Left 2012    Dr. Cerda, [place]    THYROIDECTOMY, PARTIAL Right 2012    Dr. Cerda, [place]    TONSILLECTOMY  2020    Dr. Ethan Mcneill    TOTAL ABDOMINAL HYSTERECTOMY WITH SALPINGO OOPHORECTOMY Bilateral     TOTAL THYROIDECTOMY      completion thyroidectomy for follicular thyroid cancer.      TYMPANOSTOMY TUBE PLACEMENT Right 2020    Dr. Ethan Mcneill    UPPER GASTROINTESTINAL ENDOSCOPY      15-20 years ago    VENTRAL/INCISIONAL HERNIA REPAIR N/A 10/27/2022    Procedure: INCISIONAL HERNIA REPAIR WITH MESH,  COMPONENT SEPARATION;  Surgeon: Arturo Kumar MD;  [facility];  Service: General;  Laterality: N/A;     Social History     Socioeconomic History    Marital status:    Tobacco Use    Smoking status: Former     Packs/day: 1.50     Years: 22.00     Additional pack years: 0.00     Total pack years: 33.00     Types: Cigarettes     Start date: 1974     Quit date: 1996     Years since quittin.4     Passive exposure: Past    Smokeless tobacco: Never    Tobacco comments:     Smoked menthol   Vaping Use    Vaping Use: Never used   Substance and Sexual Activity    Alcohol use: No    Drug use: Never    Sexual activity: Not Currently     Partners: Male     Birth control/protection: None, Hysterectomy     Family History   Problem Relation Age of Onset    Atrial fibrillation Mother     Thyroid disease Mother     Stroke Mother     Arthritis Mother         Osteoarthritis    Arrhythmia Mother     Heart failure  Mother     Early death Father 68        Lung cancer    Lung cancer Father     Alcohol abuse Father     Early death Sister 16        Heart attack    Heart attack Sister     Down syndrome Sister     Developmental Disability Sister         Down syndrome    Sleep apnea Brother         Nonorganic    Other Brother         Palpitations (Symptom)    Mental illness Brother     Arthritis Brother         Osteoporosis    Asthma Brother     Developmental Disability Brother         At birth    Arrhythmia Brother     Heart attack Brother     Heart failure Brother     Stroke Maternal Grandmother     Breast cancer Neg Hx     Ovarian cancer Neg Hx        Review of Systems:   Review of Systems      All other systems were reviewed and are negative.  Exceptions are noted in the subjective or above.      Objective     Vital Signs  Vitals:    11/16/23 1053   BP: 128/50   Pulse: 66   Resp: 18   Temp: 97.5 °F (36.4 °C)   SpO2: 97%       Body mass index is 34.41 kg/m².  BMI is >= 30 and <35. (Class 1 Obesity). The following options were offered after discussion;: exercise counseling/recommendations and nutrition counseling/recommendations      Physical Exam  Vitals and nursing note reviewed.   Constitutional:       General: She is not in acute distress.     Appearance: She is well-developed. She is obese.   Cardiovascular:      Rate and Rhythm: Normal rate and regular rhythm.      Pulses: Normal pulses.      Heart sounds: Normal heart sounds.   Pulmonary:      Effort: Pulmonary effort is normal.      Breath sounds: Normal breath sounds. No wheezing.   Chest:      Chest wall: No tenderness.   Musculoskeletal:         General: Tenderness and deformity present.   Skin:     General: Skin is warm and dry.      Capillary Refill: Capillary refill takes less than 2 seconds.      Coloration: Skin is not pale.      Findings: No erythema or rash.   Neurological:      Mental Status: She is alert and oriented to person, place, and time.   Psychiatric:          Behavior: Behavior normal.         Thought Content: Thought content normal.         Judgment: Judgment normal.              Results Review:    I reviewed the patient's new clinical results.       Medication Review:     Current Outpatient Medications:     albuterol sulfate HFA (Ventolin HFA) 108 (90 Base) MCG/ACT inhaler, Inhale 2 puffs Every 4 (Four) Hours As Needed for Wheezing or Shortness of Air., Disp: 18 g, Rfl: 5    allopurinol (ZYLOPRIM) 300 MG tablet, TAKE ONE TABLET BY MOUTH EVERY DAY, Disp: 90 tablet, Rfl: 3    apixaban (ELIQUIS) 5 MG tablet tablet, Take 1 tablet by mouth 2 (Two) Times a Day., Disp: 180 tablet, Rfl: 3    calcium carbonate (OS-SHANTE) 600 MG tablet, Take 1 tablet by mouth 2 (Two) Times a Day With Meals. NOT CURRENTLY TAKING, Disp: , Rfl:     Cinnamon 500 MG tablet, Take  by mouth., Disp: , Rfl:     cyanocobalamin (VITAMIN B-12) 2500 MCG tablet tablet, TAKE ONE TABLET BY MOUTH THREE TIMES A WEEK MUST MAKE AN APPOINTMENT (Patient taking differently: Take 1,200 mcg by mouth 1 (One) Time Per Week. MONDAYS), Disp: 30 tablet, Rfl: 5    diclofenac (VOLTAREN) 1 % gel gel, Apply 4 g topically to the appropriate area as directed 4 (Four) Times a Day As Needed (pain)., Disp: 100 g, Rfl: 1    docusate sodium 100 MG capsule, Take 1 capsule by mouth 2 (Two) Times a Day As Needed for Constipation., Disp: 30 capsule, Rfl: 0    estradiol (ESTRACE) 0.1 MG/GM vaginal cream, Insert 2 g into the vagina Daily As Needed (vaginal dryness). USUALLY USES TWICE A WEEK, Disp: 42.5 g, Rfl: 11    GLUCOSAMINE HCL-MSM PO, 1 tablet Daily. NOT CURRENTLY TAKING, Disp: , Rfl:     hydroCHLOROthiazide (HYDRODIURIL) 12.5 MG tablet, Take 1 tablet by mouth Daily. (Patient taking differently: Take 1 tablet by mouth Daily As Needed (swelling in ankles and feet).), Disp: 90 tablet, Rfl: 3    levothyroxine (SYNTHROID, LEVOTHROID) 150 MCG tablet, Take 1 tablet by mouth Daily., Disp: 30 tablet, Rfl: 5    metoprolol tartrate  (LOPRESSOR) 25 MG tablet, Take 0.5 tablets by mouth 2 (Two) Times a Day., Disp: 30 tablet, Rfl: 6    montelukast (Singulair) 10 MG tablet, Take 1 tablet by mouth Every Night. PRN (Patient taking differently: Take 1 tablet by mouth As Needed. PRN), Disp: 90 tablet, Rfl: 1    MULTIPLE VITAMIN PO, 1 tablet Daily. NOT CURRENTLY TAKING, Disp: , Rfl:     NON FORMULARY, Lions ramiro mushrooms, Disp: , Rfl:     Omega-3 Fatty Acids (FISH OIL) 435 MG capsule, Take 1,000 mg by mouth Daily. NOT CURRENTLY TAKING, Disp: , Rfl:     omeprazole (priLOSEC) 40 MG capsule, Take 1 capsule by mouth Daily., Disp: 90 capsule, Rfl: 3    Probiotic Product (SOLUBLE FIBER/PROBIOTICS PO), Take 1 capsule by mouth 2 (Two) Times a Day., Disp: , Rfl:     Tiotropium Bromide Monohydrate (Spiriva Respimat) 1.25 MCG/ACT aerosol solution inhaler, Inhale 2 puffs Daily., Disp: 4 g, Rfl: 0    TURMERIC PO, Take  by mouth., Disp: , Rfl:     vitamin C (ASCORBIC ACID) 250 MG tablet, Take 1 tablet by mouth Daily., Disp: , Rfl:     Current Facility-Administered Medications:     triamcinolone acetonide (KENALOG-40) injection 40 mg, 40 mg, Intra-articular, Once, Gordon Norman MD    Diagnoses and all orders for this visit:    Right hip pain  -     XR Hip With or Without Pelvis 2 - 3 View Right          Plan:    1. Right hip pain.  I will get an x-ray of her right hip today on 11/16/2023.   She was advised to call back and schedule with [facility].  The patient may use lidocaine patches and moist heat for relief.       Return if symptoms worsen or fail to improve.    JEREMY Garsia  11/16/2023          Transcribed from ambient dictation for JEREMY Garsia by Bridgette López.  11/16/23   14:26 EST    Patient or patient representative verbalized consent to the visit recording.  I have personally performed the services described in this document as transcribed by the above individual, and it is both accurate and complete.

## 2023-12-27 ENCOUNTER — PATIENT MESSAGE (OUTPATIENT)
Dept: INTERNAL MEDICINE | Facility: CLINIC | Age: 68
End: 2023-12-27
Payer: COMMERCIAL

## 2023-12-27 RX ORDER — HYDROCHLOROTHIAZIDE 12.5 MG/1
12.5 TABLET ORAL DAILY
Qty: 90 TABLET | Refills: 3 | Status: SHIPPED | OUTPATIENT
Start: 2023-12-27

## 2023-12-27 NOTE — TELEPHONE ENCOUNTER
Rx Refill Note  Requested Prescriptions     Pending Prescriptions Disp Refills    estradiol (ESTRACE) 0.1 MG/GM vaginal cream [Pharmacy Med Name: estradiol 0.01% (0.1 mg/gram) vaginal cream] 42.5 g 11     Sig: Insert TWO grams vaginally daily AS NEEDED FOR vaginal dryness. Usually uses twice A WEEK      Last office visit with prescribing clinician: 9/18/2023   Last telemedicine visit with prescribing clinician: Visit date not found   Next office visit with prescribing clinician: 12/27/2023                         Would you like a call back once the refill request has been completed: [] Yes [] No    If the office needs to give you a call back, can they leave a voicemail: [] Yes [] No    Jos Vergara MA  12/27/23, 11:08 EST

## 2023-12-27 NOTE — TELEPHONE ENCOUNTER
From: Albania Ramos  To: Gordon Norman  Sent: 12/27/2023 11:07 AM EST  Subject: Diclofenac Topical 1%    I need a refill in this please.  ThanksAlbania

## 2023-12-29 DIAGNOSIS — J45.30 MILD PERSISTENT ASTHMA WITHOUT COMPLICATION: Primary | ICD-10-CM

## 2023-12-29 RX ORDER — TIOTROPIUM BROMIDE INHALATION SPRAY 1.56 UG/1
2 SPRAY, METERED RESPIRATORY (INHALATION) DAILY
Qty: 4 G | Refills: 0 | Status: SHIPPED | OUTPATIENT
Start: 2023-12-29

## 2023-12-29 RX ORDER — ESTRADIOL 0.1 MG/G
CREAM VAGINAL
Qty: 42.5 G | Refills: 11 | Status: SHIPPED | OUTPATIENT
Start: 2023-12-29

## 2023-12-29 RX ORDER — TIOTROPIUM BROMIDE INHALATION SPRAY 1.56 UG/1
2 SPRAY, METERED RESPIRATORY (INHALATION) DAILY
Qty: 4 G | Refills: 0 | COMMUNITY
Start: 2023-12-29 | End: 2023-12-29 | Stop reason: SDUPTHER

## 2024-01-10 ENCOUNTER — OFFICE VISIT (OUTPATIENT)
Dept: INTERNAL MEDICINE | Facility: CLINIC | Age: 69
End: 2024-01-10
Payer: COMMERCIAL

## 2024-01-10 VITALS
HEART RATE: 60 BPM | DIASTOLIC BLOOD PRESSURE: 76 MMHG | RESPIRATION RATE: 16 BRPM | TEMPERATURE: 97.1 F | OXYGEN SATURATION: 95 % | WEIGHT: 231 LBS | BODY MASS INDEX: 34.21 KG/M2 | HEIGHT: 69 IN | SYSTOLIC BLOOD PRESSURE: 124 MMHG

## 2024-01-10 DIAGNOSIS — J06.9 UPPER RESPIRATORY TRACT INFECTION, UNSPECIFIED TYPE: ICD-10-CM

## 2024-01-10 DIAGNOSIS — R35.0 URINARY FREQUENCY: Primary | ICD-10-CM

## 2024-01-10 DIAGNOSIS — I48.0 PAROXYSMAL ATRIAL FIBRILLATION: Chronic | ICD-10-CM

## 2024-01-10 DIAGNOSIS — R10.9 RIGHT FLANK PAIN: ICD-10-CM

## 2024-01-10 DIAGNOSIS — R31.9 HEMATURIA, UNSPECIFIED TYPE: ICD-10-CM

## 2024-01-10 LAB
BILIRUB BLD-MCNC: NEGATIVE MG/DL
CLARITY, POC: CLEAR
COLOR UR: ABNORMAL
EXPIRATION DATE: ABNORMAL
GLUCOSE UR STRIP-MCNC: NEGATIVE MG/DL
KETONES UR QL: NEGATIVE
LEUKOCYTE EST, POC: NEGATIVE
Lab: ABNORMAL
NITRITE UR-MCNC: NEGATIVE MG/ML
PH UR: 6.5 [PH] (ref 5–8)
PROT UR STRIP-MCNC: NEGATIVE MG/DL
RBC # UR STRIP: ABNORMAL /UL
SP GR UR: 1.01 (ref 1–1.03)
UROBILINOGEN UR QL: ABNORMAL

## 2024-01-10 PROCEDURE — 99214 OFFICE O/P EST MOD 30 MIN: CPT | Performed by: INTERNAL MEDICINE

## 2024-01-10 PROCEDURE — 81003 URINALYSIS AUTO W/O SCOPE: CPT | Performed by: INTERNAL MEDICINE

## 2024-01-10 RX ORDER — CEFDINIR 300 MG/1
300 CAPSULE ORAL 2 TIMES DAILY
Qty: 20 CAPSULE | Refills: 0 | Status: SHIPPED | OUTPATIENT
Start: 2024-01-10

## 2024-01-10 NOTE — PROGRESS NOTES
Subjective     Patient ID: Albania Ramos is a 68 y.o. female. Patient is here for management of multiple medical problems.     Chief Complaint   Patient presents with    Difficulty Urinating    Sinus Problem     History of Present Illness     Dec 3 covid. Still with sinus congestion.          The following portions of the patient's history were reviewed and updated as appropriate: allergies, current medications, past family history, past medical history, past social history, past surgical history and problem list.    Review of Systems    Current Outpatient Medications:     albuterol (PROVENTIL) (2.5 MG/3ML) 0.083% nebulizer solution, , Disp: , Rfl:     albuterol sulfate HFA (Ventolin HFA) 108 (90 Base) MCG/ACT inhaler, Inhale 2 puffs Every 4 (Four) Hours As Needed for Wheezing or Shortness of Air., Disp: 18 g, Rfl: 5    albuterol sulfate  (90 Base) MCG/ACT inhaler, Inhale 2 puffs Every 4 (Four) Hours As Needed for Wheezing or Shortness of Air., Disp: 18 g, Rfl: 0    allopurinol (ZYLOPRIM) 300 MG tablet, TAKE ONE TABLET BY MOUTH EVERY DAY, Disp: 90 tablet, Rfl: 3    calcium carbonate (OS-SHANTE) 600 MG tablet, Take 1 tablet by mouth 2 (Two) Times a Day With Meals. NOT CURRENTLY TAKING, Disp: , Rfl:     Cinnamon 500 MG tablet, Take  by mouth., Disp: , Rfl:     cyanocobalamin (VITAMIN B-12) 2500 MCG tablet tablet, TAKE ONE TABLET BY MOUTH THREE TIMES A WEEK MUST MAKE AN APPOINTMENT (Patient taking differently: Take 1,200 mcg by mouth 1 (One) Time Per Week. MONDAYS), Disp: 30 tablet, Rfl: 5    diclofenac (VOLTAREN) 1 % gel gel, Apply 4 g topically to the appropriate area as directed 4 (Four) Times a Day As Needed (pain)., Disp: 100 g, Rfl: 1    Diclofenac Sodium (VOLTAREN) 1 % gel gel, Apply 4 g topically to the appropriate area as directed 4 (Four) Times a Day As Needed (pain)., Disp: 100 g, Rfl: 11    docusate sodium 100 MG capsule, Take 1 capsule by mouth 2 (Two) Times a Day As Needed for Constipation., Disp: 30  "capsule, Rfl: 0    estradiol (ESTRACE) 0.1 MG/GM vaginal cream, Insert TWO grams vaginally daily AS NEEDED FOR vaginal dryness. Usually uses twice A WEEK, Disp: 42.5 g, Rfl: 11    GLUCOSAMINE HCL-MSM PO, 1 tablet Daily. NOT CURRENTLY TAKING, Disp: , Rfl:     hydroCHLOROthiazide (HYDRODIURIL) 12.5 MG tablet, Take 1 tablet by mouth Daily., Disp: 90 tablet, Rfl: 3    ipratropium (ATROVENT) 0.06 % nasal spray, , Disp: , Rfl:     levothyroxine (SYNTHROID, LEVOTHROID) 150 MCG tablet, Take 1 tablet by mouth Daily., Disp: 30 tablet, Rfl: 5    metoprolol tartrate (LOPRESSOR) 25 MG tablet, Take 0.5 tablets by mouth 2 (Two) Times a Day., Disp: 30 tablet, Rfl: 6    montelukast (Singulair) 10 MG tablet, Take 1 tablet by mouth Every Night. PRN (Patient taking differently: Take 1 tablet by mouth As Needed. PRN), Disp: 90 tablet, Rfl: 1    MULTIPLE VITAMIN PO, 1 tablet Daily. NOT CURRENTLY TAKING, Disp: , Rfl:     NON FORMULARY, Lions ramiro mushrooms, Disp: , Rfl:     Omega-3 Fatty Acids (FISH OIL) 435 MG capsule, Take 1,000 mg by mouth Daily. NOT CURRENTLY TAKING, Disp: , Rfl:     omeprazole (priLOSEC) 40 MG capsule, Take 1 capsule by mouth Daily., Disp: 90 capsule, Rfl: 3    Probiotic Product (SOLUBLE FIBER/PROBIOTICS PO), Take 1 capsule by mouth 2 (Two) Times a Day., Disp: , Rfl:     Tiotropium Bromide Monohydrate (Spiriva Respimat) 1.25 MCG/ACT aerosol solution inhaler, Inhale 2 puffs Daily., Disp: 4 g, Rfl: 0    TURMERIC PO, Take  by mouth., Disp: , Rfl:     vitamin C (ASCORBIC ACID) 250 MG tablet, Take 1 tablet by mouth Daily., Disp: , Rfl:     cefdinir (OMNICEF) 300 MG capsule, Take 1 capsule by mouth 2 (Two) Times a Day., Disp: 20 capsule, Rfl: 0    Current Facility-Administered Medications:     triamcinolone acetonide (KENALOG-40) injection 40 mg, 40 mg, Intra-articular, Once, Gordon Norman MD    Objective      Blood pressure 124/76, pulse 60, temperature 97.1 °F (36.2 °C), resp. rate 16, height 175.3 cm (69\"), weight " 105 kg (231 lb), SpO2 95%, not currently breastfeeding.            Physical Exam     General Appearance:    Alert, cooperative, no distress, appears stated age   Head:    Normocephalic, without obvious abnormality, atraumatic   Eyes:    PERRL, conjunctiva/corneas clear, EOM's intact   Ears:    Normal TM's and external ear canals, both ears   Nose:   Nares normal, septum midline, mucosa normal, no drainage   or sinus tenderness   Throat:   Lips, mucosa, and tongue normal; teeth and gums normal   Neck:   Supple, symmetrical, trachea midline, no adenopathy;        thyroid:  No enlargement/tenderness/nodules; no carotid    bruit or JVD   Back:     Symmetric, no curvature, ROM normal, no CVA tenderness   Lungs:     Clear to auscultation bilaterally, respirations unlabored   Chest wall:    No tenderness or deformity   Heart:    Regular rate and rhythm, S1 and S2 normal, no murmur,        rub or gallop   Abdomen:     Soft, non-tender, bowel sounds active all four quadrants,     no masses, no organomegaly   Extremities:   Extremities normal, atraumatic, no cyanosis or edema   Pulses:   2+ and symmetric all extremities   Skin:   Skin color, texture, turgor normal, no rashes or lesions   Lymph nodes:   Cervical, supraclavicular, and axillary nodes normal   Neurologic:   CNII-XII intact. Normal strength, sensation and reflexes       throughout      Results for orders placed or performed in visit on 01/10/24   POC Urinalysis Dipstick, Automated    Specimen: Urine   Result Value Ref Range    Color Straw Yellow, Straw, Dark Yellow, Kelle    Clarity, UA Clear Clear    Specific Gravity  1.010 1.005 - 1.030    pH, Urine 6.5 5.0 - 8.0    Leukocytes Negative Negative    Nitrite, UA Negative Negative    Protein, POC Negative Negative mg/dL    Glucose, UA Negative Negative mg/dL    Ketones, UA Negative Negative    Urobilinogen, UA 0.2 E.U./dL Normal, 0.2 E.U./dL    Bilirubin Negative Negative    Blood, UA Trace (A) Negative    Lot  Number [id number]     Expiration Date 1/14/25          Assessment & Plan   Start warm steam humidfer.     Right flank pain. Trace hematuria. Off abx on friaday last week and now pain in right flank on Monday.        Diagnoses and all orders for this visit:    1. Urinary frequency (Primary)  -     POC Urinalysis Dipstick, Automated    2. Paroxysmal atrial fibrillation    3. Upper respiratory tract infection, unspecified type    4. Right flank pain  -     US Renal Bilateral; Future    5. Hematuria, unspecified type    Other orders  -     cefdinir (OMNICEF) 300 MG capsule; Take 1 capsule by mouth 2 (Two) Times a Day.  Dispense: 20 capsule; Refill: 0      No follow-ups on file.          There are no Patient Instructions on file for this visit.     Gordon Norman MD    Assessment & Plan

## 2024-01-15 DIAGNOSIS — E89.0 POSTOPERATIVE HYPOTHYROIDISM: ICD-10-CM

## 2024-01-15 RX ORDER — LEVOTHYROXINE SODIUM 0.12 MG/1
TABLET ORAL
Qty: 45 TABLET | Refills: 1 | OUTPATIENT
Start: 2024-01-15

## 2024-02-27 DIAGNOSIS — E89.0 POSTOPERATIVE HYPOTHYROIDISM: ICD-10-CM

## 2024-02-27 RX ORDER — LEVOTHYROXINE SODIUM 0.15 MG/1
150 TABLET ORAL DAILY
Qty: 90 TABLET | Refills: 2 | Status: SHIPPED | OUTPATIENT
Start: 2024-02-27

## 2024-02-27 NOTE — TELEPHONE ENCOUNTER
Rx Refill Note  Requested Prescriptions     Pending Prescriptions Disp Refills    levothyroxine (SYNTHROID, LEVOTHROID) 150 MCG tablet [Pharmacy Med Name: levothyroxine 150 mcg tablet] 30 tablet 5     Sig: TAKE ONE TABLET BY MOUTH EVERY DAY      Last office visit with prescribing clinician: 7/26/2023     Next office visit with prescribing clinician: 7/11/2024       Betty Alfaro MA  02/27/24, 12:02 EST

## 2024-03-15 LAB
25(OH)D3+25(OH)D2 SERPL-MCNC: 31.3 NG/ML (ref 30–100)
ALBUMIN SERPL-MCNC: 4.1 G/DL (ref 3.5–5.2)
ALBUMIN/GLOB SERPL: 2.1 G/DL
ALP SERPL-CCNC: 80 U/L (ref 39–117)
ALT SERPL-CCNC: 17 U/L (ref 1–33)
AST SERPL-CCNC: 14 U/L (ref 1–32)
BASOPHILS # BLD AUTO: 0.06 10*3/MM3 (ref 0–0.2)
BASOPHILS NFR BLD AUTO: 1 % (ref 0–1.5)
BILIRUB SERPL-MCNC: 0.3 MG/DL (ref 0–1.2)
BUN SERPL-MCNC: 16 MG/DL (ref 8–23)
BUN/CREAT SERPL: 23.5 (ref 7–25)
CALCIUM SERPL-MCNC: 9.2 MG/DL (ref 8.6–10.5)
CHLORIDE SERPL-SCNC: 107 MMOL/L (ref 98–107)
CHOLEST SERPL-MCNC: 218 MG/DL (ref 0–200)
CO2 SERPL-SCNC: 27.3 MMOL/L (ref 22–29)
CREAT SERPL-MCNC: 0.68 MG/DL (ref 0.57–1)
EGFRCR SERPLBLD CKD-EPI 2021: 95 ML/MIN/1.73
EOSINOPHIL # BLD AUTO: 0.51 10*3/MM3 (ref 0–0.4)
EOSINOPHIL NFR BLD AUTO: 8.5 % (ref 0.3–6.2)
ERYTHROCYTE [DISTWIDTH] IN BLOOD BY AUTOMATED COUNT: 13.5 % (ref 12.3–15.4)
GLOBULIN SER CALC-MCNC: 2 GM/DL
GLUCOSE SERPL-MCNC: 92 MG/DL (ref 65–99)
HBA1C MFR BLD: 5.8 % (ref 4.8–5.6)
HCT VFR BLD AUTO: 39.5 % (ref 34–46.6)
HDLC SERPL-MCNC: 57 MG/DL (ref 40–60)
HGB BLD-MCNC: 12.7 G/DL (ref 12–15.9)
IMM GRANULOCYTES # BLD AUTO: 0.02 10*3/MM3 (ref 0–0.05)
IMM GRANULOCYTES NFR BLD AUTO: 0.3 % (ref 0–0.5)
LDLC SERPL CALC-MCNC: 138 MG/DL (ref 0–100)
LYMPHOCYTES # BLD AUTO: 1.85 10*3/MM3 (ref 0.7–3.1)
LYMPHOCYTES NFR BLD AUTO: 30.9 % (ref 19.6–45.3)
MCH RBC QN AUTO: 28.5 PG (ref 26.6–33)
MCHC RBC AUTO-ENTMCNC: 32.2 G/DL (ref 31.5–35.7)
MCV RBC AUTO: 88.6 FL (ref 79–97)
MONOCYTES # BLD AUTO: 0.41 10*3/MM3 (ref 0.1–0.9)
MONOCYTES NFR BLD AUTO: 6.8 % (ref 5–12)
NEUTROPHILS # BLD AUTO: 3.14 10*3/MM3 (ref 1.7–7)
NEUTROPHILS NFR BLD AUTO: 52.5 % (ref 42.7–76)
NRBC BLD AUTO-RTO: 0 /100 WBC (ref 0–0.2)
PLATELET # BLD AUTO: 235 10*3/MM3 (ref 140–450)
POTASSIUM SERPL-SCNC: 5.3 MMOL/L (ref 3.5–5.2)
PROT SERPL-MCNC: 6.1 G/DL (ref 6–8.5)
RBC # BLD AUTO: 4.46 10*6/MM3 (ref 3.77–5.28)
SODIUM SERPL-SCNC: 146 MMOL/L (ref 136–145)
T4 FREE SERPL-MCNC: 1.71 NG/DL (ref 0.93–1.7)
TRIGL SERPL-MCNC: 127 MG/DL (ref 0–150)
TSH SERPL DL<=0.005 MIU/L-ACNC: 0.92 UIU/ML (ref 0.27–4.2)
VIT B12 SERPL-MCNC: 950 PG/ML (ref 211–946)
VLDLC SERPL CALC-MCNC: 23 MG/DL (ref 5–40)
WBC # BLD AUTO: 5.99 10*3/MM3 (ref 3.4–10.8)

## 2024-03-16 ENCOUNTER — HOSPITAL ENCOUNTER (EMERGENCY)
Facility: HOSPITAL | Age: 69
Discharge: HOME | End: 2024-03-16
Attending: STUDENT IN AN ORGANIZED HEALTH CARE EDUCATION/TRAINING PROGRAM
Payer: COMMERCIAL

## 2024-03-16 ENCOUNTER — APPOINTMENT (OUTPATIENT)
Dept: GENERAL RADIOLOGY | Facility: HOSPITAL | Age: 69
End: 2024-03-16
Payer: COMMERCIAL

## 2024-03-16 VITALS
SYSTOLIC BLOOD PRESSURE: 120 MMHG | WEIGHT: 242 LBS | RESPIRATION RATE: 18 BRPM | BODY MASS INDEX: 35.84 KG/M2 | TEMPERATURE: 97.9 F | DIASTOLIC BLOOD PRESSURE: 55 MMHG | HEART RATE: 73 BPM | OXYGEN SATURATION: 94 % | HEIGHT: 69 IN

## 2024-03-16 DIAGNOSIS — I49.3 SYMPTOMATIC PVCS: Primary | ICD-10-CM

## 2024-03-16 DIAGNOSIS — I48.0 PAROXYSMAL ATRIAL FIBRILLATION: Chronic | ICD-10-CM

## 2024-03-16 LAB
ALBUMIN SERPL-MCNC: 4.1 G/DL (ref 3.5–5.2)
ALBUMIN/GLOB SERPL: 1.7 G/DL
ALP SERPL-CCNC: 92 U/L (ref 39–117)
ALT SERPL W P-5'-P-CCNC: 17 U/L (ref 1–33)
ANION GAP SERPL CALCULATED.3IONS-SCNC: 10.9 MMOL/L (ref 5–15)
AST SERPL-CCNC: 17 U/L (ref 1–32)
BACTERIA UR QL AUTO: NORMAL /HPF
BASOPHILS # BLD AUTO: 0.06 10*3/MM3 (ref 0–0.2)
BASOPHILS NFR BLD AUTO: 0.7 % (ref 0–1.5)
BILIRUB SERPL-MCNC: 0.2 MG/DL (ref 0–1.2)
BILIRUB UR QL STRIP: NEGATIVE
BUN SERPL-MCNC: 20 MG/DL (ref 8–23)
BUN/CREAT SERPL: 23.8 (ref 7–25)
CALCIUM SPEC-SCNC: 9.4 MG/DL (ref 8.6–10.5)
CHLORIDE SERPL-SCNC: 105 MMOL/L (ref 98–107)
CLARITY UR: CLEAR
CO2 SERPL-SCNC: 29.1 MMOL/L (ref 22–29)
COLOR UR: YELLOW
CREAT SERPL-MCNC: 0.84 MG/DL (ref 0.57–1)
DEPRECATED RDW RBC AUTO: 45.3 FL (ref 37–54)
EGFRCR SERPLBLD CKD-EPI 2021: 75.8 ML/MIN/1.73
EOSINOPHIL # BLD AUTO: 0.58 10*3/MM3 (ref 0–0.4)
EOSINOPHIL NFR BLD AUTO: 7 % (ref 0.3–6.2)
ERYTHROCYTE [DISTWIDTH] IN BLOOD BY AUTOMATED COUNT: 13.8 % (ref 12.3–15.4)
FLUAV SUBTYP SPEC NAA+PROBE: NOT DETECTED
FLUBV RNA ISLT QL NAA+PROBE: NOT DETECTED
GLOBULIN UR ELPH-MCNC: 2.4 GM/DL
GLUCOSE SERPL-MCNC: 147 MG/DL (ref 65–99)
GLUCOSE UR STRIP-MCNC: NEGATIVE MG/DL
HCT VFR BLD AUTO: 38.6 % (ref 34–46.6)
HGB BLD-MCNC: 12.6 G/DL (ref 12–15.9)
HGB UR QL STRIP.AUTO: ABNORMAL
HOLD SPECIMEN: NORMAL
HOLD SPECIMEN: NORMAL
HYALINE CASTS UR QL AUTO: NORMAL /LPF
IMM GRANULOCYTES # BLD AUTO: 0.03 10*3/MM3 (ref 0–0.05)
IMM GRANULOCYTES NFR BLD AUTO: 0.4 % (ref 0–0.5)
KETONES UR QL STRIP: NEGATIVE
LEUKOCYTE ESTERASE UR QL STRIP.AUTO: NEGATIVE
LYMPHOCYTES # BLD AUTO: 2.68 10*3/MM3 (ref 0.7–3.1)
LYMPHOCYTES NFR BLD AUTO: 32.2 % (ref 19.6–45.3)
MCH RBC QN AUTO: 29.3 PG (ref 26.6–33)
MCHC RBC AUTO-ENTMCNC: 32.6 G/DL (ref 31.5–35.7)
MCV RBC AUTO: 89.8 FL (ref 79–97)
MONOCYTES # BLD AUTO: 0.44 10*3/MM3 (ref 0.1–0.9)
MONOCYTES NFR BLD AUTO: 5.3 % (ref 5–12)
NEUTROPHILS NFR BLD AUTO: 4.54 10*3/MM3 (ref 1.7–7)
NEUTROPHILS NFR BLD AUTO: 54.4 % (ref 42.7–76)
NITRITE UR QL STRIP: NEGATIVE
NRBC BLD AUTO-RTO: 0 /100 WBC (ref 0–0.2)
NT-PROBNP SERPL-MCNC: 91.2 PG/ML (ref 0–900)
PH UR STRIP.AUTO: 6 [PH] (ref 5–8)
PLATELET # BLD AUTO: 214 10*3/MM3 (ref 140–450)
PMV BLD AUTO: 10.9 FL (ref 6–12)
POTASSIUM SERPL-SCNC: 4 MMOL/L (ref 3.5–5.2)
PROT SERPL-MCNC: 6.5 G/DL (ref 6–8.5)
PROT UR QL STRIP: NEGATIVE
RBC # BLD AUTO: 4.3 10*6/MM3 (ref 3.77–5.28)
RBC # UR STRIP: NORMAL /HPF
REF LAB TEST METHOD: NORMAL
SARS-COV-2 RNA RESP QL NAA+PROBE: NOT DETECTED
SODIUM SERPL-SCNC: 145 MMOL/L (ref 136–145)
SP GR UR STRIP: 1.01 (ref 1–1.03)
SQUAMOUS #/AREA URNS HPF: NORMAL /HPF
T4 FREE SERPL-MCNC: 1.46 NG/DL (ref 0.93–1.7)
TROPONIN T SERPL HS-MCNC: 6 NG/L
TSH SERPL DL<=0.05 MIU/L-ACNC: 0.99 UIU/ML (ref 0.27–4.2)
UROBILINOGEN UR QL STRIP: ABNORMAL
WBC # UR STRIP: NORMAL /HPF
WBC NRBC COR # BLD AUTO: 8.33 10*3/MM3 (ref 3.4–10.8)
WHOLE BLOOD HOLD COAG: NORMAL
WHOLE BLOOD HOLD SPECIMEN: NORMAL

## 2024-03-16 PROCEDURE — 84439 ASSAY OF FREE THYROXINE: CPT

## 2024-03-16 PROCEDURE — 85025 COMPLETE CBC W/AUTO DIFF WBC: CPT

## 2024-03-16 PROCEDURE — 84443 ASSAY THYROID STIM HORMONE: CPT

## 2024-03-16 PROCEDURE — 80053 COMPREHEN METABOLIC PANEL: CPT

## 2024-03-16 PROCEDURE — 81001 URINALYSIS AUTO W/SCOPE: CPT

## 2024-03-16 PROCEDURE — 84484 ASSAY OF TROPONIN QUANT: CPT

## 2024-03-16 PROCEDURE — 96374 THER/PROPH/DIAG INJ IV PUSH: CPT

## 2024-03-16 PROCEDURE — 87636 SARSCOV2 & INF A&B AMP PRB: CPT

## 2024-03-16 PROCEDURE — 93005 ELECTROCARDIOGRAM TRACING: CPT

## 2024-03-16 PROCEDURE — 83880 ASSAY OF NATRIURETIC PEPTIDE: CPT

## 2024-03-16 PROCEDURE — 99284 EMERGENCY DEPT VISIT MOD MDM: CPT

## 2024-03-16 PROCEDURE — 71045 X-RAY EXAM CHEST 1 VIEW: CPT

## 2024-03-16 RX ORDER — FAMOTIDINE 10 MG/ML
20 INJECTION, SOLUTION INTRAVENOUS ONCE
Status: COMPLETED | OUTPATIENT
Start: 2024-03-16 | End: 2024-03-16

## 2024-03-16 RX ORDER — SODIUM CHLORIDE 0.9 % (FLUSH) 0.9 %
10 SYRINGE (ML) INJECTION AS NEEDED
Status: DISCONTINUED | OUTPATIENT
Start: 2024-03-16 | End: 2024-03-16 | Stop reason: HOSPADM

## 2024-03-16 RX ADMIN — FAMOTIDINE 20 MG: 10 INJECTION INTRAVENOUS at 18:34

## 2024-03-16 RX ADMIN — ALUMINUM HYDROXIDE, MAGNESIUM HYDROXIDE, AND DIMETHICONE: 400; 400; 40 SUSPENSION ORAL at 18:33

## 2024-03-16 NOTE — ED PROVIDER NOTES
Subjective  History of Present Illness:    This is a 69-year-old female, history of atrial fibrillation status post ablation, follows with Dr. Jackman, currently on Eliquis, sleep apnea, dyspnea, presenting today for evaluation of dyspnea, chest heaviness, palpitations and overall fatigue.  Patient reports symptoms ongoing for the last 4 days.  Intermittent in nature.  Not worsened by exertion.  She feels like her A-fib may be try to come back after the ablation that she had 2 years ago.  She is not on any rhythm control medications per the patient.  No history of heart failure or COPD per the patient.  She was a prior smoker in the past.  Reports has been compliant with her Eliquis therapy.  No unilateral leg swelling or pain, no reported hemoptysis or urinary symptoms.  No cough.  No fevers.  Reports that she had her thyroid levels checked on Monday by her primary care.  History of thyroid cancer and thyroidectomy currently on thyroid hormone replacement.  No pain with inspiration.  Reports that she has a history of esophageal reflux and has had a lot of burning today in her chest and a heaviness.  No abdominal pain no nausea no diarrhea and no radiation of any pain.      Nurses Notes reviewed and agree, including vitals, allergies, social history and prior medical history.     REVIEW OF SYSTEMS: All systems reviewed and not pertinent unless noted.  Review of Systems   Constitutional:  Negative for fever.   Respiratory:  Positive for shortness of breath. Negative for cough.    Cardiovascular:  Positive for palpitations. Negative for chest pain and leg swelling.        Chest heaviness   Gastrointestinal:  Negative for abdominal pain, diarrhea, nausea and vomiting.   Genitourinary:  Negative for dysuria.   Neurological:  Negative for dizziness, light-headedness and numbness.   All other systems reviewed and are negative.      Past Medical History:   Diagnosis Date    Abdominal pain     Abnormal ECG 2005    Atrial  Fibrillation    Allergic 2003    Allergic rhinitis     Ankle joint pain     Arrhythmia 2006    Arthritis     Arthritis 03/22/2023    Asthma     Bronchiectasis     Cataract 2015    Had left removed 12/2021 and rt removed 1/2022    Cholelithiasis     Chronic bronchitis     Colon polyp 12/10/2019    COVID-19 vaccine series completed     pfizer x3    Degenerative joint disease     Depression     Diverticulitis     Diverticulosis     Dizziness     Has had some episodes. No blake syncope.11/2007 patient underwent pulmonary vein isolation, initially successful.Patient returned, however in February noting some recurrent episode of dizziness.Then wore wore event recorder which showed some recurrent PAF.    Dysuria     Easy bruising     Elevated cholesterol     Follicular thyroid cancer     treated with total thyroidectomy followed by [facility]    GERD (gastroesophageal reflux disease)     H/O bone density study     Hay fever     History of chest x-ray 07/10/2015    No acute cardiopulmonary process    History of chest x-ray 07/02/2015    No acute cardiopulmonary process    History of echocardiogram 04/04/2007    LVEF of greater than 60%. Mild MR.Mild TR.Trace pulmonary insufficinecy.    History of mammogram     History of PFTs 07/02/2015    Normal spirometry    Hyperlipidemia     Hypothyroidism     Measles     Migraine headache     Mumps     Obesity 2020    Osteopenia 2000    Osteoporosis     Paroxysmal atrial fibrillation     A. Failed medical therapy. B. Pulmonary vein isolation 11/2006. C. Recurrent episodes of PAF by event recorder 9/2006.    Pneumonia     PONV (postoperative nausea and vomiting)     Shortness of breath     Sleep apnea     USES CPAP    Sleep apnea, obstructive     Surfer's nodules of right foot     Torn meniscus     Varicella     Visual impairment        Allergies:    Codeine, Niacin, Darvon [propoxyphene], Zofran [ondansetron], Adhesive tape, Bentyl [dicyclomine], Ciprodex [ciprofloxacin-dexamethasone], Flagyl  [metronidazole], Other, and Prednisone      Past Surgical History:   Procedure Laterality Date    ABLATION OF DYSRHYTHMIC FOCUS      x 2    BRONCHOSCOPY      CARDIAC CATHETERIZATION      CARDIAC ELECTROPHYSIOLOGY PROCEDURE N/A 08/04/2016    Procedure: Loop recorder implant;  Surgeon: Himanshu Calvert MD;  Location:  [facility];  Service:     CARDIAC ELECTROPHYSIOLOGY PROCEDURE N/A 10/13/2016    Procedure: Loop recorder removal;  Surgeon: Himanshu Calvert MD;  Location:  [facility];  Service:     CARDIAC ELECTROPHYSIOLOGY PROCEDURE N/A 12/18/2017    Procedure: Loop insertion;  Surgeon: Mary Ann Blackwell MD;  Location:  [facility];  Service:     CARDIAC ELECTROPHYSIOLOGY PROCEDURE N/A 11/06/2019    Procedure: Loop recorder removal;  Surgeon: Branden Chatterjee MD;  Location:  [facility];  Service: Cardiovascular    CARDIAC SURGERY      Cardiac Ablation x2    CATARACT EXTRACTION W/ INTRAOCULAR LENS IMPLANT Left 12/20/2021    Procedure: CATARACT PHACO EXTRACTION WITH INTRAOCULAR LENS IMPLANT LEFT;  Surgeon: Atrhur Mcdonald MD;  Location:  [facility];  Service: Ophthalmology;  Laterality: Left;    CATARACT EXTRACTION W/ INTRAOCULAR LENS IMPLANT Right 01/03/2022    Procedure: CATARACT PHACO EXTRACTION WITH INTRAOCULAR LENS IMPLANT RIGHT WITH VIVITY LENS;  Surgeon: Arthur Mcdonald MD;  Location:  [facility];  Service: Ophthalmology;  Laterality: Right;    CHOLECYSTECTOMY      COLON RESECTION N/A 03/25/2022    Procedure: LAPAROSCOPIC  COLON RESECTION SIGMOIDECTOMY;  Surgeon: Arturo Kumar MD;  Location:  [facility];  Service: General;  Laterality: N/A;    COLONOSCOPY  12/10/2019    COLONOSCOPY  12/2021    DILATATION AND CURETTAGE      EAR TUBES Bilateral     EYE SURGERY  12/2021 & 01/2022    Had cateracs removed from both eyes and multi focal implants put in both.    FOOT SURGERY      bone spur    HAMMER TOE REPAIR Left 11/13/2020     INGUINAL HERNIA REPAIR Left     x 3    JOINT REPLACEMENT  ,     Bilateral knee replacements    LUNG BIOPSY      Remote    LYMPH NODE BIOPSY      MOUTH SURGERY      WISDOM TEETH    REPLACEMENT TOTAL KNEE BILATERAL Bilateral     THYROIDECTOMY, PARTIAL Left 2012    Dr. Cerda, Hugh    THYROIDECTOMY, PARTIAL Right 2012    Dr. Cerda, Hugh    TONSILLECTOMY  2020    Dr. Ethan Mcneill    TOTAL ABDOMINAL HYSTERECTOMY WITH SALPINGO OOPHORECTOMY Bilateral     TOTAL THYROIDECTOMY      completion thyroidectomy for follicular thyroid cancer.      TYMPANOSTOMY TUBE PLACEMENT Right 2020    Dr. Ethan Mcneill    UPPER GASTROINTESTINAL ENDOSCOPY      15-20 years ago    VENTRAL/INCISIONAL HERNIA REPAIR N/A 10/27/2022    Procedure: INCISIONAL HERNIA REPAIR WITH MESH,  COMPONENT SEPARATION;  Surgeon: Arturo Kumar MD;  Location[facility];  Service: General;  Laterality: N/A;         Social History     Socioeconomic History    Marital status:    Tobacco Use    Smoking status: Former     Current packs/day: 0.00     Average packs/day: 1.5 packs/day for 22.0 years (33.1 ttl pk-yrs)     Types: Cigarettes     Start date: 1974     Quit date: 1996     Years since quittin.7     Passive exposure: Past    Smokeless tobacco: Never    Tobacco comments:     Smoked menthol   Vaping Use    Vaping status: Never Used   Substance and Sexual Activity    Alcohol use: No    Drug use: Never    Sexual activity: Not Currently     Partners: Male     Birth control/protection: None, Hysterectomy         Family History   Problem Relation Age of Onset    Atrial fibrillation Mother     Thyroid disease Mother     Stroke Mother     Arthritis Mother         Osteoarthritis    Arrhythmia Mother     Heart failure Mother     Early death Father 68        Lung cancer    Lung cancer Father     Alcohol abuse Father     Early death Sister 16        Heart attack    Heart attack Sister     Down syndrome Sister     Developmental  "Disability Sister         Down syndrome    Sleep apnea Brother         Nonorganic    Other Brother         Palpitations (Symptom)    Mental illness Brother     Arthritis Brother         Osteoporosis    Asthma Brother     Developmental Disability Brother         At birth    Arrhythmia Brother     Heart attack Brother     Heart failure Brother     Stroke Maternal Grandmother     Breast cancer Neg Hx     Ovarian cancer Neg Hx        Objective  Physical Exam:  /55   Pulse 75   Temp 97.9 °F (36.6 °C)   Resp 18   Ht 175.3 cm (69\")   Wt 110 kg (242 lb)   LMP  (LMP Unknown)   SpO2 96%   BMI 35.74 kg/m²      Physical Exam  Vitals and nursing note reviewed.   Constitutional:       General: She is not in acute distress.     Appearance: She is obese. She is not ill-appearing, toxic-appearing or diaphoretic.   HENT:      Head: Normocephalic and atraumatic.      Mouth/Throat:      Mouth: Mucous membranes are moist.      Pharynx: Oropharynx is clear.   Eyes:      Extraocular Movements: Extraocular movements intact.   Cardiovascular:      Rate and Rhythm: Normal rate and regular rhythm.   Pulmonary:      Effort: Pulmonary effort is normal. No tachypnea, bradypnea or respiratory distress.      Breath sounds: Normal breath sounds. No decreased breath sounds, wheezing, rhonchi or rales.   Chest:      Chest wall: No mass, deformity or tenderness.   Abdominal:      Palpations: Abdomen is soft.      Tenderness: There is no abdominal tenderness. There is no guarding or rebound.      Comments: Surgical scar   Musculoskeletal:         General: Normal range of motion.      Cervical back: Normal range of motion and neck supple.      Right lower leg: No tenderness. No edema.      Left lower leg: No tenderness. No edema.   Skin:     General: Skin is warm and dry.      Capillary Refill: Capillary refill takes less than 2 seconds.   Neurological:      General: No focal deficit present.      Mental Status: She is alert and oriented " to person, place, and time.   Psychiatric:         Mood and Affect: Mood normal.         Behavior: Behavior normal.               Procedures    ED Course:    ED Course as of 03/16/24 1958   Sat Mar 16, 2024   1807 ATTENDING ATTESTATION  HPI: 69-year-old female with past medical history of hypertension, sleep apnea, hypothyroidism, atrial fibrillation status post ablation who presents to the ER with chief complaint of palpitations.  Described as a pounding in her chest. Gradual onset of symptoms yesterday.  Reports associated chest tightness and shortness of breath. Reportedly had a recent elevated thyroid levels at her PCPs office.  No cardiac history.    EXAM:  General: Alert.  Nontoxic appearance.  No acute distress.  Head: Normocephalic.  Atraumatic.  Eyes: No scleral icterus.  ENT: Moist mucous membranes.  Cardiovascular: Regular rate and rhythm.  No murmurs.  No rubs.  2+ distal pulses bilaterally.  Respiratory: Equal breath sounds bilaterally.  No rales.  No rhonchi.  No wheezing.  GI: Abdomen is soft.  Nondistended.  Nontender to palpation.  No rebound.  No guarding.  No CVA tenderness.  MSK: Moves all 4 extremities.  Neurologic: Oriented x 3.  No focal deficits.  Skin: No erythema.  No edema.  Psych: Normal mood and affect.       [JS]   1851 ECG 12 Lead ED Triage Standing Order; SOA  EKG from interpretation sinus rhythm, occasional PVCs, rate 81, normal axis, no STEMI, normal QRS QTc intervals. [JS]   1928 XR Chest 1 View  I have independently reviewed and interpreted the chest x-ray.  My interpretation is negative for pneumonia or pneumothorax.   [JS]   1929 I reviewed the labs listed above. Clinically unremarkable. [JS]      ED Course User Index  [JS] Ed Cook DO       Lab Results (last 24 hours)       Procedure Component Value Units Date/Time    CBC & Differential [[id number]]  (Abnormal) Collected: 03/16/24 1825    Specimen: Blood Updated: 03/16/24 1831    Narrative:      The following orders were  created for panel order CBC & Differential.  Procedure                               Abnormality         Status                     ---------                               -----------         ------                     CBC Auto Differential[[id number]]        Abnormal            Final result                 Please view results for these tests on the individual orders.    Comprehensive Metabolic Panel [[id number]]  (Abnormal) Collected: 03/16/24 1825    Specimen: Blood Updated: 03/16/24 1850     Glucose 147 mg/dL      BUN 20 mg/dL      Creatinine 0.84 mg/dL      Sodium 145 mmol/L      Potassium 4.0 mmol/L      Chloride 105 mmol/L      CO2 29.1 mmol/L      Calcium 9.4 mg/dL      Total Protein 6.5 g/dL      Albumin 4.1 g/dL      ALT (SGPT) 17 U/L      AST (SGOT) 17 U/L      Alkaline Phosphatase 92 U/L      Total Bilirubin 0.2 mg/dL      Globulin 2.4 gm/dL      A/G Ratio 1.7 g/dL      BUN/Creatinine Ratio 23.8     Anion Gap 10.9 mmol/L      eGFR 75.8 mL/min/1.73     Narrative:      GFR Normal >60  Chronic Kidney Disease <60  Kidney Failure <15      BNP [[id number]]  (Normal) Collected: 03/16/24 1825    Specimen: Blood Updated: 03/16/24 1902     proBNP 91.2 pg/mL     Narrative:      This assay is used as an aid in the diagnosis of individuals suspected of having heart failure. It can be used as an aid in the diagnosis of acute decompensated heart failure (ADHF) in patients presenting with signs and symptoms of ADHF to the emergency department (ED). In addition, NT-proBNP of <300 pg/mL indicates ADHF is not likely.    Age Range Result Interpretation  NT-proBNP Concentration (pg/mL:      <50             Positive            >450                   Gray                 300-450                    Negative             <300    50-75           Positive            >900                  Gray                300-900                  Negative            <300      >75             Positive            >1800                  Phillips                 300-1800                  Negative            <300    Single High Sensitivity Troponin T [[id number]]  (Normal) Collected: 03/16/24 1825    Specimen: Blood Updated: 03/16/24 1902     HS Troponin T 6 ng/L     Narrative:      High Sensitive Troponin T Reference Range:  <14.0 ng/L- Negative Female for AMI  <22.0 ng/L- Negative Male for AMI  >=14 - Abnormal Female indicating possible myocardial injury.  >=22 - Abnormal Male indicating possible myocardial injury.   Clinicians would have to utilize clinical acumen, EKG, Troponin, and serial changes to determine if it is an Acute Myocardial Infarction or myocardial injury due to an underlying chronic condition.         CBC Auto Differential [[id number]]  (Abnormal) Collected: 03/16/24 1825    Specimen: Blood Updated: 03/16/24 1831     WBC 8.33 10*3/mm3      RBC 4.30 10*6/mm3      Hemoglobin 12.6 g/dL      Hematocrit 38.6 %      MCV 89.8 fL      MCH 29.3 pg      MCHC 32.6 g/dL      RDW 13.8 %      RDW-SD 45.3 fl      MPV 10.9 fL      Platelets 214 10*3/mm3      Neutrophil % 54.4 %      Lymphocyte % 32.2 %      Monocyte % 5.3 %      Eosinophil % 7.0 %      Basophil % 0.7 %      Immature Grans % 0.4 %      Neutrophils, Absolute 4.54 10*3/mm3      Lymphocytes, Absolute 2.68 10*3/mm3      Monocytes, Absolute 0.44 10*3/mm3      Eosinophils, Absolute 0.58 10*3/mm3      Basophils, Absolute 0.06 10*3/mm3      Immature Grans, Absolute 0.03 10*3/mm3      nRBC 0.0 /100 WBC     TSH [[id number]]  (Normal) Collected: 03/16/24 1825    Specimen: Blood Updated: 03/16/24 1902     TSH 0.992 uIU/mL     T4, Free [[id number]]  (Normal) Collected: 03/16/24 1825    Specimen: Blood Updated: 03/16/24 1902     Free T4 1.46 ng/dL     Narrative:      Results may be falsely increased if patient taking Biotin.      COVID-19 and FLU A/B PCR, 1 HR TAT - Swab, Nasopharynx [[id number]]  (Normal) Collected: 03/16/24 1838    Specimen: Swab from Nasopharynx Updated: 03/16/24 1918     COVID19 Not Detected      Influenza A PCR Not Detected     Influenza B PCR Not Detected    Narrative:      Fact sheet for providers: https://www.fda.gov/media/377324/download    Fact sheet for patients: https://www.fda.gov/media/161539/download    Test performed by PCR.    Urinalysis With Culture If Indicated - Urine, Clean Catch [[id number]]  (Abnormal) Collected: 03/16/24 1918    Specimen: Urine, Clean Catch Updated: 03/16/24 1931     Color, UA Yellow     Appearance, UA Clear     pH, UA 6.0     Specific Gravity, UA 1.013     Glucose, UA Negative     Ketones, UA Negative     Bilirubin, UA Negative     Blood, UA Small (1+)     Protein, UA Negative     Leuk Esterase, UA Negative     Nitrite, UA Negative     Urobilinogen, UA 0.2 E.U./dL    Narrative:      In absence of clinical symptoms, the presence of pyuria, bacteria, and/or nitrites on the urinalysis result does not correlate with infection.    Urinalysis, Microscopic Only - Urine, Clean Catch [[id number]] Collected: 03/16/24 1918    Specimen: Urine, Clean Catch Updated: 03/16/24 1931     RBC, UA None Seen /HPF      WBC, UA None Seen /HPF      Comment: Urine culture not indicated.        Bacteria, UA None Seen /HPF      Squamous Epithelial Cells, UA 0-2 /HPF      Hyaline Casts, UA None Seen /LPF      Methodology Manual Light Microscopy             No radiology results from the last 24 hrs       MDM     Amount and/or Complexity of Data Reviewed  Clinical lab tests: reviewed  Tests in the medicine section of CPT®: reviewed        Initial impression of presenting illness: 69-year-old female presenting today for evaluation of dyspnea and chest heaviness and overall fatigue.    DDX: includes but is not limited to: Electrolyte abnormality, dehydration, GERD, peptic ulcer disease, ACS, NSTEMI, pneumothorax, pneumonia, arrhythmia, thyroid abnormality, UTI, others    Patient arrives hemodynamically stable afebrile nontachycardic nonhypoxic nontoxic-appearing with vitals interpreted by myself.   Patient has elevated blood pressure but she is on HCTZ and metoprolol per chart review.    Pertinent features from physical exam: Nontoxic-appearing 69-year-old female.  Abdomen soft nontender nonreactive.  There is a surgical scar noted.  She is alert and orient x 4.  At her baseline.  Answers all questions appropriately and follows commands.  Cardiac auscultation revealed regular rate and rhythm on my assessment, 2+ radial pulses bilaterally, lungs were clear without focal findings and good air movement, no wheezing rhonchi rales or stridorous sounds..  Oropharynx was clear.  Neck supple.    Initial diagnostic plan: CBC CMP troponin BNP TSH T4 COVID flu urinalysis EKG, chest x-ray    Results from initial plan were reviewed and interpreted by me revealing EKG revealed normal sinus rhythm occasional PVCs rate of 81 no STEMI.  Chest x-ray negative for pneumonia or pneumothorax or acute process.  Appears similar compared to prior.  CBC CMP nonactionable.  Troponin BNP TSH and free T4 all within normal limits.  COVID flu negative.  Urinalysis with small blood on urine, negative for infection, no red blood cells seen.  All labs workup appear reassuring.  proBNP within normal limits.  Heart score 3.  Do not suspect ACS.    Diagnostic information from other sources: Old record reviewed    Interventions / Re-evaluation: GI cocktail and Pepcid IV.  Stable for discharge at this time.  No clinical signs of COPD exacerbation.  Patient felt better at this time.  Hemodynamically stable.  No hypoxia.  Clinically appropriate for discharge at this time.    Results/clinical rationale were discussed with patient at bedside.  Patient may be experiencing symptomatic PVCs.    Consultations/Discussion of results with other physicians: discussed plan of care with attending physician    Disposition plan: Discharge.  Follow-up with cardiology.  Return precautions given.  Continue medications as prescribed.  Reports she has a cardiology  appointment upcoming in the near future with Dr. Jackman.  She was advised to return for any new or worsening symptoms.  Advised her to continue her metoprolol as this can help to decrease rate of PVCs.  Discussed that she may need dose adjustment to a higher dose but would let her follow-up with cardiology for this.  -----    Final diagnoses:   Symptomatic PVCs          Dave Bright PA-C  03/16/24 1959

## 2024-03-17 NOTE — ED NOTES
Pt verbalizes understanding of discharge and follow up instructions. Pt ambulated out of the ED without distress.

## 2024-03-18 ENCOUNTER — TELEPHONE (OUTPATIENT)
Dept: CARDIOLOGY | Facility: CLINIC | Age: 69
End: 2024-03-18
Payer: COMMERCIAL

## 2024-03-18 DIAGNOSIS — R93.89 ABNORMAL CHEST XRAY: Primary | ICD-10-CM

## 2024-03-18 NOTE — PROGRESS NOTES
[facility] Cardiology Office Follow Up Note    Albania Ramos  [id number]  2024    Primary Care Provider: Gordon Norman MD    Chief Complaint   Patient presents with    Shortness of Breath    Palpitations    Follow-up       Subjective     History of Present Illness:   Albania Ramos is a 69 y.o. female with paroxysmal atrial fibrillation status post ablation x2 and STEFAN on CPAP who presents to the Cardiology Clinic for ER follow-up.  Patient recently went to the ER with worsening palpitations and dyspnea.  Patient says that she has been under a lot of stress at home and started to have significant palpitations and dyspnea at rest.  She also had significant chest pressure like somebody was sitting on her chest.  She went to the ER to be evaluated and was ruled out for ischemia.  She was told that it could be PVCs.  Patient is somewhat sedentary at baseline due to severe knee pain after bilateral knee replacement.  She says that the palpitations are different than her previous atrial fibrillation.    Past Cardiac Testin. Last Coronary Angio: none    2. Last Echo: 21  Normal left ventricular size and systolic function, LVEF 60-65%.  Normal LV diastolic filling pattern.  Normal right ventricular size and systolic function.  Normal left and right atrial size.  Trace mitral regurgitation.  Mild tricuspid regurgitation, RVSP 35 mmHg.    3. Prior Stress Testin17   Normal Lexiscan     4. Prior Holter Monitor: 16  The basic underlying rhythm is sinus   No evidence of atrial fibrillation recurrence  No other atrial or ventricular tachyarrhythmias  No bradyarrhythmia  Inconsistent correlation between atrial and ventricular premature beats and symptoms.   No other atrial or ventricular     Review of Systems:  Review of Systems   Constitutional: Negative.    Respiratory:  Positive for chest tightness and shortness of breath.    Cardiovascular:  Positive for chest pain,  palpitations and leg swelling.   Gastrointestinal: Negative.    Musculoskeletal: Negative.    Neurological: Negative.        I have reviewed and/or updated the patient's past medical, past surgical, family, social history, problem list and allergies as appropriate.       Current Outpatient Medications:     albuterol (PROVENTIL) (2.5 MG/3ML) 0.083% nebulizer solution, , Disp: , Rfl:     albuterol sulfate HFA (Ventolin HFA) 108 (90 Base) MCG/ACT inhaler, Inhale 2 puffs Every 4 (Four) Hours As Needed for Wheezing or Shortness of Air., Disp: 18 g, Rfl: 5    albuterol sulfate  (90 Base) MCG/ACT inhaler, Inhale 2 puffs Every 4 (Four) Hours As Needed for Wheezing or Shortness of Air., Disp: 18 g, Rfl: 0    allopurinol (ZYLOPRIM) 300 MG tablet, TAKE ONE TABLET BY MOUTH EVERY DAY, Disp: 90 tablet, Rfl: 3    apixaban (ELIQUIS) 5 MG tablet tablet, Take 1 tablet by mouth 2 (Two) Times a Day., Disp: , Rfl:     calcium carbonate (OS-SHANTE) 600 MG tablet, Take 1 tablet by mouth 2 (Two) Times a Day With Meals. NOT CURRENTLY TAKING, Disp: , Rfl:     cyanocobalamin (VITAMIN B-12) 2500 MCG tablet tablet, TAKE ONE TABLET BY MOUTH THREE TIMES A WEEK MUST MAKE AN APPOINTMENT (Patient taking differently: Take 1,200 mcg by mouth 1 (One) Time Per Week. MONDAYS), Disp: 30 tablet, Rfl: 5    Diclofenac Sodium (VOLTAREN) 1 % gel gel, Apply 4 g topically to the appropriate area as directed 4 (Four) Times a Day As Needed (pain)., Disp: 100 g, Rfl: 11    docusate sodium 100 MG capsule, Take 1 capsule by mouth 2 (Two) Times a Day As Needed for Constipation., Disp: 30 capsule, Rfl: 0    estradiol (ESTRACE) 0.1 MG/GM vaginal cream, Insert TWO grams vaginally daily AS NEEDED FOR vaginal dryness. Usually uses twice A WEEK, Disp: 42.5 g, Rfl: 11    GLUCOSAMINE HCL-MSM PO, 1 tablet Daily. NOT CURRENTLY TAKING, Disp: , Rfl:     hydroCHLOROthiazide (HYDRODIURIL) 12.5 MG tablet, Take 1 tablet by mouth Daily., Disp: 90 tablet, Rfl: 3    ipratropium  "(ATROVENT) 0.06 % nasal spray, , Disp: , Rfl:     levothyroxine (SYNTHROID, LEVOTHROID) 150 MCG tablet, TAKE ONE TABLET BY MOUTH EVERY DAY, Disp: 90 tablet, Rfl: 2    metoprolol tartrate (LOPRESSOR) 25 MG tablet, TAKE 1/2 TABLET BY MOUTH TWICE DAILY, Disp: 30 tablet, Rfl: 6    montelukast (Singulair) 10 MG tablet, Take 1 tablet by mouth Every Night. PRN (Patient taking differently: Take 1 tablet by mouth As Needed. PRN), Disp: 90 tablet, Rfl: 1    MULTIPLE VITAMIN PO, 1 tablet Daily. NOT CURRENTLY TAKING, Disp: , Rfl:     NON FORMULARY, Lions ramiro mushrooms, Disp: , Rfl:     Omega-3 Fatty Acids (FISH OIL) 435 MG capsule, Take 1,000 mg by mouth Daily. NOT CURRENTLY TAKING, Disp: , Rfl:     omeprazole (priLOSEC) 40 MG capsule, Take 1 capsule by mouth Daily., Disp: 90 capsule, Rfl: 3    Tiotropium Bromide Monohydrate (Spiriva Respimat) 1.25 MCG/ACT aerosol solution inhaler, Inhale 2 puffs Daily., Disp: 4 g, Rfl: 0    TURMERIC PO, Take  by mouth., Disp: , Rfl:     vitamin C (ASCORBIC ACID) 250 MG tablet, Take 1 tablet by mouth Daily., Disp: , Rfl:     cefdinir (OMNICEF) 300 MG capsule, Take 1 capsule by mouth 2 (Two) Times a Day., Disp: 20 capsule, Rfl: 0    Cinnamon 500 MG tablet, Take  by mouth., Disp: , Rfl:     diclofenac (VOLTAREN) 1 % gel gel, Apply 4 g topically to the appropriate area as directed 4 (Four) Times a Day As Needed (pain)., Disp: 100 g, Rfl: 1    Probiotic Product (SOLUBLE FIBER/PROBIOTICS PO), Take 1 capsule by mouth 2 (Two) Times a Day., Disp: , Rfl:     Current Facility-Administered Medications:     triamcinolone acetonide (KENALOG-40) injection 40 mg, 40 mg, Intra-articular, Once, Gordon Norman MD       Objective     Vitals:  Vitals:    03/19/24 0834   BP: 126/60   BP Location: Right leg   Patient Position: Sitting   Cuff Size: Adult   Pulse: 75   SpO2: 95%   Weight: 109 kg (240 lb)   Height: 175.3 cm (69.02\")       Physical Exam:  Vitals reviewed.   Constitutional:       Appearance: Healthy " appearance. Not in distress.   Neck:      Vascular: No JVD.   Pulmonary:      Effort: Pulmonary effort is normal.      Breath sounds: Normal breath sounds.   Cardiovascular:      Normal rate. Regular rhythm. Normal S1. Normal S2.       Murmurs: There is no murmur.      No gallop.  No click. No rub.   Pulses:     Intact distal pulses.   Edema:     Pretibial: bilateral trace edema of the pretibial area.     Ankle: bilateral trace edema of the ankle.     Feet: bilateral trace edema of the feet.  Abdominal:      General: There is no distension.      Palpations: Abdomen is soft.      Tenderness: There is no abdominal tenderness.   Skin:     General: Skin is warm and dry.         Results Review:   I reviewed the patient's new clinical results.  I reviewed the patient's new imaging results and agree with the interpretation.  I reviewed the patient's other test results and agree with the interpretation  I personally viewed and interpreted the patient's EKG/Telemetry data    Lipid Panel (03/15/2024 08:28)  Hemoglobin A1c (03/15/2024 08:28)  CBC & Differential (03/16/2024 18:25)  Comprehensive Metabolic Panel (03/16/2024 18:25)  BNP (03/16/2024 18:25)  Single High Sensitivity Troponin T (03/16/2024 18:25)  TSH (03/16/2024 18:25)  T4, Free (03/16/2024 18:25)  COVID-19 and FLU A/B PCR, 1 HR TAT - Swab, Nasopharynx (03/16/2024 18:38)      ECG 12 Lead    Date/Time: 3/19/2024 9:11 AM  Performed by: Rogelio Bartlett MD    Authorized by: Rogelio Bartlett MD  Comparison: compared with previous ECG from 3/16/2024  Similar to previous ECG  Rhythm: sinus rhythm  Rate: normal  Conduction: conduction normal  ST Segments: ST segments normal  T Waves: T waves normal  QRS axis: normal  Other: no other findings    Clinical impression: normal ECG              Assessment & Plan   Diagnoses and all orders for this visit:    1. Chest pain, unspecified type (Primary)  2. REYES (dyspnea on exertion)  Symptoms are not entirely classic but patient is  quite sedentary, therefore very difficult to assess true exertional symptoms.  Baseline ECG is normal.  She did have a PVC on her ER ECG.  Overall, ER workup was unremarkable with blood work within normal limits.  No clinical signs or symptoms of heart failure.  BNP and troponins were normal in the ER recently.  -- Proceed with nuclear Lexiscan stress test and echo for further risk stratification    3. Paroxysmal atrial fibrillation  S/p ablation x 2.  Previously had a loop recorder which has since been removed.  [id number] is at least 2.  No history of DM, MI, stroke, CHF, or hypertension.  Hypothyroidism seems to be under control.  Says her current palpitations are different than her A-fib.  Compliant with her anticoagulation.  No bleeding on Eliquis.  -- Continue Eliquis and metoprolol  -- If above workup is negative, may have her return for a monitor for 1 to 2 weeks to screen for any new arrhythmias/ectopy and quantify burden of A-fib  -- Continue nightly compliance with CPAP    4. Bilateral leg edema  Chronic, stable, status post vein ablation.  Doing much better after this procedure.  No clinical signs of heart failure.  -- Continue HCTZ    5. Hyperlipidemia, LDL goal <70  LDL in the 130s.  ASCVD risk is 7.6%.  Intermediate range.  Denies any history of heart attack or stroke.  -- Will continue to encourage lifestyle changes but I do think moderate intensity statin would be reasonable for primary prevention, pending results of the above workup          Plan   Orders Placed This Encounter   Procedures    Stress Test With Myocardial Perfusion One Day     Standing Status:   Future     Standing Expiration Date:   3/19/2025     Order Specific Question:   Rest/stress, rest only or stress only?     Answer:   Rest/Stress     Order Specific Question:   What stress agent will be used?     Answer:   Regadenoson (Lexiscan)     Order Specific Question:   Difficulty walking criteria?     Answer:   Musculoskeletal (hips,  knees, feet, back, amputee)     Order Specific Question:   Reason for exam?     Answer:   Chest Pain     Order Specific Question:   Release to patient     Answer:   Routine Release [[id number]]    ECG 12 Lead     This order was created via procedure documentation     Order Specific Question:   Release to patient     Answer:   Routine Release [[id number]]    Adult Transthoracic Echo Complete W/ Cont if Necessary Per Protocol     Standing Status:   Future     Standing Expiration Date:   3/19/2025     Order Specific Question:   Reason for exam?     Answer:   Chest Pain     Order Specific Question:   Reason for exam?     Answer:   Dyspnea     Order Specific Question:   Release to patient     Answer:   Routine Release [[id number]]     Medications:    Preventative Cardiology:   Tobacco Cessation: N/A  Obstructive Sleep Apnea Screening: Completed  AAA Screening: Deffered  Peripheral Arterial Disease Screening: Deffered       Follow Up:   Return in about 6 weeks (around 4/30/2024).    Thank you for allowing me to participate in the care of your patient. Please to not hesitate to contact me with additional questions or concerns.     Rogelio Bartlett MD  03/19/2024  14:11 EDT

## 2024-03-18 NOTE — TELEPHONE ENCOUNTER
"  Caller: Albania Ramos \"JULIO\"    Relationship to patient: Self    Best call back number: [phone]    Chief complaint:     Type of visit: FOLLOW UP    Requested date: ASAP     If rescheduling, when is the original appointment: 4.8.2024     Additional notes:PATIENT WENT TO THE ER SATURDAY; HAVING SOB AT TIMES AND AN IRREGULAR HEARTBEAT. PLEASE CALL THE PATIENT ASAP TO RESCHEDULE.          "

## 2024-03-19 ENCOUNTER — HOSPITAL ENCOUNTER (OUTPATIENT)
Dept: GENERAL RADIOLOGY | Facility: HOSPITAL | Age: 69
Discharge: HOME | End: 2024-03-19
Admitting: INTERNAL MEDICINE
Payer: COMMERCIAL

## 2024-03-19 ENCOUNTER — OFFICE VISIT (OUTPATIENT)
Dept: CARDIOLOGY | Facility: CLINIC | Age: 69
End: 2024-03-19
Payer: COMMERCIAL

## 2024-03-19 VITALS
WEIGHT: 240 LBS | DIASTOLIC BLOOD PRESSURE: 60 MMHG | BODY MASS INDEX: 35.55 KG/M2 | OXYGEN SATURATION: 95 % | HEART RATE: 75 BPM | HEIGHT: 69 IN | SYSTOLIC BLOOD PRESSURE: 126 MMHG

## 2024-03-19 DIAGNOSIS — R06.09 DOE (DYSPNEA ON EXERTION): ICD-10-CM

## 2024-03-19 DIAGNOSIS — E78.5 HYPERLIPIDEMIA LDL GOAL <70: Chronic | ICD-10-CM

## 2024-03-19 DIAGNOSIS — R60.0 BILATERAL LEG EDEMA: Chronic | ICD-10-CM

## 2024-03-19 DIAGNOSIS — R07.9 CHEST PAIN, UNSPECIFIED TYPE: Primary | ICD-10-CM

## 2024-03-19 DIAGNOSIS — R93.89 ABNORMAL CHEST XRAY: ICD-10-CM

## 2024-03-19 DIAGNOSIS — I48.0 PAROXYSMAL ATRIAL FIBRILLATION: Chronic | ICD-10-CM

## 2024-03-19 PROCEDURE — 71046 X-RAY EXAM CHEST 2 VIEWS: CPT

## 2024-03-21 ENCOUNTER — OFFICE VISIT (OUTPATIENT)
Dept: INTERNAL MEDICINE | Facility: CLINIC | Age: 69
End: 2024-03-21
Payer: COMMERCIAL

## 2024-03-21 VITALS
HEIGHT: 69 IN | RESPIRATION RATE: 16 BRPM | HEART RATE: 75 BPM | BODY MASS INDEX: 35.99 KG/M2 | WEIGHT: 243 LBS | TEMPERATURE: 97 F | DIASTOLIC BLOOD PRESSURE: 64 MMHG | SYSTOLIC BLOOD PRESSURE: 110 MMHG | OXYGEN SATURATION: 98 %

## 2024-03-21 DIAGNOSIS — E53.8 COBALAMIN DEFICIENCY: ICD-10-CM

## 2024-03-21 DIAGNOSIS — E79.0 HYPERURICEMIA: Primary | ICD-10-CM

## 2024-03-21 DIAGNOSIS — G62.9 NEUROPATHY: ICD-10-CM

## 2024-03-21 DIAGNOSIS — E55.9 VITAMIN D DEFICIENCY: ICD-10-CM

## 2024-03-21 PROCEDURE — 99214 OFFICE O/P EST MOD 30 MIN: CPT | Performed by: INTERNAL MEDICINE

## 2024-03-21 NOTE — PROGRESS NOTES
Subjective     Patient ID: Albania Ramos is a 69 y.o. female. Patient is here for management of multiple medical problems.     Chief Complaint   Patient presents with    Foot Pain     Bilateral nerve pain in feet.     History of Present Illness       Foot pain.   B/L foot pain. Burning.    Mid foot to toes.  Gets red qand hot.  Can be sitting and .             The following portions of the patient's history were reviewed and updated as appropriate: allergies, current medications, past family history, past medical history, past social history, past surgical history and problem list.    Review of Systems    Current Outpatient Medications:     albuterol (PROVENTIL) (2.5 MG/3ML) 0.083% nebulizer solution, , Disp: , Rfl:     albuterol sulfate HFA (Ventolin HFA) 108 (90 Base) MCG/ACT inhaler, Inhale 2 puffs Every 4 (Four) Hours As Needed for Wheezing or Shortness of Air., Disp: 18 g, Rfl: 5    albuterol sulfate  (90 Base) MCG/ACT inhaler, Inhale 2 puffs Every 4 (Four) Hours As Needed for Wheezing or Shortness of Air., Disp: 18 g, Rfl: 0    allopurinol (ZYLOPRIM) 300 MG tablet, TAKE ONE TABLET BY MOUTH EVERY DAY, Disp: 90 tablet, Rfl: 3    apixaban (ELIQUIS) 5 MG tablet tablet, Take 1 tablet by mouth 2 (Two) Times a Day., Disp: , Rfl:     calcium carbonate (OS-SHANTE) 600 MG tablet, Take 1 tablet by mouth 2 (Two) Times a Day With Meals. NOT CURRENTLY TAKING, Disp: , Rfl:     cyanocobalamin (VITAMIN B-12) 2500 MCG tablet tablet, TAKE ONE TABLET BY MOUTH THREE TIMES A WEEK MUST MAKE AN APPOINTMENT (Patient taking differently: Take 1,200 mcg by mouth 1 (One) Time Per Week. MONDAYS), Disp: 30 tablet, Rfl: 5    Diclofenac Sodium (VOLTAREN) 1 % gel gel, Apply 4 g topically to the appropriate area as directed 4 (Four) Times a Day As Needed (pain)., Disp: 100 g, Rfl: 11    docusate sodium 100 MG capsule, Take 1 capsule by mouth 2 (Two) Times a Day As Needed for Constipation., Disp: 30 capsule, Rfl: 0    estradiol (ESTRACE)  "0.1 MG/GM vaginal cream, Insert TWO grams vaginally daily AS NEEDED FOR vaginal dryness. Usually uses twice A WEEK, Disp: 42.5 g, Rfl: 11    GLUCOSAMINE HCL-MSM PO, 1 tablet Daily. NOT CURRENTLY TAKING, Disp: , Rfl:     hydroCHLOROthiazide (HYDRODIURIL) 12.5 MG tablet, Take 1 tablet by mouth Daily., Disp: 90 tablet, Rfl: 3    ipratropium (ATROVENT) 0.06 % nasal spray, , Disp: , Rfl:     levothyroxine (SYNTHROID, LEVOTHROID) 150 MCG tablet, TAKE ONE TABLET BY MOUTH EVERY DAY, Disp: 90 tablet, Rfl: 2    metoprolol tartrate (LOPRESSOR) 25 MG tablet, TAKE 1/2 TABLET BY MOUTH TWICE DAILY, Disp: 30 tablet, Rfl: 6    montelukast (Singulair) 10 MG tablet, Take 1 tablet by mouth Every Night. PRN (Patient taking differently: Take 1 tablet by mouth As Needed. PRN), Disp: 90 tablet, Rfl: 1    MULTIPLE VITAMIN PO, 1 tablet Daily. NOT CURRENTLY TAKING, Disp: , Rfl:     NON FORMULARY, Lions ramiro mushrooms, Disp: , Rfl:     Omega-3 Fatty Acids (FISH OIL) 435 MG capsule, Take 1,000 mg by mouth Daily. NOT CURRENTLY TAKING, Disp: , Rfl:     omeprazole (priLOSEC) 40 MG capsule, Take 1 capsule by mouth Daily., Disp: 90 capsule, Rfl: 3    Tiotropium Bromide Monohydrate (Spiriva Respimat) 1.25 MCG/ACT aerosol solution inhaler, Inhale 2 puffs Daily., Disp: 4 g, Rfl: 0    TURMERIC PO, Take  by mouth., Disp: , Rfl:     vitamin C (ASCORBIC ACID) 250 MG tablet, Take 1 tablet by mouth Daily., Disp: , Rfl:     Current Facility-Administered Medications:     triamcinolone acetonide (KENALOG-40) injection 40 mg, 40 mg, Intra-articular, Once, Gordon Norman MD    Objective      Blood pressure 110/64, pulse 75, temperature 97 °F (36.1 °C), resp. rate 16, height 175.3 cm (69.02\"), weight 110 kg (243 lb), SpO2 98%, not currently breastfeeding.            Physical Exam     General Appearance:    Alert, cooperative, no distress, appears stated age   Head:    Normocephalic, without obvious abnormality, atraumatic   Eyes:    PERRL, conjunctiva/corneas " clear, EOM's intact   Ears:    Normal TM's and external ear canals, both ears   Nose:   Nares normal, septum midline, mucosa normal, no drainage   or sinus tenderness   Throat:   Lips, mucosa, and tongue normal; teeth and gums normal   Neck:   Supple, symmetrical, trachea midline, no adenopathy;        thyroid:  No enlargement/tenderness/nodules; no carotid    bruit or JVD   Back:     Symmetric, no curvature, ROM normal, no CVA tenderness   Lungs:     Clear to auscultation bilaterally, respirations unlabored   Chest wall:    No tenderness or deformity   Heart:    Regular rate and rhythm, S1 and S2 normal, no murmur,        rub or gallop   Abdomen:     Soft, non-tender, bowel sounds active all four quadrants,     no masses, no organomegaly   Extremities:   Extremities normal, atraumatic, no cyanosis or edema   Pulses:   2+ and symmetric all extremities   Skin:   Skin color, texture, turgor normal, no rashes or lesions   Lymph nodes:   Cervical, supraclavicular, and axillary nodes normal   Neurologic:   CNII-XII intact. Normal strength, sensation and reflexes       throughout      Results for orders placed or performed during the hospital encounter of 03/16/24   COVID-19 and FLU A/B PCR, 1 HR TAT - Swab, Nasopharynx    Specimen: Nasopharynx; Swab   Result Value Ref Range    COVID19 Not Detected Not Detected - Ref. Range    Influenza A PCR Not Detected Not Detected    Influenza B PCR Not Detected Not Detected   Comprehensive Metabolic Panel    Specimen: Blood   Result Value Ref Range    Glucose 147 (H) 65 - 99 mg/dL    BUN 20 8 - 23 mg/dL    Creatinine 0.84 0.57 - 1.00 mg/dL    Sodium 145 136 - 145 mmol/L    Potassium 4.0 3.5 - 5.2 mmol/L    Chloride 105 98 - 107 mmol/L    CO2 29.1 (H) 22.0 - 29.0 mmol/L    Calcium 9.4 8.6 - 10.5 mg/dL    Total Protein 6.5 6.0 - 8.5 g/dL    Albumin 4.1 3.5 - 5.2 g/dL    ALT (SGPT) 17 1 - 33 U/L    AST (SGOT) 17 1 - 32 U/L    Alkaline Phosphatase 92 39 - 117 U/L    Total Bilirubin 0.2  0.0 - 1.2 mg/dL    Globulin 2.4 gm/dL    A/G Ratio 1.7 g/dL    BUN/Creatinine Ratio 23.8 7.0 - 25.0    Anion Gap 10.9 5.0 - 15.0 mmol/L    eGFR 75.8 >60.0 mL/min/1.73   BNP    Specimen: Blood   Result Value Ref Range    proBNP 91.2 0.0 - 900.0 pg/mL   Single High Sensitivity Troponin T    Specimen: Blood   Result Value Ref Range    HS Troponin T 6 <14 ng/L   CBC Auto Differential    Specimen: Blood   Result Value Ref Range    WBC 8.33 3.40 - 10.80 10*3/mm3    RBC 4.30 3.77 - 5.28 10*6/mm3    Hemoglobin 12.6 12.0 - 15.9 g/dL    Hematocrit 38.6 34.0 - 46.6 %    MCV 89.8 79.0 - 97.0 fL    MCH 29.3 26.6 - 33.0 pg    MCHC 32.6 31.5 - 35.7 g/dL    RDW 13.8 12.3 - 15.4 %    RDW-SD 45.3 37.0 - 54.0 fl    MPV 10.9 6.0 - 12.0 fL    Platelets 214 140 - 450 10*3/mm3    Neutrophil % 54.4 42.7 - 76.0 %    Lymphocyte % 32.2 19.6 - 45.3 %    Monocyte % 5.3 5.0 - 12.0 %    Eosinophil % 7.0 (H) 0.3 - 6.2 %    Basophil % 0.7 0.0 - 1.5 %    Immature Grans % 0.4 0.0 - 0.5 %    Neutrophils, Absolute 4.54 1.70 - 7.00 10*3/mm3    Lymphocytes, Absolute 2.68 0.70 - 3.10 10*3/mm3    Monocytes, Absolute 0.44 0.10 - 0.90 10*3/mm3    Eosinophils, Absolute 0.58 (H) 0.00 - 0.40 10*3/mm3    Basophils, Absolute 0.06 0.00 - 0.20 10*3/mm3    Immature Grans, Absolute 0.03 0.00 - 0.05 10*3/mm3    nRBC 0.0 0.0 - 0.2 /100 WBC   TSH    Specimen: Blood   Result Value Ref Range    TSH 0.992 0.270 - 4.200 uIU/mL   T4, Free    Specimen: Blood   Result Value Ref Range    Free T4 1.46 0.93 - 1.70 ng/dL   Urinalysis With Culture If Indicated - Urine, Clean Catch    Specimen: Urine, Clean Catch   Result Value Ref Range    Color, UA Yellow Yellow, Straw    Appearance, UA Clear Clear    pH, UA 6.0 5.0 - 8.0    Specific Gravity, UA 1.013 1.005 - 1.030    Glucose, UA Negative Negative    Ketones, UA Negative Negative    Bilirubin, UA Negative Negative    Blood, UA Small (1+) (A) Negative    Protein, UA Negative Negative    Leuk Esterase, UA Negative Negative     Nitrite, UA Negative Negative    Urobilinogen, UA 0.2 E.U./dL 0.2 - 1.0 E.U./dL   Urinalysis, Microscopic Only - Urine, Clean Catch    Specimen: Urine, Clean Catch   Result Value Ref Range    RBC, UA None Seen None Seen, 0-2 /HPF    WBC, UA None Seen None Seen, 0-2 /HPF    Bacteria, UA None Seen None Seen /HPF    Squamous Epithelial Cells, UA 0-2 None Seen, 0-2 /HPF    Hyaline Casts, UA None Seen None Seen /LPF    Methodology Manual Light Microscopy    Green Top (Gel)   Result Value Ref Range    Extra Tube Hold for add-ons.    Lavender Top   Result Value Ref Range    Extra Tube hold for add-on    Gold Top - SST   Result Value Ref Range    Extra Tube Hold for add-ons.    Light Blue Top   Result Value Ref Range    Extra Tube Hold for add-ons.          Assessment & Plan       Diagnoses and all orders for this visit:    1. Hyperuricemia (Primary)  -     Comprehensive Metabolic Panel  -     Vitamin B12  -     CBC & Differential  -     Lipid Panel  -     TSH  -     T4, Free  -     Hemoglobin A1c  -     Vitamin D,25-Hydroxy  -     Vitamin B6  -     Uric Acid    2. Cobalamin deficiency  -     Comprehensive Metabolic Panel  -     Vitamin B12  -     CBC & Differential  -     Lipid Panel  -     TSH  -     T4, Free  -     Hemoglobin A1c  -     Vitamin D,25-Hydroxy  -     Vitamin B6    3. Vitamin D deficiency  -     Comprehensive Metabolic Panel  -     Vitamin B12  -     CBC & Differential  -     Lipid Panel  -     TSH  -     T4, Free  -     Hemoglobin A1c  -     Vitamin D,25-Hydroxy  -     Vitamin B6    4. Neuropathy  -     Comprehensive Metabolic Panel  -     Vitamin B12  -     CBC & Differential  -     Lipid Panel  -     TSH  -     T4, Free  -     Hemoglobin A1c  -     Vitamin D,25-Hydroxy  -     Vitamin B6      Return in about 6 weeks (around 5/2/2024).          There are no Patient Instructions on file for this visit.     Gordon Norman MD    Assessment & Plan       Answers submitted by the patient for this  visit:  Primary Reason for Visit (Submitted on 3/19/2024)  What is the primary reason for your visit?: Extremity Pain  Lower Extremity Injury Questionnaire (Submitted on 3/19/2024)  Chief Complaint: Extremity pain  Injury: No

## 2024-03-25 ENCOUNTER — TELEPHONE (OUTPATIENT)
Dept: PULMONOLOGY | Facility: CLINIC | Age: 69
End: 2024-03-25
Payer: COMMERCIAL

## 2024-03-25 RX ORDER — TIOTROPIUM BROMIDE 18 UG/1
1 CAPSULE ORAL; RESPIRATORY (INHALATION)
Qty: 90 CAPSULE | Refills: 3 | Status: SHIPPED | OUTPATIENT
Start: 2024-03-25

## 2024-03-25 NOTE — TELEPHONE ENCOUNTER
"Caller: Albania Ramos \"JULIO\"    Relationship: Self    Best call back number: [phone]    Requested Prescriptions:  SPIRIVA BUT NOT THE AEROSOL PT NEEDS THE HANDY HALER 18 MICROGRAM CAPSULE.   Requested Prescriptions      No prescriptions requested or ordered in this encounter        Pharmacy where request should be sent: [facility]      Last office visit with prescribing clinician: 12/28/2022   Last telemedicine visit with prescribing clinician: Visit date not found   Next office visit with prescribing clinician: 8/29/2024     Additional details provided by patient: PT IS COMPLETELY OUT OF MEDICATION.    Does the patient have less than a 3 day supply:  [x] Yes  [] No    Would you like a call back once the refill request has been completed: [] Yes [] No    If the office needs to give you a call back, can they leave a voicemail: [x] Yes [] No    Michele Saravia Rep   03/25/24 08:40 EDT       "

## 2024-05-07 ENCOUNTER — HOSPITAL ENCOUNTER (OUTPATIENT)
Dept: NUCLEAR MEDICINE | Facility: HOSPITAL | Age: 69
Discharge: HOME | End: 2024-05-07
Payer: COMMERCIAL

## 2024-05-07 DIAGNOSIS — R06.09 DOE (DYSPNEA ON EXERTION): ICD-10-CM

## 2024-05-07 DIAGNOSIS — R07.9 CHEST PAIN, UNSPECIFIED TYPE: ICD-10-CM

## 2024-05-07 LAB
BH CV REST NUCLEAR ISOTOPE DOSE: 9.4 MCI
BH CV STRESS COMMENTS STAGE 1: NORMAL
BH CV STRESS DOSE REGADENOSON STAGE 1: 0.4
BH CV STRESS DURATION MIN STAGE 1: 0
BH CV STRESS DURATION SEC STAGE 1: 10
BH CV STRESS NUCLEAR ISOTOPE DOSE: 31 MCI
BH CV STRESS PROTOCOL 1: NORMAL
BH CV STRESS RECOVERY BP: NORMAL MMHG
BH CV STRESS RECOVERY HR: 80 BPM
BH CV STRESS STAGE 1: 1
LV EF NUC BP: 70 %
MAXIMAL PREDICTED HEART RATE: 152 BPM
PERCENT MAX PREDICTED HR: 55.92 %
STRESS BASELINE BP: NORMAL MMHG
STRESS BASELINE HR: 59 BPM
STRESS PERCENT HR: 66 %
STRESS POST PEAK BP: NORMAL MMHG
STRESS POST PEAK HR: 85 BPM
STRESS TARGET HR: 129 BPM

## 2024-05-07 PROCEDURE — 0 TECHNETIUM SESTAMIBI: Performed by: INTERNAL MEDICINE

## 2024-05-07 PROCEDURE — A9500 TC99M SESTAMIBI: HCPCS | Performed by: INTERNAL MEDICINE

## 2024-05-07 PROCEDURE — 78452 HT MUSCLE IMAGE SPECT MULT: CPT

## 2024-05-07 PROCEDURE — 25010000002 REGADENOSON 0.4 MG/5ML SOLUTION: Performed by: INTERNAL MEDICINE

## 2024-05-07 PROCEDURE — 93017 CV STRESS TEST TRACING ONLY: CPT

## 2024-05-07 RX ORDER — REGADENOSON 0.08 MG/ML
0.4 INJECTION, SOLUTION INTRAVENOUS
Status: COMPLETED | OUTPATIENT
Start: 2024-05-07 | End: 2024-05-07

## 2024-05-07 RX ADMIN — TECHNETIUM TC 99M SESTAMIBI 1 DOSE: 1 INJECTION INTRAVENOUS at 08:18

## 2024-05-07 RX ADMIN — TECHNETIUM TC 99M SESTAMIBI 1 DOSE: 1 INJECTION INTRAVENOUS at 06:36

## 2024-05-07 RX ADMIN — REGADENOSON 0.4 MG: 0.08 INJECTION, SOLUTION INTRAVENOUS at 08:18

## 2024-05-15 ENCOUNTER — TELEPHONE (OUTPATIENT)
Dept: CARDIOLOGY | Facility: CLINIC | Age: 69
End: 2024-05-15
Payer: COMMERCIAL

## 2024-05-15 DIAGNOSIS — E78.5 HYPERLIPIDEMIA LDL GOAL <70: Primary | ICD-10-CM

## 2024-05-15 RX ORDER — ATORVASTATIN CALCIUM 40 MG/1
40 TABLET, FILM COATED ORAL DAILY
Qty: 30 TABLET | Refills: 11 | Status: SHIPPED | OUTPATIENT
Start: 2024-05-15

## 2024-05-15 NOTE — TELEPHONE ENCOUNTER
I spoke with pt concerning her stress test result note, she understood verbal instructions and will start the Atorvastatin as prescribed and states she has not had any chest pain so will wait and not take the Imdur.

## 2024-05-16 ENCOUNTER — OFFICE VISIT (OUTPATIENT)
Dept: INTERNAL MEDICINE | Facility: CLINIC | Age: 69
End: 2024-05-16
Payer: COMMERCIAL

## 2024-05-16 VITALS
BODY MASS INDEX: 37.03 KG/M2 | HEIGHT: 69 IN | SYSTOLIC BLOOD PRESSURE: 124 MMHG | OXYGEN SATURATION: 97 % | HEART RATE: 67 BPM | DIASTOLIC BLOOD PRESSURE: 64 MMHG | WEIGHT: 250 LBS

## 2024-05-16 DIAGNOSIS — R39.9 LOWER URINARY TRACT SYMPTOMS (LUTS): Primary | ICD-10-CM

## 2024-05-16 LAB
BILIRUB BLD-MCNC: NEGATIVE MG/DL
CLARITY, POC: CLEAR
COLOR UR: ABNORMAL
EXPIRATION DATE: ABNORMAL
GLUCOSE UR STRIP-MCNC: NEGATIVE MG/DL
KETONES UR QL: NEGATIVE
LEUKOCYTE EST, POC: NEGATIVE
Lab: ABNORMAL
NITRITE UR-MCNC: NEGATIVE MG/ML
PH UR: 6 [PH] (ref 5–8)
PROT UR STRIP-MCNC: NEGATIVE MG/DL
RBC # UR STRIP: ABNORMAL /UL
SP GR UR: 1.02 (ref 1–1.03)
UROBILINOGEN UR QL: NORMAL

## 2024-05-16 PROCEDURE — 81003 URINALYSIS AUTO W/O SCOPE: CPT | Performed by: NURSE PRACTITIONER

## 2024-05-16 PROCEDURE — 99213 OFFICE O/P EST LOW 20 MIN: CPT | Performed by: NURSE PRACTITIONER

## 2024-05-16 RX ORDER — OFLOXACIN 3 MG/ML
SOLUTION AURICULAR (OTIC)
COMMUNITY
Start: 2024-04-23

## 2024-05-16 RX ORDER — NITROFURANTOIN 25; 75 MG/1; MG/1
100 CAPSULE ORAL 2 TIMES DAILY
Qty: 10 CAPSULE | Refills: 0 | Status: SHIPPED | OUTPATIENT
Start: 2024-05-16

## 2024-05-16 NOTE — PROGRESS NOTES
,    Office Note     Name: Albania Ramos    : 1955     MRN: [id number]     Chief Complaint  Follow-up (Burning and frequency during urination/odor)    Subjective     History of Present Illness:  Albania Ramos is a 69 y.o. female who presents today for urinary symptoms, developing this morning. Symptoms include dysuria, flank pain, urinary frequency, pelvic pressure, and urgency.     Review of Systems:   Review of Systems   Constitutional:  Negative for chills, diaphoresis and fever.   Gastrointestinal:  Negative for diarrhea, nausea and vomiting.   Genitourinary:  Positive for dysuria, flank pain, frequency, pelvic pressure and urgency. Negative for decreased urine volume, hematuria and urinary incontinence.       Past Medical History:   Past Medical History:   Diagnosis Date    Abdominal pain     Abnormal ECG     Atrial Fibrillation    Allergic     Allergic rhinitis     Ankle joint pain     Arrhythmia 2006    Arthritis     Arthritis 2023    Asthma     Bronchiectasis     Cataract     Had left removed 2021 and rt removed 2022    Cholelithiasis     Chronic bronchitis     Colon polyp 12/10/2019    COVID-19 vaccine series completed     pfizer x3    Degenerative joint disease     Depression     Diverticulitis     Diverticulosis     Dizziness     Has had some episodes. No blake syncope.2007 patient underwent pulmonary vein isolation, initially successful.Patient returned, however in February noting some recurrent episode of dizziness.Then wore wore event recorder which showed some recurrent PAF.    Dysuria     Easy bruising     Elevated cholesterol     Follicular thyroid cancer     treated with total thyroidectomy followed by [facility]    GERD (gastroesophageal reflux disease)     H/O bone density study     Hay fever     History of chest x-ray 07/10/2015    No acute cardiopulmonary process    History of chest x-ray 2015    No acute cardiopulmonary process    History of echocardiogram  04/04/2007    LVEF of greater than 60%. Mild MR.Mild TR.Trace pulmonary insufficinecy.    History of mammogram     History of PFTs 07/02/2015    Normal spirometry    Hyperlipidemia     Hypothyroidism     Measles     Migraine headache     Mumps     Obesity 2020    Osteopenia 2000    Osteoporosis     Paroxysmal atrial fibrillation     A. Failed medical therapy. B. Pulmonary vein isolation 11/2006. C. Recurrent episodes of PAF by event recorder 9/2006.    Pneumonia     PONV (postoperative nausea and vomiting)     Shortness of breath     Sleep apnea     USES CPAP    Sleep apnea, obstructive     Surfer's nodules of right foot     Torn meniscus     Varicella     Visual impairment        Past Surgical History:   Past Surgical History:   Procedure Laterality Date    ABLATION OF DYSRHYTHMIC FOCUS      x 2    BRONCHOSCOPY      CARDIAC CATHETERIZATION      CARDIAC ELECTROPHYSIOLOGY PROCEDURE N/A 08/04/2016    Procedure: Loop recorder implant;  Surgeon: Himanshu Calvert MD;  Location:  [facility];  Service:     CARDIAC ELECTROPHYSIOLOGY PROCEDURE N/A 10/13/2016    Procedure: Loop recorder removal;  Surgeon: Himanshu Calvert MD;  Location:  [facility];  Service:     CARDIAC ELECTROPHYSIOLOGY PROCEDURE N/A 12/18/2017    Procedure: Loop insertion;  Surgeon: Mary Ann Blackwell MD;  Location:  [facility];  Service:     CARDIAC ELECTROPHYSIOLOGY PROCEDURE N/A 11/06/2019    Procedure: Loop recorder removal;  Surgeon: Branden Chatterjee MD;  Location:  [facility];  Service: Cardiovascular    CARDIAC SURGERY      Cardiac Ablation x2    CATARACT EXTRACTION W/ INTRAOCULAR LENS IMPLANT Left 12/20/2021    Procedure: CATARACT PHACO EXTRACTION WITH INTRAOCULAR LENS IMPLANT LEFT;  Surgeon: Arthur Mcdonald MD;  Location: [facility] OR;  Service: Ophthalmology;  Laterality: Left;    CATARACT EXTRACTION W/ INTRAOCULAR LENS IMPLANT Right 01/03/2022    Procedure: CATARACT  PHACO EXTRACTION WITH INTRAOCULAR LENS IMPLANT RIGHT WITH VIVITY LENS;  Surgeon: Arthur Mcdonald MD;  Location:  [facility] OR;  Service: Ophthalmology;  Laterality: Right;    CHOLECYSTECTOMY      COLON RESECTION N/A 03/25/2022    Procedure: LAPAROSCOPIC  COLON RESECTION SIGMOIDECTOMY;  Surgeon: Arturo Kumar MD;  Location: [facility] OR;  Service: General;  Laterality: N/A;    COLONOSCOPY  12/10/2019    COLONOSCOPY  12/2021    DILATATION AND CURETTAGE      EAR TUBES Bilateral     ENDOVENOUS ABLATION SAPHENOUS VEIN W/ LASER Left 02/05/2024    ENDOVENOUS ABLATION SAPHENOUS VEIN W/ LASER Right 02/19/2024    EYE SURGERY  12/2021 & 01/2022    Had cateracs removed from both eyes and multi focal implants put in both.    FOOT SURGERY      bone spur    HAMMER TOE REPAIR Left 11/13/2020    INGUINAL HERNIA REPAIR Left     x 3    JOINT REPLACEMENT  2012, 2014    Bilateral knee replacements    LUNG BIOPSY      Remote    LYMPH NODE BIOPSY      MOUTH SURGERY      WISDOM TEETH    REPLACEMENT TOTAL KNEE BILATERAL Bilateral     THYROIDECTOMY, PARTIAL Left 06/2012    Dr. Cerda, [place]    THYROIDECTOMY, PARTIAL Right 07/2012    Dr. Cerda, [place]    TONSILLECTOMY  07/2020    Dr. Ethan Mcneill    TOTAL ABDOMINAL HYSTERECTOMY WITH SALPINGO OOPHORECTOMY Bilateral 1996    TOTAL THYROIDECTOMY      completion thyroidectomy for follicular thyroid cancer.      TYMPANOSTOMY TUBE PLACEMENT Right 07/2020    Dr. Ethan Mcneill    UPPER GASTROINTESTINAL ENDOSCOPY      15-20 years ago    VENTRAL/INCISIONAL HERNIA REPAIR N/A 10/27/2022    Procedure: INCISIONAL HERNIA REPAIR WITH MESH,  COMPONENT SEPARATION;  Surgeon: Arturo Kumar MD;  Location: [facility] OR;  Service: General;  Laterality: N/A;       Immunizations:   Immunization History   Administered Date(s) Administered    COVID-19 (PFIZER) BIVALENT 12+YRS 09/20/2022    COVID-19 (PFIZER) Purple Cap Monovalent 02/26/2021, 03/22/2021, 10/06/2021    COVID-19 (UNSPECIFIED) 04/03/2023    Covid-19  (Pfizer) Gray Cap Monovalent 06/23/2022    Fluad Quad 65+ 10/06/2022, 10/09/2023    Flublock Quad =>18yrs 09/11/2019, 09/29/2020    Flublok 18+yrs 12/12/2022    Fluzone High Dose =>65 Years ([facility] ONLY) 11/23/2020    Fluzone High-Dose 65+yrs 09/09/2021    Hepatitis A 03/28/2018, 12/28/2018    Influenza TIV (IM) 10/08/2016    Influenza, Unspecified 08/01/2012, 10/30/2018, 09/13/2019, 09/27/2021, 04/03/2023    PPD Test 09/09/2015    Pneumococcal Conjugate 13-Valent (PCV13) 07/25/2015    Pneumococcal Polysaccharide (PPSV23) 08/31/2021    Pneumococcal, Unspecified 11/16/2015    Shingrix 06/28/2018, 10/28/2018    Tdap 01/01/2011, 06/15/2018    Tetanus 04/26/2011    Zostavax 01/01/2012        Medications:     Current Outpatient Medications:     albuterol (PROVENTIL) (2.5 MG/3ML) 0.083% nebulizer solution, , Disp: , Rfl:     albuterol sulfate HFA (Ventolin HFA) 108 (90 Base) MCG/ACT inhaler, Inhale 2 puffs Every 4 (Four) Hours As Needed for Wheezing or Shortness of Air., Disp: 18 g, Rfl: 5    albuterol sulfate  (90 Base) MCG/ACT inhaler, Inhale 2 puffs Every 4 (Four) Hours As Needed for Wheezing or Shortness of Air., Disp: 18 g, Rfl: 0    allopurinol (ZYLOPRIM) 300 MG tablet, TAKE ONE TABLET BY MOUTH EVERY DAY, Disp: 90 tablet, Rfl: 3    apixaban (ELIQUIS) 5 MG tablet tablet, Take 1 tablet by mouth 2 (Two) Times a Day., Disp: , Rfl:     atorvastatin (LIPITOR) 40 MG tablet, Take 1 tablet by mouth Daily., Disp: 30 tablet, Rfl: 11    calcium carbonate (OS-SHANTE) 600 MG tablet, Take 1 tablet by mouth 2 (Two) Times a Day With Meals. NOT CURRENTLY TAKING, Disp: , Rfl:     cyanocobalamin (VITAMIN B-12) 2500 MCG tablet tablet, TAKE ONE TABLET BY MOUTH THREE TIMES A WEEK MUST MAKE AN APPOINTMENT (Patient taking differently: Take 1,200 mcg by mouth 1 (One) Time Per Week. MONDAYS), Disp: 30 tablet, Rfl: 5    Diclofenac Sodium (VOLTAREN) 1 % gel gel, Apply 4 g topically to the appropriate area as directed 4 (Four) Times a Day  As Needed (pain)., Disp: 100 g, Rfl: 11    docusate sodium 100 MG capsule, Take 1 capsule by mouth 2 (Two) Times a Day As Needed for Constipation., Disp: 30 capsule, Rfl: 0    estradiol (ESTRACE) 0.1 MG/GM vaginal cream, Insert TWO grams vaginally daily AS NEEDED FOR vaginal dryness. Usually uses twice A WEEK, Disp: 42.5 g, Rfl: 11    GLUCOSAMINE HCL-MSM PO, 1 tablet Daily. NOT CURRENTLY TAKING, Disp: , Rfl:     hydroCHLOROthiazide (HYDRODIURIL) 12.5 MG tablet, Take 1 tablet by mouth Daily., Disp: 90 tablet, Rfl: 3    ipratropium (ATROVENT) 0.06 % nasal spray, , Disp: , Rfl:     levothyroxine (SYNTHROID, LEVOTHROID) 150 MCG tablet, TAKE ONE TABLET BY MOUTH EVERY DAY, Disp: 90 tablet, Rfl: 2    metoprolol tartrate (LOPRESSOR) 25 MG tablet, TAKE 1/2 TABLET BY MOUTH TWICE DAILY, Disp: 30 tablet, Rfl: 6    montelukast (Singulair) 10 MG tablet, Take 1 tablet by mouth Every Night. PRN (Patient taking differently: Take 1 tablet by mouth As Needed. PRN), Disp: 90 tablet, Rfl: 1    MULTIPLE VITAMIN PO, 1 tablet Daily. NOT CURRENTLY TAKING, Disp: , Rfl:     NON FORMULARY, Lions ramiro mushrooms, Disp: , Rfl:     nystatin (MYCOSTATIN) 100,000 unit/mL suspension, Take 5 mL by mouth 4 (Four) Times a Day., Disp: 280 mL, Rfl: 0    ofloxacin (FLOXIN) 0.3 % otic solution, , Disp: , Rfl:     Omega-3 Fatty Acids (FISH OIL) 435 MG capsule, Take 1,000 mg by mouth Daily. NOT CURRENTLY TAKING, Disp: , Rfl:     omeprazole (priLOSEC) 40 MG capsule, Take 1 capsule by mouth Daily., Disp: 90 capsule, Rfl: 3    tiotropium (SPIRIVA) 18 MCG per inhalation capsule, Place 1 capsule into inhaler and inhale Daily., Disp: 90 capsule, Rfl: 3    TURMERIC PO, Take  by mouth., Disp: , Rfl:     vitamin C (ASCORBIC ACID) 250 MG tablet, Take 1 tablet by mouth Daily., Disp: , Rfl:     nitrofurantoin, macrocrystal-monohydrate, (Macrobid) 100 MG capsule, Take 1 capsule by mouth 2 (Two) Times a Day., Disp: 10 capsule, Rfl: 0    Current Facility-Administered  "Medications:     triamcinolone acetonide (KENALOG-40) injection 40 mg, 40 mg, Intra-articular, Once, Gordon Norman MD    Allergies:   Allergies   Allergen Reactions    Codeine Other (See Comments)     Pt states \"it made me feel like my insides were in a vice \"    Niacin Hives    Darvon [Propoxyphene] Nausea And Vomiting    Zofran [Ondansetron] Other (See Comments)     Face/neck/chest very red/flushed after IV Zofran administration; tolerates po Zofran without difficultly     Adhesive Tape Rash     tegaderm     Bentyl [Dicyclomine] Rash     Patient states she turned \"bright red\"    Ciprodex [Ciprofloxacin-Dexamethasone] Other (See Comments)     REDNESS,tendonitis    Flagyl [Metronidazole] Nausea Only    Other Other (See Comments)     POLLEN,TREES,AND PLANTS MULTIPLE ENVIRONMENTAL ALLERGIES    Prednisone Rash       Family History:   Family History   Problem Relation Age of Onset    Atrial fibrillation Mother     Thyroid disease Mother     Stroke Mother     Arthritis Mother         Osteoarthritis    Arrhythmia Mother     Heart failure Mother     Early death Father 68        Lung cancer    Lung cancer Father     Alcohol abuse Father     Early death Sister 16        Heart attack    Heart attack Sister     Down syndrome Sister     Developmental Disability Sister         Down syndrome    Sleep apnea Brother         Nonorganic    Other Brother         Palpitations (Symptom)    Mental illness Brother     Arthritis Brother         Osteoporosis    Asthma Brother     Developmental Disability Brother         At birth    Arrhythmia Brother     Heart attack Brother     Heart failure Brother     Stroke Maternal Grandmother     Breast cancer Neg Hx     Ovarian cancer Neg Hx        Social History:   Social History     Socioeconomic History    Marital status:    Tobacco Use    Smoking status: Former     Current packs/day: 0.00     Average packs/day: 1.5 packs/day for 22.0 years (33.1 ttl pk-yrs)     Types: Cigarettes    " " Start date: 1974     Quit date: 1996     Years since quittin.9     Passive exposure: Past    Smokeless tobacco: Never    Tobacco comments:     Smoked menthol   Vaping Use    Vaping status: Never Used   Substance and Sexual Activity    Alcohol use: No    Drug use: Never    Sexual activity: Not Currently     Partners: Male     Birth control/protection: None, Hysterectomy         Objective     Vital Signs  /64   Pulse 67   Ht 175.3 cm (69\")   Wt 113 kg (250 lb) Comment: verbal-patient refused to weigh.  SpO2 97%   BMI 36.92 kg/m²   Estimated body mass index is 36.92 kg/m² as calculated from the following:    Height as of this encounter: 175.3 cm (69\").    Weight as of this encounter: 113 kg (250 lb).            Physical Exam  Vitals and nursing note reviewed.   Constitutional:       General: She is not in acute distress.     Appearance: Normal appearance. She is not ill-appearing or toxic-appearing.   HENT:      Head: Normocephalic and atraumatic.   Cardiovascular:      Rate and Rhythm: Normal rate and regular rhythm.      Heart sounds: Normal heart sounds.   Pulmonary:      Effort: Pulmonary effort is normal.      Breath sounds: Normal breath sounds.   Abdominal:      General: There is no distension.      Palpations: Abdomen is soft. There is no mass.      Tenderness: There is no abdominal tenderness. There is no right CVA tenderness, left CVA tenderness, guarding or rebound.      Hernia: No hernia is present.   Musculoskeletal:         General: Normal range of motion.      Cervical back: Normal range of motion and neck supple.   Skin:     General: Skin is warm.   Neurological:      General: No focal deficit present.      Mental Status: She is alert.   Psychiatric:         Mood and Affect: Mood normal.         Behavior: Behavior normal.         Thought Content: Thought content normal.         Judgment: Judgment normal.          Assessment and Plan     Procedures      Lab Results (last 72 " hours)       Procedure Component Value Units Date/Time    POCT urinalysis dipstick, automated [[id number]]  (Abnormal) Collected: 05/16/24 1550    Specimen: Urine Updated: 05/16/24 1550     Color Straw     Clarity, UA Clear     Specific Gravity  1.025     pH, Urine 6.0     Leukocytes Negative     Nitrite, UA Negative     Protein, POC Negative mg/dL      Glucose, UA Negative mg/dL      Ketones, UA Negative     Urobilinogen, UA Normal     Bilirubin Negative     Blood, UA 2+     Lot Number [id number]     Expiration Date 01/14/25             Results for orders placed or performed in visit on 05/16/24   POCT urinalysis dipstick, automated    Specimen: Urine   Result Value Ref Range    Color Straw Yellow, Straw, Dark Yellow, Kelle    Clarity, UA Clear Clear    Specific Gravity  1.025 1.005 - 1.030    pH, Urine 6.0 5.0 - 8.0    Leukocytes Negative Negative    Nitrite, UA Negative Negative    Protein, POC Negative Negative mg/dL    Glucose, UA Negative Negative mg/dL    Ketones, UA Negative Negative    Urobilinogen, UA Normal Normal, 0.2 E.U./dL    Bilirubin Negative Negative    Blood, UA 2+ (A) Negative    Lot Number [id number]     Expiration Date 01/14/25          Diagnoses and all orders for this visit:    1. Lower urinary tract symptoms (LUTS) (Primary)  -     POCT urinalysis dipstick, automated  -     Urine Culture - Urine, Urine, Clean Catch; Future    Other orders  -     nitrofurantoin, macrocrystal-monohydrate, (Macrobid) 100 MG capsule; Take 1 capsule by mouth 2 (Two) Times a Day.  Dispense: 10 capsule; Refill: 0    Reviewed exam and lab findings with the patient. Urine culture ordered. Patient will be notified of the urine culture results. Drink plenty of water. Keep your follow up appointment with Dr. Norman.        Follow Up  Return if symptoms worsen or fail to improve.    JEREMY Srivastava  [facility]  [address]  [place]  [id number] [phone]   No

## 2024-05-20 ENCOUNTER — OFFICE VISIT (OUTPATIENT)
Dept: CARDIOLOGY | Facility: CLINIC | Age: 69
End: 2024-05-20
Payer: COMMERCIAL

## 2024-05-20 VITALS
HEART RATE: 75 BPM | SYSTOLIC BLOOD PRESSURE: 126 MMHG | OXYGEN SATURATION: 96 % | RESPIRATION RATE: 21 BRPM | BODY MASS INDEX: 37.03 KG/M2 | DIASTOLIC BLOOD PRESSURE: 78 MMHG | HEIGHT: 69 IN | WEIGHT: 250 LBS

## 2024-05-20 DIAGNOSIS — I48.0 PAROXYSMAL ATRIAL FIBRILLATION: Primary | ICD-10-CM

## 2024-05-20 PROCEDURE — 99214 OFFICE O/P EST MOD 30 MIN: CPT | Performed by: INTERNAL MEDICINE

## 2024-05-20 NOTE — PROGRESS NOTES
"             [facility] Cardiology Office Follow Up Note    Albania Ramos  [id number]  2024    Primary Care Provider: Gordon Norman MD    Chief Complaint: Routine follow-up    History of Present Illness:   Mrs. Albania Ramos is a 69 y.o. female who presents to the Cardiology Clinic for in routine follow-up.  The patient has a past medical history significant for asthma, hypothyroidism, chronic venous stasis, and depression.  She has a past cardiac history significant for paroxysmal atrial fibrillation, status post ablation x2.  She has also undergone outpatient loop recorder monitoring x2, with her last loop recorder being removed in 10/19.  Today, the patient reports she is doing well from a cardiac standpoint.  Her episodes of palpitations have been minimal.  She has not had any sustained episodes.  No dizziness or lightheadedness.  No presyncopal or syncopal events.  She does report feeling \"cold\" which she believes is associated with her Eliquis.  No significant bleeding or bruising.  No other specific complaints today.    Past Cardiac Testin. Last Coronary Angio: No known  2. Prior Stress Testin2017              1. Normal Lexiscan  3. Last Echo:  21              1.  Normal left ventricular size and systolic function, LVEF 60-65%.              2.  Normal LV diastolic filling pattern.              3.  Normal right ventricular size and systolic function.              4.  Normal left and right atrial size.              5.  Trace mitral regurgitation.              6.  Mild tricuspid regurgitation, RVSP 35 mmHg.  4. Prior Holter Monitor: Loop recorder removed on 2019              1.  SCANNED - CARDIOLOGY (10/07/2019)    Review of Systems:   Review of Systems   Constitutional:  Negative for activity change, appetite change, chills, diaphoresis, fatigue, fever, unexpected weight gain and unexpected weight loss.   Eyes:  Negative for blurred vision and double vision. "   Respiratory:  Negative for cough, chest tightness, shortness of breath and wheezing.    Cardiovascular:  Positive for palpitations. Negative for chest pain and leg swelling.   Gastrointestinal:  Negative for abdominal pain, anal bleeding, blood in stool and GERD.   Endocrine: Negative for cold intolerance and heat intolerance.   Genitourinary:  Negative for hematuria.   Neurological:  Negative for dizziness, syncope, weakness and light-headedness.   Hematological:  Does not bruise/bleed easily.   Psychiatric/Behavioral:  Negative for depressed mood and stress. The patient is not nervous/anxious.        I have reviewed and/or updated the patient's past medical, past surgical, family, social history, problem list and allergies as appropriate.     Medications:     Current Outpatient Medications:     albuterol (PROVENTIL) (2.5 MG/3ML) 0.083% nebulizer solution, , Disp: , Rfl:     albuterol sulfate HFA (Ventolin HFA) 108 (90 Base) MCG/ACT inhaler, Inhale 2 puffs Every 4 (Four) Hours As Needed for Wheezing or Shortness of Air., Disp: 18 g, Rfl: 5    albuterol sulfate  (90 Base) MCG/ACT inhaler, Inhale 2 puffs Every 4 (Four) Hours As Needed for Wheezing or Shortness of Air., Disp: 18 g, Rfl: 0    allopurinol (ZYLOPRIM) 300 MG tablet, TAKE ONE TABLET BY MOUTH EVERY DAY, Disp: 90 tablet, Rfl: 3    atorvastatin (LIPITOR) 40 MG tablet, Take 1 tablet by mouth Daily., Disp: 30 tablet, Rfl: 11    calcium carbonate (OS-SHANTE) 600 MG tablet, Take 1 tablet by mouth 2 (Two) Times a Day With Meals. NOT CURRENTLY TAKING, Disp: , Rfl:     cyanocobalamin (VITAMIN B-12) 2500 MCG tablet tablet, TAKE ONE TABLET BY MOUTH THREE TIMES A WEEK MUST MAKE AN APPOINTMENT (Patient taking differently: Take 1,200 mcg by mouth 1 (One) Time Per Week. MONDAYS), Disp: 30 tablet, Rfl: 5    Diclofenac Sodium (VOLTAREN) 1 % gel gel, Apply 4 g topically to the appropriate area as directed 4 (Four) Times a Day As Needed (pain)., Disp: 100 g, Rfl: 11     docusate sodium 100 MG capsule, Take 1 capsule by mouth 2 (Two) Times a Day As Needed for Constipation., Disp: 30 capsule, Rfl: 0    estradiol (ESTRACE) 0.1 MG/GM vaginal cream, Insert TWO grams vaginally daily AS NEEDED FOR vaginal dryness. Usually uses twice A WEEK, Disp: 42.5 g, Rfl: 11    GLUCOSAMINE HCL-MSM PO, 1 tablet Daily. NOT CURRENTLY TAKING, Disp: , Rfl:     hydroCHLOROthiazide (HYDRODIURIL) 12.5 MG tablet, Take 1 tablet by mouth Daily., Disp: 90 tablet, Rfl: 3    ipratropium (ATROVENT) 0.06 % nasal spray, , Disp: , Rfl:     levothyroxine (SYNTHROID, LEVOTHROID) 150 MCG tablet, TAKE ONE TABLET BY MOUTH EVERY DAY, Disp: 90 tablet, Rfl: 2    metoprolol tartrate (LOPRESSOR) 25 MG tablet, TAKE 1/2 TABLET BY MOUTH TWICE DAILY, Disp: 30 tablet, Rfl: 6    montelukast (Singulair) 10 MG tablet, Take 1 tablet by mouth Every Night. PRN (Patient taking differently: Take 1 tablet by mouth As Needed. PRN), Disp: 90 tablet, Rfl: 1    MULTIPLE VITAMIN PO, 1 tablet Daily. NOT CURRENTLY TAKING, Disp: , Rfl:     nitrofurantoin, macrocrystal-monohydrate, (Macrobid) 100 MG capsule, Take 1 capsule by mouth 2 (Two) Times a Day., Disp: 10 capsule, Rfl: 0    NON FORMULARY, Lions ramiro mushrooms, Disp: , Rfl:     nystatin (MYCOSTATIN) 100,000 unit/mL suspension, Take 5 mL by mouth 4 (Four) Times a Day., Disp: 280 mL, Rfl: 0    ofloxacin (FLOXIN) 0.3 % otic solution, , Disp: , Rfl:     Omega-3 Fatty Acids (FISH OIL) 435 MG capsule, Take 1,000 mg by mouth Daily. NOT CURRENTLY TAKING, Disp: , Rfl:     omeprazole (priLOSEC) 40 MG capsule, Take 1 capsule by mouth Daily., Disp: 90 capsule, Rfl: 3    tiotropium (SPIRIVA) 18 MCG per inhalation capsule, Place 1 capsule into inhaler and inhale Daily., Disp: 90 capsule, Rfl: 3    TURMERIC PO, Take  by mouth., Disp: , Rfl:     vitamin C (ASCORBIC ACID) 250 MG tablet, Take 1 tablet by mouth Daily., Disp: , Rfl:     rivaroxaban (XARELTO) 20 MG tablet, Take 1 tablet by mouth Daily., Disp: 90  "tablet, Rfl: 3    Current Facility-Administered Medications:     triamcinolone acetonide (KENALOG-40) injection 40 mg, 40 mg, Intra-articular, Once, Gordon Norman MD    Physical Exam:  Vital Signs:   Vitals:    05/20/24 1107   BP: 126/78   BP Location: Left arm   Patient Position: Sitting   Cuff Size: Adult   Pulse: 75   Resp: 21   SpO2: 96%   Weight: 113 kg (250 lb)   Height: 175.3 cm (69\")       Physical Exam  Constitutional:       General: She is not in acute distress.     Appearance: She is well-developed. She is obese. She is not diaphoretic.   HENT:      Head: Normocephalic and atraumatic.   Eyes:      General: No scleral icterus.     Pupils: Pupils are equal, round, and reactive to light.   Neck:      Trachea: No tracheal deviation.   Cardiovascular:      Rate and Rhythm: Normal rate and regular rhythm.      Heart sounds: Normal heart sounds. No murmur heard.     No friction rub. No gallop.      Comments: Normal JVD.  Pulmonary:      Effort: Pulmonary effort is normal. No respiratory distress.      Breath sounds: Normal breath sounds. No stridor. No wheezing or rales.   Chest:      Chest wall: No tenderness.   Abdominal:      General: Bowel sounds are normal. There is no distension.      Palpations: Abdomen is soft.      Tenderness: There is no abdominal tenderness. There is no guarding or rebound.   Musculoskeletal:         General: No swelling. Normal range of motion.      Cervical back: Neck supple. No tenderness.   Lymphadenopathy:      Cervical: No cervical adenopathy.   Skin:     General: Skin is warm and dry.      Findings: No erythema.   Neurological:      General: No focal deficit present.      Mental Status: She is alert and oriented to person, place, and time.   Psychiatric:         Mood and Affect: Mood normal.         Behavior: Behavior normal.         Results Review:   I reviewed the patient's new clinical results.        Assessment / Plan:     1.  Paroxysmal atrial fibrillation  --Status " post ablation x2 without documented recurrence  --No documented recurrent episodes of PAF with prior outpatient cardiac monitoring  -- Episodes of palpitations remain rare, no sustained episodes  -- Continue metoprolol  -- Given reported intolerance to Eliquis, will change to Xarelto  -- Discussed referral for Watchman, which the patient will consider  -- Follow-up in 1 year, sooner if required    Follow Up:   Return in about 1 year (around 5/20/2025).      Thank you for allowing me to participate in the care of your patient. Please to not hesitate to contact me with additional questions or concerns.     NICHELLE Jackman MD  Interventional Cardiology   05/20/2024  11:15 EDT

## 2024-05-31 ENCOUNTER — TELEPHONE (OUTPATIENT)
Dept: CARDIOLOGY | Facility: CLINIC | Age: 69
End: 2024-05-31
Payer: COMMERCIAL

## 2024-06-04 ENCOUNTER — OFFICE VISIT (OUTPATIENT)
Dept: INTERNAL MEDICINE | Facility: CLINIC | Age: 69
End: 2024-06-04
Payer: COMMERCIAL

## 2024-06-04 ENCOUNTER — TELEPHONE (OUTPATIENT)
Dept: CARDIOLOGY | Facility: CLINIC | Age: 69
End: 2024-06-04

## 2024-06-04 ENCOUNTER — TRANSCRIBE ORDERS (OUTPATIENT)
Dept: ADMINISTRATIVE | Facility: HOSPITAL | Age: 69
End: 2024-06-04
Payer: COMMERCIAL

## 2024-06-04 VITALS
BODY MASS INDEX: 36.95 KG/M2 | RESPIRATION RATE: 16 BRPM | HEART RATE: 67 BPM | OXYGEN SATURATION: 97 % | WEIGHT: 249.5 LBS | SYSTOLIC BLOOD PRESSURE: 132 MMHG | HEIGHT: 69 IN | DIASTOLIC BLOOD PRESSURE: 76 MMHG

## 2024-06-04 DIAGNOSIS — K21.9 GASTROESOPHAGEAL REFLUX DISEASE WITHOUT ESOPHAGITIS: Primary | Chronic | ICD-10-CM

## 2024-06-04 DIAGNOSIS — M54.50 LUMBAR PAIN: Primary | ICD-10-CM

## 2024-06-04 PROCEDURE — 99214 OFFICE O/P EST MOD 30 MIN: CPT | Performed by: STUDENT IN AN ORGANIZED HEALTH CARE EDUCATION/TRAINING PROGRAM

## 2024-06-04 RX ORDER — PANTOPRAZOLE SODIUM 40 MG/1
40 TABLET, DELAYED RELEASE ORAL DAILY
Qty: 90 TABLET | Refills: 1 | Status: SHIPPED | OUTPATIENT
Start: 2024-06-04

## 2024-06-04 RX ORDER — APIXABAN 5 MG/1
5 TABLET, FILM COATED ORAL ONCE
COMMUNITY
Start: 2024-06-03

## 2024-06-04 NOTE — PROGRESS NOTES
Office Note     Name: Albania Ramos    : 1955     MRN: [id number]     Chief Complaint  GI Problem (Acid reflux) and Dizziness (Believes to be ear crystals that haven't broken up)    Subjective     History of Present Illness:  Albania Ramos is a 69 y.o. female who presents today for acid reflux. Concerned about taking atorvastatin and omeprazole causing liver damage.  She also notes that omeprazole is not helping her reflux at all.      Family History:   Family History   Problem Relation Age of Onset    Atrial fibrillation Mother     Thyroid disease Mother     Stroke Mother     Arthritis Mother         Osteoarthritis    Arrhythmia Mother     Heart failure Mother     Early death Father 68        Lung cancer    Lung cancer Father     Alcohol abuse Father     Early death Sister 16        Heart attack    Heart attack Sister     Down syndrome Sister     Developmental Disability Sister         Down syndrome    Sleep apnea Brother         Nonorganic    Other Brother         Palpitations (Symptom)    Mental illness Brother     Arthritis Brother         Osteoporosis    Asthma Brother     Developmental Disability Brother         At birth    Arrhythmia Brother     Heart attack Brother     Heart failure Brother     Stroke Maternal Grandmother     Breast cancer Neg Hx     Ovarian cancer Neg Hx        Social History:   Social History     Socioeconomic History    Marital status:    Tobacco Use    Smoking status: Former     Current packs/day: 0.00     Average packs/day: 1.5 packs/day for 22.0 years (33.1 ttl pk-yrs)     Types: Cigarettes     Start date: 1974     Quit date: 1996     Years since quittin.9     Passive exposure: Past    Smokeless tobacco: Never    Tobacco comments:     Smoked menthol   Vaping Use    Vaping status: Never Used   Substance and Sexual Activity    Alcohol use: No    Drug use: Never    Sexual activity: Not Currently     Partners: Male     Birth control/protection: None,  "Hysterectomy       Health Maintenance   Topic Date Due    ANNUAL PHYSICAL  10/07/2020    RSV Vaccine - Adults (1 - 1-dose 60+ series) 03/21/2025 (Originally 1/16/2016)    COVID-19 Vaccine (7 - 2023-24 season) 06/04/2025 (Originally 9/1/2023)    INFLUENZA VACCINE  08/01/2024    BMI FOLLOWUP  11/16/2024    MAMMOGRAM  01/27/2025    LIPID PANEL  03/15/2025    DXA SCAN  10/30/2025    TDAP/TD VACCINES (3 - Td or Tdap) 06/15/2028    COLORECTAL CANCER SCREENING  12/10/2029    HEPATITIS C SCREENING  Completed    Pneumococcal Vaccine 65+  Completed    ZOSTER VACCINE  Completed    PAP SMEAR  Discontinued       Objective     Vital Signs  /76   Pulse 67   Resp 16   Ht 175.3 cm (69.02\")   Wt 113 kg (249 lb 8 oz)   SpO2 97%   BMI 36.83 kg/m²   Estimated body mass index is 36.83 kg/m² as calculated from the following:    Height as of this encounter: 175.3 cm (69.02\").    Weight as of this encounter: 113 kg (249 lb 8 oz).        Physical Exam  Vitals and nursing note reviewed.   Constitutional:       Appearance: Normal appearance.   HENT:      Head: Normocephalic and atraumatic.   Cardiovascular:      Rate and Rhythm: Normal rate and regular rhythm.      Pulses: Normal pulses.      Heart sounds: Normal heart sounds.   Pulmonary:      Effort: Pulmonary effort is normal. No respiratory distress.      Breath sounds: Normal breath sounds. No wheezing, rhonchi or rales.   Abdominal:      General: Abdomen is flat. Bowel sounds are normal.      Palpations: Abdomen is soft.      Tenderness: There is no abdominal tenderness. There is no guarding.   Musculoskeletal:      Cervical back: Neck supple.   Skin:     General: Skin is warm.      Capillary Refill: Capillary refill takes less than 2 seconds.   Neurological:      General: No focal deficit present.      Mental Status: She is alert. Mental status is at baseline.   Psychiatric:         Mood and Affect: Mood normal.         Behavior: Behavior normal.          Procedures "     Assessment and Plan     Diagnoses and all orders for this visit:    1. Gastroesophageal reflux disease without esophagitis (Primary)  Assessment & Plan:  Discontinue omeprazole.  Start pantoprazole 40 mg.  Given patient's normal liver function test, advised patient that it is okay to take atorvastatin and omeprazole together in the meantime.  For reassurance, I have advised patient to have CMP done to determine liver function after compliance with atorvastatin and omeprazole together in 6 weeks.  Follow-up with PCP in 8 weeks.    Orders:  -     Comprehensive Metabolic Panel  -     pantoprazole (Protonix) 40 MG EC tablet; Take 1 tablet by mouth Daily.  Dispense: 90 tablet; Refill: 1         Counseling was given to patient for the following topics: instructions for management, risks and benefits of treatment options, and importance of treatment compliance.    Follow Up  Return in about 2 months (around 8/4/2024) for Recheck with pcp.    MD DAVID Olson  [facility]  [address] [place] [id number] [phone]

## 2024-06-04 NOTE — TELEPHONE ENCOUNTER
".    Caller: Albania Ramos \"JULIO\"    Relationship to patient: Self    Best call back number: [phone]    Chief complaint:     Type of visit: FOLLOW UP    Requested date: ASAP     If rescheduling, when is the original appointment: 5.20.2025     Additional notes:PCP HAS ADVISED THE PATIENT TO SEE CARDIOLOGY; SHE HAS BEEN HAVING DIZZY SPELLS FOR 9 DAYS. IT IS NOT HER EARS. PLEASE CALL THE PATIENT TO RESCHEDULE. DID ADD TO THE WAIT LIST.          "

## 2024-06-04 NOTE — ASSESSMENT & PLAN NOTE
Discontinue omeprazole.  Start pantoprazole 40 mg.  Given patient's normal liver function test, advised patient that it is okay to take atorvastatin and omeprazole together in the meantime.  For reassurance, I have advised patient to have CMP done to determine liver function after compliance with atorvastatin and omeprazole together in 6 weeks.  Follow-up with PCP in 8 weeks.

## 2024-06-06 ENCOUNTER — OFFICE VISIT (OUTPATIENT)
Dept: CARDIOLOGY | Facility: CLINIC | Age: 69
End: 2024-06-06
Payer: COMMERCIAL

## 2024-06-06 VITALS
WEIGHT: 241 LBS | SYSTOLIC BLOOD PRESSURE: 120 MMHG | BODY MASS INDEX: 35.7 KG/M2 | DIASTOLIC BLOOD PRESSURE: 60 MMHG | HEART RATE: 78 BPM | HEIGHT: 69 IN | OXYGEN SATURATION: 95 %

## 2024-06-06 DIAGNOSIS — R07.9 CHEST PAIN, UNSPECIFIED TYPE: ICD-10-CM

## 2024-06-06 DIAGNOSIS — R06.09 DYSPNEA ON EXERTION: Chronic | ICD-10-CM

## 2024-06-06 DIAGNOSIS — E78.5 HYPERLIPIDEMIA LDL GOAL <70: Chronic | ICD-10-CM

## 2024-06-06 DIAGNOSIS — R60.0 BILATERAL LEG EDEMA: Chronic | ICD-10-CM

## 2024-06-06 DIAGNOSIS — I48.0 PAROXYSMAL ATRIAL FIBRILLATION: Primary | Chronic | ICD-10-CM

## 2024-06-06 RX ORDER — NITROGLYCERIN 0.4 MG/1
TABLET SUBLINGUAL
Qty: 100 TABLET | Refills: 11 | Status: SHIPPED | OUTPATIENT
Start: 2024-06-06

## 2024-06-06 NOTE — PROGRESS NOTES
"    [facility] Cardiology Office Follow Up Note    Albania Ramos  [id number]  2024    Primary Care Provider: Gordon Norman MD    Chief Complaint   Patient presents with    Follow-up    Dizziness     Subjective     History of Present Illness:   Albania Ramos is a 69 y.o. female   with paroxysmal atrial fibrillation status post ablation x2 and STEFAN on CPAP who presents to the Cardiology Clinic for follow-up of her atrial fibrillation.  Since her last visit, she was seen by Dr. Jacmkan and no changes were made.  They did discuss potentially getting a Watchman.  Patient did call back to proceed with the referral but has not heard anything back from the office and is very upset about this.  Says she feels cold while taking her blood thinner and that it is \"painful\" and very uncomfortable for her.  She has not fallen or had any injuries or bleeding on the Eliquis.  She would like to discuss potential Watchman implantation.  She continues to have the same type of chest pressure and dyspnea when she exerts herself.  She also has some significant dizziness which can happen randomly and is not associated with positional changes.  Her primary care thought it could be her heart or her medications.    Past Cardiac Testin. Last Coronary Angio: No known    2. Prior Stress Testin2024    This is an abnormal, pharmacologic, nuclear stress test. Overall impression consistent with a low risk study.    Myocardial perfusion imaging indicates a moderately-sized infarct located in the anterior wall with no significant ischemia noted. Suspect significant breast atenuation artifact, but perfusion abnormality did not correct with prone positioning.    ECG portion nondiagnostic.    Left ventricular ejection fraction is normal (Calculated EF = 70%).    3. Last Echo:   4/15/2024    Left ventricular systolic function is normal. Left ventricular ejection fraction appears to be 56 - 60%. Left ventricular " diastolic function was normal.    Left ventricular wall thickness is consistent with mild concentric hypertrophy.    The right ventricular cavity is borderline dilated with preserved systolic function.    No hemodynamically significant valvular dysfunction.    4. Prior Holter Monitor: Loop recorder removed on 11/6/2019              1.  SCANNED - CARDIOLOGY (10/07/2019)    Review of Systems:  Review of Systems   Constitutional: Negative.    Respiratory:  Positive for chest tightness and shortness of breath. Negative for cough.    Cardiovascular:  Positive for chest pain. Negative for palpitations and leg swelling.   Gastrointestinal: Negative.    Musculoskeletal: Negative.    Neurological:  Positive for dizziness. Negative for syncope and light-headedness.       I have reviewed and/or updated the patient's past medical, past surgical, family, social history, problem list and allergies as appropriate.       Current Outpatient Medications:     albuterol (PROVENTIL) (2.5 MG/3ML) 0.083% nebulizer solution, , Disp: , Rfl:     albuterol sulfate  (90 Base) MCG/ACT inhaler, Inhale 2 puffs Every 4 (Four) Hours As Needed for Wheezing or Shortness of Air., Disp: 18 g, Rfl: 0    allopurinol (ZYLOPRIM) 300 MG tablet, TAKE ONE TABLET BY MOUTH EVERY DAY, Disp: 90 tablet, Rfl: 3    atorvastatin (LIPITOR) 40 MG tablet, Take 1 tablet by mouth Daily., Disp: 30 tablet, Rfl: 11    calcium carbonate (OS-SHANTE) 600 MG tablet, Take 1 tablet by mouth 2 (Two) Times a Day With Meals. NOT CURRENTLY TAKING, Disp: , Rfl:     cyanocobalamin (VITAMIN B-12) 2500 MCG tablet tablet, TAKE ONE TABLET BY MOUTH THREE TIMES A WEEK MUST MAKE AN APPOINTMENT (Patient taking differently: Take 1,200 mcg by mouth 1 (One) Time Per Week. MONDAYS), Disp: 30 tablet, Rfl: 5    Diclofenac Sodium (VOLTAREN) 1 % gel gel, Apply 4 g topically to the appropriate area as directed 4 (Four) Times a Day As Needed (pain)., Disp: 100 g, Rfl: 11    docusate sodium 100 MG  capsule, Take 1 capsule by mouth 2 (Two) Times a Day As Needed for Constipation., Disp: 30 capsule, Rfl: 0    Eliquis 5 MG tablet tablet, Take 1 tablet by mouth 1 (One) Time., Disp: , Rfl:     estradiol (ESTRACE) 0.1 MG/GM vaginal cream, Insert TWO grams vaginally daily AS NEEDED FOR vaginal dryness. Usually uses twice A WEEK, Disp: 42.5 g, Rfl: 11    GLUCOSAMINE HCL-MSM PO, 1 tablet Daily. NOT CURRENTLY TAKING, Disp: , Rfl:     hydroCHLOROthiazide (HYDRODIURIL) 12.5 MG tablet, Take 1 tablet by mouth Daily., Disp: 90 tablet, Rfl: 3    ipratropium (ATROVENT) 0.06 % nasal spray, , Disp: , Rfl:     levothyroxine (SYNTHROID, LEVOTHROID) 150 MCG tablet, TAKE ONE TABLET BY MOUTH EVERY DAY, Disp: 90 tablet, Rfl: 2    metoprolol tartrate (LOPRESSOR) 25 MG tablet, Take 1 tablet by mouth Daily., Disp: 30 tablet, Rfl: 6    montelukast (Singulair) 10 MG tablet, Take 1 tablet by mouth Every Night. PRN (Patient taking differently: Take 1 tablet by mouth As Needed. PRN), Disp: 90 tablet, Rfl: 1    MULTIPLE VITAMIN PO, 1 tablet Daily. NOT CURRENTLY TAKING, Disp: , Rfl:     NON FORMULARY, Lions ramiro mushrooms, Disp: , Rfl:     nystatin (MYCOSTATIN) 100,000 unit/mL suspension, Take 5 mL by mouth 4 (Four) Times a Day., Disp: 280 mL, Rfl: 0    ofloxacin (FLOXIN) 0.3 % otic solution, , Disp: , Rfl:     Omega-3 Fatty Acids (FISH OIL) 435 MG capsule, Take 1,000 mg by mouth Daily. NOT CURRENTLY TAKING, Disp: , Rfl:     pantoprazole (Protonix) 40 MG EC tablet, Take 1 tablet by mouth Daily., Disp: 90 tablet, Rfl: 1    tiotropium (SPIRIVA) 18 MCG per inhalation capsule, Place 1 capsule into inhaler and inhale Daily., Disp: 90 capsule, Rfl: 3    TURMERIC PO, Take  by mouth., Disp: , Rfl:     vitamin C (ASCORBIC ACID) 250 MG tablet, Take 1 tablet by mouth Daily., Disp: , Rfl:     albuterol sulfate HFA (Ventolin HFA) 108 (90 Base) MCG/ACT inhaler, Inhale 2 puffs Every 4 (Four) Hours As Needed for Wheezing or Shortness of Air. (Patient not  "taking: Reported on 6/6/2024), Disp: 18 g, Rfl: 5    nitroglycerin (NITROSTAT) 0.4 MG SL tablet, 1 under the tongue as needed for angina, may repeat q5mins for up three doses, Disp: 100 tablet, Rfl: 11    Current Facility-Administered Medications:     triamcinolone acetonide (KENALOG-40) injection 40 mg, 40 mg, Intra-articular, Once, Gordon Norman MD       Objective     Vitals:  Vitals:    06/06/24 1352 06/06/24 1400 06/06/24 1401 06/06/24 1403   BP: 124/64 130/60 130/60 120/60   BP Location: Left arm Right arm Right arm Right arm   Patient Position: Sitting Lying Sitting Standing   Cuff Size: Adult Adult Adult Adult   Pulse: 73 68 68 78   SpO2: 95%      Weight: 109 kg (241 lb)      Height: 175.3 cm (69\")          Physical Exam:  Vitals reviewed.   Constitutional:       Appearance: Healthy appearance. Not in distress.   Cardiovascular:      Normal rate. Regular rhythm. Normal S1. Normal S2.       Murmurs: There is no murmur.      No gallop.  No click. No rub.   Pulses:     Intact distal pulses.   Edema:     Peripheral edema absent.   Skin:     General: Skin is warm and dry.         Results Review:   I reviewed the patient's new clinical results.  I reviewed the patient's new imaging results and agree with the interpretation.  I personally viewed and interpreted the patient's EKG/Telemetry data      ECG 12 Lead    Date/Time: 6/6/2024 3:10 PM  Performed by: Rogelio Bartlett MD    Authorized by: Rogelio Bartlett MD  Comparison: compared with previous ECG from 3/19/2024  Similar to previous ECG  Rhythm: sinus rhythm  Rate: normal  BPM: 66  Conduction: conduction normal  ST Segments: ST segments normal  T Waves: T waves normal  QRS axis: normal  Other: no other findings    Clinical impression: normal ECG              Assessment & Plan   Diagnoses and all orders for this visit:    1. Chest pain, unspecified type (Primary)  2. REYES (dyspnea on exertion)  ER workup was normal with normal BNP and troponins.  Recent nuclear " stress test was abnormal but felt to be low risk.  Possible anterior infarct but no ischemia.  Likely significant breast attenuation artifact.  Again, difficult to assess her true symptoms with being sedentary.  Considering her age and risk factors, likely has some degree of CAD.  -- Since there is no evidence of any high risk ischemia, will try medical therapy first and if remains symptomatic, would pursue angiography at that point.  -- Increase metoprolol to 25 twice daily.  Will call back in 2 weeks.  If ineffective, we will start Ranexa.  Would probably avoid Imdur due to her dizziness.  -- I do not think her dizziness is cardiac related, therefore will defer to primary care and ENT.  Appointment is scheduled for next week.     3. Paroxysmal atrial fibrillation  S/p ablation x 2.  Previously ILR removed.  [id number] 2-3.  No history of DM, MI, stroke, CHF, or hypertension.  Asymptomatic from the standpoint.  No bleeding on Eliquis.  -- Continue Eliquis and metoprolol  -- Patient requests referral for Watchman evaluation.  Wants to be off of blood thinner due to being cold all the time.  Has had no bleeding complications or falls recently.  Placing referral to EP.     4. Bilateral leg edema  Chronic, stable, status post vein ablation.    No significant edema today.  Recent echo showed normal EF with normal diastology with mild LVH.  RV borderline dilated with preserved function.  Likely due to her STEFAN.  -- Continue HCTZ only as needed     5. Hyperlipidemia, LDL goal <70  LDL 130s.  ASCVD risk is 7.6%.  Intermediate range.  Denies any history of heart attack or stroke.  -- I do think a statin should be considered.  However, patient has a lot going on right now between her dizziness and chest pain without any clinical ASCVD. I do think we can hold off for now. If she does get better with antianginals, would try to convince her to start a statin.          Plan   Orders Placed This Encounter   Procedures     Ambulatory Referral to Cardiac Electrophysiology     Referral Priority:   Routine     Referral Type:   Consultation     Referral Reason:   Specialty Services Required     Requested Specialty:   Cardiac Electrophysiology     Number of Visits Requested:   1     Medications:  -     metoprolol tartrate (LOPRESSOR) 25 MG tablet; Take 1 tablet by mouth Daily.  Dispense: 30 tablet; Refill: 6  -     nitroglycerin (NITROSTAT) 0.4 MG SL tablet; 1 under the tongue as needed for angina, may repeat q5mins for up three doses  Dispense: 100 tablet; Refill: 11    Preventative Cardiology:   Tobacco Cessation: N/A  Obstructive Sleep Apnea Screening: Completed  AAA Screening: Completed  Peripheral Arterial Disease Screening: Deffered       Follow Up:   Return in about 2 months (around 8/6/2024).    Thank you for allowing me to participate in the care of your patient. Please to not hesitate to contact me with additional questions or concerns.     I spent 42 minutes evaluating, treating, counseling, coordinating patient care, reviewing old/outside records, and documenting. This excludes time spent on separately billable services and procedures.       Rogelio Bartlett MD  06/06/2024  08:22 EDT

## 2024-06-07 ENCOUNTER — TELEPHONE (OUTPATIENT)
Dept: CARDIOLOGY | Facility: CLINIC | Age: 69
End: 2024-06-07

## 2024-06-07 NOTE — TELEPHONE ENCOUNTER
"    Caller: Albania Ramos \"JULIO\"    Relationship: Self    Best call back number: [phone]    Which medication are you concerned about: METOPROLOL 25 MG TABLET.    Who prescribed you this medication: DR. GARCIA INCREASED DOSAGE.    When did you start taking this medication: INCREASED 6-6-24.    What are your concerns: PATIENT WENT TO  MEDICATION AT [facility] AND DOSAGE IS NOT WHAT WAS DISCUSSED IN OFFICE WITH DR. GARCIA. PLEASE REACH OUT TO PATIENT. PATIENT STATES SHE WAS TO TAKE TWO TIMES DAILY, BOTTLE STATES ONE TIME DAILY.    How long have you had these concerns: YES        "

## 2024-06-13 PROBLEM — R19.7 DIARRHEA: Chronic | Status: RESOLVED | Noted: 2023-07-05 | Resolved: 2024-06-13

## 2024-06-13 PROBLEM — R10.9 RIGHT SIDED ABDOMINAL PAIN: Status: RESOLVED | Noted: 2023-07-05 | Resolved: 2024-06-13

## 2024-06-13 PROBLEM — K57.92 DIVERTICULITIS: Status: RESOLVED | Noted: 2023-03-22 | Resolved: 2024-06-13

## 2024-06-13 PROBLEM — R11.0 NAUSEA: Status: RESOLVED | Noted: 2023-07-05 | Resolved: 2024-06-13

## 2024-06-13 PROBLEM — Z98.890 HISTORY OF LOOP RECORDER: Status: ACTIVE | Noted: 2024-06-13

## 2024-06-13 PROBLEM — R06.09 DYSPNEA ON EXERTION: Status: RESOLVED | Noted: 2017-11-28 | Resolved: 2024-06-13

## 2024-06-13 PROBLEM — R10.32 LEFT LOWER QUADRANT ABDOMINAL PAIN: Chronic | Status: RESOLVED | Noted: 2022-12-23 | Resolved: 2024-06-13

## 2024-06-13 PROBLEM — R19.7 DIARRHEA: Status: RESOLVED | Noted: 2023-03-22 | Resolved: 2024-06-13

## 2024-06-13 PROBLEM — I10 PRIMARY HYPERTENSION: Status: ACTIVE | Noted: 2024-06-13

## 2024-06-13 PROBLEM — K57.32 DIVERTICULITIS LARGE INTESTINE: Status: RESOLVED | Noted: 2022-03-25 | Resolved: 2024-06-13

## 2024-06-13 NOTE — PROGRESS NOTES
Cardiac Electrophysiology Outpatient Consult Note            [facility]    Consult Note     Albania Ramos  [id number]  06/17/2024  [phone]     Primary Care Physician: Gordon Norman MD    Referred By: Rogelio Bartlett MD    Subjective     Chief Complaint:   Diagnoses and all orders for this visit:    1. Paroxysmal atrial fibrillation (Primary)      Chief Complaint   Patient presents with    <9>Paroxysmal atrial fibrillation       History of Present Illness:   Albania Ramos is a 69 y.o. female who presents to my electrophysiology clinic for evaluation for left atrial appendage occlusion.  She has paroxysmal atrial fibrillation and has had 2 previous ablations.  She is on chronic anticoagulation.  This makes her feel very cold and is quite painful causing a burning sensation throughout her body.  This has been very difficult for her to tolerate.  She has an extensive family history of atrial fibrillation to include pretty much every first-degree relative.  She has had several first-degree relatives have stroke while on anticoagulation.  This is causing her great concern and anxiety.  She has occasional awareness of palpitations which so far remain self-limiting.    Past Medical History:   Patient Active Problem List    Diagnosis Date Noted    Staggering gait 06/18/2024    History of loop recorder 06/13/2024    Primary hypertension 06/13/2024    Hyperlipidemia LDL goal <70 06/06/2024    Class 2 severe obesity due to excess calories with serious comorbidity and body mass index (BMI) of 36.0 to 36.9 in adult 07/05/2023    Personal history of colonic polyps 07/05/2023    DDD (degenerative disc disease), cervical 03/22/2023    DDD (degenerative disc disease), lumbosacral 03/22/2023    Environmental allergies 03/22/2023    STEFAN on CPAP 03/22/2023    Spastic colon 03/22/2023    Arthritis 03/22/2023    Osteoarthritis 03/22/2023    Fatty liver 03/22/2023    GERD  (gastroesophageal reflux disease) 03/22/2023    Malignant neoplasm of thyroid gland 03/22/2023    Incisional hernia 10/27/2022    Nuclear sclerotic cataract of left eye 12/10/2021    Bilateral leg edema 09/07/2021    PONV (postoperative nausea and vomiting) 07/29/2021    Frequent urinary incontinence 12/09/2020    History of frequent urinary tract infections 12/09/2020    Chest pain 11/28/2017     Note Last Updated: 6/13/2024     Myocardial perfusion study 12-6-174: No reversible ischemia EF 67%  MPS, 5/7/2024: Moderately sized infarct located in the anterior wall with no significant ischemia noted.  EF 70.  Low risk study.      PVCs (premature ventricular contractions) 07/26/2016    Hypothyroidism 05/12/2016    Cobalamin deficiency 05/12/2016    Malignant neoplasm of thyroid gland 05/12/2016    Torn cartilage 05/12/2016    Atopic rhinitis 05/12/2016    Paroxysmal atrial fibrillation 05/12/2016     Note Last Updated: 6/17/2024     A-fib ablation x 2  Echo, 4/11/2024: EF 55 to 60%.  Normal valvular morphology.  Normal left atrial size.         Past Surgical History:   Past Surgical History:   Procedure Laterality Date    ABLATION OF DYSRHYTHMIC FOCUS      x 2    BRONCHOSCOPY      CARDIAC CATHETERIZATION      ABLATION X2    CARDIAC ELECTROPHYSIOLOGY PROCEDURE N/A 08/04/2016    Procedure: Loop recorder implant;  Surgeon: Himanshu Calvert MD;  Location:  [facility];  Service:     CARDIAC ELECTROPHYSIOLOGY PROCEDURE N/A 10/13/2016    Procedure: Loop recorder removal;  Surgeon: Himanshu Calvert MD;  Location:  [facility];  Service:     CARDIAC ELECTROPHYSIOLOGY PROCEDURE N/A 12/18/2017    Procedure: Loop insertion;  Surgeon: Mary Ann Blackwell MD;  Location:  [facility];  Service:     CARDIAC ELECTROPHYSIOLOGY PROCEDURE N/A 11/06/2019    Procedure: Loop recorder removal;  Surgeon: Branden Chatterjee MD;  Location:  [facility];  Service: Cardiovascular     CATARACT EXTRACTION W/ INTRAOCULAR LENS IMPLANT Left 12/20/2021    Procedure: CATARACT PHACO EXTRACTION WITH INTRAOCULAR LENS IMPLANT LEFT;  Surgeon: Arthur Mcdonald MD;  Location: [facility] OR;  Service: Ophthalmology;  Laterality: Left;    CATARACT EXTRACTION W/ INTRAOCULAR LENS IMPLANT Right 01/03/2022    Procedure: CATARACT PHACO EXTRACTION WITH INTRAOCULAR LENS IMPLANT RIGHT WITH VIVITY LENS;  Surgeon: Arthur Mcdonald MD;  Location: [facility] OR;  Service: Ophthalmology;  Laterality: Right;    CHOLECYSTECTOMY      COLON RESECTION N/A 03/25/2022    Procedure: LAPAROSCOPIC  COLON RESECTION SIGMOIDECTOMY;  Surgeon: Arturo Kumar MD;  Location: [facility] OR;  Service: General;  Laterality: N/A;    COLONOSCOPY  12/10/2019    COLONOSCOPY  12/2021    DILATATION AND CURETTAGE      EAR TUBES Bilateral     ENDOVENOUS ABLATION SAPHENOUS VEIN W/ LASER Left 02/05/2024    ENDOVENOUS ABLATION SAPHENOUS VEIN W/ LASER Right 02/19/2024    EYE SURGERY  12/2021 & 01/2022    Had cateracs removed from both eyes and multi focal implants put in both.    FOOT SURGERY Left     bone spur    HAMMER TOE REPAIR Left 11/13/2020    INGUINAL HERNIA REPAIR Left     x 3    JOINT REPLACEMENT  2012, 2014    Bilateral knee replacements    LUNG BIOPSY      Remote    LYMPH NODE BIOPSY      REPLACEMENT TOTAL KNEE BILATERAL Bilateral     THYROIDECTOMY, PARTIAL Left 06/2012    Dr. Cerda, [place]    THYROIDECTOMY, PARTIAL Right 07/2012    Dr. Cerda, Christian    TONSILLECTOMY  07/2020    Dr. Ethan Mcneill    TOTAL ABDOMINAL HYSTERECTOMY WITH SALPINGO OOPHORECTOMY Bilateral 1996    TOTAL THYROIDECTOMY      completion thyroidectomy for follicular thyroid cancer.      TYMPANOSTOMY TUBE PLACEMENT Right 07/2020    Dr. Ethan Mcneill    UPPER GASTROINTESTINAL ENDOSCOPY      15-20 years ago    VENTRAL/INCISIONAL HERNIA REPAIR N/A 10/27/2022    Procedure: INCISIONAL HERNIA REPAIR WITH MESH,  COMPONENT SEPARATION;  Surgeon: Arturo Kumar MD;  Location:   [facility] OR;  Service: General;  Laterality: N/A;    WISDOM TOOTH EXTRACTION         Social History:   Social History     Socioeconomic History    Marital status:    Tobacco Use    Smoking status: Former     Current packs/day: 0.00     Average packs/day: 1.5 packs/day for 22.0 years (33.1 ttl pk-yrs)     Types: Cigarettes     Start date: 1974     Quit date: 1996     Years since quittin.1     Passive exposure: Past    Smokeless tobacco: Never    Tobacco comments:     Smoked menthol   Vaping Use    Vaping status: Never Used   Substance and Sexual Activity    Alcohol use: No    Drug use: Never    Sexual activity: Not Currently     Partners: Male     Birth control/protection: None, Hysterectomy       Medications:     Current Outpatient Medications:     albuterol (PROVENTIL) (2.5 MG/3ML) 0.083% nebulizer solution, , Disp: , Rfl:     albuterol sulfate HFA (Ventolin HFA) 108 (90 Base) MCG/ACT inhaler, Inhale 2 puffs Every 4 (Four) Hours As Needed for Wheezing or Shortness of Air. (Patient not taking: Reported on 2024), Disp: 18 g, Rfl: 5    albuterol sulfate  (90 Base) MCG/ACT inhaler, Inhale 2 puffs Every 4 (Four) Hours As Needed for Wheezing or Shortness of Air., Disp: 18 g, Rfl: 0    allopurinol (ZYLOPRIM) 300 MG tablet, TAKE ONE TABLET BY MOUTH EVERY DAY, Disp: 90 tablet, Rfl: 3    atorvastatin (LIPITOR) 40 MG tablet, Take 1 tablet by mouth Daily., Disp: 30 tablet, Rfl: 11    calcium carbonate (OS-SHANTE) 600 MG tablet, Take 1 tablet by mouth 2 (Two) Times a Day With Meals. NOT CURRENTLY TAKING, Disp: , Rfl:     cyanocobalamin (VITAMIN B-12) 2500 MCG tablet tablet, TAKE ONE TABLET BY MOUTH THREE TIMES A WEEK MUST MAKE AN APPOINTMENT (Patient taking differently: Take 1,200 mcg by mouth 1 (One) Time Per Week. ), Disp: 30 tablet, Rfl: 5    Diclofenac Sodium (VOLTAREN) 1 % gel gel, Apply 4 g topically to the appropriate area as directed 4 (Four) Times a Day As Needed (pain)., Disp: 100 g,  Rfl: 11    docusate sodium 100 MG capsule, Take 1 capsule by mouth 2 (Two) Times a Day As Needed for Constipation., Disp: 30 capsule, Rfl: 0    Eliquis 5 MG tablet tablet, Take 1 tablet by mouth 1 (One) Time., Disp: , Rfl:     estradiol (ESTRACE) 0.1 MG/GM vaginal cream, Insert TWO grams vaginally daily AS NEEDED FOR vaginal dryness. Usually uses twice A WEEK, Disp: 42.5 g, Rfl: 11    GLUCOSAMINE HCL-MSM PO, 1 tablet Daily. NOT CURRENTLY TAKING, Disp: , Rfl:     hydroCHLOROthiazide (HYDRODIURIL) 12.5 MG tablet, Take 1 tablet by mouth Daily., Disp: 90 tablet, Rfl: 3    ipratropium (ATROVENT) 0.06 % nasal spray, , Disp: , Rfl:     levothyroxine (SYNTHROID, LEVOTHROID) 150 MCG tablet, TAKE ONE TABLET BY MOUTH EVERY DAY, Disp: 90 tablet, Rfl: 2    metoprolol tartrate (LOPRESSOR) 25 MG tablet, Take 1 tablet by mouth 2 (Two) Times a Day., Disp: 180 tablet, Rfl: 3    montelukast (Singulair) 10 MG tablet, Take 1 tablet by mouth Every Night. PRN (Patient taking differently: Take 1 tablet by mouth As Needed. PRN), Disp: 90 tablet, Rfl: 1    MULTIPLE VITAMIN PO, 1 tablet Daily. NOT CURRENTLY TAKING, Disp: , Rfl:     nitroglycerin (NITROSTAT) 0.4 MG SL tablet, 1 under the tongue as needed for angina, may repeat q5mins for up three doses, Disp: 100 tablet, Rfl: 11    NON FORMULARY, Lions ramiro mushrooms, Disp: , Rfl:     nystatin (MYCOSTATIN) 100,000 unit/mL suspension, Take 5 mL by mouth 4 (Four) Times a Day., Disp: 280 mL, Rfl: 0    ofloxacin (FLOXIN) 0.3 % otic solution, , Disp: , Rfl:     Omega-3 Fatty Acids (FISH OIL) 435 MG capsule, Take 1,000 mg by mouth Daily. NOT CURRENTLY TAKING, Disp: , Rfl:     pantoprazole (Protonix) 40 MG EC tablet, Take 1 tablet by mouth Daily., Disp: 90 tablet, Rfl: 1    tiotropium (SPIRIVA) 18 MCG per inhalation capsule, Place 1 capsule into inhaler and inhale Daily., Disp: 90 capsule, Rfl: 3    TURMERIC PO, Take  by mouth., Disp: , Rfl:     vitamin C (ASCORBIC ACID) 250 MG tablet, Take 1 tablet  "by mouth Daily., Disp: , Rfl:     Current Facility-Administered Medications:     triamcinolone acetonide (KENALOG-40) injection 40 mg, 40 mg, Intra-articular, Once, Gordon Norman MD    Allergies:   Allergies   Allergen Reactions    Codeine Other (See Comments)     Pt states \"it made me feel like my insides were in a vice \"    Niacin Hives    Darvon [Propoxyphene] Nausea And Vomiting    Zofran [Ondansetron] Other (See Comments)     Face/neck/chest very red/flushed after IV Zofran administration; tolerates po Zofran without difficultly     Adhesive Tape Rash     tegaderm     Bentyl [Dicyclomine] Rash     Patient states she turned \"bright red\"    Ciprodex [Ciprofloxacin-Dexamethasone] Other (See Comments)     REDNESS,tendonitis    Flagyl [Metronidazole] Nausea Only    Other Other (See Comments)     POLLEN,TREES,AND PLANTS MULTIPLE ENVIRONMENTAL ALLERGIES    Prednisone Rash       Objective   Vital Signs:   Vitals:    06/17/24 0845   BP: 110/68   BP Location: Left arm   Patient Position: Sitting   Cuff Size: Adult   Pulse: 55   SpO2: 95%   Weight: 116 kg (255 lb)   Height: 175.3 cm (69\")       PHYSICAL EXAM    Neck: no adenopathy, no carotid bruit, no JVD, and thyroid: not enlarged  Lungs: clear to auscultation bilaterally and no rhonchi or crackles\", ' symmetric  Heart: regular rate and rhythm, S1, S2 normal, no murmur, click, rub or gallop  Abdomen: Soft, non-tender, bowel sounds normal,  no organomegaly  Extremities: extremities normal, atraumatic, no cyanosis or edema      Lab Results   Component Value Date    GLUCOSE 104 (H) 07/12/2024    CALCIUM 8.6 07/12/2024     07/12/2024    K 4.4 07/12/2024    CO2 35.0 (H) 07/12/2024     07/12/2024    BUN 27 (H) 07/12/2024    CREATININE 0.89 07/12/2024    EGFRIFAFRI 80 11/16/2021    EGFRIFNONA 80 01/31/2022    BCR 30.3 (H) 07/12/2024    ANIONGAP 5.0 07/12/2024     Lab Results   Component Value Date    WBC 10.45 07/12/2024    HGB 13.0 07/12/2024    HCT 42.2 " 07/12/2024    MCV 92.7 07/12/2024     07/12/2024     Lab Results   Component Value Date    INR 1.01 09/05/2023    INR 0.88 10/13/2016    INR 0.96 10/15/2015    PROTIME 13.9 09/05/2023    PROTIME 9.6 10/13/2016    PROTIME 10.0 10/15/2015     Lab Results   Component Value Date    TSH 0.858 06/24/2024    X9KSTAA 13.5 (H) 06/07/2016       CHADS-VASc Risk Assessment               3 Total Score    1 Age 65-74    1  1 Sex: Female   Hypertension       Criteria that do not apply:    CHF        Age >/= 75    DM    PRIOR STROKE/TIA/THROMBO    Vascular Disease                                            HAS-Bled Score for Major Bleeding Risk       Question  Yes  No    Hypertension History - BP Systolic >160 mmHg, no treatment x    2.   Renal Disease - Creatinine higher than 2.6 mg/dL (200mmol/L)     3.   Liver Disease - Bilirubin higher 2x or AST/ALT/AP higher 3x normal x    4.   Stroke History      5.   History of major bleeding or predisposition      6.   Labile INR - unstable/high INRs, time in Therapeutic Range <60%     7.   Age > 65  x    8.   Under medication that predisposes to bleeding  x    9.   Alcohol or drug usage history - more than 7 drinks per week              Score Major bleeding risk   0 1.10%   1 3.40%   2 4.10%   3 5.80%   4 8.90%   5 9.10%   >6 higher %             I personally viewed and interpreted the patient's EKG/Telemetry/lab data    Procedures    Tobacco Cessation: N/A  Obstructive Sleep Apnea Screening: Known obstructive sleep apnea and is CPAP compliant    Assessment & Plan    Diagnoses and all orders for this visit:    1. Paroxysmal atrial fibrillation (Primary)         Diagnosis Plan   1. Paroxysmal atrial fibrillation  Patient referred for consideration for percutaneous left atrial appendage closure.    [id number] equals 3  HASBLED = 4    Patient is at high risk for Cardine bog stroke.  She is a poor candidate for long-term anticoagulation because of intolerance to multiple anticoagulants  with severe symptoms.  She and I discussed the option of left atrial punch closure with the Watchman device.  She is interested and wishes to proceed.    I quoted the patient a less than 1% chance of significant procedure complication risk including but not limited to vascular injury and bleeding, cardiac perforation, tamponade, stroke, myocardial infarction, death, device related thrombosis, embolization of the device requiring either percutaneous or open surgical retrieval as well as other potential unforseen complications.  The patient understands these risks and wishes to proceed.    The patient and I made a mutually shared decision ( shared decision making model ) that the patient would be best served by proceeding with the above invasive heart procedure.  This is a high risk invasive cardiac procedure which comes with significant risk of morbidity and mortality.  This patient has significant underlying  heart disease.  Albania Ramos understands these risks and has made an informed decision to proceed.    Preop CT  Watchman  Moderate sedation  No RONY  Dual antiplatelet therapy        Body mass index is 37.66 kg/m².    I spent 45 minutes in consultation with this patient which included more than 65% of this time in direct face-to-face counseling, physical examination and discussion of my assessment and findings and this shared decision making with the patient.  The remainder of the time not spent face-to-face was performing one, some or all of the following actions: preparing to see the patient (e.g. reviewing tests, prior clinicians' notes, etc), ordering medications, tests or procedures, coordination of care, discussion of the plan with other healthcare providers, documenting clinical information in epic as well as independently interpreting results and communication of these results to the patient family and/or caregiver(s).  Please note that this explicitly excludes time spent on other separate billable  services such as performing procedures or test interpretation, when applicable.    Follow Up:       Thank you for allowing me to participate in the care of your patient. Please to not hesitate to contact me with additional questions or concerns.      Miguel Yu DO, FAC, [facility] [[email or web address]]  Cardiac Electrophysiologist  [facility]              Electronically signed by ADENIKE Guy, 06/13/24, 3:24 PM EDT.

## 2024-06-17 ENCOUNTER — OFFICE VISIT (OUTPATIENT)
Dept: CARDIOLOGY | Facility: CLINIC | Age: 69
End: 2024-06-17
Payer: COMMERCIAL

## 2024-06-17 VITALS
HEART RATE: 55 BPM | WEIGHT: 255 LBS | HEIGHT: 69 IN | SYSTOLIC BLOOD PRESSURE: 110 MMHG | OXYGEN SATURATION: 95 % | BODY MASS INDEX: 37.77 KG/M2 | DIASTOLIC BLOOD PRESSURE: 68 MMHG

## 2024-06-17 DIAGNOSIS — I48.0 PAROXYSMAL ATRIAL FIBRILLATION: Primary | ICD-10-CM

## 2024-06-17 RX ORDER — SUMATRIPTAN 25 MG/1
25 TABLET, FILM COATED ORAL AS NEEDED
COMMUNITY
Start: 2024-06-10

## 2024-06-18 ENCOUNTER — PREP FOR SURGERY (OUTPATIENT)
Dept: OTHER | Facility: HOSPITAL | Age: 69
End: 2024-06-18
Payer: COMMERCIAL

## 2024-06-18 ENCOUNTER — TELEPHONE (OUTPATIENT)
Dept: CARDIOLOGY | Facility: CLINIC | Age: 69
End: 2024-06-18
Payer: COMMERCIAL

## 2024-06-18 DIAGNOSIS — I48.0 PAROXYSMAL ATRIAL FIBRILLATION: Primary | Chronic | ICD-10-CM

## 2024-06-18 DIAGNOSIS — R26.0 STAGGERING GAIT: ICD-10-CM

## 2024-06-18 RX ORDER — SODIUM CHLORIDE 0.9 % (FLUSH) 0.9 %
10 SYRINGE (ML) INJECTION AS NEEDED
OUTPATIENT
Start: 2024-06-18

## 2024-06-18 RX ORDER — ONDANSETRON 2 MG/ML
4 INJECTION INTRAMUSCULAR; INTRAVENOUS EVERY 6 HOURS PRN
OUTPATIENT
Start: 2024-06-18

## 2024-06-18 RX ORDER — ACETAMINOPHEN 325 MG/1
650 TABLET ORAL EVERY 4 HOURS PRN
OUTPATIENT
Start: 2024-06-18

## 2024-06-18 RX ORDER — CEFAZOLIN SODIUM 2 G/100ML
2 INJECTION, SOLUTION INTRAVENOUS ONCE
OUTPATIENT
Start: 2024-06-18 | End: 2024-06-18

## 2024-06-18 RX ORDER — SODIUM CHLORIDE 9 MG/ML
40 INJECTION, SOLUTION INTRAVENOUS AS NEEDED
OUTPATIENT
Start: 2024-06-18

## 2024-06-18 RX ORDER — NITROGLYCERIN 0.4 MG/1
0.4 TABLET SUBLINGUAL
OUTPATIENT
Start: 2024-06-18

## 2024-06-18 RX ORDER — SODIUM CHLORIDE 0.9 % (FLUSH) 0.9 %
3 SYRINGE (ML) INJECTION EVERY 12 HOURS SCHEDULED
OUTPATIENT
Start: 2024-06-18

## 2024-06-18 NOTE — TELEPHONE ENCOUNTER
LAAO Nurse Navigator    Patient referred for Left Atrial Appendage Closure Device.      Atrial fibrillation / Atrial flutter:  Quality Measure    CHADS-VASc-  Age 65-74 (1)  Sex Female (1)  CHF history  No (0)  HTN history  Yes (1)  Vascular disease history  No (0)  DM history  No (0)  Stroke/TIA/Thromboembolism history No (0)  CHADS-VASc Score: 3        HAS-BLED-  Hypertension-1  Abnormal renal-0  Abnormal liver-1  Hx Stroke-0  Bleeding tendency or predisposition-0  Labile INR-0  Age (greater tahnd 65)-1  Drugs-1  Alcohol-0  HAS-BLED Score:4    Anticoagulation plan: DNS Eliquis start ASA 81mg Daily by mouth on the morning of the procedure.    Statin Therapy (for patients with a history of CAD, CVA/TIA, PVA, DM)  Yes-Atorvastatin      Reviewed LAAO information with patient, particularly procedural information and shared decision making.  Pamphlet left for future reference.  Verbalized understanding.  I gave them my contact information and encouraged to call me with questions or concerns in the interim.  Pt reported Staggering gate and frequently almost falls, , pain shooting through her body from the coldness the AC causes.       06/18/24  14:34 EDT

## 2024-06-19 NOTE — TELEPHONE ENCOUNTER
Department of Anesthesiology  Preprocedure Note       Name:  Patt Bass   Age:  32 y.o.  :  1992                                          MRN:  [id number]         Date:  2024      Surgeon: Surgeon(s):  Nimesh Brizuela MD    Procedure: Procedure(s):  ** ADD ON E-6**APPENDECTOMY LAPAROSCOPIC, POSS OPEN    Medications prior to admission:   Prior to Admission medications    Medication Sig Start Date End Date Taking? Authorizing Provider   sennosides-docusate sodium (SENOKOT-S) 8.6-50 MG tablet Take 1 tablet by mouth in the morning and at bedtime 24   Chandler Gillis MD   acetaminophen (TYLENOL) 500 MG tablet Take 2 tablets by mouth every 6 hours as needed for Pain 24   Chandler Gillis MD   ibuprofen (ADVIL;MOTRIN) 600 MG tablet Take 1 tablet by mouth every 6 hours as needed for Pain 24   Chandler Gillis MD   ferrous sulfate (FE TABS 325) 325 (65 Fe) MG EC tablet Take 1 tablet by mouth daily (with breakfast)    Provider, MD Carol   ustekinumab (STELARA) 90 MG/ML SOSY prefilled syringe Inject the contents of 1 syringe (90 mg) underneath the skin every 8 weeks  Patient not taking: Reported on 2024 4/15/24   Suni Pierre MD   TRUEplus Lancets 33G MISC USE AS DIRECTED FOUR TIMES A DAY  Patient not taking: Reported on 2024 4/10/24   Mansi Gates APRN - CNM   TRUE METRIX BLOOD GLUCOSE TEST strip USE TO TEST FOUR TIMES A DAY AND AS NEEDED FOR SYMPTOMS OF IRREGULAR BLOOD GLUCOSE  Patient not taking: Reported on 2024 4/10/24   Mansi Gates APRN - CNM   Alcohol Swabs (ALCOHOL PADS) 70 % PADS 1 box  by Does not apply route 4 times daily  Patient not taking: Reported on 2024 3/19/24   Mansi Gates APRN - CNM   glucose monitoring kit 1 kit by Does not apply route daily as needed (glucose monitoring) Use x4 daily  Patient not taking: Reported on 2024 3/19/24   Kody, Mansi Szych, APRN - CNM   omeprazole (PRILOSEC) 20 MG  Tell the patient to continue Eliquis.  START low dose diltiazem for for rate control; called to [place].      Can someone please schedule with patient for a 1 month follow-up with Dr. Jackman?  Thanks!

## 2024-06-24 ENCOUNTER — LAB (OUTPATIENT)
Dept: LAB | Facility: HOSPITAL | Age: 69
End: 2024-06-24
Payer: COMMERCIAL

## 2024-06-24 DIAGNOSIS — I48.0 PAROXYSMAL ATRIAL FIBRILLATION: Chronic | ICD-10-CM

## 2024-06-24 LAB
25(OH)D3 SERPL-MCNC: 42.1 NG/ML (ref 30–100)
ALBUMIN SERPL-MCNC: 4.2 G/DL (ref 3.5–5.2)
ALBUMIN/GLOB SERPL: 1.8 G/DL
ALP SERPL-CCNC: 87 U/L (ref 39–117)
ALT SERPL W P-5'-P-CCNC: 16 U/L (ref 1–33)
ANION GAP SERPL CALCULATED.3IONS-SCNC: 11.1 MMOL/L (ref 5–15)
AST SERPL-CCNC: 19 U/L (ref 1–32)
BASOPHILS # BLD AUTO: 0.07 10*3/MM3 (ref 0–0.2)
BASOPHILS NFR BLD AUTO: 1.1 % (ref 0–1.5)
BILIRUB SERPL-MCNC: 0.4 MG/DL (ref 0–1.2)
BUN SERPL-MCNC: 13 MG/DL (ref 8–23)
BUN/CREAT SERPL: 16 (ref 7–25)
CALCIUM SPEC-SCNC: 9.2 MG/DL (ref 8.6–10.5)
CHLORIDE SERPL-SCNC: 108 MMOL/L (ref 98–107)
CHOLEST SERPL-MCNC: 113 MG/DL (ref 0–200)
CO2 SERPL-SCNC: 24.9 MMOL/L (ref 22–29)
CREAT SERPL-MCNC: 0.81 MG/DL (ref 0.57–1)
DEPRECATED RDW RBC AUTO: 40.4 FL (ref 37–54)
EGFRCR SERPLBLD CKD-EPI 2021: 79.2 ML/MIN/1.73
EOSINOPHIL # BLD AUTO: 0.36 10*3/MM3 (ref 0–0.4)
EOSINOPHIL NFR BLD AUTO: 5.5 % (ref 0.3–6.2)
ERYTHROCYTE [DISTWIDTH] IN BLOOD BY AUTOMATED COUNT: 13 % (ref 12.3–15.4)
GLOBULIN UR ELPH-MCNC: 2.3 GM/DL
GLUCOSE SERPL-MCNC: 98 MG/DL (ref 65–99)
HBA1C MFR BLD: 6.1 % (ref 4.8–5.6)
HCT VFR BLD AUTO: 36.7 % (ref 34–46.6)
HDLC SERPL-MCNC: 56 MG/DL (ref 40–60)
HGB BLD-MCNC: 12.1 G/DL (ref 12–15.9)
IMM GRANULOCYTES # BLD AUTO: 0.02 10*3/MM3 (ref 0–0.05)
IMM GRANULOCYTES NFR BLD AUTO: 0.3 % (ref 0–0.5)
LDLC SERPL CALC-MCNC: 43 MG/DL (ref 0–100)
LDLC/HDLC SERPL: 0.78 {RATIO}
LYMPHOCYTES # BLD AUTO: 1.81 10*3/MM3 (ref 0.7–3.1)
LYMPHOCYTES NFR BLD AUTO: 27.9 % (ref 19.6–45.3)
MCH RBC QN AUTO: 28.7 PG (ref 26.6–33)
MCHC RBC AUTO-ENTMCNC: 33 G/DL (ref 31.5–35.7)
MCV RBC AUTO: 87 FL (ref 79–97)
MONOCYTES # BLD AUTO: 0.54 10*3/MM3 (ref 0.1–0.9)
MONOCYTES NFR BLD AUTO: 8.3 % (ref 5–12)
NEUTROPHILS NFR BLD AUTO: 3.69 10*3/MM3 (ref 1.7–7)
NEUTROPHILS NFR BLD AUTO: 56.9 % (ref 42.7–76)
NRBC BLD AUTO-RTO: 0 /100 WBC (ref 0–0.2)
PLATELET # BLD AUTO: 241 10*3/MM3 (ref 140–450)
PMV BLD AUTO: 12.2 FL (ref 6–12)
POTASSIUM SERPL-SCNC: 4.7 MMOL/L (ref 3.5–5.2)
PROT SERPL-MCNC: 6.5 G/DL (ref 6–8.5)
RBC # BLD AUTO: 4.22 10*6/MM3 (ref 3.77–5.28)
SODIUM SERPL-SCNC: 144 MMOL/L (ref 136–145)
T4 FREE SERPL-MCNC: 1.65 NG/DL (ref 0.92–1.68)
TRIGL SERPL-MCNC: 66 MG/DL (ref 0–150)
URATE SERPL-MCNC: 5 MG/DL (ref 2.4–5.7)
VIT B12 BLD-MCNC: 1619 PG/ML (ref 211–946)
VLDLC SERPL-MCNC: 14 MG/DL (ref 5–40)
WBC NRBC COR # BLD AUTO: 6.49 10*3/MM3 (ref 3.4–10.8)

## 2024-06-24 PROCEDURE — 80061 LIPID PANEL: CPT | Performed by: INTERNAL MEDICINE

## 2024-06-24 PROCEDURE — 84207 ASSAY OF VITAMIN B-6: CPT | Performed by: INTERNAL MEDICINE

## 2024-06-24 PROCEDURE — 84439 ASSAY OF FREE THYROXINE: CPT | Performed by: INTERNAL MEDICINE

## 2024-06-24 PROCEDURE — 80053 COMPREHEN METABOLIC PANEL: CPT | Performed by: INTERNAL MEDICINE

## 2024-06-24 PROCEDURE — 83036 HEMOGLOBIN GLYCOSYLATED A1C: CPT | Performed by: INTERNAL MEDICINE

## 2024-06-24 PROCEDURE — 84550 ASSAY OF BLOOD/URIC ACID: CPT | Performed by: INTERNAL MEDICINE

## 2024-06-24 PROCEDURE — 84443 ASSAY THYROID STIM HORMONE: CPT | Performed by: INTERNAL MEDICINE

## 2024-06-24 PROCEDURE — 85025 COMPLETE CBC W/AUTO DIFF WBC: CPT | Performed by: INTERNAL MEDICINE

## 2024-06-24 PROCEDURE — 82607 VITAMIN B-12: CPT | Performed by: INTERNAL MEDICINE

## 2024-06-24 PROCEDURE — 82306 VITAMIN D 25 HYDROXY: CPT | Performed by: INTERNAL MEDICINE

## 2024-06-25 ENCOUNTER — TELEPHONE (OUTPATIENT)
Dept: CARDIOLOGY | Facility: CLINIC | Age: 69
End: 2024-06-25

## 2024-06-25 NOTE — TELEPHONE ENCOUNTER
Spoke w/ pt about her lab work. Informed her she will have to have her scheduled PAT labs and CT done on 7/12/24 as instructed by Courtney in procedure scheduling. I let her know that lab work has to be done 7-10 days prior to her Watchman procedure. Pt transferred to procedure scheduling because she wanted to make sure the procedure had been improved by insurance.

## 2024-06-25 NOTE — TELEPHONE ENCOUNTER
"  Caller: Albania Ramos \"Madelyn\"    Relationship: Self    Best call back number:[phone]    What is the best time to reach you: ANY    Who are you requesting to speak with (clinical staff, provider,  specific staff member): CLINICAL    What was the call regarding: PATIENT STATES SILVIA RAUSCH HAD ORDERED A BASIC METABOLIC PANEL WITH CBC, BUT THE PATIENT STATES THAT THEY TOOK THE ORDER FROM HER PCP. THE PATIENT STATES IF SHE NEEDS TO REDO THEM TO LET HER KNOW. PLEASE CONTACT PATIENT AND ADVISE ON THIS ISSUE.    Is it okay if the provider responds through 5to1hart: PLEASE CALL    "

## 2024-06-28 LAB — PYRIDOXAL PHOS SERPL-MCNC: 9.3 UG/L (ref 3.4–65.2)

## 2024-07-01 ENCOUNTER — TELEPHONE (OUTPATIENT)
Dept: OBSTETRICS AND GYNECOLOGY | Facility: CLINIC | Age: 69
End: 2024-07-01

## 2024-07-01 NOTE — TELEPHONE ENCOUNTER
"  Caller: Albania Ramos \"Madelyn\"    Relationship: Self    Best call back number: [phone]      Who are you requesting to speak with (clinical staff,HARMAN MENON PA-C    What was the call regarding: PATIEINT WOULD LIKE TO KNOW IF WE CAN REPAIR RECTOCELE.  IT'S GOTTEN WORSE AND PAINFUL, PLEASE ADVISE.       "

## 2024-07-11 ENCOUNTER — OFFICE VISIT (OUTPATIENT)
Dept: ENDOCRINOLOGY | Facility: CLINIC | Age: 69
End: 2024-07-11
Payer: COMMERCIAL

## 2024-07-11 VITALS
SYSTOLIC BLOOD PRESSURE: 116 MMHG | HEART RATE: 54 BPM | WEIGHT: 250 LBS | HEIGHT: 69 IN | BODY MASS INDEX: 37.03 KG/M2 | DIASTOLIC BLOOD PRESSURE: 62 MMHG | OXYGEN SATURATION: 97 %

## 2024-07-11 DIAGNOSIS — E89.0 POSTOPERATIVE HYPOTHYROIDISM: Primary | ICD-10-CM

## 2024-07-11 DIAGNOSIS — C73 THYROID CANCER: ICD-10-CM

## 2024-07-11 DIAGNOSIS — L65.9 HAIR LOSS: ICD-10-CM

## 2024-07-11 PROCEDURE — 84432 ASSAY OF THYROGLOBULIN: CPT | Performed by: INTERNAL MEDICINE

## 2024-07-11 PROCEDURE — 86800 THYROGLOBULIN ANTIBODY: CPT | Performed by: INTERNAL MEDICINE

## 2024-07-11 NOTE — PROGRESS NOTES
"Chief Complaint   Patient presents with    Hypothyroidism        HPI   Albania Ramos is a 69 y.o. female had concerns including Hypothyroidism.      Here for follow-up on hypothyroidism and thyroid cancer.  She is feeling quite well.  Has a Watchman procedure next week for A-fib.  Hair loss has since resolved and she has a significant amount of regrowth, scalp and eyebrows.    PCP checked labs last week and TFTs are in optimal range.  She is on levothyroxine 150 mcg daily.    The following portions of the patient's history were reviewed and updated as appropriate: allergies, current medications, past family history, past medical history, past social history, past surgical history, and problem list.    Review of Systems   Constitutional: Negative.    Endocrine: Negative.         /62 (BP Location: Right arm, Patient Position: Sitting, Cuff Size: Adult)   Pulse 54   Ht 175.3 cm (69\")   Wt 113 kg (250 lb)   LMP  (LMP Unknown)   SpO2 97%   BMI 36.92 kg/m²      Physical Exam  Vitals reviewed.   Constitutional:       Appearance: Normal appearance. She is obese.      Comments: Body mass index is 36.92 kg/m².   Cardiovascular:      Rate and Rhythm: Normal rate.   Pulmonary:      Effort: Pulmonary effort is normal.   Neurological:      General: No focal deficit present.      Mental Status: She is alert. Mental status is at baseline.   Psychiatric:         Mood and Affect: Mood normal.         Behavior: Behavior normal.              LABS AND IMAGING   CMP  Lab Results   Component Value Date    GLUCOSE 98 06/24/2024    BUN 13 06/24/2024    CREATININE 0.81 06/24/2024    EGFRIFNONA 80 01/31/2022    EGFRIFAFRI 80 11/16/2021    BCR 16.0 06/24/2024    K 4.7 06/24/2024    CO2 24.9 06/24/2024    CALCIUM 9.2 06/24/2024    PROTENTOTREF 6.1 03/15/2024    ALBUMIN 4.2 06/24/2024    LABIL2 2.1 03/15/2024    AST 19 06/24/2024    ALT 16 06/24/2024        CBC w/DIFF   Lab Results   Component Value Date    WBC 6.49 06/24/2024    " RBC 4.22 06/24/2024    HGB 12.1 06/24/2024    HCT 36.7 06/24/2024    MCV 87.0 06/24/2024    MCH 28.7 06/24/2024    MCHC 33.0 06/24/2024    RDW 13.0 06/24/2024    RDWSD 40.4 06/24/2024    MPV 12.2 (H) 06/24/2024     06/24/2024    NEUTRORELPCT 56.9 06/24/2024    LYMPHORELPCT 27.9 06/24/2024    MONORELPCT 8.3 06/24/2024    EOSRELPCT 5.5 06/24/2024    BASORELPCT 1.1 06/24/2024    AUTOIGPER 0.3 06/24/2024    NEUTROABS 3.69 06/24/2024    LYMPHSABS 1.81 06/24/2024    MONOSABS 0.54 06/24/2024    EOSABS 0.36 06/24/2024    BASOSABS 0.07 06/24/2024    AUTOIGNUM 0.02 06/24/2024    NRBC 0.0 06/24/2024       TSH  Lab Results   Component Value Date    TSH 0.858 06/24/2024    TSH 0.992 03/16/2024    TSH 0.923 03/15/2024       T4  Lab Results   Component Value Date    FREET4 1.65 06/24/2024    FREET4 1.46 03/16/2024    FREET4 1.71 (H) 03/15/2024     Lab Results   Component Value Date    Y8ITKBV 13.5 (H) 06/07/2016     T3  Lab Results   Component Value Date    T3FREE 2.53 07/24/2023    T3FREE 3.4 05/18/2021    T3FREE 2.4 12/20/2017       Lab Results   Component Value Date    THYROGLOBULN 0.1 (L) 09/05/2023    THYROGLOBULN <0.1 (L) 02/01/2022       Lab Results   Component Value Date    THGAB <1.0 09/05/2023    THGAB <1.0 02/01/2022         ULTRASOUND THYROID-     HISTORY: History of thyroid cancer; [id number] neoplasm of thyroid  gland     FINDINGS: Surgical changes of thyroidectomy are present. Bilateral neck  lymph nodes are seen in the inferior neck. Right node measures 17 x 6 x  18 mm, slightly smaller from prior exam. Normal morphology is  maintained. Left-sided lymph node measures up to 10 mm, previously 12  mm. No new abnormality is seen.     IMPRESSION:  Stable to mildly improved appearance of lymph nodes likely  benign reactive. Longer term follow-up is recommended in 6 months with  ultrasound.     This report was finalized on 7/27/2021 2:40 PM by Kalpesh Angulo MD.           ULTRASOUND THYROID-     HISTORY: See  Diagnosis; [id number] neoplasm of thyroid gland.     FINDINGS: Thyroid gland is surgically absent. There is no mass in the  thyroidectomy bed.      Bilateral neck lymph nodes are noted, most of which are normal in size.  One node is borderline enlarged in the right mid neck measuring 19 x 17  x 8 mm. Fatty hilum is maintained. No fluid collection is seen.     IMPRESSION:  1. No evidence of recurrent mass in the thyroidectomy bed.  2. Single borderline enlarged lymph node right neck. Recommend  ultrasound follow-up in 3-6 weeks. Favor benign etiology.     This report was finalized on 2021 4:32 PM by Kalpesh Angulo MD    Post-operative Neck Ultrasound  [facility]    PT: [place] Walters Rachel  : 1955  Date:  24  Indication: History of thyroid cancer.    Technique: Real time high resolution imaging of the anterior neck was performed in longitudinal and transverse planes.    Findings:   Compartment / Level I  (Submandibular): no abnormal lymph nodules bilaterally  Compartment / Level II  (Suprahyoid parajugular): no abnormal lymph nodules bilaterally  Compartment / Level III  (Infrahyoid supracricoid parajugular): no abnormal lymph nodules bilaterally  Compartment / Level IV (Infracricoid parajugular): no abnormal lymph nodules bilaterally  Compartment / Level V (Posterior cervical triangle): no abnormal lymph nodules bilaterally  Compartment / Level VI (Central compartment): no abnormal lymph nodules identified  Compartment / Level VII (Substernal space): no abnormal lymph nodules bilaterally    Impression: The structures of the neck are shifted medially as expected post thyroidectomy. The thyroid bed is unremarkable.   No abnormal lymph nodes were identified in surgical anatomic compartments I - VII.      Recommendation: Repeat Neck US 1 year.          Assessment and Plan    Diagnoses and all orders for this visit:    1. Postoperative hypothyroidism (Primary)  Euthyroid on levothyroxine  "150 mcg daily.  TSH is in an optimal range.  Levels updated last month.  Monitor yearly.    2. 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 With focal capsular invasion, focal lymphovascular invasion, no extrathyroidal extension. No lymph nodes submitted.   Right thyroid lobe was benign after resection 7/23/2012.   99.2 mCi of I-131 on 8/20/2012 with post treatment scan showing uptake in the thyroid bed as well as a slight focus in the colon which may have represented a \"GI mucosal activity\" (patient does have recurrent diverticulitis and had colon resection 2022).    Thyroglobulin levels have been low.  Recheck today.  Monitor yearly.  Neck ultrasound today with no nodules, remnant thyroid, or pathologic lymph nodes.  Repeat in a year.  -     US Thyroid  -     Thyroglobulin Antibody and Thyroglobulin, SOFÍA or LC/MS-MS    3. Hair loss  Improving, with regrowth. Suspect large amount of weight loss last years contributed. TSH was also suppressed on higher doses.          Return in about 1 year (around 7/11/2025) for next scheduled follow up, with thyroid ultrasound ** 30 min appt. The patient was instructed to contact the clinic with any interval questions or concerns.    Electronically signed by: Melina Simpson DO   Endocrinologist    Please note that portions of this note were completed with a voice recognition program.    "

## 2024-07-12 ENCOUNTER — PRE-ADMISSION TESTING (OUTPATIENT)
Dept: PREADMISSION TESTING | Facility: HOSPITAL | Age: 69
End: 2024-07-12
Payer: COMMERCIAL

## 2024-07-12 ENCOUNTER — HOSPITAL ENCOUNTER (OUTPATIENT)
Dept: CT IMAGING | Facility: HOSPITAL | Age: 69
Discharge: HOME | End: 2024-07-12
Payer: COMMERCIAL

## 2024-07-12 DIAGNOSIS — R26.0 STAGGERING GAIT: ICD-10-CM

## 2024-07-12 DIAGNOSIS — I48.0 PAROXYSMAL ATRIAL FIBRILLATION: Chronic | ICD-10-CM

## 2024-07-12 LAB
ANION GAP SERPL CALCULATED.3IONS-SCNC: 5 MMOL/L (ref 5–15)
BUN SERPL-MCNC: 27 MG/DL (ref 8–23)
BUN/CREAT SERPL: 30.3 (ref 7–25)
CALCIUM SPEC-SCNC: 8.6 MG/DL (ref 8.6–10.5)
CHLORIDE SERPL-SCNC: 105 MMOL/L (ref 98–107)
CO2 SERPL-SCNC: 35 MMOL/L (ref 22–29)
CREAT SERPL-MCNC: 0.89 MG/DL (ref 0.57–1)
DEPRECATED RDW RBC AUTO: 47.4 FL (ref 37–54)
EGFRCR SERPLBLD CKD-EPI 2021: 70.7 ML/MIN/1.73
ERYTHROCYTE [DISTWIDTH] IN BLOOD BY AUTOMATED COUNT: 13.9 % (ref 12.3–15.4)
GLUCOSE SERPL-MCNC: 104 MG/DL (ref 65–99)
HCT VFR BLD AUTO: 42.2 % (ref 34–46.6)
HGB BLD-MCNC: 13 G/DL (ref 12–15.9)
MCH RBC QN AUTO: 28.6 PG (ref 26.6–33)
MCHC RBC AUTO-ENTMCNC: 30.8 G/DL (ref 31.5–35.7)
MCV RBC AUTO: 92.7 FL (ref 79–97)
PLATELET # BLD AUTO: 232 10*3/MM3 (ref 140–450)
PMV BLD AUTO: 11.4 FL (ref 6–12)
POTASSIUM SERPL-SCNC: 4.4 MMOL/L (ref 3.5–5.2)
RBC # BLD AUTO: 4.55 10*6/MM3 (ref 3.77–5.28)
SODIUM SERPL-SCNC: 145 MMOL/L (ref 136–145)
THYROGLOB AB SERPL-ACNC: <1 IU/ML (ref 0–0.9)
THYROGLOB SERPL-MCNC: 0.1 NG/ML (ref 1.5–38.5)
WBC NRBC COR # BLD AUTO: 10.45 10*3/MM3 (ref 3.4–10.8)

## 2024-07-12 PROCEDURE — 85027 COMPLETE CBC AUTOMATED: CPT

## 2024-07-12 PROCEDURE — 36415 COLL VENOUS BLD VENIPUNCTURE: CPT

## 2024-07-12 PROCEDURE — 25510000001 IOPAMIDOL PER 1 ML: Performed by: INTERNAL MEDICINE

## 2024-07-12 PROCEDURE — 80048 BASIC METABOLIC PNL TOTAL CA: CPT

## 2024-07-12 PROCEDURE — 71275 CT ANGIOGRAPHY CHEST: CPT

## 2024-07-12 RX ADMIN — IOPAMIDOL 100 ML: 755 INJECTION, SOLUTION INTRAVENOUS at 09:27

## 2024-07-12 NOTE — PAT
An arrival time for procedure was not provided during PAT visit. If patient had any questions or concerns about their arrival time, they were instructed to contact their surgeon/physician.  Additionally, if the patient referred to an arrival time that was acquired from their my chart account, patient was encouraged to verify that time with their surgeon/physician. Arrival times are NOT provided in Pre Admission Testing Department.    Patient instructed to bring CPAP mask and tubing to the hospital for overnight stay.  Explained that it is not necessary to bring their CPAP machine to the hospital instead a CPAP machine will be provided for use by the hospital. If patient knows their CPAP settings, those settings will be implemented.  If not, the CPAP machine will be utilized on the auto setting using their mask and tubing.    Patient verbalized understanding.    PATIENT STATES SHE HAS CT APPOINTMENT AT [facility] AFTER PAT APPOINTMENT.    PATIENT STATES THAT SHE WAS INSTRUCTED BY DR ALEXANDER'S OFFICE TO CONTINUE ELIQUIS PRIOR TO PROCEDURE

## 2024-07-12 NOTE — DISCHARGE INSTRUCTIONS
Dear Patient,    Do NOT eat, drink, or smoke after midnight the night before your procedure.   Take your medications as instructed by your doctor.    Glasses and jewelry may be worn, but dentures must be removed prior to your procedure.    Leave any items you consider valuable at home.      MORNING of your Procedure, please bring the following:     -Photo ID and insurance card(s)    -ALL medications in their ORIGINAL CONTAINERS or current medication list.    -Co-pay and/or deductible required by your insurance   -Copy of living will or power of  document (if not brought to Pre-Admission Testing department)   -CPAP mask and tubing, not your machine (if applicable)   -Relaxation aids (music, books, magazines)   -Skin Prep Instruction Sheet (if applicable)       Check in is on the 2nd floor of the [facility].  Your procedure will be performed in the cath lab or EP lab.  During your procedure, your family will wait in the cath lab waiting area where you checked in.      Need to make arrangements for transportation prior to discharge.    Educational handout related to procedure was given in Pre-Admission testing.  The educational handout is for informational purposes only.  If you have any questions about your procedure, please speak with your physician.      Please note:  If you are scheduled to have one of the following procedures: Pulmonary Vein Ablation, Lead Extraction, MitraClip, Cerebral Coilings or Embolization, please let your family know that after your procedure you will be going to recovery unit on the 2nd floor of the [facility].  When the physician is finished speaking with your family after your procedure is completed, your family will be directed or escorted to the surgery waiting area in the [facility].  This is where your family will wait until you are given a room assignment and then your family will be directed to the appropriate unit.

## 2024-07-15 RX ORDER — ALLOPURINOL 300 MG/1
TABLET ORAL
Qty: 90 TABLET | Refills: 3 | Status: SHIPPED | OUTPATIENT
Start: 2024-07-15

## 2024-07-22 ENCOUNTER — PREP FOR SURGERY (OUTPATIENT)
Dept: OTHER | Facility: HOSPITAL | Age: 69
End: 2024-07-22
Payer: COMMERCIAL

## 2024-07-22 DIAGNOSIS — I48.0 PAROXYSMAL ATRIAL FIBRILLATION: Primary | ICD-10-CM

## 2024-07-22 DIAGNOSIS — I48.0 PAROXYSMAL ATRIAL FIBRILLATION: Primary | Chronic | ICD-10-CM

## 2024-07-22 DIAGNOSIS — R26.0 STAGGERING GAIT: ICD-10-CM

## 2024-07-22 DIAGNOSIS — R23.3 BRUISES EASILY: ICD-10-CM

## 2024-07-22 RX ORDER — ONDANSETRON 2 MG/ML
4 INJECTION INTRAMUSCULAR; INTRAVENOUS EVERY 6 HOURS PRN
OUTPATIENT
Start: 2024-07-22

## 2024-07-22 RX ORDER — SODIUM CHLORIDE 9 MG/ML
40 INJECTION, SOLUTION INTRAVENOUS AS NEEDED
OUTPATIENT
Start: 2024-07-22

## 2024-07-22 RX ORDER — SODIUM CHLORIDE 0.9 % (FLUSH) 0.9 %
10 SYRINGE (ML) INJECTION AS NEEDED
OUTPATIENT
Start: 2024-07-22

## 2024-07-22 RX ORDER — ACETAMINOPHEN 325 MG/1
650 TABLET ORAL EVERY 4 HOURS PRN
OUTPATIENT
Start: 2024-07-22

## 2024-07-22 RX ORDER — SODIUM CHLORIDE 0.9 % (FLUSH) 0.9 %
3 SYRINGE (ML) INJECTION EVERY 12 HOURS SCHEDULED
OUTPATIENT
Start: 2024-07-22

## 2024-07-22 RX ORDER — CEFAZOLIN SODIUM 2 G/100ML
2 INJECTION, SOLUTION INTRAVENOUS ONCE
OUTPATIENT
Start: 2024-07-22 | End: 2024-07-22

## 2024-07-22 RX ORDER — NITROGLYCERIN 0.4 MG/1
0.4 TABLET SUBLINGUAL
OUTPATIENT
Start: 2024-07-22

## 2024-07-31 ENCOUNTER — OFFICE VISIT (OUTPATIENT)
Dept: OBSTETRICS AND GYNECOLOGY | Facility: CLINIC | Age: 69
End: 2024-07-31
Payer: COMMERCIAL

## 2024-07-31 VITALS
SYSTOLIC BLOOD PRESSURE: 134 MMHG | HEIGHT: 69 IN | WEIGHT: 255 LBS | BODY MASS INDEX: 37.77 KG/M2 | DIASTOLIC BLOOD PRESSURE: 60 MMHG

## 2024-07-31 DIAGNOSIS — N81.6 RECTOCELE: Primary | ICD-10-CM

## 2024-07-31 PROCEDURE — 99213 OFFICE O/P EST LOW 20 MIN: CPT | Performed by: OBSTETRICS & GYNECOLOGY

## 2024-07-31 NOTE — PROGRESS NOTES
Subjective   Chief Complaint   Patient presents with    Vaginal Prolapse     Patient is here for evaluation of Rectocele and discuss surgery.     Al[place]ia Ramos is a 69 y.o. year old  ( x 3).  No LMP recorded (lmp unknown). Patient has had a hysterectomy.  She presents to be seen because of rectocele.  Patient has symptoms of pressure and discomfort in the lower vaginal region.  She has a known rectocele.  She has longstanding IBS-see and also had a colon resection for diverticular disease with subsequent ventral hernia repair in .  Bowel habits are unchanged and patient states she often sits 30 to 40 minutes at a time on the toilet multiple times a day.  No splinting..     OTHER COMPLAINTS:  CRISTINA/BSO     The following portions of the patient's history were reviewed and updated as appropriate:She  has a past medical history of Abdominal pain, Abnormal ECG (), Allergic (), Allergic rhinitis, Ankle joint pain, Arrhythmia (), Arthritis, Arthritis (2023), Asthma, Bronchiectasis, Cataract (), Cholelithiasis, Chronic bronchitis, Colon polyp (12/10/2019), COVID-19 vaccine series completed, Degenerative joint disease, Depression, Diverticulitis, Diverticulosis, Dizziness, Dysuria, Easy bruising, Elevated cholesterol, Follicular thyroid cancer, GERD (gastroesophageal reflux disease), H/O bone density study, Hay fever, History of chest x-ray (07/10/2015), History of chest x-ray (2015), History of echocardiogram (2007), History of mammogram, History of PFTs (2015), Hyperlipidemia, Hypothyroidism, Measles, Migraine headache, Mumps, Obesity (), Osteopenia (), Osteoporosis, Paroxysmal atrial fibrillation, Pneumonia, PONV (postoperative nausea and vomiting), Primary hypertension (2024), Shortness of breath, Sleep apnea, Sleep apnea, obstructive, Surfer's nodules of right foot, Thyroid nodule (), Torn meniscus, Varicella, and Visual impairment.  She does not  have any pertinent problems on file.  She  has a past surgical history that includes Cholecystectomy; Colonoscopy (12/10/2019); Ear Tubes Removal (Bilateral); Inguinal hernia repair (Left); Total thyroidectomy; Ablation of dysrhythmic focus; Dilation and curettage of uterus; Cardiac electrophysiology procedure (N/A, 08/04/2016); Lung biopsy; Foot surgery (Left); Cardiac electrophysiology procedure (N/A, 10/13/2016); Lymph node biopsy; Cardiac electrophysiology procedure (N/A, 12/18/2017); Replacement total knee bilateral (Bilateral); Cardiac electrophysiology procedure (N/A, 11/06/2019); Tonsillectomy (07/2020); Tympanostomy tube placement (Right, 07/2020); Hammer Toe Repair (Left, 11/13/2020); Thyroidectomy, partial (Left, 06/2012); Thyroidectomy, partial (Right, 07/2012); Cataract extraction w/ intraocular lens implant (Left, 12/20/2021); Cataract extraction w/ intraocular lens implant (Right, 01/03/2022); Total abdominal hysterectomy w/ bilateral salpingoophorectomy (Bilateral, 1996); Colectomy (N/A, 03/25/2022); ventral/incisional hernia repair (N/A, 10/27/2022); Joint replacement (2012, 2014); Eye surgery (12/2021 & 01/2022); Bronchoscopy; Cardiac catheterization; Colonoscopy (12/2021); Upper gastrointestinal endoscopy; Endovenous ablation saphenous vein w/ laser (Left, 02/05/2024); Endovenous ablation saphenous vein w/ laser (Right, 02/19/2024); and [place] tooth extraction.  Her family history includes Alcohol abuse in her father; Arrhythmia in her brother and mother; Arthritis in her brother and mother; Asthma in her brother; Atrial fibrillation in her maternal aunt, maternal aunt, and mother; Developmental Disability in her brother and sister; Diabetes in her mother; Down syndrome in her sister; Early death (age of onset: 16) in her sister; Early death (age of onset: 68) in her father; Heart attack in her brother and sister; Heart failure in her brother and mother; Lung cancer in her father; Mental illness  in her brother; Obesity in her brother; Other in her brother; Sleep apnea in her brother; Stroke in her maternal aunt, maternal aunt, maternal grandmother, and mother; Thyroid disease in her mother.  She  reports that she quit smoking about 28 years ago. Her smoking use included cigarettes. She started smoking about 50 years ago. She has a 33.1 pack-year smoking history. She has been exposed to tobacco smoke. She has never used smokeless tobacco. She reports that she does not drink alcohol and does not use drugs.  Current Outpatient Medications   Medication Sig Dispense Refill    albuterol sulfate HFA (Ventolin HFA) 108 (90 Base) MCG/ACT inhaler Inhale 2 puffs Every 4 (Four) Hours As Needed for Wheezing or Shortness of Air. 18 g 5    albuterol sulfate  (90 Base) MCG/ACT inhaler Inhale 2 puffs Every 4 (Four) Hours As Needed for Wheezing or Shortness of Air. 18 g 0    allopurinol (ZYLOPRIM) 300 MG tablet TAKE ONE TABLET BY MOUTH EVERY DAY 90 tablet 3    atorvastatin (LIPITOR) 40 MG tablet Take 1 tablet by mouth Daily. (Patient taking differently: Take 1 tablet by mouth Every Night.) 30 tablet 11    calcium carbonate (OS-SHANTE) 600 MG tablet Take 1 tablet by mouth As Needed for Heartburn.      cyanocobalamin (VITAMIN B-12) 2500 MCG tablet tablet TAKE ONE TABLET BY MOUTH THREE TIMES A WEEK MUST MAKE AN APPOINTMENT (Patient taking differently: Take 1,200 mcg by mouth 1 (One) Time Per Week. MONDAYS) 30 tablet 5    Diclofenac Sodium (VOLTAREN) 1 % gel gel Apply 4 g topically to the appropriate area as directed 4 (Four) Times a Day As Needed (pain). 100 g 11    docusate sodium 100 MG capsule Take 1 capsule by mouth 2 (Two) Times a Day As Needed for Constipation. 30 capsule 0    Eliquis 5 MG tablet tablet Take 1 tablet by mouth Every 12 (Twelve) Hours. PATIENT STATES SHE WAS INSTRUCTED TO CONTINUE PER DR ALEXANDER'S OFFICE      estradiol (ESTRACE) 0.1 MG/GM vaginal cream Insert TWO grams vaginally daily AS NEEDED FOR  vaginal dryness. Usually uses twice A WEEK 42.5 g 11    GLUCOSAMINE HCL-MSM PO Take 1 tablet by mouth Daily.      hydroCHLOROthiazide (HYDRODIURIL) 12.5 MG tablet Take 1 tablet by mouth Daily. (Patient taking differently: Take 1 tablet by mouth Daily As Needed (SWELLING).) 90 tablet 3    ipratropium (ATROVENT) 0.06 % nasal spray 1 spray As Needed for Rhinitis.      levothyroxine (SYNTHROID, LEVOTHROID) 150 MCG tablet TAKE ONE TABLET BY MOUTH EVERY DAY (Patient taking differently: Take 1 tablet by mouth Every Morning.) 90 tablet 2    metoprolol tartrate (LOPRESSOR) 25 MG tablet Take 1 tablet by mouth 2 (Two) Times a Day. 180 tablet 3    montelukast (Singulair) 10 MG tablet Take 1 tablet by mouth Every Night. PRN 90 tablet 1    MULTIPLE VITAMIN PO Take 1 tablet by mouth Daily.      nitroglycerin (NITROSTAT) 0.4 MG SL tablet 1 under the tongue as needed for angina, may repeat q5mins for up three doses (Patient taking differently: 1 tablet Every 5 (Five) Minutes As Needed for Chest Pain. 1 under the tongue as needed for angina, may repeat q5mins for up three doses) 100 tablet 11    NON FORMULARY Take 2 tablets by mouth Daily. Lions ramiro mushrooms      nystatin (MYCOSTATIN) 100,000 unit/mL suspension Take 5 mL by mouth 4 (Four) Times a Day. (Patient taking differently: Take 5 mL by mouth As Needed (THRUSH/ YEAST).) 280 mL 0    ofloxacin (FLOXIN) 0.3 % otic solution Administer 5 drops to the right ear As Needed (PRESSURE IN EARS).      Omega-3 Fatty Acids (FISH OIL) 435 MG capsule Take 1,000 mg by mouth Daily.      SUMAtriptan (IMITREX) 25 MG tablet Take 1 tablet by mouth As Needed for Migraine.      tiotropium (SPIRIVA) 18 MCG per inhalation capsule Place 1 capsule into inhaler and inhale Daily. 90 capsule 3    TURMERIC PO Take 3 capsules by mouth Daily.      vitamin C (ASCORBIC ACID) 250 MG tablet Take 1 tablet by mouth Daily.       Current Facility-Administered Medications   Medication Dose Route Frequency Provider  Last Rate Last Admin    triamcinolone acetonide (KENALOG-40) injection 40 mg  40 mg Intra-articular Once Gordon Norman MD         Current Outpatient Medications on File Prior to Visit   Medication Sig    albuterol sulfate HFA (Ventolin HFA) 108 (90 Base) MCG/ACT inhaler Inhale 2 puffs Every 4 (Four) Hours As Needed for Wheezing or Shortness of Air.    albuterol sulfate  (90 Base) MCG/ACT inhaler Inhale 2 puffs Every 4 (Four) Hours As Needed for Wheezing or Shortness of Air.    allopurinol (ZYLOPRIM) 300 MG tablet TAKE ONE TABLET BY MOUTH EVERY DAY    atorvastatin (LIPITOR) 40 MG tablet Take 1 tablet by mouth Daily. (Patient taking differently: Take 1 tablet by mouth Every Night.)    calcium carbonate (OS-SHANTE) 600 MG tablet Take 1 tablet by mouth As Needed for Heartburn.    cyanocobalamin (VITAMIN B-12) 2500 MCG tablet tablet TAKE ONE TABLET BY MOUTH THREE TIMES A WEEK MUST MAKE AN APPOINTMENT (Patient taking differently: Take 1,200 mcg by mouth 1 (One) Time Per Week. MONDAYS)    Diclofenac Sodium (VOLTAREN) 1 % gel gel Apply 4 g topically to the appropriate area as directed 4 (Four) Times a Day As Needed (pain).    docusate sodium 100 MG capsule Take 1 capsule by mouth 2 (Two) Times a Day As Needed for Constipation.    Eliquis 5 MG tablet tablet Take 1 tablet by mouth Every 12 (Twelve) Hours. PATIENT STATES SHE WAS INSTRUCTED TO CONTINUE PER DR ALEXANDER'S OFFICE    estradiol (ESTRACE) 0.1 MG/GM vaginal cream Insert TWO grams vaginally daily AS NEEDED FOR vaginal dryness. Usually uses twice A WEEK    GLUCOSAMINE HCL-MSM PO Take 1 tablet by mouth Daily.    hydroCHLOROthiazide (HYDRODIURIL) 12.5 MG tablet Take 1 tablet by mouth Daily. (Patient taking differently: Take 1 tablet by mouth Daily As Needed (SWELLING).)    ipratropium (ATROVENT) 0.06 % nasal spray 1 spray As Needed for Rhinitis.    levothyroxine (SYNTHROID, LEVOTHROID) 150 MCG tablet TAKE ONE TABLET BY MOUTH EVERY DAY (Patient taking differently:  Take 1 tablet by mouth Every Morning.)    metoprolol tartrate (LOPRESSOR) 25 MG tablet Take 1 tablet by mouth 2 (Two) Times a Day.    montelukast (Singulair) 10 MG tablet Take 1 tablet by mouth Every Night. PRN    MULTIPLE VITAMIN PO Take 1 tablet by mouth Daily.    nitroglycerin (NITROSTAT) 0.4 MG SL tablet 1 under the tongue as needed for angina, may repeat q5mins for up three doses (Patient taking differently: 1 tablet Every 5 (Five) Minutes As Needed for Chest Pain. 1 under the tongue as needed for angina, may repeat q5mins for up three doses)    NON FORMULARY Take 2 tablets by mouth Daily. Lions ramiro mushrooms    nystatin (MYCOSTATIN) 100,000 unit/mL suspension Take 5 mL by mouth 4 (Four) Times a Day. (Patient taking differently: Take 5 mL by mouth As Needed (THRUSH/ YEAST).)    ofloxacin (FLOXIN) 0.3 % otic solution Administer 5 drops to the right ear As Needed (PRESSURE IN EARS).    Omega-3 Fatty Acids (FISH OIL) 435 MG capsule Take 1,000 mg by mouth Daily.    SUMAtriptan (IMITREX) 25 MG tablet Take 1 tablet by mouth As Needed for Migraine.    tiotropium (SPIRIVA) 18 MCG per inhalation capsule Place 1 capsule into inhaler and inhale Daily.    TURMERIC PO Take 3 capsules by mouth Daily.    vitamin C (ASCORBIC ACID) 250 MG tablet Take 1 tablet by mouth Daily.    [DISCONTINUED] albuterol (PROVENTIL) (2.5 MG/3ML) 0.083% nebulizer solution 2.5 mg As Needed for Wheezing or Shortness of Air.     Current Facility-Administered Medications on File Prior to Visit   Medication    triamcinolone acetonide (KENALOG-40) injection 40 mg     She is allergic to codeine, niacin, darvon [propoxyphene], zofran [ondansetron], adhesive tape, bentyl [dicyclomine], ciprodex [ciprofloxacin-dexamethasone], flagyl [metronidazole], other, and prednisone.    Social History    Tobacco Use      Smoking status: Former        Packs/day: 0.00        Years: 1.5 packs/day for 22.0 years (33.1 ttl pk-yrs)        Types: Cigarettes        Start  "date: 1974        Quit date: 1996        Years since quittin.1        Passive exposure: Past      Smokeless tobacco: Never      Tobacco comments: Smoked menthol    Review of Systems  Consitutional POS: nothing reported    NEG: anorexia or night sweats   Gastointestinal POS: see HPI    NEG: bloating, change in bowel habits, melena, or reflux symptoms   Genitourinary POS: nothing reported    NEG: dysuria or hematuria   Integument POS: nothing reported    NEG: moles that are changing in size, shape, color or rashes   Breast POS: nothing reported    NEG: persistent breast lump, skin dimpling, or nipple discharge         Respiratory: negative  Cardiovascular: negative  GYN:   See HPI          Objective   /60   Ht 175.3 cm (69\")   Wt 116 kg (255 lb)   LMP  (LMP Unknown)   BMI 37.66 kg/m²     General:  well developed; well nourished  no acute distress   Skin:  Not performed.   Thyroid: not examined   Lungs:  breathing is unlabored   Heart:  Not performed.   Breasts:  Not performed.   Abdomen: soft, non-tender; no masses  no umbilical or inguinal hernias are present  no hepato-splenomegaly   Pelvis: Clinical staff was present for exam  External genitalia:  normal appearance of the external genitalia including Bartholin's and [place]'s glands.  :  urethral meatus normal;  Vaginal:  atrophic mucosal changes are present;  Cervix:  absent.  Uterus:  absent.  Adnexa:  absent, bilateral.  Rectal:  digital rectal exam not performed; anus visually normal appearing.  Rectocele GRADE 2     Psychiatric: Alert and oriented ×3, mood and affect appropriate  HEENT: Atraumatic, normocephalic, normal scleral icterus  Extremities: 2+ pulses bilaterally, no edema      Lab Review   No data reviewed    Imaging   No data reviewed        Assessment   Stage II rectocele.  She overall has good cuff support and minimal cystocele.  Sphincter tone is excellent     Plan   Discussed options with patient.  I feel her long-term IBS " makes her a poor candidate for rectocele repair is the constant straining and constipation will most likely undo the corrective procedure.  I would start vaginal estrogen intravaginally not just periurethrally to reestrogenized the tissue and follow-up with me if symptoms worsen.      No orders of the defined types were placed in this encounter.         This note was electronically signed.      July 31, 2024

## 2024-08-06 ENCOUNTER — OFFICE VISIT (OUTPATIENT)
Dept: INTERNAL MEDICINE | Facility: CLINIC | Age: 69
End: 2024-08-06
Payer: COMMERCIAL

## 2024-08-06 VITALS
TEMPERATURE: 97.2 F | BODY MASS INDEX: 37.33 KG/M2 | HEART RATE: 58 BPM | RESPIRATION RATE: 16 BRPM | DIASTOLIC BLOOD PRESSURE: 60 MMHG | HEIGHT: 69 IN | SYSTOLIC BLOOD PRESSURE: 120 MMHG | WEIGHT: 252 LBS | OXYGEN SATURATION: 96 %

## 2024-08-06 DIAGNOSIS — R53.83 OTHER FATIGUE: ICD-10-CM

## 2024-08-06 DIAGNOSIS — G72.0 STATIN MYOPATHY: ICD-10-CM

## 2024-08-06 DIAGNOSIS — G47.33 OSA ON CPAP: Primary | ICD-10-CM

## 2024-08-06 DIAGNOSIS — T46.6X5A STATIN MYOPATHY: ICD-10-CM

## 2024-08-06 PROCEDURE — 99214 OFFICE O/P EST MOD 30 MIN: CPT | Performed by: INTERNAL MEDICINE

## 2024-08-06 RX ORDER — OMEPRAZOLE 40 MG/1
1 CAPSULE, DELAYED RELEASE ORAL AS NEEDED
COMMUNITY
Start: 2024-08-01

## 2024-08-06 NOTE — PROGRESS NOTES
Subjective     Patient ID: [place]ia Ramos is a 69 y.o. female. Patient is here for management of multiple medical problems.     Chief Complaint   Patient presents with    Hypothyroidism    Fatigue    Sinusitis     Coughing up yellow mucus for the past week.     History of Present Illness   Cough and sinus congestion.       Fatigue.   X 1 month.       Morning is good then the rest of the day. Sits all the day.    Ha1c a bit.          The following portions of the patient's history were reviewed and updated as appropriate: allergies, current medications, past family history, past medical history, past social history, past surgical history and problem list.    Review of Systems    Current Outpatient Medications:     allopurinol (ZYLOPRIM) 300 MG tablet, TAKE ONE TABLET BY MOUTH EVERY DAY, Disp: 90 tablet, Rfl: 3    atorvastatin (LIPITOR) 40 MG tablet, Take 1 tablet by mouth Daily. (Patient taking differently: Take 1 tablet by mouth Every Night.), Disp: 30 tablet, Rfl: 11    calcium carbonate (OS-SHANTE) 600 MG tablet, Take 1 tablet by mouth As Needed for Heartburn., Disp: , Rfl:     cyanocobalamin (VITAMIN B-12) 2500 MCG tablet tablet, TAKE ONE TABLET BY MOUTH THREE TIMES A WEEK MUST MAKE AN APPOINTMENT (Patient taking differently: Take 1,200 mcg by mouth 1 (One) Time Per Week. MONDAYS), Disp: 30 tablet, Rfl: 5    Diclofenac Sodium (VOLTAREN) 1 % gel gel, Apply 4 g topically to the appropriate area as directed 4 (Four) Times a Day As Needed (pain)., Disp: 100 g, Rfl: 11    docusate sodium 100 MG capsule, Take 1 capsule by mouth 2 (Two) Times a Day As Needed for Constipation., Disp: 30 capsule, Rfl: 0    Eliquis 5 MG tablet tablet, Take 1 tablet by mouth Every 12 (Twelve) Hours. PATIENT STATES SHE WAS INSTRUCTED TO CONTINUE PER DR ALEXANDER'S OFFICE, Disp: , Rfl:     estradiol (ESTRACE) 0.1 MG/GM vaginal cream, Insert TWO grams vaginally daily AS NEEDED FOR vaginal dryness. Usually uses twice A WEEK, Disp: 42.5 g, Rfl: 11     GLUCOSAMINE HCL-MSM PO, Take 1 tablet by mouth Daily., Disp: , Rfl:     hydroCHLOROthiazide (HYDRODIURIL) 12.5 MG tablet, Take 1 tablet by mouth Daily. (Patient taking differently: Take 1 tablet by mouth Daily As Needed (SWELLING).), Disp: 90 tablet, Rfl: 3    ipratropium (ATROVENT) 0.06 % nasal spray, 1 spray As Needed for Rhinitis., Disp: , Rfl:     levothyroxine (SYNTHROID, LEVOTHROID) 150 MCG tablet, TAKE ONE TABLET BY MOUTH EVERY DAY (Patient taking differently: Take 1 tablet by mouth Every Morning.), Disp: 90 tablet, Rfl: 2    metoprolol tartrate (LOPRESSOR) 25 MG tablet, Take 1 tablet by mouth 2 (Two) Times a Day., Disp: 180 tablet, Rfl: 3    montelukast (Singulair) 10 MG tablet, Take 1 tablet by mouth Every Night. PRN, Disp: 90 tablet, Rfl: 1    MULTIPLE VITAMIN PO, Take 1 tablet by mouth Daily., Disp: , Rfl:     nitroglycerin (NITROSTAT) 0.4 MG SL tablet, 1 under the tongue as needed for angina, may repeat q5mins for up three doses (Patient taking differently: 1 tablet Every 5 (Five) Minutes As Needed for Chest Pain. 1 under the tongue as needed for angina, may repeat q5mins for up three doses), Disp: 100 tablet, Rfl: 11    NON FORMULARY, Take 2 tablets by mouth Daily. Lions ramiro mushrooms, Disp: , Rfl:     nystatin (MYCOSTATIN) 100,000 unit/mL suspension, Take 5 mL by mouth 4 (Four) Times a Day. (Patient taking differently: Take 5 mL by mouth As Needed (THRUSH/ YEAST).), Disp: 280 mL, Rfl: 0    ofloxacin (FLOXIN) 0.3 % otic solution, Administer 5 drops to the right ear As Needed (PRESSURE IN EARS)., Disp: , Rfl:     Omega-3 Fatty Acids (FISH OIL) 435 MG capsule, Take 1,000 mg by mouth Daily., Disp: , Rfl:     omeprazole (priLOSEC) 40 MG capsule, Take 1 capsule by mouth As Needed (for Acid Reflux)., Disp: , Rfl:     SUMAtriptan (IMITREX) 25 MG tablet, Take 1 tablet by mouth As Needed for Migraine., Disp: , Rfl:     tiotropium (SPIRIVA) 18 MCG per inhalation capsule, Place 1 capsule into inhaler and inhale  "Daily., Disp: 90 capsule, Rfl: 3    TURMERIC PO, Take 3 capsules by mouth Daily., Disp: , Rfl:     vitamin C (ASCORBIC ACID) 250 MG tablet, Take 1 tablet by mouth Daily., Disp: , Rfl:     Current Facility-Administered Medications:     triamcinolone acetonide (KENALOG-40) injection 40 mg, 40 mg, Intra-articular, Once, Gordon Norman MD    Objective      Blood pressure 120/60, pulse 58, temperature 97.2 °F (36.2 °C), resp. rate 16, height 175.3 cm (69\"), weight 114 kg (252 lb), SpO2 96%, not currently breastfeeding.            Physical Exam     General Appearance:    Alert, cooperative, no distress, appears stated age   Head:    Normocephalic, without obvious abnormality, atraumatic   Eyes:    PERRL, conjunctiva/corneas clear, EOM's intact   Ears:    Normal TM's and external ear canals, both ears   Nose:   Nares normal, septum midline, mucosa normal, no drainage   or sinus tenderness   Throat:   Lips, mucosa, and tongue normal; teeth and gums normal   Neck:   Supple, symmetrical, trachea midline, no adenopathy;        thyroid:  No enlargement/tenderness/nodules; no carotid    bruit or JVD   Back:     Symmetric, no curvature, ROM normal, no CVA tenderness   Lungs:     Clear to auscultation bilaterally, respirations unlabored   Chest wall:    No tenderness or deformity   Heart:    Regular rate and rhythm, S1 and S2 normal, no murmur,        rub or gallop   Abdomen:     Soft, non-tender, bowel sounds active all four quadrants,     no masses, no organomegaly   Extremities:   Extremities normal, atraumatic, no cyanosis or edema   Pulses:   2+ and symmetric all extremities   Skin:   Skin color, texture, turgor normal, no rashes or lesions   Lymph nodes:   Cervical, supraclavicular, and axillary nodes normal   Neurologic:   CNII-XII intact. Normal strength, sensation and reflexes       throughout      Results for orders placed or performed in visit on 07/12/24   Basic Metabolic Panel    Specimen: Blood   Result Value Ref " Range    Glucose 104 (H) 65 - 99 mg/dL    BUN 27 (H) 8 - 23 mg/dL    Creatinine 0.89 0.57 - 1.00 mg/dL    Sodium 145 136 - 145 mmol/L    Potassium 4.4 3.5 - 5.2 mmol/L    Chloride 105 98 - 107 mmol/L    CO2 35.0 (H) 22.0 - 29.0 mmol/L    Calcium 8.6 8.6 - 10.5 mg/dL    BUN/Creatinine Ratio 30.3 (H) 7.0 - 25.0    Anion Gap 5.0 5.0 - 15.0 mmol/L    eGFR 70.7 >60.0 mL/min/1.73   CBC (No Diff)    Specimen: Blood   Result Value Ref Range    WBC 10.45 3.40 - 10.80 10*3/mm3    RBC 4.55 3.77 - 5.28 10*6/mm3    Hemoglobin 13.0 12.0 - 15.9 g/dL    Hematocrit 42.2 34.0 - 46.6 %    MCV 92.7 79.0 - 97.0 fL    MCH 28.6 26.6 - 33.0 pg    MCHC 30.8 (L) 31.5 - 35.7 g/dL    RDW 13.9 12.3 - 15.4 %    RDW-SD 47.4 37.0 - 54.0 fl    MPV 11.4 6.0 - 12.0 fL    Platelets 232 140 - 450 10*3/mm3         Assessment & Plan     Fatigue. Multiple etiology. Diet not going well.      Cpap. Going well. Using all night.     Muscle hurt.  X 2 months and on statin x 2 month.     Wean off lipirtor.   Add co q 10 while weaning off.    Will increase exercise.    Diecussed diet changes.        Diagnoses and all orders for this visit:    1. STEFAN on CPAP (Primary)    2. Other fatigue    3. Statin myopathy      Wean off lipirtor.   Add co q 10 while weaning off.  Return in about 6 weeks (around 9/17/2024), or statin myopathy.          There are no Patient Instructions on file for this visit.     Gordon Normna MD    Assessment & Plan       Answers submitted by the patient for this visit:  Other (Submitted on 7/30/2024)  Please describe your symptoms.: Fatigue/ low iron?  Have you had these symptoms before?: No  How long have you been having these symptoms?: Greater than 2 weeks  Please list any medications you are currently taking for this condition.: None  Primary Reason for Visit (Submitted on 7/30/2024)  What is the primary reason for your visit?: Other

## 2024-08-26 NOTE — NURSING NOTE
PRE-LAAO HISTORY  Albania Ramos 1955   [address] [place] [id number]   [phone] (home)      Referring MD: Rogelio Bartlett MD   Information obtained from: [x] Medical record review  [x] Patient report  Scheduled for: LAAO  implant on July 19, 2024 with Dr Dr. Yu  SDM Visit: Rogelio Bartlett MD        CHADS-VASc Risk Assessment                   3 Total Score     1 Hypertension     1 Age 65-74     1 Sex: Female     Criteria that do not apply:     CHF     Age >/= 75     DM     PRIOR STROKE/TIA/THROMBO     Vascular Disease                History & Risk Factors (prior to LAAO implant)  [id number] Risk Factors:  Congestive HF              [x] No                         [] Yes  LV Dysfunction              [x] No                         [] Yes  Hypertension                 [] No                         [x] Yes  Diabetes                        [x] No                         [] Yes  Stroke                            [x] No                         [] Yes  TIA                                 [x] No                         [] Yes  Thromboembolism        [x] No                         [] Yes  Vascular Disease          [x] No                         [] Yes       If Yes, Type              [] Prior MI     [] PAD                 [] Aortic Plaque       Statin if indicated          [x] No                        [] Yes  HAS-BLED Risk Factors:  HTN (uncontrolled)     [] No   [x] Yes  Abnormal Renal Fxn   [x] No   [] Yes  Abnormal Liver Fxn     [x] No   [] Yes  Stroke                          [x] No   [] Yes  Bleeding event            [x] No   [] Yes  Labile INR                   [x] No   [] Yes  Alcohol                        [x] No   [] Yes  Antiplatelets                [x] No   [] Yes  NSAIDS                       [x] No   [] Yes     Indication:  Bruising, staggering gate, painful sensation r/t Eliquis     Additional Stroke & Bleeding Risk Factors:  Increased Fall Risk   [] No     [x] Yes  Bleeding Event         [x]  No     [] Yes      If Yes, Type      [] Intracranial [] Epistaxis   [] GI   [] Other     Rhythm History:  AFIBTYPE: paroxysmal     Valvular AFib                                       [x] No   [] Yes       If Yes, Rheum Valve Disease        []  No  [] Yes       If Yes, Mitral Valve Replacement  [] No   [] Yes            If Yes, Mechanical?                  [] No   [] Yes       If Yes, Mitral Valve Repair             [] No   [] Yes     Attempt at AFib Termination:              [] No   [x] Yes       If Yes, pharmacologic CV              [] No   [x] Yes       If Yes, DC cardioversion                [x] No   [] Yes       If Yes, catheter ablation                 [] No   [x] Yes            If Yes, Date of most recent: 2002              If Yes, surgical ablation                 [x] No   [] Yes           If Yes, Date of most recent: ________            Atrial Flutter                                         [x] No   [] Yes       MAURICE Intervention                                 [x] No   [] Yes     Additional History & Risk Factors:  Cardiomyopathy                                  [x] No   [] Yes  Chronic Lung Disease                        [] No   [x] Yes  Coronary Artery Disease                    [x] No   [] Yes  Sleep Apnea                                       [] No   [x] Yes       If Yes, compliant with treatment    [] No   [x] Yes     Epicardial Approach Considered        [x] No   [] Yes     Diagnostic Studies:  Last Echo:  [x] TTE Date: 4/15/24                           EF: 56-60%                                           LA: 4.5 cm                                             VHD-Trace MR & TR                                              Ace, arb arni for EF < 40%                  Pre-procedure blood thinner medications:  DNS Eliquis 5 mg and add Asprin 81 mg by mouth daily.

## 2024-08-29 ENCOUNTER — OFFICE VISIT (OUTPATIENT)
Dept: PULMONOLOGY | Facility: CLINIC | Age: 69
End: 2024-08-29
Payer: COMMERCIAL

## 2024-08-29 VITALS
BODY MASS INDEX: 36.88 KG/M2 | OXYGEN SATURATION: 95 % | RESPIRATION RATE: 14 BRPM | WEIGHT: 249 LBS | HEART RATE: 67 BPM | HEIGHT: 69 IN | DIASTOLIC BLOOD PRESSURE: 72 MMHG | SYSTOLIC BLOOD PRESSURE: 133 MMHG

## 2024-08-29 DIAGNOSIS — G47.33 OBSTRUCTIVE SLEEP APNEA: ICD-10-CM

## 2024-08-29 DIAGNOSIS — J45.30 MILD PERSISTENT ASTHMA WITHOUT COMPLICATION: Primary | ICD-10-CM

## 2024-08-29 PROCEDURE — 99214 OFFICE O/P EST MOD 30 MIN: CPT | Performed by: INTERNAL MEDICINE

## 2024-08-29 RX ORDER — ALBUTEROL SULFATE 90 UG/1
2 AEROSOL, METERED RESPIRATORY (INHALATION) EVERY 4 HOURS PRN
Qty: 18 G | Refills: 5 | Status: SHIPPED | OUTPATIENT
Start: 2024-08-29

## 2024-08-29 NOTE — PROGRESS NOTES
"  Chief Complaint   Patient presents with    Follow-up    Sleeping Problem         Subjective   Albania Ramos is a 69 y.o. female.   Patient was evaluated today for follow up of asthma, and STEFAN.     Patient does not report any recent exacerbations requiring emergency room visits or hospitalizations.     Patient is compliant with pulmonary medicines, as prescribed.     she is currently on Spiriva. she is using the rescue inhalers minimally.     Patient doesn't report any issues with the PAP device. The patient describes no significant issues with her mask either.     Patient says that the compliance with the use of the equipment is good.     Patient says that her symptoms of fatigue & daytime sleepiness have been helped greatly with the use of PAP, as prescribed.     she had a recalled device and was given a replacement device instead of the recalled device in mid 2022.       The following portions of the patient's history were reviewed and updated as appropriate: allergies, current medications, past family history, past medical history, past social history, and past surgical history.    Review of Systems   HENT:  Positive for sinus pressure and sneezing. Negative for sore throat.    Respiratory:  Negative for cough, chest tightness, shortness of breath and wheezing.        Objective   Visit Vitals  /72   Pulse 67   Resp 14   Ht 175.3 cm (69\") Comment: pt reported   Wt 113 kg (249 lb) Comment: pt reported   LMP  (LMP Unknown)   SpO2 95%   BMI 36.77 kg/m²       BMI Readings from Last 8 Encounters:   08/29/24 36.77 kg/m²   08/06/24 37.21 kg/m²   07/31/24 37.66 kg/m²   07/11/24 36.92 kg/m²   06/17/24 37.66 kg/m²   06/06/24 35.59 kg/m²   06/04/24 36.83 kg/m²   05/20/24 36.92 kg/m²       Physical Exam  Vitals reviewed.   Constitutional:       Appearance: She is well-developed.   HENT:      Head: Atraumatic.      Mouth/Throat:      Comments: Oropharynx was crowded.  Pulmonary:      Effort: Pulmonary effort is " normal. No respiratory distress.      Breath sounds: Normal breath sounds. No wheezing or rales.   Neurological:      Mental Status: She is alert and oriented to person, place, and time.         Assessment & Plan   Diagnoses and all orders for this visit:    1. Mild persistent asthma without complication (Primary)    2. Obstructive sleep apnea    Other orders  -     albuterol sulfate HFA (Ventolin HFA) 108 (90 Base) MCG/ACT inhaler; Inhale 2 puffs Every 4 (Four) Hours As Needed for Wheezing or Shortness of Air.  Dispense: 18 g; Refill: 5           Return in about 10 months (around 6/18/2025) for Recheck, For Nubia Valderrama).    DISCUSSION (if any):  It appears that her symptoms of asthma are under adequate control with the current regimen.    Patient's medications for underlying asthma were reviewed in great detail.    Compliance with medications stressed.     Side effects of prescribed medications discussed with the patient.    The need to continue to be aware of triggers that may cause asthma exacerbation versus progression of disease, was also discussed.    Sleep study performed in 2016  AHI was 22 / hour.   Supine AHI was 52/hour.   REM AHI was 54/hour.     Latest PAP device provided in mid 2022  DME company: [employer]    Current PAP settings: 11.5  Current mask type: Karin View FFM    Compliance data was obtained from the her device/DME company.    Continue PAP device on a regular basis, at least 4 hours or more per night.    Sleep hygiene measures were discussed    Vaccination status addressed.    Up-to-date with influenza vaccinations.     Up-to-date with pneumonia vaccinations.     It was recommended that the patient consider Prevnar-20 vaccination and discuss it with her PCP, if not already obtained.       Dictated utilizing Dragon dictation.    This document was electronically signed by Moreno Mustafa MD on 08/29/24 at 09:46 EDT

## 2024-09-10 ENCOUNTER — READMISSION MANAGEMENT (OUTPATIENT)
Dept: CALL CENTER | Facility: HOSPITAL | Age: 69
End: 2024-09-10
Payer: COMMERCIAL

## 2024-09-10 ENCOUNTER — HOSPITAL ENCOUNTER (INPATIENT)
Facility: HOSPITAL | Age: 69
LOS: 1 days | Discharge: HOME | End: 2024-09-10
Attending: INTERNAL MEDICINE | Admitting: INTERNAL MEDICINE
Payer: COMMERCIAL

## 2024-09-10 VITALS
TEMPERATURE: 97.6 F | HEART RATE: 61 BPM | OXYGEN SATURATION: 100 % | DIASTOLIC BLOOD PRESSURE: 68 MMHG | SYSTOLIC BLOOD PRESSURE: 110 MMHG | RESPIRATION RATE: 14 BRPM

## 2024-09-10 DIAGNOSIS — I48.0 PAROXYSMAL ATRIAL FIBRILLATION: ICD-10-CM

## 2024-09-10 DIAGNOSIS — Z95.818 PRESENCE OF WATCHMAN LEFT ATRIAL APPENDAGE CLOSURE DEVICE: Primary | ICD-10-CM

## 2024-09-10 DIAGNOSIS — R23.3 BRUISES EASILY: ICD-10-CM

## 2024-09-10 DIAGNOSIS — R26.0 STAGGERING GAIT: ICD-10-CM

## 2024-09-10 LAB
ACT BLD: 354 SECONDS (ref 82–152)
ANION GAP SERPL CALCULATED.3IONS-SCNC: 9 MMOL/L (ref 5–15)
BUN SERPL-MCNC: 19 MG/DL (ref 8–23)
BUN/CREAT SERPL: 26.4 (ref 7–25)
CALCIUM SPEC-SCNC: 9.1 MG/DL (ref 8.6–10.5)
CHLORIDE SERPL-SCNC: 107 MMOL/L (ref 98–107)
CO2 SERPL-SCNC: 28 MMOL/L (ref 22–29)
CREAT SERPL-MCNC: 0.72 MG/DL (ref 0.57–1)
DEPRECATED RDW RBC AUTO: 42.7 FL (ref 37–54)
EGFRCR SERPLBLD CKD-EPI 2021: 91.2 ML/MIN/1.73
ERYTHROCYTE [DISTWIDTH] IN BLOOD BY AUTOMATED COUNT: 13.2 % (ref 12.3–15.4)
GLUCOSE SERPL-MCNC: 116 MG/DL (ref 65–99)
HCT VFR BLD AUTO: 39.9 % (ref 34–46.6)
HGB BLD-MCNC: 12.9 G/DL (ref 12–15.9)
MCH RBC QN AUTO: 28.7 PG (ref 26.6–33)
MCHC RBC AUTO-ENTMCNC: 32.3 G/DL (ref 31.5–35.7)
MCV RBC AUTO: 88.7 FL (ref 79–97)
PLATELET # BLD AUTO: 222 10*3/MM3 (ref 140–450)
PMV BLD AUTO: 10.9 FL (ref 6–12)
POTASSIUM SERPL-SCNC: 4.4 MMOL/L (ref 3.5–5.2)
RBC # BLD AUTO: 4.5 10*6/MM3 (ref 3.77–5.28)
SODIUM SERPL-SCNC: 144 MMOL/L (ref 136–145)
WBC NRBC COR # BLD AUTO: 6.65 10*3/MM3 (ref 3.4–10.8)

## 2024-09-10 PROCEDURE — 99152 MOD SED SAME PHYS/QHP 5/>YRS: CPT | Performed by: INTERNAL MEDICINE

## 2024-09-10 PROCEDURE — 85347 COAGULATION TIME ACTIVATED: CPT

## 2024-09-10 PROCEDURE — C1889 IMPLANT/INSERT DEVICE, NOC: HCPCS | Performed by: INTERNAL MEDICINE

## 2024-09-10 PROCEDURE — 33340 PERQ CLSR TCAT L ATR APNDGE: CPT | Performed by: INTERNAL MEDICINE

## 2024-09-10 PROCEDURE — 25010000002 FENTANYL CITRATE (PF) 50 MCG/ML SOLUTION: Performed by: INTERNAL MEDICINE

## 2024-09-10 PROCEDURE — C1759 CATH, INTRA ECHOCARDIOGRAPHY: HCPCS | Performed by: INTERNAL MEDICINE

## 2024-09-10 PROCEDURE — C1894 INTRO/SHEATH, NON-LASER: HCPCS | Performed by: INTERNAL MEDICINE

## 2024-09-10 PROCEDURE — 25810000003 SODIUM CHLORIDE 0.9 % SOLUTION: Performed by: INTERNAL MEDICINE

## 2024-09-10 PROCEDURE — 25010000002 CEFAZOLIN PER 500 MG: Performed by: PHYSICIAN ASSISTANT

## 2024-09-10 PROCEDURE — 93662 INTRACARDIAC ECG (ICE): CPT | Performed by: INTERNAL MEDICINE

## 2024-09-10 PROCEDURE — 80048 BASIC METABOLIC PNL TOTAL CA: CPT | Performed by: PHYSICIAN ASSISTANT

## 2024-09-10 PROCEDURE — 25010000002 HEPARIN (PORCINE) PER 1000 UNITS: Performed by: INTERNAL MEDICINE

## 2024-09-10 PROCEDURE — C1760 CLOSURE DEV, VASC: HCPCS | Performed by: INTERNAL MEDICINE

## 2024-09-10 PROCEDURE — 94660 CPAP INITIATION&MGMT: CPT

## 2024-09-10 PROCEDURE — 85027 COMPLETE CBC AUTOMATED: CPT | Performed by: PHYSICIAN ASSISTANT

## 2024-09-10 PROCEDURE — 25510000001 IOPAMIDOL PER 1 ML: Performed by: INTERNAL MEDICINE

## 2024-09-10 PROCEDURE — 25010000002 PROTAMINE SULFATE PER 10 MG: Performed by: INTERNAL MEDICINE

## 2024-09-10 PROCEDURE — 25010000002 BUPIVACAINE 0.5 % SOLUTION: Performed by: INTERNAL MEDICINE

## 2024-09-10 PROCEDURE — C1893 INTRO/SHEATH, FIXED,NON-PEEL: HCPCS | Performed by: INTERNAL MEDICINE

## 2024-09-10 PROCEDURE — S0260 H&P FOR SURGERY: HCPCS | Performed by: PHYSICIAN ASSISTANT

## 2024-09-10 PROCEDURE — 36415 COLL VENOUS BLD VENIPUNCTURE: CPT

## 2024-09-10 PROCEDURE — 02L73DK OCCLUSION OF LEFT ATRIAL APPENDAGE WITH INTRALUMINAL DEVICE, PERCUTANEOUS APPROACH: ICD-10-PCS | Performed by: INTERNAL MEDICINE

## 2024-09-10 PROCEDURE — 63710000001 ONDANSETRON ODT 4 MG TABLET DISPERSIBLE: Performed by: PHYSICIAN ASSISTANT

## 2024-09-10 PROCEDURE — 25010000002 MIDAZOLAM PER 1 MG: Performed by: INTERNAL MEDICINE

## 2024-09-10 PROCEDURE — C1769 GUIDE WIRE: HCPCS | Performed by: INTERNAL MEDICINE

## 2024-09-10 DEVICE — CP WATCHMAN FLX PRO PROC: Type: IMPLANTABLE DEVICE | Status: FUNCTIONAL

## 2024-09-10 DEVICE — CP SYS WATCHMAN TRUSTEER ACCESS: Type: IMPLANTABLE DEVICE | Status: FUNCTIONAL

## 2024-09-10 DEVICE — LEFT ATRIAL APPENDAGE CLOSURE DEVICE WITH DELIVERY SYSTEM
Type: IMPLANTABLE DEVICE | Site: HEART | Status: FUNCTIONAL
Brand: WATCHMAN FLX™ PRO

## 2024-09-10 RX ORDER — SODIUM CHLORIDE 9 MG/ML
40 INJECTION, SOLUTION INTRAVENOUS AS NEEDED
Status: DISCONTINUED | OUTPATIENT
Start: 2024-09-10 | End: 2024-09-10 | Stop reason: HOSPADM

## 2024-09-10 RX ORDER — FENTANYL CITRATE 50 UG/ML
INJECTION, SOLUTION INTRAMUSCULAR; INTRAVENOUS
Status: DISCONTINUED | OUTPATIENT
Start: 2024-09-10 | End: 2024-09-10 | Stop reason: HOSPADM

## 2024-09-10 RX ORDER — LIDOCAINE HYDROCHLORIDE 5 MG/ML
INJECTION, SOLUTION INFILTRATION; PERINEURAL
Status: DISCONTINUED | OUTPATIENT
Start: 2024-09-10 | End: 2024-09-10 | Stop reason: HOSPADM

## 2024-09-10 RX ORDER — NITROGLYCERIN 0.4 MG/1
0.4 TABLET SUBLINGUAL
Status: DISCONTINUED | OUTPATIENT
Start: 2024-09-10 | End: 2024-09-10 | Stop reason: HOSPADM

## 2024-09-10 RX ORDER — ACETAMINOPHEN 325 MG/1
650 TABLET ORAL EVERY 4 HOURS PRN
Status: DISCONTINUED | OUTPATIENT
Start: 2024-09-10 | End: 2024-09-10 | Stop reason: HOSPADM

## 2024-09-10 RX ORDER — IOPAMIDOL 755 MG/ML
INJECTION, SOLUTION INTRAVASCULAR
Status: DISCONTINUED | OUTPATIENT
Start: 2024-09-10 | End: 2024-09-10 | Stop reason: HOSPADM

## 2024-09-10 RX ORDER — ETOMIDATE 2 MG/ML
INJECTION INTRAVENOUS
Status: DISCONTINUED | OUTPATIENT
Start: 2024-09-10 | End: 2024-09-10 | Stop reason: HOSPADM

## 2024-09-10 RX ORDER — SODIUM CHLORIDE 0.9 % (FLUSH) 0.9 %
10 SYRINGE (ML) INJECTION AS NEEDED
Status: DISCONTINUED | OUTPATIENT
Start: 2024-09-10 | End: 2024-09-10 | Stop reason: HOSPADM

## 2024-09-10 RX ORDER — HEPARIN SODIUM 1000 [USP'U]/ML
INJECTION, SOLUTION INTRAVENOUS; SUBCUTANEOUS
Status: DISCONTINUED | OUTPATIENT
Start: 2024-09-10 | End: 2024-09-10 | Stop reason: HOSPADM

## 2024-09-10 RX ORDER — SODIUM CHLORIDE 0.9 % (FLUSH) 0.9 %
3 SYRINGE (ML) INJECTION EVERY 12 HOURS SCHEDULED
Status: DISCONTINUED | OUTPATIENT
Start: 2024-09-10 | End: 2024-09-10 | Stop reason: HOSPADM

## 2024-09-10 RX ORDER — ONDANSETRON 4 MG/1
4 TABLET, ORALLY DISINTEGRATING ORAL EVERY 6 HOURS PRN
Status: DISCONTINUED | OUTPATIENT
Start: 2024-09-10 | End: 2024-09-10 | Stop reason: HOSPADM

## 2024-09-10 RX ORDER — ONDANSETRON 2 MG/ML
4 INJECTION INTRAMUSCULAR; INTRAVENOUS EVERY 6 HOURS PRN
Status: DISCONTINUED | OUTPATIENT
Start: 2024-09-10 | End: 2024-09-10

## 2024-09-10 RX ORDER — SODIUM CHLORIDE 9 MG/ML
INJECTION, SOLUTION INTRAVENOUS
Status: DISCONTINUED | OUTPATIENT
Start: 2024-09-10 | End: 2024-09-10 | Stop reason: HOSPADM

## 2024-09-10 RX ORDER — ASPIRIN 81 MG/1
81 TABLET ORAL DAILY
COMMUNITY

## 2024-09-10 RX ORDER — BUPIVACAINE HYDROCHLORIDE 5 MG/ML
INJECTION, SOLUTION PERINEURAL
Status: DISCONTINUED | OUTPATIENT
Start: 2024-09-10 | End: 2024-09-10 | Stop reason: HOSPADM

## 2024-09-10 RX ORDER — PROTAMINE SULFATE 10 MG/ML
INJECTION, SOLUTION INTRAVENOUS
Status: DISCONTINUED | OUTPATIENT
Start: 2024-09-10 | End: 2024-09-10 | Stop reason: HOSPADM

## 2024-09-10 RX ORDER — MIDAZOLAM HYDROCHLORIDE 1 MG/ML
INJECTION INTRAMUSCULAR; INTRAVENOUS
Status: DISCONTINUED | OUTPATIENT
Start: 2024-09-10 | End: 2024-09-10 | Stop reason: HOSPADM

## 2024-09-10 RX ORDER — ASPIRIN 81 MG/1
81 TABLET ORAL DAILY
Status: DISCONTINUED | OUTPATIENT
Start: 2024-09-11 | End: 2024-09-10 | Stop reason: HOSPADM

## 2024-09-10 RX ADMIN — SODIUM CHLORIDE 2 G: 900 INJECTION INTRAVENOUS at 08:30

## 2024-09-10 RX ADMIN — ONDANSETRON 4 MG: 4 TABLET, ORALLY DISINTEGRATING ORAL at 08:04

## 2024-09-10 NOTE — OP NOTE
" LEFT ATRIAL APPENDAGE CLOSURE (LAAC)                                 Watchman Implant    PROCEDURES PERFORMED:    Left atrial appendage closure with 35 mm Watchman FLX Pro device with intracardiac echocardiography.  Patient was admitted to the hospital for this procedure.  This is an inpatient procedure.    PREPROCEDURAL DIAGNOSES:    1. Paroxysmal Atrial fibrillation  2. [id number] score of 3  3. HASBLED score of 3    REFERRING PHYSICIAN:    Dr VAZQUEZ    POST PROCEDURE DIANGOSES:  As above.    INDICATION FOR PROCEDURE:  Briefly, Albania Ramos is a 69 y.o. year old female with a history of AF.    ANTICOAGULATION STRATEGY PRIOR TO AND POST PROCEDURE:  DOAC.  The last dose of anticoagulant was confirmed to have been taken this AM.    PT/INR:  No results found for: \"LABPROT\", \"INR\"  PTT:  No results found for: \"APTT\"    CBC:   WBC   Date Value Ref Range Status   09/10/2024 6.65 3.40 - 10.80 10*3/mm3 Final     RBC   Date Value Ref Range Status   09/10/2024 4.50 3.77 - 5.28 10*6/mm3 Final     Hemoglobin   Date Value Ref Range Status   09/10/2024 12.9 12.0 - 15.9 g/dL Final     Hematocrit   Date Value Ref Range Status   09/10/2024 39.9 34.0 - 46.6 % Final     MCV   Date Value Ref Range Status   09/10/2024 88.7 79.0 - 97.0 fL Final     RDW   Date Value Ref Range Status   09/10/2024 13.2 12.3 - 15.4 % Final     Platelets   Date Value Ref Range Status   09/10/2024 222 140 - 450 10*3/mm3 Final     BMP:     Sodium   Date Value Ref Range Status   09/10/2024 144 136 - 145 mmol/L Final     Potassium   Date Value Ref Range Status   09/10/2024 4.4 3.5 - 5.2 mmol/L Final     Chloride   Date Value Ref Range Status   09/10/2024 107 98 - 107 mmol/L Final     CO2   Date Value Ref Range Status   09/10/2024 28.0 22.0 - 29.0 mmol/L Final     BUN   Date Value Ref Range Status   09/10/2024 19 8 - 23 mg/dL Final     Creatinine   Date Value Ref Range Status   09/10/2024 0.72 0.57 - 1.00 mg/dL Final     eGFR   Date Value Ref Range Status "   09/10/2024 91.2 >60.0 mL/min/1.73 Final       Vital Signs: /60   Pulse 51   Temp 97.6 °F (36.4 °C) (Temporal)   Resp 11   LMP  (LMP Unknown)   SpO2 96%      Admit Weight:     BMI: There is no height or weight on file to calculate BMI.    MODERATE SEDATION FOR PROCEDURE:    Moderate sedation was given during this procedure.    I supervised and directed RN to administer this sedation.  This staff member also monitored the patient's hemodynamic and respiratory status and response to these medications.  Please see the full detailed procedure report generated by the electrophysiology laboratory staff.  The patient tolerated moderate sedation well.  There were no complications regarding sedation.  The total dose of fentanyl was 0 mcg and the total dose of midazolam was 0 mg.  The total dose of Brevital was 0 mg.  First sedation was administered at 0 and continued through 0.    PROCEDURE NARRATIVE:    The patient was able to give written informed consent after revisiting the key portions of the risk versus benefit profile of the procedure.  This discussion was framed by our lengthy conversations  (please see our detailed notes).  Patient verbalized strong understanding of this discussion and a strong desire to proceed with the procedure.  Please note that this detailed informed consent process utilized mutual and shared decision making process between all parties involved, principally the physician and patient, but also potentially with input from the patient's selected family and friends.    Please also note that the patient was seen and examined prior to this procedure by a non implanting physician who agreed that this patient would benefit from left atrial appendage closure as an alternative to long-term anticoagulation.  Please see this physician separate attestation.    The patient was brought to the EP Lab in the post absorptive state.      The patient was then prepared and draped in a routing sterile  fashion.  Seldinger access was obtained at the right common femoral vein with a dual venipunctures utilizing ultrasound probe guided vascular access.  A J tip wire was advanced into the vascular space.  A pair of sheaths were placed into the inferior vena cava.  The patient was anticoagulated with unfractionated heparin in bolus and then continuous infusion to maintain an ACT of between 200 and 300 seconds.    A phased array intracardiac echocardiography probe was placed into the right atrium, right ventricle and after transseptal puncture also into the left atrium and left ventricle.  This was used for visualization of critical cardiac structures such as the fossa ovalis interatrial septum left atrial appendage left superior pulmonary vein mitral valve annulus and pericardial space.  This aspect of the procedure is a separate and independent part of the procedure which was critical for implantation of the Watchman.    We performed transseptal puncture with a combination of fluoroscopic and echocardiographic guidance.    The Watchman delivery sheath was then placed in the left atrium under echocardiographic and fluoroscopic guidance safely.  Left atrial appendage was then cannulated with a 10 mm angled pigtail catheter.  This was again performed with a combination of echocardiographic and fluoroscopic guidance.  Contrast injection was also performed.  The Watchman delivery sheath was then placed into optimal position within left atrial appendage.  The left atrial appendage diameter was viewed in multiple projections on echocardiography as well as fluoroscopy with contrast injection into the left atrial appendage.  A 35 mm device was selected and delivered with the supplied delivery tools to the left atrial appendage safely.      PASS criteria were then evaluated and confirmed.      The device was then safely released.    Catheters and sheaths were withdrawn from the left atrium and then the body.  Hemostasis at the  site of venous access was achieved after withdrawing the sheath from the body with a vascade closure device  and manual pressure.  Intracardiac echocardiogram demonstrated no pericardial effusion.    Protamine was given to reverse anticoagulation.    The patient was transferred to recovery in stable condition.    COMPLICATIONS: none    EBL: minimal    KEY PROCEDURAL FINDINGS:     35 mm Watchman FLX Pro implanted with all trabeculae covered and no enrico-device leak    POST PROCEDURAL PLAN:    Uninterrupted anticoagulation for not less than 45 days unless specially instructed otherwise by myself or another member of our EP physician team. The patient will also be taking 81 mg of aspirin.  An imaging ( RONY or CT scan ) will be obtained to assess for appropriate closure of the left atrial appendage occlusion device at 45 days post implant.    Please note that this patient was admitted specifically for this elective inpatient procedure.  This is an inpatient procedure.  Due to COVID-19 global pandemic and bed shortage we will endeavor to discharge this patient later today rather than tomorrow.  I think that he will tolerate this well.  We will of course wait until later in the afternoon to make a final decision.  The patient will be seen at our office per protocol for this procedure.    Miguel Yu DO, FACC, RS  Cardiac Electrophysiologist  [facility] / [facility]

## 2024-09-10 NOTE — Clinical Note
A 9 fr sheath was successfully inserted with ultrasound guidance into the right femoral vein. Sheath insertion not delayed.

## 2024-09-10 NOTE — H&P
"[facility] Electrophysiology    Date of Hospital Visit: 09/10/24    Place of Service: [facility]    Patient Name: Albania Ramos  :1955      Primary Care Provider: Gordon Norman MD    Cheif Complaint EP: Atrial Fibrillation      Problem List:  Active Hospital Problems    Diagnosis  POA    **Bruises easily [R23.3]  Yes    Paroxysmal atrial fibrillation [I48.0]  Yes     Priority: High     A-fib ablation x 2  Echo, 2024: EF 55 to 60%.  Normal valvular morphology.  Normal left atrial size.      STEFAN on CPAP [G47.33]  Yes     Priority: Low    Class 2 severe obesity due to excess calories with serious comorbidity and body mass index (BMI) of 36.0 to 36.9 in adult [E66.01, Z68.36]  Not Applicable    Fatty liver [K76.0]  Yes    Hypothyroidism [E03.9]  Yes       History of Present Illness:  This is a 69-year-old female with paroxysmal atrial fibrillation and history of cardioembolic stroke she is intolerant to chronic anticoagulation and presents today for left atrial appendage occlusion.        Allergies   Allergen Reactions    Codeine Other (See Comments)     Pt states \"it made me feel like my insides were in a vice \"    Niacin Hives    Darvon [Propoxyphene] Nausea And Vomiting    Zofran [Ondansetron] Other (See Comments)     Face/neck/chest very red/flushed after IV Zofran administration; tolerates po Zofran without difficultly     Lipitor [Atorvastatin] Myalgia    Adhesive Tape Rash     tegaderm     Bentyl [Dicyclomine] Rash     Patient states she turned \"bright red\"    Ciprodex [Ciprofloxacin-Dexamethasone] Other (See Comments)     REDNESS,tendonitis    Flagyl [Metronidazole] Nausea Only    Other Other (See Comments)     POLLEN,TREES,AND PLANTS MULTIPLE ENVIRONMENTAL ALLERGIES    Prednisone Rash       Current Outpatient Medications   Medication Instructions    albuterol sulfate HFA (Ventolin HFA) 108 (90 Base) MCG/ACT inhaler 2 puffs, Inhalation, Every 4 Hours PRN    " allopurinol (ZYLOPRIM) 300 MG tablet TAKE ONE TABLET BY MOUTH EVERY DAY    atorvastatin (LIPITOR) 40 mg, Oral, Daily    calcium carbonate (OS-SHANTE) 600 mg, Oral, As Needed    cyanocobalamin (VITAMIN B-12) 2500 MCG tablet tablet TAKE ONE TABLET BY MOUTH THREE TIMES A WEEK MUST MAKE AN APPOINTMENT    Diclofenac Sodium (VOLTAREN) 4 g, Topical, 4 Times Daily PRN    docusate sodium 100 mg, Oral, 2 Times Daily PRN    Eliquis 5 mg, Oral, Every 12 Hours Scheduled, PATIENT STATES SHE WAS INSTRUCTED TO CONTINUE PER DR ALEXANDER'S OFFICE    estradiol (ESTRACE) 0.1 MG/GM vaginal cream Insert TWO grams vaginally daily AS NEEDED FOR vaginal dryness. Usually uses twice A WEEK    Fish Oil 1,000 mg, Oral, Daily    GLUCOSAMINE HCL-MSM PO 1 tablet, Oral, Daily    hydroCHLOROthiazide 12.5 mg, Oral, Daily    ipratropium (ATROVENT) 0.06 % nasal spray 1 spray As Needed for Rhinitis.    levothyroxine (SYNTHROID, LEVOTHROID) 150 mcg, Oral, Daily    metoprolol tartrate (LOPRESSOR) 25 mg, Oral, 2 Times Daily    MULTIPLE VITAMIN PO 1 tablet, Oral, Daily    nitroglycerin (NITROSTAT) 0.4 MG SL tablet 1 under the tongue as needed for angina, may repeat q5mins for up three doses    NON FORMULARY 2 tablets, Oral, Daily, Lions ramiro mushrooms    nystatin (MYCOSTATIN) 500,000 Units, Oral, 4 Times Daily    ofloxacin (FLOXIN) 0.3 % otic solution Administer 5 drops to the right ear As Needed (PRESSURE IN EARS).    omeprazole (priLOSEC) 40 MG capsule 1 capsule, Oral, As Needed    SUMAtriptan (IMITREX) 25 mg, Oral, As Needed    tiotropium (SPIRIVA) 18 MCG per inhalation capsule 1 capsule, Inhalation, Daily - RT    TURMERIC PO 3 capsules, Oral, Daily    vitamin C (ASCORBIC ACID) 250 mg, Oral, Daily           Social History     Socioeconomic History    Marital status:    Tobacco Use    Smoking status: Former     Current packs/day: 0.00     Average packs/day: 1.5 packs/day for 22.0 years (33.1 ttl pk-yrs)     Types: Cigarettes     Start date: 6/1/1974      Quit date: 1996     Years since quittin.2     Passive exposure: Past    Smokeless tobacco: Never    Tobacco comments:     Smoked menthol   Vaping Use    Vaping status: Never Used   Substance and Sexual Activity    Alcohol use: Yes     Comment: very rare    Drug use: Never    Sexual activity: Not Currently     Partners: Male     Birth control/protection: None, Hysterectomy       Family History   Problem Relation Age of Onset    Atrial fibrillation Mother     Thyroid disease Mother     Stroke Mother         Stroke x2    Arthritis Mother         Osteoarthritis    Arrhythmia Mother     Heart failure Mother     Diabetes Mother     Early death Father 68        Lung cancer    Lung cancer Father     Alcohol abuse Father     Early death Sister 16        Heart attack    Heart attack Sister     Down syndrome Sister     Developmental Disability Sister         Down syndrome    Sleep apnea Brother         Nonorganic    Mental illness Brother         At births    Arthritis Brother         Osteoarthritis    Asthma Brother     Developmental Disability Brother         At birth    Arrhythmia Brother     Heart attack Brother     Heart failure Brother     Obesity Brother     Stroke Maternal Aunt     Atrial fibrillation Maternal Aunt     Stroke Maternal Aunt     Atrial fibrillation Maternal Aunt     Stroke Maternal Grandmother     Breast cancer Neg Hx     Ovarian cancer Neg Hx        REVIEW OF SYSTEMS:   Review of Systems   Constitutional: Positive for chills. Negative for diaphoresis, malaise/fatigue and weight gain.   Cardiovascular:  Negative for chest pain, claudication, dyspnea on exertion, irregular heartbeat, leg swelling, orthopnea, palpitations, paroxysmal nocturnal dyspnea and syncope.   Respiratory:  Positive for shortness of breath. Negative for cough, sleep disturbances due to breathing and wheezing.    Hematologic/Lymphatic: Positive for bleeding problem.   Musculoskeletal:  Negative for muscle cramps, muscle  weakness and myalgias.   Gastrointestinal:  Negative for heartburn.   Neurological:  Negative for weakness.            Objective:  Vitals:    09/10/24 0715 09/10/24 0716 09/10/24 0717   BP: 108/46 119/52    Pulse:   61   SpO2:   90%     There is no height or weight on file to calculate BMI.      Vitals reviewed.   Constitutional:       Appearance: Well-developed.   Pulmonary:      Effort: Pulmonary effort is normal. No respiratory distress.      Breath sounds: Normal breath sounds. No wheezing. No rales.      Comments: Bases clear  Chest:      Chest wall: Not tender to palpatation.   Cardiovascular:      Normal rate. Regular rhythm.      Murmurs: There is no murmur.      No gallop.  No click. No rub.   Pulses:     Intact distal pulses.   Edema:     Peripheral edema absent.   Musculoskeletal: Normal range of motion.       Lab Review:           Results from last 7 days   Lab Units 09/10/24  0725   WBC 10*3/mm3 6.65   HEMOGLOBIN g/dL 12.9   HEMATOCRIT % 39.9   PLATELETS 10*3/mm3 222     CrCl cannot be calculated (Patient's most recent lab result is older than the maximum 30 days allowed.).  Lab Results   Component Value Date    INR 1.01 09/05/2023    INR 0.88 10/13/2016    INR 0.96 10/15/2015    PROTIME 13.9 09/05/2023    PROTIME 9.6 10/13/2016    PROTIME 10.0 10/15/2015        Assessment:   Atrial fibrillation, poor candidate for long-term anticoagulation        Plan:   Left atrial appendage occlusion        ADENIKE Guy

## 2024-09-10 NOTE — NURSING NOTE
LAAO Procedure Information   [facility] 1955   [address] [place] [address] [id number] [phone] (home)       MAURICE Orifice Maximal Width: 28  mm  Cumulative Air Kerma:  115 mGy  Contrast Volume:  30 mL  Dose Area Product:  1002.17 ?Gy-M2

## 2024-09-11 ENCOUNTER — TRANSITIONAL CARE MANAGEMENT TELEPHONE ENCOUNTER (OUTPATIENT)
Dept: CALL CENTER | Facility: HOSPITAL | Age: 69
End: 2024-09-11
Payer: COMMERCIAL

## 2024-09-11 ENCOUNTER — TELEPHONE (OUTPATIENT)
Dept: CARDIOLOGY | Facility: CLINIC | Age: 69
End: 2024-09-11
Payer: COMMERCIAL

## 2024-09-11 NOTE — OUTREACH NOTE
Prep Survey      Flowsheet Row Responses   [facility] patient discharged from? [place]   Is LACE score < 7 ? Yes   Eligibility [facility]   Date of Admission 09/10/24   Date of Discharge 09/10/24   Discharge Disposition Home or Self Care   Discharge diagnosis Bruises easily,left atrial appendage occlusion.   Does the patient have one of the following disease processes/diagnoses(primary or secondary)? General Surgery   Does the patient have Home health ordered? No   Is there a DME ordered? No   Medication alerts for this patient see avs--eliquis   Prep survey completed? Yes            Mayela BRONSON - Registered Nurse

## 2024-09-11 NOTE — OUTREACH NOTE
Call Center TCM Note      Flowsheet Row Responses   [facility] patient discharged from? [place]   Does the patient have one of the following disease processes/diagnoses(primary or secondary)? General Surgery   TCM attempt successful? No   Unsuccessful attempts Attempt 1   Call Status Comments attempted patient and spouse per  verbal            Melina Lauren RN    9/11/2024, 12:20 EDT

## 2024-09-11 NOTE — OUTREACH NOTE
Call Center TCM Note      Flowsheet Row Responses   [facility] patient discharged from? [place]   Does the patient have one of the following disease processes/diagnoses(primary or secondary)? General Surgery   TCM attempt successful? No   Unsuccessful attempts Attempt 2   Call Status Comments attempted patient and spouse per  verbal            Melina Lauren RN    9/11/2024, 12:58 EDT

## 2024-09-11 NOTE — TELEPHONE ENCOUNTER
Pt called today stating she was running a temperature of 101. Patient denies any pain/swelling/SOB. Instructed patient to monitor site closely for signs of infection. Pt verbalized understanding.

## 2024-09-12 ENCOUNTER — TRANSITIONAL CARE MANAGEMENT TELEPHONE ENCOUNTER (OUTPATIENT)
Dept: CALL CENTER | Facility: HOSPITAL | Age: 69
End: 2024-09-12
Payer: COMMERCIAL

## 2024-09-12 NOTE — OUTREACH NOTE
Call Center TCM Note      Flowsheet Row Responses   [facility] patient discharged from? [facility] [facility]   Does the patient have one of the following disease processes/diagnoses(primary or secondary)? General Surgery   TCM attempt successful? No   Unsuccessful attempts Attempt 3  [attempted patient and spouse listed on PCP verbal]            Valerie Alfaro RN    9/12/2024, 11:35 EDT

## 2024-09-12 NOTE — TELEPHONE ENCOUNTER
Called to check in on Ms. Ramos, Pt is feeling very good today, she will call if anything changes.

## 2024-09-17 ENCOUNTER — OFFICE VISIT (OUTPATIENT)
Dept: INTERNAL MEDICINE | Facility: CLINIC | Age: 69
End: 2024-09-17
Payer: COMMERCIAL

## 2024-09-17 ENCOUNTER — OFFICE VISIT (OUTPATIENT)
Dept: CARDIOLOGY | Facility: CLINIC | Age: 69
End: 2024-09-17
Payer: COMMERCIAL

## 2024-09-17 VITALS
TEMPERATURE: 96.8 F | HEART RATE: 57 BPM | RESPIRATION RATE: 16 BRPM | BODY MASS INDEX: 36.88 KG/M2 | OXYGEN SATURATION: 95 % | HEIGHT: 69 IN | WEIGHT: 249 LBS | SYSTOLIC BLOOD PRESSURE: 120 MMHG | DIASTOLIC BLOOD PRESSURE: 64 MMHG

## 2024-09-17 VITALS
HEART RATE: 67 BPM | WEIGHT: 249 LBS | OXYGEN SATURATION: 97 % | BODY MASS INDEX: 36.88 KG/M2 | DIASTOLIC BLOOD PRESSURE: 64 MMHG | SYSTOLIC BLOOD PRESSURE: 126 MMHG | HEIGHT: 69 IN

## 2024-09-17 DIAGNOSIS — E78.5 HYPERLIPIDEMIA LDL GOAL <70: Chronic | ICD-10-CM

## 2024-09-17 DIAGNOSIS — I48.0 PAROXYSMAL ATRIAL FIBRILLATION: ICD-10-CM

## 2024-09-17 DIAGNOSIS — E78.5 HYPERLIPIDEMIA, UNSPECIFIED HYPERLIPIDEMIA TYPE: ICD-10-CM

## 2024-09-17 DIAGNOSIS — R60.0 BILATERAL LEG EDEMA: ICD-10-CM

## 2024-09-17 DIAGNOSIS — R07.2 PRECORDIAL PAIN: Primary | ICD-10-CM

## 2024-09-17 DIAGNOSIS — T46.6X5A STATIN MYOPATHY: Primary | ICD-10-CM

## 2024-09-17 DIAGNOSIS — I48.0 PAROXYSMAL ATRIAL FIBRILLATION: Chronic | ICD-10-CM

## 2024-09-17 DIAGNOSIS — G72.0 STATIN MYOPATHY: Primary | ICD-10-CM

## 2024-09-17 PROCEDURE — 93000 ELECTROCARDIOGRAM COMPLETE: CPT | Performed by: INTERNAL MEDICINE

## 2024-09-17 PROCEDURE — 99214 OFFICE O/P EST MOD 30 MIN: CPT | Performed by: INTERNAL MEDICINE

## 2024-09-17 RX ORDER — UBIDECARENONE 75 MG
CAPSULE ORAL
COMMUNITY

## 2024-09-17 NOTE — DISCHARGE SUMMARY
Left atrial appendage closure discharge summary         [facility]        Discharge Summary       PATIENT NAME:  Albania Ramos    :  1955 AGE: 69 y.o.     Admission Date: 9/10/2024   Discharge Date: 9/10/2024     Reason for Admission:   Elective inpatient percutaneous closure of left atrial appendage    Procedures     Percutaneous left atrial appendage closure    Brief Summary of Hospital Course    Patient was admitted for this elective inpatient procedure.  The procedure went smoothly.  The patient is being discharged home.    Consulting Providers: None    Discharge Instructions     Home or Self Care    Activity: To keep activity for the next 3 days at an absolute minimum at home.  Diet: Resume prior diet  Follow-up: Per protocol    Discharge Medications          Discharge Medications        Changes to Medications        Instructions Start Date   cyanocobalamin 2500 MCG tablet tablet  Commonly known as: VITAMIN B-12  What changed: See the new instructions.   TAKE ONE TABLET BY MOUTH THREE TIMES A WEEK MUST MAKE AN APPOINTMENT      estradiol 0.1 MG/GM vaginal cream  Commonly known as: ESTRACE  What changed: See the new instructions.   Insert TWO grams vaginally daily AS NEEDED FOR vaginal dryness. Usually uses twice A WEEK      hydroCHLOROthiazide 12.5 MG tablet  What changed:   when to take this  reasons to take this   12.5 mg, Oral, Daily      levothyroxine 150 MCG tablet  Commonly known as: SYNTHROID, LEVOTHROID  What changed: when to take this   150 mcg, Oral, Daily      nitroglycerin 0.4 MG SL tablet  Commonly known as: NITROSTAT  What changed:   how much to take  when to take this  reasons to take this   1 under the tongue as needed for angina, may repeat q5mins for up three doses      nystatin 100,000 unit/mL suspension  Commonly known as: MYCOSTATIN  What changed:   when to take this  reasons to take this   500,000 Units, Oral, 4 Times Daily              Continue These Medications        Instructions Start Date   albuterol sulfate  (90 Base) MCG/ACT inhaler  Commonly known as: Ventolin HFA   2 puffs, Inhalation, Every 4 Hours PRN      allopurinol 300 MG tablet  Commonly known as: ZYLOPRIM   TAKE ONE TABLET BY MOUTH EVERY DAY      aspirin 81 MG EC tablet   81 mg, Oral, Daily      calcium carbonate 600 MG tablet  Commonly known as: OS-SHANTE   600 mg, Oral, As Needed      Diclofenac Sodium 1 % gel gel  Commonly known as: VOLTAREN   4 g, Topical, 4 Times Daily PRN      docusate sodium 100 MG capsule   100 mg, Oral, 2 Times Daily PRN      Eliquis 5 MG tablet tablet  Generic drug: apixaban   5 mg, Oral, Every 12 Hours Scheduled, PATIENT STATES SHE WAS INSTRUCTED TO CONTINUE PER DR ALEXANDER'S OFFICE      Fish Oil 435 MG capsule   1,000 mg, Oral, Daily      GLUCOSAMINE HCL-MSM PO   1 tablet, Oral, Daily      ipratropium 0.06 % nasal spray  Commonly known as: ATROVENT   1 spray As Needed for Rhinitis.      metoprolol tartrate 25 MG tablet  Commonly known as: LOPRESSOR   25 mg, Oral, 2 Times Daily      multivitamin tablet tablet  Commonly known as: THERAGRAN   1 tablet, Oral, Daily      NON FORMULARY   2 tablets, Oral, Daily, Lions ramiro mushrooms      ofloxacin 0.3 % otic solution  Commonly known as: FLOXIN   Administer 5 drops to the right ear As Needed (PRESSURE IN EARS).      omeprazole 40 MG capsule  Commonly known as: priLOSEC   1 capsule, Oral, As Needed      SUMAtriptan 25 MG tablet  Commonly known as: IMITREX   25 mg, Oral, As Needed      tiotropium 18 MCG per inhalation capsule  Commonly known as: SPIRIVA   1 capsule, Inhalation, Daily - RT      TURMERIC PO   3 capsules, Oral, Daily      vitamin C 250 MG tablet  Commonly known as: ASCORBIC ACID   250 mg, Oral, Daily               /68 Comment: post ambulation  Pulse 61   Temp 97.6 °F (36.4 °C) (Temporal)   Resp 14   LMP  (LMP Unknown)   SpO2 100%   Admit Weight     [unfilled]    CBC:         BMP:   "    Invalid input(s): \"SODIUM\", \"PHOSPHORUS\"  PT/INR:     APTT:       PHYSICAL EXAM  General appearance: awake, alert, oriented, moves all extremities  Lungs: no rhonchi, no wheezes, no rales  Heart: RRR  Abdomen: positive bowel sounds, no bruits, no masses  Extremities: warm and dry, no cyanosis, no clubbing       Miguel Yu, DO, FACC, FHRS  Cardiac Electrophysiologist    Dictated Utilizing Dragon Dictation    "

## 2024-10-10 ENCOUNTER — TELEPHONE (OUTPATIENT)
Dept: OBSTETRICS AND GYNECOLOGY | Facility: CLINIC | Age: 69
End: 2024-10-10
Payer: COMMERCIAL

## 2024-10-10 NOTE — TELEPHONE ENCOUNTER
"  Caller: Albania Ramos \"Madelyn\"    Relationship: Self    Best call back number: [phone]      Who are you requesting to speak with (clinical staff, DR. DOMÍNGUEZ      What was the call regarding: PATIENT ADVISED THAT SHE HAS A RECTOCELE AND PREVIOUSLY DISCUSSED SURGERY.  PATIENT WOULD LIKE TO MOVE FORWARD WITH SURGERY, PLEASE ASSIST.       "

## 2024-10-11 ENCOUNTER — HOSPITAL ENCOUNTER (OUTPATIENT)
Dept: CARDIOLOGY | Facility: HOSPITAL | Age: 69
Discharge: HOME | End: 2024-10-11
Admitting: INTERNAL MEDICINE
Payer: COMMERCIAL

## 2024-10-11 VITALS
OXYGEN SATURATION: 94 % | RESPIRATION RATE: 16 BRPM | DIASTOLIC BLOOD PRESSURE: 57 MMHG | SYSTOLIC BLOOD PRESSURE: 113 MMHG | HEART RATE: 58 BPM

## 2024-10-11 DIAGNOSIS — I48.0 PAROXYSMAL ATRIAL FIBRILLATION: ICD-10-CM

## 2024-10-11 DIAGNOSIS — R23.3 BRUISES EASILY: ICD-10-CM

## 2024-10-11 DIAGNOSIS — Z95.818 PRESENCE OF WATCHMAN LEFT ATRIAL APPENDAGE CLOSURE DEVICE: ICD-10-CM

## 2024-10-11 PROBLEM — Z98.890 HISTORY OF LOOP RECORDER: Status: RESOLVED | Noted: 2024-06-13 | Resolved: 2024-10-11

## 2024-10-11 PROCEDURE — 25010000002 MIDAZOLAM PER 1 MG: Performed by: INTERNAL MEDICINE

## 2024-10-11 PROCEDURE — 25010000002 FENTANYL CITRATE (PF) 50 MCG/ML SOLUTION: Performed by: INTERNAL MEDICINE

## 2024-10-11 PROCEDURE — 93321 DOPPLER ECHO F-UP/LMTD STD: CPT

## 2024-10-11 PROCEDURE — 93325 DOPPLER ECHO COLOR FLOW MAPG: CPT

## 2024-10-11 PROCEDURE — 93312 ECHO TRANSESOPHAGEAL: CPT

## 2024-10-11 PROCEDURE — 76376 3D RENDER W/INTRP POSTPROCES: CPT

## 2024-10-11 RX ORDER — CLOPIDOGREL BISULFATE 75 MG/1
75 TABLET ORAL DAILY
Qty: 90 TABLET | Refills: 1 | Status: SHIPPED | OUTPATIENT
Start: 2024-10-11

## 2024-10-11 RX ORDER — MIDAZOLAM HYDROCHLORIDE 1 MG/ML
INJECTION INTRAMUSCULAR; INTRAVENOUS
Status: COMPLETED | OUTPATIENT
Start: 2024-10-11 | End: 2024-10-11

## 2024-10-11 RX ORDER — FENTANYL CITRATE 50 UG/ML
INJECTION, SOLUTION INTRAMUSCULAR; INTRAVENOUS
Status: COMPLETED | OUTPATIENT
Start: 2024-10-11 | End: 2024-10-11

## 2024-10-11 RX ADMIN — MIDAZOLAM 2 MG: 1 INJECTION INTRAMUSCULAR; INTRAVENOUS at 08:53

## 2024-10-11 RX ADMIN — MIDAZOLAM 2 MG: 1 INJECTION INTRAMUSCULAR; INTRAVENOUS at 08:58

## 2024-10-11 RX ADMIN — FENTANYL CITRATE 50 MCG: 50 INJECTION, SOLUTION INTRAMUSCULAR; INTRAVENOUS at 08:53

## 2024-10-11 RX ADMIN — FENTANYL CITRATE 50 MCG: 50 INJECTION, SOLUTION INTRAMUSCULAR; INTRAVENOUS at 08:58

## 2024-10-11 NOTE — H&P
[facility], Pre-procedure H&P  Date of Hospital Visit: 10/11/24  Encounter Provider: Roxanne Sheikh PA-C    Place of Service: [facility]  Patient Name: Albania Ramos  :1955  MRN: [id number]     Primary Care Provider: Gordon Norman MD    Chief complaint: RONY for follow-up Watchman device    PROBLEM LIST  Patient Active Problem List    Diagnosis Date Noted    Chest pain 2017     Priority: High     Note Last Updated: 2024     Myocardial perfusion study : No reversible ischemia EF 67%  MPS, 2024: Moderately sized infarct located in the anterior wall with no significant ischemia noted.  EF 70.  Low risk study.      Paroxysmal atrial fibrillation 2016     Priority: High     Note Last Updated: 10/11/2024     A-fib ablation x 2  Echo, 2024: EF 55 to 60%.  Normal valvular morphology.  Normal left atrial size.  35mm Watchman FLX device placed 9/10/2024 by Dr. Aimee Gill easily 2024    Staggering gait 2024    Primary hypertension 2024    Hyperlipidemia LDL goal <70 2024    Class 2 severe obesity due to excess calories with serious comorbidity and body mass index (BMI) of 36.0 to 36.9 in adult 2023    Personal history of colonic polyps 2023     Note Last Updated: 10/1/2024     REPLACING DXS THAT WERE INACTIVATED AFTER THE 10/01 REGULATORY UPDATE      DDD (degenerative disc disease), cervical 2023    DDD (degenerative disc disease), lumbosacral 2023    Environmental allergies 2023    STEFAN on CPAP 2023    Spastic colon 2023    Arthritis 2023    Osteoarthritis 2023    Fatty liver 2023    GERD (gastroesophageal reflux disease) 2023    Malignant neoplasm of thyroid gland 2023    Incisional hernia 10/27/2022    Nuclear sclerotic cataract of left eye 12/10/2021    Bilateral leg edema 2021    PONV (postoperative nausea and vomiting)  07/29/2021    Frequent urinary incontinence 12/09/2020    History of frequent urinary tract infections 12/09/2020    PVCs (premature ventricular contractions) 07/26/2016    Hypothyroidism 05/12/2016    Cobalamin deficiency 05/12/2016    Malignant neoplasm of thyroid gland 05/12/2016    Torn cartilage 05/12/2016    Atopic rhinitis 05/12/2016      History of Present Illness:  Patient is a 69-year-old history noted above including paroxysmal atrial fibrillation s/p ablation x 2, hyperlipidemia presents today for 45-day follow-up with transesophageal echocardiogram for her Watchman device.   the 35 mm Watchman FLX was placed on 9/10 by Dr. Yu.  Patient states she is unsure if it is related but she has experienced some increased fatigue over the last couple of months.  She states it started prior to the implantation when she contracted COVID.  She does not necessarily have exertional shortness of breath or chest pain however she states throughout the day she feels as if she does not have as much energy.  Patient denies any lower extremity edema or orthopnea with her symptoms.  She was seen by her primary cardiology team who is monitoring her for this situation.  Patient is currently taking Eliquis twice a day.  She was not on this medication for any other reason prior to atrial fibrillation.      I reviewed patient's past medical history, surgical history, family history and social history.    Current Outpatient Medications   Medication Instructions    albuterol sulfate HFA (Ventolin HFA) 108 (90 Base) MCG/ACT inhaler 2 puffs, Inhalation, Every 4 Hours PRN    allopurinol (ZYLOPRIM) 300 MG tablet TAKE ONE TABLET BY MOUTH EVERY DAY    aspirin 81 mg, Oral, Daily    calcium carbonate (OS-SHANTE) 600 mg, Oral, As Needed    Coenzyme Q10 (Co Q-10) 200 MG capsule Oral    cyanocobalamin (VITAMIN B-12) 2500 MCG tablet tablet TAKE ONE TABLET BY MOUTH THREE TIMES A WEEK MUST MAKE AN APPOINTMENT    Diclofenac Sodium (VOLTAREN) 4 g,  Topical, 4 Times Daily PRN    docusate sodium 100 mg, Oral, 2 Times Daily PRN    Eliquis 5 mg, Oral, Every 12 Hours Scheduled, PATIENT STATES SHE WAS INSTRUCTED TO CONTINUE PER DR ALEXANDER'S OFFICE    estradiol (ESTRACE) 0.1 MG/GM vaginal cream Insert TWO grams vaginally daily AS NEEDED FOR vaginal dryness. Usually uses twice A WEEK    Fish Oil 1,000 mg, Oral, Daily    GLUCOSAMINE HCL-MSM PO 1 tablet, Oral, Daily    hydroCHLOROthiazide 12.5 mg, Oral, Daily    ipratropium (ATROVENT) 0.06 % nasal spray 1 spray As Needed for Rhinitis.    levothyroxine (SYNTHROID, LEVOTHROID) 150 mcg, Oral, Daily    metoprolol tartrate (LOPRESSOR) 25 mg, Oral, 2 Times Daily    MULTIPLE VITAMIN PO 1 tablet, Oral, Daily    nitroglycerin (NITROSTAT) 0.4 MG SL tablet 1 under the tongue as needed for angina, may repeat q5mins for up three doses    NON FORMULARY 2 tablets, Oral, Daily, Lions ramiro mushrooms    nystatin (MYCOSTATIN) 500,000 Units, Oral, 4 Times Daily    ofloxacin (FLOXIN) 0.3 % otic solution Administer 5 drops to the right ear As Needed (PRESSURE IN EARS).    omeprazole (priLOSEC) 40 MG capsule 1 capsule, Oral, As Needed    SUMAtriptan (IMITREX) 25 mg, Oral, As Needed    tiotropium (SPIRIVA) 18 MCG per inhalation capsule 1 capsule, Inhalation, Daily - RT    TURMERIC PO 3 capsules, Oral, Daily    vitamin C (ASCORBIC ACID) 250 mg, Oral, Daily     Review of Systems   Pertinent positives and negatives noted in the HPI         Objective:     Vitals:    10/11/24 0810   BP: 128/57   Pulse: 55   Resp: 15   SpO2: 94%       There is no height or weight on file to calculate BMI.    No intake or output data in the 24 hours ending 10/11/24 0829    Vitals reviewed.   Constitutional:       Appearance: Healthy appearance. Not in distress.   HENT:    Mouth/Throat:      Pharynx: Oropharynx is clear.   Neck:      Vascular: No carotid bruit or JVD.   Pulmonary:      Effort: Pulmonary effort is normal.      Breath sounds: No decreased breath sounds.  No wheezing. No rhonchi.   Cardiovascular:      Normal rate. Regular rhythm.      Murmurs: There is no murmur.      No rub.   Pulses:     Intact distal pulses.   Edema:     Peripheral edema absent.   Abdominal:      General: There is no distension.      Palpations: Abdomen is soft.      Tenderness: There is no abdominal tenderness.   Musculoskeletal:         General: No deformity. Skin:     General: Skin is warm and dry.   Neurological:      General: No focal deficit present.      Mental Status: Alert and oriented to person, place and time.           Lab Review:   CMP          6/24/2024    09:33 7/12/2024    08:22 9/10/2024    07:25   CMP   Glucose 98  104  116    BUN 13  27  19    Creatinine 0.81  0.89  0.72    EGFR 79.2  70.7  91.2    Sodium 144  145  144    Potassium 4.7  4.4  4.4    Chloride 108  105  107    Calcium 9.2  8.6  9.1    Total Protein 6.5      Albumin 4.2      Globulin 2.3      Total Bilirubin 0.4      Alkaline Phosphatase 87      AST (SGOT) 19      ALT (SGPT) 16      Albumin/Globulin Ratio 1.8      BUN/Creatinine Ratio 16.0  30.3  26.4    Anion Gap 11.1  5.0  9.0          Lab Results   Component Value Date    CHOL 113 06/24/2024    CHLPL 218 (H) 03/15/2024    TRIG 66 06/24/2024    HDL 56 06/24/2024    LDL 43 06/24/2024       Results for orders placed in visit on 04/11/24    Adult Transthoracic Echo Complete W/ Cont if Necessary Per Protocol    Interpretation Summary    Left ventricular systolic function is normal. Left ventricular ejection fraction appears to be 56 - 60%. Left ventricular diastolic function was normal.    Left ventricular wall thickness is consistent with mild concentric hypertrophy.    The right ventricular cavity is borderline dilated with preserved systolic function.    No hemodynamically significant valvular dysfunction.             Assessment:   Atrial fibrillation s/p 35mm Watchman FLX device on 9/10      Plan:   Proceed with 45-day follow-up transesophageal echocardiogram for  Watchman FLX device.  Risk and benefits discussed with the patient she agrees to proceed.  Further recommendations to be made postprocedure by Dr. Kee.      Roxanne Sheikh PA-C

## 2024-10-13 LAB
BH CV ECHO MEAS - RAP SYSTOLE: 8 MMHG
BH CV ECHO MEAS - RVSP: 28 MMHG
BH CV ECHO MEAS - TR MAX PG: 20.3 MMHG
BH CV ECHO MEAS - TR MAX VEL: 219.5 CM/SEC

## 2024-10-22 ENCOUNTER — OFFICE VISIT (OUTPATIENT)
Dept: OBSTETRICS AND GYNECOLOGY | Facility: CLINIC | Age: 69
End: 2024-10-22
Payer: COMMERCIAL

## 2024-10-22 VITALS
DIASTOLIC BLOOD PRESSURE: 60 MMHG | BODY MASS INDEX: 36.43 KG/M2 | SYSTOLIC BLOOD PRESSURE: 110 MMHG | WEIGHT: 246 LBS | HEIGHT: 69 IN

## 2024-10-22 DIAGNOSIS — N81.6 RECTOCELE: Primary | ICD-10-CM

## 2024-10-22 RX ORDER — ATORVASTATIN CALCIUM 40 MG/1
40 TABLET, FILM COATED ORAL EVERY EVENING
COMMUNITY
Start: 2024-09-25

## 2024-10-22 NOTE — PROGRESS NOTES
Subjective   Chief Complaint   Patient presents with    Consult     Rectocele     [place] Selena Ramos is a 69 y.o. year old .  No LMP recorded (lmp unknown). Patient has had a hysterectomy.  She presents to be seen because of rectocele. Bowel habits have improved with modifications. E2 cream vaginally for several years    OTHER COMPLAINTS:  A-fib with recent Watchman procedure.  This is allowed her to come off of the Eliquis.  She will be on the Plavix for 6 months and then be able to come off    The following portions of the patient's history were reviewed and updated as appropriate:She  has a past medical history of Abdominal pain, Abnormal ECG (), Allergic (), Allergic rhinitis, Ankle joint pain, Arrhythmia (), Arthritis, Arthritis (2023), Asthma, Bronchiectasis, Cataract (), Cholelithiasis, Chronic bronchitis, Colon polyp (12/10/2019), COVID-19 vaccine series completed, Degenerative joint disease, Depression, Diverticulitis, Diverticulosis, Dizziness, Dysuria, Easy bruising, Elevated cholesterol, Follicular thyroid cancer, GERD (gastroesophageal reflux disease), H/O bone density study, Hay fever, History of chest x-ray (07/10/2015), History of chest x-ray (2015), History of echocardiogram (2007), History of mammogram, History of PFTs (2015), Hyperlipidemia, Hypothyroidism, Measles, Migraine headache, Mumps, Obesity (), Osteopenia (), Osteoporosis, Paroxysmal atrial fibrillation, Pneumonia, PONV (postoperative nausea and vomiting), Primary hypertension (2024), Shortness of breath, Sleep apnea, Sleep apnea, obstructive, Surfer's nodules of right foot, Thyroid nodule (), Torn meniscus, Varicella, and Visual impairment.  She does not have any pertinent problems on file.  She  has a past surgical history that includes Cholecystectomy; Colonoscopy (12/10/2019); Ear Tubes Removal (Bilateral); Inguinal hernia repair (Left); Total thyroidectomy; Ablation of  dysrhythmic focus; Dilation and curettage of uterus; Cardiac electrophysiology procedure (N/A, 08/04/2016); Lung biopsy; Foot surgery (Left); Cardiac electrophysiology procedure (N/A, 10/13/2016); Lymph node biopsy; Cardiac electrophysiology procedure (N/A, 12/18/2017); Replacement total knee bilateral (Bilateral); Cardiac electrophysiology procedure (N/A, 11/06/2019); Tonsillectomy (07/2020); Tympanostomy tube placement (Right, 07/2020); Hammer Toe Repair (Left, 11/13/2020); Thyroidectomy, partial (Left, 06/2012); Thyroidectomy, partial (Right, 07/2012); Cataract extraction w/ intraocular lens implant (Left, 12/20/2021); Cataract extraction w/ intraocular lens implant (Right, 01/03/2022); Total abdominal hysterectomy w/ bilateral salpingoophorectomy (Bilateral, 1996); Colectomy (N/A, 03/25/2022); ventral/incisional hernia repair (N/A, 10/27/2022); Joint replacement (2012, 2014); Eye surgery (12/2021 & 01/2022); Bronchoscopy; Cardiac catheterization; Colonoscopy (12/2021); Upper gastrointestinal endoscopy; Endovenous ablation saphenous vein w/ laser (Left, 02/05/2024); Endovenous ablation saphenous vein w/ laser (Right, 02/19/2024); [place] tooth extraction; and Atrial Appendage Exclusion Left (N/A, 9/10/2024).  Her family history includes Alcohol abuse in her father; Arrhythmia in her brother and mother; Arthritis in her brother and mother; Asthma in her brother; Atrial fibrillation in her maternal aunt, maternal aunt, and mother; Developmental Disability in her brother and sister; Diabetes in her mother; Down syndrome in her sister; Early death (age of onset: 16) in her sister; Early death (age of onset: 68) in her father; Heart attack in her brother and sister; Heart failure in her brother and mother; Lung cancer in her father; Mental illness in her brother; Obesity in her brother; Sleep apnea in her brother; Stroke in her maternal aunt, maternal aunt, maternal grandmother, and mother; Thyroid disease in her  mother.  She  reports that she quit smoking about 28 years ago. Her smoking use included cigarettes. She started smoking about 50 years ago. She has a 33.1 pack-year smoking history. She has been exposed to tobacco smoke. She has never used smokeless tobacco. She reports current alcohol use. She reports that she does not use drugs.  Current Outpatient Medications   Medication Sig Dispense Refill    albuterol sulfate HFA (Ventolin HFA) 108 (90 Base) MCG/ACT inhaler Inhale 2 puffs Every 4 (Four) Hours As Needed for Wheezing or Shortness of Air. 18 g 5    allopurinol (ZYLOPRIM) 300 MG tablet TAKE ONE TABLET BY MOUTH EVERY DAY 90 tablet 3    aspirin 81 MG EC tablet Take 1 tablet by mouth Daily.      atorvastatin (LIPITOR) 40 MG tablet Take 1 tablet by mouth Every Evening.      calcium carbonate (OS-SHANTE) 600 MG tablet Take 1 tablet by mouth As Needed for Heartburn.      clopidogrel (PLAVIX) 75 MG tablet Take 1 tablet by mouth Daily. 90 tablet 1    Coenzyme Q10 (Co Q-10) 200 MG capsule Take  by mouth.      cyanocobalamin (VITAMIN B-12) 2500 MCG tablet tablet TAKE ONE TABLET BY MOUTH THREE TIMES A WEEK MUST MAKE AN APPOINTMENT (Patient taking differently: Take 1,200 mcg by mouth 1 (One) Time Per Week. MONDAYS) 30 tablet 5    Diclofenac Sodium (VOLTAREN) 1 % gel gel Apply 4 g topically to the appropriate area as directed 4 (Four) Times a Day As Needed (pain). 100 g 11    docusate sodium 100 MG capsule Take 1 capsule by mouth 2 (Two) Times a Day As Needed for Constipation. 30 capsule 0    estradiol (ESTRACE) 0.1 MG/GM vaginal cream Insert TWO grams vaginally daily AS NEEDED FOR vaginal dryness. Usually uses twice A WEEK (Patient taking differently: Insert 2 g into the vagina 3 (Three) Times a Week.) 42.5 g 11    GLUCOSAMINE HCL-MSM PO Take 1 tablet by mouth Daily.      hydroCHLOROthiazide (HYDRODIURIL) 12.5 MG tablet Take 1 tablet by mouth Daily. 90 tablet 3    ipratropium (ATROVENT) 0.06 % nasal spray 1 spray As Needed for  Rhinitis.      levothyroxine (SYNTHROID, LEVOTHROID) 150 MCG tablet TAKE ONE TABLET BY MOUTH EVERY DAY (Patient taking differently: Take 1 tablet by mouth Every Morning.) 90 tablet 2    metoprolol tartrate (LOPRESSOR) 25 MG tablet Take 1 tablet by mouth 2 (Two) Times a Day. 180 tablet 3    MULTIPLE VITAMIN PO Take 1 tablet by mouth Daily.      nitroglycerin (NITROSTAT) 0.4 MG SL tablet 1 under the tongue as needed for angina, may repeat q5mins for up three doses (Patient taking differently: 1 tablet Every 5 (Five) Minutes As Needed for Chest Pain. 1 under the tongue as needed for angina, may repeat q5mins for up three doses) 100 tablet 11    NON FORMULARY Take 2 tablets by mouth Daily. Lib[place] rubio mushrooms      nystatin (MYCOSTATIN) 100,000 unit/mL suspension Take 5 mL by mouth 4 (Four) Times a Day. 280 mL 0    ofloxacin (FLOXIN) 0.3 % otic solution Administer 5 drops to the right ear As Needed (PRESSURE IN EARS).      Omega-3 Fatty Acids (FISH OIL) 435 MG capsule Take 1,000 mg by mouth Daily.      omeprazole (priLOSEC) 40 MG capsule Take 1 capsule by mouth As Needed (for Acid Reflux).      SUMAtriptan (IMITREX) 25 MG tablet Take 1 tablet by mouth As Needed for Migraine.      tiotropium (SPIRIVA) 18 MCG per inhalation capsule Place 1 capsule into inhaler and inhale Daily. 90 capsule 3    TURMERIC PO Take 3 capsules by mouth Daily.      vitamin C (ASCORBIC ACID) 250 MG tablet Take 1 tablet by mouth Daily.       Current Facility-Administered Medications   Medication Dose Route Frequency Provider Last Rate Last Admin    triamcinolone acetonide (KENALOG-40) injection 40 mg  40 mg Intra-articular Once Gordon Norman MD         Current Outpatient Medications on File Prior to Visit   Medication Sig    albuterol sulfate HFA (Ventolin HFA) 108 (90 Base) MCG/ACT inhaler Inhale 2 puffs Every 4 (Four) Hours As Needed for Wheezing or Shortness of Air.    allopurinol (ZYLOPRIM) 300 MG tablet TAKE ONE TABLET BY MOUTH EVERY DAY     aspirin 81 MG EC tablet Take 1 tablet by mouth Daily.    atorvastatin (LIPITOR) 40 MG tablet Take 1 tablet by mouth Every Evening.    calcium carbonate (OS-SHANTE) 600 MG tablet Take 1 tablet by mouth As Needed for Heartburn.    clopidogrel (PLAVIX) 75 MG tablet Take 1 tablet by mouth Daily.    Coenzyme Q10 (Co Q-10) 200 MG capsule Take  by mouth.    cyanocobalamin (VITAMIN B-12) 2500 MCG tablet tablet TAKE ONE TABLET BY MOUTH THREE TIMES A WEEK MUST MAKE AN APPOINTMENT (Patient taking differently: Take 1,200 mcg by mouth 1 (One) Time Per Week. MONDAYS)    Diclofenac Sodium (VOLTAREN) 1 % gel gel Apply 4 g topically to the appropriate area as directed 4 (Four) Times a Day As Needed (pain).    docusate sodium 100 MG capsule Take 1 capsule by mouth 2 (Two) Times a Day As Needed for Constipation.    estradiol (ESTRACE) 0.1 MG/GM vaginal cream Insert TWO grams vaginally daily AS NEEDED FOR vaginal dryness. Usually uses twice A WEEK (Patient taking differently: Insert 2 g into the vagina 3 (Three) Times a Week.)    GLUCOSAMINE HCL-MSM PO Take 1 tablet by mouth Daily.    hydroCHLOROthiazide (HYDRODIURIL) 12.5 MG tablet Take 1 tablet by mouth Daily.    ipratropium (ATROVENT) 0.06 % nasal spray 1 spray As Needed for Rhinitis.    levothyroxine (SYNTHROID, LEVOTHROID) 150 MCG tablet TAKE ONE TABLET BY MOUTH EVERY DAY (Patient taking differently: Take 1 tablet by mouth Every Morning.)    metoprolol tartrate (LOPRESSOR) 25 MG tablet Take 1 tablet by mouth 2 (Two) Times a Day.    MULTIPLE VITAMIN PO Take 1 tablet by mouth Daily.    nitroglycerin (NITROSTAT) 0.4 MG SL tablet 1 under the tongue as needed for angina, may repeat q5mins for up three doses (Patient taking differently: 1 tablet Every 5 (Five) Minutes As Needed for Chest Pain. 1 under the tongue as needed for angina, may repeat q5mins for up three doses)    NON FORMULARY Take 2 tablets by mouth Daily. Lions ramiro mushrooms    nystatin (MYCOSTATIN) 100,000 unit/mL  suspension Take 5 mL by mouth 4 (Four) Times a Day.    ofloxacin (FLOXIN) 0.3 % otic solution Administer 5 drops to the right ear As Needed (PRESSURE IN EARS).    Omega-3 Fatty Acids (FISH OIL) 435 MG capsule Take 1,000 mg by mouth Daily.    omeprazole (priLOSEC) 40 MG capsule Take 1 capsule by mouth As Needed (for Acid Reflux).    SUMAtriptan (IMITREX) 25 MG tablet Take 1 tablet by mouth As Needed for Migraine.    tiotropium (SPIRIVA) 18 MCG per inhalation capsule Place 1 capsule into inhaler and inhale Daily.    TURMERIC PO Take 3 capsules by mouth Daily.    vitamin C (ASCORBIC ACID) 250 MG tablet Take 1 tablet by mouth Daily.     Current Facility-Administered Medications on File Prior to Visit   Medication    triamcinolone acetonide (KENALOG-40) injection 40 mg     She is allergic to codeine, niacin, darvon [propoxyphene], lipitor [atorvastatin], zofran [ondansetron], adhesive tape, bentyl [dicyclomine], ciprodex [ciprofloxacin-dexamethasone], flagyl [metronidazole], other, and prednisone.    Social History    Tobacco Use      Smoking status: Former        Packs/day: 0.00        Years: 1.5 packs/day for 22.0 years (33.1 ttl pk-yrs)        Types: Cigarettes        Start date: 1974        Quit date: 1996        Years since quittin.3        Passive exposure: Past      Smokeless tobacco: Never      Tobacco comments: Smoked menthol    Review of Systems  Consitutional POS: nothing reported    NEG: anorexia or night sweats   Gastointestinal POS: nothing reported    NEG: bloating, change in bowel habits, melena, or reflux symptoms   Genitourinary POS: nothing reported    NEG: dysuria or hematuria   Integument POS: nothing reported    NEG: moles that are changing in size, shape, color or rashes   Breast POS: nothing reported    NEG: persistent breast lump, skin dimpling, or nipple discharge         Respiratory: negative  Cardiovascular: negative  GYN:  negative          Objective   /60   Ht 175.3 cm  "(69\")   Wt 112 kg (246 lb)   LMP  (LMP Unknown)   BMI 36.33 kg/m²     General:  well developed; well nourished  no acute distress  mentation appropriate   Skin:  No suspicious lesions seen   Thyroid: not examined   Lungs:  breathing is unlabored  clear to auscultation bilaterally   Heart:  regular rate and rhythm, S1, S2 normal, no murmur, click, rub or gallop   Breasts:  Not performed.   Abdomen: soft, non-tender; no masses  no umbilical or inguinal hernias are present  no hepato-splenomegaly   Pelvis: Clinical staff was present for exam  External genitalia:  normal appearance of the external genitalia including Bartholin's and Copemish's glands.  :  urethral meatus normal;  Vaginal:  normal pink mucosa without prolapse or lesions.  Cervix:  absent.  Uterus:  absent.  Adnexa:  absent, bilateral.  Rectal:  digital rectal exam not performed; anus visually normal appearing.  Rectocele GRADE 2     Psychiatric: Alert and oriented ×3, mood and affect appropriate  HEENT: Atraumatic, normocephalic, normal scleral icterus  Extremities: 2+ pulses bilaterally, no edema      Lab Review   No data reviewed    Imaging   No data reviewed        Assessment   Rectocele_ symptoms improved     Plan   I will continue with lifestyle and dietary modifications as she has had improvement in symptoms.  I do not feel surgical repair at this time is warranted.  I will have her back in 6 months to assess again.      No orders of the defined types were placed in this encounter.         This note was electronically signed.      October 22, 2024      "

## 2024-10-28 DIAGNOSIS — K21.00 GASTRO-ESOPHAGEAL REFLUX DISEASE WITH ESOPHAGITIS, WITHOUT BLEEDING: ICD-10-CM

## 2024-10-28 RX ORDER — OMEPRAZOLE 40 MG/1
40 CAPSULE, DELAYED RELEASE ORAL DAILY
Qty: 90 CAPSULE | Refills: 3 | Status: SHIPPED | OUTPATIENT
Start: 2024-10-28

## 2024-11-11 DIAGNOSIS — E89.0 POSTOPERATIVE HYPOTHYROIDISM: ICD-10-CM

## 2024-11-11 NOTE — TELEPHONE ENCOUNTER
Rx Refill Note  Requested Prescriptions     Pending Prescriptions Disp Refills    levothyroxine (SYNTHROID, LEVOTHROID) 150 MCG tablet [Pharmacy Med Name: levothyroxine 150 mcg tablet] 90 tablet 2     Sig: TAKE ONE TABLET BY MOUTH EVERY DAY      Last office visit with prescribing clinician: 7/11/2024   Last telemedicine visit with prescribing clinician: Visit date not found   Next office visit with prescribing clinician: 7/14/2025                         Would you like a call back once the refill request has been completed: [] Yes [] No    If the office needs to give you a call back, can they leave a voicemail: [] Yes [] No    Geraldine Malone MA  11/11/24, 14:38 EST

## 2024-11-12 RX ORDER — LEVOTHYROXINE SODIUM 150 UG/1
150 TABLET ORAL DAILY
Qty: 90 TABLET | Refills: 2 | Status: SHIPPED | OUTPATIENT
Start: 2024-11-12

## 2024-11-21 DIAGNOSIS — K21.9 GASTROESOPHAGEAL REFLUX DISEASE WITHOUT ESOPHAGITIS: Chronic | ICD-10-CM

## 2024-11-21 RX ORDER — PANTOPRAZOLE SODIUM 40 MG/1
40 TABLET, DELAYED RELEASE ORAL DAILY
Qty: 90 TABLET | Refills: 1 | OUTPATIENT
Start: 2024-11-21

## 2024-11-25 DIAGNOSIS — K21.9 GASTROESOPHAGEAL REFLUX DISEASE WITHOUT ESOPHAGITIS: Chronic | ICD-10-CM

## 2024-11-25 RX ORDER — PANTOPRAZOLE SODIUM 40 MG/1
40 TABLET, DELAYED RELEASE ORAL DAILY
Qty: 90 TABLET | Refills: 1 | OUTPATIENT
Start: 2024-11-25

## 2024-12-09 ENCOUNTER — OFFICE VISIT (OUTPATIENT)
Dept: CARDIOLOGY | Facility: CLINIC | Age: 69
End: 2024-12-09
Payer: COMMERCIAL

## 2024-12-09 VITALS
WEIGHT: 253 LBS | OXYGEN SATURATION: 98 % | HEART RATE: 71 BPM | HEIGHT: 69 IN | SYSTOLIC BLOOD PRESSURE: 128 MMHG | DIASTOLIC BLOOD PRESSURE: 60 MMHG | BODY MASS INDEX: 37.47 KG/M2

## 2024-12-09 DIAGNOSIS — I48.0 PAROXYSMAL ATRIAL FIBRILLATION: Primary | Chronic | ICD-10-CM

## 2024-12-09 DIAGNOSIS — G47.33 OSA ON CPAP: ICD-10-CM

## 2024-12-09 DIAGNOSIS — I10 PRIMARY HYPERTENSION: ICD-10-CM

## 2024-12-09 PROCEDURE — 99214 OFFICE O/P EST MOD 30 MIN: CPT | Performed by: PHYSICIAN ASSISTANT

## 2024-12-09 NOTE — PROGRESS NOTES
Cardiac Electrophysiology Outpatient Consult Note            [facility]    Consult Note     Al[place]ia Ramos  [id number]    [phone]     Primary Care Physician: Gordon Norman MD      Subjective     Chief Complaint:   Diagnoses and all orders for this visit:    1. Paroxysmal atrial fibrillation (Primary)    2. STEFAN on CPAP    3. Primary hypertension        Chief Complaint   Patient presents with    Paroxysmal atrial fibrillation       History of Present Illness:   [place] Ramos is a 69 y.o. female who presents electrophysiology clinic for follow-up of atrial fibrillation, Watchman device.  Watchman device was placed September 17, 2024.  Follow-up RONY confirmed Watchman Flex Pro in good position no  enrico- device leakage EF 55%.  She has has had remote PVI for Afib with Dr. Calvert. Since the procedure is doing well.  Denies any palpitation dizziness near syncope semi-.  Denies any chest pain.    Past Medical History:   Patient Active Problem List    Diagnosis Date Noted    Paroxysmal atrial fibrillation 05/12/2016     Priority: High     Note Last Updated: 10/11/2024     A-fib ablation x 2  Echo, 4/11/2024: EF 55 to 60%.  Normal valvular morphology.  Normal left atrial size.  35mm Watchman FLX device placed 9/10/2024 by Dr. Aimee Gill easily 07/22/2024    Staggering gait 06/18/2024    Primary hypertension 06/13/2024    Hyperlipidemia LDL goal <70 06/06/2024    Class 2 severe obesity due to excess calories with serious comorbidity and body mass index (BMI) of 36.0 to 36.9 in adult 07/05/2023    Personal history of colonic polyps 07/05/2023     Note Last Updated: 10/1/2024     REPLACING DXS THAT WERE INACTIVATED AFTER THE 10/01 REGULATORY UPDATE      DDD (degenerative disc disease), cervical 03/22/2023    DDD (degenerative disc disease), lumbosacral 03/22/2023    Environmental allergies 03/22/2023    STEFAN on CPAP 03/22/2023    Spastic colon 03/22/2023     Arthritis 03/22/2023    Osteoarthritis 03/22/2023    Fatty liver 03/22/2023    GERD (gastroesophageal reflux disease) 03/22/2023    Malignant neoplasm of thyroid gland 03/22/2023    Incisional hernia 10/27/2022    Nuclear sclerotic cataract of left eye 12/10/2021    Bilateral leg edema 09/07/2021    PONV (postoperative nausea and vomiting) 07/29/2021    Frequent urinary incontinence 12/09/2020    History of frequent urinary tract infections 12/09/2020    Chest pain 11/28/2017     Note Last Updated: 6/13/2024     Myocardial perfusion study 12-6-174: No reversible ischemia EF 67%  MPS, 5/7/2024: Moderately sized infarct located in the anterior wall with no significant ischemia noted.  EF 70.  Low risk study.      PVCs (premature ventricular contractions) 07/26/2016    Hypothyroidism 05/12/2016    Cobalamin deficiency 05/12/2016    Malignant neoplasm of thyroid gland 05/12/2016    Torn cartilage 05/12/2016    Atopic rhinitis 05/12/2016       Past Surgical History:   Past Surgical History:   Procedure Laterality Date    ABLATION OF DYSRHYTHMIC FOCUS      x 2    ATRIAL APPENDAGE EXCLUSION LEFT WITH TRANSESOPHAGEAL ECHOCARDIOGRAM N/A 9/10/2024    Procedure: Atrial Appendage Occlusion;  Surgeon: Miguel Yu DO;  Location:  [facility];  Service: Cardiology;  Laterality: N/A;    BRONCHOSCOPY      CARDIAC CATHETERIZATION      ABLATION X2    CARDIAC ELECTROPHYSIOLOGY PROCEDURE N/A 08/04/2016    Procedure: Loop recorder implant;  Surgeon: Himanshu Calvert MD;  Location:  [facility];  Service:     CARDIAC ELECTROPHYSIOLOGY PROCEDURE N/A 10/13/2016    Procedure: Loop recorder removal;  Surgeon: Himanshu Calvert MD;  Location:  [facility];  Service:     CARDIAC ELECTROPHYSIOLOGY PROCEDURE N/A 12/18/2017    Procedure: Loop insertion;  Surgeon: Mary Ann Blackwell MD;  Location:  [facility];  Service:     CARDIAC ELECTROPHYSIOLOGY PROCEDURE N/A 11/06/2019     Procedure: Loop recorder removal;  Surgeon: Branden Chatterjee MD;  Location: [facility] CATH INVASIVE LOCATION;  Service: Cardiovascular    CATARACT EXTRACTION W/ INTRAOCULAR LENS IMPLANT Left 12/20/2021    Procedure: CATARACT PHACO EXTRACTION WITH INTRAOCULAR LENS IMPLANT LEFT;  Surgeon: Arthur Mcdonald MD;  Location: [facility] OR;  Service: Ophthalmology;  Laterality: Left;    CATARACT EXTRACTION W/ INTRAOCULAR LENS IMPLANT Right 01/03/2022    Procedure: CATARACT PHACO EXTRACTION WITH INTRAOCULAR LENS IMPLANT RIGHT WITH VIVITY LENS;  Surgeon: Arthur Mcdonald MD;  Location: [facility] OR;  Service: Ophthalmology;  Laterality: Right;    CHOLECYSTECTOMY      COLON RESECTION N/A 03/25/2022    Procedure: LAPAROSCOPIC  COLON RESECTION SIGMOIDECTOMY;  Surgeon: Arturo Kumar MD;  Location: [facility] OR;  Service: General;  Laterality: N/A;    COLONOSCOPY  12/10/2019    COLONOSCOPY  12/2021    DILATATION AND CURETTAGE      EAR TUBES Bilateral     ENDOVENOUS ABLATION SAPHENOUS VEIN W/ LASER Left 02/05/2024    ENDOVENOUS ABLATION SAPHENOUS VEIN W/ LASER Right 02/19/2024    EYE SURGERY  12/2021 & 01/2022    Had cateracs removed from both eyes and multi focal implants put in both.    FOOT SURGERY Left     bone spur    HAMMER TOE REPAIR Left 11/13/2020    INGUINAL HERNIA REPAIR Left     x 3    JOINT REPLACEMENT  2012, 2014    Bilateral knee replacements    LUNG BIOPSY      Remote    LYMPH NODE BIOPSY      REPLACEMENT TOTAL KNEE BILATERAL Bilateral     THYROIDECTOMY, PARTIAL Left 06/2012    Dr. Cerda, Jainism    THYROIDECTOMY, PARTIAL Right 07/2012    Dr. Cerda, Jainism    TONSILLECTOMY  07/2020    Dr. Ethan Mcneill    TOTAL ABDOMINAL HYSTERECTOMY WITH SALPINGO OOPHORECTOMY Bilateral 1996    TOTAL THYROIDECTOMY      completion thyroidectomy for follicular thyroid cancer.      TYMPANOSTOMY TUBE PLACEMENT Right 07/2020    Dr. Ethan Mcneill    UPPER GASTROINTESTINAL ENDOSCOPY      15-20 years ago    VENTRAL/INCISIONAL HERNIA  "REPAIR N/A 10/27/2022    Procedure: INCISIONAL HERNIA REPAIR WITH MESH,  COMPONENT SEPARATION;  Surgeon: Arturo Kumar MD;  Location: [facility];  Service: General;  Laterality: N/A;    WISDOM TOOTH EXTRACTION         Social History:   Social History     Socioeconomic History    Marital status:    Tobacco Use    Smoking status: Former     Current packs/day: 0.00     Average packs/day: 1.5 packs/day for 22.0 years (33.1 ttl pk-yrs)     Types: Cigarettes     Start date: 1974     Quit date: 1996     Years since quittin.5     Passive exposure: Past    Smokeless tobacco: Never    Tobacco comments:     Smoked menthol   Vaping Use    Vaping status: Never Used   Substance and Sexual Activity    Alcohol use: Yes     Comment: very rare    Drug use: Never    Sexual activity: Not Currently     Partners: Male     Birth control/protection: None, Hysterectomy       Medications:     Allergies:   Allergies   Allergen Reactions    Codeine Other (See Comments)     Pt states \"it made me feel like my insides were in a vice \"    Niacin Hives    Darvon [Propoxyphene] Nausea And Vomiting    Lipitor [Atorvastatin] Myalgia    Zofran [Ondansetron] Other (See Comments)     Face/neck/chest very red/flushed after IV Zofran administration; tolerates po Zofran without difficultly     Adhesive Tape Rash     tegaderm     Bentyl [Dicyclomine] Rash     Patient states she turned \"bright red\"    Ciprodex [Ciprofloxacin-Dexamethasone] Other (See Comments)     REDNESS,tendonitis    Flagyl [Metronidazole] Nausea Only    Other Other (See Comments)     POLLEN,TREES,AND PLANTS MULTIPLE ENVIRONMENTAL ALLERGIES    Prednisone Rash       Objective   Vital Signs:   Vitals:    24 0843   BP: 128/60   BP Location: Left arm   Patient Position: Sitting   Cuff Size: Adult   Pulse: 71   SpO2: 98%   Weight: 115 kg (253 lb)   Height: 175.3 cm (69\")       PHYSICAL EXAM    Neck: no adenopathy, no carotid bruit, no JVD, and thyroid: not " "enlarged  Lungs: clear to auscultation bilaterally and no rhonchi or crackles\", ' symmetric  Heart: regular rate and rhythm, S1, S2 normal, no murmur, click, rub or gallop  Abdomen: Soft, non-tender, bowel sounds normal,  no organomegaly  Extremities: extremities normal, atraumatic, no cyanosis or edema      Lab Results   Component Value Date    GLUCOSE 116 (H) 09/10/2024    CALCIUM 9.1 09/10/2024     09/10/2024    K 4.4 09/10/2024    CO2 28.0 09/10/2024     09/10/2024    BUN 19 09/10/2024    CREATININE 0.72 09/10/2024    EGFRIFAFRI 80 11/16/2021    EGFRIFNONA 80 01/31/2022    BCR 26.4 (H) 09/10/2024    ANIONGAP 9.0 09/10/2024     Lab Results   Component Value Date    WBC 6.65 09/10/2024    HGB 12.9 09/10/2024    HCT 39.9 09/10/2024    MCV 88.7 09/10/2024     09/10/2024     Lab Results   Component Value Date    INR 1.01 09/05/2023    INR 0.88 10/13/2016    INR 0.96 10/15/2015    PROTIME 13.9 09/05/2023    PROTIME 9.6 10/13/2016    PROTIME 10.0 10/15/2015     Lab Results   Component Value Date    TSH 0.858 06/24/2024    P1ULMYV 13.5 (H) 06/07/2016     I personally viewed and interpreted the patient's EKG/Telemetry/lab data    Procedures    Tobacco Cessation: N/A  Obstructive Sleep Apnea Screening: Known obstructive sleep apnea and is CPAP compliant    Assessment & Plan    Diagnoses and all orders for this visit:    1. Paroxysmal atrial fibrillation (Primary)    2. STEFAN on CPAP    3. Primary hypertension           Diagnosis Plan   1. Paroxysmal atrial fibrillation  Status post ablation.  Maintaining sinus rhythm   2.  Watchman device: Status post successful implantation follow-up RONY stable.  No peridevice flow.   3.  Hypertension: Controlled on current medical therapy  4.  STEFAN, CPAP     Electronically signed by ADENIKE Liao, 12/09/24, 9:26 AM EST.   "

## 2025-01-07 ENCOUNTER — HOSPITAL ENCOUNTER (OUTPATIENT)
Dept: CT IMAGING | Facility: HOSPITAL | Age: 70
Discharge: HOME | End: 2025-01-07
Admitting: INTERNAL MEDICINE
Payer: MEDICARE

## 2025-01-07 ENCOUNTER — OFFICE VISIT (OUTPATIENT)
Dept: INTERNAL MEDICINE | Facility: CLINIC | Age: 70
End: 2025-01-07
Payer: MEDICARE

## 2025-01-07 VITALS
DIASTOLIC BLOOD PRESSURE: 74 MMHG | OXYGEN SATURATION: 97 % | BODY MASS INDEX: 37.47 KG/M2 | SYSTOLIC BLOOD PRESSURE: 110 MMHG | TEMPERATURE: 96.9 F | HEART RATE: 62 BPM | RESPIRATION RATE: 16 BRPM | WEIGHT: 253 LBS | HEIGHT: 69 IN

## 2025-01-07 DIAGNOSIS — M54.2 CERVICAL SPINE PAIN: Primary | ICD-10-CM

## 2025-01-07 DIAGNOSIS — M54.12 CERVICAL RADICULAR PAIN: ICD-10-CM

## 2025-01-07 PROCEDURE — 72125 CT NECK SPINE W/O DYE: CPT

## 2025-01-07 PROCEDURE — 1125F AMNT PAIN NOTED PAIN PRSNT: CPT | Performed by: INTERNAL MEDICINE

## 2025-01-07 PROCEDURE — 3078F DIAST BP <80 MM HG: CPT | Performed by: INTERNAL MEDICINE

## 2025-01-07 PROCEDURE — 3074F SYST BP LT 130 MM HG: CPT | Performed by: INTERNAL MEDICINE

## 2025-01-07 PROCEDURE — G2211 COMPLEX E/M VISIT ADD ON: HCPCS | Performed by: INTERNAL MEDICINE

## 2025-01-07 PROCEDURE — 99214 OFFICE O/P EST MOD 30 MIN: CPT | Performed by: INTERNAL MEDICINE

## 2025-01-07 RX ORDER — NYSTATIN 100000 [USP'U]/ML
500000 SUSPENSION ORAL 4 TIMES DAILY
Qty: 280 ML | Refills: 0 | Status: SHIPPED | OUTPATIENT
Start: 2025-01-07

## 2025-01-07 RX ORDER — TIOTROPIUM BROMIDE 18 UG/1
1 CAPSULE ORAL; RESPIRATORY (INHALATION)
Qty: 90 CAPSULE | Refills: 3 | Status: SHIPPED | OUTPATIENT
Start: 2025-01-07

## 2025-01-07 RX ORDER — IPRATROPIUM BROMIDE 42 UG/1
1 SPRAY, METERED NASAL AS NEEDED
Qty: 15 ML | Refills: 3 | Status: SHIPPED | OUTPATIENT
Start: 2025-01-07

## 2025-01-07 NOTE — PROGRESS NOTES
Subjective     Patient ID: Albania Ramos is a 69 y.o. female. Patient is here for management of multiple medical problems.     Chief Complaint   Patient presents with    Neck Pain     Left neck for the last week to 2 weeks    Shoulder Pain     Left shhoulder     History of Present Illness     Years. Of neck pain and catching and popping. No fall. Now with catching and popping and and then pain down left shoulder.            The following portions of the patient's history were reviewed and updated as appropriate: allergies, current medications, past family history, past medical history, past social history, past surgical history and problem list.    Review of Systems    Current Outpatient Medications:     albuterol sulfate HFA (Ventolin HFA) 108 (90 Base) MCG/ACT inhaler, Inhale 2 puffs Every 4 (Four) Hours As Needed for Wheezing or Shortness of Air., Disp: 18 g, Rfl: 5    allopurinol (ZYLOPRIM) 300 MG tablet, TAKE ONE TABLET BY MOUTH EVERY DAY, Disp: 90 tablet, Rfl: 3    aspirin 81 MG EC tablet, Take 1 tablet by mouth Daily., Disp: , Rfl:     atorvastatin (LIPITOR) 40 MG tablet, Take 1 tablet by mouth Every Evening., Disp: , Rfl:     calcium carbonate (OS-SHANTE) 600 MG tablet, Take 1 tablet by mouth As Needed for Heartburn., Disp: , Rfl:     clopidogrel (PLAVIX) 75 MG tablet, Take 1 tablet by mouth Daily., Disp: 90 tablet, Rfl: 1    Coenzyme Q10 (Co Q-10) 200 MG capsule, Take  by mouth., Disp: , Rfl:     cyanocobalamin (VITAMIN B-12) 2500 MCG tablet tablet, TAKE ONE TABLET BY MOUTH THREE TIMES A WEEK MUST MAKE AN APPOINTMENT (Patient taking differently: Take 1,200 mcg by mouth 1 (One) Time Per Week. MONDAYS), Disp: 30 tablet, Rfl: 5    Diclofenac Sodium (VOLTAREN) 1 % gel gel, Apply 4 g topically to the appropriate area as directed 4 (Four) Times a Day As Needed (pain)., Disp: 100 g, Rfl: 11    docusate sodium 100 MG capsule, Take 1 capsule by mouth 2 (Two) Times a Day As Needed for Constipation., Disp: 30 capsule,  Rfl: 0    estradiol (ESTRACE) 0.1 MG/GM vaginal cream, Insert TWO grams vaginally daily AS NEEDED FOR vaginal dryness. Usually uses twice A WEEK (Patient taking differently: Insert 2 g into the vagina 3 (Three) Times a Week.), Disp: 42.5 g, Rfl: 11    GLUCOSAMINE HCL-MSM PO, Take 1 tablet by mouth Daily., Disp: , Rfl:     ipratropium (ATROVENT) 0.06 % nasal spray, Administer 1 spray into the nostril(s) as directed by provider As Needed for Rhinitis., Disp: 15 mL, Rfl: 3    levothyroxine (SYNTHROID, LEVOTHROID) 150 MCG tablet, TAKE ONE TABLET BY MOUTH EVERY DAY, Disp: 90 tablet, Rfl: 2    metoprolol tartrate (LOPRESSOR) 25 MG tablet, Take 1 tablet by mouth 2 (Two) Times a Day., Disp: 180 tablet, Rfl: 3    MULTIPLE VITAMIN PO, Take 1 tablet by mouth Daily., Disp: , Rfl:     nitroglycerin (NITROSTAT) 0.4 MG SL tablet, 1 under the tongue as needed for angina, may repeat q5mins for up three doses (Patient taking differently: 1 tablet Every 5 (Five) Minutes As Needed for Chest Pain. 1 under the tongue as needed for angina, may repeat q5mins for up three doses), Disp: 100 tablet, Rfl: 11    NON FORMULARY, Take 2 tablets by mouth Daily. Lions ramiro mushrooms, Disp: , Rfl:     nystatin (MYCOSTATIN) 100,000 unit/mL suspension, Take 5 mL by mouth 4 (Four) Times a Day., Disp: 280 mL, Rfl: 0    ofloxacin (FLOXIN) 0.3 % otic solution, Administer 5 drops to the right ear As Needed (PRESSURE IN EARS)., Disp: , Rfl:     Omega-3 Fatty Acids (FISH OIL) 435 MG capsule, Take 1,000 mg by mouth Daily., Disp: , Rfl:     omeprazole (priLOSEC) 40 MG capsule, TAKE ONE CAPSULE BY MOUTH DAILY, Disp: 90 capsule, Rfl: 3    SUMAtriptan (IMITREX) 25 MG tablet, Take 1 tablet by mouth As Needed for Migraine., Disp: , Rfl:     tiotropium (SPIRIVA) 18 MCG per inhalation capsule, Place 1 capsule into inhaler and inhale Daily., Disp: 90 capsule, Rfl: 3    TURMERIC PO, Take 3 capsules by mouth Daily., Disp: , Rfl:     vitamin C (ASCORBIC ACID) 250 MG  "tablet, Take 1 tablet by mouth Daily., Disp: , Rfl:     Current Facility-Administered Medications:     triamcinolone acetonide (KENALOG-40) injection 40 mg, 40 mg, Intra-articular, Once, Gordon Norman MD    Objective      Blood pressure 110/74, pulse 62, temperature 96.9 °F (36.1 °C), resp. rate 16, height 175.3 cm (69\"), weight 115 kg (253 lb), SpO2 97%, not currently breastfeeding.    Class 2 Severe Obesity (BMI >=35 and <=39.9). Obesity-related health conditions include the following: hypertension and osteoarthritis. Obesity is unchanged. BMI is is above average; BMI management plan is completed. We discussed portion control and increasing exercise.       Physical Exam     General Appearance:    Alert, cooperative, no distress, appears stated age   Head:    Normocephalic, without obvious abnormality, atraumatic   Eyes:    PERRL, conjunctiva/corneas clear, EOM's intact   Ears:    Normal TM's and external ear canals, both ears   Nose:   Nares normal, septum midline, mucosa normal, no drainage   or sinus tenderness   Throat:   Lips, mucosa, and tongue normal; teeth and gums normal   Neck:   Supple, symmetrical, trachea midline, no adenopathy;        thyroid:  No enlargement/tenderness/nodules; no carotid    bruit or JVD   Back:     Symmetric, no curvature, ROM normal, no CVA tenderness   Lungs:     Clear to auscultation bilaterally, respirations unlabored   Chest wall:    No tenderness or deformity   Heart:    Regular rate and rhythm, S1 and S2 normal, no murmur,        rub or gallop   Abdomen:     Soft, non-tender, bowel sounds active all four quadrants,     no masses, no organomegaly   Extremities:   Extremities normal, atraumatic, no cyanosis or edema   Pulses:   2+ and symmetric all extremities   Skin:   Skin color, texture, turgor normal, no rashes or lesions   Lymph nodes:   Cervical, supraclavicular, and axillary nodes normal   Neurologic:   CNII-XII intact. Normal strength, sensation and reflexes       " throughout      Results for orders placed or performed during the hospital encounter of 10/11/24   Adult Transesophageal Echo 3D (RONY) W/ Cont If Necessary Per Protocol    Collection Time: 10/11/24  9:39 AM   Result Value Ref Range    TR max len 219.5 cm/sec    TR max PG 20.3 mmHg    RVSP(TR) 28 mmHg    RAP systole 8 mmHg         Assessment & Plan       Pt with worsening pain in neck and catching radiculopathy.     Will need to eval for cervical instability.          Diagnoses and all orders for this visit:    1. Cervical spine pain (Primary)  -     CT cervical spine wo contrast    2. Cervical radicular pain  -     CT cervical spine wo contrast    Other orders  -     nystatin (MYCOSTATIN) 100,000 unit/mL suspension; Take 5 mL by mouth 4 (Four) Times a Day.  Dispense: 280 mL; Refill: 0  -     tiotropium (SPIRIVA) 18 MCG per inhalation capsule; Place 1 capsule into inhaler and inhale Daily.  Dispense: 90 capsule; Refill: 3  -     ipratropium (ATROVENT) 0.06 % nasal spray; Administer 1 spray into the nostril(s) as directed by provider As Needed for Rhinitis.  Dispense: 15 mL; Refill: 3      No follow-ups on file.  Has f/u on 3/20/25          There are no Patient Instructions on file for this visit.     Gordon Norman MD    Assessment & Plan       Answers submitted by the patient for this visit:  Primary Reason for Visit (Submitted on 1/7/2025)  What is the primary reason for your visit?: Problem Not Listed

## 2025-01-08 ENCOUNTER — OFFICE VISIT (OUTPATIENT)
Dept: INTERNAL MEDICINE | Facility: CLINIC | Age: 70
End: 2025-01-08
Payer: MEDICARE

## 2025-01-08 VITALS
SYSTOLIC BLOOD PRESSURE: 148 MMHG | DIASTOLIC BLOOD PRESSURE: 78 MMHG | TEMPERATURE: 97 F | BODY MASS INDEX: 37.47 KG/M2 | OXYGEN SATURATION: 98 % | WEIGHT: 253 LBS | HEART RATE: 87 BPM | HEIGHT: 69 IN

## 2025-01-08 DIAGNOSIS — I10 PRIMARY HYPERTENSION: ICD-10-CM

## 2025-01-08 DIAGNOSIS — R39.9 URINARY SYMPTOM OR SIGN: Primary | ICD-10-CM

## 2025-01-08 DIAGNOSIS — N32.89 BLADDER SPASM: ICD-10-CM

## 2025-01-08 DIAGNOSIS — I48.0 PAROXYSMAL ATRIAL FIBRILLATION: Chronic | ICD-10-CM

## 2025-01-08 DIAGNOSIS — R31.0 GROSS HEMATURIA: ICD-10-CM

## 2025-01-08 LAB
BILIRUB BLD-MCNC: NEGATIVE MG/DL
CLARITY, POC: ABNORMAL
COLOR UR: ABNORMAL
EXPIRATION DATE: ABNORMAL
GLUCOSE UR STRIP-MCNC: NEGATIVE MG/DL
KETONES UR QL: NEGATIVE
LEUKOCYTE EST, POC: ABNORMAL
Lab: ABNORMAL
NITRITE UR-MCNC: NEGATIVE MG/ML
PH UR: 6 [PH] (ref 5–8)
PROT UR STRIP-MCNC: ABNORMAL MG/DL
RBC # UR STRIP: ABNORMAL /UL
SP GR UR: 1.01 (ref 1–1.03)
UROBILINOGEN UR QL: NORMAL

## 2025-01-08 PROCEDURE — 3078F DIAST BP <80 MM HG: CPT | Performed by: NURSE PRACTITIONER

## 2025-01-08 PROCEDURE — 99214 OFFICE O/P EST MOD 30 MIN: CPT | Performed by: NURSE PRACTITIONER

## 2025-01-08 PROCEDURE — 1160F RVW MEDS BY RX/DR IN RCRD: CPT | Performed by: NURSE PRACTITIONER

## 2025-01-08 PROCEDURE — 1125F AMNT PAIN NOTED PAIN PRSNT: CPT | Performed by: NURSE PRACTITIONER

## 2025-01-08 PROCEDURE — 81003 URINALYSIS AUTO W/O SCOPE: CPT | Performed by: NURSE PRACTITIONER

## 2025-01-08 PROCEDURE — 3077F SYST BP >= 140 MM HG: CPT | Performed by: NURSE PRACTITIONER

## 2025-01-08 PROCEDURE — 1159F MED LIST DOCD IN RCRD: CPT | Performed by: NURSE PRACTITIONER

## 2025-01-08 RX ORDER — SULFAMETHOXAZOLE AND TRIMETHOPRIM 800; 160 MG/1; MG/1
1 TABLET ORAL 2 TIMES DAILY
Qty: 14 TABLET | Refills: 0 | Status: SHIPPED | OUTPATIENT
Start: 2025-01-08 | End: 2025-01-15

## 2025-01-08 RX ORDER — PHENAZOPYRIDINE HYDROCHLORIDE 200 MG/1
200 TABLET, FILM COATED ORAL 3 TIMES DAILY PRN
Qty: 6 TABLET | Refills: 0 | Status: SHIPPED | OUTPATIENT
Start: 2025-01-08 | End: 2025-01-10

## 2025-01-08 NOTE — PROGRESS NOTES
Office Visit      Patient Name: Albania Ramos  : 1955   MRN: [id number]     Chief Complaint:    Chief Complaint   Patient presents with    Urinary Tract Infection       History of Present Illness: Albania Ramos is a 69 y.o. female who is here today with dysuria, blood in urine that she first noted today.  Right side is sore. Has been tested for kidney stones in the past, has never had them.  No fever, chills, difficulty starting urine stream, nausea, vomiting, pelvic pressure.  Treated for dental infection with amoxicillin, stopped taking it yesterday.   HTN, AFib.  Recent watchman implant, plans to be able to stop taking plavix in 3 months.  Taking medication as prescribed. Denies chest pain, dyspnea, orthopnea, palpitations, lower extremity edema, confusion, headaches, weakness, visual disturbances.    Subjective      I have reviewed and the following portions of the patient's history were updated as appropriate: past family history, past medical history, past social history, past surgical history and problem list.      Current Outpatient Medications:     albuterol sulfate HFA (Ventolin HFA) 108 (90 Base) MCG/ACT inhaler, Inhale 2 puffs Every 4 (Four) Hours As Needed for Wheezing or Shortness of Air., Disp: 18 g, Rfl: 5    allopurinol (ZYLOPRIM) 300 MG tablet, TAKE ONE TABLET BY MOUTH EVERY DAY, Disp: 90 tablet, Rfl: 3    aspirin 81 MG EC tablet, Take 1 tablet by mouth Daily., Disp: , Rfl:     atorvastatin (LIPITOR) 40 MG tablet, Take 1 tablet by mouth Every Evening., Disp: , Rfl:     calcium carbonate (OS-SHANTE) 600 MG tablet, Take 1 tablet by mouth As Needed for Heartburn., Disp: , Rfl:     clopidogrel (PLAVIX) 75 MG tablet, Take 1 tablet by mouth Daily., Disp: 90 tablet, Rfl: 1    Coenzyme Q10 (Co Q-10) 200 MG capsule, Take  by mouth., Disp: , Rfl:     cyanocobalamin (VITAMIN B-12) 2500 MCG tablet tablet, TAKE ONE TABLET BY MOUTH THREE TIMES A WEEK MUST MAKE AN APPOINTMENT (Patient taking  differently: Take 1,200 mcg by mouth 1 (One) Time Per Week. MONDAYS), Disp: 30 tablet, Rfl: 5    Diclofenac Sodium (VOLTAREN) 1 % gel gel, Apply 4 g topically to the appropriate area as directed 4 (Four) Times a Day As Needed (pain)., Disp: 100 g, Rfl: 11    docusate sodium 100 MG capsule, Take 1 capsule by mouth 2 (Two) Times a Day As Needed for Constipation., Disp: 30 capsule, Rfl: 0    estradiol (ESTRACE) 0.1 MG/GM vaginal cream, Insert TWO grams vaginally daily AS NEEDED FOR vaginal dryness. Usually uses twice A WEEK (Patient taking differently: Insert 2 g into the vagina 3 (Three) Times a Week.), Disp: 42.5 g, Rfl: 11    GLUCOSAMINE HCL-MSM PO, Take 1 tablet by mouth Daily., Disp: , Rfl:     ipratropium (ATROVENT) 0.06 % nasal spray, Administer 1 spray into the nostril(s) as directed by provider As Needed for Rhinitis., Disp: 15 mL, Rfl: 3    levothyroxine (SYNTHROID, LEVOTHROID) 150 MCG tablet, TAKE ONE TABLET BY MOUTH EVERY DAY, Disp: 90 tablet, Rfl: 2    metoprolol tartrate (LOPRESSOR) 25 MG tablet, Take 1 tablet by mouth 2 (Two) Times a Day., Disp: 180 tablet, Rfl: 3    MULTIPLE VITAMIN PO, Take 1 tablet by mouth Daily., Disp: , Rfl:     nitroglycerin (NITROSTAT) 0.4 MG SL tablet, 1 under the tongue as needed for angina, may repeat q5mins for up three doses (Patient taking differently: 1 tablet Every 5 (Five) Minutes As Needed for Chest Pain. 1 under the tongue as needed for angina, may repeat q5mins for up three doses), Disp: 100 tablet, Rfl: 11    NON FORMULARY, Take 2 tablets by mouth Daily. Lions ramiro mushrooms, Disp: , Rfl:     nystatin (MYCOSTATIN) 100,000 unit/mL suspension, Take 5 mL by mouth 4 (Four) Times a Day., Disp: 280 mL, Rfl: 0    ofloxacin (FLOXIN) 0.3 % otic solution, Administer 5 drops to the right ear As Needed (PRESSURE IN EARS)., Disp: , Rfl:     Omega-3 Fatty Acids (FISH OIL) 435 MG capsule, Take 1,000 mg by mouth Daily., Disp: , Rfl:     omeprazole (priLOSEC) 40 MG capsule, TAKE ONE  "CAPSULE BY MOUTH DAILY, Disp: 90 capsule, Rfl: 3    phenazopyridine (Pyridium) 200 MG tablet, Take 1 tablet by mouth 3 (Three) Times a Day As Needed for Bladder Spasms for up to 2 days., Disp: 6 tablet, Rfl: 0    sulfamethoxazole-trimethoprim (Bactrim DS) 800-160 MG per tablet, Take 1 tablet by mouth 2 (Two) Times a Day for 7 days., Disp: 14 tablet, Rfl: 0    SUMAtriptan (IMITREX) 25 MG tablet, Take 1 tablet by mouth As Needed for Migraine., Disp: , Rfl:     tiotropium (SPIRIVA) 18 MCG per inhalation capsule, Place 1 capsule into inhaler and inhale Daily., Disp: 90 capsule, Rfl: 3    TURMERIC PO, Take 3 capsules by mouth Daily., Disp: , Rfl:     vitamin C (ASCORBIC ACID) 250 MG tablet, Take 1 tablet by mouth Daily., Disp: , Rfl:     Current Facility-Administered Medications:     triamcinolone acetonide (KENALOG-40) injection 40 mg, 40 mg, Intra-articular, Once, Gordon Norman MD    Allergies   Allergen Reactions    Codeine Other (See Comments)     Pt states \"it made me feel like my insides were in a vice \"    Niacin Hives    Darvon [Propoxyphene] Nausea And Vomiting    Lipitor [Atorvastatin] Myalgia    Zofran [Ondansetron] Other (See Comments)     Face/neck/chest very red/flushed after IV Zofran administration; tolerates po Zofran without difficultly     Adhesive Tape Rash     tegaderm     Bentyl [Dicyclomine] Rash     Patient states she turned \"bright red\"    Ciprodex [Ciprofloxacin-Dexamethasone] Other (See Comments)     REDNESS,tendonitis    Flagyl [Metronidazole] Nausea Only    Other Other (See Comments)     POLLEN,TREES,AND PLANTS MULTIPLE ENVIRONMENTAL ALLERGIES    Prednisone Rash       Objective     Physical Exam:  Vital Signs:   Vitals:    01/08/25 1359   BP: 148/78   Pulse: 87   Temp: 97 °F (36.1 °C)   SpO2: 98%   Weight: 115 kg (253 lb)   Height: 175.3 cm (69.02\")     Body mass index is 37.34 kg/m².         Physical Exam  Constitutional:       Appearance: She is not ill-appearing.   HENT:      Head: " Normocephalic.      Right Ear: External ear normal.      Left Ear: External ear normal.   Eyes:      Conjunctiva/sclera: Conjunctivae normal.      Pupils: Pupils are equal, round, and reactive to light.   Cardiovascular:      Rate and Rhythm: Normal rate and regular rhythm.      Pulses:           Radial pulses are 2+ on the right side and 2+ on the left side.        Dorsalis pedis pulses are 2+ on the right side and 2+ on the left side.      Heart sounds: Normal heart sounds.   Pulmonary:      Effort: Pulmonary effort is normal.      Breath sounds: Normal breath sounds.   Abdominal:      Tenderness: There is no abdominal tenderness. There is no right CVA tenderness or left CVA tenderness.   Musculoskeletal:      Cervical back: Normal range of motion and neck supple.      Right lower leg: No edema.      Left lower leg: No edema.   Skin:     General: Skin is warm.      Capillary Refill: Capillary refill takes less than 2 seconds.   Neurological:      Mental Status: She is alert and oriented to person, place, and time.      Coordination: Coordination normal.      Gait: Gait normal.   Psychiatric:         Mood and Affect: Mood normal.         Behavior: Behavior normal.         Thought Content: Thought content normal.             Assessment / Plan      Assessment/Plan:   Diagnoses and all orders for this visit:    1. Urinary symptom or sign (Primary)  -     POCT urinalysis dipstick, automated  -     sulfamethoxazole-trimethoprim (Bactrim DS) 800-160 MG per tablet; Take 1 tablet by mouth 2 (Two) Times a Day for 7 days.  Dispense: 14 tablet; Refill: 0    2. Gross hematuria  -     Urine Culture - Urine, Urine, Clean Catch; Future  -     sulfamethoxazole-trimethoprim (Bactrim DS) 800-160 MG per tablet; Take 1 tablet by mouth 2 (Two) Times a Day for 7 days.  Dispense: 14 tablet; Refill: 0    3. Bladder spasm  -     phenazopyridine (Pyridium) 200 MG tablet; Take 1 tablet by mouth 3 (Three) Times a Day As Needed for Bladder  Spasms for up to 2 days.  Dispense: 6 tablet; Refill: 0    4. Primary hypertension  5. Paroxysmal atrial fibrillation        - Follow heart healthy diet.  Keep sodium intake < 1500 mg per day.  Avoid processed & fast foods.          - Exercise as tolerated, with a goal of 30 minutes of moderate exercise most days.         - Take medications as prescribed.        Follow Up:   Return if symptoms worsen or fail to improve.    Patient was given instructions and counseling regarding her condition or for health maintenance advice. Please see specific information pulled into the AVS if appropriate.       [facility]     Please note that portions of this note may have been completed with a voice recognition program. Efforts were made to edit dictation, but occasionally words are mistranscribed.

## 2025-01-08 NOTE — PROGRESS NOTES
Comparison: None available.    Technique: Axial CT images were obtained of the cervical spine without contrast administration.  Reconstructed coronal and sagittal images were also obtained. Automated exposure control and iterative construction methods were used.      Findings:  No evidence of acute fracture. There is mild straightening of normal cervical lordosis. Endplate osteophyte formations with multilevel facet arthropathy. Degenerative disc disease with disc height loss greatest at C6-7. There is mild canal narrowing at   this level. There is varying bony neural foraminal narrowing, severe on the left at C3-4. 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 Craniocervical junction is intact. Paraspinal soft tissues show no acute abnormality. Surgical changes of   thyroidectomy.    IMPRESSION:  Impression:  No acute abnormality of the cervical spine. Degenerative changes as above. Severe left bony neural foraminal narrowing at C3-4.      May need to see Neuro surgery now or later if gettign worse.  No instability seen

## 2025-01-11 LAB
BACTERIA UR CULT: ABNORMAL
OTHER ANTIBIOTIC SUSC ISLT: ABNORMAL

## 2025-01-22 DIAGNOSIS — M54.12 CERVICAL RADICULAR PAIN: Primary | ICD-10-CM

## 2025-01-29 ENCOUNTER — OFFICE VISIT (OUTPATIENT)
Dept: INTERNAL MEDICINE | Facility: CLINIC | Age: 70
End: 2025-01-29
Payer: MEDICARE

## 2025-01-29 VITALS
RESPIRATION RATE: 16 BRPM | OXYGEN SATURATION: 96 % | HEART RATE: 64 BPM | TEMPERATURE: 97 F | WEIGHT: 256 LBS | BODY MASS INDEX: 37.92 KG/M2 | DIASTOLIC BLOOD PRESSURE: 58 MMHG | SYSTOLIC BLOOD PRESSURE: 114 MMHG | HEIGHT: 69 IN

## 2025-01-29 DIAGNOSIS — M54.12 CERVICAL RADICULOPATHY: Primary | ICD-10-CM

## 2025-01-29 DIAGNOSIS — R29.898 LEFT ARM WEAKNESS: ICD-10-CM

## 2025-01-29 PROCEDURE — 99214 OFFICE O/P EST MOD 30 MIN: CPT | Performed by: INTERNAL MEDICINE

## 2025-01-29 PROCEDURE — G2211 COMPLEX E/M VISIT ADD ON: HCPCS | Performed by: INTERNAL MEDICINE

## 2025-01-29 PROCEDURE — 3074F SYST BP LT 130 MM HG: CPT | Performed by: INTERNAL MEDICINE

## 2025-01-29 PROCEDURE — 1125F AMNT PAIN NOTED PAIN PRSNT: CPT | Performed by: INTERNAL MEDICINE

## 2025-01-29 PROCEDURE — 3078F DIAST BP <80 MM HG: CPT | Performed by: INTERNAL MEDICINE

## 2025-01-29 NOTE — PROGRESS NOTES
Subjective     Patient ID: Albania Ramos is a 70 y.o. female. Patient is here for management of multiple medical problems.     Chief Complaint   Patient presents with    Neck Pain     Left side of arm    Arm Pain     Left side of neck       Left side neck pain, popping and catching. Pain radiates down left arm.  Symptoms are: new.   Onset was 1 to 6 months.   Symptoms occur: intermittently.  Symptoms include: joint pain, joint swelling and neck pain.   Pertinent negative symptoms include no abdominal pain, no anorexia, no change in stool, no chest pain, no chills, no congestion, no cough, no diaphoresis, no fatigue, no fever, no headaches, no myalgias, no nausea, no numbness, no rash, no sore throat, no swollen glands, no dysuria, no vertigo, no visual change, no vomiting and no weakness.   Treatment and/or Medications comments include: Tylenol        Neck pain. Left arm radiculopathy. Had abnormal CT scan. Having weakness in left hand. Left hand dominate.       The following portions of the patient's history were reviewed and updated as appropriate: allergies, current medications, past family history, past medical history, past social history, past surgical history and problem list.    Review of Systems   Constitutional:  Negative for chills, diaphoresis, fatigue and fever.   HENT:  Negative for congestion and sore throat.    Respiratory:  Negative for cough.    Cardiovascular:  Negative for chest pain.   Gastrointestinal:  Negative for abdominal pain, anorexia, nausea and vomiting.   Genitourinary:  Negative for dysuria.   Musculoskeletal:  Positive for joint pain and neck pain. Negative for myalgias.   Skin:  Negative for rash.   Neurological:  Negative for vertigo, weakness, numbness and headaches.       Current Outpatient Medications:     albuterol sulfate HFA (Ventolin HFA) 108 (90 Base) MCG/ACT inhaler, Inhale 2 puffs Every 4 (Four) Hours As Needed for Wheezing or Shortness of Air., Disp: 18 g, Rfl: 5     allopurinol (ZYLOPRIM) 300 MG tablet, TAKE ONE TABLET BY MOUTH EVERY DAY, Disp: 90 tablet, Rfl: 3    aspirin 81 MG EC tablet, Take 1 tablet by mouth Daily., Disp: , Rfl:     calcium carbonate (OS-SHANTE) 600 MG tablet, Take 1 tablet by mouth As Needed for Heartburn., Disp: , Rfl:     clopidogrel (PLAVIX) 75 MG tablet, Take 1 tablet by mouth Daily., Disp: 90 tablet, Rfl: 1    Coenzyme Q10 (Co Q-10) 200 MG capsule, Take  by mouth., Disp: , Rfl:     cyanocobalamin (VITAMIN B-12) 2500 MCG tablet tablet, TAKE ONE TABLET BY MOUTH THREE TIMES A WEEK MUST MAKE AN APPOINTMENT (Patient taking differently: Take 1,200 mcg by mouth 1 (One) Time Per Week. MONDAYS), Disp: 30 tablet, Rfl: 5    Diclofenac Sodium (VOLTAREN) 1 % gel gel, Apply 4 g topically to the appropriate area as directed 4 (Four) Times a Day As Needed (pain)., Disp: 100 g, Rfl: 11    docusate sodium 100 MG capsule, Take 1 capsule by mouth 2 (Two) Times a Day As Needed for Constipation., Disp: 30 capsule, Rfl: 0    estradiol (ESTRACE) 0.1 MG/GM vaginal cream, Insert TWO grams vaginally daily AS NEEDED FOR vaginal dryness. Usually uses twice A WEEK (Patient taking differently: Insert 2 g into the vagina 3 (Three) Times a Week.), Disp: 42.5 g, Rfl: 11    GLUCOSAMINE HCL-MSM PO, Take 1 tablet by mouth Daily., Disp: , Rfl:     ipratropium (ATROVENT) 0.06 % nasal spray, Administer 1 spray into the nostril(s) as directed by provider As Needed for Rhinitis., Disp: 15 mL, Rfl: 3    levothyroxine (SYNTHROID, LEVOTHROID) 150 MCG tablet, TAKE ONE TABLET BY MOUTH EVERY DAY, Disp: 90 tablet, Rfl: 2    metoprolol tartrate (LOPRESSOR) 25 MG tablet, Take 1 tablet by mouth 2 (Two) Times a Day., Disp: 180 tablet, Rfl: 3    MULTIPLE VITAMIN PO, Take 1 tablet by mouth Daily., Disp: , Rfl:     nitroglycerin (NITROSTAT) 0.4 MG SL tablet, 1 under the tongue as needed for angina, may repeat q5mins for up three doses (Patient taking differently: 1 tablet Every 5 (Five) Minutes As Needed for  "Chest Pain. 1 under the tongue as needed for angina, may repeat q5mins for up three doses), Disp: 100 tablet, Rfl: 11    NON FORMULARY, Take 2 tablets by mouth Daily. Lions ramiro mushrooms, Disp: , Rfl:     nystatin (MYCOSTATIN) 100,000 unit/mL suspension, Take 5 mL by mouth 4 (Four) Times a Day., Disp: 280 mL, Rfl: 0    ofloxacin (FLOXIN) 0.3 % otic solution, Administer 5 drops to the right ear As Needed (PRESSURE IN EARS)., Disp: , Rfl:     Omega-3 Fatty Acids (FISH OIL) 435 MG capsule, Take 1,000 mg by mouth Daily., Disp: , Rfl:     omeprazole (priLOSEC) 40 MG capsule, TAKE ONE CAPSULE BY MOUTH DAILY, Disp: 90 capsule, Rfl: 3    SUMAtriptan (IMITREX) 25 MG tablet, Take 1 tablet by mouth As Needed for Migraine., Disp: , Rfl:     tiotropium (SPIRIVA) 18 MCG per inhalation capsule, Place 1 capsule into inhaler and inhale Daily., Disp: 90 capsule, Rfl: 3    TURMERIC PO, Take 3 capsules by mouth Daily., Disp: , Rfl:     vitamin C (ASCORBIC ACID) 250 MG tablet, Take 1 tablet by mouth Daily., Disp: , Rfl:     Current Facility-Administered Medications:     triamcinolone acetonide (KENALOG-40) injection 40 mg, 40 mg, Intra-articular, Once, Gordon Norman MD    Objective      Blood pressure 114/58, pulse 64, temperature 97 °F (36.1 °C), resp. rate 16, height 175.3 cm (69.02\"), weight 116 kg (256 lb), SpO2 96%, not currently breastfeeding.            Physical Exam     General Appearance:    Alert, cooperative, no distress, appears stated age   Head:    Normocephalic, without obvious abnormality, atraumatic   Eyes:    PERRL, conjunctiva/corneas clear, EOM's intact   Ears:    Normal TM's and external ear canals, both ears   Nose:   Nares normal, septum midline, mucosa normal, no drainage   or sinus tenderness   Throat:   Lips, mucosa, and tongue normal; teeth and gums normal   Neck:   Supple, symmetrical, trachea midline, no adenopathy;        thyroid:  No enlargement/tenderness/nodules; no carotid    bruit or JVD   Back:   "   Symmetric, no curvature, ROM normal, no CVA tenderness   Lungs:     Clear to auscultation bilaterally, respirations unlabored   Chest wall:    No tenderness or deformity   Heart:    Regular rate and rhythm, S1 and S2 normal, no murmur,        rub or gallop   Abdomen:     Soft, non-tender, bowel sounds active all four quadrants,     no masses, no organomegaly   Extremities:   Extremities normal, atraumatic, no cyanosis or edema   Pulses:   2+ and symmetric all extremities   Skin:   Skin color, texture, turgor normal, no rashes or lesions   Lymph nodes:   Cervical, supraclavicular, and axillary nodes normal   Neurologic:   CNII-XII intact. abnomral strength 3/5 on left arm. 5/5 on right.       Results for orders placed or performed in visit on 01/08/25   POCT urinalysis dipstick, automated    Collection Time: 01/08/25  2:13 PM    Specimen: Urine   Result Value Ref Range    Color Red (A) Yellow, Straw, Dark Yellow, Kelle    Clarity, UA Hazy (A) Clear    Specific Gravity  1.010 1.005 - 1.030    pH, Urine 6.0 5.0 - 8.0    Leukocytes Trace (A) Negative    Nitrite, UA Negative Negative    Protein, POC 1+ (A) Negative mg/dL    Glucose, UA Negative Negative mg/dL    Ketones, UA Negative Negative    Urobilinogen, UA Normal Normal, 0.2 E.U./dL    Bilirubin Negative Negative    Blood, UA 3+ (A) Negative    Lot Number [id number]     Expiration Date 4,102,026    Urine Culture - Urine, Urine, Clean Catch    Collection Time: 01/08/25  4:26 PM    Specimen: Urine, Clean Catch    UC   Result Value Ref Range    Urine Culture Final report (A)     Result 1 Escherichia coli (A)     Result 2 Comment     Susceptibility Testing Comment          Assessment & Plan   Unable to take nsaids or steroids.   Radicular neck pain with left arm weakness and abnormal CT neck.       Diagnoses and all orders for this visit:    1. Cervical radiculopathy (Primary)  -     MRI Cervical Spine Without Contrast    2. Left arm weakness  -     MRI Cervical  Spine Without Contrast      No follow-ups on file.        Had f/u 3/20    There are no Patient Instructions on file for this visit.     Gordon Norman MD    Assessment & Plan

## 2025-01-31 ENCOUNTER — TELEPHONE (OUTPATIENT)
Dept: INTERNAL MEDICINE | Facility: CLINIC | Age: 70
End: 2025-01-31
Payer: MEDICARE

## 2025-01-31 NOTE — TELEPHONE ENCOUNTER
Amanda LINDSAY with [employer] called regarding pts MRI. She needed additional clinical info that I relayed to her from pts last OV note. She also wanted to know what the abnormal part was of the CT scan. I relayed that information also. She states that on her end, the MRI was not approved but will send request to their physician to review, with the CT scan information. She did state that Dr Norman could also do a peer to peer if he would like. [phone] is the contact phone number for the peer to peer..

## 2025-01-31 NOTE — TELEPHONE ENCOUNTER
Dr. Norman,     This patient is scheduled for the STAT MRI C Spine on Monday, 2/3, at [facility]. While no decision has been made official, it looks like this is going to be denied by the insurance. All pertinent records have been faxed by our Financial Clearance department for review. If you would like to complete a peer to peer, you may contact Amber.    A[facility] [phone]  Order ID: [id number]    From what we understand, [facility] will not turn STAT imaging requests away, so she should be able to proceed with the imaging, regardless of the insurances decision.

## 2025-02-03 ENCOUNTER — HOSPITAL ENCOUNTER (OUTPATIENT)
Dept: MRI IMAGING | Facility: HOSPITAL | Age: 70
Discharge: HOME | End: 2025-02-03
Admitting: INTERNAL MEDICINE
Payer: MEDICARE

## 2025-02-03 PROCEDURE — 72141 MRI NECK SPINE W/O DYE: CPT

## 2025-02-11 ENCOUNTER — TELEPHONE (OUTPATIENT)
Dept: CARDIOLOGY | Facility: HOSPITAL | Age: 70
End: 2025-02-11
Payer: MEDICARE

## 2025-02-11 NOTE — TELEPHONE ENCOUNTER
Spoke with patient and we discussed the need for this telephone appointment. She is not cancelling it now.

## 2025-03-05 ENCOUNTER — OFFICE VISIT (OUTPATIENT)
Dept: NEUROSURGERY | Facility: CLINIC | Age: 70
End: 2025-03-05
Payer: MEDICARE

## 2025-03-05 VITALS
WEIGHT: 260 LBS | HEIGHT: 69 IN | BODY MASS INDEX: 38.51 KG/M2 | TEMPERATURE: 97.1 F | SYSTOLIC BLOOD PRESSURE: 130 MMHG | DIASTOLIC BLOOD PRESSURE: 74 MMHG

## 2025-03-05 DIAGNOSIS — M54.12 CERVICAL RADICULOPATHY: Primary | ICD-10-CM

## 2025-03-05 DIAGNOSIS — M47.812 CERVICAL SPONDYLOSIS: ICD-10-CM

## 2025-03-05 DIAGNOSIS — M50.30 DDD (DEGENERATIVE DISC DISEASE), CERVICAL: ICD-10-CM

## 2025-03-05 PROCEDURE — 3075F SYST BP GE 130 - 139MM HG: CPT | Performed by: PHYSICIAN ASSISTANT

## 2025-03-05 PROCEDURE — 1159F MED LIST DOCD IN RCRD: CPT | Performed by: PHYSICIAN ASSISTANT

## 2025-03-05 PROCEDURE — 99204 OFFICE O/P NEW MOD 45 MIN: CPT | Performed by: PHYSICIAN ASSISTANT

## 2025-03-05 PROCEDURE — 1160F RVW MEDS BY RX/DR IN RCRD: CPT | Performed by: PHYSICIAN ASSISTANT

## 2025-03-05 PROCEDURE — 3078F DIAST BP <80 MM HG: CPT | Performed by: PHYSICIAN ASSISTANT

## 2025-03-05 RX ORDER — BETAMETHASONE VALERATE 1.2 MG/G
CREAM TOPICAL
COMMUNITY

## 2025-03-05 RX ORDER — METHYLPREDNISOLONE 4 MG/1
TABLET ORAL
Qty: 1 EACH | Refills: 0 | Status: SHIPPED | OUTPATIENT
Start: 2025-03-05

## 2025-03-05 RX ORDER — CYCLOBENZAPRINE HCL 10 MG
10 TABLET ORAL 3 TIMES DAILY PRN
Qty: 45 TABLET | Refills: 0 | Status: SHIPPED | OUTPATIENT
Start: 2025-03-05

## 2025-03-05 RX ORDER — GABAPENTIN 300 MG/1
CAPSULE ORAL
Qty: 90 CAPSULE | Refills: 0 | Status: SHIPPED | OUTPATIENT
Start: 2025-03-05

## 2025-03-05 NOTE — PROGRESS NOTES
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 Her medical history is significant for A-fib (status post watchman implant, .  Currently on Plavix), diverticulosis, and sleep apnea. She has intermittent neck pain that acutely worsened over the last 6 months. Since January of this year symptoms have been constant and severe.  She describes pain in the left neck that runs into the shoulder and upper arm. She has a cold feeling all the way down the arm into the finger tips of all 5 fingers. In certain positions she will experience lightening like shooting pain up the back of her head.  She has not had any formal therapy. No focal weakness, bowel or bladder difficulties or right arm complaints. Symptoms are worse with lateral rotation of the neck. Ranging the arm is uncomfortable. She has not found anything that provides relief.       Past Medical History:   Diagnosis Date    Abdominal pain     Abnormal ECG     Atrial Fibrillation    Allergic 2003    Allergic rhinitis     Ankle joint pain     Arrhythmia 2006    Arthritis     Arthritis 2023    Asthma     Bronchiectasis     Cataract 2015    Had left removed 2021 and rt removed 2022    Cholelithiasis     Chronic bronchitis     Colon polyp 12/10/2019    COVID-19 vaccine series completed     pfizer x3    Degenerative joint disease     Depression     Diverticulitis     Diverticulosis     Dizziness     Has had some episodes. No blake syncope.2007 patient underwent pulmonary vein isolation, initially successful.Patient returned, however in February noting some recurrent episode of dizziness.Then wore wore event recorder which showed some recurrent PAF.    Dysuria     Easy bruising     Elevated cholesterol     Follicular thyroid cancer     treated with total thyroidectomy  followed by [facility]    GERD (gastroesophageal reflux disease)     H/O bone density study     Hay fever     History of chest x-ray 07/10/2015    No acute cardiopulmonary process    History of chest x-ray 07/02/2015    No acute cardiopulmonary process    History of echocardiogram 04/04/2007    LVEF of greater than 60%. Mild MR.Mild TR.Trace pulmonary insufficinecy.    History of mammogram     History of PFTs 07/02/2015    Normal spirometry    Hyperlipidemia     Hypothyroidism     Inflammatory bowel disease 2000    Irritable bowel syndrome 2000    Measles     Migraine headache     Mumps     Obesity 2020    Osteopenia 2000    Osteoporosis     Paroxysmal atrial fibrillation     A. Failed medical therapy. B. Pulmonary vein isolation 11/2006. C. Recurrent episodes of PAF by event recorder 9/2006.    Pneumonia     PONV (postoperative nausea and vomiting)     Primary hypertension 06/13/2024    Shortness of breath     Sleep apnea     USES CPAP    Sleep apnea, obstructive     Surfer's nodules of right foot     Thyroid nodule 2012    Torn meniscus     Varicella     Visual impairment          Current Outpatient Medications:     albuterol sulfate HFA (Ventolin HFA) 108 (90 Base) MCG/ACT inhaler, Inhale 2 puffs Every 4 (Four) Hours As Needed for Wheezing or Shortness of Air., Disp: 18 g, Rfl: 5    aspirin 81 MG EC tablet, Take 1 tablet by mouth Daily., Disp: , Rfl:     betamethasone valerate (VALISONE) 0.1 % cream, Apply TO BOTH ear canals gently with q-tip 2-3 times WEEKLY, Disp: , Rfl:     calcium carbonate (OS-SHANTE) 600 MG tablet, Take 1 tablet by mouth As Needed for Heartburn., Disp: , Rfl:     clopidogrel (PLAVIX) 75 MG tablet, Take 1 tablet by mouth Daily., Disp: 90 tablet, Rfl: 1    Coenzyme Q10 (Co Q-10) 200 MG capsule, Take  by mouth., Disp: , Rfl:     cyanocobalamin (VITAMIN B-12) 2500 MCG tablet tablet, TAKE ONE TABLET BY MOUTH THREE TIMES A WEEK MUST MAKE AN APPOINTMENT (Patient taking differently: Take 1,200 mcg by mouth 1  (One) Time Per Week. MONDAYS), Disp: 30 tablet, Rfl: 5    Diclofenac Sodium (VOLTAREN) 1 % gel gel, Apply 4 g topically to the appropriate area as directed 4 (Four) Times a Day As Needed (pain)., Disp: 100 g, Rfl: 11    docusate sodium 100 MG capsule, Take 1 capsule by mouth 2 (Two) Times a Day As Needed for Constipation., Disp: 30 capsule, Rfl: 0    estradiol (ESTRACE) 0.1 MG/GM vaginal cream, Insert TWO grams vaginally daily AS NEEDED FOR vaginal dryness. Usually uses twice A WEEK (Patient taking differently: Insert 2 g into the vagina 3 (Three) Times a Week.), Disp: 42.5 g, Rfl: 11    GLUCOSAMINE HCL-MSM PO, Take 1 tablet by mouth Daily., Disp: , Rfl:     ipratropium (ATROVENT) 0.06 % nasal spray, Administer 1 spray into the nostril(s) as directed by provider As Needed for Rhinitis., Disp: 15 mL, Rfl: 3    levothyroxine (SYNTHROID, LEVOTHROID) 150 MCG tablet, TAKE ONE TABLET BY MOUTH EVERY DAY, Disp: 90 tablet, Rfl: 2    metoprolol tartrate (LOPRESSOR) 25 MG tablet, Take 1 tablet by mouth 2 (Two) Times a Day., Disp: 180 tablet, Rfl: 3    MULTIPLE VITAMIN PO, Take 1 tablet by mouth Daily., Disp: , Rfl:     nitroglycerin (NITROSTAT) 0.4 MG SL tablet, 1 under the tongue as needed for angina, may repeat q5mins for up three doses (Patient taking differently: 1 tablet Every 5 (Five) Minutes As Needed for Chest Pain. 1 under the tongue as needed for angina, may repeat q5mins for up three doses), Disp: 100 tablet, Rfl: 11    NON FORMULARY, Take 2 tablets by mouth Daily. Lions ramiro mushrooms, Disp: , Rfl:     nystatin (MYCOSTATIN) 100,000 unit/mL suspension, Take 5 mL by mouth 4 (Four) Times a Day., Disp: 280 mL, Rfl: 0    ofloxacin (FLOXIN) 0.3 % otic solution, Administer 5 drops to the right ear As Needed (PRESSURE IN EARS)., Disp: , Rfl:     Omega-3 Fatty Acids (FISH OIL) 435 MG capsule, Take 1,000 mg by mouth Daily., Disp: , Rfl:     omeprazole (priLOSEC) 40 MG capsule, TAKE ONE CAPSULE BY MOUTH DAILY, Disp: 90  capsule, Rfl: 3    SUMAtriptan (IMITREX) 25 MG tablet, Take 1 tablet by mouth As Needed for Migraine., Disp: , Rfl:     tiotropium (SPIRIVA) 18 MCG per inhalation capsule, Place 1 capsule into inhaler and inhale Daily., Disp: 90 capsule, Rfl: 3    TURMERIC PO, Take 3 capsules by mouth Daily., Disp: , Rfl:     vitamin C (ASCORBIC ACID) 250 MG tablet, Take 1 tablet by mouth Daily., Disp: , Rfl:     cyclobenzaprine (FLEXERIL) 10 MG tablet, Take 1 tablet by mouth 3 (Three) Times a Day As Needed for Muscle Spasms., Disp: 45 tablet, Rfl: 0    gabapentin (NEURONTIN) 300 MG capsule, One tab at night x 3 days, then one tab BID x 3 days, then one tab TID, Disp: 90 capsule, Rfl: 0    methylPREDNISolone (MEDROL) 4 MG dose pack, Take as directed on package instructions., Disp: 1 each, Rfl: 0    Current Facility-Administered Medications:     triamcinolone acetonide (KENALOG-40) injection 40 mg, 40 mg, Intra-articular, Once, Gordon Norman MD    Past Surgical History:   Procedure Laterality Date    ABLATION OF DYSRHYTHMIC FOCUS      x 2    ATRIAL APPENDAGE EXCLUSION LEFT WITH TRANSESOPHAGEAL ECHOCARDIOGRAM N/A 09/10/2024    Procedure: Atrial Appendage Occlusion;  Surgeon: Miguel Yu DO;  Location: [facility] EP INVASIVE LOCATION;  Service: Cardiology;  Laterality: N/A;    BRONCHOSCOPY      CARDIAC CATHETERIZATION      ABLATION X2    CARDIAC ELECTROPHYSIOLOGY PROCEDURE N/A 08/04/2016    Procedure: Loop recorder implant;  Surgeon: Himanshu Calvert MD;  Location: [facility] EP INVASIVE LOCATION;  Service:     CARDIAC ELECTROPHYSIOLOGY PROCEDURE N/A 10/13/2016    Procedure: Loop recorder removal;  Surgeon: Himanshu Calvert MD;  Location:  [facility] EP INVASIVE LOCATION;  Service:     CARDIAC ELECTROPHYSIOLOGY PROCEDURE N/A 12/18/2017    Procedure: Loop insertion;  Surgeon: Mary Ann Blackwell MD;  Location: [facility] CATH INVASIVE LOCATION;  Service:     CARDIAC ELECTROPHYSIOLOGY PROCEDURE N/A 11/06/2019    Procedure: Loop recorder removal;   Surgeon: Branden Chatterjee MD;  Location: [facility] CATH INVASIVE LOCATION;  Service: Cardiovascular    CATARACT EXTRACTION W/ INTRAOCULAR LENS IMPLANT Left 12/20/2021    Procedure: CATARACT PHACO EXTRACTION WITH INTRAOCULAR LENS IMPLANT LEFT;  Surgeon: Arthur Mcdonald MD;  Location: [facility] OR;  Service: Ophthalmology;  Laterality: Left;    CATARACT EXTRACTION W/ INTRAOCULAR LENS IMPLANT Right 01/03/2022    Procedure: CATARACT PHACO EXTRACTION WITH INTRAOCULAR LENS IMPLANT RIGHT WITH VIVITY LENS;  Surgeon: Arthur Mcdonald MD;  Location: [facility] OR;  Service: Ophthalmology;  Laterality: Right;    CHOLECYSTECTOMY      COLON RESECTION N/A 03/25/2022    Procedure: LAPAROSCOPIC  COLON RESECTION SIGMOIDECTOMY;  Surgeon: Arturo Kumar MD;  Location: [facility] OR;  Service: General;  Laterality: N/A;    COLONOSCOPY  12/10/2019    COLONOSCOPY  12/2021    DILATATION AND CURETTAGE      EAR TUBES Bilateral     ENDOVENOUS ABLATION SAPHENOUS VEIN W/ LASER Left 02/05/2024    ENDOVENOUS ABLATION SAPHENOUS VEIN W/ LASER Right 02/19/2024    EYE SURGERY  12/2021 & 01/2022    Had cateracs removed from both eyes and multi focal implants put in both.    FOOT SURGERY Left     bone spur    HAMMER TOE REPAIR Left 11/13/2020    INGUINAL HERNIA REPAIR Left     x 3    JOINT REPLACEMENT  2012, 2014    Bilateral knee replacements    LUNG BIOPSY      Remote    LYMPH NODE BIOPSY      REPLACEMENT TOTAL KNEE BILATERAL Bilateral     THYROIDECTOMY, PARTIAL Left 06/2012    Dr. Cerda, Gn[facility]    THYROIDECTOMY, PARTIAL Right 07/2012    Dr. Cerda, Gnosticist    TONSILLECTOMY  07/2020    Dr. Ethan Mcneill    TOTAL ABDOMINAL HYSTERECTOMY WITH SALPINGO OOPHORECTOMY Bilateral 1996    TOTAL THYROIDECTOMY      completion thyroidectomy for follicular thyroid cancer.      TYMPANOSTOMY TUBE PLACEMENT Right 07/2020    Dr. Ethan Mcneill    UPPER GASTROINTESTINAL ENDOSCOPY      15-20 years ago    VASCULAR SURGERY  01/2024    Bi lateral leg vein ablations     VENTRAL/INCISIONAL HERNIA REPAIR N/A 10/27/2022    Procedure: INCISIONAL HERNIA REPAIR WITH MESH,  COMPONENT SEPARATION;  Surgeon: Arturo Kumar MD;  [facility];  Service: General;  Laterality: N/A;    WISDOM TOOTH EXTRACTION         Social History     Socioeconomic History    Marital status:    Tobacco Use    Smoking status: Former     Current packs/day: 0.00     Average packs/day: 1.5 packs/day for 22.0 years (33.1 ttl pk-yrs)     Types: Cigarettes     Start date: 1974     Quit date: 1996     Years since quittin.7     Passive exposure: Past    Smokeless tobacco: Never    Tobacco comments:     Smoked menthol   Vaping Use    Vaping status: Never Used   Substance and Sexual Activity    Alcohol use: Not Currently     Comment: very rare    Drug use: Never    Sexual activity: Not Currently     Partners: Male     Birth control/protection: Vasectomy, Hysterectomy         Review of Systems   Constitutional:  Positive for activity change. Negative for appetite change, chills, diaphoresis, fatigue, fever and unexpected weight change.   HENT:  Negative for congestion, dental problem, drooling, ear discharge, ear pain, facial swelling, hearing loss, mouth sores, nosebleeds, postnasal drip, rhinorrhea, sinus pressure, sinus pain, sneezing, sore throat, tinnitus, trouble swallowing and voice change.    Eyes:  Negative for photophobia, pain, discharge, redness, itching and visual disturbance.   Respiratory:  Negative for apnea, cough, choking, chest tightness, shortness of breath, wheezing and stridor.    Cardiovascular:  Negative for chest pain, palpitations and leg swelling.   Gastrointestinal:  Negative for abdominal distention, abdominal pain, anal bleeding, blood in stool, constipation, diarrhea, nausea, rectal pain and vomiting.   Endocrine: Negative for cold intolerance, heat intolerance, polydipsia, polyphagia and polyuria.   Genitourinary:  Negative for decreased urine volume,  "difficulty urinating, dyspareunia, dysuria, enuresis, flank pain, frequency, genital sores, hematuria, menstrual problem, pelvic pain, urgency, vaginal bleeding, vaginal discharge and vaginal pain.   Musculoskeletal:  Positive for neck pain and neck stiffness. Negative for arthralgias, back pain, gait problem, joint swelling and myalgias.   Skin:  Negative for color change, pallor, rash and wound.   Allergic/Immunologic: Positive for environmental allergies and food allergies. Negative for immunocompromised state.   Neurological:  Negative for dizziness, tremors, seizures, syncope, facial asymmetry, speech difficulty, weakness, light-headedness, numbness and headaches.   Hematological:  Negative for adenopathy. Does not bruise/bleed easily.   Psychiatric/Behavioral:  Positive for sleep disturbance. Negative for agitation, behavioral problems, confusion, decreased concentration, dysphoric mood, hallucinations, self-injury and suicidal ideas. The patient is not nervous/anxious and is not hyperactive.        Objective   Vital Signs: Blood pressure 130/74, temperature 97.1 °F (36.2 °C), temperature source Infrared, height 175.3 cm (69.02\"), weight 118 kg (260 lb), not currently breastfeeding.  Physical Exam  Vitals and nursing note reviewed.   Constitutional:       Appearance: She is well-developed. She is not toxic-appearing.      Comments: Patient is tearful today.   HENT:      Head: Normocephalic and atraumatic.   Psychiatric:         Behavior: Behavior normal.         Thought Content: Thought content normal.     Musculoskeletal:     Strength is intact in upper and lower extremities to direct testing.     Station and gait are normal.     Range of motion of the left shoulder is very painful in the neck and down the arm  Neurologic:     Muscle tone is normal throughout.     Coordination is intact.     Deep tendon reflexes: Hyperreflexia in the right arm and lower extremities.       Left arm reflexes are diminished " throughout.       Sensation is intact to light touch throughout.     Patient is oriented to person, place, and time.       Independent review of radiographic imaging: MRI of the cervical spine demonstrates diffuse spondylosis most pronounced at C6-7 where there is moderate disc bulge and bilateral neuroforaminal narrowing.  There is bilateral foraminal narrowing at C5-6.    Assessment & Plan   Diagnosis: Cervical spondylosis with radiculopathy    Medical Decision Making: Patient is very uncomfortable on exam today.  She has not tried physical therapy or any conservative measures as of yet to treat her radiculopathy.  I am going to try to get her pain under better control with a steroid pack and gabapentin and refer her to physical therapy.  I will call and check on her next week.  Plan to follow-up in the office in 1 month    Diagnoses and all orders for this visit:    1. Cervical radiculopathy (Primary)  -     Ambulatory Referral to Physical Therapy for Evaluation & Treatment    2. DDD (degenerative disc disease), cervical    3. Cervical spondylosis    Other orders  -     methylPREDNISolone (MEDROL) 4 MG dose pack; Take as directed on package instructions.  Dispense: 1 each; Refill: 0  -     gabapentin (NEURONTIN) 300 MG capsule; One tab at night x 3 days, then one tab BID x 3 days, then one tab TID  Dispense: 90 capsule; Refill: 0  -     cyclobenzaprine (FLEXERIL) 10 MG tablet; Take 1 tablet by mouth 3 (Three) Times a Day As Needed for Muscle Spasms.  Dispense: 45 tablet; Refill: 0                                  Rosy Basurto PA-C  Patient Care Team:  Gordon Norman MD as PCP - General (Internal Medicine)  Khadijah Bernal MD as Surgeon (General Surgery)  Melina Simpson DO as Consulting Physician (Endocrinology)  Tim Jackman MD as Consulting Physician (Cardiology)

## 2025-03-12 ENCOUNTER — TREATMENT (OUTPATIENT)
Dept: PHYSICAL THERAPY | Facility: CLINIC | Age: 70
End: 2025-03-12
Payer: MEDICARE

## 2025-03-12 DIAGNOSIS — M54.2 CERVICAL PAIN: Primary | ICD-10-CM

## 2025-03-12 PROCEDURE — 97161 PT EVAL LOW COMPLEX 20 MIN: CPT

## 2025-03-12 PROCEDURE — 97110 THERAPEUTIC EXERCISES: CPT

## 2025-03-12 NOTE — PROGRESS NOTES
Physical Therapy Initial Evaluation and Plan of Care  [address]"[employer] [place] [address]      Patient: Albania Ramos   : 1955  Diagnosis/ICD-10 Code:  Cervical pain [M54.2]  Referring practitioner: ADENIKE Gates*  Date of Initial Visit: 3/12/2025  Today's Date: 3/12/2025  Patient seen for 1 session         Visit Diagnoses:    ICD-10-CM ICD-9-CM   1. Cervical pain  M54.2 723.1         Subjective Questionnaire: NDI:29      Subjective Evaluation    History of Present Illness  Mechanism of injury: Pt reports she has had neck pain for a long time but over the past months it has become worse including symptoms down into the L arm and all the way down into the hand. Reports difficulty driving due to L shoulder pain and turning her head. States pushing a shopping cart causes pain to shoot up the side of her head like a burning poker. The L shoulder feels like it has been dislocated. Arm gets cold and numb. Reports she has lost muscle tone in the L UE. Can not lift items. Notes she was given gabapentin and a muscle relaxer but is having difficulty taking them due to the side effects. Sleeping is bad sleeps on her L side usually but has been unable to. Notes all finger tips go numb but especially the last 2-3 fingers feel the most numb. Pain with anything that makes her lift her arm.      Patient Occupation: retired CMA Quality of life: fair    Pain  At best pain rating: 3  At worst pain ratin  Quality: dull ache, throbbing, burning, sharp, grinding, knife-like and pulling (numbness tingling)  Relieving factors: heat and rest  Aggravating factors: lifting, movement, overhead activity, prolonged positioning, sleeping, outstretched reach and repetitive movement  Progression: worsening    Social Support  Lives with: spouse    Hand dominance: left    Diagnostic Tests  MRI studies: abnormal  CT scan: abnormal    Treatments  Current treatment: medication  Patient Goals  Patient goals for  therapy: decreased pain, increased motion and increased strength             Objective          Static Posture     Head  Forward.    Shoulders  Elevated and rounded.    Comments  Pt with severe guarding of the cervical spine    Tenderness     Additional Tenderness Details  Tender to touch to light palpation at all muscles of the L neck and shoulder, spinous processes, transverse processes and suboccipital region      Neurological Testing     Sensation   Cervical/Thoracic   Left   Diminished: light touch    Additional Neurological Details  Pt reports decreased DTRs L at Neuro office so did not complete them secondary to pain     Active Range of Motion   Cervical/Thoracic Spine   Cervical    Flexion: 10 degrees with pain  Extension: 20 degrees   Left lateral flexion: 20 degrees with pain  Right lateral flexion: 25 degrees with pain  Left rotation: 50 degrees with pain  Right rotation: 45 degrees with pain    Additional Active Range of Motion Details  L shoulder flexion 90 deg throbbing pain  L shoulder abd 70 deg pain  L shoulder IR glute pain    Strength/Myotome Testing     Additional Strength Details   R 67.5lbs   L 25lbs    UE strength not tested secondary to severe pain and pt with over use of cervical musculature for shoulder movements, does not have full ROM so 3/5 at most but secondary to pain    Tests     Additional Tests Details  Unable to perform testing secondary to increased severe pain  Supine distraction increased pain          Assessment & Plan       Assessment  Impairments: abnormal muscle tone, abnormal or restricted ROM, activity intolerance, impaired physical strength and pain with function   Functional limitations: carrying objects, lifting, sleeping, pulling, pushing, uncomfortable because of pain, sitting, reaching behind back and reaching overhead   Assessment details: Pt is a 69 YO F who presents to the clinic with severe cervical pain with L UE radicular pain. Pt may benefit from  skilled PT for decreasing cervical pain, L UE pain and improving L UE and cervical ROM as well as L UE strength to aid with return to normal daily activities.    Barriers to therapy: severity, chronicity, age, multiple co-morbidities  Prognosis: poor    Goals  Plan Goals: SHORT TERM GOALS:     4 weeks  1. Pt independent with HEP  2. Pt to demonstrate cervical AROM 10-25% of expected norms to allow for improved ability to perform ADL's  3. Pt to report not having any headaches related to neck pain for the past 3 days    LONG TERM GOALS:   8 weeks  1. Pt to demonstrate cervical AROM 25-50% of expected norms to allow for improved safety when driving  2. Pt to demonstrate ability to lift 2# OH with left arm(s) without increase in pain in the neck   3. Pt to report being able to work full shift or work in the home without increase in pain in the neck  4. Pt report cessation of Left UE symptoms demonstrating decrease in nerve compression.       Plan  Therapy options: will be seen for skilled therapy services  Planned modality interventions: cryotherapy, dry needling, ultrasound and thermotherapy (hydrocollator packs)  Planned therapy interventions: abdominal trunk stabilization, flexibility, functional ROM exercises, home exercise program, joint mobilization, neuromuscular re-education, postural training, soft tissue mobilization, spinal/joint mobilization, strengthening, stretching and therapeutic activities  Frequency: 2x week  Duration in weeks: 8  Treatment plan discussed with: patient        History # of Personal Factors and/or Comorbidities: HIGH (3+)  Examination of Body System(s): # of elements: LOW (1-2)  Clinical Presentation: STABLE   Clinical Decision Making: LOW       Timed:         Manual Therapy:         mins  54344;     Therapeutic Exercise:   16      mins  07466;     Neuromuscular Doug:       mins  47557;    Therapeutic Activity:          mins  84904;     Gait Training:          mins  14852;      Ultrasound:          mins  29429;    Ionto                                   mins   39685  Self Care                            mins   37064  Canalith Repos         mins 61062      Un-Timed:  Electrical Stimulation:         mins  10955 ( );  Dry Needling          mins self-pay  Traction          mins 24404  Low Eval    30     Mins  28672  Mod Eval          Mins  44154  High Eval                            Mins  05415        Timed Treatment:   16   mins   Total Treatment:     46  mins      PT: Gabrielle Hendricks PT     License Number: [id number]    Electronically signed by Gabrielle Hendricks PT, 03/12/25, 12:25 PM EDT    Certification Period: 3/13/2025 thru 6/10/2025  I certify that the therapy services are furnished while this patient is under my care.  The services outlined above are required by this patient, and will be reviewed every 90 days.         Physician Signature:__________________________________________________    PHYSICIAN: Rosy Basurto PA-C  NPI: [id number]      DATE:     Please sign and return via fax to [email or web address] . Thank you, [facility].

## 2025-03-14 ENCOUNTER — TELEPHONE (OUTPATIENT)
Dept: NEUROSURGERY | Facility: CLINIC | Age: 70
End: 2025-03-14
Payer: MEDICARE

## 2025-03-14 ENCOUNTER — CLINICAL SUPPORT (OUTPATIENT)
Dept: CARDIOLOGY | Facility: HOSPITAL | Age: 70
End: 2025-03-14
Payer: MEDICARE

## 2025-03-14 ENCOUNTER — TREATMENT (OUTPATIENT)
Dept: PHYSICAL THERAPY | Facility: CLINIC | Age: 70
End: 2025-03-14
Payer: MEDICARE

## 2025-03-14 DIAGNOSIS — M54.2 CERVICAL PAIN: Primary | ICD-10-CM

## 2025-03-14 DIAGNOSIS — I48.0 PAROXYSMAL ATRIAL FIBRILLATION: Primary | ICD-10-CM

## 2025-03-14 DIAGNOSIS — Z95.818 PRESENCE OF WATCHMAN LEFT ATRIAL APPENDAGE CLOSURE DEVICE: ICD-10-CM

## 2025-03-14 PROCEDURE — 97110 THERAPEUTIC EXERCISES: CPT

## 2025-03-14 PROCEDURE — 97140 MANUAL THERAPY 1/> REGIONS: CPT

## 2025-03-14 RX ORDER — GABAPENTIN 100 MG/1
100 CAPSULE ORAL 3 TIMES DAILY
Qty: 90 CAPSULE | Refills: 0 | Status: SHIPPED | OUTPATIENT
Start: 2025-03-14

## 2025-03-14 RX ORDER — CYCLOBENZAPRINE HCL 5 MG
5 TABLET ORAL 3 TIMES DAILY PRN
Qty: 45 TABLET | Refills: 1 | Status: SHIPPED | OUTPATIENT
Start: 2025-03-14

## 2025-03-14 NOTE — PROGRESS NOTES
Physical Therapy Daily Treatment Note  [address] [place] [address]      Patient: Albania Ramos   : 1955  Referring practitioner: ADENIKE Gates*  Date of Initial Visit: Type: THERAPY  Noted: 3/12/2025  Today's Date: 3/14/2025  Patient seen for 2 sessions         Visit Diagnoses:    ICD-10-CM ICD-9-CM   1. Cervical pain  M54.2 723.1       Subjective   Patient reports that her neck/L shoulder pain and motion are improving. No problems with the exercises and shocked that exercises so small could actually help.    Objective   See Exercise, Manual, and Modality Logs for complete treatment.   Observation: improved cervical rotation    Assessment/Plan  Pt without report of pain during treatment today. Demonstrated improved cervical ROM as well as tolerance to palpation compared to eval. Pt educated to continue working with her HEP with the addition of levator stretch to aid with continued improvements in cervical and L UE symptoms. Continue with current POT progressing pt per tolerance.    Timed:         Manual Therapy: 17        mins  35017;     Therapeutic Exercise:     14    mins  53881;     Neuromuscular Doug:      mins  42974;    Therapeutic Activity:          mins  82049;     Gait Training:           mins  47585;     Ultrasound:          mins  96966;    Ionto                                   mins   85103  Self Care                            mins   68743  Canalith Repos         mins 25502      Un-Timed:  Electrical Stimulation:        mins  26159 ( );  Dry Needling          mins self-pay  Traction          mins 32406      Timed Treatment:   31   mins   Total Treatment:     31   mins    Gabrielle Hendricks PT  [place] License: [id number]

## 2025-03-14 NOTE — PROGRESS NOTES
6 Month Watchman Follow-up Note    Patient presents today for follow-up s/p watchman placement by Dr. Yu on 9/10/24.  Patient has completed follow-up RONY that revealed a well-positioned watchman device with no significant enrico-prosthetic leak present.  Reports no other cardiac devices or procedures in the past year that would require DAPT. Patient instructed to transition to aspirin monotherapy.       Discussed 1 year watchman follow-up with planned RONY around 1 year anniversary date of watchman implant.  1 year RONY ordered per watchman protocol.  Patient receptive of plan, appreciative of call.     EP/CRM Study (09/10/2024 09:13)  Adult Transesophageal Echo 3D (RONY) W/ Cont If Necessary Per Protocol (10/11/2024 09:39)

## 2025-03-14 NOTE — TELEPHONE ENCOUNTER
I called to check on patient. Physical therapy is helping. She is not tolerating the gabapentin at the higher dose so I am calling in a lower dose of it and flexeril.

## 2025-03-19 ENCOUNTER — TELEPHONE (OUTPATIENT)
Dept: CARDIOLOGY | Facility: CLINIC | Age: 70
End: 2025-03-19

## 2025-03-19 ENCOUNTER — TREATMENT (OUTPATIENT)
Dept: PHYSICAL THERAPY | Facility: CLINIC | Age: 70
End: 2025-03-19
Payer: MEDICARE

## 2025-03-19 ENCOUNTER — TELEPHONE (OUTPATIENT)
Dept: CARDIOLOGY | Facility: CLINIC | Age: 70
End: 2025-03-19
Payer: MEDICARE

## 2025-03-19 DIAGNOSIS — M54.2 CERVICAL PAIN: Primary | ICD-10-CM

## 2025-03-19 PROCEDURE — 97110 THERAPEUTIC EXERCISES: CPT

## 2025-03-19 PROCEDURE — 97140 MANUAL THERAPY 1/> REGIONS: CPT

## 2025-03-19 NOTE — PROGRESS NOTES
Physical Therapy Daily Treatment Note  [address] [place] [address]      Patient: Albania Ramos   : 1955  Referring practitioner: ADENIKE Gates*  Date of Initial Visit: Type: THERAPY  Noted: 3/12/2025  Today's Date: 3/19/2025  Patient seen for 3 sessions         Visit Diagnoses:    ICD-10-CM ICD-9-CM   1. Cervical pain  M54.2 723.1       Subjective   Patient reports that the neck is doing less popping but she hung clothes out to dry and her shoulder is bothering her today. Felt ok after last session    Objective   See Exercise, Manual, and Modality Logs for complete treatment.     Assessment/Plan  Pt with minimal reports of pain mostly with manual therapy to the L levator/upper trap which caused an aching pain into the pt's shoulder and neck. Pt continues to demonstrate improved supine cervical ROM but does still have increased pain with cervical activities in sitting. Pt is having some improvement but does continue with significant cervical pain and shoulder pain. Continue with the current POT progressing pt per tolerance and dependent upon response to treatment.       Timed:         Manual Therapy: 16        mins  12729;     Therapeutic Exercise:     23    mins  68377;     Neuromuscular Doug:      mins  66572;    Therapeutic Activity:          mins  73674;     Gait Training:           mins  25906;     Ultrasound:          mins  25021;    Ionto                                   mins   80838  Self Care                            mins   84956  Canalith Repos         mins 86992      Un-Timed:  Electrical Stimulation:        mins  24423 ( );  Dry Needling          mins self-pay  Traction          mins 42720      Timed Treatment:   39   mins   Total Treatment:     39   mins    Gabrielle Hendricks PT  [place] License: [id number]

## 2025-03-19 NOTE — TELEPHONE ENCOUNTER
"    Caller: Albania Ramos \"Madelyn\"    Relationship: Self    Best call back number: [phone]     Who is your current provider: DR. GARCIA    Is your current provider offboarding? YES/NO    Who would you like your new provider to be: DR. ENMANUEL FLORIAN    What are your reasons for transferring care: DR. GARCIA MOVED TO [place] AND PT CANNOT TRAVEL    Additional notes: PT'S  LOVES DR. FLORIAN.     "

## 2025-03-19 NOTE — TELEPHONE ENCOUNTER
"    Caller: Albania Ramos \"Madelyn\"    Relationship: Self    Best call back number: [phone]    Who is your current provider:     Is your current provider offboarding? NO    Who would you like your new provider to be:     What are your reasons for transferring care: PT WOULD LIKE TO SEE DR. FLORIAN IN [place]. SHE CAN'T TRAVEL TO [place] ANY LONGER.    Additional notes: PT IS NOT HAVING ANY NEW OR WORSENING SYMPTOMS. SHE WOULD JUST LIKE TO GET ESTABLISHED WITH SOMEONE IN [place].          "

## 2025-03-25 ENCOUNTER — OFFICE VISIT (OUTPATIENT)
Dept: INTERNAL MEDICINE | Facility: CLINIC | Age: 70
End: 2025-03-25
Payer: MEDICARE

## 2025-03-25 VITALS
HEIGHT: 69 IN | TEMPERATURE: 98.3 F | SYSTOLIC BLOOD PRESSURE: 134 MMHG | OXYGEN SATURATION: 100 % | WEIGHT: 259 LBS | BODY MASS INDEX: 38.36 KG/M2 | HEART RATE: 55 BPM | RESPIRATION RATE: 22 BRPM | DIASTOLIC BLOOD PRESSURE: 80 MMHG

## 2025-03-25 DIAGNOSIS — R42 VERTIGO: Primary | ICD-10-CM

## 2025-03-25 PROCEDURE — 3075F SYST BP GE 130 - 139MM HG: CPT | Performed by: PHYSICIAN ASSISTANT

## 2025-03-25 PROCEDURE — 1160F RVW MEDS BY RX/DR IN RCRD: CPT | Performed by: PHYSICIAN ASSISTANT

## 2025-03-25 PROCEDURE — 99213 OFFICE O/P EST LOW 20 MIN: CPT | Performed by: PHYSICIAN ASSISTANT

## 2025-03-25 PROCEDURE — 3079F DIAST BP 80-89 MM HG: CPT | Performed by: PHYSICIAN ASSISTANT

## 2025-03-25 PROCEDURE — 1159F MED LIST DOCD IN RCRD: CPT | Performed by: PHYSICIAN ASSISTANT

## 2025-03-25 PROCEDURE — 1125F AMNT PAIN NOTED PAIN PRSNT: CPT | Performed by: PHYSICIAN ASSISTANT

## 2025-03-25 RX ORDER — AMOXICILLIN 500 MG/1
1 CAPSULE ORAL 3 TIMES DAILY
COMMUNITY
Start: 2025-03-24

## 2025-03-25 RX ORDER — MECLIZINE HYDROCHLORIDE 25 MG/1
12.5-25 TABLET ORAL 3 TIMES DAILY PRN
Qty: 30 TABLET | Refills: 0 | Status: SHIPPED | OUTPATIENT
Start: 2025-03-25

## 2025-03-25 RX ORDER — CHLORHEXIDINE GLUCONATE ORAL RINSE 1.2 MG/ML
SOLUTION DENTAL
COMMUNITY
Start: 2025-03-24

## 2025-03-25 NOTE — PROGRESS NOTES
Office Visit      Patient Name: Albania Ramos  : 1955   MRN: [id number]     Chief Complaint:    Chief Complaint   Patient presents with    Dizziness     History of vertigo, believes it was caused by a PT exercise   Also had a tooth extracted yesterday        History of Present Illness: Albania Ramos is a 70 y.o. female who is here today to discuss dizziness.  She has been in physical therapy for neck pain and last night was doing some home exercises before going to bed.  She awoke early this morning with dizziness and nausea with dry heaving precipitated by any movement of the head, very similar to episodes of vertigo she has had in the past.  She attempted to do Epley maneuvers at home but was unable to tolerate the movements due to triggering more dizziness and dry heaving.  She also had a right upper molar removed yesterday morning so she has been taking amoxicillin since yesterday.  No numbness, tingling, weakness, confusion, speech disturbance or visual disturbance.        Subjective      I have reviewed and the following portions of the patient's history were updated as appropriate: past family history, past medical history, past social history, past surgical history and problem list.      Current Outpatient Medications:     albuterol sulfate HFA (Ventolin HFA) 108 (90 Base) MCG/ACT inhaler, Inhale 2 puffs Every 4 (Four) Hours As Needed for Wheezing or Shortness of Air., Disp: 18 g, Rfl: 5    amoxicillin (AMOXIL) 500 MG capsule, Take 1 capsule by mouth 3 times a day., Disp: , Rfl:     aspirin 81 MG EC tablet, Take 1 tablet by mouth Daily., Disp: , Rfl:     betamethasone valerate (VALISONE) 0.1 % cream, Apply TO BOTH ear canals gently with q-tip 2-3 times WEEKLY, Disp: , Rfl:     calcium carbonate (OS-SHANTE) 600 MG tablet, Take 1 tablet by mouth As Needed for Heartburn., Disp: , Rfl:     chlorhexidine (PERIDEX) 0.12 % solution, SWISH with 15 MILLILITERS FOR TWO minutes AND spit AFTER breakfast AND  AFTER dinner, Disp: , Rfl:     Coenzyme Q10 (Co Q-10) 200 MG capsule, Take  by mouth., Disp: , Rfl:     cyanocobalamin (VITAMIN B-12) 2500 MCG tablet tablet, TAKE ONE TABLET BY MOUTH THREE TIMES A WEEK MUST MAKE AN APPOINTMENT (Patient taking differently: Take 1,200 mcg by mouth 1 (One) Time Per Week. MONDAYS), Disp: 30 tablet, Rfl: 5    cyclobenzaprine (FLEXERIL) 5 MG tablet, Take 1 tablet by mouth 3 (Three) Times a Day As Needed for Muscle Spasms., Disp: 45 tablet, Rfl: 1    Diclofenac Sodium (VOLTAREN) 1 % gel gel, Apply 4 g topically to the appropriate area as directed 4 (Four) Times a Day As Needed (pain)., Disp: 100 g, Rfl: 11    docusate sodium 100 MG capsule, Take 1 capsule by mouth 2 (Two) Times a Day As Needed for Constipation., Disp: 30 capsule, Rfl: 0    estradiol (ESTRACE) 0.1 MG/GM vaginal cream, Insert TWO grams vaginally daily AS NEEDED FOR vaginal dryness. Usually uses twice A WEEK (Patient taking differently: Insert 2 g into the vagina 3 (Three) Times a Week.), Disp: 42.5 g, Rfl: 11    gabapentin (NEURONTIN) 100 MG capsule, Take 1 capsule by mouth 3 (Three) Times a Day., Disp: 90 capsule, Rfl: 0    gabapentin (NEURONTIN) 300 MG capsule, One tab at night x 3 days, then one tab BID x 3 days, then one tab TID, Disp: 90 capsule, Rfl: 0    GLUCOSAMINE HCL-MSM PO, Take 1 tablet by mouth Daily., Disp: , Rfl:     ipratropium (ATROVENT) 0.06 % nasal spray, Administer 1 spray into the nostril(s) as directed by provider As Needed for Rhinitis., Disp: 15 mL, Rfl: 3    levothyroxine (SYNTHROID, LEVOTHROID) 150 MCG tablet, TAKE ONE TABLET BY MOUTH EVERY DAY, Disp: 90 tablet, Rfl: 2    metoprolol tartrate (LOPRESSOR) 25 MG tablet, Take 1 tablet by mouth 2 (Two) Times a Day., Disp: 180 tablet, Rfl: 3    MULTIPLE VITAMIN PO, Take 1 tablet by mouth Daily., Disp: , Rfl:     nitroglycerin (NITROSTAT) 0.4 MG SL tablet, 1 under the tongue as needed for angina, may repeat q5mins for up three doses (Patient taking  "differently: 1 tablet Every 5 (Five) Minutes As Needed for Chest Pain. 1 under the tongue as needed for angina, may repeat q5mins for up three doses), Disp: 100 tablet, Rfl: 11    NON FORMULARY, Take 2 tablets by mouth Daily. Bernadine rubio mushrooms, Disp: , Rfl:     nystatin (MYCOSTATIN) 100,000 unit/mL suspension, Take 5 mL by mouth 4 (Four) Times a Day., Disp: 280 mL, Rfl: 0    ofloxacin (FLOXIN) 0.3 % otic solution, Administer 5 drops to the right ear As Needed (PRESSURE IN EARS)., Disp: , Rfl:     Omega-3 Fatty Acids (FISH OIL) 435 MG capsule, Take 1,000 mg by mouth Daily., Disp: , Rfl:     omeprazole (priLOSEC) 40 MG capsule, TAKE ONE CAPSULE BY MOUTH DAILY, Disp: 90 capsule, Rfl: 3    SUMAtriptan (IMITREX) 25 MG tablet, Take 1 tablet by mouth As Needed for Migraine., Disp: , Rfl:     tiotropium (SPIRIVA) 18 MCG per inhalation capsule, Place 1 capsule into inhaler and inhale Daily., Disp: 90 capsule, Rfl: 3    TURMERIC PO, Take 3 capsules by mouth Daily., Disp: , Rfl:     vitamin C (ASCORBIC ACID) 250 MG tablet, Take 1 tablet by mouth Daily., Disp: , Rfl:     meclizine (ANTIVERT) 25 MG tablet, Take 0.5-1 tablets by mouth 3 (Three) Times a Day As Needed for Dizziness., Disp: 30 tablet, Rfl: 0    methylPREDNISolone (MEDROL) 4 MG dose pack, Take as directed on package instructions., Disp: 1 each, Rfl: 0    Current Facility-Administered Medications:     triamcinolone acetonide (KENALOG-40) injection 40 mg, 40 mg, Intra-articular, Once, Gordon Norman MD    Allergies   Allergen Reactions    Codeine Other (See Comments)     Pt states \"it made me feel like my insides were in a vice \"    codeine    Niacin Hives and Other (See Comments)     niacin    Lipitor [Atorvastatin] Myalgia    Propoxyphene Nausea And Vomiting and Other (See Comments)     propoxyphene hydrochloride    Zofran [Ondansetron] Other (See Comments)     Face/neck/chest very red/flushed after IV Zofran administration; tolerates po Zofran without " "difficultly     Adhesive Tape Rash     tegaderm     Bentyl [Dicyclomine] Rash     Patient states she turned \"bright red\"    Ciprofloxacin-Dexamethasone Other (See Comments) and Unknown - Low Severity     REDNESS,tendonitis    Ciprodex    Flagyl [Metronidazole] Nausea Only    Other Other (See Comments)     POLLEN,TREES,AND PLANTS MULTIPLE ENVIRONMENTAL ALLERGIES    Prednisone Rash       Objective     Vital Signs:   Vitals:    03/25/25 1323   BP: 134/80   Pulse: 55   Resp: 22   Temp: 98.3 °F (36.8 °C)   SpO2: 100%   Weight: 117 kg (259 lb)   Height: 175.3 cm (69\")     Body mass index is 38.25 kg/m².    Physical Exam  Vitals and nursing note reviewed.   Constitutional:       General: She is not in acute distress.     Appearance: She is well-developed. She is obese. She is not ill-appearing, toxic-appearing or diaphoretic.   HENT:      Head: Normocephalic and atraumatic.      Right Ear: Ear canal and external ear normal. No middle ear effusion. There is no impacted cerumen. No hemotympanum. Tympanic membrane is not perforated, erythematous, retracted or bulging.      Left Ear: Ear canal and external ear normal. A middle ear effusion (Trace) is present. There is no impacted cerumen. No hemotympanum. Tympanic membrane is not perforated, erythematous, retracted or bulging.      Ears:      Comments: Right tympanostomy tube in place.     Mouth/Throat:      Mouth: Mucous membranes are moist.      Pharynx: No oropharyngeal exudate or posterior oropharyngeal erythema.      Comments: Recent right upper molar extraction without bleeding or discharge.  Eyes:      General: Vision grossly intact. Gaze aligned appropriately. No visual field deficit or scleral icterus.        Right eye: No discharge.         Left eye: No discharge.      Extraocular Movements: Extraocular movements intact.      Right eye: Normal extraocular motion and no nystagmus.      Left eye: Normal extraocular motion and no nystagmus.      Conjunctiva/sclera: " Conjunctivae normal.      Pupils: Pupils are equal, round, and reactive to light.   Cardiovascular:      Rate and Rhythm: Normal rate and regular rhythm.      Heart sounds: Normal heart sounds. No murmur heard.     No friction rub. No gallop.   Pulmonary:      Effort: Pulmonary effort is normal. No respiratory distress.      Breath sounds: Normal breath sounds. No stridor. No wheezing, rhonchi or rales.   Chest:      Chest wall: No tenderness.   Musculoskeletal:         General: No tenderness or deformity. Normal range of motion.      Cervical back: Normal range of motion and neck supple. No rigidity or tenderness.      Right lower leg: No edema.      Left lower leg: No edema.   Lymphadenopathy:      Cervical: No cervical adenopathy.   Skin:     General: Skin is warm and dry.      Capillary Refill: Capillary refill takes less than 2 seconds.      Findings: No rash.   Neurological:      General: No focal deficit present.      Mental Status: She is alert and oriented to person, place, and time. Mental status is at baseline.      Cranial Nerves: Cranial nerves 2-12 are intact. No cranial nerve deficit, dysarthria or facial asymmetry.      Sensory: Sensation is intact. No sensory deficit.      Motor: Motor function is intact. No weakness, tremor or abnormal muscle tone.      Coordination: Coordination is intact. Coordination normal.      Gait: Gait normal.      Deep Tendon Reflexes: Reflexes normal.      Comments: Kenzie and Hallpike maneuvers positive bilaterally, unable to differentiate which side was worse due to severity of symptoms.  No focal neurological deficit on exam today.   Psychiatric:         Mood and Affect: Mood normal.         Behavior: Behavior normal.         Thought Content: Thought content normal.         Judgment: Judgment normal.         Common labs          6/24/2024    09:33 7/12/2024    08:22 9/10/2024    07:25   Common Labs   Glucose 98  104  116    BUN 13  27  19    Creatinine 0.81  0.89  0.72     Sodium 144  145  144    Potassium 4.7  4.4  4.4    Chloride 108  105  107    Calcium 9.2  8.6  9.1    Albumin 4.2      Total Bilirubin 0.4      Alkaline Phosphatase 87      AST (SGOT) 19      ALT (SGPT) 16      WBC 6.49  10.45  6.65    Hemoglobin 12.1  13.0  12.9    Hematocrit 36.7  42.2  39.9    Platelets 241  232  222    Total Cholesterol 113      Triglycerides 66      HDL Cholesterol 56      LDL Cholesterol  43      Hemoglobin A1C 6.10      Uric Acid 5.0            Assessment / Plan      Assessment/Plan:   Diagnoses and all orders for this visit:    1. Vertigo (Primary)  -     meclizine (ANTIVERT) 25 MG tablet; Take 0.5-1 tablets by mouth 3 (Three) Times a Day As Needed for Dizziness.  Dispense: 30 tablet; Refill: 0       Unable to tolerate Epley maneuver in office today.  She has had success doing maneuvers on her own at home in the past and will try it again later today after taking meclizine.  Follow-up with ENT if symptoms are not improving.        Follow Up:   Return if symptoms worsen or fail to improve.    Patient was given instructions and counseling regarding her condition or for health maintenance advice. Please see specific information pulled into the AVS if appropriate.     Loida Jeong PA-C  [facility]     Please note that portions of this note may have been completed with a voice recognition program.

## 2025-03-31 RX ORDER — CLOPIDOGREL BISULFATE 75 MG/1
75 TABLET ORAL DAILY
Qty: 90 TABLET | Refills: 1 | OUTPATIENT
Start: 2025-03-31

## 2025-04-04 ENCOUNTER — OFFICE VISIT (OUTPATIENT)
Dept: INTERNAL MEDICINE | Facility: CLINIC | Age: 70
End: 2025-04-04
Payer: MEDICARE

## 2025-04-04 VITALS
HEART RATE: 66 BPM | WEIGHT: 263 LBS | HEIGHT: 69 IN | RESPIRATION RATE: 14 BRPM | DIASTOLIC BLOOD PRESSURE: 60 MMHG | SYSTOLIC BLOOD PRESSURE: 108 MMHG | TEMPERATURE: 97.4 F | OXYGEN SATURATION: 97 % | BODY MASS INDEX: 38.95 KG/M2

## 2025-04-04 DIAGNOSIS — M54.2 CERVICAL PAIN (NECK): Primary | ICD-10-CM

## 2025-04-04 DIAGNOSIS — H81.8X1: ICD-10-CM

## 2025-04-04 LAB
ALBUMIN SERPL-MCNC: 4 G/DL (ref 3.5–5.2)
ALBUMIN/GLOB SERPL: 1.7 G/DL
ALP SERPL-CCNC: 84 U/L (ref 39–117)
ALT SERPL-CCNC: 20 U/L (ref 1–33)
AST SERPL-CCNC: 21 U/L (ref 1–32)
BASOPHILS # BLD AUTO: 0.05 10*3/MM3 (ref 0–0.2)
BASOPHILS NFR BLD AUTO: 0.8 % (ref 0–1.5)
BILIRUB SERPL-MCNC: 0.2 MG/DL (ref 0–1.2)
BUN SERPL-MCNC: 17 MG/DL (ref 8–23)
BUN/CREAT SERPL: 21.8 (ref 7–25)
CALCIUM SERPL-MCNC: 9.1 MG/DL (ref 8.6–10.5)
CHLORIDE SERPL-SCNC: 106 MMOL/L (ref 98–107)
CHOLEST SERPL-MCNC: 211 MG/DL (ref 0–200)
CO2 SERPL-SCNC: 30.4 MMOL/L (ref 22–29)
CREAT SERPL-MCNC: 0.78 MG/DL (ref 0.57–1)
EGFRCR SERPLBLD CKD-EPI 2021: 82.3 ML/MIN/1.73
EOSINOPHIL # BLD AUTO: 0.39 10*3/MM3 (ref 0–0.4)
EOSINOPHIL NFR BLD AUTO: 6.2 % (ref 0.3–6.2)
ERYTHROCYTE [DISTWIDTH] IN BLOOD BY AUTOMATED COUNT: 12.9 % (ref 12.3–15.4)
GLOBULIN SER CALC-MCNC: 2.3 GM/DL
GLUCOSE SERPL-MCNC: 101 MG/DL (ref 65–99)
HCT VFR BLD AUTO: 42 % (ref 34–46.6)
HDLC SERPL-MCNC: 56 MG/DL (ref 40–60)
HGB BLD-MCNC: 13.4 G/DL (ref 12–15.9)
IMM GRANULOCYTES # BLD AUTO: 0.02 10*3/MM3 (ref 0–0.05)
IMM GRANULOCYTES NFR BLD AUTO: 0.3 % (ref 0–0.5)
LDLC SERPL CALC-MCNC: 134 MG/DL (ref 0–100)
LYMPHOCYTES # BLD AUTO: 1.94 10*3/MM3 (ref 0.7–3.1)
LYMPHOCYTES NFR BLD AUTO: 30.9 % (ref 19.6–45.3)
MCH RBC QN AUTO: 28.3 PG (ref 26.6–33)
MCHC RBC AUTO-ENTMCNC: 31.9 G/DL (ref 31.5–35.7)
MCV RBC AUTO: 88.8 FL (ref 79–97)
MONOCYTES # BLD AUTO: 0.49 10*3/MM3 (ref 0.1–0.9)
MONOCYTES NFR BLD AUTO: 7.8 % (ref 5–12)
NEUTROPHILS # BLD AUTO: 3.38 10*3/MM3 (ref 1.7–7)
NEUTROPHILS NFR BLD AUTO: 54 % (ref 42.7–76)
NRBC BLD AUTO-RTO: 0 /100 WBC (ref 0–0.2)
PLATELET # BLD AUTO: 269 10*3/MM3 (ref 140–450)
POTASSIUM SERPL-SCNC: 5.1 MMOL/L (ref 3.5–5.2)
PROT SERPL-MCNC: 6.3 G/DL (ref 6–8.5)
RBC # BLD AUTO: 4.73 10*6/MM3 (ref 3.77–5.28)
SODIUM SERPL-SCNC: 146 MMOL/L (ref 136–145)
T4 FREE SERPL-MCNC: 1.5 NG/DL (ref 0.92–1.68)
TRIGL SERPL-MCNC: 119 MG/DL (ref 0–150)
TSH SERPL DL<=0.005 MIU/L-ACNC: 0.93 UIU/ML (ref 0.27–4.2)
VLDLC SERPL CALC-MCNC: 21 MG/DL (ref 5–40)
WBC # BLD AUTO: 6.27 10*3/MM3 (ref 3.4–10.8)

## 2025-04-04 NOTE — PROGRESS NOTES
Subjective     Patient ID: Albania Ramos is a 70 y.o. female. Patient is here for management of multiple medical problems.     Chief Complaint   Patient presents with    Dizziness    Neck Pain     History of Present Illness   Jumped up out of bed and had overwhelming dizziness and N/V. Hit closeset door and fel back into bed.   Has hx of OA and pinched nerve in past.      Has inner ear issues with cristals and see's ent a lot. To complicate this neck pain and tooth extreaction.  Not able to relax enough to do a raza pike.  Wants me to do it.         The following portions of the patient's history were reviewed and updated as appropriate: allergies, current medications, past family history, past medical history, past social history, past surgical history and problem list.    Review of Systems    Current Outpatient Medications:     albuterol sulfate HFA (Ventolin HFA) 108 (90 Base) MCG/ACT inhaler, Inhale 2 puffs Every 4 (Four) Hours As Needed for Wheezing or Shortness of Air., Disp: 18 g, Rfl: 5    amoxicillin (AMOXIL) 500 MG capsule, Take 1 capsule by mouth 3 times a day., Disp: , Rfl:     aspirin 81 MG EC tablet, Take 1 tablet by mouth Daily., Disp: , Rfl:     betamethasone valerate (VALISONE) 0.1 % cream, Apply TO BOTH ear canals gently with q-tip 2-3 times WEEKLY, Disp: , Rfl:     calcium carbonate (OS-SHANTE) 600 MG tablet, Take 1 tablet by mouth As Needed for Heartburn., Disp: , Rfl:     chlorhexidine (PERIDEX) 0.12 % solution, SWISH with 15 MILLILITERS FOR TWO minutes AND spit AFTER breakfast AND AFTER dinner, Disp: , Rfl:     Coenzyme Q10 (Co Q-10) 200 MG capsule, Take  by mouth., Disp: , Rfl:     cyanocobalamin (VITAMIN B-12) 2500 MCG tablet tablet, TAKE ONE TABLET BY MOUTH THREE TIMES A WEEK MUST MAKE AN APPOINTMENT (Patient taking differently: Take 1,200 mcg by mouth 1 (One) Time Per Week. MONDAYS), Disp: 30 tablet, Rfl: 5    cyclobenzaprine (FLEXERIL) 5 MG tablet, Take 1 tablet by mouth 3 (Three) Times a  Day As Needed for Muscle Spasms., Disp: 45 tablet, Rfl: 1    Diclofenac Sodium (VOLTAREN) 1 % gel gel, Apply 4 g topically to the appropriate area as directed 4 (Four) Times a Day As Needed (pain)., Disp: 100 g, Rfl: 11    docusate sodium 100 MG capsule, Take 1 capsule by mouth 2 (Two) Times a Day As Needed for Constipation., Disp: 30 capsule, Rfl: 0    estradiol (ESTRACE) 0.1 MG/GM vaginal cream, Insert TWO grams vaginally daily AS NEEDED FOR vaginal dryness. Usually uses twice A WEEK (Patient taking differently: Insert 2 g into the vagina 3 (Three) Times a Week.), Disp: 42.5 g, Rfl: 11    gabapentin (NEURONTIN) 100 MG capsule, Take 1 capsule by mouth 3 (Three) Times a Day., Disp: 90 capsule, Rfl: 0    gabapentin (NEURONTIN) 300 MG capsule, One tab at night x 3 days, then one tab BID x 3 days, then one tab TID, Disp: 90 capsule, Rfl: 0    GLUCOSAMINE HCL-MSM PO, Take 1 tablet by mouth Daily., Disp: , Rfl:     ipratropium (ATROVENT) 0.06 % nasal spray, Administer 1 spray into the nostril(s) as directed by provider As Needed for Rhinitis., Disp: 15 mL, Rfl: 3    levothyroxine (SYNTHROID, LEVOTHROID) 150 MCG tablet, TAKE ONE TABLET BY MOUTH EVERY DAY, Disp: 90 tablet, Rfl: 2    meclizine (ANTIVERT) 25 MG tablet, Take 0.5-1 tablets by mouth 3 (Three) Times a Day As Needed for Dizziness., Disp: 30 tablet, Rfl: 0    metoprolol tartrate (LOPRESSOR) 25 MG tablet, Take 1 tablet by mouth 2 (Two) Times a Day., Disp: 180 tablet, Rfl: 3    MULTIPLE VITAMIN PO, Take 1 tablet by mouth Daily., Disp: , Rfl:     nitroglycerin (NITROSTAT) 0.4 MG SL tablet, 1 under the tongue as needed for angina, may repeat q5mins for up three doses (Patient taking differently: 1 tablet Every 5 (Five) Minutes As Needed for Chest Pain. 1 under the tongue as needed for angina, may repeat q5mins for up three doses), Disp: 100 tablet, Rfl: 11    NON FORMULARY, Take 2 tablets by mouth Daily. Bernadine gloria, Disp: , Rfl:     nystatin (MYCOSTATIN)  "100,000 unit/mL suspension, Take 5 mL by mouth 4 (Four) Times a Day., Disp: 280 mL, Rfl: 0    ofloxacin (FLOXIN) 0.3 % otic solution, Administer 5 drops to the right ear As Needed (PRESSURE IN EARS)., Disp: , Rfl:     Omega-3 Fatty Acids (FISH OIL) 435 MG capsule, Take 1,000 mg by mouth Daily., Disp: , Rfl:     omeprazole (priLOSEC) 40 MG capsule, TAKE ONE CAPSULE BY MOUTH DAILY, Disp: 90 capsule, Rfl: 3    SUMAtriptan (IMITREX) 25 MG tablet, Take 1 tablet by mouth As Needed for Migraine., Disp: , Rfl:     tiotropium (SPIRIVA) 18 MCG per inhalation capsule, Place 1 capsule into inhaler and inhale Daily., Disp: 90 capsule, Rfl: 3    TURMERIC PO, Take 3 capsules by mouth Daily., Disp: , Rfl:     vitamin C (ASCORBIC ACID) 250 MG tablet, Take 1 tablet by mouth Daily., Disp: , Rfl:     Current Facility-Administered Medications:     triamcinolone acetonide (KENALOG-40) injection 40 mg, 40 mg, Intra-articular, Once, Gordon Norman MD    Objective      Blood pressure 108/60, pulse 66, temperature 97.4 °F (36.3 °C), resp. rate 14, height 175.3 cm (69\"), weight 119 kg (263 lb), SpO2 97%, not currently breastfeeding.            Physical Exam     General Appearance:    Alert, cooperative, no distress, appears stated age   Head:    Normocephalic, without obvious abnormality, atraumatic   Eyes:    PERRL, conjunctiva/corneas clear, EOM's intact   Ears:    Normal TM's and external ear canals, both ears   Nose:   Nares normal, septum midline, mucosa normal, no drainage   or sinus tenderness   Throat:   Lips, mucosa, and tongue normal; teeth and gums normal   Neck:   Supple, symmetrical, trachea midline, no adenopathy;        thyroid:  No enlargement/tenderness/nodules; no carotid    bruit or JVD   Back:     Symmetric, no curvature, ROM normal, no CVA tenderness   Lungs:     Clear to auscultation bilaterally, respirations unlabored   Chest wall:    No tenderness or deformity   Heart:    Regular rate and rhythm, S1 and S2 normal, " no murmur,        rub or gallop   Abdomen:     Soft, non-tender, bowel sounds active all four quadrants,     no masses, no organomegaly   Extremities:   Extremities normal, atraumatic, no cyanosis or edema   Pulses:   2+ and symmetric all extremities   Skin:   Skin color, texture, turgor normal, no rashes or lesions   Lymph nodes:   Cervical, supraclavicular, and axillary nodes normal   Neurologic:   CNII-XII intact. Normal strength, sensation and reflexes       throughout      Results for orders placed or performed in visit on 01/08/25   POCT urinalysis dipstick, automated    Collection Time: 01/08/25  2:13 PM    Specimen: Urine   Result Value Ref Range    Color Red (A) Yellow, Straw, Dark Yellow, Kelle    Clarity, UA Hazy (A) Clear    Specific Gravity  1.010 1.005 - 1.030    pH, Urine 6.0 5.0 - 8.0    Leukocytes Trace (A) Negative    Nitrite, UA Negative Negative    Protein, POC 1+ (A) Negative mg/dL    Glucose, UA Negative Negative mg/dL    Ketones, UA Negative Negative    Urobilinogen, UA Normal Normal, 0.2 E.U./dL    Bilirubin Negative Negative    Blood, UA 3+ (A) Negative    Lot Number [id number]     Expiration Date 4,102,026    Urine Culture - Urine, Urine, Clean Catch    Collection Time: 01/08/25  4:26 PM    Specimen: Urine, Clean Catch    UC   Result Value Ref Range    Urine Culture Final report (A)     Result 1 Escherichia coli (A)     Result 2 Comment     Susceptibility Testing Comment          Assessment & Plan   Narrative & Impression   CT CERVICAL SPINE WO CONTRAST     Date of Exam: 1/7/2025 11:08 AM EST     Indication: radiculitis left shoulder..     Comparison: None available.     Technique: Axial CT images were obtained of the cervical spine without contrast administration.  Reconstructed coronal and sagittal images were also obtained. Automated exposure control and iterative construction methods were used.        Findings:  No evidence of acute fracture. There is mild straightening of normal  cervical lordosis. Endplate osteophyte formations with multilevel facet arthropathy. Degenerative disc disease with disc height loss greatest at C6-7. There is mild canal narrowing at   this level. There is varying bony neural foraminal narrowing, severe on the left at C3-4. 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 Craniocervical junction is intact. Paraspinal soft tissues show no acute abnormality. Surgical changes of   thyroidectomy.     IMPRESSION:  Impression:  No acute abnormality of the cervical spine. Degenerative changes as above. Severe left bony neural foraminal narrowing at C3-4.           Electronically Signed: Zain Jackman MD    1/7/2025 11:36 AM EST    Workstation ID: [id number]       Pain currently on left side and correlates with CT c- spine in past.  PT not helping. Will set up with Dr Torres.      Vertigo is uncontrolled.    Diet changes dicussed.    Increase protein. avoid  Less processed foods and fake sugars, diet food.       Camara pike to day helpped.      Diagnoses and all orders for this visit:    1. Cervical pain (neck) (Primary)  -     Ambulatory Referral to Pain Management    2. Otolith disease of right ear      No follow-ups on file.          There are no Patient Instructions on file for this visit.     Gordon Norman MD    Assessment & Plan

## 2025-04-07 ENCOUNTER — PATIENT MESSAGE (OUTPATIENT)
Dept: INTERNAL MEDICINE | Facility: CLINIC | Age: 70
End: 2025-04-07
Payer: MEDICARE

## 2025-04-08 ENCOUNTER — TREATMENT (OUTPATIENT)
Dept: PHYSICAL THERAPY | Facility: CLINIC | Age: 70
End: 2025-04-08
Payer: MEDICARE

## 2025-04-08 DIAGNOSIS — M54.2 CERVICAL PAIN: Primary | ICD-10-CM

## 2025-04-08 PROCEDURE — 97110 THERAPEUTIC EXERCISES: CPT

## 2025-04-08 PROCEDURE — 97140 MANUAL THERAPY 1/> REGIONS: CPT

## 2025-04-08 NOTE — PROGRESS NOTES
Physical Therapy Daily Treatment Note  [address] [place] [address]      Patient: Albania Ramos   : 1955  Referring practitioner: ADENIKE Gates*  Date of Initial Visit: Type: THERAPY  Noted: 3/12/2025  Today's Date: 2025  Patient seen for 4 sessions         Visit Diagnoses:    ICD-10-CM ICD-9-CM   1. Cervical pain  M54.2 723.1       Subjective   Patient reports she has had vertigo for 2 weeks. Improving a little, sees ENT on Thursday. Notes the neck and shoulder had improved greatly but with the vertigo and not being able to move it, it has become worse. Notes symptoms down the arm have improved.     Objective   See Exercise, Manual, and Modality Logs for complete treatment.   Observation: pt has begun guarding the neck a little again but because of the vertigo    Assessment/Plan  Pt without report of pain during treatment but exercises were kept gentle and did not include a lot of moving the head around the aid with not agitating the vertigo. Exercises were kept in sitting today due to vertigo.  Educated pt that PT's can treat vertigo if she ever needs it and can not get into her ENT specialist. Pt feels once the vertigo is gone and she can get back to her exercises the neck will continue to improve. Hold pt's chart for 30 days to allow for assessment of HEP efficacy. If pt does not call for an appt DC chart at that time.    Timed:         Manual Therapy: 12        mins  85294;     Therapeutic Exercise:     18    mins  62935;     Neuromuscular Doug:      mins  36477;    Therapeutic Activity:          mins  25858;     Gait Training:           mins  91595;     Ultrasound:          mins  62894;    Ionto                                   mins   67227  Self Care                            mins   02598  Canalith Repos         mins 05974      Un-Timed:  Electrical Stimulation:        mins  29693 (MC );  Dry Needling          mins self-pay  Traction          mins  63729      Timed Treatment:   30   mins   Total Treatment:     30   mins    Gabrielle Hendricks, PT  [place] License: [id number]

## 2025-04-10 ENCOUNTER — TELEPHONE (OUTPATIENT)
Dept: PHYSICAL THERAPY | Facility: CLINIC | Age: 70
End: 2025-04-10
Payer: MEDICARE

## 2025-04-10 NOTE — TELEPHONE ENCOUNTER
"  Caller: Albania Ramos \"Madelyn\"    Relationship: Self    Best call back number:   Telephone Information:   Mobile [phone]      What was the call regarding: PATIENT'S ENT WOULD LIKE HER TO GET IN SOONER THAN MAY, ADDED HER TO WAITLIST PLEASE CALL BACK IF ANYTHING SOONER OPENS UP  "

## 2025-04-12 DIAGNOSIS — E78.5 HYPERLIPIDEMIA, UNSPECIFIED HYPERLIPIDEMIA TYPE: Primary | ICD-10-CM

## 2025-04-12 DIAGNOSIS — R79.9 ABNORMAL FINDING OF BLOOD CHEMISTRY, UNSPECIFIED: ICD-10-CM

## 2025-04-16 LAB — HBA1C MFR BLD: 5.7 % (ref 4.8–5.6)

## 2025-04-18 ENCOUNTER — TREATMENT (OUTPATIENT)
Dept: PHYSICAL THERAPY | Facility: CLINIC | Age: 70
End: 2025-04-18
Payer: MEDICARE

## 2025-04-18 DIAGNOSIS — M54.2 CERVICAL PAIN: Primary | ICD-10-CM

## 2025-04-18 PROCEDURE — 97110 THERAPEUTIC EXERCISES: CPT

## 2025-04-18 PROCEDURE — 97140 MANUAL THERAPY 1/> REGIONS: CPT

## 2025-04-18 NOTE — PROGRESS NOTES
Physical Therapy Daily Treatment Note  [address] [place] [address]      Patient: Albania Ramos   : 1955  Referring practitioner: ADENIKE Gates*  Date of Initial Visit: Type: THERAPY  Noted: 3/12/2025  Today's Date: 2025  Patient seen for 5 sessions         Visit Diagnoses:    ICD-10-CM ICD-9-CM   1. Cervical pain  M54.2 723.1       Subjective   Patient reports she is still having dizziness but thinks it may be related to her tooth being pulled and something with the sinus. Neck is still giving her problems especially at the base of the skull and down onto the shoulder blade. Does note that the radiating pain into the shoulder is better. Reports she has returned to the gym and is doing her exercises there.    Objective   See Exercise, Manual, and Modality Logs for complete treatment.     Assessment/Plan  Pt with report of improved neck symptoms at the conclusion of the session. Educated pt regarding cervical positioning  especially at the suboccipital region to aid with cervical muscle tightness.  Discussed safe exercise performance.Continue with current POT progressing pt per tolerance.      Timed:         Manual Therapy: 24        mins  82752;     Therapeutic Exercise:     15    mins  96847;     Neuromuscular Doug:      mins  43358;    Therapeutic Activity:          mins  74177;     Gait Training:           mins  58659;     Ultrasound:          mins  03886;    Ionto                                   mins   42933  Self Care                            mins   75598  Canalith Repos         mins 11014      Un-Timed:  Electrical Stimulation:        mins  99945 ( );  Dry Needling          mins self-pay  Traction          mins 61176      Timed Treatment:   39   mins   Total Treatment:     39   mins    Gabrielle Hendricks PT  [place] License: [id number]

## 2025-04-23 ENCOUNTER — TREATMENT (OUTPATIENT)
Dept: PHYSICAL THERAPY | Facility: CLINIC | Age: 70
End: 2025-04-23
Payer: MEDICARE

## 2025-04-23 DIAGNOSIS — M54.2 CERVICAL PAIN: Primary | ICD-10-CM

## 2025-04-23 PROCEDURE — 97140 MANUAL THERAPY 1/> REGIONS: CPT

## 2025-04-23 PROCEDURE — 97110 THERAPEUTIC EXERCISES: CPT

## 2025-04-23 RX ORDER — IPRATROPIUM BROMIDE 42 UG/1
SPRAY, METERED NASAL
Qty: 15 ML | Refills: 3 | Status: SHIPPED | OUTPATIENT
Start: 2025-04-23

## 2025-04-23 NOTE — PROGRESS NOTES
Physical Therapy Daily Treatment Note  [address] [place] [address]      Patient: Albania Ramos   : 1955  Referring practitioner: ADENIKE Gates*  Date of Initial Visit: Type: THERAPY  Noted: 3/12/2025  Today's Date: 2025  Patient seen for 6 sessions         Visit Diagnoses:    ICD-10-CM ICD-9-CM   1. Cervical pain  M54.2 723.1       Subjective   Patient reports that her vertigo is almost completely gone. Neck can still be painful and tight but is improving.  Still getting HAs but not as frequent or intense. Pt notes no symptoms down the arm but does still get some shoulder pain      Objective   See Exercise, Manual, and Modality Logs for complete treatment.   Cervical ROM: flexion 45 deg, ext 50 deg, R rotation 70 deg, L rotation 65 deg, L sidebending 35 deg, R     Assessment/Plan  Pt has met STG#1,2 and LTG#4. Remaining goals are still appropriate and pt may continue to make progress toward them but may take longer due to the severity of the pt's neck degeneration and chronic positioning. Pt with reports of improved symptoms at the conclusion of the session. Educated pt to continue to work with exercises to aid with improving strength and postural control. Continue with current POT progressing pt per tolerance.    Timed:         Manual Therapy: 24        mins  40831;     Therapeutic Exercise:     15    mins  10697;     Neuromuscular Doug:      mins  66150;    Therapeutic Activity:          mins  40517;     Gait Training:           mins  46219;     Ultrasound:          mins  74383;    Ionto                                   mins   69230  Self Care                            mins   77683  Canalith Repos         mins 88985      Un-Timed:  Electrical Stimulation:        mins  69110 ( );  Dry Needling          mins self-pay  Traction          mins 36218      Timed Treatment:   39   mins   Total Treatment:     39   mins    Gabrielle Hendricks, PT  [place] License: [id number]

## 2025-04-25 ENCOUNTER — OFFICE VISIT (OUTPATIENT)
Dept: CARDIOLOGY | Facility: CLINIC | Age: 70
End: 2025-04-25
Payer: MEDICARE

## 2025-04-25 VITALS
HEART RATE: 52 BPM | DIASTOLIC BLOOD PRESSURE: 80 MMHG | HEIGHT: 69 IN | WEIGHT: 260 LBS | BODY MASS INDEX: 38.51 KG/M2 | OXYGEN SATURATION: 97 % | SYSTOLIC BLOOD PRESSURE: 132 MMHG

## 2025-04-25 DIAGNOSIS — G47.33 OSA ON CPAP: ICD-10-CM

## 2025-04-25 DIAGNOSIS — E78.5 HYPERLIPIDEMIA LDL GOAL <70: Chronic | ICD-10-CM

## 2025-04-25 DIAGNOSIS — R94.39 ABNORMAL STRESS TEST: ICD-10-CM

## 2025-04-25 DIAGNOSIS — I48.0 PAROXYSMAL ATRIAL FIBRILLATION: Primary | Chronic | ICD-10-CM

## 2025-04-25 RX ORDER — PENICILLIN V POTASSIUM 500 MG/1
1 TABLET, FILM COATED ORAL 3 TIMES DAILY
COMMUNITY
Start: 2025-04-18

## 2025-04-25 RX ORDER — ROSUVASTATIN CALCIUM 20 MG/1
20 TABLET, COATED ORAL DAILY
Qty: 30 TABLET | Refills: 11 | Status: SHIPPED | OUTPATIENT
Start: 2025-04-25

## 2025-04-25 NOTE — PROGRESS NOTES
[facility] Cardiology Office Follow Up Note    Albania Ramos  [id number]  2025    Primary Care Provider: Gordon Norman MD    Chief Complaint   Patient presents with    Precordial pain    Follow-up       Subjective     History of Present Illness:   Albania Ramos is a 70 y.o. female with paroxysmal atrial fibrillation status post Watchman who presents to the Cardiology Clinic for regular follow-up.  Patient states that she cannot tolerate atorvastatin because it made her feel very lethargic and nonfunctional.  She stopped that and her symptoms resolved.  She felt like she was going to go back in A-fib but symptoms resolved earlier this morning.  She was taken off of Plavix by Dr. Yu and is currently on aspirin alone after RONY confirmed no device leak.  Says she is feeling well overall.  She has had some problems with her neck and with vertigo.  Going to see neurosurgery to work on some nerve impingement.  She has been more sedentary due to these issues and does get lower short of breath with activity but denies any exertional chest pain.    Past Cardiac History and Testin.  Paroxysmal atrial fibrillation  -- [id number] 3  -- s/p ablation x 2, previous ILR removed  -- s/p Watchman 2024 ([place])  -- RONY 10/24: 35mm Watchman FLX pro device, no periprosthetic leak.  Small ASD present with left-to-right shunting.    2.  Chest pain/exertional dyspnea  --Echo : EF 56-60%, normal diastology, mild LVH, borderline RV dilation  --Abnormal, low risk.  Moderate anterior defect probably significant breast attenuation but perfusion abnormality did not correct with the proning.  Anterior infarct cannot be excluded.  EF 70%    3.  Hyperlipidemia  4.  Bilateral leg edema, status post vein ablation  5.  STEFAN on CPAP  6.  ASD  --I ncidental on RONY post watchman 2024,  Left-to-right shunting    Review of Systems:  Review of Systems   Constitutional: Negative.    Respiratory:  Positive  for shortness of breath. Negative for cough and chest tightness.    Cardiovascular: Negative.    Gastrointestinal: Negative.    Musculoskeletal:  Positive for gait problem, neck pain and neck stiffness. Negative for back pain.       I have reviewed and/or updated the patient's past medical, past surgical, family, social history, problem list and allergies as appropriate.       Current Outpatient Medications:     albuterol sulfate HFA (Ventolin HFA) 108 (90 Base) MCG/ACT inhaler, Inhale 2 puffs Every 4 (Four) Hours As Needed for Wheezing or Shortness of Air., Disp: 18 g, Rfl: 5    aspirin 81 MG EC tablet, Take 1 tablet by mouth Daily., Disp: , Rfl:     betamethasone valerate (VALISONE) 0.1 % cream, Apply TO BOTH ear canals gently with q-tip 2-3 times WEEKLY, Disp: , Rfl:     calcium carbonate (OS-SHANTE) 600 MG tablet, Take 1 tablet by mouth As Needed for Heartburn., Disp: , Rfl:     chlorhexidine (PERIDEX) 0.12 % solution, SWISH with 15 MILLILITERS FOR TWO minutes AND spit AFTER breakfast AND AFTER dinner, Disp: , Rfl:     Coenzyme Q10 (Co Q-10) 200 MG capsule, Take  by mouth., Disp: , Rfl:     cyanocobalamin (VITAMIN B-12) 2500 MCG tablet tablet, TAKE ONE TABLET BY MOUTH THREE TIMES A WEEK MUST MAKE AN APPOINTMENT (Patient taking differently: Take 1,200 mcg by mouth 1 (One) Time Per Week. MONDAYS), Disp: 30 tablet, Rfl: 5    cyclobenzaprine (FLEXERIL) 5 MG tablet, Take 1 tablet by mouth 3 (Three) Times a Day As Needed for Muscle Spasms., Disp: 45 tablet, Rfl: 1    Diclofenac Sodium (VOLTAREN) 1 % gel gel, Apply 4 g topically to the appropriate area as directed 4 (Four) Times a Day As Needed (pain)., Disp: 100 g, Rfl: 11    docusate sodium 100 MG capsule, Take 1 capsule by mouth 2 (Two) Times a Day As Needed for Constipation., Disp: 30 capsule, Rfl: 0    estradiol (ESTRACE) 0.1 MG/GM vaginal cream, Insert TWO grams vaginally daily AS NEEDED FOR vaginal dryness. Usually uses twice A WEEK (Patient taking differently:  Insert 2 g into the vagina 3 (Three) Times a Week.), Disp: 42.5 g, Rfl: 11    gabapentin (NEURONTIN) 100 MG capsule, Take 1 capsule by mouth 3 (Three) Times a Day., Disp: 90 capsule, Rfl: 0    gabapentin (NEURONTIN) 300 MG capsule, One tab at night x 3 days, then one tab BID x 3 days, then one tab TID, Disp: 90 capsule, Rfl: 0    GLUCOSAMINE HCL-MSM PO, Take 1 tablet by mouth Daily., Disp: , Rfl:     ipratropium (ATROVENT) 0.06 % nasal spray, INSTILL 1 SPRAY IN EACH NOSTRIL ONCE DAILY AS NEEDED FOR RHINITIS, Disp: 15 mL, Rfl: 3    levothyroxine (SYNTHROID, LEVOTHROID) 150 MCG tablet, TAKE ONE TABLET BY MOUTH EVERY DAY, Disp: 90 tablet, Rfl: 2    meclizine (ANTIVERT) 25 MG tablet, Take 0.5-1 tablets by mouth 3 (Three) Times a Day As Needed for Dizziness., Disp: 30 tablet, Rfl: 0    metoprolol tartrate (LOPRESSOR) 25 MG tablet, Take 1 tablet by mouth 2 (Two) Times a Day., Disp: 180 tablet, Rfl: 3    MULTIPLE VITAMIN PO, Take 1 tablet by mouth Daily., Disp: , Rfl:     nitroglycerin (NITROSTAT) 0.4 MG SL tablet, 1 under the tongue as needed for angina, may repeat q5mins for up three doses (Patient taking differently: 1 tablet Every 5 (Five) Minutes As Needed for Chest Pain. 1 under the tongue as needed for angina, may repeat q5mins for up three doses), Disp: 100 tablet, Rfl: 11    NON FORMULARY, Take 2 tablets by mouth Daily. Lions ramiro mushrooms, Disp: , Rfl:     nystatin (MYCOSTATIN) 100,000 unit/mL suspension, Take 5 mL by mouth 4 (Four) Times a Day., Disp: 280 mL, Rfl: 0    ofloxacin (FLOXIN) 0.3 % otic solution, Administer 5 drops to the right ear As Needed (PRESSURE IN EARS)., Disp: , Rfl:     Omega-3 Fatty Acids (FISH OIL) 435 MG capsule, Take 1,000 mg by mouth Daily., Disp: , Rfl:     omeprazole (priLOSEC) 40 MG capsule, TAKE ONE CAPSULE BY MOUTH DAILY, Disp: 90 capsule, Rfl: 3    penicillin v potassium (VEETID) 500 MG tablet, Take 1 tablet by mouth 3 times a day., Disp: , Rfl:     SUMAtriptan (IMITREX) 25 MG  "tablet, Take 1 tablet by mouth As Needed for Migraine., Disp: , Rfl:     tiotropium (SPIRIVA) 18 MCG per inhalation capsule, Place 1 capsule into inhaler and inhale Daily., Disp: 90 capsule, Rfl: 3    TURMERIC PO, Take 3 capsules by mouth Daily., Disp: , Rfl:     vitamin C (ASCORBIC ACID) 250 MG tablet, Take 1 tablet by mouth Daily., Disp: , Rfl:     rosuvastatin (CRESTOR) 20 MG tablet, Take 1 tablet by mouth Daily., Disp: 30 tablet, Rfl: 11    Current Facility-Administered Medications:     triamcinolone acetonide (KENALOG-40) injection 40 mg, 40 mg, Intra-articular, Once, Gordon Norman MD       Objective     Vitals:  Vitals:    04/25/25 0907   BP: 132/80   BP Location: Left arm   Patient Position: Sitting   Cuff Size: Adult   Pulse: 52   SpO2: 97%   Weight: 118 kg (260 lb)   Height: 175.3 cm (69.02\")       Physical Exam:  Vitals reviewed.   Constitutional:       Appearance: Healthy appearance. Not in distress.   Cardiovascular:      Normal rate. Regular rhythm. Normal S1. Normal S2.       Murmurs: There is no murmur.      No gallop.  No click. No rub.   Pulses:     Intact distal pulses.   Edema:     Peripheral edema absent.   Skin:     General: Skin is warm and dry.         Results Review:   I reviewed the patient's new clinical results.  I reviewed the patient's new imaging results and agree with the interpretation.  I reviewed the patient's other test results and agree with the interpretation  I personally viewed and interpreted the patient's EKG/Telemetry data      ECG 12 Lead    Date/Time: 4/25/2025 9:35 AM  Performed by: Rogelio Bartlett MD    Authorized by: Rogelio Bartlett MD  Comparison: compared with previous ECG from 9/17/2024  Similar to previous ECG  Rhythm: sinus bradycardia  Rate: bradycardic  BPM: 52  Conduction: conduction normal  ST Segments: ST segments normal  T Waves: T waves normal  QRS axis: normal  Other: no other findings    Clinical impression: normal ECG          Most Recent " Labs:  Hemoglobin A1c (04/15/2025 08:53)  T4, Free (04/04/2025 08:14)  TSH (04/04/2025 08:14)  Comprehensive Metabolic Panel (04/04/2025 08:14)  CBC & Differential (04/04/2025 08:14)  Lipid panel (04/04/2025 08:14)          Assessment & Plan  Paroxysmal atrial fibrillation  [id number] is 3  Status post Watchman  In sinus rhythm today.  Intermittently has some palpitations but nothing that bothers her  Continue aspirin and metoprolol  Continue follow-up with Dr. Yu, 1 year RONY pending  Orders:    ECG 12 Lead    Hyperlipidemia LDL goal <70  Intolerant of Lipitor due to lethargy  Start rosuvastatin 20.    Recheck lipids with primary care in 6 to 8 weeks.  Goal LDL less than 70 in the setting of her diabetes which is well-controlled.  A1c 5.7.    Orders:    rosuvastatin (CRESTOR) 20 MG tablet; Take 1 tablet by mouth Daily.    Abnormal stress test  No recurrence of chest pain.  She does have some exertional dyspnea at baseline but is quite sedentary and deconditioned.  Exam is normal.  Previous noninvasive workup was relatively unremarkable.  There was an anterior defect which was thought to be breast attenuation versus infarct but no reversibility to suggest ischemia, therefore overall low risk  Continue aspirin and statin  Advise regular aerobic exercise and to call if she does develop any significant exertional limitation.  No further workup indicated at this time.       STEFAN on CPAP  Compliant with CPAP.              Plan   Preventative Cardiology:   Tobacco Cessation: N/A  Obstructive Sleep Apnea Screening: Completed  AAA Screening: Deffered  Peripheral Arterial Disease Screening: Deffered    Advanced Care Planning  ACP discussion was held with the patient during this visit. Patient does not have an advance directive, declines further assistance.           Follow Up:   Return in about 6 months (around 10/25/2025).    Thank you for allowing me to participate in the care of your patient. Please do not hesitate to  contact me with additional questions or concerns.     Electronically signed by Rogelio Bartlett MD, 04/25/25, 9:38 AM EDT.

## 2025-04-25 NOTE — ASSESSMENT & PLAN NOTE
Intolerant of Lipitor due to lethargy  Start rosuvastatin 20.    Recheck lipids with primary care in 6 to 8 weeks.  Goal LDL less than 70 in the setting of her diabetes which is well-controlled.  A1c 5.7.    Orders:    rosuvastatin (CRESTOR) 20 MG tablet; Take 1 tablet by mouth Daily.

## 2025-04-25 NOTE — ASSESSMENT & PLAN NOTE
[id number] is 3  Status post Watchman  In sinus rhythm today.  Intermittently has some palpitations but nothing that bothers her  Continue aspirin and metoprolol  Continue follow-up with Dr. Yu, 1 year RONY pending  Orders:    ECG 12 Lead

## 2025-04-25 NOTE — ASSESSMENT & PLAN NOTE
No recurrence of chest pain.  She does have some exertional dyspnea at baseline but is quite sedentary and deconditioned.  Exam is normal.  Previous noninvasive workup was relatively unremarkable.  There was an anterior defect which was thought to be breast attenuation versus infarct but no reversibility to suggest ischemia, therefore overall low risk  Continue aspirin and statin  Advise regular aerobic exercise and to call if she does develop any significant exertional limitation.  No further workup indicated at this time.

## 2025-05-07 ENCOUNTER — LAB (OUTPATIENT)
Dept: LAB | Facility: HOSPITAL | Age: 70
End: 2025-05-07
Payer: MEDICARE

## 2025-05-07 ENCOUNTER — HOSPITAL ENCOUNTER (OUTPATIENT)
Dept: CT IMAGING | Facility: HOSPITAL | Age: 70
Discharge: HOME | End: 2025-05-07
Payer: MEDICARE

## 2025-05-07 ENCOUNTER — OFFICE VISIT (OUTPATIENT)
Dept: INTERNAL MEDICINE | Facility: CLINIC | Age: 70
End: 2025-05-07
Payer: MEDICARE

## 2025-05-07 VITALS
BODY MASS INDEX: 38.66 KG/M2 | SYSTOLIC BLOOD PRESSURE: 132 MMHG | HEIGHT: 69 IN | OXYGEN SATURATION: 98 % | WEIGHT: 261 LBS | RESPIRATION RATE: 18 BRPM | DIASTOLIC BLOOD PRESSURE: 84 MMHG | TEMPERATURE: 98.4 F | HEART RATE: 58 BPM

## 2025-05-07 DIAGNOSIS — K31.89 GASTRIC MASS: ICD-10-CM

## 2025-05-07 DIAGNOSIS — R10.32 LEFT LOWER QUADRANT ABDOMINAL PAIN: Chronic | ICD-10-CM

## 2025-05-07 DIAGNOSIS — R10.32 LEFT LOWER QUADRANT ABDOMINAL PAIN: Primary | ICD-10-CM

## 2025-05-07 LAB
ALBUMIN SERPL-MCNC: 4.3 G/DL (ref 3.5–5.2)
ALBUMIN/GLOB SERPL: 1.7 G/DL
ALP SERPL-CCNC: 83 U/L (ref 39–117)
ALT SERPL W P-5'-P-CCNC: 16 U/L (ref 1–33)
ANION GAP SERPL CALCULATED.3IONS-SCNC: 7.6 MMOL/L (ref 5–15)
AST SERPL-CCNC: 18 U/L (ref 1–32)
BASOPHILS # BLD AUTO: 0.06 10*3/MM3 (ref 0–0.2)
BASOPHILS NFR BLD AUTO: 0.8 % (ref 0–1.5)
BILIRUB BLD-MCNC: NEGATIVE MG/DL
BILIRUB SERPL-MCNC: 0.4 MG/DL (ref 0–1.2)
BUN SERPL-MCNC: 15 MG/DL (ref 8–23)
BUN/CREAT SERPL: 17.2 (ref 7–25)
CALCIUM SPEC-SCNC: 9.5 MG/DL (ref 8.6–10.5)
CHLORIDE SERPL-SCNC: 99 MMOL/L (ref 98–107)
CLARITY, POC: CLEAR
CO2 SERPL-SCNC: 29.4 MMOL/L (ref 22–29)
COLOR UR: ABNORMAL
CREAT SERPL-MCNC: 0.87 MG/DL (ref 0.57–1)
DEPRECATED RDW RBC AUTO: 41.6 FL (ref 37–54)
EGFRCR SERPLBLD CKD-EPI 2021: 72.2 ML/MIN/1.73
EOSINOPHIL # BLD AUTO: 0.48 10*3/MM3 (ref 0–0.4)
EOSINOPHIL NFR BLD AUTO: 6.1 % (ref 0.3–6.2)
ERYTHROCYTE [DISTWIDTH] IN BLOOD BY AUTOMATED COUNT: 12.9 % (ref 12.3–15.4)
EXPIRATION DATE: ABNORMAL
GLOBULIN UR ELPH-MCNC: 2.5 GM/DL
GLUCOSE SERPL-MCNC: 97 MG/DL (ref 65–99)
GLUCOSE UR STRIP-MCNC: NEGATIVE MG/DL
HCT VFR BLD AUTO: 41 % (ref 34–46.6)
HGB BLD-MCNC: 13.1 G/DL (ref 12–15.9)
IMM GRANULOCYTES # BLD AUTO: 0.03 10*3/MM3 (ref 0–0.05)
IMM GRANULOCYTES NFR BLD AUTO: 0.4 % (ref 0–0.5)
KETONES UR QL: NEGATIVE
LEUKOCYTE EST, POC: NEGATIVE
LIPASE SERPL-CCNC: 19 U/L (ref 13–60)
LYMPHOCYTES # BLD AUTO: 2.41 10*3/MM3 (ref 0.7–3.1)
LYMPHOCYTES NFR BLD AUTO: 30.5 % (ref 19.6–45.3)
Lab: ABNORMAL
MCH RBC QN AUTO: 28.3 PG (ref 26.6–33)
MCHC RBC AUTO-ENTMCNC: 32 G/DL (ref 31.5–35.7)
MCV RBC AUTO: 88.6 FL (ref 79–97)
MONOCYTES # BLD AUTO: 0.54 10*3/MM3 (ref 0.1–0.9)
MONOCYTES NFR BLD AUTO: 6.8 % (ref 5–12)
NEUTROPHILS NFR BLD AUTO: 4.38 10*3/MM3 (ref 1.7–7)
NEUTROPHILS NFR BLD AUTO: 55.4 % (ref 42.7–76)
NITRITE UR-MCNC: NEGATIVE MG/ML
NRBC BLD AUTO-RTO: 0 /100 WBC (ref 0–0.2)
PH UR: 6.5 [PH] (ref 5–8)
PLATELET # BLD AUTO: 257 10*3/MM3 (ref 140–450)
PMV BLD AUTO: 12.1 FL (ref 6–12)
POTASSIUM SERPL-SCNC: 4.4 MMOL/L (ref 3.5–5.2)
PROT SERPL-MCNC: 6.8 G/DL (ref 6–8.5)
PROT UR STRIP-MCNC: NEGATIVE MG/DL
RBC # BLD AUTO: 4.63 10*6/MM3 (ref 3.77–5.28)
RBC # UR STRIP: ABNORMAL /UL
SODIUM SERPL-SCNC: 136 MMOL/L (ref 136–145)
SP GR UR: 1.01 (ref 1–1.03)
UROBILINOGEN UR QL: NORMAL
WBC NRBC COR # BLD AUTO: 7.9 10*3/MM3 (ref 3.4–10.8)

## 2025-05-07 PROCEDURE — 36415 COLL VENOUS BLD VENIPUNCTURE: CPT | Performed by: STUDENT IN AN ORGANIZED HEALTH CARE EDUCATION/TRAINING PROGRAM

## 2025-05-07 PROCEDURE — 3079F DIAST BP 80-89 MM HG: CPT | Performed by: STUDENT IN AN ORGANIZED HEALTH CARE EDUCATION/TRAINING PROGRAM

## 2025-05-07 PROCEDURE — 81003 URINALYSIS AUTO W/O SCOPE: CPT | Performed by: STUDENT IN AN ORGANIZED HEALTH CARE EDUCATION/TRAINING PROGRAM

## 2025-05-07 PROCEDURE — 80053 COMPREHEN METABOLIC PANEL: CPT | Performed by: STUDENT IN AN ORGANIZED HEALTH CARE EDUCATION/TRAINING PROGRAM

## 2025-05-07 PROCEDURE — 74178 CT ABD&PLV WO CNTR FLWD CNTR: CPT

## 2025-05-07 PROCEDURE — 1125F AMNT PAIN NOTED PAIN PRSNT: CPT | Performed by: STUDENT IN AN ORGANIZED HEALTH CARE EDUCATION/TRAINING PROGRAM

## 2025-05-07 PROCEDURE — 83690 ASSAY OF LIPASE: CPT | Performed by: STUDENT IN AN ORGANIZED HEALTH CARE EDUCATION/TRAINING PROGRAM

## 2025-05-07 PROCEDURE — 85025 COMPLETE CBC W/AUTO DIFF WBC: CPT | Performed by: STUDENT IN AN ORGANIZED HEALTH CARE EDUCATION/TRAINING PROGRAM

## 2025-05-07 PROCEDURE — 25510000001 IOPAMIDOL 61 % SOLUTION: Performed by: STUDENT IN AN ORGANIZED HEALTH CARE EDUCATION/TRAINING PROGRAM

## 2025-05-07 PROCEDURE — 3075F SYST BP GE 130 - 139MM HG: CPT | Performed by: STUDENT IN AN ORGANIZED HEALTH CARE EDUCATION/TRAINING PROGRAM

## 2025-05-07 RX ORDER — METHYLPREDNISOLONE 4 MG/1
TABLET ORAL
COMMUNITY
Start: 2025-05-06

## 2025-05-07 RX ORDER — IOPAMIDOL 612 MG/ML
100 INJECTION, SOLUTION INTRAVASCULAR
Status: COMPLETED | OUTPATIENT
Start: 2025-05-07 | End: 2025-05-07

## 2025-05-07 RX ADMIN — IOPAMIDOL 100 ML: 612 INJECTION, SOLUTION INTRAVENOUS at 13:27

## 2025-05-07 NOTE — PROGRESS NOTES
Office Note     Name: Albania Ramos    : 1955     MRN: [id number]     Chief Complaint  Back Pain (Left side radiating to lower abdomen. With constipation)    Subjective     History of Present Illness:  Albania Ramos is a 70 y.o. female who presents today for soreness on left lower quadrant pain, going on for the past 5 days now. Notes hypogastric to LLQ pain radiating to her back. Denies urinary symptoms. Does have chronic constipation. Last diverticulitis flare up was     Last colonoscopy 2019 diverticulosis and polyp, to repeat in 5 years  Last colonoscopy was at [facility] in     Family History:   Family History   Problem Relation Age of Onset    Atrial fibrillation Mother     Thyroid disease Mother     Stroke Mother         Stroke x2    Arthritis Mother         Osteoarthritis    Arrhythmia Mother     Heart failure Mother     Diabetes Mother     Early death Father 68        Lung cancer    Lung cancer Father     Alcohol abuse Father     Cancer Father     COPD Father     Early death Sister 16        Heart attack    Heart attack Sister     Down syndrome Sister     Developmental Disability Sister         Down syndrome    Birth defects Sister         Downs Syndrome    Learning disabilities Sister         Severe Downs Syndrome    Mental retardation Sister     Sleep apnea Brother         Nonorganic    Mental illness Brother         At births    Arthritis Brother         Osteoarthritis    Asthma Brother     Developmental Disability Brother         Mild mintal retardation    Arrhythmia Brother     Heart attack Brother     Heart failure Brother     Obesity Brother     Birth defects Brother         Mild Mental Retardation    Learning disabilities Brother         Mild mental retardation    Hyperlipidemia Brother     Other Brother     Hypertension Brother     Mental retardation Brother     Thyroid disease Brother     Stroke Maternal Aunt     Atrial fibrillation Maternal Aunt     Stroke  "Maternal Aunt     Atrial fibrillation Maternal Aunt     Stroke Maternal Grandmother     Hypertension Maternal Grandmother     Asthma Brother         Childhood    Early death Sister         Heart attack    Breast cancer Neg Hx     Ovarian cancer Neg Hx        Social History:   Social History     Socioeconomic History    Marital status:    Tobacco Use    Smoking status: Former     Current packs/day: 0.00     Average packs/day: 1.6 packs/day for 27.5 years (44.0 ttl pk-yrs)     Types: Cigarettes     Start date: 3/15/1974     Quit date: 1996     Years since quittin.9     Passive exposure: Past    Smokeless tobacco: Never    Tobacco comments:     Smoked menthol   Vaping Use    Vaping status: Never Used   Substance and Sexual Activity    Alcohol use: Not Currently     Comment: very rare    Drug use: No    Sexual activity: Not Currently     Partners: Male     Birth control/protection: Vasectomy, Hysterectomy       Health Maintenance   Topic Date Due    ANNUAL WELLNESS VISIT  Never done    MAMMOGRAM  2025    COVID-19 Vaccine ( season) 2026 (Originally 2024)    INFLUENZA VACCINE  2025    DXA SCAN  10/30/2025    LIPID PANEL  2026    TDAP/TD VACCINES (3 - Td or Tdap) 06/15/2028    COLORECTAL CANCER SCREENING  12/10/2029    HEPATITIS C SCREENING  Completed    Pneumococcal Vaccine 50+  Completed    ZOSTER VACCINE  Completed       Patient Care Team:  Gordon Norman MD as PCP - General (Internal Medicine)  Khadijah Bernal MD as Surgeon (General Surgery)  Melina Simpson DO as Consulting Physician (Endocrinology)  Tim Jackman MD as Consulting Physician (Cardiology)  Rogelio Bartlett MD as Cardiologist (Cardiology)    Objective     Vital Signs  /84   Pulse 58   Temp 98.4 °F (36.9 °C) (Infrared)   Resp 18   Ht 175.3 cm (69.02\")   Wt 118 kg (261 lb)   SpO2 98%   BMI 38.53 kg/m²   Estimated body mass index is 38.53 kg/m² as calculated from the " "following:    Height as of this encounter: 175.3 cm (69.02\").    Weight as of this encounter: 118 kg (261 lb).        Physical Exam  Vitals and nursing note reviewed.   Constitutional:       Appearance: Normal appearance.   HENT:      Head: Normocephalic and atraumatic.   Cardiovascular:      Rate and Rhythm: Normal rate and regular rhythm.      Pulses: Normal pulses.      Heart sounds: Normal heart sounds.   Pulmonary:      Effort: Pulmonary effort is normal. No respiratory distress.      Breath sounds: Normal breath sounds. No wheezing, rhonchi or rales.   Abdominal:      General: Abdomen is flat. Bowel sounds are normal.      Palpations: Abdomen is soft.      Tenderness: There is no abdominal tenderness. There is no guarding.   Musculoskeletal:      Cervical back: Neck supple.   Skin:     General: Skin is warm.      Capillary Refill: Capillary refill takes less than 2 seconds.   Neurological:      General: No focal deficit present.      Mental Status: She is alert. Mental status is at baseline.   Psychiatric:         Mood and Affect: Mood normal.         Behavior: Behavior normal.          Procedures     Assessment and Plan     Diagnoses and all orders for this visit:    1. Left lower quadrant abdominal pain (Primary)  Assessment & Plan:  Acute problem, worsening  Differentials include acute gastroenteritis, vs constipation vs diverticulitis vs pancreatitis. Obtain labs and CT abd pelvis  Patient advised to proceed to [facility] for STAT CT abd pelvis this afternoon    Orders:  -     CBC & Differential  -     Comprehensive Metabolic Panel  -     Lipase  -     CT Abdomen Pelvis With & Without Contrast; Future  -     POCT urinalysis dipstick, automated         Counseling was given to patient for the following topics: instructions for management, risks and benefits of treatment options, and importance of treatment compliance.    Follow Up  No follow-ups on file.    MD DAVID [facility] " [facility]  [address] [place] [address] [address]  [facility] [address] [phone]

## 2025-05-07 NOTE — PROGRESS NOTES
CT abd pelvis showed    - diffuse fatty infiltration and enlarged liver  - focal prominence of the posterior wall of the gastric fundus, mass needs to be excluded. Recommend endoscopy.  I have discussed these findings with patient. I will refer her to GI. She agreed and showed complete understanding.

## 2025-05-07 NOTE — ASSESSMENT & PLAN NOTE
Acute problem, worsening  Differentials include acute gastroenteritis, vs constipation vs diverticulitis vs pancreatitis. Obtain labs and CT abd pelvis  Patient advised to proceed to [facility] for STAT CT abd pelvis this afternoon

## 2025-05-13 ENCOUNTER — OFFICE VISIT (OUTPATIENT)
Dept: GASTROENTEROLOGY | Facility: CLINIC | Age: 70
End: 2025-05-13
Payer: MEDICARE

## 2025-05-13 VITALS — SYSTOLIC BLOOD PRESSURE: 126 MMHG | OXYGEN SATURATION: 97 % | DIASTOLIC BLOOD PRESSURE: 70 MMHG | HEART RATE: 60 BPM

## 2025-05-13 DIAGNOSIS — R93.3 ABNORMAL CT SCAN, STOMACH: Primary | ICD-10-CM

## 2025-05-13 DIAGNOSIS — Z87.19 HISTORY OF DIVERTICULITIS: ICD-10-CM

## 2025-05-13 DIAGNOSIS — K58.2 IRRITABLE BOWEL SYNDROME WITH BOTH CONSTIPATION AND DIARRHEA: ICD-10-CM

## 2025-05-13 DIAGNOSIS — Z98.890 HISTORY OF COLON SURGERY: ICD-10-CM

## 2025-05-13 DIAGNOSIS — Z86.0101 HISTORY OF ADENOMATOUS POLYP OF COLON: ICD-10-CM

## 2025-05-13 DIAGNOSIS — K76.0 METABOLIC DYSFUNCTION-ASSOCIATED STEATOTIC LIVER DISEASE (MASLD): ICD-10-CM

## 2025-05-13 DIAGNOSIS — K21.9 GASTROESOPHAGEAL REFLUX DISEASE, UNSPECIFIED WHETHER ESOPHAGITIS PRESENT: ICD-10-CM

## 2025-05-13 PROCEDURE — 3074F SYST BP LT 130 MM HG: CPT | Performed by: PHYSICIAN ASSISTANT

## 2025-05-13 PROCEDURE — 3078F DIAST BP <80 MM HG: CPT | Performed by: PHYSICIAN ASSISTANT

## 2025-05-13 PROCEDURE — 1159F MED LIST DOCD IN RCRD: CPT | Performed by: PHYSICIAN ASSISTANT

## 2025-05-13 PROCEDURE — 99214 OFFICE O/P EST MOD 30 MIN: CPT | Performed by: PHYSICIAN ASSISTANT

## 2025-05-13 PROCEDURE — 1160F RVW MEDS BY RX/DR IN RCRD: CPT | Performed by: PHYSICIAN ASSISTANT

## 2025-05-13 RX ORDER — SODIUM CHLORIDE 9 MG/ML
30 INJECTION, SOLUTION INTRAVENOUS CONTINUOUS PRN
Status: CANCELLED | OUTPATIENT
Start: 2025-05-13 | End: 2025-05-14

## 2025-05-13 NOTE — PROGRESS NOTES
"     New Patient Consult      Date: 2025   Patient Name: Albania Ramos  MRN: [id number]  : 1955     Primary Care Provider: Gordon Norman MD  Referring Provider: Jarred    Chief Complaint   Patient presents with    Abnormal Imaging     History of Present Illness: Albania Ramos is a 70 y.o. female who is here today as an established patient (last seen here in ) with Gastroenterology for evaluation of Abnormal Imaging.    Had recent CT scan with possible gastric mass. No recent EGD. Has history of IBS constipation, has taken Linzess in the past. Recently having more regular stools. No rectal bleeding. No current abdominal pain.     Last colonoscopy was Dec 2021 by [facility] (possibly Dr. Kumar) before her colon surgery. Had history of severe diverticulitis and colon surgery due to that. Prior colonoscopy by Dr. Coyle 2019 which showed fair prep, diverticulosis, internal hemorrhoids, 4 mm polyp in transverse colon, redundant colon. Of note, she has history of tubulovillous adenomatous ascending colon polyp () on chart review.     Prior history 2023   She used to see [facility] and is wanting to transfer her care to this office. She has a history of right sided abdominal pain that radiated to her back that started in 2023 that was severe. The pain would be severe enough to cause nausea. Moving made the pain worse, eating and bowel movements did not affect the pain. The pain has significantly improved now and she is not having the nausea at this time. She had \"1/2 of the colon\" removed due to diverticulitis in 2022, then developed a \"large hernia\" due to the surgery that had to be repaired with mesh. She had been having weight gain after being sedentary before and immediately after her hernia repair surgery, but she has recently changed her diet and been more active. She has lost 38 pounds since 2023 intentionally. She has liver " "imaging scheduled for next week at [facility].     She has a history of left lower abdominal pain with diarrhea for many years that comes and goes. She denies any constipation at this time. She usually has about 4 soft bowel movements per day on average, no loose or watery stools at this time. She can't take Bentyl as it causes her to become \"bright red\".  No history of rectal bleeding.      The patient states she had a colonoscopy that was \"normal\" in December 2021 prior to having her colon resection by a surgeon, in [place], [place]. We do not have any records of this. Her last EGD was 15-20 years ago. There is no family history of GI malignancy.     Subjective      Past Medical History:   Diagnosis Date    Abdominal pain     Abnormal ECG 2005    Atrial Fibrillation    Allergic 2003    Allergic rhinitis     Ankle joint pain     Arrhythmia 2006    Arthritis     Arthritis 03/22/2023    Asthma     Bronchiectasis     Cataract 2015    Had left removed 12/2021 and rt removed 1/2022    Cholelithiasis     Chronic bronchitis     Colon polyp 12/10/2019    COVID-19 vaccine series completed     pfizer x3    Degenerative joint disease     Depression     Diverticulitis     Diverticulitis of colon 1999    Diverticulosis     Dizziness     Has had some episodes. No blake syncope.11/2007 patient underwent pulmonary vein isolation, initially successful.Patient returned, however in February noting some recurrent episode of dizziness.Then wore wore event recorder which showed some recurrent PAF.    Dysuria     Easy bruising     Elevated cholesterol     Fatty liver 2020    Fibroid Many years    Breast    Follicular thyroid cancer     treated with total thyroidectomy followed by BR[facility]BILA    GERD (gastroesophageal reflux disease)     H/O bone density study     Hay fever     Hernia 2022    Incissional hernia    History of chest x-ray 07/10/2015    No acute cardiopulmonary process    History of chest x-ray 07/02/2015    No acute " cardiopulmonary process    History of echocardiogram 04/04/2007    LVEF of greater than 60%. Mild MR.Mild TR.Trace pulmonary insufficinecy.    History of mammogram     History of PFTs 07/02/2015    Normal spirometry    Hyperlipidemia 2019    Hypothyroidism     Inflammatory bowel disease 2000    Irritable bowel syndrome 2000    Measles     Migraine headache     Mumps     Obesity 2020    Osteopenia 2000    Osteoporosis     Paroxysmal atrial fibrillation     A. Failed medical therapy. B. Pulmonary vein isolation 11/2006. C. Recurrent episodes of PAF by event recorder 9/2006.    Peptic ulceration     Pneumonia     PONV (postoperative nausea and vomiting)     Primary hypertension 06/13/2024    Shortness of breath     Sleep apnea     USES CPAP    Sleep apnea, obstructive     Surfer's nodules of right foot     Thyroid nodule 2012    Torn meniscus     Urinary incontinence 2018    Urinary tract infection     Varicella     Visual impairment      Past Surgical History:   Procedure Laterality Date    ABDOMINAL SURGERY  2022    Hernia repair    ABLATION OF DYSRHYTHMIC FOCUS      x 2    ATRIAL APPENDAGE EXCLUSION LEFT WITH TRANSESOPHAGEAL ECHOCARDIOGRAM N/A 09/10/2024    Procedure: Atrial Appendage Occlusion;  Surgeon: Miguel Yu DO;  Location:  [facility];  Service: Cardiology;  Laterality: N/A;    BRONCHOSCOPY      CARDIAC CATHETERIZATION      ABLATION X2    CARDIAC ELECTROPHYSIOLOGY PROCEDURE N/A 08/04/2016    Procedure: Loop recorder implant;  Surgeon: Himanshu Calvert MD;  Location: [facility];  Service:     CARDIAC ELECTROPHYSIOLOGY PROCEDURE N/A 10/13/2016    Procedure: Loop recorder removal;  Surgeon: Himanshu Calvert MD;  Location:  [facility];  Service:     CARDIAC ELECTROPHYSIOLOGY PROCEDURE N/A 12/18/2017    Procedure: Loop insertion;  Surgeon: Mary Ann Blackwell MD;  Location:  [facility];  Service:     CARDIAC ELECTROPHYSIOLOGY PROCEDURE N/A  11/06/2019    Procedure: Loop recorder removal;  Surgeon: Branden Chatterjee MD;  Location: [facility] INVASIVE LOCATION;  Service: Cardiovascular    CATARACT EXTRACTION W/ INTRAOCULAR LENS IMPLANT Left 12/20/2021    Procedure: CATARACT PHACO EXTRACTION WITH INTRAOCULAR LENS IMPLANT LEFT;  Surgeon: Arthur Mcdonald MD;  Location: [facility] OR;  Service: Ophthalmology;  Laterality: Left;    CATARACT EXTRACTION W/ INTRAOCULAR LENS IMPLANT Right 01/03/2022    Procedure: CATARACT PHACO EXTRACTION WITH INTRAOCULAR LENS IMPLANT RIGHT WITH VIVITY LENS;  Surgeon: Arthur Mcdonald MD;  Location: [facility] OR;  Service: Ophthalmology;  Laterality: Right;    CHOLECYSTECTOMY      2004    COLON RESECTION N/A 03/25/2022    Procedure: LAPAROSCOPIC  COLON RESECTION SIGMOIDECTOMY;  Surgeon: Arturo Kumar MD;  Location: [facility] OR;  Service: General;  Laterality: N/A;    COLONOSCOPY  12/10/2019    COLONOSCOPY  12/2021    CYSTOSCOPY  2017    DILATATION AND CURETTAGE      EAR TUBES Bilateral     ENDOVENOUS ABLATION SAPHENOUS VEIN W/ LASER Left 02/05/2024    ENDOVENOUS ABLATION SAPHENOUS VEIN W/ LASER Right 02/19/2024    EYE SURGERY  12/2021 & 01/2022    Had cateracs removed from both eyes and multi focal implants put in both.    FOOT SURGERY Left     bone spur    HAMMER TOE REPAIR Left 11/13/2020    INGUINAL HERNIA REPAIR Left     x 3    JOINT REPLACEMENT  2012, 2014    Bilateral knee replacements    LUNG BIOPSY      Remote    LYMPH NODE BIOPSY      REPLACEMENT TOTAL KNEE BILATERAL Bilateral     THYROIDECTOMY, PARTIAL Left 06/2012    Dr. Cerda, [place]    THYROIDECTOMY, PARTIAL Right 07/2012    Dr. Cerda [place]    TONSILLECTOMY  07/2020    Dr. Ethan Mcneill    TOTAL ABDOMINAL HYSTERECTOMY WITH SALPINGO OOPHORECTOMY Bilateral 1996    TOTAL THYROIDECTOMY      completion thyroidectomy for follicular thyroid cancer.      TYMPANOSTOMY TUBE PLACEMENT Right 07/2020    Dr. Ethan Mcneill    UPPER GASTROINTESTINAL ENDOSCOPY       15-20 years ago    VASCULAR SURGERY  01/2024    Bi lateral leg vein ablations    VEIN SURGERY  February 2024    Both legs    VENTRAL/INCISIONAL HERNIA REPAIR N/A 10/27/2022    Procedure: INCISIONAL HERNIA REPAIR WITH MESH,  COMPONENT SEPARATION;  Surgeon: Arturo Kumar MD;  Location: [facility] OR;  Service: General;  Laterality: N/A;    WISDOM TOOTH EXTRACTION       Family History   Problem Relation Age of Onset    Atrial fibrillation Mother     Thyroid disease Mother     Stroke Mother         Stroke x2    Arthritis Mother         Osteoarthritis    Arrhythmia Mother     Heart failure Mother     Diabetes Mother     Colon polyps Mother     Early death Father 68        Lung cancer    Lung cancer Father     Alcohol abuse Father     Cancer Father     COPD Father     Cirrhosis Father     Early death Sister 16        Heart attack    Heart attack Sister     Down syndrome Sister     Developmental Disability Sister         Down syndrome    Birth defects Sister         Downs Syndrome    Learning disabilities Sister         Severe Downs Syndrome    Mental retardation Sister     Sleep apnea Brother         Nonorganic    Mental illness Brother         At births    Arthritis Brother         Osteoarthritis    Asthma Brother     Developmental Disability Brother         Mild mintal retardation    Arrhythmia Brother     Heart attack Brother     Heart failure Brother     Obesity Brother     Birth defects Brother         Mild Mental Retardation    Learning disabilities Brother         Mild mental retardation    Hyperlipidemia Brother     Other Brother     Hypertension Brother     Mental retardation Brother     Thyroid disease Brother     Stroke Maternal Aunt     Atrial fibrillation Maternal Aunt     Stroke Maternal Aunt     Atrial fibrillation Maternal Aunt     Stroke Maternal Grandmother     Hypertension Maternal Grandmother     Asthma Brother         Childhood    Early death Sister         Heart attack    Breast cancer Neg Hx      Ovarian cancer Neg Hx      Social History     Socioeconomic History    Marital status:    Tobacco Use    Smoking status: Former     Current packs/day: 0.00     Average packs/day: 1.6 packs/day for 27.5 years (44.0 ttl pk-yrs)     Types: Cigarettes     Start date: 3/15/1974     Quit date: 1996     Years since quittin.9     Passive exposure: Past    Smokeless tobacco: Never    Tobacco comments:     Smoked menthol   Vaping Use    Vaping status: Never Used   Substance and Sexual Activity    Alcohol use: Not Currently     Comment: very rare    Drug use: No    Sexual activity: Not Currently     Partners: Male     Birth control/protection: Vasectomy, Hysterectomy     Current Outpatient Medications:     albuterol sulfate HFA (Ventolin HFA) 108 (90 Base) MCG/ACT inhaler, Inhale 2 puffs Every 4 (Four) Hours As Needed for Wheezing or Shortness of Air., Disp: 18 g, Rfl: 5    aspirin 81 MG EC tablet, Take 1 tablet by mouth Daily., Disp: , Rfl:     betamethasone valerate (VALISONE) 0.1 % cream, Apply TO BOTH ear canals gently with q-tip 2-3 times WEEKLY, Disp: , Rfl:     calcium carbonate (OS-SHANTE) 600 MG tablet, Take 1 tablet by mouth As Needed for Heartburn., Disp: , Rfl:     chlorhexidine (PERIDEX) 0.12 % solution, SWISH with 15 MILLILITERS FOR TWO minutes AND spit AFTER breakfast AND AFTER dinner, Disp: , Rfl:     Coenzyme Q10 (Co Q-10) 200 MG capsule, Take  by mouth., Disp: , Rfl:     cyanocobalamin (VITAMIN B-12) 2500 MCG tablet tablet, TAKE ONE TABLET BY MOUTH THREE TIMES A WEEK MUST MAKE AN APPOINTMENT (Patient taking differently: Take 1,200 mcg by mouth 1 (One) Time Per Week. ), Disp: 30 tablet, Rfl: 5    cyclobenzaprine (FLEXERIL) 5 MG tablet, Take 1 tablet by mouth 3 (Three) Times a Day As Needed for Muscle Spasms., Disp: 45 tablet, Rfl: 1    Diclofenac Sodium (VOLTAREN) 1 % gel gel, Apply 4 g topically to the appropriate area as directed 4 (Four) Times a Day As Needed (pain)., Disp: 100 g, Rfl:  11    docusate sodium 100 MG capsule, Take 1 capsule by mouth 2 (Two) Times a Day As Needed for Constipation., Disp: 30 capsule, Rfl: 0    estradiol (ESTRACE) 0.1 MG/GM vaginal cream, Insert TWO grams vaginally daily AS NEEDED FOR vaginal dryness. Usually uses twice A WEEK (Patient taking differently: Insert 2 g into the vagina 3 (Three) Times a Week.), Disp: 42.5 g, Rfl: 11    gabapentin (NEURONTIN) 100 MG capsule, Take 1 capsule by mouth 3 (Three) Times a Day., Disp: 90 capsule, Rfl: 0    GLUCOSAMINE HCL-MSM PO, Take 1 tablet by mouth Daily., Disp: , Rfl:     ipratropium (ATROVENT) 0.06 % nasal spray, INSTILL 1 SPRAY IN EACH NOSTRIL ONCE DAILY AS NEEDED FOR RHINITIS, Disp: 15 mL, Rfl: 3    levothyroxine (SYNTHROID, LEVOTHROID) 150 MCG tablet, TAKE ONE TABLET BY MOUTH EVERY DAY, Disp: 90 tablet, Rfl: 2    meclizine (ANTIVERT) 25 MG tablet, Take 0.5-1 tablets by mouth 3 (Three) Times a Day As Needed for Dizziness., Disp: 30 tablet, Rfl: 0    methylPREDNISolone (MEDROL) 4 MG dose pack, , Disp: , Rfl:     metoprolol tartrate (LOPRESSOR) 25 MG tablet, Take 1 tablet by mouth 2 (Two) Times a Day., Disp: 180 tablet, Rfl: 3    MULTIPLE VITAMIN PO, Take 1 tablet by mouth Daily., Disp: , Rfl:     nitroglycerin (NITROSTAT) 0.4 MG SL tablet, 1 under the tongue as needed for angina, may repeat q5mins for up three doses (Patient taking differently: 1 tablet Every 5 (Five) Minutes As Needed for Chest Pain. 1 under the tongue as needed for angina, may repeat q5mins for up three doses), Disp: 100 tablet, Rfl: 11    NON FORMULARY, Take 2 tablets by mouth Daily. Lions ramiro mushrooms, Disp: , Rfl:     nystatin (MYCOSTATIN) 100,000 unit/mL suspension, Take 5 mL by mouth 4 (Four) Times a Day., Disp: 280 mL, Rfl: 0    ofloxacin (FLOXIN) 0.3 % otic solution, Administer 5 drops to the right ear As Needed (PRESSURE IN EARS)., Disp: , Rfl:     Omega-3 Fatty Acids (FISH OIL) 435 MG capsule, Take 1,000 mg by mouth Daily., Disp: , Rfl:      "omeprazole (priLOSEC) 40 MG capsule, TAKE ONE CAPSULE BY MOUTH DAILY (Patient taking differently: Take 1 capsule by mouth Daily As Needed.), Disp: 90 capsule, Rfl: 3    penicillin v potassium (VEETID) 500 MG tablet, Take 1 tablet by mouth 3 times a day., Disp: , Rfl:     rosuvastatin (CRESTOR) 20 MG tablet, Take 1 tablet by mouth Daily., Disp: 30 tablet, Rfl: 11    SUMAtriptan (IMITREX) 25 MG tablet, Take 1 tablet by mouth As Needed for Migraine., Disp: , Rfl:     tiotropium (SPIRIVA) 18 MCG per inhalation capsule, Place 1 capsule into inhaler and inhale Daily., Disp: 90 capsule, Rfl: 3    TURMERIC PO, Take 3 capsules by mouth Daily., Disp: , Rfl:     vitamin C (ASCORBIC ACID) 250 MG tablet, Take 1 tablet by mouth Daily., Disp: , Rfl:     Current Facility-Administered Medications:     triamcinolone acetonide (KENALOG-40) injection 40 mg, 40 mg, Intra-articular, Once, Gordon Norman MD    Allergies   Allergen Reactions    Codeine Other (See Comments)     Pt states \"it made me feel like my insides were in a vice \"    codeine    Niacin Hives and Other (See Comments)     niacin    Lipitor [Atorvastatin] Myalgia    Propoxyphene Nausea And Vomiting and Other (See Comments)     propoxyphene hydrochloride    Zofran [Ondansetron] Other (See Comments)     Face/neck/chest very red/flushed after IV Zofran administration; tolerates po Zofran without difficultly     Adhesive Tape Rash     tegaderm     Bentyl [Dicyclomine] Rash     Patient states she turned \"bright red\"    Ciprofloxacin-Dexamethasone Other (See Comments) and Unknown - Low Severity     REDNESS,tendonitis    Ciprodex    Flagyl [Metronidazole] Nausea Only    Other Other (See Comments)     POLLEN,TREES,AND PLANTS MULTIPLE ENVIRONMENTAL ALLERGIES    Prednisone Rash     The following portions of the patient's history were reviewed and updated as appropriate: allergies, current medications, past family history, past medical history, past social history, past " surgical history and problem list.    Objective     Physical Exam  Vitals reviewed.   Constitutional:       General: She is not in acute distress.     Appearance: Normal appearance. She is well-developed. She is not ill-appearing or diaphoretic.   HENT:      Head: Normocephalic and atraumatic.      Right Ear: External ear normal.      Left Ear: External ear normal.      Nose: Nose normal.   Eyes:      General: No scleral icterus.        Right eye: No discharge.         Left eye: No discharge.      Conjunctiva/sclera: Conjunctivae normal.   Neck:      Vascular: No JVD.   Cardiovascular:      Rate and Rhythm: Normal rate and regular rhythm.      Heart sounds: Normal heart sounds. No murmur heard.     No friction rub. No gallop.   Pulmonary:      Effort: Pulmonary effort is normal. No respiratory distress.      Breath sounds: Normal breath sounds. No wheezing or rales.   Chest:      Chest wall: No tenderness.   Abdominal:      General: Bowel sounds are normal. There is no distension.      Palpations: Abdomen is soft. There is no mass.      Tenderness: There is abdominal tenderness (epigastric, mild). There is no guarding.   Musculoskeletal:         General: No deformity. Normal range of motion.      Cervical back: Normal range of motion.   Skin:     General: Skin is warm and dry.      Findings: No erythema or rash.   Neurological:      Mental Status: She is alert and oriented to person, place, and time.      Coordination: Coordination normal.   Psychiatric:         Mood and Affect: Mood normal.         Behavior: Behavior normal.         Thought Content: Thought content normal.         Judgment: Judgment normal.       Vitals:    05/13/25 0823   BP: 126/70   Pulse: 60   SpO2: 97%   Weight: Comment: Pt refused     Results Review:   I have reviewed the patient's new clinical and imaging results.    Office Visit on 05/07/2025   Component Date Value Ref Range Status    Glucose 05/07/2025 97  65 - 99 mg/dL Final    BUN  05/07/2025 15  8 - 23 mg/dL Final    Creatinine 05/07/2025 0.87  0.57 - 1.00 mg/dL Final    Sodium 05/07/2025 136  136 - 145 mmol/L Final    Potassium 05/07/2025 4.4  3.5 - 5.2 mmol/L Final    Chloride 05/07/2025 99  98 - 107 mmol/L Final    CO2 05/07/2025 29.4 (H)  22.0 - 29.0 mmol/L Final    Calcium 05/07/2025 9.5  8.6 - 10.5 mg/dL Final    Total Protein 05/07/2025 6.8  6.0 - 8.5 g/dL Final    Albumin 05/07/2025 4.3  3.5 - 5.2 g/dL Final    ALT (SGPT) 05/07/2025 16  1 - 33 U/L Final    AST (SGOT) 05/07/2025 18  1 - 32 U/L Final    Alkaline Phosphatase 05/07/2025 83  39 - 117 U/L Final    Total Bilirubin 05/07/2025 0.4  0.0 - 1.2 mg/dL Final    Globulin 05/07/2025 2.5  gm/dL Final    A/G Ratio 05/07/2025 1.7  g/dL Final    BUN/Creatinine Ratio 05/07/2025 17.2  7.0 - 25.0 Final    Anion Gap 05/07/2025 7.6  5.0 - 15.0 mmol/L Final    eGFR 05/07/2025 72.2  >60.0 mL/min/1.73 Final    Lipase 05/07/2025 19  13 - 60 U/L Final    WBC 05/07/2025 7.90  3.40 - 10.80 10*3/mm3 Final    RBC 05/07/2025 4.63  3.77 - 5.28 10*6/mm3 Final    Hemoglobin 05/07/2025 13.1  12.0 - 15.9 g/dL Final    Hematocrit 05/07/2025 41.0  34.0 - 46.6 % Final    MCV 05/07/2025 88.6  79.0 - 97.0 fL Final    MCH 05/07/2025 28.3  26.6 - 33.0 pg Final    MCHC 05/07/2025 32.0  31.5 - 35.7 g/dL Final    RDW 05/07/2025 12.9  12.3 - 15.4 % Final    RDW-SD 05/07/2025 41.6  37.0 - 54.0 fl Final    MPV 05/07/2025 12.1 (H)  6.0 - 12.0 fL Final    Platelets 05/07/2025 257  140 - 450 10*3/mm3 Final    Neutrophil % 05/07/2025 55.4  42.7 - 76.0 % Final    Lymphocyte % 05/07/2025 30.5  19.6 - 45.3 % Final    Monocyte % 05/07/2025 6.8  5.0 - 12.0 % Final    Eosinophil % 05/07/2025 6.1  0.3 - 6.2 % Final    Basophil % 05/07/2025 0.8  0.0 - 1.5 % Final    Immature Grans % 05/07/2025 0.4  0.0 - 0.5 % Final    Neutrophils, Absolute 05/07/2025 4.38  1.70 - 7.00 10*3/mm3 Final    Lymphocytes, Absolute 05/07/2025 2.41  0.70 - 3.10 10*3/mm3 Final    Monocytes, Absolute  05/07/2025 0.54  0.10 - 0.90 10*3/mm3 Final    Eosinophils, Absolute 05/07/2025 0.48 (H)  0.00 - 0.40 10*3/mm3 Final    Basophils, Absolute 05/07/2025 0.06  0.00 - 0.20 10*3/mm3 Final    Immature Grans, Absolute 05/07/2025 0.03  0.00 - 0.05 10*3/mm3 Final    nRBC 05/07/2025 0.0  0.0 - 0.2 /100 WBC Final   Orders Only on 04/12/2025   Component Date Value Ref Range Status    Hemoglobin A1C 04/15/2025 5.70 (H)  4.80 - 5.60 % Final    Comment: Hemoglobin A1C Ranges:  Increased Risk for Diabetes  5.7% to 6.4%  Diabetes                     >= 6.5%  Diabetic Goal                < 7.0%        CT Abdomen Pelvis With & Without Contrast  Result Date: 5/7/2025  Possible gastric fundal mass, recommend endoscopy.  Diverticulosis.  Fatty liver.      Assessment / Plan      1. Abnormal CT scan, stomach  2. Gastroesophageal reflux disease, unspecified whether esophagitis present  Long history of GERD and abdominal pain which she has related to IBS. Recent CTAP 5/2025 done due to LLQ abdominal pain that has resolved now. CTAP showed possible gastric fundal mass, reported prominence seen. She had EGD in the past but it was years ago. On PPI daily for GERD which helps symptoms. No dysphagia. Labs 5/2025 with normal H/H. No anemia. Needs further evaluation of possible gastric mass.     EGD, urgent, will have tomorrow 5/14/2025  Already holding ASA for the past 6 days  Continue PPI daily    She will need an esophagogastroduodenoscopy performed with monitored anesthesia care. The indications, technique, alternatives and potential risk and complications were discussed with the patient including but not limited to bleeding, intestinal perforations, missing lesions and anesthetic complications. The patient understands and wishes to proceed with the procedure and has given their verbal consent. Written patient education information was given to the patient. She should follow up in the office after this procedure to discuss the results and  further recommendations can be made at that time. The patient will call if they have further questions before procedure.  - Case Request    3. Metabolic dysfunction-associated steatotic liver disease (MASLD)  History of fatty liver. Nonalcoholic. BMI 38 when last calculated. Liver enzymes normal 5/2025. Plt normal. No signs of cirrhosis.     Mediterranean diet  Continue to monitor  Continue to work on weight loss    4. History of adenomatous polyp of colon  5. History of colon surgery  6. History of diverticulitis  7. Irritable bowel syndrome with both constipation and diarrhea  Long history of IBS, has mixed stool consistencies. Currently symptoms are ok. Having stools daily at this time. Has history of colon surgery related to diverticulitis. Last colonoscopy was 2021 by [facility].     Will request op/path from last colonoscopy to review  Continue current bowel regimen  Was previously directed to repeat colonoscopy in 5 years, due 2026      Follow Up:   Return for follow up after procedure to discuss results.    Sabrina Wesley PA-C  [facility] [place]  5/13/2025  13:04 EDT

## 2025-05-14 ENCOUNTER — ANESTHESIA (OUTPATIENT)
Dept: GASTROENTEROLOGY | Facility: HOSPITAL | Age: 70
End: 2025-05-14
Payer: MEDICARE

## 2025-05-14 ENCOUNTER — HOSPITAL ENCOUNTER (OUTPATIENT)
Facility: HOSPITAL | Age: 70
Setting detail: HOSPITAL OUTPATIENT SURGERY
Discharge: HOME | End: 2025-05-14
Attending: INTERNAL MEDICINE | Admitting: INTERNAL MEDICINE
Payer: MEDICARE

## 2025-05-14 ENCOUNTER — ANESTHESIA EVENT (OUTPATIENT)
Dept: GASTROENTEROLOGY | Facility: HOSPITAL | Age: 70
End: 2025-05-14
Payer: MEDICARE

## 2025-05-14 VITALS
TEMPERATURE: 97.2 F | HEART RATE: 53 BPM | OXYGEN SATURATION: 96 % | SYSTOLIC BLOOD PRESSURE: 117 MMHG | RESPIRATION RATE: 18 BRPM | DIASTOLIC BLOOD PRESSURE: 62 MMHG

## 2025-05-14 DIAGNOSIS — R93.3 ABNORMAL CT SCAN, STOMACH: ICD-10-CM

## 2025-05-14 PROCEDURE — 25810000003 SODIUM CHLORIDE 0.9 % SOLUTION: Performed by: PHYSICIAN ASSISTANT

## 2025-05-14 PROCEDURE — 25010000002 PROPOFOL 200 MG/20ML EMULSION: Performed by: NURSE ANESTHETIST, CERTIFIED REGISTERED

## 2025-05-14 PROCEDURE — 43239 EGD BIOPSY SINGLE/MULTIPLE: CPT | Performed by: INTERNAL MEDICINE

## 2025-05-14 RX ORDER — SODIUM CHLORIDE 9 MG/ML
30 INJECTION, SOLUTION INTRAVENOUS CONTINUOUS PRN
Status: DISCONTINUED | OUTPATIENT
Start: 2025-05-14 | End: 2025-05-14 | Stop reason: HOSPADM

## 2025-05-14 RX ORDER — PROPOFOL 10 MG/ML
INJECTION, EMULSION INTRAVENOUS AS NEEDED
Status: DISCONTINUED | OUTPATIENT
Start: 2025-05-14 | End: 2025-05-14 | Stop reason: SURG

## 2025-05-14 RX ORDER — LIDOCAINE HCL/PF 100 MG/5ML
SYRINGE (ML) INJECTION AS NEEDED
Status: DISCONTINUED | OUTPATIENT
Start: 2025-05-14 | End: 2025-05-14 | Stop reason: SURG

## 2025-05-14 RX ADMIN — PROPOFOL 100 MG: 10 INJECTION, EMULSION INTRAVENOUS at 09:26

## 2025-05-14 RX ADMIN — SODIUM CHLORIDE 30 ML/HR: 9 INJECTION, SOLUTION INTRAVENOUS at 08:51

## 2025-05-14 RX ADMIN — Medication 40 MG: at 09:24

## 2025-05-14 RX ADMIN — PROPOFOL 100 MG: 10 INJECTION, EMULSION INTRAVENOUS at 09:24

## 2025-05-14 NOTE — ANESTHESIA POSTPROCEDURE EVALUATION
Patient: Albania Ramos    Procedure Summary       Date: 05/14/25 Room / Location: [facility]COPY 2 / [facility]    Anesthesia Start: 0921 Anesthesia Stop: 0933    Procedure: Esophagogastroduodenoscopy with biopsy Diagnosis:       Abnormal CT scan, stomach      (Abnormal CT scan, stomach [R93.3])    Surgeons: Nereida Amaro MD Provider: Vinicio Marquez CRNA    Anesthesia Type: MAC ASA Status: 3            Anesthesia Type: MAC    Vitals  HR 74  Sat 95  Resp 12  /53  Temp 98        Post Anesthesia Care and Evaluation    Patient location during evaluation: bedside  Patient participation: complete - patient participated  Level of consciousness: awake and alert and sleepy but conscious  Pain score: 0  Pain management: adequate    Airway patency: patent  Anesthetic complications: No anesthetic complications  PONV Status: none  Cardiovascular status: acceptable  Respiratory status: acceptable and nasal cannula  Hydration status: acceptable

## 2025-05-14 NOTE — DISCHARGE INSTRUCTIONS
Rest today  No pushing,pulling,tugging,heavy lifting, or strenuous activity   No major decision making,driving,or drinking alcoholic beverages for 24 hours due to the sedation you received  Always use good hand hygiene/washing technique  No driving on pain medication.    To assist you in voiding:  Drink plenty of fluids  Listen to running water while attempting to void.    If you are unable to urinate and you have an uncomfortable urge to void or it has been   6 hours since you were discharged, return to the Emergency Room.    - Discharge patient to home (ambulatory).   - Resume previous diet.   - Follow an antireflux regimen.   - Await pathology results.   - Follow up CTAP in 3-6 months  - Return to my office in 8 weeks.

## 2025-05-14 NOTE — ANESTHESIA PREPROCEDURE EVALUATION
Anesthesia Evaluation     Patient summary reviewed and Nursing notes reviewed   history of anesthetic complications:  PONV  NPO Solid Status: > 8 hours  NPO Liquid Status: > 8 hours           Airway   Mallampati: II  TM distance: >3 FB  Possible difficult intubation and Large neck circumference  Dental - normal exam     Pulmonary    (+) pneumonia resolved , a smoker Former, COPD, asthma,shortness of breath, sleep apnea, decreased breath sounds  Cardiovascular - normal exam    (+) dysrhythmias Atrial Fib, PVC, CHF , ERYES, PVD, hyperlipidemia      Neuro/Psych  (+) headaches (migraines ), dizziness/light headedness, psychiatric history Anxiety and Depression  GI/Hepatic/Renal/Endo    (+) GERD, thyroid problem (follicular thyroid cancer treated with thyroidectomy and BRAMBILA ) hypothyroidism and thyroid cancer    Musculoskeletal     (+) arthralgias, back pain, chronic pain, myalgias  Abdominal   (+) obese   Substance History      OB/GYN          Other   arthritis,   history of cancer                  Anesthesia Plan    ASA 3     MAC     (Risks and benefits discussed including risk of aspiration, recall and dental damage. All patient questions answered.    Will continue with plan of care.)  intravenous induction     Anesthetic plan, risks, benefits, and alternatives have been provided, discussed and informed consent has been obtained with: patient.  Pre-procedure education provided

## 2025-05-14 NOTE — H&P
[facility]  HISTORY AND PHYSICAL    Patient Name: Albania Ramos  : 1955  MRN: [id number]    Chief Complaint:   For EGD    History Of Presenting Illness:    Abnormal CTAP    Past Medical History:   Diagnosis Date    Abdominal pain     Abnormal ECG     Atrial Fibrillation    Allergic 2003    Allergic rhinitis     Ankle joint pain     Arrhythmia 2006    Arthritis     Arthritis 2023    Asthma     Bronchiectasis     Cataract 2015    Had left removed 2021 and rt removed 2022    Cholelithiasis     Chronic bronchitis     Colon polyp 12/10/2019    COVID-19 vaccine series completed     pfizer x3    Degenerative joint disease     Depression     history of    Diverticulitis     Diverticulitis of colon     Diverticulosis     Dizziness     Has had some episodes. No blake syncope.2007 patient underwent pulmonary vein isolation, initially successful.Patient returned, however in February noting some recurrent episode of dizziness.Then wore wore event recorder which showed some recurrent PAF.    Dysuria     Easy bruising     Elevated cholesterol     Fatty liver 2020    Fibroid Many years    Breast    Follicular thyroid cancer     treated with total thyroidectomy followed by BRAMBILA    GERD (gastroesophageal reflux disease)     H/O bone density study     Hay fever     Hernia     Incissional hernia    History of chest x-ray 07/10/2015    No acute cardiopulmonary process    History of chest x-ray 2015    No acute cardiopulmonary process    History of echocardiogram 2007    LVEF of greater than 60%. Mild MR.Mild TR.Trace pulmonary insufficinecy.    History of mammogram     History of PFTs 2015    Normal spirometry    Hyperlipidemia     Hypothyroidism     Inflammatory bowel disease 2000    Irritable bowel syndrome 2000    Measles     Migraine headache     Mumps     Obesity 2020    Osteopenia 2000    Osteoporosis     Paroxysmal atrial fibrillation     A. Failed medical  therapy. B. Pulmonary vein isolation 11/2006. C. Recurrent episodes of PAF by event recorder 9/2006.    Peptic ulceration     Pneumonia     PONV (postoperative nausea and vomiting)     Primary hypertension 06/13/2024    Shortness of breath     Sleep apnea     USES CPAP    Sleep apnea, obstructive     Surfer's nodules of right foot     Thyroid nodule 2012    Torn meniscus     Urinary incontinence 2018    Urinary tract infection     Varicella     Visual impairment        Past Surgical History:   Procedure Laterality Date    ABDOMINAL SURGERY  2022    Hernia repair    ABLATION OF DYSRHYTHMIC FOCUS      x 2    ATRIAL APPENDAGE EXCLUSION LEFT WITH TRANSESOPHAGEAL ECHOCARDIOGRAM N/A 09/10/2024    Procedure: Atrial Appendage Occlusion;  Surgeon: Miguel Yu DO;  Location:  [facility];  Service: Cardiology;  Laterality: N/A;    BRONCHOSCOPY      CARDIAC CATHETERIZATION      ABLATION X2    CARDIAC ELECTROPHYSIOLOGY PROCEDURE N/A 08/04/2016    Procedure: Loop recorder implant;  Surgeon: Himanshu Calvert MD;  Location:  [facility];  Service:     CARDIAC ELECTROPHYSIOLOGY PROCEDURE N/A 10/13/2016    Procedure: Loop recorder removal;  Surgeon: Himanshu Calvert MD;  Location:  [facility];  Service:     CARDIAC ELECTROPHYSIOLOGY PROCEDURE N/A 12/18/2017    Procedure: Loop insertion;  Surgeon: Mary Ann Blackwell MD;  Location:  [facility];  Service:     CARDIAC ELECTROPHYSIOLOGY PROCEDURE N/A 11/06/2019    Procedure: Loop recorder removal;  Surgeon: Branden Chatterjee MD;  Location:  [facility];  Service: Cardiovascular    CATARACT EXTRACTION W/ INTRAOCULAR LENS IMPLANT Left 12/20/2021    Procedure: CATARACT PHACO EXTRACTION WITH INTRAOCULAR LENS IMPLANT LEFT;  Surgeon: Arthur Mcdonald MD;  Location: [facility];  Service: Ophthalmology;  Laterality: Left;    CATARACT EXTRACTION W/ INTRAOCULAR LENS IMPLANT Right 01/03/2022    Procedure:  CATARACT PHACO EXTRACTION WITH INTRAOCULAR LENS IMPLANT RIGHT WITH VIVITY LENS;  Surgeon: Arthur Mcdonald MD;  Location:  [facility];  Service: Ophthalmology;  Laterality: Right;    CHOLECYSTECTOMY      2004    COLON RESECTION N/A 03/25/2022    Procedure: LAPAROSCOPIC  COLON RESECTION SIGMOIDECTOMY;  Surgeon: Arturo Kumar MD;  Location:  [facility];  Service: General;  Laterality: N/A;    COLONOSCOPY  12/10/2019    COLONOSCOPY  12/2021    CYSTOSCOPY  2017    DILATATION AND CURETTAGE      EAR TUBES Bilateral     ENDOVENOUS ABLATION SAPHENOUS VEIN W/ LASER Left 02/05/2024    ENDOVENOUS ABLATION SAPHENOUS VEIN W/ LASER Right 02/19/2024    EYE SURGERY  12/2021 & 01/2022    Had cateracs removed from both eyes and multi focal implants put in both.    FOOT SURGERY Left     bone spur    HAMMER TOE REPAIR Left 11/13/2020    INGUINAL HERNIA REPAIR Left     x 3    JOINT REPLACEMENT  2012, 2014    Bilateral knee replacements    LUNG BIOPSY      Remote    LYMPH NODE BIOPSY      REPLACEMENT TOTAL KNEE BILATERAL Bilateral     THYROIDECTOMY, PARTIAL Left 06/2012    Dr. Cerda, [place]    THYROIDECTOMY, PARTIAL Right 07/2012    Dr. Cerda, [place]    TONSILLECTOMY  07/2020    Dr. Ethan Mcneill    TOTAL ABDOMINAL HYSTERECTOMY WITH SALPINGO OOPHORECTOMY Bilateral 1996    TOTAL THYROIDECTOMY      completion thyroidectomy for follicular thyroid cancer.      TYMPANOSTOMY TUBE PLACEMENT Right 07/2020    Dr. Ethan Mcneill    UPPER GASTROINTESTINAL ENDOSCOPY      15-20 years ago    VASCULAR SURGERY  01/2024    Bi lateral leg vein ablations    VEIN SURGERY  February 2024    Both legs    VENTRAL/INCISIONAL HERNIA REPAIR N/A 10/27/2022    Procedure: INCISIONAL HERNIA REPAIR WITH MESH,  COMPONENT SEPARATION;  Surgeon: Arturo Kumar MD;  Location:  [facility];  Service: General;  Laterality: N/A;    WISDOM TOOTH EXTRACTION         Social History     Socioeconomic History    Marital status:    Tobacco Use    Smoking status: Former      Current packs/day: 0.00     Average packs/day: 1.6 packs/day for 27.5 years (44.0 ttl pk-yrs)     Types: Cigarettes     Start date: 3/15/1974     Quit date: 1996     Years since quittin.9     Passive exposure: Past    Smokeless tobacco: Never    Tobacco comments:     Smoked menthol   Vaping Use    Vaping status: Never Used   Substance and Sexual Activity    Alcohol use: Not Currently     Comment: very rare    Drug use: No    Sexual activity: Not Currently     Partners: Male     Birth control/protection: Vasectomy, Hysterectomy       Family History   Problem Relation Age of Onset    Atrial fibrillation Mother     Thyroid disease Mother     Stroke Mother         Stroke x2    Arthritis Mother         Osteoarthritis    Arrhythmia Mother     Heart failure Mother     Diabetes Mother     Colon polyps Mother     Early death Father 68        Lung cancer    Lung cancer Father     Alcohol abuse Father     Cancer Father     COPD Father     Cirrhosis Father     Early death Sister 16        Heart attack    Heart attack Sister     Down syndrome Sister     Developmental Disability Sister         Down syndrome    Birth defects Sister         Downs Syndrome    Learning disabilities Sister         Severe Downs Syndrome    Mental retardation Sister     Sleep apnea Brother         Nonorganic    Mental illness Brother         At births    Arthritis Brother         Osteoarthritis    Asthma Brother     Developmental Disability Brother         Mild mintal retardation    Arrhythmia Brother     Heart attack Brother     Heart failure Brother     Obesity Brother     Birth defects Brother         Mild Mental Retardation    Learning disabilities Brother         Mild mental retardation    Hyperlipidemia Brother     Other Brother     Hypertension Brother     Mental retardation Brother     Thyroid disease Brother     Stroke Maternal Aunt     Atrial fibrillation Maternal Aunt     Stroke Maternal Aunt     Atrial fibrillation Maternal  Aunt     Stroke Maternal Grandmother     Hypertension Maternal Grandmother     Asthma Brother         Childhood    Early death Sister         Heart attack    Breast cancer Neg Hx     Ovarian cancer Neg Hx        Prior to Admission Medications:  Facility-Administered Medications Prior to Admission   Medication Dose Route Frequency Provider Last Rate Last Admin    triamcinolone acetonide (KENALOG-40) injection 40 mg  40 mg Intra-articular Once Gordon Norman MD         Medications Prior to Admission   Medication Sig Dispense Refill Last Dose/Taking    albuterol sulfate HFA (Ventolin HFA) 108 (90 Base) MCG/ACT inhaler Inhale 2 puffs Every 4 (Four) Hours As Needed for Wheezing or Shortness of Air. 18 g 5 Past Week    betamethasone valerate (VALISONE) 0.1 % cream Apply TO BOTH ear canals gently with q-tip 2-3 times WEEKLY   Past Week    calcium carbonate (OS-SHANTE) 600 MG tablet Take 1 tablet by mouth As Needed for Heartburn.   Past Week    chlorhexidine (PERIDEX) 0.12 % solution SWISH with 15 MILLILITERS FOR TWO minutes AND spit AFTER breakfast AND AFTER dinner   Past Week    Coenzyme Q10 (Co Q-10) 200 MG capsule Take  by mouth.   Past Week    cyanocobalamin (VITAMIN B-12) 2500 MCG tablet tablet TAKE ONE TABLET BY MOUTH THREE TIMES A WEEK MUST MAKE AN APPOINTMENT (Patient taking differently: Take 1,200 mcg by mouth 1 (One) Time Per Week. MONDAYS) 30 tablet 5 Past Week    cyclobenzaprine (FLEXERIL) 5 MG tablet Take 1 tablet by mouth 3 (Three) Times a Day As Needed for Muscle Spasms. 45 tablet 1 Past Week    Diclofenac Sodium (VOLTAREN) 1 % gel gel Apply 4 g topically to the appropriate area as directed 4 (Four) Times a Day As Needed (pain). 100 g 11 Past Week    docusate sodium 100 MG capsule Take 1 capsule by mouth 2 (Two) Times a Day As Needed for Constipation. 30 capsule 0 Past Week    estradiol (ESTRACE) 0.1 MG/GM vaginal cream Insert TWO grams vaginally daily AS NEEDED FOR vaginal dryness. Usually uses twice A WEEK  (Patient taking differently: Insert 2 g into the vagina 3 (Three) Times a Week.) 42.5 g 11 Past Week    gabapentin (NEURONTIN) 100 MG capsule Take 1 capsule by mouth 3 (Three) Times a Day. 90 capsule 0 Past Week    GLUCOSAMINE HCL-MSM PO Take 1 tablet by mouth Daily.   Past Week    ipratropium (ATROVENT) 0.06 % nasal spray INSTILL 1 SPRAY IN EACH NOSTRIL ONCE DAILY AS NEEDED FOR RHINITIS 15 mL 3 Past Week    levothyroxine (SYNTHROID, LEVOTHROID) 150 MCG tablet TAKE ONE TABLET BY MOUTH EVERY DAY 90 tablet 2 5/14/2025 Morning    meclizine (ANTIVERT) 25 MG tablet Take 0.5-1 tablets by mouth 3 (Three) Times a Day As Needed for Dizziness. 30 tablet 0 Past Week    methylPREDNISolone (MEDROL) 4 MG dose pack    Past Week    metoprolol tartrate (LOPRESSOR) 25 MG tablet Take 1 tablet by mouth 2 (Two) Times a Day. 180 tablet 3 5/13/2025    MULTIPLE VITAMIN PO Take 1 tablet by mouth Daily.   Past Week    NON FORMULARY Take 2 tablets by mouth Daily. Lions ramiro mushrooms   Past Week    nystatin (MYCOSTATIN) 100,000 unit/mL suspension Take 5 mL by mouth 4 (Four) Times a Day. 280 mL 0 Past Week    ofloxacin (FLOXIN) 0.3 % otic solution Administer 5 drops to the right ear As Needed (PRESSURE IN EARS).   Past Week    Omega-3 Fatty Acids (FISH OIL) 435 MG capsule Take 1,000 mg by mouth Daily.   Past Week    omeprazole (priLOSEC) 40 MG capsule TAKE ONE CAPSULE BY MOUTH DAILY (Patient taking differently: Take 1 capsule by mouth Daily As Needed.) 90 capsule 3 Past Week    penicillin v potassium (VEETID) 500 MG tablet Take 1 tablet by mouth 3 times a day.   Past Week    rosuvastatin (CRESTOR) 20 MG tablet Take 1 tablet by mouth Daily. 30 tablet 11 Past Week    SUMAtriptan (IMITREX) 25 MG tablet Take 1 tablet by mouth As Needed for Migraine.   Past Week    tiotropium (SPIRIVA) 18 MCG per inhalation capsule Place 1 capsule into inhaler and inhale Daily. 90 capsule 3 Past Week    TURMERIC PO Take 3 capsules by mouth Daily.   Past Week     "vitamin C (ASCORBIC ACID) 250 MG tablet Take 1 tablet by mouth Daily.   Past Week    aspirin 81 MG EC tablet Take 1 tablet by mouth Daily.   5/7/2025    nitroglycerin (NITROSTAT) 0.4 MG SL tablet 1 under the tongue as needed for angina, may repeat q5mins for up three doses (Patient taking differently: 1 tablet Every 5 (Five) Minutes As Needed for Chest Pain. 1 under the tongue as needed for angina, may repeat q5mins for up three doses) 100 tablet 11 Unknown       Allergies:  Allergies   Allergen Reactions    Codeine Other (See Comments)     Pt states \"it made me feel like my insides were in a vice \"    codeine    Niacin Hives and Other (See Comments)     niacin    Lipitor [Atorvastatin] Myalgia    Propoxyphene Nausea And Vomiting and Other (See Comments)     propoxyphene hydrochloride    Zofran [Ondansetron] Other (See Comments)     Face/neck/chest very red/flushed after IV Zofran administration; tolerates po Zofran without difficultly     Adhesive Tape Rash     tegaderm     Bentyl [Dicyclomine] Rash     Patient states she turned \"bright red\"    Ciprofloxacin-Dexamethasone Other (See Comments) and Unknown - Low Severity     REDNESS,tendonitis    Ciprodex    Flagyl [Metronidazole] Nausea Only    Other Other (See Comments)     POLLEN,TREES,AND PLANTS MULTIPLE ENVIRONMENTAL ALLERGIES    Prednisone Rash        Vitals: Temp:  [97.6 °F (36.4 °C)] 97.6 °F (36.4 °C)  Heart Rate:  [61] 61  Resp:  [18] 18  BP: (132)/(78) 132/78    Review Of Systems:  Constitutional:  Negative for chills, fever, and unexpected weight change.  Respiratory:  Negative for cough, chest tightness, shortness of breath, and wheezing.  Cardiovascular:  Negative for chest pain, palpitations, and leg swelling.  Gastrointestinal:  Negative for abdominal distention, abdominal pain, nausea, vomiting.  Neurological:  Negative for weakness, numbness, and headaches.     Physical Exam:    General Appearance:  Alert, cooperative, in no acute distress. "   Lungs:   Clear to auscultation, respirations regular, even and                 unlabored.   Heart:  Regular rhythm and normal rate.   Abdomen:   Normal bowel sounds, no masses, no organomegaly. Soft, nontender, nondistended   Neurologic: Alert and oriented x 3. Moves all four limbs equally       Assessment & Plan     Assessment:  Principal Problem:    Abnormal CT scan, stomach      Plan: Esophagogastroduodenoscopy (N/A)     Nereida Amaro MD  5/14/2025

## 2025-05-15 LAB — REF LAB TEST METHOD: NORMAL

## 2025-06-03 ENCOUNTER — OFFICE VISIT (OUTPATIENT)
Dept: NEUROSURGERY | Facility: CLINIC | Age: 70
End: 2025-06-03
Payer: MEDICARE

## 2025-06-03 VITALS
TEMPERATURE: 98.7 F | HEIGHT: 69 IN | WEIGHT: 263 LBS | BODY MASS INDEX: 38.95 KG/M2 | SYSTOLIC BLOOD PRESSURE: 122 MMHG | DIASTOLIC BLOOD PRESSURE: 60 MMHG

## 2025-06-03 DIAGNOSIS — M54.12 CERVICAL RADICULOPATHY: Primary | ICD-10-CM

## 2025-06-03 DIAGNOSIS — M50.30 DDD (DEGENERATIVE DISC DISEASE), CERVICAL: ICD-10-CM

## 2025-06-03 DIAGNOSIS — M47.812 CERVICAL SPONDYLOSIS: ICD-10-CM

## 2025-06-03 PROCEDURE — 99213 OFFICE O/P EST LOW 20 MIN: CPT | Performed by: PHYSICIAN ASSISTANT

## 2025-06-03 PROCEDURE — 3078F DIAST BP <80 MM HG: CPT | Performed by: PHYSICIAN ASSISTANT

## 2025-06-03 PROCEDURE — 3074F SYST BP LT 130 MM HG: CPT | Performed by: PHYSICIAN ASSISTANT

## 2025-06-03 RX ORDER — EZETIMIBE 10 MG/1
10 TABLET ORAL DAILY
Qty: 90 TABLET | Refills: 3 | Status: SHIPPED | OUTPATIENT
Start: 2025-06-03

## 2025-06-03 NOTE — PROGRESS NOTES
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 Her medical history is significant for A-fib (status post watchman implant, ), diverticulosis, and sleep apnea. She has intermittent neck pain that acutely worsened over the last 6 months. Since January of this year symptoms have been constant and severe.  She describes pain in the left neck that runs into the shoulder and upper arm. She has a cold feeling all the way down the arm into the finger tips of all 5 fingers. In certain positions she will experience lightening like shooting pain up the back of her head.  I referred her for therapy which has helped. She is very intolerant of medicines. She could not take gabapentin or a muscle relaxer. The medrol dosepak helped. Her arm pain has improved but she still has left sided neck pain that will extend up her head.       Past Medical History:   Diagnosis Date    Abdominal pain     Abnormal ECG     Atrial Fibrillation    Allergic     Allergic rhinitis     Ankle joint pain     Arrhythmia 2006    Arthritis     Arthritis 2023    Asthma     Bronchiectasis     Cataract 2015    Had left removed 2021 and rt removed 2022    Cholelithiasis     Chronic bronchitis     Colon polyp 12/10/2019    COVID-19 vaccine series completed     pfizer x3    Degenerative joint disease     Depression     history of    Diverticulitis     Diverticulitis of colon     Diverticulosis     Dizziness     Has had some episodes. No blake syncope.2007 patient underwent pulmonary vein isolation, initially successful.Patient returned, however in February noting some recurrent episode of dizziness.Then wore wore event recorder which showed some recurrent PAF.    Dysuria     Easy bruising     Elevated cholesterol     Fatty liver 2020    Fibroid Many  years    Breast    Follicular thyroid cancer     treated with total thyroidectomy followed by [facility]    GERD (gastroesophageal reflux disease)     H/O bone density study     Hay fever     Hernia 2022    Incissional hernia    History of chest x-ray 07/10/2015    No acute cardiopulmonary process    History of chest x-ray 07/02/2015    No acute cardiopulmonary process    History of echocardiogram 04/04/2007    LVEF of greater than 60%. Mild MR.Mild TR.Trace pulmonary insufficinecy.    History of mammogram     History of PFTs 07/02/2015    Normal spirometry    Hyperlipidemia 2019    Hypothyroidism     Inflammatory bowel disease 2000    Irritable bowel syndrome 2000    Measles     Migraine headache     Mumps     Obesity 2020    Osteopenia 2000    Osteoporosis     Paroxysmal atrial fibrillation     A. Failed medical therapy. B. Pulmonary vein isolation 11/2006. C. Recurrent episodes of PAF by event recorder 9/2006.    Peptic ulceration     Pneumonia     PONV (postoperative nausea and vomiting)     Primary hypertension 06/13/2024    Shortness of breath     Sleep apnea     USES CPAP    Sleep apnea, obstructive     Surfer's nodules of right foot     Thyroid nodule 2012    Torn meniscus     Urinary incontinence 2018    Urinary tract infection     Varicella     Visual impairment          Current Outpatient Medications:     albuterol sulfate HFA (Ventolin HFA) 108 (90 Base) MCG/ACT inhaler, Inhale 2 puffs Every 4 (Four) Hours As Needed for Wheezing or Shortness of Air., Disp: 18 g, Rfl: 5    aspirin 81 MG EC tablet, Take 1 tablet by mouth Daily., Disp: , Rfl:     betamethasone valerate (VALISONE) 0.1 % cream, Apply TO BOTH ear canals gently with q-tip 2-3 times WEEKLY, Disp: , Rfl:     calcium carbonate (OS-SHANTE) 600 MG tablet, Take 1 tablet by mouth As Needed for Heartburn., Disp: , Rfl:     chlorhexidine (PERIDEX) 0.12 % solution, SWISH with 15 MILLILITERS FOR TWO minutes AND spit AFTER breakfast AND AFTER dinner, Disp: , Rfl:      cyanocobalamin (VITAMIN B-12) 2500 MCG tablet tablet, TAKE ONE TABLET BY MOUTH THREE TIMES A WEEK MUST MAKE AN APPOINTMENT (Patient taking differently: Take 1,200 mcg by mouth 1 (One) Time Per Week. MONDAYS), Disp: 30 tablet, Rfl: 5    cyclobenzaprine (FLEXERIL) 5 MG tablet, Take 1 tablet by mouth 3 (Three) Times a Day As Needed for Muscle Spasms., Disp: 45 tablet, Rfl: 1    Diclofenac Sodium (VOLTAREN) 1 % gel gel, Apply 4 g topically to the appropriate area as directed 4 (Four) Times a Day As Needed (pain)., Disp: 100 g, Rfl: 11    docusate sodium 100 MG capsule, Take 1 capsule by mouth 2 (Two) Times a Day As Needed for Constipation., Disp: 30 capsule, Rfl: 0    estradiol (ESTRACE) 0.1 MG/GM vaginal cream, Insert TWO grams vaginally daily AS NEEDED FOR vaginal dryness. Usually uses twice A WEEK (Patient taking differently: Insert 2 g into the vagina 3 (Three) Times a Week.), Disp: 42.5 g, Rfl: 11    GLUCOSAMINE HCL-MSM PO, Take 1 tablet by mouth Daily., Disp: , Rfl:     ipratropium (ATROVENT) 0.06 % nasal spray, INSTILL 1 SPRAY IN EACH NOSTRIL ONCE DAILY AS NEEDED FOR RHINITIS, Disp: 15 mL, Rfl: 3    levothyroxine (SYNTHROID, LEVOTHROID) 150 MCG tablet, TAKE ONE TABLET BY MOUTH EVERY DAY, Disp: 90 tablet, Rfl: 2    metoprolol tartrate (LOPRESSOR) 25 MG tablet, Take 1 tablet by mouth 2 (Two) Times a Day., Disp: 180 tablet, Rfl: 3    MULTIPLE VITAMIN PO, Take 1 tablet by mouth Daily., Disp: , Rfl:     nitroglycerin (NITROSTAT) 0.4 MG SL tablet, 1 under the tongue as needed for angina, may repeat q5mins for up three doses (Patient taking differently: 1 tablet Every 5 (Five) Minutes As Needed for Chest Pain. 1 under the tongue as needed for angina, may repeat q5mins for up three doses), Disp: 100 tablet, Rfl: 11    NON FORMULARY, Take 2 tablets by mouth Daily. Bernadine rubio mushrooms, Disp: , Rfl:     nystatin (MYCOSTATIN) 100,000 unit/mL suspension, Take 5 mL by mouth 4 (Four) Times a Day., Disp: 280 mL, Rfl: 0     ofloxacin (FLOXIN) 0.3 % otic solution, Administer 5 drops to the right ear As Needed (PRESSURE IN EARS)., Disp: , Rfl:     Omega-3 Fatty Acids (FISH OIL) 435 MG capsule, Take 1,000 mg by mouth Daily., Disp: , Rfl:     omeprazole (priLOSEC) 40 MG capsule, TAKE ONE CAPSULE BY MOUTH DAILY (Patient taking differently: Take 1 capsule by mouth Daily As Needed.), Disp: 90 capsule, Rfl: 3    SUMAtriptan (IMITREX) 25 MG tablet, Take 1 tablet by mouth As Needed for Migraine., Disp: , Rfl:     tiotropium (SPIRIVA) 18 MCG per inhalation capsule, Place 1 capsule into inhaler and inhale Daily., Disp: 90 capsule, Rfl: 3    TURMERIC PO, Take 3 capsules by mouth Daily., Disp: , Rfl:     vitamin C (ASCORBIC ACID) 250 MG tablet, Take 1 tablet by mouth Daily., Disp: , Rfl:     Coenzyme Q10 (Co Q-10) 200 MG capsule, Take  by mouth., Disp: , Rfl:     gabapentin (NEURONTIN) 100 MG capsule, Take 1 capsule by mouth 3 (Three) Times a Day., Disp: 90 capsule, Rfl: 0    meclizine (ANTIVERT) 25 MG tablet, Take 0.5-1 tablets by mouth 3 (Three) Times a Day As Needed for Dizziness., Disp: 30 tablet, Rfl: 0    methylPREDNISolone (MEDROL) 4 MG dose pack, , Disp: , Rfl:     penicillin v potassium (VEETID) 500 MG tablet, Take 1 tablet by mouth 3 times a day., Disp: , Rfl:     rosuvastatin (CRESTOR) 20 MG tablet, Take 1 tablet by mouth Daily., Disp: 30 tablet, Rfl: 11    Current Facility-Administered Medications:     triamcinolone acetonide (KENALOG-40) injection 40 mg, 40 mg, Intra-articular, Once, Gordon Norman MD    Past Surgical History:   Procedure Laterality Date    ABDOMINAL SURGERY  2022    Hernia repair    ABLATION OF DYSRHYTHMIC FOCUS      x 2    ATRIAL APPENDAGE EXCLUSION LEFT WITH TRANSESOPHAGEAL ECHOCARDIOGRAM N/A 09/10/2024    Procedure: Atrial Appendage Occlusion;  Surgeon: Miguel Yu DO;  Location: [facility] INVASIVE LOCATION;  Service: Cardiology;  Laterality: N/A;    BRONCHOSCOPY      CARDIAC CATHETERIZATION      ABLATION X2     CARDIAC ELECTROPHYSIOLOGY PROCEDURE N/A 08/04/2016    Procedure: Loop recorder implant;  Surgeon: Himanshu Calvert MD;  Location:  [facility] INVASIVE LOCATION;  Service:     CARDIAC ELECTROPHYSIOLOGY PROCEDURE N/A 10/13/2016    Procedure: Loop recorder removal;  Surgeon: Himanshu Calvert MD;  Location:  [facility] INVASIVE LOCATION;  Service:     CARDIAC ELECTROPHYSIOLOGY PROCEDURE N/A 12/18/2017    Procedure: Loop insertion;  Surgeon: Mary Ann Blackwell MD;  Location:  [facility] INVASIVE LOCATION;  Service:     CARDIAC ELECTROPHYSIOLOGY PROCEDURE N/A 11/06/2019    Procedure: Loop recorder removal;  Surgeon: Branden Chatterjee MD;  Location: [facility] CATH INVASIVE LOCATION;  Service: Cardiovascular    CATARACT EXTRACTION W/ INTRAOCULAR LENS IMPLANT Left 12/20/2021    Procedure: CATARACT PHACO EXTRACTION WITH INTRAOCULAR LENS IMPLANT LEFT;  Surgeon: Arthur Mcdonald MD;  Location: [facility] OR;  Service: Ophthalmology;  Laterality: Left;    CATARACT EXTRACTION W/ INTRAOCULAR LENS IMPLANT Right 01/03/2022    Procedure: CATARACT PHACO EXTRACTION WITH INTRAOCULAR LENS IMPLANT RIGHT WITH VIVITY LENS;  Surgeon: Arthur Mcdonald MD;  Location: [facility] OR;  Service: Ophthalmology;  Laterality: Right;    CHOLECYSTECTOMY      2004    COLON RESECTION N/A 03/25/2022    Procedure: LAPAROSCOPIC  COLON RESECTION SIGMOIDECTOMY;  Surgeon: Arturo Kumar MD;  Location: [facility] OR;  Service: General;  Laterality: N/A;    COLONOSCOPY  12/10/2019    COLONOSCOPY  12/17/2021    Dr. Heredia- prep good, very tortuous colon, diverticular disease in sigmoid    CYSTOSCOPY  2017    DILATATION AND CURETTAGE      EAR TUBES Bilateral     ENDOSCOPY N/A 05/14/2025    Procedure: Esophagogastroduodenoscopy with biopsy;  Surgeon: Nereida Amaro MD;  Location: [facility] ENDOSCOPY;  Service: Gastroenterology;  Laterality: N/A;    ENDOVENOUS ABLATION SAPHENOUS VEIN W/ LASER Left 02/05/2024    ENDOVENOUS ABLATION SAPHENOUS VEIN W/ LASER  Right 2024    EYE SURGERY  2021 & 2022    Had cateracs removed from both eyes and multi focal implants put in both.    FOOT SURGERY Left     bone spur    HAMMER TOE REPAIR Left 2020    INGUINAL HERNIA REPAIR Left     x 3    JOINT REPLACEMENT  ,     Bilateral knee replacements    LUNG BIOPSY      Remote    LYMPH NODE BIOPSY      REPLACEMENT TOTAL KNEE BILATERAL Bilateral     THYROIDECTOMY, PARTIAL Left 2012    Dr. Cerda, Hugh    THYROIDECTOMY, PARTIAL Right 2012    Dr. Cerda, Hugh    TONSILLECTOMY  2020    Dr. Ethan Mcneill    TOTAL ABDOMINAL HYSTERECTOMY WITH SALPINGO OOPHORECTOMY Bilateral     TOTAL THYROIDECTOMY      completion thyroidectomy for follicular thyroid cancer.      TYMPANOSTOMY TUBE PLACEMENT Right 2020    Dr. Ethan Mcneill    UPPER GASTROINTESTINAL ENDOSCOPY      15-20 years ago    VASCULAR SURGERY  2024    Bi lateral leg vein ablations    VEIN SURGERY  2024    Both legs    VENTRAL/INCISIONAL HERNIA REPAIR N/A 10/27/2022    Procedure: INCISIONAL HERNIA REPAIR WITH MESH,  COMPONENT SEPARATION;  Surgeon: Arturo Kumar MD;  Location: [facility];  Service: General;  Laterality: N/A;    WISDOM TOOTH EXTRACTION         Social History     Socioeconomic History    Marital status:    Tobacco Use    Smoking status: Former     Current packs/day: 0.00     Average packs/day: 1.6 packs/day for 27.5 years (44.0 ttl pk-yrs)     Types: Cigarettes     Start date: 3/15/1974     Quit date: 1996     Years since quittin.9     Passive exposure: Past    Smokeless tobacco: Never    Tobacco comments:     Smoked menthol   Vaping Use    Vaping status: Never Used   Substance and Sexual Activity    Alcohol use: Not Currently     Comment: very rare    Drug use: No    Sexual activity: Not Currently     Partners: Male     Birth control/protection: Vasectomy, Hysterectomy         Review of Systems   Constitutional:  Positive for activity change. Negative  for appetite change, chills, diaphoresis, fatigue, fever and unexpected weight change.   HENT:  Negative for congestion, dental problem, drooling, ear discharge, ear pain, facial swelling, hearing loss, mouth sores, nosebleeds, postnasal drip, rhinorrhea, sinus pressure, sinus pain, sneezing, sore throat, tinnitus, trouble swallowing and voice change.    Eyes:  Negative for photophobia, pain, discharge, redness, itching and visual disturbance.   Respiratory:  Negative for apnea, cough, choking, chest tightness, shortness of breath, wheezing and stridor.    Cardiovascular:  Negative for chest pain, palpitations and leg swelling.   Gastrointestinal:  Negative for abdominal distention, abdominal pain, anal bleeding, blood in stool, constipation, diarrhea, nausea, rectal pain and vomiting.   Endocrine: Negative for cold intolerance, heat intolerance, polydipsia, polyphagia and polyuria.   Genitourinary:  Negative for decreased urine volume, difficulty urinating, dyspareunia, dysuria, enuresis, flank pain, frequency, genital sores, hematuria, menstrual problem, pelvic pain, urgency, vaginal bleeding, vaginal discharge and vaginal pain.   Musculoskeletal:  Positive for neck pain and neck stiffness. Negative for arthralgias, back pain, gait problem, joint swelling and myalgias.   Skin:  Negative for color change, pallor, rash and wound.   Allergic/Immunologic: Positive for environmental allergies and food allergies. Negative for immunocompromised state.   Neurological:  Negative for dizziness, tremors, seizures, syncope, facial asymmetry, speech difficulty, weakness, light-headedness, numbness and headaches.   Hematological:  Negative for adenopathy. Does not bruise/bleed easily.   Psychiatric/Behavioral:  Positive for sleep disturbance. Negative for agitation, behavioral problems, confusion, decreased concentration, dysphoric mood, hallucinations, self-injury and suicidal ideas. The patient is not nervous/anxious and is  "not hyperactive.        Objective   Vital Signs: Blood pressure 122/60, temperature 98.7 °F (37.1 °C), temperature source Temporal, height 175.3 cm (69\"), weight 119 kg (263 lb), not currently breastfeeding.  Physical Exam  Vitals and nursing note reviewed.   Constitutional:       Appearance: She is well-developed. She is not toxic-appearing.      Comments: Patient is tearful today.   HENT:      Head: Normocephalic and atraumatic.   Psychiatric:         Behavior: Behavior normal.         Thought Content: Thought content normal.     Musculoskeletal:     Strength is intact in upper and lower extremities to direct testing.     Station and gait are normal.     Range of motion of the left shoulder is very painful in the neck and down the arm  Neurologic:     Muscle tone is normal throughout.     Coordination is intact.     Deep tendon reflexes: Hyperreflexia in the right arm and lower extremities.       Left arm reflexes are diminished throughout.       Sensation is intact to light touch throughout.     Patient is oriented to person, place, and time.       Independent review of radiographic imaging: MRI of the cervical spine demonstrates diffuse spondylosis most pronounced at C6-7 where there is moderate disc bulge and bilateral neuroforaminal narrowing.  There is bilateral foraminal narrowing at C5-6.    Assessment & Plan   Diagnosis: Cervical spondylosis with radiculopathy    Medical Decision Making: Patient is doing better. Radicular symptoms improved but she still has neck pain. She does daily stretching. I have offered to send her to the pain clinic for some blocks but symptoms are not bad enough. She will let me know if she changes her mind. I will be happy to see her as needed.       Diagnoses and all orders for this visit:    1. Cervical radiculopathy (Primary)    2. DDD (degenerative disc disease), cervical    3. Cervical spondylosis                                    Rosy Basurto PA-C  Patient Care " Team:  Gordon Norman MD as PCP - General (Internal Medicine)  Khadijah Bernal MD as Surgeon (General Surgery)  Melina Simpson DO as Consulting Physician (Endocrinology)  Tim Jackman MD as Consulting Physician (Cardiology)  Rogelio Bartlett MD as Cardiologist (Cardiology)

## 2025-06-19 ENCOUNTER — OFFICE VISIT (OUTPATIENT)
Dept: INTERNAL MEDICINE | Facility: CLINIC | Age: 70
End: 2025-06-19
Payer: MEDICARE

## 2025-06-19 VITALS
SYSTOLIC BLOOD PRESSURE: 130 MMHG | HEART RATE: 73 BPM | HEIGHT: 69 IN | WEIGHT: 263 LBS | DIASTOLIC BLOOD PRESSURE: 58 MMHG | OXYGEN SATURATION: 98 % | TEMPERATURE: 98.5 F | BODY MASS INDEX: 38.95 KG/M2 | RESPIRATION RATE: 16 BRPM

## 2025-06-19 DIAGNOSIS — R30.0 DYSURIA: Primary | ICD-10-CM

## 2025-06-19 DIAGNOSIS — M43.12 SPONDYLOLISTHESIS OF CERVICAL REGION: ICD-10-CM

## 2025-06-19 DIAGNOSIS — M62.838 MUSCLE SPASM: ICD-10-CM

## 2025-06-19 LAB
BILIRUB BLD-MCNC: NEGATIVE MG/DL
CLARITY, POC: CLEAR
COLOR UR: ABNORMAL
EXPIRATION DATE: ABNORMAL
GLUCOSE UR STRIP-MCNC: NEGATIVE MG/DL
KETONES UR QL: NEGATIVE
LEUKOCYTE EST, POC: NEGATIVE
Lab: ABNORMAL
NITRITE UR-MCNC: NEGATIVE MG/ML
PH UR: 6 [PH] (ref 5–8)
PROT UR STRIP-MCNC: NEGATIVE MG/DL
RBC # UR STRIP: ABNORMAL /UL
SP GR UR: 1.03 (ref 1–1.03)
UROBILINOGEN UR QL: NORMAL

## 2025-06-19 NOTE — PROGRESS NOTES
Subjective     Patient ID: Albania Ramos is a 70 y.o. female. Patient is here for management of multiple medical problems.     Chief Complaint   Patient presents with    Neck Pain     Radiating into head with sharp pain    Dysuria     History of Present Illness     Wt is up. Neck pain is worse.     Pt is 240 lbs.     Pt states only eating fruit and veggies and chicken  Only water and tea unsweated.    Pt has had thyroid cancer not a candiate.       Pt thinking about CBD         The following portions of the patient's history were reviewed and updated as appropriate: allergies, current medications, past family history, past medical history, past social history, past surgical history and problem list.    Review of Systems    Current Outpatient Medications:     albuterol sulfate HFA (Ventolin HFA) 108 (90 Base) MCG/ACT inhaler, Inhale 2 puffs Every 4 (Four) Hours As Needed for Wheezing or Shortness of Air., Disp: 18 g, Rfl: 5    aspirin 81 MG EC tablet, Take 1 tablet by mouth Daily., Disp: , Rfl:     betamethasone valerate (VALISONE) 0.1 % cream, Apply TO BOTH ear canals gently with q-tip 2-3 times WEEKLY, Disp: , Rfl:     calcium carbonate (OS-SHANTE) 600 MG tablet, Take 1 tablet by mouth As Needed for Heartburn., Disp: , Rfl:     chlorhexidine (PERIDEX) 0.12 % solution, SWISH with 15 MILLILITERS FOR TWO minutes AND spit AFTER breakfast AND AFTER dinner, Disp: , Rfl:     cyanocobalamin (VITAMIN B-12) 2500 MCG tablet tablet, TAKE ONE TABLET BY MOUTH THREE TIMES A WEEK MUST MAKE AN APPOINTMENT (Patient taking differently: Take 1,200 mcg by mouth 1 (One) Time Per Week. MONDAYS), Disp: 30 tablet, Rfl: 5    cyclobenzaprine (FLEXERIL) 5 MG tablet, Take 1 tablet by mouth 3 (Three) Times a Day As Needed for Muscle Spasms., Disp: 45 tablet, Rfl: 1    Diclofenac Sodium (VOLTAREN) 1 % gel gel, Apply 4 g topically to the appropriate area as directed 4 (Four) Times a Day As Needed (pain)., Disp: 100 g, Rfl: 11    docusate sodium 100  MG capsule, Take 1 capsule by mouth 2 (Two) Times a Day As Needed for Constipation., Disp: 30 capsule, Rfl: 0    estradiol (ESTRACE) 0.1 MG/GM vaginal cream, Insert TWO grams vaginally daily AS NEEDED FOR vaginal dryness. Usually uses twice A WEEK (Patient taking differently: Insert 2 g into the vagina 3 (Three) Times a Week.), Disp: 42.5 g, Rfl: 11    ezetimibe (ZETIA) 10 MG tablet, Take 1 tablet by mouth Daily., Disp: 90 tablet, Rfl: 3    GLUCOSAMINE HCL-MSM PO, Take 1 tablet by mouth Daily., Disp: , Rfl:     ipratropium (ATROVENT) 0.06 % nasal spray, INSTILL 1 SPRAY IN EACH NOSTRIL ONCE DAILY AS NEEDED FOR RHINITIS, Disp: 15 mL, Rfl: 3    levothyroxine (SYNTHROID, LEVOTHROID) 150 MCG tablet, TAKE ONE TABLET BY MOUTH EVERY DAY, Disp: 90 tablet, Rfl: 2    metoprolol tartrate (LOPRESSOR) 25 MG tablet, Take 1 tablet by mouth 2 (Two) Times a Day., Disp: 180 tablet, Rfl: 3    MULTIPLE VITAMIN PO, Take 1 tablet by mouth Daily., Disp: , Rfl:     nitroglycerin (NITROSTAT) 0.4 MG SL tablet, 1 under the tongue as needed for angina, may repeat q5mins for up three doses (Patient taking differently: 1 tablet Every 5 (Five) Minutes As Needed for Chest Pain. 1 under the tongue as needed for angina, may repeat q5mins for up three doses), Disp: 100 tablet, Rfl: 11    NON FORMULARY, Take 2 tablets by mouth Daily. Cynthiablane ramiro mushrooms, Disp: , Rfl:     nystatin (MYCOSTATIN) 100,000 unit/mL suspension, Take 5 mL by mouth 4 (Four) Times a Day., Disp: 280 mL, Rfl: 0    ofloxacin (FLOXIN) 0.3 % otic solution, Administer 5 drops to the right ear As Needed (PRESSURE IN EARS)., Disp: , Rfl:     Omega-3 Fatty Acids (FISH OIL) 435 MG capsule, Take 1,000 mg by mouth Daily., Disp: , Rfl:     omeprazole (priLOSEC) 40 MG capsule, TAKE ONE CAPSULE BY MOUTH DAILY (Patient taking differently: Take 1 capsule by mouth Daily As Needed.), Disp: 90 capsule, Rfl: 3    SUMAtriptan (IMITREX) 25 MG tablet, Take 1 tablet by mouth As Needed for Migraine.,  "Disp: , Rfl:     tiotropium (SPIRIVA) 18 MCG per inhalation capsule, Place 1 capsule into inhaler and inhale Daily., Disp: 90 capsule, Rfl: 3    TURMERIC PO, Take 3 capsules by mouth Daily., Disp: , Rfl:     vitamin C (ASCORBIC ACID) 250 MG tablet, Take 1 tablet by mouth Daily., Disp: , Rfl:     Current Facility-Administered Medications:     triamcinolone acetonide (KENALOG-40) injection 40 mg, 40 mg, Intra-articular, Once, Gordon Norman MD    Objective      Blood pressure 130/58, pulse 73, temperature 98.5 °F (36.9 °C), resp. rate 16, height 175.3 cm (69\"), weight 119 kg (263 lb), SpO2 98%, not currently breastfeeding.            Physical Exam     General Appearance:    Alert, cooperative, no distress, appears stated age   Head:    Normocephalic, without obvious abnormality, atraumatic   Eyes:    PERRL, conjunctiva/corneas clear, EOM's intact   Ears:    Normal TM's and external ear canals, both ears   Nose:   Nares normal, septum midline, mucosa normal, no drainage   or sinus tenderness   Throat:   Lips, mucosa, and tongue normal; teeth and gums normal   Neck:   Supple, symmetrical, trachea midline, no adenopathy;        thyroid:  No enlargement/tenderness/nodules; no carotid    bruit or JVD   Back:     Symmetric, no curvature, ROM normal, no CVA tenderness   Lungs:     Clear to auscultation bilaterally, respirations unlabored   Chest wall:    No tenderness or deformity   Heart:    Regular rate and rhythm, S1 and S2 normal, no murmur,        rub or gallop   Abdomen:     Soft, non-tender, bowel sounds active all four quadrants,     no masses, no organomegaly   Extremities:   Extremities normal, atraumatic, no cyanosis or edema   Pulses:   2+ and symmetric all extremities   Skin:   Skin color, texture, turgor normal, no rashes or lesions   Lymph nodes:   Cervical, supraclavicular, and axillary nodes normal   Neurologic:   CNII-XII intact. Normal strength, sensation and reflexes       throughout      Results for " orders placed or performed in visit on 06/19/25   POCT urinalysis dipstick, automated    Collection Time: 06/19/25  3:20 PM    Specimen: Urine   Result Value Ref Range    Color Dark Yellow Yellow, Straw, Dark Yellow, Kelle    Clarity, UA Clear Clear    Specific Gravity  1.030 1.005 - 1.030    pH, Urine 6.0 5.0 - 8.0    Leukocytes Negative Negative    Nitrite, UA Negative Negative    Protein, POC Negative Negative mg/dL    Glucose, UA Negative Negative mg/dL    Ketones, UA Negative Negative    Urobilinogen, UA Normal Normal, 0.2 E.U./dL    Bilirubin Negative Negative    Blood, UA 2+ (A) Negative    Lot Number [id number]     Expiration Date 01/24/2027          Assessment & Plan   Will need to track calories and get in calori deficit.      Muscle cramping.  Start calm for mag replacement.     Hips and muscle cramping. Worse with starting zetia.        Diagnoses and all orders for this visit:    1. Dysuria (Primary)  -     POCT urinalysis dipstick, automated    2. Spondylolisthesis of cervical region  -     Ambulatory Referral to Orthopedic Surgery    3. Muscle spasm      Return in about 3 months (around 9/19/2025).          There are no Patient Instructions on file for this visit.     Gordon Norman MD    Assessment & Plan

## 2025-06-24 ENCOUNTER — OFFICE VISIT (OUTPATIENT)
Dept: PULMONOLOGY | Facility: CLINIC | Age: 70
End: 2025-06-24
Payer: MEDICARE

## 2025-06-24 VITALS
WEIGHT: 259 LBS | OXYGEN SATURATION: 94 % | SYSTOLIC BLOOD PRESSURE: 118 MMHG | RESPIRATION RATE: 18 BRPM | HEART RATE: 60 BPM | DIASTOLIC BLOOD PRESSURE: 68 MMHG | BODY MASS INDEX: 38.36 KG/M2 | HEIGHT: 69 IN

## 2025-06-24 DIAGNOSIS — J30.89 OTHER ALLERGIC RHINITIS: ICD-10-CM

## 2025-06-24 DIAGNOSIS — G47.33 OSA (OBSTRUCTIVE SLEEP APNEA): Primary | ICD-10-CM

## 2025-06-24 DIAGNOSIS — E66.812 OBESITY, CLASS II, BMI 35-39.9, ISOLATED (SEE ACTUAL BMI): ICD-10-CM

## 2025-06-24 DIAGNOSIS — J45.30 MILD PERSISTENT ASTHMA WITHOUT COMPLICATION: ICD-10-CM

## 2025-06-24 PROCEDURE — 99214 OFFICE O/P EST MOD 30 MIN: CPT | Performed by: INTERNAL MEDICINE

## 2025-06-24 PROCEDURE — 3074F SYST BP LT 130 MM HG: CPT | Performed by: INTERNAL MEDICINE

## 2025-06-24 PROCEDURE — 3078F DIAST BP <80 MM HG: CPT | Performed by: INTERNAL MEDICINE

## 2025-06-24 RX ORDER — TIOTROPIUM BROMIDE 18 UG/1
1 CAPSULE ORAL; RESPIRATORY (INHALATION)
Qty: 90 CAPSULE | Refills: 3 | Status: SHIPPED | OUTPATIENT
Start: 2025-06-24

## 2025-06-24 NOTE — PROGRESS NOTES
"  Chief Complaint   Patient presents with    Breathing Problem    Follow-up       Subjective   [place] Ramos is a 70 y.o. female.   Patient was evaluated today for follow up of asthma, and STEFAN.     Patient does not report any recent exacerbations requiring emergency room visits or hospitalizations.     Patient is compliant with pulmonary medicines, as prescribed.     she is currently on Spiriva. she is using the rescue inhalers minimally.     Patient says that she has been using her nasal sprays on a seasonal basis and describes no significant ongoing issues other than occasional congestion.     Patient doesn't report any issues with the PAP device. The patient describes no significant issues with her mask either.     Patient says that the compliance with the use of the equipment is good.     Patient says that her symptoms of fatigue & daytime sleepiness have been helped greatly with the use of PAP, as prescribed.       The following portions of the patient's history were reviewed and updated as appropriate: allergies, current medications, past family history, past medical history, past social history, and past surgical history.    Review of Systems   HENT:  Positive for postnasal drip and sinus pressure. Negative for sneezing and sore throat.    Respiratory:  Negative for cough, chest tightness, shortness of breath and wheezing.        Objective   Visit Vitals  /68   Pulse 60   Resp 18   Ht 175.3 cm (69\") Comment: pt reported   Wt 117 kg (259 lb) Comment: pt reported   LMP  (LMP Unknown)   SpO2 94%   BMI 38.25 kg/m²       BMI Readings from Last 8 Encounters:   06/24/25 38.25 kg/m²   06/19/25 38.84 kg/m²   06/03/25 38.84 kg/m²   05/07/25 38.53 kg/m²   04/25/25 38.38 kg/m²   04/04/25 38.84 kg/m²   03/25/25 38.25 kg/m²   03/05/25 38.37 kg/m²       Physical Exam  Vitals reviewed.   Constitutional:       Appearance: She is well-developed.   HENT:      Head: Atraumatic.      Mouth/Throat:      Comments: Oropharynx " was crowded.  Pulmonary:      Effort: Pulmonary effort is normal. No respiratory distress.      Breath sounds: Normal breath sounds. No wheezing or rales.   Neurological:      Mental Status: She is alert and oriented to person, place, and time.           Assessment & Plan   Diagnoses and all orders for this visit:    1. STEFAN (obstructive sleep apnea) (Primary)  -     BIPAP / CPAP Adjustment    2. Mild persistent asthma without complication    3. Other allergic rhinitis    4. Obesity, Class II, BMI 35-39.9, isolated (see actual BMI)    Other orders  -     tiotropium (SPIRIVA) 18 MCG per inhalation capsule; Place 1 capsule into inhaler and inhale Daily.  Dispense: 90 capsule; Refill: 3           Return in about 4 months (around 10/24/2025) for Recheck, For Nubia Valderrama).    DISCUSSION (if any):  It appears that her symptoms of asthma are under adequate control with the current regimen.    Patient's medications for underlying asthma were reviewed in great detail.    Compliance with medications stressed.     Side effects of prescribed medications discussed with the patient.    The need to continue to be aware of triggers that may cause asthma exacerbation versus progression of disease, was also discussed.    Patient was advised to continue her nasal spray on a seasonal basis, since her symptoms are consistent with seasonal allergic rhinitis.    Sleep study performed in Dec 2016  AHI was 22 / hour.   REM AHI was 54/hour.     Latest PAP device provided in mid 2022  DME company: [employer]    Current PAP settings: 11.5  Current mask type: Karin View FFM    I told the patient that her symptoms are consistent with partially controlled sleep apnea.    I have decided to empirically increase the pressure to 12 cm.    Patient was advised to continue using PAP for at least 4 hours every night.    Patient was advised to call this office with any issues.    Vaccination status addressed.    Up-to-date with influenza vaccinations.      Up-to-date with pneumonia vaccinations.     It was recommended that the patient consider Prevnar-20 vaccination and discuss it with her PCP, if not already obtained.       Dictated utilizing Dragon dictation.    This document was electronically signed by Moreno Mustafa MD on 06/24/25 at 10:17 EDT

## 2025-06-26 DIAGNOSIS — E89.0 POSTOPERATIVE HYPOTHYROIDISM: ICD-10-CM

## 2025-06-26 RX ORDER — LEVOTHYROXINE SODIUM 150 UG/1
150 TABLET ORAL DAILY
Qty: 90 TABLET | Refills: 0 | Status: SHIPPED | OUTPATIENT
Start: 2025-06-26

## 2025-06-26 NOTE — TELEPHONE ENCOUNTER
Rx Refill Note  Requested Prescriptions     Pending Prescriptions Disp Refills    levothyroxine (SYNTHROID, LEVOTHROID) 150 MCG tablet [Pharmacy Med Name: levothyroxine 150 mcg tablet] 90 tablet 2     Sig: take 1 tablet by mouth once daily      Last office visit with prescribing clinician: 7/11/2024   Last telemedicine visit with prescribing clinician: Visit date not found   Next office visit with prescribing clinician: 7/14/2025                         Would you like a call back once the refill request has been completed: [] Yes [] No    If the office needs to give you a call back, can they leave a voicemail: [] Yes [] No    Geraldine Malone MA  06/26/25, 10:25 EDT

## 2025-07-02 DIAGNOSIS — I48.0 PAROXYSMAL ATRIAL FIBRILLATION: Chronic | ICD-10-CM

## 2025-07-02 RX ORDER — METOPROLOL TARTRATE 25 MG/1
25 TABLET, FILM COATED ORAL 2 TIMES DAILY
Qty: 180 TABLET | Refills: 3 | Status: SHIPPED | OUTPATIENT
Start: 2025-07-02

## 2025-08-06 ENCOUNTER — OFFICE VISIT (OUTPATIENT)
Dept: OBSTETRICS AND GYNECOLOGY | Facility: CLINIC | Age: 70
End: 2025-08-06
Payer: MEDICARE

## 2025-08-06 VITALS
SYSTOLIC BLOOD PRESSURE: 110 MMHG | WEIGHT: 261 LBS | BODY MASS INDEX: 38.66 KG/M2 | DIASTOLIC BLOOD PRESSURE: 68 MMHG | HEIGHT: 69 IN

## 2025-08-06 DIAGNOSIS — N63.41 SUBAREOLAR MASS OF RIGHT BREAST: Primary | ICD-10-CM

## 2025-08-06 DIAGNOSIS — Z12.31 ENCOUNTER FOR SCREENING MAMMOGRAM FOR MALIGNANT NEOPLASM OF BREAST: ICD-10-CM

## 2025-08-11 ENCOUNTER — HOSPITAL ENCOUNTER (OUTPATIENT)
Dept: MAMMOGRAPHY | Facility: HOSPITAL | Age: 70
Discharge: HOME | End: 2025-08-11
Admitting: OBSTETRICS & GYNECOLOGY
Payer: MEDICARE

## 2025-08-11 DIAGNOSIS — N63.41 SUBAREOLAR MASS OF RIGHT BREAST: ICD-10-CM

## 2025-08-11 DIAGNOSIS — Z12.31 ENCOUNTER FOR SCREENING MAMMOGRAM FOR MALIGNANT NEOPLASM OF BREAST: ICD-10-CM

## 2025-08-11 PROCEDURE — 77063 BREAST TOMOSYNTHESIS BI: CPT

## 2025-08-11 PROCEDURE — 77067 SCR MAMMO BI INCL CAD: CPT

## 2025-08-12 ENCOUNTER — TELEPHONE (OUTPATIENT)
Dept: CARDIOLOGY | Facility: CLINIC | Age: 70
End: 2025-08-12
Payer: MEDICARE

## 2025-08-12 DIAGNOSIS — I48.0 PAROXYSMAL ATRIAL FIBRILLATION: Chronic | ICD-10-CM

## 2025-08-12 DIAGNOSIS — N32.89 BLADDER SPASM: ICD-10-CM

## 2025-08-12 PROCEDURE — 77063 BREAST TOMOSYNTHESIS BI: CPT | Performed by: RADIOLOGY

## 2025-08-12 PROCEDURE — 77067 SCR MAMMO BI INCL CAD: CPT | Performed by: RADIOLOGY

## 2025-08-12 RX ORDER — PHENAZOPYRIDINE HYDROCHLORIDE 200 MG/1
TABLET, FILM COATED ORAL
Qty: 6 TABLET | Refills: 0 | OUTPATIENT
Start: 2025-08-12

## 2025-08-12 RX ORDER — NITROGLYCERIN 0.4 MG/1
TABLET SUBLINGUAL
Qty: 100 TABLET | Refills: 11 | OUTPATIENT
Start: 2025-08-12

## 2025-08-13 DIAGNOSIS — M54.12 CERVICAL RADICULOPATHY: Primary | ICD-10-CM

## 2025-08-13 DIAGNOSIS — M50.30 DDD (DEGENERATIVE DISC DISEASE), CERVICAL: ICD-10-CM

## 2025-08-19 DIAGNOSIS — N63.41 SUBAREOLAR MASS OF RIGHT BREAST: Primary | ICD-10-CM

## 2025-08-19 DIAGNOSIS — Z12.31 ENCOUNTER FOR SCREENING MAMMOGRAM FOR MALIGNANT NEOPLASM OF BREAST: Primary | ICD-10-CM

## 2025-08-22 DIAGNOSIS — I48.0 PAROXYSMAL ATRIAL FIBRILLATION: Chronic | ICD-10-CM

## 2025-08-22 RX ORDER — NITROGLYCERIN 0.4 MG/1
TABLET SUBLINGUAL
Qty: 100 TABLET | Refills: 11 | Status: SHIPPED | OUTPATIENT
Start: 2025-08-22

## 2025-08-27 ENCOUNTER — HOSPITAL ENCOUNTER (OUTPATIENT)
Dept: CARDIOLOGY | Facility: HOSPITAL | Age: 70
Discharge: HOME | End: 2025-08-27
Admitting: INTERNAL MEDICINE
Payer: MEDICARE

## 2025-08-27 VITALS
WEIGHT: 260.14 LBS | DIASTOLIC BLOOD PRESSURE: 60 MMHG | HEIGHT: 69 IN | RESPIRATION RATE: 17 BRPM | OXYGEN SATURATION: 95 % | HEART RATE: 67 BPM | BODY MASS INDEX: 38.53 KG/M2 | SYSTOLIC BLOOD PRESSURE: 107 MMHG

## 2025-08-27 DIAGNOSIS — I48.0 PAROXYSMAL ATRIAL FIBRILLATION: ICD-10-CM

## 2025-08-27 DIAGNOSIS — Z95.818 PRESENCE OF WATCHMAN LEFT ATRIAL APPENDAGE CLOSURE DEVICE: ICD-10-CM

## 2025-08-27 LAB
BH CV ECHO MEAS - AO ROOT DIAM: 3.3 CM
BH CV ECHO MEAS - RAP SYSTOLE: 8 MMHG
BH CV ECHO MEAS - RVSP: 31 MMHG
BH CV ECHO MEAS - TR MAX PG: 23 MMHG
BH CV VAS BP LEFT ARM: NORMAL MMHG

## 2025-08-27 PROCEDURE — 93319 3D ECHO IMG CGEN CAR ANOMAL: CPT

## 2025-08-27 PROCEDURE — 25010000002 FENTANYL CITRATE (PF) 50 MCG/ML SOLUTION: Performed by: INTERNAL MEDICINE

## 2025-08-27 PROCEDURE — 25010000002 MIDAZOLAM PER 1 MG: Performed by: INTERNAL MEDICINE

## 2025-08-27 PROCEDURE — 93312 ECHO TRANSESOPHAGEAL: CPT

## 2025-08-27 PROCEDURE — 99152 MOD SED SAME PHYS/QHP 5/>YRS: CPT

## 2025-08-27 PROCEDURE — 93320 DOPPLER ECHO COMPLETE: CPT

## 2025-08-27 RX ORDER — FENTANYL CITRATE 50 UG/ML
INJECTION, SOLUTION INTRAMUSCULAR; INTRAVENOUS
Status: COMPLETED | OUTPATIENT
Start: 2025-08-27 | End: 2025-08-27

## 2025-08-27 RX ORDER — MIDAZOLAM HYDROCHLORIDE 1 MG/ML
INJECTION, SOLUTION INTRAMUSCULAR; INTRAVENOUS
Status: COMPLETED | OUTPATIENT
Start: 2025-08-27 | End: 2025-08-27

## 2025-08-27 RX ADMIN — MIDAZOLAM 2 MG: 1 INJECTION INTRAMUSCULAR; INTRAVENOUS at 14:08

## 2025-08-27 RX ADMIN — MIDAZOLAM 2 MG: 1 INJECTION INTRAMUSCULAR; INTRAVENOUS at 14:04

## 2025-08-27 RX ADMIN — FENTANYL CITRATE 50 MCG: 50 INJECTION, SOLUTION INTRAMUSCULAR; INTRAVENOUS at 14:05

## 2025-08-27 RX ADMIN — FENTANYL CITRATE 50 MCG: 50 INJECTION, SOLUTION INTRAMUSCULAR; INTRAVENOUS at 14:08

## (undated) DEVICE — LEX GENERAL ABDOMINAL SPLIT: Brand: MEDLINE INDUSTRIES, INC.

## (undated) DEVICE — 3M™ STERI-DRAPE™ INSTRUMENT POUCH 1018: Brand: STERI-DRAPE™

## (undated) DEVICE — DRAPE,UNDERBUTTOCKS,PCH,STERILE: Brand: MEDLINE

## (undated) DEVICE — TBG PENCL TELESCP MEGADYNE SMOKE EVAC 10FT

## (undated) DEVICE — INTENDED FOR TISSUE SEPARATION, AND OTHER PROCEDURES THAT REQUIRE A SHARP SURGICAL BLADE TO PUNCTURE OR CUT.: Brand: BARD-PARKER ® STAINLESS STEEL BLADES

## (undated) DEVICE — EYE CARE POST OP KIT: Brand: MEDLINE INDUSTRIES, INC.

## (undated) DEVICE — ST EXT IV SMRTSTE 2VLV FIX M LL 6ML 41

## (undated) DEVICE — ADULT, W/LG. BACK PAD, RADIOTRANSPARENT ELEMENT AND LEAD WIRE COMPATIBLE W/: Brand: DEFIBRILLATION ELECTRODES

## (undated) DEVICE — Device: Brand: MEDEX

## (undated) DEVICE — PK LAP LASR CHOLE 10

## (undated) DEVICE — SOL NACL 0.9PCT 1000ML

## (undated) DEVICE — ST EXT IV SMARTSITE 2VLV SP M LL 5ML IV1

## (undated) DEVICE — CONTRL CONTRST CHMBRD W/TBG72IN REUS

## (undated) DEVICE — PINNACLE INTRODUCER SHEATH: Brand: PINNACLE

## (undated) DEVICE — SOL IRRIG H2O 1000ML STRL

## (undated) DEVICE — BLAKE SILICONE DRAIN, 15 FR ROUND, HUBLESS WITH 3/16" TROCAR: Brand: BLAKE

## (undated) DEVICE — HYBRID CO2 TUBING/CAP SET FOR OLYMPUS® SCOPES & CO2 SOURCE: Brand: ERBE

## (undated) DEVICE — SUT MNCRYL PLS ANTIB UD 4/0 PS2 18IN

## (undated) DEVICE — ENDOPATH XCEL BLADELESS TROCARS WITH STABILITY SLEEVES: Brand: ENDOPATH XCEL

## (undated) DEVICE — SUT SILK 3/0 SH CR8 18IN C013D

## (undated) DEVICE — 1 X VERSACROSS CONNECT TRANSSEPTAL DILATOR (INCLUDING 1 X J-TIP MECHANICAL GUIDEWIRE); 1 X VERSACROSS RF WIRE (INCLUDING 1 X CONNECTOR CABLE (SINGLE USE)); 1 X DISPERSIVE ELECTRODE: Brand: VERSACROSS CONNECT LAAC ACCESS SOLUTION

## (undated) DEVICE — GLV SURG BIOGEL LTX PF 7

## (undated) DEVICE — SUT PDS LP 1 TP1 96IN VIO PDP880GA

## (undated) DEVICE — SAFESECURE,SECUREMENT,FOLEY CATH,STERILE: Brand: MEDLINE

## (undated) DEVICE — TRAP FLD MINIVAC MEGADYNE 100ML

## (undated) DEVICE — SUT SILK 3/0 SH 30IN K832H

## (undated) DEVICE — ENDOPATH XCEL BLUNT TIP TROCARS WITH SMOOTH SLEEVES: Brand: ENDOPATH XCEL

## (undated) DEVICE — SYR LUERLOK 5CC

## (undated) DEVICE — LEX ELECTRO PHYSIOLOGY: Brand: MEDLINE INDUSTRIES, INC.

## (undated) DEVICE — DOME MONITORING W BONDED STPCK BIOTRANS2

## (undated) DEVICE — SYS CLS VASC/VENI VASCADE/MVP 10TO12F XL

## (undated) DEVICE — GW DIAG .032

## (undated) DEVICE — INTRO SHEATH ENGAGE W/50 GW .038 9F12

## (undated) DEVICE — SUT PDS O CT1 CR/8 18IN Z740D

## (undated) DEVICE — SET PRIMARY GRVTY 10DP MALE LL 104IN

## (undated) DEVICE — SUT VIC 0 UR6 27IN VCP603H

## (undated) DEVICE — LAPAROSCOPIC SMOKE FILTRATION SYSTEM: Brand: PALL LAPAROSHIELD® PLUS LAPAROSCOPIC SMOKE FILTRATION SYSTEM

## (undated) DEVICE — UNDERGLV SURG BIOGEL INDICATOR LF PF 7.5

## (undated) DEVICE — APPL CHLORAPREP W/TINT 26ML BLU

## (undated) DEVICE — ST INF PRI SMRTSTE 20DRP 2VLV 24ML 117

## (undated) DEVICE — HP CONCL INTREPID COAX I/A CRV .3MM

## (undated) DEVICE — CATHETER,URETHRAL,REDRUBBER,STRL,18FR: Brand: MEDLINE

## (undated) DEVICE — SILICONE CLAMP COVERS, STERILE, BLUE, 0.29" (7.40MM) X 5", 2/PKG: Brand: KEY SURGICAL SILICONE CLAMP COVERS

## (undated) DEVICE — SUT ETHLN 2/0 PS 18IN 585H

## (undated) DEVICE — SUT SILK 2/0 SH 30IN K833H

## (undated) DEVICE — ADULT NASAL CO2 SAMPLING WITH O2 DELIVERY CANNULA FOR CAPNOFLEX MODULE: Brand: VITAL SIGNS™

## (undated) DEVICE — MEDI-VAC YANKAUER SUCTION HANDLE: Brand: CARDINAL HEALTH

## (undated) DEVICE — PATIENT RETURN ELECTRODE, SINGLE-USE, CONTACT QUALITY MONITORING, ADULT, WITH 9FT CORD, FOR PATIENTS WEIGING OVER 33LBS. (15KG): Brand: MEGADYNE

## (undated) DEVICE — 450 ML BOTTLE OF 0.05% CHLORHEXIDINE GLUCONATE IN 99.95% STERILE WATER FOR IRRIGATION, USP AND APPLICATOR.: Brand: IRRISEPT ANTIMICROBIAL WOUND LAVAGE

## (undated) DEVICE — CANISTER, RIGID, 2000CC: Brand: MEDLINE INDUSTRIES, INC.

## (undated) DEVICE — WOUND RETRACTOR AND PROTECTOR: Brand: ALEXIS O WOUND PROTECTOR-RETRACTOR

## (undated) DEVICE — ELECTRD BLD EZ CLN STD 6.5IN

## (undated) DEVICE — JACKSON-PRATT 100CC BULB RESERVOIR: Brand: CARDINAL HEALTH

## (undated) DEVICE — SPNG LAP PREWSH SFTPK 18X18IN STRL PK/5

## (undated) DEVICE — CATH DIAG EXPO .052 PIG145 6F 110CM

## (undated) DEVICE — 15 DEG. MICROKNIFE - 3MM: Brand: SHARPOINT

## (undated) DEVICE — ENSEAL X1 TISSUE SEALER, CURVED JAW, 37 CM SHAFT LENGTH: Brand: ENSEAL

## (undated) DEVICE — ECHELON FLEX POWERED PLUS ARTICULATING ENDOSCOPIC LINEAR CUTTER , 60MM: Brand: ECHELON FLEX

## (undated) DEVICE — ENDOPATH XCEL UNIVERSAL TROCAR STABLILITY SLEEVES: Brand: ENDOPATH XCEL

## (undated) DEVICE — PREMIUM DRY TRAY LF: Brand: MEDLINE INDUSTRIES, INC.

## (undated) DEVICE — Device

## (undated) DEVICE — ACCESS SHEATH WITH DILATOR: Brand: WATCHMAN TRUSTEER™ ACCESS SYSTEM

## (undated) DEVICE — ANTIBACTERIAL UNDYED BRAIDED (POLYGLACTIN 910), SYNTHETIC ABSORBABLE SUTURE: Brand: COATED VICRYL

## (undated) DEVICE — LEGGINGS, PAIR, 29X43, STERILE: Brand: MEDLINE

## (undated) DEVICE — 30977 SEE SHARP - ENHANCED INTRAOPERATIVE LAPAROSCOPE CLEANING & DEFOGGING: Brand: 30977 SEE SHARP - ENHANCED INTRAOPERATIVE LAPAROSCOPE CLEANING & DEFOGGING

## (undated) DEVICE — DRSNG SURESITE WNDW 4X4.5

## (undated) DEVICE — DRSNG SURESITE123 4X4.8IN

## (undated) DEVICE — [HIGH FLOW INSUFFLATOR,  DO NOT USE IF PACKAGE IS DAMAGED,  KEEP DRY,  KEEP AWAY FROM SUNLIGHT,  PROTECT FROM HEAT AND RADIOACTIVE SOURCES.]: Brand: PNEUMOSURE

## (undated) DEVICE — CONMED SCOPE SAVER BITE BLOCK, 20X27 MM: Brand: SCOPE SAVER

## (undated) DEVICE — 3M™ STERI-STRIP™ REINFORCED ADHESIVE SKIN CLOSURES, R1547, 1/2 IN X 4 IN (12 MM X 100 MM), 6 STRIPS/ENVELOPE: Brand: 3M™ STERI-STRIP™

## (undated) DEVICE — ABDOMINAL BINDER: Brand: DEROYAL

## (undated) DEVICE — INSTRUMENT TRAY: Brand: MEDLINE INDUSTRIES, INC.

## (undated) DEVICE — ENDOCUT SCISSOR TIP, DISPOSABLE: Brand: RENEW

## (undated) DEVICE — KT MANIFLD EP

## (undated) DEVICE — GLV SURG NEOPRN SENSICARE SZ8

## (undated) DEVICE — BNDR ABD PREMIUM/UNIV 10IN 27TO48IN

## (undated) DEVICE — IRRIGATOR BULB ASEPTO 60CC STRL

## (undated) DEVICE — SYS CLS VASC/VENI VASCADE MVP 6TO12F

## (undated) DEVICE — PENROSE DRAIN 18 X .5" SILICONE: Brand: MEDLINE

## (undated) DEVICE — SUT SILK 2/0 TIES 18IN A185H

## (undated) DEVICE — TOWEL,OR,DSP,ST,BLUE,STD,4/PK,20PK/CS: Brand: MEDLINE

## (undated) DEVICE — ENDOSCOPY PORT CONNECTOR FOR OLYMPUS® SCOPES: Brand: ERBE

## (undated) DEVICE — FRCP BX RADJAW4 NDL 2.8 240 STD OG

## (undated) DEVICE — MEDICINE CUP, GRADUATED, STER: Brand: MEDLINE

## (undated) DEVICE — LIMB HOLDER, WRIST/ANKLE: Brand: DEROYAL

## (undated) DEVICE — ST EXT IV SMARTSITE PINCH/CLMP 5ML 46CM

## (undated) DEVICE — TUBING, SUCTION, 1/4" X 10', STRAIGHT: Brand: MEDLINE

## (undated) DEVICE — TOTAL TRAY, 16FR 10ML SIL FOLEY, URN: Brand: MEDLINE

## (undated) DEVICE — ELECTRD RETRN/GRND MEGADYNE SGL/PLT W/CORD 9FT DISP

## (undated) DEVICE — CANN IRR/INJ AIR ANT CHAMBER 6MM BEND 27G

## (undated) DEVICE — CATH ULTRS NUVISION

## (undated) DEVICE — CLEARCUT® HP2 SLIT KNIFE INTREPID MICRO-COAXIAL SYSTEM 2.4 DB: Brand: CLEARCUT®; INTREPID

## (undated) DEVICE — CANNULA,ADULT,SOFT-TOUCH,7'TUBE,UC: Brand: PENDING

## (undated) DEVICE — CLTH CLENS READYCLEANSE PERI CARE PK/5

## (undated) DEVICE — DECANT BG O JET